# Patient Record
Sex: MALE | Race: BLACK OR AFRICAN AMERICAN | NOT HISPANIC OR LATINO | Employment: UNEMPLOYED | URBAN - METROPOLITAN AREA
[De-identification: names, ages, dates, MRNs, and addresses within clinical notes are randomized per-mention and may not be internally consistent; named-entity substitution may affect disease eponyms.]

---

## 2020-06-02 ENCOUNTER — TRANSCRIBE ORDERS (OUTPATIENT)
Dept: ADMINISTRATIVE | Facility: HOSPITAL | Age: 60
End: 2020-06-02

## 2020-06-02 ENCOUNTER — TRANSCRIBE ORDERS (OUTPATIENT)
Dept: LAB | Facility: HOSPITAL | Age: 60
End: 2020-06-02

## 2020-06-02 ENCOUNTER — APPOINTMENT (OUTPATIENT)
Dept: LAB | Facility: HOSPITAL | Age: 60
End: 2020-06-02
Payer: OTHER MISCELLANEOUS

## 2020-06-02 DIAGNOSIS — G89.4 CHRONIC PAIN SYNDROME: Primary | ICD-10-CM

## 2020-06-02 DIAGNOSIS — Z79.891 ENCOUNTER FOR LONG-TERM METHADONE USE: ICD-10-CM

## 2020-06-02 PROCEDURE — 80307 DRUG TEST PRSMV CHEM ANLYZR: CPT | Performed by: PAIN MEDICINE

## 2020-06-03 LAB
AMPHETAMINES UR QL SCN: NEGATIVE NG/ML
BARBITURATES UR QL SCN: NEGATIVE NG/ML
BENZODIAZ UR QL: NEGATIVE NG/ML
BZE UR QL: NEGATIVE NG/ML
CANNABINOIDS UR QL SCN: NEGATIVE NG/ML
METHADONE UR QL SCN: NEGATIVE NG/ML
OPIATES UR QL: NEGATIVE NG/ML
PCP UR QL: NEGATIVE NG/ML
PROPOXYPH UR QL SCN: NEGATIVE NG/ML

## 2020-09-22 ENCOUNTER — HOSPITAL ENCOUNTER (EMERGENCY)
Facility: HOSPITAL | Age: 60
Discharge: HOME | End: 2020-09-22
Attending: EMERGENCY MEDICINE | Admitting: EMERGENCY MEDICINE
Payer: COMMERCIAL

## 2020-09-22 VITALS
RESPIRATION RATE: 18 BRPM | DIASTOLIC BLOOD PRESSURE: 84 MMHG | SYSTOLIC BLOOD PRESSURE: 149 MMHG | OXYGEN SATURATION: 99 % | TEMPERATURE: 97.6 F | HEART RATE: 96 BPM

## 2020-09-22 DIAGNOSIS — M54.30 SCIATICA: Primary | ICD-10-CM

## 2020-09-22 DIAGNOSIS — G89.29 ACUTE EXACERBATION OF CHRONIC LOW BACK PAIN: ICD-10-CM

## 2020-09-22 DIAGNOSIS — M54.50 ACUTE EXACERBATION OF CHRONIC LOW BACK PAIN: ICD-10-CM

## 2020-09-22 PROCEDURE — 96372 THER/PROPH/DIAG INJ SC/IM: CPT

## 2020-09-22 PROCEDURE — 99284 EMERGENCY DEPT VISIT MOD MDM: CPT | Performed by: EMERGENCY MEDICINE

## 2020-09-22 PROCEDURE — 99283 EMERGENCY DEPT VISIT LOW MDM: CPT

## 2020-09-22 RX ORDER — NAPROXEN 500 MG/1
500 TABLET ORAL 2 TIMES DAILY WITH MEALS
Qty: 30 TABLET | Refills: 0 | Status: SHIPPED | OUTPATIENT
Start: 2020-09-22 | End: 2020-11-13 | Stop reason: DRUGHIGH

## 2020-09-22 RX ORDER — KETOROLAC TROMETHAMINE 30 MG/ML
30 INJECTION, SOLUTION INTRAMUSCULAR; INTRAVENOUS ONCE
Status: COMPLETED | OUTPATIENT
Start: 2020-09-22 | End: 2020-09-22

## 2020-09-22 RX ORDER — DIAZEPAM 5 MG/1
5 TABLET ORAL ONCE
Status: COMPLETED | OUTPATIENT
Start: 2020-09-22 | End: 2020-09-22

## 2020-09-22 RX ORDER — CYCLOBENZAPRINE HCL 10 MG
10 TABLET ORAL 2 TIMES DAILY PRN
Qty: 20 TABLET | Refills: 0 | Status: SHIPPED | OUTPATIENT
Start: 2020-09-22 | End: 2020-11-13

## 2020-09-22 RX ADMIN — DIAZEPAM 5 MG: 5 TABLET ORAL at 09:48

## 2020-09-22 RX ADMIN — KETOROLAC TROMETHAMINE 30 MG: 30 INJECTION, SOLUTION INTRAMUSCULAR at 09:48

## 2020-09-22 NOTE — ED PROVIDER NOTES
History  Chief Complaint   Patient presents with    Back Pain     chronic back pain since work injury last year, c/o lower back pain radiating to left leg        59-year-old gentleman presents complaint of recurrent back pains  He states that in 2019 he had a fall and since then has had recurrent and ongoing back pain issues  He does not currently have a family physician in this area as he does not have insurance coverage  He complains of pain that starts in his lumbar spine and radiates into his left lower extremity  Denies weakness but at times will have a mild numbness sensation  Denies any bowel or bladder incontinence issues or other acute neurologic complaints  Reports that in one week's time he has an appointment scheduled with his former primary care clinician and [place]  Back Pain   Location:  Lumbar spine  Quality:  Stabbing and stiffness  Radiates to:  L posterior upper leg and L thigh  Pain severity:  Severe  Onset quality:  Gradual  Timing:  Intermittent  Progression:  Waxing and waning  Chronicity:  Recurrent  Relieved by:  Nothing  Worsened by:  Palpation, movement, touching and twisting  Ineffective treatments: gabapentin - partial relief  Associated symptoms: no abdominal pain, no bladder incontinence, no bowel incontinence, no chest pain, no dysuria, no fever, no headaches, no numbness, no paresthesias (mild LLE at times - not currently), no perianal numbness and no weakness        None       Past Medical History:   Diagnosis Date    Back pain        History reviewed  No pertinent surgical history  History reviewed  No pertinent family history  I have reviewed and agree with the history as documented      E-Cigarette/Vaping     E-Cigarette/Vaping Substances     Social History     Tobacco Use    Smoking status: Never Smoker    Smokeless tobacco: Never Used   Substance Use Topics    Alcohol use: Not Currently    Drug use: Never       Review of Systems   Constitutional: Negative for activity change, chills, fatigue and fever  HENT: Negative  Negative for congestion, postnasal drip, rhinorrhea, sinus pain, sore throat and trouble swallowing  Eyes: Negative  Respiratory: Negative  Cardiovascular: Negative for chest pain  Gastrointestinal: Negative for abdominal pain, bowel incontinence, constipation, diarrhea, nausea and vomiting  Endocrine: Negative  Genitourinary: Negative  Negative for bladder incontinence and dysuria  Musculoskeletal: Positive for back pain  Negative for arthralgias, myalgias, neck pain and neck stiffness  Skin: Negative  Allergic/Immunologic: Negative  Neurological: Negative for weakness, numbness, headaches and paresthesias (mild LLE at times - not currently)  Hematological: Negative  Psychiatric/Behavioral: Negative  Physical Exam  Physical Exam  Vitals signs and nursing note reviewed  Constitutional:       General: He is not in acute distress  Appearance: He is well-developed  He is not diaphoretic  HENT:      Head: Normocephalic and atraumatic  Mouth/Throat:      Mouth: Mucous membranes are moist    Eyes:      Pupils: Pupils are equal, round, and reactive to light  Neck:      Musculoskeletal: Neck supple  Cardiovascular:      Rate and Rhythm: Normal rate and regular rhythm  Heart sounds: Normal heart sounds  Pulmonary:      Effort: Pulmonary effort is normal  No respiratory distress  Breath sounds: Normal breath sounds  Abdominal:      General: Bowel sounds are normal  There is no distension  Palpations: Abdomen is soft  Tenderness: There is no abdominal tenderness  There is no guarding  Musculoskeletal: Normal range of motion  Lumbar back: He exhibits tenderness and spasm  He exhibits no bony tenderness  Back:    Skin:     General: Skin is warm and dry  Capillary Refill: Capillary refill takes less than 2 seconds     Neurological:      General: No focal deficit present  Mental Status: He is alert and oriented to person, place, and time  Sensory: No sensory deficit  Motor: No weakness  Deep Tendon Reflexes: Reflexes normal    Psychiatric:         Mood and Affect: Mood normal          Behavior: Behavior normal          Vital Signs  ED Triage Vitals [09/22/20 0931]   Temperature Pulse Respirations Blood Pressure SpO2   97 6 °F (36 4 °C) 96 18 149/84 99 %      Temp Source Heart Rate Source Patient Position - Orthostatic VS BP Location FiO2 (%)   Tympanic Monitor Sitting Left arm --      Pain Score       --           Vitals:    09/22/20 0931   BP: 149/84   Pulse: 96   Patient Position - Orthostatic VS: Sitting         Visual Acuity      ED Medications  Medications   ketorolac (TORADOL) injection 30 mg (has no administration in time range)   diazepam (VALIUM) tablet 5 mg (5 mg Oral Given 9/22/20 0948)       Diagnostic Studies  Results Reviewed     None                 No orders to display              Procedures  Procedures         ED Course                                       MDM  Number of Diagnoses or Management Options  Acute exacerbation of chronic low back pain:   Sciatica:   Diagnosis management comments: 32-year-old gentleman presents with an acute exacerbation of his chronic low back pain  He does not currently have any red flag symptoms and already has a follow-up appointment scheduled in one weeks time with his primary care clinician  He is neurologically intact and we have put him in contact with registration to help him arrange for possible insurance coverage  He has been provided with medications here and was ambulatory out of the department        Disposition  Final diagnoses:   Sciatica   Acute exacerbation of chronic low back pain     Time reflects when diagnosis was documented in both MDM as applicable and the Disposition within this note     Time User Action Codes Description Comment    9/22/2020  9:41 AM Pardeep Muhammad Add [M54 9] Back pain     9/22/2020  9:41 AM [place] Clover Add [M54 30] Sciatica     9/22/2020  9:41 AM [place] Clover Add [M54 5,  G89 29] Acute exacerbation of chronic low back pain     9/22/2020  9:41 AM Arpan Vega Modify [M54 30] Sciatica     9/22/2020  9:41 AM Shantel Coma [M54 9] Back pain       ED Disposition     ED Disposition Condition Date/Time Comment    Discharge Stable Tue Sep 22, 2020  9:41 AM Rachna Comfort discharge to home/self care              Follow-up Information    None         Patient's Medications   Discharge Prescriptions    CYCLOBENZAPRINE (FLEXERIL) 10 MG TABLET    Take 1 tablet (10 mg total) by mouth 2 (two) times a day as needed for muscle spasms       Start Date: 9/22/2020 End Date: --       Order Dose: 10 mg       Quantity: 20 tablet    Refills: 0    NAPROXEN (NAPROSYN) 500 MG TABLET    Take 1 tablet (500 mg total) by mouth 2 (two) times a day with meals       Start Date: 9/22/2020 End Date: --       Order Dose: 500 mg       Quantity: 30 tablet    Refills: 0         PDMP Review     None          ED Provider  Electronically Signed by           Lorena Barrera DO  09/22/20 9305

## 2020-09-24 ENCOUNTER — TELEPHONE (OUTPATIENT)
Dept: PHYSICAL THERAPY | Facility: OTHER | Age: 60
End: 2020-09-24

## 2020-09-24 NOTE — TELEPHONE ENCOUNTER
Nurse reached out to discuss recent referral entered for  [facility] program and offerings  Nurse discussed calling site for pricing as he does not have current health insurance  Nurse also asked if he started the process yet as [facility] will allow pt to receive services prior to receiving card etc patient stated he was on the phone " a long time yesterday" about getting HI  Nurse stated she wanted to help him and call was disconnected by other party      Referral closed per protocol

## 2020-10-16 ENCOUNTER — OFFICE VISIT (OUTPATIENT)
Dept: FAMILY MEDICINE CLINIC | Facility: CLINIC | Age: 60
End: 2020-10-16

## 2020-10-16 VITALS
OXYGEN SATURATION: 99 % | HEART RATE: 89 BPM | TEMPERATURE: 97.8 F | RESPIRATION RATE: 18 BRPM | BODY MASS INDEX: 27.92 KG/M2 | SYSTOLIC BLOOD PRESSURE: 138 MMHG | DIASTOLIC BLOOD PRESSURE: 76 MMHG | HEIGHT: 70 IN | WEIGHT: 195 LBS

## 2020-10-16 DIAGNOSIS — M54.30 SCIATICA, UNSPECIFIED LATERALITY: ICD-10-CM

## 2020-10-16 DIAGNOSIS — M54.50 CHRONIC LOW BACK PAIN WITHOUT SCIATICA, UNSPECIFIED BACK PAIN LATERALITY: ICD-10-CM

## 2020-10-16 DIAGNOSIS — G89.29 CHRONIC LOW BACK PAIN WITHOUT SCIATICA, UNSPECIFIED BACK PAIN LATERALITY: ICD-10-CM

## 2020-10-16 DIAGNOSIS — N40.1 BENIGN PROSTATIC HYPERPLASIA WITH LOWER URINARY TRACT SYMPTOMS, SYMPTOM DETAILS UNSPECIFIED: ICD-10-CM

## 2020-10-16 DIAGNOSIS — I10 BENIGN ESSENTIAL HTN: Primary | ICD-10-CM

## 2020-10-16 DIAGNOSIS — E78.5 HYPERLIPIDEMIA, UNSPECIFIED HYPERLIPIDEMIA TYPE: ICD-10-CM

## 2020-10-16 DIAGNOSIS — E11.9 TYPE 2 DIABETES MELLITUS WITHOUT COMPLICATION, WITHOUT LONG-TERM CURRENT USE OF INSULIN (HCC): ICD-10-CM

## 2020-10-16 LAB — SL AMB POCT HEMOGLOBIN AIC: 6.2 (ref ?–6.5)

## 2020-10-16 PROCEDURE — 83036 HEMOGLOBIN GLYCOSYLATED A1C: CPT | Performed by: FAMILY MEDICINE

## 2020-10-16 PROCEDURE — 99202 OFFICE O/P NEW SF 15 MIN: CPT | Performed by: FAMILY MEDICINE

## 2020-10-16 RX ORDER — GABAPENTIN 800 MG/1
800 TABLET ORAL 3 TIMES DAILY
COMMUNITY
End: 2021-02-12 | Stop reason: SDUPTHER

## 2020-10-16 RX ORDER — ATORVASTATIN CALCIUM 10 MG/1
10 TABLET, FILM COATED ORAL DAILY
COMMUNITY
End: 2021-01-19 | Stop reason: SDUPTHER

## 2020-10-16 RX ORDER — HYDROCODONE BITARTRATE AND IBUPROFEN 7.5; 2 MG/1; MG/1
1 TABLET, FILM COATED ORAL EVERY 6 HOURS PRN
COMMUNITY
End: 2021-08-16 | Stop reason: SDUPTHER

## 2020-10-16 RX ORDER — AMLODIPINE BESYLATE 10 MG/1
10 TABLET ORAL DAILY
COMMUNITY
End: 2021-01-19 | Stop reason: SDUPTHER

## 2020-10-16 RX ORDER — TAMSULOSIN HYDROCHLORIDE 0.4 MG/1
0.4 CAPSULE ORAL
COMMUNITY
End: 2021-01-22 | Stop reason: SDUPTHER

## 2020-10-16 RX ORDER — CYCLOBENZAPRINE HCL 10 MG
10 TABLET ORAL 3 TIMES DAILY PRN
COMMUNITY
End: 2020-11-13 | Stop reason: SDUPTHER

## 2020-11-13 ENCOUNTER — APPOINTMENT (OUTPATIENT)
Dept: LAB | Facility: CLINIC | Age: 60
End: 2020-11-13
Payer: COMMERCIAL

## 2020-11-13 ENCOUNTER — OFFICE VISIT (OUTPATIENT)
Dept: FAMILY MEDICINE CLINIC | Facility: CLINIC | Age: 60
End: 2020-11-13

## 2020-11-13 VITALS
HEART RATE: 95 BPM | DIASTOLIC BLOOD PRESSURE: 80 MMHG | BODY MASS INDEX: 28.29 KG/M2 | HEIGHT: 70 IN | OXYGEN SATURATION: 97 % | SYSTOLIC BLOOD PRESSURE: 138 MMHG | WEIGHT: 197.6 LBS | RESPIRATION RATE: 18 BRPM | TEMPERATURE: 96.8 F

## 2020-11-13 DIAGNOSIS — Z12.5 SCREENING FOR PROSTATE CANCER: ICD-10-CM

## 2020-11-13 DIAGNOSIS — Z12.11 ENCOUNTER FOR SCREENING COLONOSCOPY: Primary | ICD-10-CM

## 2020-11-13 DIAGNOSIS — I10 BENIGN ESSENTIAL HTN: ICD-10-CM

## 2020-11-13 DIAGNOSIS — M54.30 SCIATICA, UNSPECIFIED LATERALITY: ICD-10-CM

## 2020-11-13 LAB
ALBUMIN SERPL BCP-MCNC: 4.2 G/DL (ref 3.5–5)
ALP SERPL-CCNC: 63 U/L (ref 46–116)
ALT SERPL W P-5'-P-CCNC: 45 U/L (ref 12–78)
ANION GAP SERPL CALCULATED.3IONS-SCNC: 5 MMOL/L (ref 4–13)
AST SERPL W P-5'-P-CCNC: 19 U/L (ref 5–45)
BASOPHILS # BLD AUTO: 0.02 THOUSANDS/ΜL (ref 0–0.1)
BASOPHILS NFR BLD AUTO: 0 % (ref 0–1)
BILIRUB SERPL-MCNC: 0.36 MG/DL (ref 0.2–1)
BUN SERPL-MCNC: 19 MG/DL (ref 5–25)
CALCIUM SERPL-MCNC: 9.6 MG/DL (ref 8.3–10.1)
CHLORIDE SERPL-SCNC: 107 MMOL/L (ref 100–108)
CO2 SERPL-SCNC: 28 MMOL/L (ref 21–32)
CREAT SERPL-MCNC: 1.19 MG/DL (ref 0.6–1.3)
EOSINOPHIL # BLD AUTO: 0.05 THOUSAND/ΜL (ref 0–0.61)
EOSINOPHIL NFR BLD AUTO: 1 % (ref 0–6)
ERYTHROCYTE [DISTWIDTH] IN BLOOD BY AUTOMATED COUNT: 13.2 % (ref 11.6–15.1)
GFR SERPL CREATININE-BSD FRML MDRD: 77 ML/MIN/1.73SQ M
GLUCOSE P FAST SERPL-MCNC: 102 MG/DL (ref 65–99)
HCT VFR BLD AUTO: 41.4 % (ref 36.5–49.3)
HGB BLD-MCNC: 14.6 G/DL (ref 12–17)
IMM GRANULOCYTES # BLD AUTO: 0.01 THOUSAND/UL (ref 0–0.2)
IMM GRANULOCYTES NFR BLD AUTO: 0 % (ref 0–2)
LYMPHOCYTES # BLD AUTO: 2.27 THOUSANDS/ΜL (ref 0.6–4.47)
LYMPHOCYTES NFR BLD AUTO: 48 % (ref 14–44)
MCH RBC QN AUTO: 29.8 PG (ref 26.8–34.3)
MCHC RBC AUTO-ENTMCNC: 35.3 G/DL (ref 31.4–37.4)
MCV RBC AUTO: 85 FL (ref 82–98)
MONOCYTES # BLD AUTO: 0.42 THOUSAND/ΜL (ref 0.17–1.22)
MONOCYTES NFR BLD AUTO: 9 % (ref 4–12)
NEUTROPHILS # BLD AUTO: 2.03 THOUSANDS/ΜL (ref 1.85–7.62)
NEUTS SEG NFR BLD AUTO: 42 % (ref 43–75)
NRBC BLD AUTO-RTO: 0 /100 WBCS
PLATELET # BLD AUTO: 261 THOUSANDS/UL (ref 149–390)
PMV BLD AUTO: 9.6 FL (ref 8.9–12.7)
POTASSIUM SERPL-SCNC: 3.9 MMOL/L (ref 3.5–5.3)
PROT SERPL-MCNC: 8 G/DL (ref 6.4–8.2)
RBC # BLD AUTO: 4.9 MILLION/UL (ref 3.88–5.62)
SODIUM SERPL-SCNC: 140 MMOL/L (ref 136–145)
WBC # BLD AUTO: 4.8 THOUSAND/UL (ref 4.31–10.16)

## 2020-11-13 PROCEDURE — 99213 OFFICE O/P EST LOW 20 MIN: CPT | Performed by: FAMILY MEDICINE

## 2020-11-13 PROCEDURE — 84153 ASSAY OF PSA TOTAL: CPT

## 2020-11-13 PROCEDURE — 80053 COMPREHEN METABOLIC PANEL: CPT

## 2020-11-13 PROCEDURE — 85025 COMPLETE CBC W/AUTO DIFF WBC: CPT

## 2020-11-13 PROCEDURE — 84154 ASSAY OF PSA FREE: CPT

## 2020-11-13 PROCEDURE — 36415 COLL VENOUS BLD VENIPUNCTURE: CPT

## 2020-11-13 RX ORDER — CYCLOBENZAPRINE HCL 10 MG
10 TABLET ORAL 3 TIMES DAILY PRN
Qty: 30 TABLET | Refills: 1 | Status: SHIPPED | OUTPATIENT
Start: 2020-11-13 | End: 2021-04-28 | Stop reason: SDUPTHER

## 2020-11-14 LAB
PSA FREE MFR SERPL: 21.4 %
PSA FREE SERPL-MCNC: 0.3 NG/ML
PSA SERPL-MCNC: 1.4 NG/ML (ref 0–4)

## 2020-11-17 ENCOUNTER — TELEPHONE (OUTPATIENT)
Dept: FAMILY MEDICINE CLINIC | Facility: CLINIC | Age: 60
End: 2020-11-17

## 2020-12-01 ENCOUNTER — TELEPHONE (OUTPATIENT)
Dept: FAMILY MEDICINE CLINIC | Facility: CLINIC | Age: 60
End: 2020-12-01

## 2020-12-02 ENCOUNTER — HOSPITAL ENCOUNTER (EMERGENCY)
Facility: HOSPITAL | Age: 60
Discharge: HOME | End: 2020-12-02
Attending: EMERGENCY MEDICINE | Admitting: EMERGENCY MEDICINE
Payer: COMMERCIAL

## 2020-12-02 VITALS
OXYGEN SATURATION: 100 % | BODY MASS INDEX: 28.32 KG/M2 | RESPIRATION RATE: 16 BRPM | TEMPERATURE: 97.9 F | HEART RATE: 84 BPM | DIASTOLIC BLOOD PRESSURE: 90 MMHG | SYSTOLIC BLOOD PRESSURE: 121 MMHG | WEIGHT: 197.4 LBS

## 2020-12-02 DIAGNOSIS — G89.29 CHRONIC BACK PAIN: Primary | ICD-10-CM

## 2020-12-02 DIAGNOSIS — M54.30 SCIATICA: ICD-10-CM

## 2020-12-02 DIAGNOSIS — M54.9 CHRONIC BACK PAIN: Primary | ICD-10-CM

## 2020-12-02 PROCEDURE — 96372 THER/PROPH/DIAG INJ SC/IM: CPT

## 2020-12-02 PROCEDURE — 99283 EMERGENCY DEPT VISIT LOW MDM: CPT

## 2020-12-02 PROCEDURE — 99283 EMERGENCY DEPT VISIT LOW MDM: CPT | Performed by: EMERGENCY MEDICINE

## 2020-12-02 RX ORDER — LIDOCAINE 50 MG/G
1 PATCH TOPICAL ONCE
Status: DISCONTINUED | OUTPATIENT
Start: 2020-12-02 | End: 2020-12-02 | Stop reason: HOSPADM

## 2020-12-02 RX ORDER — CYCLOBENZAPRINE HCL 10 MG
10 TABLET ORAL ONCE
Status: COMPLETED | OUTPATIENT
Start: 2020-12-02 | End: 2020-12-02

## 2020-12-02 RX ORDER — ACETAMINOPHEN 325 MG/1
975 TABLET ORAL ONCE
Status: COMPLETED | OUTPATIENT
Start: 2020-12-02 | End: 2020-12-02

## 2020-12-02 RX ORDER — KETOROLAC TROMETHAMINE 30 MG/ML
15 INJECTION, SOLUTION INTRAMUSCULAR; INTRAVENOUS ONCE
Status: COMPLETED | OUTPATIENT
Start: 2020-12-02 | End: 2020-12-02

## 2020-12-02 RX ADMIN — ACETAMINOPHEN 975 MG: 325 TABLET ORAL at 11:33

## 2020-12-02 RX ADMIN — KETOROLAC TROMETHAMINE 15 MG: 30 INJECTION, SOLUTION INTRAMUSCULAR; INTRAVENOUS at 11:33

## 2020-12-02 RX ADMIN — LIDOCAINE 1 PATCH: 50 PATCH CUTANEOUS at 11:36

## 2020-12-02 RX ADMIN — CYCLOBENZAPRINE HYDROCHLORIDE 10 MG: 10 TABLET, FILM COATED ORAL at 11:33

## 2020-12-03 DIAGNOSIS — Z12.11 ENCOUNTER FOR SCREENING COLONOSCOPY: Primary | ICD-10-CM

## 2020-12-08 ENCOUNTER — EVALUATION (OUTPATIENT)
Dept: PHYSICAL THERAPY | Facility: CLINIC | Age: 60
End: 2020-12-08
Payer: COMMERCIAL

## 2020-12-08 DIAGNOSIS — M54.30 SCIATICA, UNSPECIFIED LATERALITY: Primary | ICD-10-CM

## 2020-12-08 PROCEDURE — 97162 PT EVAL MOD COMPLEX 30 MIN: CPT

## 2020-12-11 ENCOUNTER — OFFICE VISIT (OUTPATIENT)
Dept: PHYSICAL THERAPY | Facility: CLINIC | Age: 60
End: 2020-12-11
Payer: COMMERCIAL

## 2020-12-11 DIAGNOSIS — M54.30 SCIATICA, UNSPECIFIED LATERALITY: Primary | ICD-10-CM

## 2020-12-11 PROCEDURE — 97530 THERAPEUTIC ACTIVITIES: CPT

## 2020-12-11 PROCEDURE — 97110 THERAPEUTIC EXERCISES: CPT

## 2020-12-14 ENCOUNTER — OFFICE VISIT (OUTPATIENT)
Dept: PHYSICAL THERAPY | Facility: CLINIC | Age: 60
End: 2020-12-14
Payer: COMMERCIAL

## 2020-12-14 DIAGNOSIS — M54.30 SCIATICA, UNSPECIFIED LATERALITY: Primary | ICD-10-CM

## 2020-12-14 PROCEDURE — 97110 THERAPEUTIC EXERCISES: CPT

## 2020-12-14 PROCEDURE — 97112 NEUROMUSCULAR REEDUCATION: CPT

## 2020-12-16 ENCOUNTER — OFFICE VISIT (OUTPATIENT)
Dept: PHYSICAL THERAPY | Facility: CLINIC | Age: 60
End: 2020-12-16
Payer: COMMERCIAL

## 2020-12-16 ENCOUNTER — TELEPHONE (OUTPATIENT)
Dept: FAMILY MEDICINE CLINIC | Facility: CLINIC | Age: 60
End: 2020-12-16

## 2020-12-16 DIAGNOSIS — M54.30 SCIATICA, UNSPECIFIED LATERALITY: Primary | ICD-10-CM

## 2020-12-16 PROCEDURE — 97150 GROUP THERAPEUTIC PROCEDURES: CPT

## 2020-12-16 PROCEDURE — 97010 HOT OR COLD PACKS THERAPY: CPT

## 2020-12-16 PROCEDURE — 97110 THERAPEUTIC EXERCISES: CPT

## 2020-12-16 PROCEDURE — 97530 THERAPEUTIC ACTIVITIES: CPT

## 2020-12-18 ENCOUNTER — OFFICE VISIT (OUTPATIENT)
Dept: PHYSICAL THERAPY | Facility: CLINIC | Age: 60
End: 2020-12-18
Payer: COMMERCIAL

## 2020-12-18 DIAGNOSIS — M54.30 SCIATICA, UNSPECIFIED LATERALITY: Primary | ICD-10-CM

## 2020-12-18 PROCEDURE — 97112 NEUROMUSCULAR REEDUCATION: CPT

## 2020-12-18 PROCEDURE — 97110 THERAPEUTIC EXERCISES: CPT

## 2020-12-18 PROCEDURE — 97530 THERAPEUTIC ACTIVITIES: CPT

## 2020-12-21 ENCOUNTER — OFFICE VISIT (OUTPATIENT)
Dept: PHYSICAL THERAPY | Facility: CLINIC | Age: 60
End: 2020-12-21
Payer: COMMERCIAL

## 2020-12-21 DIAGNOSIS — M54.30 SCIATICA, UNSPECIFIED LATERALITY: Primary | ICD-10-CM

## 2020-12-21 PROCEDURE — 97530 THERAPEUTIC ACTIVITIES: CPT

## 2020-12-21 PROCEDURE — 97112 NEUROMUSCULAR REEDUCATION: CPT

## 2020-12-21 PROCEDURE — 97110 THERAPEUTIC EXERCISES: CPT

## 2020-12-23 ENCOUNTER — OFFICE VISIT (OUTPATIENT)
Dept: PHYSICAL THERAPY | Facility: CLINIC | Age: 60
End: 2020-12-23
Payer: COMMERCIAL

## 2020-12-23 DIAGNOSIS — M54.30 SCIATICA, UNSPECIFIED LATERALITY: Primary | ICD-10-CM

## 2020-12-23 PROCEDURE — 97530 THERAPEUTIC ACTIVITIES: CPT

## 2020-12-23 PROCEDURE — 97112 NEUROMUSCULAR REEDUCATION: CPT

## 2020-12-24 ENCOUNTER — OFFICE VISIT (OUTPATIENT)
Dept: PHYSICAL THERAPY | Facility: CLINIC | Age: 60
End: 2020-12-24
Payer: COMMERCIAL

## 2020-12-24 DIAGNOSIS — M54.30 SCIATICA, UNSPECIFIED LATERALITY: Primary | ICD-10-CM

## 2020-12-24 PROCEDURE — 97530 THERAPEUTIC ACTIVITIES: CPT

## 2020-12-28 ENCOUNTER — OFFICE VISIT (OUTPATIENT)
Dept: PHYSICAL THERAPY | Facility: CLINIC | Age: 60
End: 2020-12-28
Payer: COMMERCIAL

## 2020-12-28 DIAGNOSIS — M54.30 SCIATICA, UNSPECIFIED LATERALITY: Primary | ICD-10-CM

## 2020-12-28 PROCEDURE — 97110 THERAPEUTIC EXERCISES: CPT

## 2020-12-28 PROCEDURE — 97112 NEUROMUSCULAR REEDUCATION: CPT

## 2020-12-30 ENCOUNTER — OFFICE VISIT (OUTPATIENT)
Dept: PHYSICAL THERAPY | Facility: CLINIC | Age: 60
End: 2020-12-30
Payer: COMMERCIAL

## 2020-12-30 DIAGNOSIS — M54.30 SCIATICA, UNSPECIFIED LATERALITY: Primary | ICD-10-CM

## 2020-12-30 PROCEDURE — 97112 NEUROMUSCULAR REEDUCATION: CPT

## 2020-12-30 PROCEDURE — 97530 THERAPEUTIC ACTIVITIES: CPT

## 2020-12-30 PROCEDURE — 97110 THERAPEUTIC EXERCISES: CPT

## 2020-12-31 ENCOUNTER — OFFICE VISIT (OUTPATIENT)
Dept: PHYSICAL THERAPY | Facility: CLINIC | Age: 60
End: 2020-12-31
Payer: COMMERCIAL

## 2020-12-31 DIAGNOSIS — M54.30 SCIATICA, UNSPECIFIED LATERALITY: Primary | ICD-10-CM

## 2020-12-31 PROCEDURE — 97112 NEUROMUSCULAR REEDUCATION: CPT

## 2020-12-31 PROCEDURE — 97110 THERAPEUTIC EXERCISES: CPT

## 2021-01-04 ENCOUNTER — APPOINTMENT (OUTPATIENT)
Dept: PHYSICAL THERAPY | Facility: CLINIC | Age: 61
End: 2021-01-04
Payer: COMMERCIAL

## 2021-01-06 ENCOUNTER — EVALUATION (OUTPATIENT)
Dept: PHYSICAL THERAPY | Facility: CLINIC | Age: 61
End: 2021-01-06
Payer: COMMERCIAL

## 2021-01-06 DIAGNOSIS — M54.30 SCIATICA, UNSPECIFIED LATERALITY: Primary | ICD-10-CM

## 2021-01-06 PROCEDURE — 97140 MANUAL THERAPY 1/> REGIONS: CPT

## 2021-01-06 PROCEDURE — 97110 THERAPEUTIC EXERCISES: CPT

## 2021-01-06 PROCEDURE — 97010 HOT OR COLD PACKS THERAPY: CPT

## 2021-01-06 NOTE — PROGRESS NOTES
PT Re-Evaluation     Today's date: 2021  Patient name: Kimi Nielsen  : 1960  MRN: [id number]  Referring provider: Mayur Braun MD  Dx:   Encounter Diagnosis     ICD-10-CM    1  Sciatica, unspecified laterality  M54 30                   Assessment  Assessment details: To date, Mr Rhianna Serrano has participated in a total of 12 skilled therapy sessions for tx of sciatica  Upon reassessment today, pt is making steady progress toward his goals  Objectively, he exhibits less pain during L/S AROM; improved stability during tandem stance TAMAR; increasing LE strength; decreased gait deviations; and dec TTP thru TAMAR iliac crests and QL  Subjectively, he reports an improved ability walking and notes reduced pain following days he attends therapy  He continues to report difficulty standing/walking prolonged periods of time which limits him from cooking and grocery shopping, stating that he requires frequent seated rest breaks while performing these activities  He also reports an improved ability completing bed mobility and now utilizes the log roll technique consistently  He still reports severe difficulty sleeping comfortably at night; negotiating stairs; donning his shoes/socks daily; and sitting comfortably as when driving for >62 minutes  He continues to present w/ strength deficits; gait deviations; mod dec and painful L/S AROM particularly during flex/ext; adverse neural tension thru the (L) sciatic nerve; decreased sensation thru the (L) LE; and TTP/inc tone thru the (R) L/S paraspinals  Marv would therefore benefit from 4 more weeks of PT intervention in order to address the aforementioned deficits so that he can return to his PLOF and function comfortably/safely in his home and surrounding environment    Impairments: abnormal gait, abnormal or restricted ROM, abnormal movement, activity intolerance, impaired balance, impaired physical strength, pain with function, poor posture  and poor body mechanics    Symptom irritability: moderateBarriers to therapy: (-) Chronicity of LBP; (+) motivated to improve  Understanding of Dx/Px/POC: good   Prognosis: good    Goals  STG 1: Pt will demonstrate compliance w/ HEP to supplement therapy in 1-2 weeks  MET  STG 2: Pt will report centralization of (L) LE pain by 50% in 2-4 weeks  NOT MET  STG 3: Pt will be able to sit comfortably for 1 hour to assist pt w/ ADLs such as driving in 2-4 weeks  PROGRESSING - 25 min  LTG 1: Pt will be able to sit comfortably for 2 hours in 6-8 weeks  PROGRESSING - 25 min  LTG 2: Pt will be able to walk 1 mile w/ the LRAD in 6-8 weeks  PROGRESSING - Pt able to walk about 7 min to car  LTG 3: Pt will be able to negotiate 1 flight of stairs w/ min difficulty in 6-8 weeks  NOT MET - pt reports continued difficulty  LTG 4: Pt will be able to roll over in bed w/ min difficulty in 6-8 weeks  PROGRESSING - pt reports improved ability since using log roll technique  LTG 5: Pt will be able to sleep comfortably at night w/out being woken by pain in 6-8 weeks  NOT MET  LTG 6: Pt will be able to don his shoes/socks w/ min difficulty in 6-8 weeks  NOT MET    Plan  Plan details: Reduce frequency to 2x/week and supplement therapy w/ HEP  Provide pt w/ updated HEP at [place]     Patient would benefit from: skilled physical therapy  Planned modality interventions: cryotherapy, TENS, traction and unattended electrical stimulation  Planned therapy interventions: abdominal trunk stabilization, self care, postural training, patient education, neuromuscular re-education, joint mobilization, manual therapy, massage, activity modification, balance, body mechanics training, breathing training, [place] taping, strengthening, stretching, therapeutic activities, coordination, flexibility, home exercise program, therapeutic exercise, functional ROM exercises and gait training  Frequency: 2x week  Duration in weeks: 4  Treatment plan discussed with: patient        Subjective Evaluation    History of Present Illness  Mechanism of injury: Pt states that since coming to therapy, there are times when he feels better and notes an improved ability walking  Pt continues to report severe burning pain thru the leg and back  He reports continued difficulty standing/walking prolonged periods of time as when cooking or grocery shopping  Pt also reports difficulty negotiating stairs daily and driving long distances  Pain  Current pain ratin  At best pain ratin  At worst pain ratin  Location: Pt reports pain across the LB as well as cramping/burning thru the (L) LE  Patient Goals  Patient goals for therapy: decreased pain  Patient goal: Pt would like to be able to get back to work  Objective     Concurrent Complaints  Positive for night pain and disturbed sleep  Negative for bladder dysfunction, bowel dysfunction and saddle (S4) numbness    Postural Observations    Additional Postural Observation Details  Rounded shoulders, slouched posture  Seated posture: Pt exhibits inc T/S kyphosis and dec L/s lordosis, inc lateral trunk lean  Correction of seated posture w/ use of L/S support improved symptoms  Palpation     Additional Palpation Details  Inc tone thru (R) L/S PS; mod-severe TTP thru TAMAR L/S PS; min TTP thru TAMAR iliac crests, QL       Neurological Testing     Sensation     Lumbar   Left   Diminished: light touch    Right   Intact: light touch    Comments   Left light touch: Diminished thru L3, L4    Active Range of Motion     Lumbar   Flexion:  with pain Restriction level: maximal  Extension:  with pain Restriction level: minimal  Left lateral flexion:  Restriction level: moderate  Right lateral flexion:  Restriction level: moderate  Left rotation:  Restriction level: minimal  Right rotation:  Restriction level: minimal    Strength/Myotome Testing     Left Hip   Planes of Motion   Flexion: 4  Abduction: 4- (Pain)    Right Hip   Planes of Motion   Flexion: 4+  Abduction: 4    Left Knee   Flexion: 4+  Extension: 4+    Right Knee   Flexion: 4+  Extension: 4+    Left Ankle/Foot   Dorsiflexion: 5  Plantar flexion: 5    Right Ankle/Foot   Dorsiflexion: 5  Plantar flexion: 5    Tests     Lumbar     Left   Positive passive SLR  Right   Negative passive SLR  General Comments:      Lumbar Comments  Tandem stance on (L): 20 secs, min sway, steady  Tandem stance on (R): 20 secs, min to no sway, steady  Gait: Dec speed, min antalgia on (L)  Precautions: Type 2 Diabetes; HTN; LBP; hyperlipidemia; benign prostate hyperplasia      Date 12/31 1/6 12/18 12/21 12/23 12/24 12/28 12/30   FOTO  XX    XX        Re-eval  XX               Manuals 12/31 1/6 12/18 12/21 12/23 12/24 12/28 12/30   BP             Re-eval  AL                                     Neuro Re-Ed 12/31 1/6 12/18 12/21 12/23 12/24 12/28 12/30   TVA Contractions  15x5" MHP   15x5" /c clam /c clam  5"x15 MPH D/c   SLR w/ TVA         10ea MHP    Supine march w/ TVA             H/L Clam w/ TVA     15x5" yellow 5"x30   Red 5"x30   Red      Slump nerve glides  nv           Mod front plank             Mod side plank             Palloff press          RTB 10x5" ea   Uni tband rows/ext w/ core RTB 10ea         RTB 10x ea   Shoulder taps w/ core on wall     10x ea   15x ea     Semi tandem stance on foam 20"x2ea         nv                Ther Ex 12/31 1/6 12/18 12/21 12/23 12/24 12/28 12/30   FIONA     3'         PPU     2x10 gentle        LTRs  10x10" MHP   10x10 ea 10"x10ea MHP 10"x10ea MHP  3' /c MHP    HS (S)**  4x15"w/ band supine w/ MHP   np 20"x5 MHP 10"x10ea  MHP  2' ea /c MHP supine    Piriformis (S)**     np        Bridges     10x  2x10   15x   Bridge w/ OH pulldown             Clams          20x   Sidelying hip abd  10ea         nv   KIM 10x /p Tx    10x     10x   Standing hip abd, ext 2x10ea green (B/L)    nv  10ea (B/L) green 15x ea      Leg press Ther Activity 12/31 1/6 12/18 12/21 12/23 12/24 12/28 12/30   Recumbent bike 6'     5' Lv1 5' L1 7'  7' Deferred  7'   Mini squats        x10     Sidesteps        1 laps     Fwd step ups nv            Lateral step ups nv            Standing hip hinge             Pt Edu     AL - HEP SP SP AL  AL- HEP review   Log roll      2' SP       Gait Training 12/31 1/6 12/18 12/21 12/23 12/24 12/28 12/30                             Modalities 12/31 1/6 12/18 12/21 12/23 12/24 12/28 12/30   MHP 5' post TE pre KIM 10' during Jono    Supine 5' + TE /c supine TE

## 2021-01-07 ENCOUNTER — HOSPITAL ENCOUNTER (EMERGENCY)
Facility: HOSPITAL | Age: 61
Discharge: HOME | End: 2021-01-07
Attending: EMERGENCY MEDICINE | Admitting: EMERGENCY MEDICINE
Payer: COMMERCIAL

## 2021-01-07 ENCOUNTER — APPOINTMENT (EMERGENCY)
Dept: RADIOLOGY | Facility: HOSPITAL | Age: 61
End: 2021-01-07
Payer: COMMERCIAL

## 2021-01-07 VITALS
OXYGEN SATURATION: 99 % | SYSTOLIC BLOOD PRESSURE: 134 MMHG | TEMPERATURE: 97.5 F | DIASTOLIC BLOOD PRESSURE: 79 MMHG | WEIGHT: 213.41 LBS | HEART RATE: 84 BPM | BODY MASS INDEX: 30.62 KG/M2 | RESPIRATION RATE: 18 BRPM

## 2021-01-07 DIAGNOSIS — M54.50 CHRONIC LOW BACK PAIN: ICD-10-CM

## 2021-01-07 DIAGNOSIS — R07.89 BURNING CHEST PAIN: Primary | ICD-10-CM

## 2021-01-07 DIAGNOSIS — G89.29 CHRONIC LOW BACK PAIN: ICD-10-CM

## 2021-01-07 LAB
ALBUMIN SERPL BCP-MCNC: 4.3 G/DL (ref 3–5.2)
ALP SERPL-CCNC: 63 U/L (ref 43–122)
ALT SERPL W P-5'-P-CCNC: 24 U/L (ref 9–52)
ANION GAP SERPL CALCULATED.3IONS-SCNC: 6 MMOL/L (ref 5–14)
AST SERPL W P-5'-P-CCNC: 23 U/L (ref 17–59)
BASOPHILS # BLD AUTO: 0 THOUSANDS/ΜL (ref 0–0.1)
BASOPHILS NFR BLD AUTO: 1 % (ref 0–1)
BILIRUB SERPL-MCNC: 0.5 MG/DL
BUN SERPL-MCNC: 14 MG/DL (ref 5–25)
CALCIUM SERPL-MCNC: 9.2 MG/DL (ref 8.4–10.2)
CHLORIDE SERPL-SCNC: 104 MMOL/L (ref 97–108)
CO2 SERPL-SCNC: 29 MMOL/L (ref 22–30)
CREAT SERPL-MCNC: 1.13 MG/DL (ref 0.7–1.5)
EOSINOPHIL # BLD AUTO: 0.1 THOUSAND/ΜL (ref 0–0.4)
EOSINOPHIL NFR BLD AUTO: 3 % (ref 0–6)
ERYTHROCYTE [DISTWIDTH] IN BLOOD BY AUTOMATED COUNT: 14 %
FLUAV RNA RESP QL NAA+PROBE: NEGATIVE
FLUBV RNA RESP QL NAA+PROBE: NEGATIVE
GFR SERPL CREATININE-BSD FRML MDRD: 82 ML/MIN/1.73SQ M
GLUCOSE SERPL-MCNC: 112 MG/DL (ref 70–99)
HCT VFR BLD AUTO: 39.8 % (ref 41–53)
HGB BLD-MCNC: 13.5 G/DL (ref 13.5–17.5)
LIPASE SERPL-CCNC: 133 U/L (ref 23–300)
LYMPHOCYTES # BLD AUTO: 2.3 THOUSANDS/ΜL (ref 0.5–4)
LYMPHOCYTES NFR BLD AUTO: 52 % (ref 25–45)
MCH RBC QN AUTO: 29.5 PG (ref 26–34)
MCHC RBC AUTO-ENTMCNC: 34 G/DL (ref 31–36)
MCV RBC AUTO: 87 FL (ref 80–100)
MONOCYTES # BLD AUTO: 0.3 THOUSAND/ΜL (ref 0.2–0.9)
MONOCYTES NFR BLD AUTO: 8 % (ref 1–10)
NEUTROPHILS # BLD AUTO: 1.6 THOUSANDS/ΜL (ref 1.8–7.8)
NEUTS SEG NFR BLD AUTO: 37 % (ref 45–65)
NT-PROBNP SERPL-MCNC: 21.5 PG/ML (ref 0–299)
PLATELET # BLD AUTO: 222 THOUSANDS/UL (ref 150–450)
PMV BLD AUTO: 7.3 FL (ref 8.9–12.7)
POTASSIUM SERPL-SCNC: 4 MMOL/L (ref 3.6–5)
PROT SERPL-MCNC: 7.6 G/DL (ref 5.9–8.4)
RBC # BLD AUTO: 4.59 MILLION/UL (ref 4.5–5.9)
RSV RNA RESP QL NAA+PROBE: NEGATIVE
SARS-COV-2 RNA RESP QL NAA+PROBE: NEGATIVE
SODIUM SERPL-SCNC: 139 MMOL/L (ref 137–147)
TROPONIN I SERPL-MCNC: <0.01 NG/ML (ref 0–0.03)
WBC # BLD AUTO: 4.4 THOUSAND/UL (ref 4.5–11)

## 2021-01-07 PROCEDURE — 83880 ASSAY OF NATRIURETIC PEPTIDE: CPT | Performed by: PHYSICIAN ASSISTANT

## 2021-01-07 PROCEDURE — 93005 ELECTROCARDIOGRAM TRACING: CPT

## 2021-01-07 PROCEDURE — 80053 COMPREHEN METABOLIC PANEL: CPT | Performed by: PHYSICIAN ASSISTANT

## 2021-01-07 PROCEDURE — 84484 ASSAY OF TROPONIN QUANT: CPT | Performed by: PHYSICIAN ASSISTANT

## 2021-01-07 PROCEDURE — 85025 COMPLETE CBC W/AUTO DIFF WBC: CPT | Performed by: PHYSICIAN ASSISTANT

## 2021-01-07 PROCEDURE — 71045 X-RAY EXAM CHEST 1 VIEW: CPT

## 2021-01-07 PROCEDURE — 99284 EMERGENCY DEPT VISIT MOD MDM: CPT | Performed by: PHYSICIAN ASSISTANT

## 2021-01-07 PROCEDURE — 0241U HB NFCT DS VIR RESP RNA 4 TRGT: CPT | Performed by: PHYSICIAN ASSISTANT

## 2021-01-07 PROCEDURE — 83690 ASSAY OF LIPASE: CPT | Performed by: PHYSICIAN ASSISTANT

## 2021-01-07 PROCEDURE — 96374 THER/PROPH/DIAG INJ IV PUSH: CPT

## 2021-01-07 PROCEDURE — 36415 COLL VENOUS BLD VENIPUNCTURE: CPT | Performed by: PHYSICIAN ASSISTANT

## 2021-01-07 PROCEDURE — 99285 EMERGENCY DEPT VISIT HI MDM: CPT

## 2021-01-07 RX ORDER — KETOROLAC TROMETHAMINE 30 MG/ML
15 INJECTION, SOLUTION INTRAMUSCULAR; INTRAVENOUS ONCE
Status: COMPLETED | OUTPATIENT
Start: 2021-01-07 | End: 2021-01-07

## 2021-01-07 RX ORDER — MAGNESIUM HYDROXIDE/ALUMINUM HYDROXICE/SIMETHICONE 120; 1200; 1200 MG/30ML; MG/30ML; MG/30ML
30 SUSPENSION ORAL ONCE
Status: COMPLETED | OUTPATIENT
Start: 2021-01-07 | End: 2021-01-07

## 2021-01-07 RX ORDER — METHOCARBAMOL 500 MG/1
500 TABLET, FILM COATED ORAL ONCE
Status: COMPLETED | OUTPATIENT
Start: 2021-01-07 | End: 2021-01-07

## 2021-01-07 RX ORDER — LIDOCAINE HYDROCHLORIDE 20 MG/ML
15 SOLUTION OROPHARYNGEAL ONCE
Status: COMPLETED | OUTPATIENT
Start: 2021-01-07 | End: 2021-01-07

## 2021-01-07 RX ORDER — ACETAMINOPHEN 325 MG/1
650 TABLET ORAL ONCE
Status: COMPLETED | OUTPATIENT
Start: 2021-01-07 | End: 2021-01-07

## 2021-01-07 RX ORDER — MELOXICAM 15 MG/1
15 TABLET ORAL DAILY
Qty: 20 TABLET | Refills: 0 | Status: SHIPPED | OUTPATIENT
Start: 2021-01-07 | End: 2021-09-10

## 2021-01-07 RX ORDER — LIDOCAINE HYDROCHLORIDE 20 MG/ML
JELLY TOPICAL AS NEEDED
Qty: 1 TUBE | Refills: 0 | Status: SHIPPED | OUTPATIENT
Start: 2021-01-07 | End: 2021-04-28 | Stop reason: SDUPTHER

## 2021-01-07 RX ORDER — ASPIRIN 81 MG/1
324 TABLET, CHEWABLE ORAL ONCE
Status: COMPLETED | OUTPATIENT
Start: 2021-01-07 | End: 2021-01-07

## 2021-01-07 RX ORDER — ALUMINUM HYDROXIDE, MAGNESIUM HYDROXIDE, SIMETHICONE 400; 400; 40 MG/10ML; MG/10ML; MG/10ML
15 SUSPENSION ORAL
Qty: 150 ML | Refills: 0 | Status: SHIPPED | OUTPATIENT
Start: 2021-01-07

## 2021-01-07 RX ORDER — LIDOCAINE 50 MG/G
1 PATCH TOPICAL ONCE
Status: DISCONTINUED | OUTPATIENT
Start: 2021-01-07 | End: 2021-01-07 | Stop reason: HOSPADM

## 2021-01-07 RX ADMIN — DIPHENHYDRAMINE HYDROCHLORIDE 25 MG: 25 LIQUID ORAL at 21:28

## 2021-01-07 RX ADMIN — METHOCARBAMOL TABLETS 500 MG: 500 TABLET, COATED ORAL at 20:45

## 2021-01-07 RX ADMIN — ALUMINUM HYDROXIDE, MAGNESIUM HYDROXIDE, AND SIMETHICONE 30 ML: 200; 200; 20 SUSPENSION ORAL at 21:30

## 2021-01-07 RX ADMIN — KETOROLAC TROMETHAMINE 15 MG: 30 INJECTION, SOLUTION INTRAMUSCULAR; INTRAVENOUS at 20:47

## 2021-01-07 RX ADMIN — LIDOCAINE HYDROCHLORIDE 15 ML: 20 SOLUTION ORAL; TOPICAL at 21:30

## 2021-01-07 RX ADMIN — LIDOCAINE 1 PATCH: 50 PATCH TOPICAL at 20:45

## 2021-01-07 RX ADMIN — ASPIRIN 81 MG CHEWABLE TABLET 324 MG: 81 TABLET CHEWABLE at 20:45

## 2021-01-07 RX ADMIN — ACETAMINOPHEN 650 MG: 325 TABLET ORAL at 20:45

## 2021-01-08 ENCOUNTER — OFFICE VISIT (OUTPATIENT)
Dept: PHYSICAL THERAPY | Facility: CLINIC | Age: 61
End: 2021-01-08
Payer: COMMERCIAL

## 2021-01-08 DIAGNOSIS — M54.30 SCIATICA, UNSPECIFIED LATERALITY: Primary | ICD-10-CM

## 2021-01-08 LAB
ATRIAL RATE: 89 BPM
P AXIS: 59 DEGREES
PR INTERVAL: 176 MS
QRS AXIS: 56 DEGREES
QRSD INTERVAL: 84 MS
QT INTERVAL: 364 MS
QTC INTERVAL: 442 MS
T WAVE AXIS: 6 DEGREES
VENTRICULAR RATE: 89 BPM

## 2021-01-08 PROCEDURE — 97112 NEUROMUSCULAR REEDUCATION: CPT

## 2021-01-08 PROCEDURE — 93010 ELECTROCARDIOGRAM REPORT: CPT | Performed by: INTERNAL MEDICINE

## 2021-01-08 PROCEDURE — 97530 THERAPEUTIC ACTIVITIES: CPT

## 2021-01-08 NOTE — ED PROCEDURE NOTE
Procedure  ECG 12 Lead Documentation Only    Date/Time: 1/7/2021 9:51 PM  Performed by: Baldev Triplett PA-C  Authorized by: Baldev Triplett PA-C     Indications / Diagnosis:  Burning chest pain  Patient location:  ED  Previous ECG:     Comparison to cardiac monitor: Yes    Interpretation:     Interpretation: normal    Rate:     ECG rate:  89    ECG rate assessment: normal    Rhythm:     Rhythm: sinus rhythm    Ectopy:     Ectopy: none    QRS:     QRS axis:  Normal    QRS intervals:  Normal  Conduction:     Conduction: normal    ST segments:     ST segments:  Normal  T waves:     T waves: normal    Comments:      qtc Shad 1923, FROYLAN  01/07/21 3630

## 2021-01-08 NOTE — ED PROVIDER NOTES
History  Chief Complaint   Patient presents with    Back Pain         Chest Pain     Patient is a 52-year-old male who presents today for evaluation of 2 complaints the 1st of which is a chronic left-sided low back pain which extends into his left leg at times and is managed with gabapentin and Flexeril patient reports he has not taken these medications today  Patient denies any direct traumatic inciting events, bowel or bladder incontinence, saddle anesthesia, numbness, tingling, weakness or paresthesias or paralysis associated with the pain  Patient denies any urinary symptoms or abdominal pain denies any nausea vomiting or diarrhea, fevers or chills  Patient reports no recent injections into the spine denies any IV drug use  Patient is also presenting today for sensation of "heartburn"  Patient describes a burning sensation in the midsternal area of his chest which he reports does radiate up into his throat  Patient reports he took Tums and sat up and drank some water and reports that this alleviated his pain  Patient denies any radiation of this pain into his back, the right-side chest or left-sided chest   Patient reports he has not had pain like this in the past denies any abdominal surgical history reports no burning/pain in his abdomen  Patient denies any palpitations or leg swelling recently  Patient reports no fevers or chills denies any shortness of breath         History provided by:  Patient   used: No    Back Pain  Location:  Lumbar spine  Quality:  Aching  Radiates to:  L posterior upper leg and L thigh  Pain severity:  Moderate  Pain is:  Same all the time  Onset quality:  Gradual  Associated symptoms: chest pain    Associated symptoms: no abdominal pain, no dysuria, no fever and no numbness    Chest Pain  Associated symptoms: back pain    Associated symptoms: no abdominal pain, no dizziness, no fatigue, no fever, no nausea, no numbness, no palpitations, no shortness of breath and not vomiting        Prior to Admission Medications   Prescriptions Last Dose Informant Patient Reported? Taking? HYDROcodone-ibuprofen (VICOPROFEN) 7 5-200 mg per tablet   Yes No   Sig: Take 1 tablet by mouth every 6 (six) hours as needed for moderate pain   amLODIPine (NORVASC) 10 mg tablet   Yes No   Sig: Take 10 mg by mouth daily   atorvastatin (LIPITOR) 10 mg tablet   Yes No   Sig: Take 10 mg by mouth daily   cyclobenzaprine (FLEXERIL) 10 mg tablet   No No   Sig: Take 1 tablet (10 mg total) by mouth 3 (three) times a day as needed for muscle spasms   gabapentin (NEURONTIN) 800 mg tablet  Self Yes No   Sig: Take 800 mg by mouth 3 (three) times a day   metFORMIN (GLUCOPHAGE) 500 mg tablet   Yes No   Sig: Take 500 mg by mouth 2 (two) times a day with meals   tamsulosin (FLOMAX) 0 4 mg   Yes No   Sig: Take 0 4 mg by mouth daily with dinner      Facility-Administered Medications: None       Past Medical History:   Diagnosis Date    Back pain        History reviewed  No pertinent surgical history  History reviewed  No pertinent family history  I have reviewed and agree with the history as documented  E-Cigarette/Vaping    E-Cigarette Use Never User      E-Cigarette/Vaping Substances     Social History     Tobacco Use    Smoking status: Never Smoker    Smokeless tobacco: Never Used   Substance Use Topics    Alcohol use: Not Currently    Drug use: Never       Review of Systems   Constitutional: Negative for chills, fatigue and fever  HENT: Negative for congestion, ear pain, rhinorrhea and sore throat  Eyes: Negative for redness  Respiratory: Negative for chest tightness and shortness of breath  Cardiovascular: Positive for chest pain  Negative for palpitations  Gastrointestinal: Negative for abdominal pain, nausea and vomiting  Genitourinary: Negative for dysuria and hematuria  Musculoskeletal: Positive for back pain  Skin: Negative for rash     Neurological: Negative for dizziness, syncope, light-headedness and numbness  Physical Exam  Physical Exam  Vitals signs and nursing note reviewed  Constitutional:       Appearance: Normal appearance  He is well-developed  HENT:      Head: Normocephalic and atraumatic  Eyes:      General: No scleral icterus  Pupils: Pupils are equal, round, and reactive to light  Neck:      Musculoskeletal: Normal range of motion  Cardiovascular:      Rate and Rhythm: Normal rate and regular rhythm  Pulses: Normal pulses  Pulmonary:      Effort: Pulmonary effort is normal  No respiratory distress  Breath sounds: No stridor  Abdominal:      General: There is no distension  Palpations: There is no mass  Musculoskeletal:      Lumbar back: He exhibits tenderness  Back:       Comments: No midline spinal tenderness, deformities, crepitus, step-off, skin changes noted to the area of pain  Skin:     General: Skin is warm and dry  Capillary Refill: Capillary refill takes less than 2 seconds  Coloration: Skin is not jaundiced  Neurological:      Mental Status: He is alert and oriented to person, place, and time        Gait: Gait normal    Psychiatric:         Mood and Affect: Mood normal          Vital Signs  ED Triage Vitals [01/07/21 2025]   Temperature Pulse Respirations Blood Pressure SpO2   97 5 °F (36 4 °C) 89 16 136/89 99 %      Temp Source Heart Rate Source Patient Position - Orthostatic VS BP Location FiO2 (%)   Tympanic Monitor Lying Left arm --      Pain Score       8           Vitals:    01/07/21 2025 01/07/21 2147   BP: 136/89 134/79   Pulse: 89 84   Patient Position - Orthostatic VS: Lying Lying         Visual Acuity      ED Medications  Medications   lidocaine (LIDODERM) 5 % patch 1 patch (1 patch Topical Medication Applied 1/7/21 2045)   aspirin chewable tablet 324 mg (324 mg Oral Given 1/7/21 2045)   ketorolac (TORADOL) injection 15 mg (15 mg Intravenous Given 1/7/21 2047)   acetaminophen (TYLENOL) tablet 650 mg (650 mg Oral Given 1/7/21 2045)   methocarbamol (ROBAXIN) tablet 500 mg (500 mg Oral Given 1/7/21 2045)   diphenhydrAMINE (BENADRYL) oral liquid 25 mg (25 mg Oral Given 1/7/21 2128)   Lidocaine Viscous HCl (XYLOCAINE) 2 % mucosal solution 15 mL (15 mL Swish & Swallow Given 1/7/21 2130)   aluminum-magnesium hydroxide-simethicone (MYLANTA) oral suspension 30 mL (30 mL Oral Given 1/7/21 2130)       Diagnostic Studies  Results Reviewed     Procedure Component Value Units Date/Time    COVID19, Influenza A/B, RSV PCR, [facility] [[id number]]  (Normal) Collected: 01/07/21 2039    Lab Status: Final result Specimen: Nares from Nasopharyngeal Swab Updated: 01/07/21 2136     SARS-CoV-2 Negative     INFLUENZA A PCR Negative     INFLUENZA B PCR Negative     RSV PCR Negative    Narrative: This test has been authorized by FDA under an EUA (Emergency Use Assay) for use by authorized laboratories  Clinical caution and judgement should be used with the interpretation of these results with consideration of the clinical impression and other laboratory testing  Testing reported as "Positive" or "Negative" has been proven to be accurate according to standard laboratory validation requirements  All testing is performed with control materials showing appropriate reactivity at standard intervals      Troponin I [[id number]]  (Normal) Collected: 01/07/21 2039    Lab Status: Final result Specimen: Blood from Arm, Right Updated: 01/07/21 2114     Troponin I <0 01 ng/mL     NT-BNP PRO [[id number]]  (Normal) Collected: 01/07/21 2039    Lab Status: Final result Specimen: Blood from Arm, Right Updated: 01/07/21 2114     NT-proBNP 21 5 pg/mL     Lipase [[id number]]  (Normal) Collected: 01/07/21 2039    Lab Status: Final result Specimen: Blood from Arm, Right Updated: 01/07/21 2108     Lipase 133 u/L     Narrative:      Hemolysis    Comprehensive metabolic panel [[id number]]  (Abnormal) Collected: 01/07/21 2039    Lab Status: Final result Specimen: Blood from Arm, Right Updated: 01/07/21 2108     Sodium 139 mmol/L      Potassium 4 0 mmol/L      Chloride 104 mmol/L      CO2 29 mmol/L      ANION GAP 6 mmol/L      BUN 14 mg/dL      Creatinine 1 13 mg/dL      Glucose 112 mg/dL      Calcium 9 2 mg/dL      AST 23 U/L      ALT 24 U/L      Alkaline Phosphatase 63 U/L      Total Protein 7 6 g/dL      Albumin 4 3 g/dL      Total Bilirubin 0 50 mg/dL      eGFR 82 ml/min/1 73sq m     Narrative:      Hemolysis  National Kidney Disease Foundation guidelines for Chronic Kidney Disease (CKD):     Stage 1 with normal or high GFR (GFR > 90 mL/min/1 73 square meters)    Stage 2 Mild CKD (GFR = 60-89 mL/min/1 73 square meters)    Stage 3A Moderate CKD (GFR = 45-59 mL/min/1 73 square meters)    Stage 3B Moderate CKD (GFR = 30-44 mL/min/1 73 square meters)    Stage 4 Severe CKD (GFR = 15-29 mL/min/1 73 square meters)    Stage 5 End Stage CKD (GFR <15 mL/min/1 73 square meters)  Note: GFR calculation is accurate only with a steady state creatinine    CBC and differential [[id number]]  (Abnormal) Collected: 01/07/21 2039    Lab Status: Final result Specimen: Blood from Arm, Right Updated: 01/07/21 2051     WBC 4 40 Thousand/uL      RBC 4 59 Million/uL      Hemoglobin 13 5 g/dL      Hematocrit 39 8 %      MCV 87 fL      MCH 29 5 pg      MCHC 34 0 g/dL      RDW 14 0 %      MPV 7 3 fL      Platelets 639 Thousands/uL      Neutrophils Relative 37 %      Lymphocytes Relative 52 %      Monocytes Relative 8 %      Eosinophils Relative 3 %      Basophils Relative 1 %      Neutrophils Absolute 1 60 Thousands/µL      Lymphocytes Absolute 2 30 Thousands/µL      Monocytes Absolute 0 30 Thousand/µL      Eosinophils Absolute 0 10 Thousand/µL      Basophils Absolute 0 00 Thousands/µL                  XR chest portable    (Results Pending)              Procedures  Procedures         ED Course  ED Course as of Jan 07 2151   Darleen Sidhu Jan 07, 2021 2138 Patient was reexamined at this time and informed of laboratory and/or imaging results and was found to be stable for discharge  Return to emergency department criteria was reviewed with the patient who verbalized understanding and was agreeable to discharge and the treatment plan at this time  HEART Risk Score      Most Recent Value   Heart Score Risk Calculator   History  0 Filed at: 01/07/2021 2115   ECG  0 Filed at: 01/07/2021 2115   Age  1 Filed at: 01/07/2021 2115   Risk Factors  1 Filed at: 01/07/2021 2115   Troponin  0 Filed at: 01/07/2021 2115   HEART Score  2 Filed at: 01/07/2021 2115                      SBIRT 22yo+      Most Recent Value   SBIRT (23 yo +)   In order to provide better care to our patients, we are screening all of our patients for alcohol and drug use  Would it be okay to ask you these screening questions? Unable to answer at this time Filed at: 01/07/2021 2057                    MDM  Number of Diagnoses or Management Options  Burning chest pain:   Chronic low back pain:   Diagnosis management comments: Denies history of severe or worsening lower extremity weakness/numbness  Denies history of saddle anesthesia/perineal anesthesia  Denies bowel or bladder incontinence/retention   History does not suggest diagnosis of cauda equina syndrome   Patient denies history of IVDA, denies history of fevers, no recent surgeries or any procedures to suggest a transient bacteremia leading to a diagnosis of subdural abscess  Denies history of blood thinner use with recent history of lumbar puncture or any violation of the epidural space to suggest history of epidural hematoma  Therefore these above diagnoses (cauda equina syndrome, epidural abscess, epidural hematoma) were not pursued with diagnostic imaging  All imaging and/or lab testing discussed with patient, strict return to ED precautions discussed  Patient recommended to follow up promptly with appropriate outpatient provider  Patient and/or family members verbalizes understanding and agrees with plan  Patient is stable for discharge      Portions of the record may have been created with voice recognition software  Occasional wrong word or "sound a like" substitutions may have occurred due to the inherent limitations of voice recognition software  Read the chart carefully and recognize, using context, where substitutions have occurred  Disposition  Final diagnoses:   Burning chest pain   Chronic low back pain     Time reflects when diagnosis was documented in both MDM as applicable and the Disposition within this note     Time User Action Codes Description Comment    1/7/2021  9:43 PM Jessica Ashlyn Add [R07 89] Burning chest pain     1/7/2021  9:43 PM Jessica Ashlyn Add [M54 5,  G89 29] Chronic low back pain       ED Disposition     ED Disposition Condition Date/Time Comment    Discharge Good u Jan 7, 2021  9:43 PM [employer] discharge to home/self care              Follow-up Information     Follow up With Specialties Details Why Contact Info    Opal Jacinto MD Family Medicine Schedule an appointment as soon as possible for a visit in 2 days As needed [facility] [address] [facility] [address]       [facility] Emergency Department Emergency Medicine  If symptoms worsen [address] [address] [phone]          Discharge Medication List as of 1/7/2021  9:45 PM      START taking these medications    Details   aluminum-magnesium hydroxide-simethicone (MAALOX) 200-200-20 MG/5ML SUSP Take 15 mL by mouth 4 (four) times a day (before meals and at bedtime), Starting u 1/7/2021, Normal      lidocaine (XYLOCAINE) 2 % topical gel Apply topically as needed (back pain), Starting u 1/7/2021, Normal      meloxicam (MOBIC) 15 mg tablet Take 1 tablet (15 mg total) by mouth daily, Starting Thu 1/7/2021, Normal         CONTINUE these medications which have NOT CHANGED    Details   amLODIPine (NORVASC) 10 mg tablet Take 10 mg by mouth daily, Historical Med      atorvastatin (LIPITOR) 10 mg tablet Take 10 mg by mouth daily, Historical Med      cyclobenzaprine (FLEXERIL) 10 mg tablet Take 1 tablet (10 mg total) by mouth 3 (three) times a day as needed for muscle spasms, Starting Fri 11/13/2020, Normal      gabapentin (NEURONTIN) 800 mg tablet Take 800 mg by mouth 3 (three) times a day, Historical Med      HYDROcodone-ibuprofen (VICOPROFEN) 7 5-200 mg per tablet Take 1 tablet by mouth every 6 (six) hours as needed for moderate pain, Historical Med      metFORMIN (GLUCOPHAGE) 500 mg tablet Take 500 mg by mouth 2 (two) times a day with meals, Historical Med      tamsulosin (FLOMAX) 0 4 mg Take 0 4 mg by mouth daily with dinner, Historical Med           No discharge procedures on file      PDMP Review     None          ED Provider  Electronically Signed by           Zackary Rodriguez PA-C  01/07/21 2149       Zackary Rodriguez PA-C  01/07/21 2659

## 2021-01-08 NOTE — PROGRESS NOTES
Daily Note     Today's date: 2021  Patient name: Nataly Draper  : 1960  MRN: [id number]  Referring provider: Diane Walsh MD  Dx:   Encounter Diagnosis     ICD-10-CM    1  Sciatica, unspecified laterality  M54 30                   Subjective: Pt states that he went the ER yesterday because he was experiencing burning thru his chest  He was given an IV of sorts which helped  This morning, pt states that he feels "a lot" better  Objective: See treatment diary below      Assessment: Tolerated treatment well  Added step ups and resumed squats to progress LE strength - pt fatigued by these additions  VC provided during today's tx session to improve upright posture when seated and occasionally to perform exercises w/ correct form  Provided pt updated HEP this AM and educated pt about goals of exercises/DOMs  Patient demonstrated fatigue post treatment, exhibited good technique with therapeutic exercises and would benefit from continued PT to progress core stab and functional LE strength to improve pt's tolerance to negotiating stairs daily in his home and community  Plan: Continue per plan of care  Progress treatment as tolerated  Precautions: Type 2 Diabetes; HTN; LBP; hyperlipidemia; benign prostate hyperplasia      Date    FOTO  XX           Re-eval  XX               Manuals    BP             Re-eval  AL                                     Neuro Re-Ed    TVA Contractions  15x5" MHP    /c clam /c clam  5"x15 MPH D/c   SLR w/ TVA         10ea MHP    Supine march w/ TVA             H/L Clam w/ TVA      5"x30   Red 5"x30   Red      Slump nerve glides  nv nv          Mod front plank             Mod side plank             Palloff press   GTB 10x5"       RTB 10x5" ea   Uni tband rows/ext w/ core RTB 10ea  GTB 10x ea       RTB 10x ea   Shoulder taps w/ core on wall        15x ea     Semi tandem stance on foam 20"x2ea  Tandem 2x20" ea       nv                Ther Ex 12/31 1/6 1/8 12/21 12/23 12/24 12/28 12/30   LTRs  10x10" MHP    10"x10ea MHP 10"x10ea MHP  3' /c MHP    HS (S)**  4x15"w/ band supine w/ MHP    20"x5 MHP 10"x10ea  MHP  2' ea /c MHP supine    Piriformis (S)**             Bridges       2x10   15x   Bridge w/ OH pulldown             Clams          20x   Sidelying hip abd  10ea         nv   KIM 10x /p Tx  nv       10x   Standing hip abd, ext 2x10ea green (B/L)  nv    10ea (B/L) green 15x ea      Leg press   nv                       Ther Activity 12/31 1/6 1/8 12/21 12/23 12/24 12/28 12/30   Recumbent bike 6'   10'   5' L1 7'  7' Deferred  7'   Mini squats   x10     x10     Sidesteps        1 laps     Fwd step ups nv  15x ea L1 Inc step         Lateral step ups nv  15x ea L1 Inc step         Standing hip hinge             Pt Edu   AL HEP   SP SP AL  AL- HEP review   Log roll      2' SP                    Gait Training 12/31 1/6 1/8 12/21 12/23 12/24 12/28 12/30                             Modalities 12/31 1/6 1/8 12/21 12/23 12/24 12/28 12/30   MHP 5' post TE pre KIM 10' during Jono    Supine 5' + TE /c supine TE

## 2021-01-11 ENCOUNTER — APPOINTMENT (OUTPATIENT)
Dept: PHYSICAL THERAPY | Facility: CLINIC | Age: 61
End: 2021-01-11
Payer: COMMERCIAL

## 2021-01-12 ENCOUNTER — CONSULT (OUTPATIENT)
Dept: SURGERY | Facility: CLINIC | Age: 61
End: 2021-01-12
Payer: COMMERCIAL

## 2021-01-12 VITALS
WEIGHT: 194 LBS | HEIGHT: 67 IN | TEMPERATURE: 99.2 F | BODY MASS INDEX: 30.45 KG/M2 | DIASTOLIC BLOOD PRESSURE: 84 MMHG | HEART RATE: 94 BPM | SYSTOLIC BLOOD PRESSURE: 132 MMHG

## 2021-01-12 DIAGNOSIS — Z12.11 ENCOUNTER FOR SCREENING COLONOSCOPY: ICD-10-CM

## 2021-01-12 PROCEDURE — 99204 OFFICE O/P NEW MOD 45 MIN: CPT | Performed by: SURGERY

## 2021-01-12 PROCEDURE — 3008F BODY MASS INDEX DOCD: CPT | Performed by: PHYSICIAN ASSISTANT

## 2021-01-12 PROCEDURE — 3079F DIAST BP 80-89 MM HG: CPT | Performed by: SURGERY

## 2021-01-12 PROCEDURE — 1036F TOBACCO NON-USER: CPT | Performed by: SURGERY

## 2021-01-12 PROCEDURE — 3008F BODY MASS INDEX DOCD: CPT | Performed by: SURGERY

## 2021-01-12 PROCEDURE — 3075F SYST BP GE 130 - 139MM HG: CPT | Performed by: SURGERY

## 2021-01-12 RX ORDER — HYDROCODONE BITARTRATE AND ACETAMINOPHEN 7.5; 325 MG/1; MG/1
1 TABLET ORAL EVERY 8 HOURS PRN
COMMUNITY
Start: 2020-12-11 | End: 2021-08-16 | Stop reason: SDUPTHER

## 2021-01-12 NOTE — PROGRESS NOTES
Assessment/Plan:  Discussed screening colonoscopy in detail including the bowel prep  Explained alternatives to colonoscopy  Discussed risks with procedure including abdominal discomfort/bloating, bleeding and very low risk perforation  Also explained the risk of missed adenoma due to poor bowel prep  Informed consent was obtained  Will schedule procedure within the next few weeks  He does not require any preoperative clearance, labs from  were reviewed  No problem-specific Assessment & Plan notes found for this encounter  Diagnoses and all orders for this visit:    Encounter for screening colonoscopy  -     Ambulatory referral to General Surgery    Other orders  -     HYDROcodone-acetaminophen (NORCO) 7 5-325 mg per tablet; Take 1 tablet by mouth every 8 (eight) hours as needed          Subjective:      Patient ID: Valeria Zuñiga is a 62 y o  male  He presents for screening colonoscopy  He has never had a colonoscopy before  He denies any melena or hematochezia  No abdominal pain or rectal pain  No constipation or diarrhea  He says his father  of colon cancer, diagnosed when he was in his 76s  The following portions of the patient's history were reviewed and updated as appropriate:   He  has a past medical history of Back pain  He   Patient Active Problem List    Diagnosis Date Noted    Benign essential HTN 10/16/2020    Chronic low back pain 10/16/2020    Hyperlipidemia 10/16/2020    Type 2 diabetes mellitus without complication, without long-term current use of insulin ([facility] [address] ) 10/16/2020    Benign prostatic hyperplasia with lower urinary tract symptoms 10/16/2020     He  has no past surgical history on file  His family history is not on file  He  reports that he has never smoked  He has never used smokeless tobacco  He reports previous alcohol use  He reports that he does not use drugs    Current Outpatient Medications   Medication Sig Dispense Refill    aluminum-magnesium hydroxide-simethicone (MAALOX) 200-200-20 MG/5ML SUSP Take 15 mL by mouth 4 (four) times a day (before meals and at bedtime) 150 mL 0    amLODIPine (NORVASC) 10 mg tablet Take 10 mg by mouth daily      atorvastatin (LIPITOR) 10 mg tablet Take 10 mg by mouth daily      cyclobenzaprine (FLEXERIL) 10 mg tablet Take 1 tablet (10 mg total) by mouth 3 (three) times a day as needed for muscle spasms 30 tablet 1    gabapentin (NEURONTIN) 800 mg tablet Take 800 mg by mouth 3 (three) times a day      HYDROcodone-acetaminophen (NORCO) 7 5-325 mg per tablet Take 1 tablet by mouth every 8 (eight) hours as needed      HYDROcodone-ibuprofen (VICOPROFEN) 7 5-200 mg per tablet Take 1 tablet by mouth every 6 (six) hours as needed for moderate pain      lidocaine (XYLOCAINE) 2 % topical gel Apply topically as needed (back pain) 1 Tube 0    meloxicam (MOBIC) 15 mg tablet Take 1 tablet (15 mg total) by mouth daily 20 tablet 0    metFORMIN (GLUCOPHAGE) 500 mg tablet Take 500 mg by mouth 2 (two) times a day with meals      tamsulosin (FLOMAX) 0 4 mg Take 0 4 mg by mouth daily with dinner       No current facility-administered medications for this visit        Current Outpatient Medications on File Prior to Visit   Medication Sig    aluminum-magnesium hydroxide-simethicone (MAALOX) 200-200-20 MG/5ML SUSP Take 15 mL by mouth 4 (four) times a day (before meals and at bedtime)    amLODIPine (NORVASC) 10 mg tablet Take 10 mg by mouth daily    atorvastatin (LIPITOR) 10 mg tablet Take 10 mg by mouth daily    cyclobenzaprine (FLEXERIL) 10 mg tablet Take 1 tablet (10 mg total) by mouth 3 (three) times a day as needed for muscle spasms    gabapentin (NEURONTIN) 800 mg tablet Take 800 mg by mouth 3 (three) times a day    HYDROcodone-acetaminophen (NORCO) 7 5-325 mg per tablet Take 1 tablet by mouth every 8 (eight) hours as needed    HYDROcodone-ibuprofen (VICOPROFEN) 7 5-200 mg per tablet Take 1 tablet by mouth every 6 (six) hours as needed for moderate pain    lidocaine (XYLOCAINE) 2 % topical gel Apply topically as needed (back pain)    meloxicam (MOBIC) 15 mg tablet Take 1 tablet (15 mg total) by mouth daily    metFORMIN (GLUCOPHAGE) 500 mg tablet Take 500 mg by mouth 2 (two) times a day with meals    tamsulosin (FLOMAX) 0 4 mg Take 0 4 mg by mouth daily with dinner     No current facility-administered medications on file prior to visit  He has No Known Allergies       Review of Systems   All other systems reviewed and are negative  Objective:      /84 (BP Location: Left arm, Patient Position: Sitting, Cuff Size: Adult)   Pulse 94   Temp 99 2 °F (37 3 °C) (Tympanic)   Ht 5' 7" (1 702 m)   Wt 88 kg (194 lb)   BMI 30 38 kg/m²          Physical Exam  Vitals signs reviewed  Constitutional:       General: He is not in acute distress  Appearance: Normal appearance  He is normal weight  HENT:      Head: Normocephalic and atraumatic  Eyes:      Extraocular Movements: Extraocular movements intact  Conjunctiva/sclera: Conjunctivae normal       Pupils: Pupils are equal, round, and reactive to light  Neck:      Musculoskeletal: Normal range of motion and neck supple  Cardiovascular:      Rate and Rhythm: Normal rate and regular rhythm  Pulmonary:      Effort: Pulmonary effort is normal  No respiratory distress  Breath sounds: Normal breath sounds  Abdominal:      General: Abdomen is flat  There is no distension  Palpations: Abdomen is soft  Tenderness: There is no abdominal tenderness  Musculoskeletal: Normal range of motion  General: No swelling or tenderness  Skin:     General: Skin is warm and dry  Neurological:      General: No focal deficit present  Mental Status: He is alert and oriented to person, place, and time  Psychiatric:         Mood and Affect: Mood normal          Behavior: Behavior normal          Thought Content:  Thought content normal          Judgment: Judgment normal

## 2021-01-13 ENCOUNTER — HOSPITAL ENCOUNTER (EMERGENCY)
Facility: HOSPITAL | Age: 61
Discharge: HOME | End: 2021-01-13
Attending: EMERGENCY MEDICINE
Payer: COMMERCIAL

## 2021-01-13 VITALS
OXYGEN SATURATION: 100 % | DIASTOLIC BLOOD PRESSURE: 95 MMHG | SYSTOLIC BLOOD PRESSURE: 150 MMHG | HEIGHT: 67 IN | BODY MASS INDEX: 29.93 KG/M2 | TEMPERATURE: 98.1 F | WEIGHT: 190.7 LBS | HEART RATE: 92 BPM | RESPIRATION RATE: 20 BRPM

## 2021-01-13 DIAGNOSIS — G89.29 CHRONIC BACK PAIN: Primary | ICD-10-CM

## 2021-01-13 DIAGNOSIS — M54.9 CHRONIC BACK PAIN: Primary | ICD-10-CM

## 2021-01-13 PROCEDURE — 99283 EMERGENCY DEPT VISIT LOW MDM: CPT

## 2021-01-13 PROCEDURE — 99282 EMERGENCY DEPT VISIT SF MDM: CPT | Performed by: PHYSICIAN ASSISTANT

## 2021-01-13 NOTE — ED NOTES
Pt was just tested on 1/7/21 for covid 19 to be re-tested     Chandrika Gorman, ENRIQUE  01/13/21 3914

## 2021-01-13 NOTE — ED PROVIDER NOTES
HPI: Patient is a 62 y o  male who presents with 4 days of myalgias which the patient describes at mild The patient has had contact with people with similar symptoms  The patient has not taken any medication  No Known Allergies    Past Medical History:   Diagnosis Date    Back pain       No past surgical history on file  Social History     Tobacco Use    Smoking status: Never Smoker    Smokeless tobacco: Never Used   Substance Use Topics    Alcohol use: Not Currently    Drug use: Never       Nursing notes reviewed  Physical Exam:  ED Triage Vitals [01/13/21 1333]   Temperature Pulse Respirations Blood Pressure SpO2   98 1 °F (36 7 °C) 92 20 150/95 100 %      Temp Source Heart Rate Source Patient Position - Orthostatic VS BP Location FiO2 (%)   Tympanic -- -- -- --      Pain Score       --           ROS: Positive for myalgia, the remainder of a 10 organ system ROS was otherwise unremarkable  General: awake, alert, no acute distress    Head: normocephalic, atraumatic    Eyes: no scleral icterus  Ears: external ears normal, hearing grossly intact  Nose: external exam grossly normal, negative nasal discharge  Neck: symmetric, No JVD noted, trachea midline  Pulmonary: no respiratory distress, no tachypnea noted  Cardiovascular: appears well perfused  Abdomen: no distention noted  Musculoskeletal: no deformities noted, tone normal  Neuro: grossly non-focal  Psych: mood and affect appropriate    60-year-old male with a past medical history moderate chronic back pain currently on hydrocodone, Lidoderm, meloxicam, and Flexeril presents to ED for request to have COVID-19 test secondary to worsening back pain  Patient's mother recently was positive COVID  Patient had COVID-19 test 4 days ago that was negative  Advised patient  That his symptoms are likely caused by worsening acute on chronic back pain    Also advised patient that if he was in close contact with his mother he is presumed positive and there be no need for further testing at this time  Patient otherwise has no other symptoms to include fever, cough, shortness of breath, dyspnea  Patient no acute distress  SpO2 100% on room air  The patient is stable and has a history and physical exam consistent with a viral illness  COVID19 testing has been performed  I considered the patient's other medical conditions as applicable/noted above in my medical decision making  The patient is stable upon discharge  The plan is for supportive care at home  The patient (and any family present) verbalized understanding of the discharge instructions and warnings that would necessitate return to the Emergency Department  All questions were answered prior to discharge  Medications - No data to display  Final diagnoses:   Chronic back pain     Time reflects when diagnosis was documented in both MDM as applicable and the Disposition within this note     Time User Action Codes Description Comment    1/13/2021  2:38 PM Isabela Byrd [M54 9,  G89 29] Chronic back pain       ED Disposition     ED Disposition Condition Date/Time Comment    Discharge Stable Wed Jan 13, 2021  2:38 PM Elvis Zurita discharge to home/self care  Follow-up Information     Follow up With Specialties Details Why Contact Info Additional [address] [facility]   [address]  [id number] [phone] [address] [facility], [address] [facility], [address] [place], [place], [id number] [phone]    [facility]   [address] [place] [address]  [id number]  700 [facility] and Pain [facility], Kaleb Hopkins [address] [place] [address], [place]        Patient's Medications   Discharge Prescriptions    No medications on file     No discharge procedures on file      Electronically Signed by Víctor Haskins PA-C  01/13/21 3244

## 2021-01-14 ENCOUNTER — TELEPHONE (OUTPATIENT)
Dept: FAMILY MEDICINE CLINIC | Facility: CLINIC | Age: 61
End: 2021-01-14

## 2021-01-15 ENCOUNTER — APPOINTMENT (OUTPATIENT)
Dept: PHYSICAL THERAPY | Facility: CLINIC | Age: 61
End: 2021-01-15
Payer: COMMERCIAL

## 2021-01-19 ENCOUNTER — TELEMEDICINE (OUTPATIENT)
Dept: FAMILY MEDICINE CLINIC | Facility: CLINIC | Age: 61
End: 2021-01-19

## 2021-01-19 ENCOUNTER — TELEPHONE (OUTPATIENT)
Dept: FAMILY MEDICINE CLINIC | Facility: CLINIC | Age: 61
End: 2021-01-19

## 2021-01-19 DIAGNOSIS — Z23 ENCOUNTER FOR IMMUNIZATION: ICD-10-CM

## 2021-01-19 DIAGNOSIS — E11.9 TYPE 2 DIABETES MELLITUS WITHOUT COMPLICATION, WITHOUT LONG-TERM CURRENT USE OF INSULIN (HCC): ICD-10-CM

## 2021-01-19 DIAGNOSIS — U07.1 COVID-19: Primary | ICD-10-CM

## 2021-01-19 DIAGNOSIS — I10 BENIGN ESSENTIAL HTN: ICD-10-CM

## 2021-01-19 PROCEDURE — 99214 OFFICE O/P EST MOD 30 MIN: CPT | Performed by: PHYSICIAN ASSISTANT

## 2021-01-19 RX ORDER — ALBUTEROL SULFATE 90 UG/1
2 AEROSOL, METERED RESPIRATORY (INHALATION) EVERY 6 HOURS PRN
Qty: 1 INHALER | Refills: 0 | Status: SHIPPED | OUTPATIENT
Start: 2021-01-19 | End: 2021-04-16 | Stop reason: SDUPTHER

## 2021-01-19 RX ORDER — AMLODIPINE BESYLATE 10 MG/1
10 TABLET ORAL DAILY
Qty: 90 TABLET | Refills: 0 | Status: SHIPPED | OUTPATIENT
Start: 2021-01-19 | End: 2021-02-12 | Stop reason: SDUPTHER

## 2021-01-19 RX ORDER — HYDROCODONE BITARTRATE AND ACETAMINOPHEN 7.5; 325 MG/1; MG/1
1 TABLET ORAL EVERY 8 HOURS PRN
Qty: 30 TABLET | Status: CANCELLED | OUTPATIENT
Start: 2021-01-19

## 2021-01-19 RX ORDER — GUAIFENESIN/DEXTROMETHORPHAN 100-10MG/5
5 SYRUP ORAL 3 TIMES DAILY PRN
Qty: 118 ML | Refills: 0 | Status: SHIPPED | OUTPATIENT
Start: 2021-01-19 | End: 2021-02-12

## 2021-01-19 RX ORDER — ATORVASTATIN CALCIUM 10 MG/1
10 TABLET, FILM COATED ORAL DAILY
Qty: 90 TABLET | Refills: 0 | Status: SHIPPED | OUTPATIENT
Start: 2021-01-19 | End: 2021-03-17

## 2021-01-19 RX ORDER — GABAPENTIN 800 MG/1
800 TABLET ORAL 3 TIMES DAILY
Status: CANCELLED | OUTPATIENT
Start: 2021-01-19

## 2021-01-19 NOTE — TELEPHONE ENCOUNTER
Called pt to schedule his next appts and he states he forgot to mention he needed fills on     HYDROcodone-acetaminophen       gabapentin (NEURONTIN) 800 mg

## 2021-01-19 NOTE — TELEPHONE ENCOUNTER
Neither med have been rx through here  I cant fill the hydrocodone    He needs to call his pain specialist

## 2021-01-19 NOTE — PROGRESS NOTES
COVID-19 Virtual Visit     Assessment/Plan:    Problem List Items Addressed This Visit        Endocrine    Type 2 diabetes mellitus without complication, without long-term current use of insulin (HCC)    Relevant Medications    atorvastatin (LIPITOR) 10 mg tablet    metFORMIN (GLUCOPHAGE) 500 mg tablet       Cardiovascular and Mediastinum    Benign essential HTN    Relevant Medications    amLODIPine (NORVASC) 10 mg tablet      Other Visit Diagnoses     COVID-19    -  Primary    Relevant Medications    dextromethorphan-guaiFENesin (ROBITUSSIN DM)  mg/5 mL syrup    albuterol (PROVENTIL HFA,VENTOLIN HFA) 90 mcg/act inhaler    Encounter for immunization        Relevant Orders    influenza vaccine, quadrivalent, recombinant, PF, 0 5 mL, for patients 18 yr+ (FLUBLOK)         Disposition:     I recommended self-quarantine for 10 days and to watch for symptoms until 14 days after exposure  If patient were to develop symptoms, they should self isolate and call our office for further guidance  To set up follow-up visit in another 2-3 days  Recommend using over-the-counter Robitussin also prescribed an inhaler and discussed how to use it today  He declined going for COVID testing since he is symptomatic and has had contact with someone who is positive  Recommend follow-up for diabetes in another month medication refills were sent to the pharmacy today  He is going to have someone else go to the pharmacy that has not had contact with him  the medications  I have spent 10 minutes directly with the patient  Greater than 50% of this time was spent in counseling/coordination of care regarding: risks and benefits of treatment options, instructions for management and impressions          Encounter provider Delano Wiggins PA-C    Provider located at [address] [place] [address]  [address] [place] [id number] [phone]    Recent Visits  Date Type Provider Dept   01/14/21 Telephone MD Haja Palafox   Showing recent visits within past 7 days and meeting all other requirements     Today's Visits  Date Type Provider Dept   01/19/21 Telemedicine FROYLAN Lam   Showing today's visits and meeting all other requirements     Future Appointments  No visits were found meeting these conditions  Showing future appointments within next 150 days and meeting all other requirements      This virtual check-in was done via Vaddio and patient was informed that this is a secure, HIPAA-compliant platform  He agrees to proceed  Patient agrees to participate in a virtual check in via telephone or video visit instead of presenting to the office to address urgent/immediate medical needs  Patient is aware this is a billable service  After connecting through [facility], the patient was identified by name and date of birth  Tessie Fernández was informed that this was a telemedicine visit and that the exam was being conducted confidentially over secure lines  My office door was closed  No one else was in the room  Tessie Fernández acknowledged consent and understanding of privacy and security of the telemedicine visit  I informed the patient that I have reviewed his record in Epic and presented the opportunity for him to ask any questions regarding the visit today  The patient agreed to participate  Subjective:   Tessie Fernández is a 62 y o  male who is concerned about COVID-19  Patient's symptoms include fever, chills, fatigue, anosmia, loss of taste, cough, shortness of breath, nausea, diarrhea, myalgias and headache  Patient denies vomiting       Date of symptom onset: 1/17/2021  Date of exposure: 1/19/2021    Exposure:   Contact with a person who is under investigation (PUI) for or who is positive for COVID-19 within the last 14 days?: Yes    Hospitalized recently for fever and/or lower respiratory symptoms?: No      Currently a healthcare worker that is involved in direct patient care?: No      Works in a special setting where the risk of COVID-19 transmission may be high? (this may include long-term care, correctional and longterm facilities; homeless shelters; assisted-living facilities and group homes ): No      Resident in a special setting where the risk of COVID-19 transmission may be high? (this may include long-term care, correctional and longterm facilities; homeless shelters; assisted-living facilities and group homes ): No      Patient has ongoing COVID exposure as his daughter is positive  Not currently taking any medication  He has chronic back pain which is now got worse and has had a lot of back stiffness  He does need refills of his medications for his diabetes and hypertension  He was supposed to come in for regular follow-up today but got sick  Lab Results   Component Value Date    SARSCOV2 Negative 01/07/2021    [address] [facility] [address] [facility] Not Detected 08/19/2020     Past Medical History:   Diagnosis Date    Back pain      No past surgical history on file    Current Outpatient Medications   Medication Sig Dispense Refill    albuterol (PROVENTIL HFA,VENTOLIN HFA) 90 mcg/act inhaler Inhale 2 puffs every 6 (six) hours as needed for wheezing or shortness of breath 1 Inhaler 0    aluminum-magnesium hydroxide-simethicone (MAALOX) 200-200-20 MG/5ML SUSP Take 15 mL by mouth 4 (four) times a day (before meals and at bedtime) 150 mL 0    amLODIPine (NORVASC) 10 mg tablet Take 1 tablet (10 mg total) by mouth daily 90 tablet 0    atorvastatin (LIPITOR) 10 mg tablet Take 1 tablet (10 mg total) by mouth daily 90 tablet 0    cyclobenzaprine (FLEXERIL) 10 mg tablet Take 1 tablet (10 mg total) by mouth 3 (three) times a day as needed for muscle spasms 30 tablet 1    dextromethorphan-guaiFENesin (ROBITUSSIN DM)  mg/5 mL syrup Take 5 mL by mouth 3 (three) times a day as needed for cough 118 mL 0    gabapentin (NEURONTIN) 800 mg tablet Take 800 mg by mouth 3 (three) times a day      HYDROcodone-acetaminophen (NORCO) 7 5-325 mg per tablet Take 1 tablet by mouth every 8 (eight) hours as needed      HYDROcodone-ibuprofen (VICOPROFEN) 7 5-200 mg per tablet Take 1 tablet by mouth every 6 (six) hours as needed for moderate pain      lidocaine (XYLOCAINE) 2 % topical gel Apply topically as needed (back pain) 1 Tube 0    meloxicam (MOBIC) 15 mg tablet Take 1 tablet (15 mg total) by mouth daily 20 tablet 0    metFORMIN (GLUCOPHAGE) 500 mg tablet Take 1 tablet (500 mg total) by mouth 2 (two) times a day with meals 180 tablet 0    tamsulosin (FLOMAX) 0 4 mg Take 0 4 mg by mouth daily with dinner       No current facility-administered medications for this visit  No Known Allergies    Review of Systems   Constitutional: Positive for chills, fatigue and fever  Respiratory: Positive for cough and shortness of breath  Cardiovascular: Negative for chest pain  Gastrointestinal: Positive for diarrhea and nausea  Negative for vomiting  Musculoskeletal: Positive for back pain and myalgias  Neurological: Positive for headaches  Objective: There were no vitals filed for this visit  Physical Exam  Constitutional:       Appearance: Normal appearance  HENT:      Head: Normocephalic and atraumatic  Right Ear: Ear canal normal       Left Ear: Ear canal normal       Nose: Nose normal    Eyes:      Conjunctiva/sclera: Conjunctivae normal    Neck:      Musculoskeletal: Normal range of motion  Comments: No visible masses    Pulmonary:      Effort: Pulmonary effort is normal  No respiratory distress  Neurological:      Mental Status: He is alert  Psychiatric:         Mood and Affect: Mood normal          Behavior: Behavior normal        VIRTUAL VISIT DISCLAIMER    Marv Castellon acknowledges that he has consented to an online visit or consultation   He understands that the online visit is based solely on information provided by him, and that, in the absence of a face-to-face physical evaluation by the physician, the diagnosis he receives is both limited and provisional in terms of accuracy and completeness  This is not intended to replace a full medical face-to-face evaluation by the physician  Kimi Nielsen understands and accepts these terms

## 2021-01-20 ENCOUNTER — TELEPHONE (OUTPATIENT)
Dept: FAMILY MEDICINE CLINIC | Facility: CLINIC | Age: 61
End: 2021-01-20

## 2021-01-22 ENCOUNTER — TELEMEDICINE (OUTPATIENT)
Dept: FAMILY MEDICINE CLINIC | Facility: CLINIC | Age: 61
End: 2021-01-22

## 2021-01-22 ENCOUNTER — APPOINTMENT (EMERGENCY)
Dept: RADIOLOGY | Facility: HOSPITAL | Age: 61
End: 2021-01-22
Payer: COMMERCIAL

## 2021-01-22 ENCOUNTER — HOSPITAL ENCOUNTER (EMERGENCY)
Facility: HOSPITAL | Age: 61
Discharge: HOME | End: 2021-01-22
Attending: EMERGENCY MEDICINE
Payer: COMMERCIAL

## 2021-01-22 VITALS
HEART RATE: 82 BPM | RESPIRATION RATE: 20 BRPM | DIASTOLIC BLOOD PRESSURE: 78 MMHG | BODY MASS INDEX: 29.81 KG/M2 | TEMPERATURE: 100.2 F | OXYGEN SATURATION: 96 % | WEIGHT: 190.3 LBS | SYSTOLIC BLOOD PRESSURE: 116 MMHG

## 2021-01-22 DIAGNOSIS — R06.02 SHORTNESS OF BREATH: ICD-10-CM

## 2021-01-22 DIAGNOSIS — N40.1 BENIGN PROSTATIC HYPERPLASIA WITH LOWER URINARY TRACT SYMPTOMS, SYMPTOM DETAILS UNSPECIFIED: ICD-10-CM

## 2021-01-22 DIAGNOSIS — J06.9 URI (UPPER RESPIRATORY INFECTION): Primary | ICD-10-CM

## 2021-01-22 DIAGNOSIS — Z20.822 CLOSE EXPOSURE TO COVID-19 VIRUS: ICD-10-CM

## 2021-01-22 DIAGNOSIS — R42 DIZZINESS: Primary | ICD-10-CM

## 2021-01-22 DIAGNOSIS — Z20.822 ENCOUNTER FOR LABORATORY TESTING FOR COVID-19 VIRUS: ICD-10-CM

## 2021-01-22 LAB
ALBUMIN SERPL BCP-MCNC: 4.1 G/DL (ref 3–5.2)
ALP SERPL-CCNC: 43 U/L (ref 43–122)
ALT SERPL W P-5'-P-CCNC: 31 U/L (ref 9–52)
ANION GAP SERPL CALCULATED.3IONS-SCNC: 6 MMOL/L (ref 5–14)
AST SERPL W P-5'-P-CCNC: 37 U/L (ref 17–59)
ATRIAL RATE: 90 BPM
BACTERIA UR QL AUTO: ABNORMAL /HPF
BASOPHILS # BLD AUTO: 0 THOUSANDS/ΜL (ref 0–0.1)
BASOPHILS NFR BLD AUTO: 0 % (ref 0–1)
BILIRUB SERPL-MCNC: 0.6 MG/DL
BILIRUB UR QL STRIP: NEGATIVE
BUN SERPL-MCNC: 19 MG/DL (ref 5–25)
CALCIUM SERPL-MCNC: 9.1 MG/DL (ref 8.4–10.2)
CHLORIDE SERPL-SCNC: 102 MMOL/L (ref 97–108)
CLARITY UR: CLEAR
CO2 SERPL-SCNC: 31 MMOL/L (ref 22–30)
COLOR UR: ABNORMAL
CREAT SERPL-MCNC: 1.2 MG/DL (ref 0.7–1.5)
EOSINOPHIL # BLD AUTO: 0 THOUSAND/ΜL (ref 0–0.4)
EOSINOPHIL NFR BLD AUTO: 0 % (ref 0–6)
ERYTHROCYTE [DISTWIDTH] IN BLOOD BY AUTOMATED COUNT: 13.3 %
GFR SERPL CREATININE-BSD FRML MDRD: 76 ML/MIN/1.73SQ M
GLUCOSE SERPL-MCNC: 122 MG/DL (ref 70–99)
GLUCOSE UR STRIP-MCNC: NEGATIVE MG/DL
HCT VFR BLD AUTO: 37.1 % (ref 41–53)
HGB BLD-MCNC: 12.6 G/DL (ref 13.5–17.5)
HGB UR QL STRIP.AUTO: 25
KETONES UR STRIP-MCNC: ABNORMAL MG/DL
LEUKOCYTE ESTERASE UR QL STRIP: NEGATIVE
LYMPHOCYTES # BLD AUTO: 1.3 THOUSANDS/ΜL (ref 0.5–4)
LYMPHOCYTES NFR BLD AUTO: 32 % (ref 25–45)
MCH RBC QN AUTO: 29.2 PG (ref 26–34)
MCHC RBC AUTO-ENTMCNC: 33.8 G/DL (ref 31–36)
MCV RBC AUTO: 86 FL (ref 80–100)
MONOCYTES # BLD AUTO: 0.4 THOUSAND/ΜL (ref 0.2–0.9)
MONOCYTES NFR BLD AUTO: 9 % (ref 1–10)
MUCOUS THREADS UR QL AUTO: ABNORMAL
NEUTROPHILS # BLD AUTO: 2.4 THOUSANDS/ΜL (ref 1.8–7.8)
NEUTS SEG NFR BLD AUTO: 59 % (ref 45–65)
NITRITE UR QL STRIP: NEGATIVE
NON-SQ EPI CELLS URNS QL MICRO: ABNORMAL /HPF
P AXIS: 57 DEGREES
PH UR STRIP.AUTO: 5 [PH]
PLATELET # BLD AUTO: 210 THOUSANDS/UL (ref 150–450)
PMV BLD AUTO: 7.8 FL (ref 8.9–12.7)
POTASSIUM SERPL-SCNC: 3.9 MMOL/L (ref 3.6–5)
PR INTERVAL: 176 MS
PROT SERPL-MCNC: 7.6 G/DL (ref 5.9–8.4)
PROT UR STRIP-MCNC: ABNORMAL MG/DL
QRS AXIS: 48 DEGREES
QRSD INTERVAL: 82 MS
QT INTERVAL: 348 MS
QTC INTERVAL: 425 MS
RBC # BLD AUTO: 4.31 MILLION/UL (ref 4.5–5.9)
RBC #/AREA URNS AUTO: ABNORMAL /HPF
SODIUM SERPL-SCNC: 139 MMOL/L (ref 137–147)
SP GR UR STRIP.AUTO: 1.02 (ref 1–1.04)
T WAVE AXIS: 23 DEGREES
UROBILINOGEN UA: NEGATIVE MG/DL
VENTRICULAR RATE: 90 BPM
WBC # BLD AUTO: 4.1 THOUSAND/UL (ref 4.5–11)
WBC #/AREA URNS AUTO: ABNORMAL /HPF

## 2021-01-22 PROCEDURE — 85025 COMPLETE CBC W/AUTO DIFF WBC: CPT | Performed by: EMERGENCY MEDICINE

## 2021-01-22 PROCEDURE — 36415 COLL VENOUS BLD VENIPUNCTURE: CPT | Performed by: EMERGENCY MEDICINE

## 2021-01-22 PROCEDURE — 93005 ELECTROCARDIOGRAM TRACING: CPT

## 2021-01-22 PROCEDURE — 1036F TOBACCO NON-USER: CPT | Performed by: PHYSICIAN ASSISTANT

## 2021-01-22 PROCEDURE — 99213 OFFICE O/P EST LOW 20 MIN: CPT | Performed by: PHYSICIAN ASSISTANT

## 2021-01-22 PROCEDURE — 96374 THER/PROPH/DIAG INJ IV PUSH: CPT

## 2021-01-22 PROCEDURE — 93010 ELECTROCARDIOGRAM REPORT: CPT | Performed by: INTERNAL MEDICINE

## 2021-01-22 PROCEDURE — 96361 HYDRATE IV INFUSION ADD-ON: CPT

## 2021-01-22 PROCEDURE — U0005 INFEC AGEN DETEC AMPLI PROBE: HCPCS | Performed by: EMERGENCY MEDICINE

## 2021-01-22 PROCEDURE — 99284 EMERGENCY DEPT VISIT MOD MDM: CPT

## 2021-01-22 PROCEDURE — 80053 COMPREHEN METABOLIC PANEL: CPT | Performed by: EMERGENCY MEDICINE

## 2021-01-22 PROCEDURE — 81001 URINALYSIS AUTO W/SCOPE: CPT | Performed by: EMERGENCY MEDICINE

## 2021-01-22 PROCEDURE — U0003 INFECTIOUS AGENT DETECTION BY NUCLEIC ACID (DNA OR RNA); SEVERE ACUTE RESPIRATORY SYNDROME CORONAVIRUS 2 (SARS-COV-2) (CORONAVIRUS DISEASE [COVID-19]), AMPLIFIED PROBE TECHNIQUE, MAKING USE OF HIGH THROUGHPUT TECHNOLOGIES AS DESCRIBED BY CMS-2020-01-R: HCPCS | Performed by: EMERGENCY MEDICINE

## 2021-01-22 PROCEDURE — 71045 X-RAY EXAM CHEST 1 VIEW: CPT

## 2021-01-22 PROCEDURE — 99285 EMERGENCY DEPT VISIT HI MDM: CPT | Performed by: EMERGENCY MEDICINE

## 2021-01-22 RX ORDER — BENZONATATE 100 MG/1
100 CAPSULE ORAL EVERY 8 HOURS
Qty: 21 CAPSULE | Refills: 0 | Status: SHIPPED | OUTPATIENT
Start: 2021-01-22 | End: 2021-02-12

## 2021-01-22 RX ORDER — TAMSULOSIN HYDROCHLORIDE 0.4 MG/1
0.4 CAPSULE ORAL
Qty: 90 CAPSULE | Refills: 0 | Status: SHIPPED | OUTPATIENT
Start: 2021-01-22 | End: 2021-04-28 | Stop reason: SDUPTHER

## 2021-01-22 RX ORDER — MULTIVIT WITH MINERALS/LUTEIN
1000 TABLET ORAL 2 TIMES DAILY
Qty: 28 TABLET | Refills: 0 | Status: SHIPPED | OUTPATIENT
Start: 2021-01-22 | End: 2021-03-17

## 2021-01-22 RX ORDER — ONDANSETRON 2 MG/ML
4 INJECTION INTRAMUSCULAR; INTRAVENOUS ONCE
Status: COMPLETED | OUTPATIENT
Start: 2021-01-22 | End: 2021-01-22

## 2021-01-22 RX ORDER — ONDANSETRON 4 MG/1
4 TABLET, ORALLY DISINTEGRATING ORAL EVERY 8 HOURS PRN
Qty: 20 TABLET | Refills: 0 | Status: SHIPPED | OUTPATIENT
Start: 2021-01-22 | End: 2021-02-12

## 2021-01-22 RX ORDER — MULTIVITAMIN
1 CAPSULE ORAL DAILY
Qty: 14 CAPSULE | Refills: 0 | Status: SHIPPED | OUTPATIENT
Start: 2021-01-22 | End: 2022-04-13

## 2021-01-22 RX ORDER — MELATONIN
2000 DAILY
Qty: 28 TABLET | Refills: 0 | Status: SHIPPED | OUTPATIENT
Start: 2021-01-22 | End: 2021-03-17

## 2021-01-22 RX ORDER — FLUTICASONE PROPIONATE 50 MCG
1 SPRAY, SUSPENSION (ML) NASAL DAILY
Qty: 16 G | Refills: 0 | Status: SHIPPED | OUTPATIENT
Start: 2021-01-22 | End: 2021-12-15 | Stop reason: SDUPTHER

## 2021-01-22 RX ORDER — ALBUTEROL SULFATE 90 UG/1
2 AEROSOL, METERED RESPIRATORY (INHALATION) EVERY 4 HOURS PRN
Qty: 1 INHALER | Refills: 0 | Status: SHIPPED | OUTPATIENT
Start: 2021-01-22 | End: 2021-11-09

## 2021-01-22 RX ORDER — ACETAMINOPHEN 325 MG/1
650 TABLET ORAL ONCE
Status: COMPLETED | OUTPATIENT
Start: 2021-01-22 | End: 2021-01-22

## 2021-01-22 RX ORDER — GUAIFENESIN 600 MG
1200 TABLET, EXTENDED RELEASE 12 HR ORAL EVERY 12 HOURS SCHEDULED
Qty: 30 TABLET | Refills: 0 | Status: SHIPPED | OUTPATIENT
Start: 2021-01-22 | End: 2021-03-17

## 2021-01-22 RX ADMIN — ONDANSETRON 4 MG: 2 INJECTION INTRAMUSCULAR; INTRAVENOUS at 12:26

## 2021-01-22 RX ADMIN — SODIUM CHLORIDE 1000 ML: 0.9 INJECTION, SOLUTION INTRAVENOUS at 12:31

## 2021-01-22 RX ADMIN — ACETAMINOPHEN 650 MG: 325 TABLET ORAL at 12:26

## 2021-01-22 NOTE — PROGRESS NOTES
COVID-19 Virtual Visit     Assessment/Plan:    Problem List Items Addressed This Visit        Genitourinary    Benign prostatic hyperplasia with lower urinary tract symptoms    Relevant Medications    tamsulosin (FLOMAX) 0 4 mg      Other Visit Diagnoses     Dizziness    -  Primary    Shortness of breath        Close exposure to COVID-19 virus             Disposition:     I referred patient to the Emergency Department at: [address] [facility] ED  I recommended continued isolation until at least 24 hours have passed since recovery defined as resolution of fever without the use of fever-reducing medications AND improvement in COVID symptoms AND 10 days have passed since onset of symptoms (or 10 days have passed since date of first positive viral diagnostic test for asymptomatic patients)  Patient will have someone else drive to ED declined 911  I have spent 15 minutes directly with the patient  Greater than 50% of this time was spent in counseling/coordination of care regarding: instructions for management and impressions  Encounter provider [address] [facility], FROYLAN    Provider located at [address] [address] [phone]    Recent Visits  Date Type Provider Dept   01/20/21 Telephone [facility]   01/19/21 Telephone [facility]   01/19/21 Telemedicine FROYLAN Lam [facility]   Showing recent visits within past 7 days and meeting all other requirements     Today's Visits  Date Type Provider Dept   01/22/21 Telemedicine FROYLAN Lam [facility]   Showing today's visits and meeting all other requirements     Future Appointments  No visits were found meeting these conditions     Showing future appointments within next 150 days and meeting all other requirements      This virtual check-in was done via eduPad and patient was informed that this is a secure, HIPAA-compliant platform  He agrees to proceed  Patient agrees to participate in a virtual check in via telephone or video visit instead of presenting to the office to address urgent/immediate medical needs  Patient is aware this is a billable service  After connecting through [facility], the patient was identified by name and date of birth  Stella De Guzman was informed that this was a telemedicine visit and that the exam was being conducted confidentially over secure lines  My office door was closed  No one else was in the room  Stella De Guzman acknowledged consent and understanding of privacy and security of the telemedicine visit  I informed the patient that I have reviewed his record in Epic and presented the opportunity for him to ask any questions regarding the visit today  The patient agreed to participate  Subjective:   Stella De Guzman is a 62 y o  male who has been screened for COVID-19  Symptom change since last report: unchanged  Patient's symptoms include fever, chills, fatigue, malaise, sore throat, shortness of breath, abdominal pain (deeels soreness) and headache  Patient denies nausea, vomiting and diarrhea  Lupe Mcpherson has been staying home and has isolated themselves in his home  He is taking care to not share personal items and is cleaning all surfaces that are touched often, like counters, tabletops, and doorknobs using household cleaning sprays or wipes  He is wearing a mask when he leaves his room  Date of symptom onset: 1/17/2021  Date of exposure: 1/19/2021    Patient is assumed positive based on multiple family contacts positive and his symptoms  He states SOB is about the same but weakness is worse and he has been dizzy  He fell multiple times last night       Lab Results   Component Value Date    SARSCOV2 Negative 01/07/2021    [address] [facility] [address] [facility] Not Detected 08/19/2020     Past Medical History:   Diagnosis Date    Back pain     Diabetes mellitus ([facility] [address] )     Hypertension      No past surgical history on file   No current facility-administered medications for this visit  Current Outpatient Medications   Medication Sig Dispense Refill    albuterol (PROVENTIL HFA,VENTOLIN HFA) 90 mcg/act inhaler Inhale 2 puffs every 6 (six) hours as needed for wheezing or shortness of breath 1 Inhaler 0    aluminum-magnesium hydroxide-simethicone (MAALOX) 200-200-20 MG/5ML SUSP Take 15 mL by mouth 4 (four) times a day (before meals and at bedtime) 150 mL 0    amLODIPine (NORVASC) 10 mg tablet Take 1 tablet (10 mg total) by mouth daily 90 tablet 0    atorvastatin (LIPITOR) 10 mg tablet Take 1 tablet (10 mg total) by mouth daily 90 tablet 0    cyclobenzaprine (FLEXERIL) 10 mg tablet Take 1 tablet (10 mg total) by mouth 3 (three) times a day as needed for muscle spasms 30 tablet 1    dextromethorphan-guaiFENesin (ROBITUSSIN DM)  mg/5 mL syrup Take 5 mL by mouth 3 (three) times a day as needed for cough 118 mL 0    gabapentin (NEURONTIN) 800 mg tablet Take 800 mg by mouth 3 (three) times a day      HYDROcodone-acetaminophen (NORCO) 7 5-325 mg per tablet Take 1 tablet by mouth every 8 (eight) hours as needed      HYDROcodone-ibuprofen (VICOPROFEN) 7 5-200 mg per tablet Take 1 tablet by mouth every 6 (six) hours as needed for moderate pain      lidocaine (XYLOCAINE) 2 % topical gel Apply topically as needed (back pain) 1 Tube 0    meloxicam (MOBIC) 15 mg tablet Take 1 tablet (15 mg total) by mouth daily 20 tablet 0    metFORMIN (GLUCOPHAGE) 500 mg tablet Take 1 tablet (500 mg total) by mouth 2 (two) times a day with meals 180 tablet 0    tamsulosin (FLOMAX) 0 4 mg Take 1 capsule (0 4 mg total) by mouth daily with dinner 90 capsule 0     No Known Allergies    Review of Systems   Constitutional: Positive for chills, fatigue and fever  HENT: Positive for sore throat  Respiratory: Positive for shortness of breath  Gastrointestinal: Positive for abdominal pain (deeels soreness)   Negative for diarrhea, nausea and vomiting  Neurological: Positive for dizziness and headaches  Objective: There were no vitals filed for this visit  Physical Exam  Constitutional:       Appearance: Normal appearance  HENT:      Head: Normocephalic and atraumatic  Right Ear: Ear canal normal       Left Ear: Ear canal normal       Nose: Nose normal    Eyes:      Conjunctiva/sclera: Conjunctivae normal    Neck:      Musculoskeletal: Normal range of motion  Comments: No visible masses    Pulmonary:      Effort: Pulmonary effort is normal  No respiratory distress  Neurological:      Mental Status: He is alert  Psychiatric:         Mood and Affect: Mood normal          Behavior: Behavior normal        VIRTUAL VISIT DISCLAIMER    Marv Nicole acknowledges that he has consented to an online visit or consultation  He understands that the online visit is based solely on information provided by him, and that, in the absence of a face-to-face physical evaluation by the physician, the diagnosis he receives is both limited and provisional in terms of accuracy and completeness  This is not intended to replace a full medical face-to-face evaluation by the physician  Leonie Reyes understands and accepts these terms

## 2021-01-22 NOTE — ED PROVIDER NOTES
History  Chief Complaint   Patient presents with    Flu Symptoms     pt c/o cough, chills and nausea  Spoke with his PCP who told him to come here  "My whole family has covid"       URI  Presenting symptoms: congestion, cough, fatigue, fever, rhinorrhea and sore throat    Severity:  Severe  Onset quality:  Gradual  Timing:  Constant  Progression:  Worsening  Chronicity:  New  Relieved by:  Nothing  Worsened by:  Nothing  Ineffective treatments:  OTC medications and inhaler  Associated symptoms: arthralgias, myalgias and sneezing    Associated symptoms: no sinus pain        Prior to Admission Medications   Prescriptions Last Dose Informant Patient Reported? Taking?    HYDROcodone-acetaminophen (NORCO) 7 5-325 mg per tablet   Yes No   Sig: Take 1 tablet by mouth every 8 (eight) hours as needed   HYDROcodone-ibuprofen (VICOPROFEN) 7 5-200 mg per tablet   Yes No   Sig: Take 1 tablet by mouth every 6 (six) hours as needed for moderate pain   albuterol (PROVENTIL HFA,VENTOLIN HFA) 90 mcg/act inhaler   No No   Sig: Inhale 2 puffs every 6 (six) hours as needed for wheezing or shortness of breath   aluminum-magnesium hydroxide-simethicone (MAALOX) 200-200-20 MG/5ML SUSP   No No   Sig: Take 15 mL by mouth 4 (four) times a day (before meals and at bedtime)   amLODIPine (NORVASC) 10 mg tablet   No No   Sig: Take 1 tablet (10 mg total) by mouth daily   atorvastatin (LIPITOR) 10 mg tablet   No No   Sig: Take 1 tablet (10 mg total) by mouth daily   cyclobenzaprine (FLEXERIL) 10 mg tablet   No No   Sig: Take 1 tablet (10 mg total) by mouth 3 (three) times a day as needed for muscle spasms   dextromethorphan-guaiFENesin (ROBITUSSIN DM)  mg/5 mL syrup   No No   Sig: Take 5 mL by mouth 3 (three) times a day as needed for cough   gabapentin (NEURONTIN) 800 mg tablet  Self Yes No   Sig: Take 800 mg by mouth 3 (three) times a day   lidocaine (XYLOCAINE) 2 % topical gel   No No   Sig: Apply topically as needed (back pain) meloxicam (MOBIC) 15 mg tablet   No No   Sig: Take 1 tablet (15 mg total) by mouth daily   metFORMIN (GLUCOPHAGE) 500 mg tablet   No No   Sig: Take 1 tablet (500 mg total) by mouth 2 (two) times a day with meals   tamsulosin (FLOMAX) 0 4 mg   No No   Sig: Take 1 capsule (0 4 mg total) by mouth daily with dinner      Facility-Administered Medications: None       Past Medical History:   Diagnosis Date    Back pain     Diabetes mellitus ([facility] [address] )     Hypertension        History reviewed  No pertinent surgical history  History reviewed  No pertinent family history  I have reviewed and agree with the history as documented  E-Cigarette/Vaping    E-Cigarette Use Never User      E-Cigarette/Vaping Substances     Social History     Tobacco Use    Smoking status: Never Smoker    Smokeless tobacco: Never Used   Substance Use Topics    Alcohol use: Not Currently    Drug use: Never       Review of Systems   Constitutional: Positive for activity change, appetite change, chills, fatigue and fever  HENT: Positive for congestion, postnasal drip, rhinorrhea, sneezing and sore throat  Negative for sinus pain and trouble swallowing  Eyes: Negative  Respiratory: Positive for cough and shortness of breath  Negative for chest tightness  Cardiovascular: Negative for chest pain  Gastrointestinal: Positive for diarrhea, nausea and vomiting  Negative for abdominal pain and constipation  Endocrine: Negative  Genitourinary: Negative  Musculoskeletal: Positive for arthralgias and myalgias  Negative for back pain  Skin: Negative  Allergic/Immunologic: Negative  Neurological: Negative  Hematological: Negative  Psychiatric/Behavioral: Negative  Physical Exam  Physical Exam  Vitals signs and nursing note reviewed  Constitutional:       General: He is not in acute distress  Appearance: Normal appearance  He is well-developed  He is ill-appearing  He is not toxic-appearing or diaphoretic  HENT:      Head: Normocephalic and atraumatic  Right Ear: Tympanic membrane and external ear normal       Left Ear: Tympanic membrane and external ear normal       Nose: Congestion and rhinorrhea present  Mouth/Throat:      Mouth: Mucous membranes are moist       Pharynx: Oropharynx is clear  Posterior oropharyngeal erythema present  No oropharyngeal exudate  Eyes:      Conjunctiva/sclera: Conjunctivae normal       Pupils: Pupils are equal, round, and reactive to light  Neck:      Musculoskeletal: Neck supple  Cardiovascular:      Rate and Rhythm: Normal rate and regular rhythm  Heart sounds: Normal heart sounds  Pulmonary:      Effort: Pulmonary effort is normal  No respiratory distress  Breath sounds: Normal breath sounds  No wheezing or rhonchi  Abdominal:      General: Bowel sounds are normal  There is no distension  Palpations: Abdomen is soft  Tenderness: There is no abdominal tenderness  There is no guarding  Musculoskeletal: Normal range of motion  Skin:     General: Skin is warm and dry  Capillary Refill: Capillary refill takes less than 2 seconds  Neurological:      General: No focal deficit present  Mental Status: He is alert and oriented to person, place, and time     Psychiatric:         Mood and Affect: Mood normal          Behavior: Behavior normal          Vital Signs  ED Triage Vitals [01/22/21 1156]   Temperature Pulse Respirations Blood Pressure SpO2   100 2 °F (37 9 °C) 102 20 119/82 97 %      Temp Source Heart Rate Source Patient Position - Orthostatic VS BP Location FiO2 (%)   Tympanic Monitor Lying Left arm --      Pain Score       --           Vitals:    01/22/21 1156 01/22/21 1429   BP: 119/82 116/78   Pulse: 102 82   Patient Position - Orthostatic VS: Lying Lying         Visual Acuity      ED Medications  Medications   sodium chloride 0 9 % bolus 1,000 mL (1,000 mL Intravenous New Bag 1/22/21 1231)   acetaminophen (TYLENOL) tablet 650 mg (650 mg Oral Given 1/22/21 1226)   ondansetron (ZOFRAN) injection 4 mg (4 mg Intravenous Given 1/22/21 1226)       Diagnostic Studies  Results Reviewed     Procedure Component Value Units Date/Time    UA (URINE) with reflex to Scope [[id number]]  (Abnormal) Collected: 01/22/21 1233    Lab Status: Final result Specimen: Urine, Clean Catch Updated: 01/22/21 1341     Color, UA Brown     Clarity, UA Clear     Specific [place], UA 1 020     pH, UA 5 0     Leukocytes, UA Negative     Nitrite, UA Negative     Protein, UA 30 (1+) mg/dl      Glucose, UA Negative mg/dl      Ketones, UA 5 (Trace) mg/dl      Bilirubin, UA Negative     Blood, UA 25 0     UROBILINOGEN UA Negative mg/dL     Urine Microscopic [[id number]] Collected: 01/22/21 1233    Lab Status: In process Specimen: Urine, Clean Catch Updated: 01/22/21 1341    Novel Coronavirus [facility] [[id number]] Collected: 01/22/21 1217    Lab Status:  In process Specimen: Nares from Nasopharyngeal Swab Updated: 01/22/21 1329    CBC and differential [[id number]]  (Abnormal) Collected: 01/22/21 1217    Lab Status: Final result Specimen: Blood from Arm, Right Updated: 01/22/21 1324     WBC 4 10 Thousand/uL      RBC 4 31 Million/uL      Hemoglobin 12 6 g/dL      Hematocrit 37 1 %      MCV 86 fL      MCH 29 2 pg      MCHC 33 8 g/dL      RDW 13 3 %      MPV 7 8 fL      Platelets 826 Thousands/uL      Neutrophils Relative 59 %      Lymphocytes Relative 32 %      Monocytes Relative 9 %      Eosinophils Relative 0 %      Basophils Relative 0 %      Neutrophils Absolute 2 40 Thousands/µL      Lymphocytes Absolute 1 30 Thousands/µL      Monocytes Absolute 0 40 Thousand/µL      Eosinophils Absolute 0 00 Thousand/µL      Basophils Absolute 0 00 Thousands/µL     Comprehensive metabolic panel [[id number]]  (Abnormal) Collected: 01/22/21 1217    Lab Status: Final result Specimen: Blood from Arm, Right Updated: 01/22/21 1323     Sodium 139 mmol/L      Potassium 3 9 mmol/L      Chloride 102 mmol/L      CO2 31 mmol/L      ANION GAP 6 mmol/L      BUN 19 mg/dL      Creatinine 1 20 mg/dL      Glucose 122 mg/dL      Calcium 9 1 mg/dL      AST 37 U/L      ALT 31 U/L      Alkaline Phosphatase 43 U/L      Total Protein 7 6 g/dL      Albumin 4 1 g/dL      Total Bilirubin 0 60 mg/dL      eGFR 76 ml/min/1 73sq m     Narrative:      [place] guidelines for Chronic Kidney Disease (CKD):     Stage 1 with normal or high GFR (GFR > 90 mL/min/1 73 square meters)    Stage 2 Mild CKD (GFR = 60-89 mL/min/1 73 square meters)    Stage 3A Moderate CKD (GFR = 45-59 mL/min/1 73 square meters)    Stage 3B Moderate CKD (GFR = 30-44 mL/min/1 73 square meters)    Stage 4 Severe CKD (GFR = 15-29 mL/min/1 73 square meters)    Stage 5 End Stage CKD (GFR <15 mL/min/1 73 square meters)  Note: GFR calculation is accurate only with a steady state creatinine                 XR chest 1 view portable   Final Result by Nesha Mcfarlane MD (01/22 7072)   No acute cardiopulmonary disease  Findings are stable            Workstation performed: [id number]                    Procedures  ECG 12 Lead Documentation Only    Date/Time: 1/22/2021 12:20 PM  Performed by: Óscar Garza DO  Authorized by: Óscar Garza DO     ECG reviewed by me, the ED Provider: yes    Patient location:  ED  Rate:     ECG rate:  90    ECG rate assessment: normal    Rhythm:     Rhythm: sinus rhythm    Ectopy:     Ectopy: PAC    QRS:     QRS axis:  Normal  ST segments:     ST segments:  Normal  T waves:     T waves: non-specific               ED Course                             SBIRT 22yo+      Most Recent Value   SBIRT (24 yo +)   In order to provide better care to our patients, we are screening all of our patients for alcohol and drug use  Would it be okay to ask you these screening questions? No Filed at: 01/22/2021 1233   Initial Alcohol Screen: US AUDIT-C    1   How often do you have a drink containing alcohol?  0 Filed at: 01/22/2021 1233   2  How many drinks containing alcohol do you have on a typical day you are drinking? 0 Filed at: 01/22/2021 1233   3a  Male UNDER 65: How often do you have five or more drinks on one occasion? 0 Filed at: 01/22/2021 1233   3b  FEMALE Any Age, or MALE 65+: How often do you have 4 or more drinks on one occassion? 0 Filed at: 01/22/2021 1233   Audit-C Score  0 Filed at: 01/22/2021 1233   DANIELLE: How many times in the past year have you    Used an illegal drug or used a prescription medication for non-medical reasons? Never Filed at: 01/22/2021 1233                    MDM  Number of Diagnoses or Management Options  Encounter for laboratory testing for COVID-19 virus:   URI (upper respiratory infection):   Diagnosis management comments: 52-year-old gentleman presents with COVID like symptoms  He reports that his entire family recently was diagnosed with COVID  He complains of generalized aches and pains along with URI symptoms  His vital signs are stable including his oxygen level  Patient's labs showed mild leukopenia and his chest x-ray was clear  Patient was sleeping soundly in no acute distress on my re-evaluation  Discussed need for follow-up with supportive care measures and expected clinical course         Amount and/or Complexity of Data Reviewed  Clinical lab tests: ordered and reviewed  Tests in the radiology section of CPT®: ordered and reviewed  Independent visualization of images, tracings, or specimens: yes        Disposition  Final diagnoses:   URI (upper respiratory infection)   Encounter for laboratory testing for COVID-19 virus     Time reflects when diagnosis was documented in both MDM as applicable and the Disposition within this note     Time User Action Codes Description Comment    1/22/2021  2:19 PM Chase Fernandez Add [J06 9] URI (upper respiratory infection)     1/22/2021  2:19 PM Chase Fernandez Add [Z20 822] Encounter for screening laboratory testing for COVID-19 virus 1/22/2021  2:19 PM Raynold Favorite Remove [Z20 822] Encounter for screening laboratory testing for COVID-19 virus     1/22/2021  2:19 PM Raynold Favorite Add [U10 [id number]] Encounter for laboratory testing for COVID-19 virus       ED Disposition     ED Disposition Condition Date/Time Comment    Discharge Stable Fri Jan 22, 2021  2:19 PM Valeria Dom discharge to home/self care              Follow-up Information     Follow up With Specialties Details Why Contact Info Additional [address] [facility] Medicine Call   [address]  [address] [facility]  [address]        [facility] Emergency Medicine   [address] [facility] [phone] [address] [facility]          Patient's Medications   Discharge Prescriptions    ALBUTEROL (PROVENTIL HFA,VENTOLIN HFA) 90 MCG/ACT INHALER    Inhale 2 puffs every 4 (four) hours as needed for wheezing       Start Date: 1/22/2021 End Date: --       Order Dose: 2 puffs       Quantity: 1 Inhaler    Refills: 0    ASCORBIC ACID (VITAMIN C) 1000 MG TABLET    Take 1 tablet (1,000 mg total) by mouth 2 (two) times a day for 14 days       Start Date: 1/22/2021 End Date: 2/5/2021       Order Dose: 1,000 mg       Quantity: 28 tablet    Refills: 0    BENZONATATE (TESSALON PERLES) 100 MG CAPSULE    Take 1 capsule (100 mg total) by mouth every 8 (eight) hours       Start Date: 1/22/2021 End Date: --       Order Dose: 100 mg       Quantity: 21 capsule    Refills: 0    CHOLECALCIFEROL (VITAMIN D3) 1,000 UNITS TABLET    Take 2 tablets (2,000 Units total) by mouth daily for 14 days       Start Date: 1/22/2021 End Date: 2/5/2021       Order Dose: 2,000 Units       Quantity: 28 tablet    Refills: 0    FLUTICASONE (FLONASE) 50 MCG/ACT NASAL SPRAY    1 spray into each nostril daily       Start Date: 1/22/2021 End Date: --       Order Dose: 1 spray       Quantity: 16 g Refills: 0    GUAIFENESIN (MUCINEX) 600 MG 12 HR TABLET    Take 2 tablets (1,200 mg total) by mouth every 12 (twelve) hours       Start Date: 1/22/2021 End Date: --       Order Dose: 1,200 mg       Quantity: 30 tablet    Refills: 0    MENTHOL-CETYLPYRIDINIUM (CEPACOL) 3 MG LOZENGE    Take 1 lozenge (3 mg total) by mouth as needed for sore throat       Start Date: 1/22/2021 End Date: --       Order Dose: 3 mg       Quantity: 9 lozenge    Refills: 0    MULTIPLE VITAMIN (MULTIVITAMIN) CAPSULE    Take 1 capsule by mouth daily for 14 days       Start Date: 1/22/2021 End Date: 2/5/2021       Order Dose: 1 capsule       Quantity: 14 capsule    Refills: 0     No discharge procedures on file      PDMP Review       Value Time User    PDMP Reviewed  Yes 1/19/2021 12:30 PM Avery Munguia PA-C          ED Provider  Electronically Signed by           Carolyne Perez DO  01/22/21 0132

## 2021-01-23 ENCOUNTER — TELEPHONE (OUTPATIENT)
Dept: EMERGENCY DEPT | Facility: HOSPITAL | Age: 61
End: 2021-01-23

## 2021-01-23 LAB — SARS-COV-2 N GENE RESP QL NAA+PROBE: POSITIVE

## 2021-01-24 ENCOUNTER — TELEPHONE (OUTPATIENT)
Dept: EMERGENCY DEPT | Facility: HOSPITAL | Age: 61
End: 2021-01-24

## 2021-01-24 NOTE — TELEPHONE ENCOUNTER
Called pt he is feeling fine,  Discussed +covid-19 test results, pt will recheck with family doctor, and will quarantine x 14 days

## 2021-01-30 ENCOUNTER — TELEPHONE (OUTPATIENT)
Dept: SURGERY | Facility: CLINIC | Age: 61
End: 2021-01-30

## 2021-01-30 NOTE — TELEPHONE ENCOUNTER
I spoke with Marv this morning to let him know his colonoscopy on 2/1/21 needs to be rescheduled  The surgery scheduler will contact him next week to reschedule his colonoscopy

## 2021-02-12 ENCOUNTER — OFFICE VISIT (OUTPATIENT)
Dept: FAMILY MEDICINE CLINIC | Facility: CLINIC | Age: 61
End: 2021-02-12

## 2021-02-12 ENCOUNTER — LAB (OUTPATIENT)
Dept: LAB | Facility: CLINIC | Age: 61
End: 2021-02-12
Payer: COMMERCIAL

## 2021-02-12 VITALS
WEIGHT: 191.7 LBS | HEART RATE: 86 BPM | DIASTOLIC BLOOD PRESSURE: 78 MMHG | BODY MASS INDEX: 30.09 KG/M2 | SYSTOLIC BLOOD PRESSURE: 112 MMHG | OXYGEN SATURATION: 96 % | RESPIRATION RATE: 18 BRPM | HEIGHT: 67 IN | TEMPERATURE: 98.5 F

## 2021-02-12 DIAGNOSIS — R11.0 NAUSEA: ICD-10-CM

## 2021-02-12 DIAGNOSIS — Z11.59 ENCOUNTER FOR HEPATITIS C SCREENING TEST FOR LOW RISK PATIENT: ICD-10-CM

## 2021-02-12 DIAGNOSIS — Z11.4 SCREENING FOR HIV (HUMAN IMMUNODEFICIENCY VIRUS): ICD-10-CM

## 2021-02-12 DIAGNOSIS — I10 BENIGN ESSENTIAL HTN: ICD-10-CM

## 2021-02-12 DIAGNOSIS — M54.50 CHRONIC LOW BACK PAIN WITHOUT SCIATICA, UNSPECIFIED BACK PAIN LATERALITY: ICD-10-CM

## 2021-02-12 DIAGNOSIS — E11.9 TYPE 2 DIABETES MELLITUS WITHOUT COMPLICATION, WITHOUT LONG-TERM CURRENT USE OF INSULIN (HCC): Primary | ICD-10-CM

## 2021-02-12 DIAGNOSIS — G89.29 CHRONIC LOW BACK PAIN WITHOUT SCIATICA, UNSPECIFIED BACK PAIN LATERALITY: ICD-10-CM

## 2021-02-12 DIAGNOSIS — E78.5 HYPERLIPIDEMIA, UNSPECIFIED HYPERLIPIDEMIA TYPE: ICD-10-CM

## 2021-02-12 DIAGNOSIS — E11.9 TYPE 2 DIABETES MELLITUS WITHOUT COMPLICATION, WITHOUT LONG-TERM CURRENT USE OF INSULIN (HCC): ICD-10-CM

## 2021-02-12 LAB
ALBUMIN SERPL BCP-MCNC: 4.2 G/DL (ref 3.5–5)
ALP SERPL-CCNC: 58 U/L (ref 46–116)
ALT SERPL W P-5'-P-CCNC: 37 U/L (ref 12–78)
ANION GAP SERPL CALCULATED.3IONS-SCNC: 4 MMOL/L (ref 4–13)
AST SERPL W P-5'-P-CCNC: 17 U/L (ref 5–45)
BASOPHILS # BLD AUTO: 0.01 THOUSANDS/ΜL (ref 0–0.1)
BASOPHILS NFR BLD AUTO: 0 % (ref 0–1)
BILIRUB SERPL-MCNC: 0.44 MG/DL (ref 0.2–1)
BUN SERPL-MCNC: 13 MG/DL (ref 5–25)
CALCIUM SERPL-MCNC: 9.7 MG/DL (ref 8.3–10.1)
CHLORIDE SERPL-SCNC: 106 MMOL/L (ref 100–108)
CHOLEST SERPL-MCNC: 186 MG/DL (ref 50–200)
CO2 SERPL-SCNC: 31 MMOL/L (ref 21–32)
CREAT SERPL-MCNC: 1.08 MG/DL (ref 0.6–1.3)
EOSINOPHIL # BLD AUTO: 0.04 THOUSAND/ΜL (ref 0–0.61)
EOSINOPHIL NFR BLD AUTO: 1 % (ref 0–6)
ERYTHROCYTE [DISTWIDTH] IN BLOOD BY AUTOMATED COUNT: 13.8 % (ref 11.6–15.1)
GFR SERPL CREATININE-BSD FRML MDRD: 86 ML/MIN/1.73SQ M
GLUCOSE P FAST SERPL-MCNC: 114 MG/DL (ref 65–99)
HCT VFR BLD AUTO: 39.8 % (ref 36.5–49.3)
HCV AB SER QL: NORMAL
HDLC SERPL-MCNC: 63 MG/DL
HGB BLD-MCNC: 13.6 G/DL (ref 12–17)
IMM GRANULOCYTES # BLD AUTO: 0.01 THOUSAND/UL (ref 0–0.2)
IMM GRANULOCYTES NFR BLD AUTO: 0 % (ref 0–2)
LDLC SERPL CALC-MCNC: 104 MG/DL (ref 0–100)
LEFT EYE DIABETIC RETINOPATHY: NORMAL
LEFT EYE IMAGE QUALITY: NORMAL
LEFT EYE MACULAR EDEMA: NORMAL
LEFT EYE OTHER RETINOPATHY: NORMAL
LYMPHOCYTES # BLD AUTO: 1.78 THOUSANDS/ΜL (ref 0.6–4.47)
LYMPHOCYTES NFR BLD AUTO: 49 % (ref 14–44)
MCH RBC QN AUTO: 29.1 PG (ref 26.8–34.3)
MCHC RBC AUTO-ENTMCNC: 34.2 G/DL (ref 31.4–37.4)
MCV RBC AUTO: 85 FL (ref 82–98)
MONOCYTES # BLD AUTO: 0.34 THOUSAND/ΜL (ref 0.17–1.22)
MONOCYTES NFR BLD AUTO: 9 % (ref 4–12)
NEUTROPHILS # BLD AUTO: 1.51 THOUSANDS/ΜL (ref 1.85–7.62)
NEUTS SEG NFR BLD AUTO: 41 % (ref 43–75)
NONHDLC SERPL-MCNC: 123 MG/DL
NRBC BLD AUTO-RTO: 0 /100 WBCS
PLATELET # BLD AUTO: 247 THOUSANDS/UL (ref 149–390)
PMV BLD AUTO: 10.1 FL (ref 8.9–12.7)
POTASSIUM SERPL-SCNC: 3.9 MMOL/L (ref 3.5–5.3)
PROT SERPL-MCNC: 8.6 G/DL (ref 6.4–8.2)
RBC # BLD AUTO: 4.67 MILLION/UL (ref 3.88–5.62)
RIGHT EYE DIABETIC RETINOPATHY: NORMAL
RIGHT EYE IMAGE QUALITY: NORMAL
RIGHT EYE MACULAR EDEMA: NORMAL
RIGHT EYE OTHER RETINOPATHY: NORMAL
SEVERITY (EYE EXAM): NORMAL
SL AMB POCT HEMOGLOBIN AIC: 6.2 (ref ?–6.5)
SODIUM SERPL-SCNC: 141 MMOL/L (ref 136–145)
TRIGL SERPL-MCNC: 93 MG/DL
WBC # BLD AUTO: 3.69 THOUSAND/UL (ref 4.31–10.16)

## 2021-02-12 PROCEDURE — 2025F 7 FLD RTA PHOTO W/O RTNOPTHY: CPT | Performed by: PHYSICIAN ASSISTANT

## 2021-02-12 PROCEDURE — 83036 HEMOGLOBIN GLYCOSYLATED A1C: CPT | Performed by: PHYSICIAN ASSISTANT

## 2021-02-12 PROCEDURE — 80053 COMPREHEN METABOLIC PANEL: CPT

## 2021-02-12 PROCEDURE — 3044F HG A1C LEVEL LT 7.0%: CPT | Performed by: PHYSICIAN ASSISTANT

## 2021-02-12 PROCEDURE — 99214 OFFICE O/P EST MOD 30 MIN: CPT | Performed by: PHYSICIAN ASSISTANT

## 2021-02-12 PROCEDURE — 36415 COLL VENOUS BLD VENIPUNCTURE: CPT

## 2021-02-12 PROCEDURE — 80061 LIPID PANEL: CPT

## 2021-02-12 PROCEDURE — 87389 HIV-1 AG W/HIV-1&-2 AB AG IA: CPT

## 2021-02-12 PROCEDURE — 85025 COMPLETE CBC W/AUTO DIFF WBC: CPT

## 2021-02-12 PROCEDURE — 86803 HEPATITIS C AB TEST: CPT

## 2021-02-12 RX ORDER — GABAPENTIN 800 MG/1
800 TABLET ORAL 3 TIMES DAILY
Qty: 90 TABLET | Refills: 1 | Status: SHIPPED | OUTPATIENT
Start: 2021-02-12 | End: 2021-04-28 | Stop reason: SDUPTHER

## 2021-02-12 RX ORDER — PROMETHAZINE HYDROCHLORIDE 25 MG/1
25 TABLET ORAL EVERY 6 HOURS PRN
Qty: 30 TABLET | Refills: 0 | Status: SHIPPED | OUTPATIENT
Start: 2021-02-12 | End: 2021-09-10

## 2021-02-12 RX ORDER — METHOCARBAMOL 750 MG/1
TABLET, FILM COATED ORAL
COMMUNITY
Start: 2021-02-10 | End: 2021-03-05

## 2021-02-12 RX ORDER — AMLODIPINE BESYLATE 10 MG/1
10 TABLET ORAL DAILY
Qty: 90 TABLET | Refills: 0 | Status: SHIPPED | OUTPATIENT
Start: 2021-02-12 | End: 2021-04-28 | Stop reason: SDUPTHER

## 2021-02-12 RX ORDER — PSYLLIUM HUSK 3.4 G/7G
POWDER ORAL
COMMUNITY
Start: 2021-01-22

## 2021-02-12 RX ORDER — METHOCARBAMOL 750 MG/1
750 TABLET, FILM COATED ORAL 4 TIMES DAILY PRN
COMMUNITY
Start: 2021-02-10 | End: 2021-02-20

## 2021-02-12 NOTE — ASSESSMENT & PLAN NOTE
Lab Results   Component Value Date    HGBA1C 6 2 02/12/2021   Continue with metformin daily  Diabetic eye wrist exam was performed in the office today  He does have neuropathy on his left foot but is more likely to be related to his lumbar radiculopathy that his diabetes  He should continue with gabapentin

## 2021-02-12 NOTE — LETTER
To Whom It May Concern:    Kimi Alberta, 1960, is currently disabled  He had been injured at work in 2019 and has need been able to work since  He is going to be establishing with a new pain management

## 2021-02-12 NOTE — LETTER
Denise Chavira, 1960, is currently disabled  He had been injured at work in 2019 and has need been able to work since  He is going to be establishing with a new pain management  He does need help with activity of daily living  His wife is currently living outside [place] and it would benefit him greatly to have her here to help aid in his medical problems

## 2021-02-12 NOTE — PROGRESS NOTES
Assessment/Plan:    Type 2 diabetes mellitus without complication, without long-term current use of insulin ([facility])    Lab Results   Component Value Date    HGBA1C 6 2 02/12/2021   Continue with metformin daily  Diabetic eye wrist exam was performed in the office today  He does have neuropathy on his left foot but is more likely to be related to his lumbar radiculopathy that his diabetes  He should continue with gabapentin  Benign essential HTN  Continue amlodipine  Chronic low back pain  He is scheduled to see pain management this month  Physical therapy has helped him in the past and recommend that he resume it  Continue with gabapentin daily  He was previously seeing a different pain specialist in [place] when he lived there and was taking Vicoprofen but I did not refill this at today's visit  Hyperlipidemia  Continue with atorvastatin  lipid level ordered to be rechecked         Problem List Items Addressed This Visit        Endocrine    Type 2 diabetes mellitus without complication, without long-term current use of insulin ([facility] [address] ) - Primary       Lab Results   Component Value Date    HGBA1C 6 2 02/12/2021   Continue with metformin daily  Diabetic eye wrist exam was performed in the office today  He does have neuropathy on his left foot but is more likely to be related to his lumbar radiculopathy that his diabetes  He should continue with gabapentin  Relevant Orders    POCT hemoglobin A1c (Completed)    Microalbumin / creatinine urine ratio    IRIS Diabetic eye exam (Completed)    Comprehensive metabolic panel    CBC and differential    Lipid panel       Cardiovascular and Mediastinum    Benign essential HTN     Continue amlodipine  Relevant Medications    amLODIPine (NORVASC) 10 mg tablet       Other    Chronic low back pain     He is scheduled to see pain management this month  Physical therapy has helped him in the past and recommend that he resume it    Continue with gabapentin daily  He was previously seeing a different pain specialist in [place] when he lived there and was taking Vicoprofen but I did not refill this at today's visit  Relevant Medications    gabapentin (NEURONTIN) 800 mg tablet    Other Relevant Orders    Ambulatory referral to Physical Therapy    Hyperlipidemia     Continue with atorvastatin  lipid level ordered to be rechecked           Other Visit Diagnoses     Encounter for hepatitis C screening test for low risk patient        Relevant Orders    Hepatitis C antibody    Screening for HIV (human immunodeficiency virus)        Relevant Orders    Human Immunodeficiency Virus 1/2 Antigen / Antibody ( Fourth Generation) with Reflex Testing    Nausea        Relevant Medications    promethazine (PHENERGAN) 25 mg tablet            Subjective:      Patient ID: Marlena Sierra is a 62 y o  male  HPI  62year old male with diabetes and hypertension here for follow-up  His blood sugar level has been good  He does not check his blood sugars regularly  He does need to get a diabetic eye exam   He does have numbness of his left foot but relates that to workman's comp injury  He denies any wounds on his feet  Denies any cold sensation when it is warm outside    He has been having sneezing still from [employer] and is getting back pain with it and tightness  He was seen at [address] [place] [address] ED and was given methocarbamol 750  Back pain is related to workmans comp injury  He did settle his work man comp  Was seeing Pain specialist in [place] prior  He was supposed to do surgery in April 2020 but becaose of COVID it was pushed off and now they wont pay for it  He did have lumbar epidurals in the past   He was also taking gabapentin and Vicoprofen  Original injury was from a fall  He injured his back and right leg  He does get numbness, tightness and pain in LLE  He feels like something crawled into his stomach    Smell and taste are still not normal  He is still getting intermittent nausea  He was diagnosed with COVID on January 22nd but did have symptoms for at least 3-4 days prior and his family was also positive  He is not having any cough, shortness of breath, fever  He does need to have a colonoscopy done however rescheduled due to the nausea he was still having  Shyann Antoine He also has a history of back pain since 2019  He follows with a pain specialist in [place] and is taking gabapentin for this  The following portions of the patient's history were reviewed and updated as appropriate:   He  has a past medical history of Back pain, Diabetes mellitus ([facility] [address] ), and Hypertension  He   Patient Active Problem List    Diagnosis Date Noted    Benign essential HTN 10/16/2020    Chronic low back pain 10/16/2020    Hyperlipidemia 10/16/2020    Type 2 diabetes mellitus without complication, without long-term current use of insulin ([facility] [address] ) 10/16/2020    Benign prostatic hyperplasia with lower urinary tract symptoms 10/16/2020     He  has no past surgical history on file  His family history is not on file  He  reports that he has never smoked  He has never used smokeless tobacco  He reports previous alcohol use  He reports that he does not use drugs    Current Outpatient Medications   Medication Sig Dispense Refill    albuterol (PROVENTIL HFA,VENTOLIN HFA) 90 mcg/act inhaler Inhale 2 puffs every 6 (six) hours as needed for wheezing or shortness of breath 1 Inhaler 0    albuterol (PROVENTIL HFA,VENTOLIN HFA) 90 mcg/act inhaler Inhale 2 puffs every 4 (four) hours as needed for wheezing 1 Inhaler 0    aluminum-magnesium hydroxide-simethicone (MAALOX) 200-200-20 MG/5ML SUSP Take 15 mL by mouth 4 (four) times a day (before meals and at bedtime) 150 mL 0    amLODIPine (NORVASC) 10 mg tablet Take 1 tablet (10 mg total) by mouth daily 90 tablet 0    atorvastatin (LIPITOR) 10 mg tablet Take 1 tablet (10 mg total) by mouth daily 90 tablet 0    Bioflavonoid Products (RA Vitamin C CR) TBCR take 1 tablet by mouth twice a day for 14 days      cyclobenzaprine (FLEXERIL) 10 mg tablet Take 1 tablet (10 mg total) by mouth 3 (three) times a day as needed for muscle spasms 30 tablet 1    fluticasone (FLONASE) 50 mcg/act nasal spray 1 spray into each nostril daily 16 g 0    gabapentin (NEURONTIN) 800 mg tablet Take 1 tablet (800 mg total) by mouth 3 (three) times a day 90 tablet 1    guaiFENesin (MUCINEX) 600 mg 12 hr tablet Take 2 tablets (1,200 mg total) by mouth every 12 (twelve) hours 30 tablet 0    HYDROcodone-acetaminophen (NORCO) 7 5-325 mg per tablet Take 1 tablet by mouth every 8 (eight) hours as needed      HYDROcodone-ibuprofen (VICOPROFEN) 7 5-200 mg per tablet Take 1 tablet by mouth every 6 (six) hours as needed for moderate pain      lidocaine (XYLOCAINE) 2 % topical gel Apply topically as needed (back pain) 1 Tube 0    meloxicam (MOBIC) 15 mg tablet Take 1 tablet (15 mg total) by mouth daily 20 tablet 0    metFORMIN (GLUCOPHAGE) 500 mg tablet Take 1 tablet (500 mg total) by mouth 2 (two) times a day with meals 180 tablet 0    methocarbamol (ROBAXIN) 750 mg tablet Take 750 mg by mouth 4 (four) times a day as needed      methocarbamol (ROBAXIN) 750 mg tablet take 1 tablet by mouth four times a day if needed for muscle spasm      tamsulosin (FLOMAX) 0 4 mg Take 1 capsule (0 4 mg total) by mouth daily with dinner 90 capsule 0    Ascorbic Acid (vitamin C) 1000 MG tablet Take 1 tablet (1,000 mg total) by mouth 2 (two) times a day for 14 days 28 tablet 0    cholecalciferol (VITAMIN D3) 1,000 units tablet Take 2 tablets (2,000 Units total) by mouth daily for 14 days 28 tablet 0    Multiple Vitamin (multivitamin) capsule Take 1 capsule by mouth daily for 14 days 14 capsule 0    promethazine (PHENERGAN) 25 mg tablet Take 1 tablet (25 mg total) by mouth every 6 (six) hours as needed for nausea or vomiting 30 tablet 0     No current facility-administered medications for this visit  Current Outpatient Medications on File Prior to Visit   Medication Sig    albuterol (PROVENTIL HFA,VENTOLIN HFA) 90 mcg/act inhaler Inhale 2 puffs every 6 (six) hours as needed for wheezing or shortness of breath    albuterol (PROVENTIL HFA,VENTOLIN HFA) 90 mcg/act inhaler Inhale 2 puffs every 4 (four) hours as needed for wheezing    aluminum-magnesium hydroxide-simethicone (MAALOX) 200-200-20 MG/5ML SUSP Take 15 mL by mouth 4 (four) times a day (before meals and at bedtime)    atorvastatin (LIPITOR) 10 mg tablet Take 1 tablet (10 mg total) by mouth daily    Bioflavonoid Products (RA Vitamin C CR) TBCR take 1 tablet by mouth twice a day for 14 days    cyclobenzaprine (FLEXERIL) 10 mg tablet Take 1 tablet (10 mg total) by mouth 3 (three) times a day as needed for muscle spasms    fluticasone (FLONASE) 50 mcg/act nasal spray 1 spray into each nostril daily    guaiFENesin (MUCINEX) 600 mg 12 hr tablet Take 2 tablets (1,200 mg total) by mouth every 12 (twelve) hours    HYDROcodone-acetaminophen (NORCO) 7 5-325 mg per tablet Take 1 tablet by mouth every 8 (eight) hours as needed    HYDROcodone-ibuprofen (VICOPROFEN) 7 5-200 mg per tablet Take 1 tablet by mouth every 6 (six) hours as needed for moderate pain    lidocaine (XYLOCAINE) 2 % topical gel Apply topically as needed (back pain)    meloxicam (MOBIC) 15 mg tablet Take 1 tablet (15 mg total) by mouth daily    metFORMIN (GLUCOPHAGE) 500 mg tablet Take 1 tablet (500 mg total) by mouth 2 (two) times a day with meals    methocarbamol (ROBAXIN) 750 mg tablet Take 750 mg by mouth 4 (four) times a day as needed    methocarbamol (ROBAXIN) 750 mg tablet take 1 tablet by mouth four times a day if needed for muscle spasm    tamsulosin (FLOMAX) 0 4 mg Take 1 capsule (0 4 mg total) by mouth daily with dinner    [DISCONTINUED] amLODIPine (NORVASC) 10 mg tablet Take 1 tablet (10 mg total) by mouth daily    [DISCONTINUED] benzonatate (TESSALON PERLES) 100 mg capsule Take 1 capsule (100 mg total) by mouth every 8 (eight) hours    [DISCONTINUED] dextromethorphan-guaiFENesin (ROBITUSSIN DM)  mg/5 mL syrup Take 5 mL by mouth 3 (three) times a day as needed for cough    [DISCONTINUED] gabapentin (NEURONTIN) 800 mg tablet Take 800 mg by mouth 3 (three) times a day    [DISCONTINUED] menthol-cetylpyridinium (CEPACOL) 3 MG lozenge Take 1 lozenge (3 mg total) by mouth as needed for sore throat    [DISCONTINUED] ondansetron (ZOFRAN-ODT) 4 mg disintegrating tablet Take 1 tablet (4 mg total) by mouth every 8 (eight) hours as needed for nausea or vomiting    Ascorbic Acid (vitamin C) 1000 MG tablet Take 1 tablet (1,000 mg total) by mouth 2 (two) times a day for 14 days    cholecalciferol (VITAMIN D3) 1,000 units tablet Take 2 tablets (2,000 Units total) by mouth daily for 14 days    Multiple Vitamin (multivitamin) capsule Take 1 capsule by mouth daily for 14 days     No current facility-administered medications on file prior to visit  He has No Known Allergies       Review of Systems   Constitutional: Negative for activity change, appetite change, fatigue, fever and unexpected weight change  HENT: Positive for sneezing  Negative for ear pain, hearing loss and sore throat  Eyes: Negative for visual disturbance  Respiratory: Negative for cough and wheezing  Cardiovascular: Negative for chest pain  Gastrointestinal: Positive for nausea  Negative for abdominal pain, constipation, diarrhea and vomiting  Genitourinary: Negative for difficulty urinating and dysuria  Musculoskeletal: Positive for back pain  Negative for arthralgias and myalgias  Skin: Negative for rash  Neurological: Positive for weakness and numbness  Negative for dizziness and headaches  Psychiatric/Behavioral: Negative for dysphoric mood  The patient is not nervous/anxious            Objective:      /78 (BP Location: Left arm, Patient Position: Sitting, Cuff Size: Standard) Pulse 86   Temp 98 5 °F (36 9 °C) (Temporal)   Resp 18   Ht 5' 7" (1 702 m)   Wt 87 kg (191 lb 11 2 oz)   SpO2 96%   BMI 30 02 kg/m²          Physical Exam  Vitals signs and nursing note reviewed  Constitutional:       General: He is not in acute distress  Appearance: He is well-developed  HENT:      Head: Normocephalic and atraumatic  Right Ear: External ear normal       Left Ear: External ear normal    Eyes:      Conjunctiva/sclera: Conjunctivae normal    Neck:      Musculoskeletal: Normal range of motion and neck supple  Thyroid: No thyromegaly  Cardiovascular:      Rate and Rhythm: Normal rate and regular rhythm  Pulses:           Dorsalis pedis pulses are 2+ on the right side and 2+ on the left side  Posterior tibial pulses are 2+ on the right side and 2+ on the left side  Heart sounds: Normal heart sounds  No murmur  Pulmonary:      Effort: Pulmonary effort is normal  No respiratory distress  Breath sounds: Normal breath sounds  No wheezing  Abdominal:      General: There is no distension  Palpations: There is no mass  Tenderness: There is no abdominal tenderness  There is no guarding  Musculoskeletal:         General: Tenderness (l3-5) present  Comments: Decreased rom lumbar spine     Feet:      Right foot:      Skin integrity: Dry skin present  No ulcer, skin breakdown, erythema, warmth or callus  Left foot:      Skin integrity: Dry skin present  No ulcer, skin breakdown, erythema, warmth or callus  Lymphadenopathy:      Cervical: No cervical adenopathy  Neurological:      Mental Status: He is alert and oriented to person, place, and time  Psychiatric:         Mood and Affect: Mood normal          Behavior: Behavior normal        Patient's shoes and socks removed  Right Foot/Ankle   Right Foot Inspection  Skin Exam: skin normal, skin intact and dry skin no warmth, no callus, no erythema, no maceration, no abnormal color, no pre-ulcer, no ulcer and no callus                          Toe Exam: ROM and strength within normal limits  Sensory     Proprioception: intact   Monofilament testing: intact  Vascular    The right DP pulse is 2+  The right PT pulse is 2+  Left Foot/Ankle  Left Foot Inspection  Skin Exam: skin normal, skin intact and dry skinno warmth, no erythema, no maceration, normal color, no pre-ulcer, no ulcer and no callus                         Toe Exam: ROM and strength within normal limits                   Sensory     Proprioception: intact  Monofilament: absent  Vascular    The left DP pulse is 2+  The left PT pulse is 2+  Assign Risk Category:  No deformity present;  No loss of protective sensation;        Risk: 0

## 2021-02-12 NOTE — ASSESSMENT & PLAN NOTE
He is scheduled to see pain management this month  Physical therapy has helped him in the past and recommend that he resume it  Continue with gabapentin daily  He was previously seeing a different pain specialist in [place] when he lived there and was taking Vicoprofen but I did not refill this at today's visit

## 2021-02-14 NOTE — RESULT ENCOUNTER NOTE
Right eye looked good   We couldnt get a view on left eye though to screen for retinopathy so he will need to see eye doctor

## 2021-02-14 NOTE — RESULT ENCOUNTER NOTE
Labs were good but his ldl cholesterol was still too high  Recommend increasing the strength of atorvastatin    I know he just got the rx so I would take 2 tabs of what he has and call when he needs refills

## 2021-02-15 ENCOUNTER — TRANSCRIBE ORDERS (OUTPATIENT)
Dept: LAB | Facility: HOSPITAL | Age: 61
End: 2021-02-15

## 2021-02-15 DIAGNOSIS — Z79.4 TYPE 2 DIABETES MELLITUS WITHOUT COMPLICATION, WITH LONG-TERM CURRENT USE OF INSULIN (HCC): Primary | ICD-10-CM

## 2021-02-15 DIAGNOSIS — E11.9 TYPE 2 DIABETES MELLITUS WITHOUT COMPLICATION, WITH LONG-TERM CURRENT USE OF INSULIN (HCC): Primary | ICD-10-CM

## 2021-02-15 LAB — HIV 1+2 AB+HIV1 P24 AG SERPL QL IA: NORMAL

## 2021-02-19 ENCOUNTER — APPOINTMENT (OUTPATIENT)
Dept: LAB | Facility: CLINIC | Age: 61
End: 2021-02-19
Payer: COMMERCIAL

## 2021-02-19 LAB
CREAT UR-MCNC: 155 MG/DL
MICROALBUMIN UR-MCNC: 7.8 MG/L (ref 0–20)
MICROALBUMIN/CREAT 24H UR: 5 MG/G CREATININE (ref 0–30)

## 2021-02-19 PROCEDURE — 3061F NEG MICROALBUMINURIA REV: CPT | Performed by: PHYSICIAN ASSISTANT

## 2021-02-19 PROCEDURE — 82043 UR ALBUMIN QUANTITATIVE: CPT

## 2021-02-19 PROCEDURE — 82570 ASSAY OF URINE CREATININE: CPT

## 2021-02-22 ENCOUNTER — APPOINTMENT (OUTPATIENT)
Dept: RADIOLOGY | Facility: MEDICAL CENTER | Age: 61
End: 2021-02-22
Payer: COMMERCIAL

## 2021-02-22 ENCOUNTER — CONSULT (OUTPATIENT)
Dept: PAIN MEDICINE | Facility: MEDICAL CENTER | Age: 61
End: 2021-02-22
Payer: COMMERCIAL

## 2021-02-22 VITALS
HEART RATE: 94 BPM | SYSTOLIC BLOOD PRESSURE: 148 MMHG | DIASTOLIC BLOOD PRESSURE: 90 MMHG | TEMPERATURE: 99.2 F | WEIGHT: 195 LBS | HEIGHT: 67 IN | RESPIRATION RATE: 18 BRPM | BODY MASS INDEX: 30.61 KG/M2

## 2021-02-22 DIAGNOSIS — M51.16 LUMBAR DISC DISEASE WITH RADICULOPATHY: Primary | ICD-10-CM

## 2021-02-22 DIAGNOSIS — M51.16 LUMBAR DISC DISEASE WITH RADICULOPATHY: ICD-10-CM

## 2021-02-22 DIAGNOSIS — G89.4 CHRONIC PAIN SYNDROME: ICD-10-CM

## 2021-02-22 DIAGNOSIS — M54.30 SCIATICA, UNSPECIFIED LATERALITY: ICD-10-CM

## 2021-02-22 PROCEDURE — 99244 OFF/OP CNSLTJ NEW/EST MOD 40: CPT | Performed by: PHYSICAL MEDICINE & REHABILITATION

## 2021-02-22 PROCEDURE — 72100 X-RAY EXAM L-S SPINE 2/3 VWS: CPT

## 2021-02-22 RX ORDER — DULOXETIN HYDROCHLORIDE 30 MG/1
30 CAPSULE, DELAYED RELEASE ORAL DAILY
Qty: 30 CAPSULE | Refills: 1 | Status: SHIPPED | OUTPATIENT
Start: 2021-02-22 | End: 2021-03-04

## 2021-02-22 NOTE — PROGRESS NOTES
Assessment  1  Lumbar disc disease with radiculopathy    2  Sciatica, unspecified laterality    3  Chronic pain syndrome        Plan  Mr Dalton Birmingham   Is a pleasant 41-year-old male who presents for  Initial evaluation regarding low back pain with intermittent radiating symptoms into the bilateral lower extremities  During today's evaluation he is demonstrating low back pain that is likely multifactorial in nature and majority of his pain appears to be generating from the lumbar spine  He was participating in physical therapy in December 2020 but reports having covered and having to stop temporarily  He still continue with his home exercises 3 times per week with minimal relief in his pain  At this time we will   1  Order lumbar x-ray and MRI to better identify disc and spine pathology contributing to symptoms  He has completed 6 weeks of physical therapy and home exercises in the last 6 months with minimal relief  2  Will provide Cymbalta 30 mg daily to better assist with neuropathic pain  3  Patient reports having 3 epidural steroid injections while living in [place] last year with minimal relief in his pain  We may still consider repeat injections depending upon MRI results  We did discuss  Neuromodulation with spinal cord stimulator possibly as well but for now we will wait for MRI results and call patient with results  My impressions and treatment recommendations were discussed in detail with the patient who verbalized understanding and had no further questions  Discharge instructions were provided  I personally saw and examined the patient and I agree with the above discussed plan of care  Orders Placed This Encounter   Procedures    MRI lumbar spine without contrast     Standing Status:   Future     Standing Expiration Date:   2/22/2025     Scheduling Instructions: There is no preparation for this test  Please leave your jewelry and valuables at home, wedding rings are the exception  Magnetic nail polish must be removed prior to arrival for your test  Please bring your insurance cards, a form of photo ID and a list of your medications with you  Arrive 15 minutes prior to your appointment time in order to register  Please bring any prior CT or MRI studies of this area that were not performed at a [facility] facility  To schedule this appointment, please contact Central Scheduling at [phone]  Prior to your appointment, please make sure you complete the MRI Screening Form when you e-Check in for your appointment  This will be available starting 7 days before your appointment in [facility]  You may receive an e-mail with an activation code if you do not have a [employer] account  If you do not have access to a device, we will complete your screening at your appointment  Order Specific Question:   What is the patient's sedation requirement? Answer:   No Sedation     Order Specific Question:   Release to patient through [facility]     Answer:   Immediate     Order Specific Question:   Is order priority selected as STAT? Answer:   No     Order Specific Question:   Reason for Exam (FREE TEXT)     Answer:   lumbar radiculopathy    X-ray lumbar spine 2 or 3 views     Standing Status:   Future     Standing Expiration Date:   2/22/2025     Scheduling Instructions:      Bring along any outside films relating to this procedure  New Medications Ordered This Visit   Medications    DULoxetine (CYMBALTA) 30 mg delayed release capsule     Sig: Take 1 capsule (30 mg total) by mouth daily     Dispense:  30 capsule     Refill:  1       History of Present Illness    Lupe Alfonso is a 62 y o  male  Presents to [place] [id number] [facility] for initial evaluation regarding low back pain, right hip and bilateral knee pain of greater than 2 years duration  Patient reports a work related injury and has been dealing with pain ever since    Today reports severe pain rated 9/10 and interfering with daily activities  Pain is constant 100% of the time with no specific pattern of morning, afternoon or evening  Describes symptoms as burning, cramping, shooting, throbbing, numbness, pins and needle sensation that is present throughout the day and night  Describes associated weakness and requires a cane for ambulation  Pain is unchanged with previous injections, physical therapy  Has tried several opiate medications including Vicodin and Percocet and presents for initial evaluation  I have personally reviewed and/or updated the patient's past medical history, past surgical history, family history, social history, current medications, allergies, and vital signs today  Review of Systems   Constitutional: Negative for fever and unexpected weight change  HENT: Negative for trouble swallowing  Eyes: Negative for visual disturbance  Respiratory: Negative for shortness of breath and wheezing  Cardiovascular: Negative for chest pain and palpitations  Gastrointestinal: Negative for constipation, diarrhea, nausea and vomiting  Endocrine: Negative for cold intolerance, heat intolerance and polydipsia  Genitourinary: Negative for difficulty urinating and frequency  Musculoskeletal: Positive for arthralgias, back pain, gait problem and myalgias  Negative for joint swelling  Skin: Negative for rash  Neurological: Negative for dizziness, seizures, syncope, weakness and headaches  Hematological: Does not bruise/bleed easily  Psychiatric/Behavioral: Positive for sleep disturbance  Negative for dysphoric mood  All other systems reviewed and are negative        Patient Active Problem List   Diagnosis    Benign essential HTN    Chronic low back pain    Hyperlipidemia    Type 2 diabetes mellitus without complication, without long-term current use of insulin (HCC)    Benign prostatic hyperplasia with lower urinary tract symptoms       Past Medical History:   Diagnosis Date    Asthma     Back pain     Back pain     Diabetes mellitus ([facility] [address] )     Hyperlipidemia     Hypertension        Past Surgical History:   Procedure Laterality Date    NO PAST SURGERIES         Family History   Problem Relation Age of Onset    No Known Problems Mother     No Known Problems Father        Social History     Occupational History    Not on file   Tobacco Use    Smoking status: Never Smoker    Smokeless tobacco: Never Used   Substance and Sexual Activity    Alcohol use: Not Currently    Drug use: Never    Sexual activity: Not Currently       Current Outpatient Medications on File Prior to Visit   Medication Sig    albuterol (PROVENTIL HFA,VENTOLIN HFA) 90 mcg/act inhaler Inhale 2 puffs every 6 (six) hours as needed for wheezing or shortness of breath    albuterol (PROVENTIL HFA,VENTOLIN HFA) 90 mcg/act inhaler Inhale 2 puffs every 4 (four) hours as needed for wheezing    aluminum-magnesium hydroxide-simethicone (MAALOX) 200-200-20 MG/5ML SUSP Take 15 mL by mouth 4 (four) times a day (before meals and at bedtime)    amLODIPine (NORVASC) 10 mg tablet Take 1 tablet (10 mg total) by mouth daily    Ascorbic Acid (vitamin C) 1000 MG tablet Take 1 tablet (1,000 mg total) by mouth 2 (two) times a day for 14 days    atorvastatin (LIPITOR) 10 mg tablet Take 1 tablet (10 mg total) by mouth daily    cholecalciferol (VITAMIN D3) 1,000 units tablet Take 2 tablets (2,000 Units total) by mouth daily for 14 days    cyclobenzaprine (FLEXERIL) 10 mg tablet Take 1 tablet (10 mg total) by mouth 3 (three) times a day as needed for muscle spasms    fluticasone (FLONASE) 50 mcg/act nasal spray 1 spray into each nostril daily    gabapentin (NEURONTIN) 800 mg tablet Take 1 tablet (800 mg total) by mouth 3 (three) times a day    HYDROcodone-ibuprofen (VICOPROFEN) 7 5-200 mg per tablet Take 1 tablet by mouth every 6 (six) hours as needed for moderate pain    lidocaine (XYLOCAINE) 2 % topical gel Apply topically as needed (back pain)    meloxicam (MOBIC) 15 mg tablet Take 1 tablet (15 mg total) by mouth daily    metFORMIN (GLUCOPHAGE) 500 mg tablet Take 1 tablet (500 mg total) by mouth 2 (two) times a day with meals    methocarbamol (ROBAXIN) 750 mg tablet take 1 tablet by mouth four times a day if needed for muscle spasm    Multiple Vitamin (multivitamin) capsule Take 1 capsule by mouth daily for 14 days    promethazine (PHENERGAN) 25 mg tablet Take 1 tablet (25 mg total) by mouth every 6 (six) hours as needed for nausea or vomiting    tamsulosin (FLOMAX) 0 4 mg Take 1 capsule (0 4 mg total) by mouth daily with dinner    Bioflavonoid Products (RA Vitamin C CR) TBCR take 1 tablet by mouth twice a day for 14 days    guaiFENesin (MUCINEX) 600 mg 12 hr tablet Take 2 tablets (1,200 mg total) by mouth every 12 (twelve) hours (Patient not taking: Reported on 2/22/2021)    HYDROcodone-acetaminophen (NORCO) 7 5-325 mg per tablet Take 1 tablet by mouth every 8 (eight) hours as needed     No current facility-administered medications on file prior to visit  No Known Allergies    Physical Exam    /90   Pulse 94   Temp 99 2 °F (37 3 °C)   Resp 18   Ht 5' 7" (1 702 m)   Wt 88 5 kg (195 lb)   BMI 30 54 kg/m²     Constitutional: normal, well developed, well nourished, alert, in no distress and non-toxic and no overt pain behavior    Eyes: anicteric  HEENT: grossly intact  Neck: supple, symmetric, trachea midline and no masses   Pulmonary:even and unlabored  Cardiovascular:No edema or pitting edema present  Skin:Normal without rashes or lesions and well hydrated  Psychiatric:Mood and affect appropriate  Neurologic:Cranial Nerves II-XII grossly intact  Musculoskeletal:antalgic and ambulates with cane , tenderness to palpation bilateral lumbar paraspinals, decreased active and passive range of motion with lumbar flexion and extension limited by pain, MMT 5/5 bilateral lower extremities except for right ankle dorsiflexion 4/5 and plantar flexion 5- out of 5, straight leg raise in the seated and supine position positive for radicular symptoms into the bilateral lower extremities, DTRs within normal limits    Imaging

## 2021-02-22 NOTE — PATIENT INSTRUCTIONS
Lumbar Radiculopathy   WHAT YOU NEED TO KNOW:   Lumbar radiculopathy is a painful condition that happens when a nerve in your lumbar spine (lower back) is pinched or irritated  Nerves control feeling and movement in your body  You may have numbness or pain that shoots down from your lower back towards your foot  DISCHARGE INSTRUCTIONS:   Medicines:   · Medicines:     ? NSAIDs , such as ibuprofen, help decrease swelling, pain, and fever  This medicine is available with or without a doctor's order  NSAIDs can cause stomach bleeding or kidney problems in certain people  If you take blood thinner medicine, always ask your healthcare provider if NSAIDs are safe for you  Always read the medicine label and follow directions  ? Muscle relaxers  help decrease pain and muscle spasms  ? Opioids: This is a strong medicine given to reduce severe pain  It is also called narcotic pain medicine  Take this medicine exactly as directed by your healthcare provider  ? Oral steroids: Steroids may also be given to reduce pain and swelling  ? Take your medicine as directed  Contact your healthcare provider if you think your medicine is not helping or if you have side effects  Tell him of her if you are allergic to any medicine  Keep a list of the medicines, vitamins, and herbs you take  Include the amounts, and when and why you take them  Bring the list or the pill bottles to follow-up visits  Carry your medicine list with you in case of an emergency  Follow up with your healthcare provider or spine specialist within 1 to 3 weeks:  After your first follow-up appointment, return to your healthcare provider or spine specialist every 2 weeks until you have healed  Ask for information about physical therapy for your condition  Write down your questions so you remember to ask them during your visits  Physical therapy:  You may need physical therapy to improve your condition   Your physical therapist may teach you certain exercises to improve posture (the way you stand and sit), flexibility, and strength in your lower back  Self care:   · Stay active: It is best to be active when you have lumbar radiculopathy  Your physical therapist or healthcare provider may tell you to take walks to ease yourself back into your daily routine  Avoid long periods of bed rest  Bed rest could worsen your symptoms  Do not move in ways that increase your pain  Ask for more information about the best ways to stay active  · Use ice or heat packs:  Use ice or heat packs as directed on the sore area of your body to decrease the pain and swelling  Put ice in a plastic bag covered with a towel on your low back  Cover heated items with a towel to avoid burns  Use ice and heat as directed  · Avoid heavy lifting: Your condition may worsen if you lift heavy things  Avoid lifting if possible  · Maintain a healthy weight:  Excess body weight may strain your back  Talk with your healthcare provider about ways to lose excess weight if you are overweight  Contact your healthcare provider or spine specialist if:   · Your pain does not improve within 1 to 3 weeks after treatment  · Your pain and weakness keep you from your normal activities at work, home, or school  · You lose more than 10 pounds in 6 months without trying  · You become depressed or sad because of the pain  · You have questions or concerns about your condition or care  Return to the emergency department if:   · You have a fever greater than 100 4°F for longer than 2 days  · You have new, severe back or leg pain, or your pain spreads to both legs  · You have any new signs of numbness or weakness, especially in your lower back, legs, arms, or genital area  · You have new trouble controlling your urine and bowel movements  · You do not feel like your bladder empties when you urinate      © Copyright [employer] 2020 Information is for End User's use only and may not be sold, redistributed or otherwise used for commercial purposes  All illustrations and images included in CareNotes® are the copyrighted property of A D A M [employer] Inc  or Tammy Akins  The above information is an  only  It is not intended as medical advice for individual conditions or treatments  Talk to your doctor, nurse or pharmacist before following any medical regimen to see if it is safe and effective for you

## 2021-02-25 ENCOUNTER — HOSPITAL ENCOUNTER (OUTPATIENT)
Dept: MRI IMAGING | Facility: HOSPITAL | Age: 61
Discharge: HOME | End: 2021-02-25
Attending: PHYSICAL MEDICINE & REHABILITATION
Payer: COMMERCIAL

## 2021-02-25 DIAGNOSIS — G89.4 CHRONIC PAIN SYNDROME: ICD-10-CM

## 2021-02-25 DIAGNOSIS — M54.30 SCIATICA, UNSPECIFIED LATERALITY: ICD-10-CM

## 2021-02-25 DIAGNOSIS — M51.16 LUMBAR DISC DISEASE WITH RADICULOPATHY: ICD-10-CM

## 2021-02-25 PROCEDURE — G1004 CDSM NDSC: HCPCS

## 2021-02-25 PROCEDURE — 72148 MRI LUMBAR SPINE W/O DYE: CPT

## 2021-02-26 DIAGNOSIS — E11.9 TYPE 2 DIABETES MELLITUS WITHOUT COMPLICATION, WITHOUT LONG-TERM CURRENT USE OF INSULIN (HCC): Primary | ICD-10-CM

## 2021-03-01 ENCOUNTER — TELEPHONE (OUTPATIENT)
Dept: RADIOLOGY | Facility: CLINIC | Age: 61
End: 2021-03-01

## 2021-03-01 NOTE — TELEPHONE ENCOUNTER
S/w pt, advised of the same  Pt verbalized understanding and plans to further discuss at [address] with Michelle Carson on 3/3

## 2021-03-01 NOTE — TELEPHONE ENCOUNTER
Kavon Arts, DO  [facility]              Please notify patient of lumbar MRI results demonstrating multilevel foraminal stenosis and disc protrusions at L3-L4 and L4-L5   If patient is interested and amenable I will place order for a lumbar epidural steroid injection under fluoro guidance   We did discuss the procedure, risks and benefits in detail   Thank you

## 2021-03-03 ENCOUNTER — OFFICE VISIT (OUTPATIENT)
Dept: PAIN MEDICINE | Facility: MEDICAL CENTER | Age: 61
End: 2021-03-03
Payer: COMMERCIAL

## 2021-03-03 VITALS
SYSTOLIC BLOOD PRESSURE: 149 MMHG | HEART RATE: 88 BPM | HEIGHT: 67 IN | DIASTOLIC BLOOD PRESSURE: 79 MMHG | BODY MASS INDEX: 29.82 KG/M2 | TEMPERATURE: 97.6 F | WEIGHT: 190 LBS

## 2021-03-03 DIAGNOSIS — G89.4 CHRONIC PAIN SYNDROME: Primary | ICD-10-CM

## 2021-03-03 DIAGNOSIS — M54.16 LUMBAR RADICULOPATHY: ICD-10-CM

## 2021-03-03 PROBLEM — M54.42 LOW BACK PAIN WITH LEFT-SIDED SCIATICA: Status: ACTIVE | Noted: 2021-03-03

## 2021-03-03 PROCEDURE — 99214 OFFICE O/P EST MOD 30 MIN: CPT | Performed by: NURSE PRACTITIONER

## 2021-03-03 NOTE — PROGRESS NOTES
Assessment  1  Lumbar radiculopathy - Left    2  Chronic pain syndrome        Plan    During the visit today, I did have a thorough conversation with the patient regarding his chronic pain, medication management, and treatment plan options  I reviewed his MRI results from 02/25/2021 with him  The results showed stable left foraminal and far lateral annular fissures and protrusions at L3-L4 and L4-L5  There is moderate ipsilateral foraminal stenosis and mild right foraminal stenosis at L3-L4 and L4-L5  Patient has left-sided radiculopathy that extends all the way to his feet and into his toes in an L3, L4-L5 dermatome distribution  Plan is as follows:  1  Possibly in the future-2 level, L3-L4 & L4-L5 TFESI  Patient doesn't want to do injections at this time as he has had them in the past in different areas with little relief  2  Referral to physical therapy  3  Discontinue Cymbalta-patient only took for 3 days due to side effects  4  Possible future ultra-sound guided TPIs of mid to low back  Pt  Presented with tender trigger points on exam   5  Follow-up in 8 weeks after course of physical therapy or sooner if patient decides to do injection or symptoms worsen  My impressions and treatment recommendations were discussed in detail with the patient who verbalized understanding and had no further questions  Discharge instructions were provided  I personally saw and examined the patient and I agree with the above discussed plan of care  Orders Placed This Encounter   Procedures    Ambulatory referral to Physical Therapy     Standing Status:   Future     Standing Expiration Date:   3/3/2022     Referral Priority:   Routine     Referral Type:   Physical Therapy     Referral Reason:   Specialty Services Required     Requested Specialty:   Physical Therapy     Number of Visits Requested:   1     Expiration Date:   3/3/2022     No orders of the defined types were placed in this encounter        History of Present Illness    Ashlyn Kaminski is a 62 y o  male   Who presents to the office today for follow-up of his MRI results and his chronic pain syndrome secondary to  Lumbar radiculopathy  At his last visit on 02/22/2021 he was prescribed Cymbalta 30 mg to help with his neuropathic pain  He states that he only took the medication for about 3 days when he began to have all over body itching and consulted with the pharmacist who told him to stop taking it  He does not wish to continue at this time  His pain today is 9/10, he says it is constant in the morning evening and night  The quality of his pain is burning, numbness and pins and needles  He currently takes 800 mg of gabapentin 3 times a day prescribed by another provider  He also takes a muscle relaxer but he doesn't know the name of it  He has two listed as currently taking on his med list  He states he doesn't take both, only one of them  He currently uses a cane to help him walk due to his pain  He states he does home exercises but not on a regular basis  He would like to be provided with some home stretching exercises  I have personally reviewed and/or updated the patient's past medical history, past surgical history, family history, social history, current medications, allergies, and vital signs today  Review of Systems   Respiratory: Negative for shortness of breath  Cardiovascular: Negative for chest pain  Gastrointestinal: Negative for constipation, diarrhea, nausea and vomiting  Musculoskeletal: Positive for back pain, gait problem (left leg pain  ) and myalgias  Negative for arthralgias and joint swelling  Skin: Negative for rash  Neurological: Negative for dizziness, seizures and weakness  All other systems reviewed and are negative        Patient Active Problem List   Diagnosis    Benign essential HTN    Chronic low back pain    Hyperlipidemia    Type 2 diabetes mellitus without complication, without long-term current use of insulin ([facility] )    Benign prostatic hyperplasia with lower urinary tract symptoms    Low back pain with left-sided sciatica    Chronic pain syndrome       Past Medical History:   Diagnosis Date    Asthma     Back pain     Back pain     Diabetes mellitus ([facility] )     Hyperlipidemia     Hypertension        Past Surgical History:   Procedure Laterality Date    NO PAST SURGERIES         Family History   Problem Relation Age of Onset    No Known Problems Mother     No Known Problems Father        Social History     Occupational History    Not on file   Tobacco Use    Smoking status: Never Smoker    Smokeless tobacco: Never Used   Substance and Sexual Activity    Alcohol use: Not Currently    Drug use: Never    Sexual activity: Not Currently       Current Outpatient Medications on File Prior to Visit   Medication Sig    amLODIPine (NORVASC) 10 mg tablet Take 1 tablet (10 mg total) by mouth daily    Ascorbic Acid (vitamin C) 1000 MG tablet Take 1 tablet (1,000 mg total) by mouth 2 (two) times a day for 14 days    atorvastatin (LIPITOR) 10 mg tablet Take 1 tablet (10 mg total) by mouth daily    Bioflavonoid Products (RA Vitamin C CR) TBCR take 1 tablet by mouth twice a day for 14 days    cyclobenzaprine (FLEXERIL) 10 mg tablet Take 1 tablet (10 mg total) by mouth 3 (three) times a day as needed for muscle spasms    gabapentin (NEURONTIN) 800 mg tablet Take 1 tablet (800 mg total) by mouth 3 (three) times a day    HYDROcodone-acetaminophen (NORCO) 7 5-325 mg per tablet Take 1 tablet by mouth every 8 (eight) hours as needed    lidocaine (XYLOCAINE) 2 % topical gel Apply topically as needed (back pain)    meloxicam (MOBIC) 15 mg tablet Take 1 tablet (15 mg total) by mouth daily    metFORMIN (GLUCOPHAGE) 500 mg tablet Take 1 tablet (500 mg total) by mouth 2 (two) times a day with meals    methocarbamol (ROBAXIN) 750 mg tablet take 1 tablet by mouth four times a day if needed for muscle spasm    Multiple Vitamin (multivitamin) capsule Take 1 capsule by mouth daily for 14 days    tamsulosin (FLOMAX) 0 4 mg Take 1 capsule (0 4 mg total) by mouth daily with dinner    albuterol (PROVENTIL HFA,VENTOLIN HFA) 90 mcg/act inhaler Inhale 2 puffs every 6 (six) hours as needed for wheezing or shortness of breath (Patient not taking: Reported on 3/3/2021)    albuterol (PROVENTIL HFA,VENTOLIN HFA) 90 mcg/act inhaler Inhale 2 puffs every 4 (four) hours as needed for wheezing (Patient not taking: Reported on 3/3/2021)    aluminum-magnesium hydroxide-simethicone (MAALOX) 200-200-20 MG/5ML SUSP Take 15 mL by mouth 4 (four) times a day (before meals and at bedtime)    cholecalciferol (VITAMIN D3) 1,000 units tablet Take 2 tablets (2,000 Units total) by mouth daily for 14 days    DULoxetine (CYMBALTA) 30 mg delayed release capsule Take 1 capsule (30 mg total) by mouth daily (Patient not taking: Reported on 3/3/2021)    fluticasone (FLONASE) 50 mcg/act nasal spray 1 spray into each nostril daily (Patient not taking: Reported on 3/3/2021)    guaiFENesin (MUCINEX) 600 mg 12 hr tablet Take 2 tablets (1,200 mg total) by mouth every 12 (twelve) hours (Patient not taking: Reported on 2/22/2021)    HYDROcodone-ibuprofen (VICOPROFEN) 7 5-200 mg per tablet Take 1 tablet by mouth every 6 (six) hours as needed for moderate pain    promethazine (PHENERGAN) 25 mg tablet Take 1 tablet (25 mg total) by mouth every 6 (six) hours as needed for nausea or vomiting (Patient not taking: Reported on 3/3/2021)     No current facility-administered medications on file prior to visit          No Known Allergies    Physical Exam    /79   Pulse 88   Temp 97 6 °F (36 4 °C) (Temporal)   Ht 5' 7" (1 702 m)   Wt 86 2 kg (190 lb)   BMI 29 76 kg/m²     Constitutional: normal, well developed, well nourished, alert,appears to be in significant pain  Eyes: anicteric  HEENT: grossly intact  Neck: supple, symmetric, trachea midline and no masses Pulmonary:even and unlabored  Cardiovascular: No visible edema  Skin:Normal without rashes or lesions and well hydrated  Psychiatric:Mood and affect appropriate  Neurologic:Cranial Nerves II-XII grossly intact  Musculoskeletal:antalgic and walks with cane   Lumbar Spine Exam    Appearance:  Normal lordosis  Palpation/Tenderness:  left lumbar paraspinal tenderness  right lumbar paraspinal tenderness  Sensory:  diminished pin prick sensation, location: left leg lateral thigh, front of thigh and lateral calf and shin  dimished light touch sensation, location: left leg lateral thigh, front of thigh and lateral calf and shin  Range of Motion:  Flexion:  No limitation and Moderately limited  with pain  Extension:  No limitation and Moderately limited  with pain  Reflexes:  Left Patellar:  1+   Right Patellar:  2+   Left Achilles:  1+   Right Achilles:  2+   Special Tests:  Left Straight Leg Test:  positive  Right Straight Leg Test:  positive   Thoracic Spine Exam    Appearance:  Normal lordosis  Palpation/Tenderness:  left thoracic paraspinal tenderness  right thoracic paraspinal tenderness  right thoracic spasm  trigger points palpable    Imaging

## 2021-03-03 NOTE — PATIENT INSTRUCTIONS

## 2021-03-04 ENCOUNTER — OFFICE VISIT (OUTPATIENT)
Dept: FAMILY MEDICINE CLINIC | Facility: CLINIC | Age: 61
End: 2021-03-04

## 2021-03-04 VITALS
SYSTOLIC BLOOD PRESSURE: 116 MMHG | HEIGHT: 67 IN | OXYGEN SATURATION: 98 % | WEIGHT: 191.6 LBS | TEMPERATURE: 97.8 F | HEART RATE: 87 BPM | BODY MASS INDEX: 30.07 KG/M2 | DIASTOLIC BLOOD PRESSURE: 82 MMHG | RESPIRATION RATE: 18 BRPM

## 2021-03-04 DIAGNOSIS — Z02.9 ENCOUNTER FOR NARCOTIC CONTRACT DISCUSSION: ICD-10-CM

## 2021-03-04 DIAGNOSIS — E66.09 CLASS 1 OBESITY DUE TO EXCESS CALORIES WITH SERIOUS COMORBIDITY AND BODY MASS INDEX (BMI) OF 30.0 TO 30.9 IN ADULT: ICD-10-CM

## 2021-03-04 DIAGNOSIS — M54.16 LUMBAR RADICULOPATHY: Primary | ICD-10-CM

## 2021-03-04 PROCEDURE — 80307 DRUG TEST PRSMV CHEM ANLYZR: CPT | Performed by: PHYSICIAN ASSISTANT

## 2021-03-04 PROCEDURE — DRUGNV: Performed by: PHYSICIAN ASSISTANT

## 2021-03-04 PROCEDURE — 99213 OFFICE O/P EST LOW 20 MIN: CPT | Performed by: PHYSICIAN ASSISTANT

## 2021-03-04 RX ORDER — AMITRIPTYLINE HYDROCHLORIDE 25 MG/1
25 TABLET, FILM COATED ORAL
Qty: 30 TABLET | Refills: 1 | Status: SHIPPED | OUTPATIENT
Start: 2021-03-04 | End: 2021-04-28 | Stop reason: SDUPTHER

## 2021-03-04 NOTE — PROGRESS NOTES
Assessment/Plan:    Class 1 obesity due to excess calories with serious comorbidity and body mass index (BMI) of 30 0 to 30 9 in adult  BMI Counseling: Body mass index is 30 01 kg/m²  The BMI is above normal  Nutrition recommendations include 3-5 servings of fruits/vegetables daily, consuming healthier snacks, decreasing soda and/or juice intake and moderation in carbohydrate intake  Lumbar radiculopathy  To start amitriptylline at night  Continue methocarbamol and 800mmg gabapentin tid  Narcotic contract was signed today and we discussed the medication can be addicting and can lead to overdose and death even when taking at rx dose  He has been on med prior and is aware of risks  Also discussed insurance may not cover prescription  He is aware  Surgical consult as requested as injeciton did not benefit him prior         Problem List Items Addressed This Visit        Nervous and Auditory    Lumbar radiculopathy - Primary     To start amitriptylline at night  Continue methocarbamol and 800mmg gabapentin tid  Narcotic contract was signed today and we discussed the medication can be addicting and can lead to overdose and death even when taking at rx dose  He has been on med prior and is aware of risks  Also discussed insurance may not cover prescription  He is aware  Surgical consult as requested as injeciton did not benefit him prior         Relevant Medications    amitriptyline (ELAVIL) 25 mg tablet    Other Relevant Orders    Ambulatory referral to Neurosurgery       Other    Class 1 obesity due to excess calories with serious comorbidity and body mass index (BMI) of 30 0 to 30 9 in adult     BMI Counseling: Body mass index is 30 01 kg/m²  The BMI is above normal  Nutrition recommendations include 3-5 servings of fruits/vegetables daily, consuming healthier snacks, decreasing soda and/or juice intake and moderation in carbohydrate intake             Encounter for narcotic contract discussion    Relevant Orders    Toxicology screen, urine            Subjective:      Patient ID: Jeramie Jacobo is a 62 y o  male  HPI  62year old M here for back pain  He did see pain management yesterday  He had MRI on 2/25/21 and has stable left foraminal and lateral annual fissure/protusion L3-4 and L4-5  He was offered injections but declined and was recommend to start PT  He was rx cymbalta but stopped it and is still taking gabapentin  He was on tramadol and hydrocodone and it didn't completely relieve pain but helped  Pain is in lumbar area and radiates into both legs  He gets numbness in left foot with cramping in both  HE did have recent mri that is unchanged from last  The following portions of the patient's history were reviewed and updated as appropriate:   He  has a past medical history of Asthma, Back pain, Back pain, Diabetes mellitus ([address] ), Hyperlipidemia, and Hypertension  He   Patient Active Problem List    Diagnosis Date Noted    Class 1 obesity due to excess calories with serious comorbidity and body mass index (BMI) of 30 0 to 30 9 in adult 03/05/2021    Lumbar radiculopathy 03/05/2021    Encounter for narcotic contract discussion 03/05/2021    Low back pain with left-sided sciatica 03/03/2021    Chronic pain syndrome 03/03/2021    Benign essential HTN 10/16/2020    Chronic low back pain 10/16/2020    Hyperlipidemia 10/16/2020    Type 2 diabetes mellitus without complication, without long-term current use of insulin ([address] ) 10/16/2020    Benign prostatic hyperplasia with lower urinary tract symptoms 10/16/2020     He  has a past surgical history that includes No past surgeries  His family history includes No Known Problems in his father and mother  He  reports that he has never smoked  He has never used smokeless tobacco  He reports previous alcohol use  He reports that he does not use drugs    Current Outpatient Medications   Medication Sig Dispense Refill    aluminum-magnesium hydroxide-simethicone (MAALOX) 341-871-00 MG/5ML SUSP Take 15 mL by mouth 4 (four) times a day (before meals and at bedtime) 150 mL 0    amLODIPine (NORVASC) 10 mg tablet Take 1 tablet (10 mg total) by mouth daily 90 tablet 0    atorvastatin (LIPITOR) 10 mg tablet Take 1 tablet (10 mg total) by mouth daily 90 tablet 0    Bioflavonoid Products (RA Vitamin C CR) TBCR take 1 tablet by mouth twice a day for 14 days      cyclobenzaprine (FLEXERIL) 10 mg tablet Take 1 tablet (10 mg total) by mouth 3 (three) times a day as needed for muscle spasms 30 tablet 1    gabapentin (NEURONTIN) 800 mg tablet Take 1 tablet (800 mg total) by mouth 3 (three) times a day 90 tablet 1    HYDROcodone-acetaminophen (NORCO) 7 5-325 mg per tablet Take 1 tablet by mouth every 8 (eight) hours as needed      HYDROcodone-ibuprofen (VICOPROFEN) 7 5-200 mg per tablet Take 1 tablet by mouth every 6 (six) hours as needed for moderate pain      lidocaine (XYLOCAINE) 2 % topical gel Apply topically as needed (back pain) 1 Tube 0    meloxicam (MOBIC) 15 mg tablet Take 1 tablet (15 mg total) by mouth daily 20 tablet 0    metFORMIN (GLUCOPHAGE) 500 mg tablet Take 1 tablet (500 mg total) by mouth 2 (two) times a day with meals 180 tablet 0    methocarbamol (ROBAXIN) 750 mg tablet take 1 tablet by mouth four times a day if needed for muscle spasm      tamsulosin (FLOMAX) 0 4 mg Take 1 capsule (0 4 mg total) by mouth daily with dinner 90 capsule 0    albuterol (PROVENTIL HFA,VENTOLIN HFA) 90 mcg/act inhaler Inhale 2 puffs every 6 (six) hours as needed for wheezing or shortness of breath (Patient not taking: Reported on 3/4/2021) 1 Inhaler 0    albuterol (PROVENTIL HFA,VENTOLIN HFA) 90 mcg/act inhaler Inhale 2 puffs every 4 (four) hours as needed for wheezing (Patient not taking: Reported on 3/4/2021) 1 Inhaler 0    amitriptyline (ELAVIL) 25 mg tablet Take 1 tablet (25 mg total) by mouth daily at bedtime 30 tablet 1    Ascorbic Acid (vitamin C) 1000 MG tablet Take 1 tablet (1,000 mg total) by mouth 2 (two) times a day for 14 days 28 tablet 0    cholecalciferol (VITAMIN D3) 1,000 units tablet Take 2 tablets (2,000 Units total) by mouth daily for 14 days 28 tablet 0    fluticasone (FLONASE) 50 mcg/act nasal spray 1 spray into each nostril daily (Patient not taking: Reported on 3/3/2021) 16 g 0    guaiFENesin (MUCINEX) 600 mg 12 hr tablet Take 2 tablets (1,200 mg total) by mouth every 12 (twelve) hours (Patient not taking: Reported on 2/22/2021) 30 tablet 0    Multiple Vitamin (multivitamin) capsule Take 1 capsule by mouth daily for 14 days 14 capsule 0    promethazine (PHENERGAN) 25 mg tablet Take 1 tablet (25 mg total) by mouth every 6 (six) hours as needed for nausea or vomiting (Patient not taking: Reported on 3/3/2021) 30 tablet 0     No current facility-administered medications for this visit        Current Outpatient Medications on File Prior to Visit   Medication Sig    aluminum-magnesium hydroxide-simethicone (MAALOX) 200-200-20 MG/5ML SUSP Take 15 mL by mouth 4 (four) times a day (before meals and at bedtime)    amLODIPine (NORVASC) 10 mg tablet Take 1 tablet (10 mg total) by mouth daily    atorvastatin (LIPITOR) 10 mg tablet Take 1 tablet (10 mg total) by mouth daily    Bioflavonoid Products (RA Vitamin C CR) TBCR take 1 tablet by mouth twice a day for 14 days    cyclobenzaprine (FLEXERIL) 10 mg tablet Take 1 tablet (10 mg total) by mouth 3 (three) times a day as needed for muscle spasms    gabapentin (NEURONTIN) 800 mg tablet Take 1 tablet (800 mg total) by mouth 3 (three) times a day    HYDROcodone-acetaminophen (NORCO) 7 5-325 mg per tablet Take 1 tablet by mouth every 8 (eight) hours as needed    HYDROcodone-ibuprofen (VICOPROFEN) 7 5-200 mg per tablet Take 1 tablet by mouth every 6 (six) hours as needed for moderate pain    lidocaine (XYLOCAINE) 2 % topical gel Apply topically as needed (back pain)    meloxicam (MOBIC) 15 mg tablet Take 1 tablet (15 mg total) by mouth daily    metFORMIN (GLUCOPHAGE) 500 mg tablet Take 1 tablet (500 mg total) by mouth 2 (two) times a day with meals    methocarbamol (ROBAXIN) 750 mg tablet take 1 tablet by mouth four times a day if needed for muscle spasm    tamsulosin (FLOMAX) 0 4 mg Take 1 capsule (0 4 mg total) by mouth daily with dinner    albuterol (PROVENTIL HFA,VENTOLIN HFA) 90 mcg/act inhaler Inhale 2 puffs every 6 (six) hours as needed for wheezing or shortness of breath (Patient not taking: Reported on 3/4/2021)    albuterol (PROVENTIL HFA,VENTOLIN HFA) 90 mcg/act inhaler Inhale 2 puffs every 4 (four) hours as needed for wheezing (Patient not taking: Reported on 3/4/2021)    Ascorbic Acid (vitamin C) 1000 MG tablet Take 1 tablet (1,000 mg total) by mouth 2 (two) times a day for 14 days    cholecalciferol (VITAMIN D3) 1,000 units tablet Take 2 tablets (2,000 Units total) by mouth daily for 14 days    fluticasone (FLONASE) 50 mcg/act nasal spray 1 spray into each nostril daily (Patient not taking: Reported on 3/3/2021)    guaiFENesin (MUCINEX) 600 mg 12 hr tablet Take 2 tablets (1,200 mg total) by mouth every 12 (twelve) hours (Patient not taking: Reported on 2/22/2021)    Multiple Vitamin (multivitamin) capsule Take 1 capsule by mouth daily for 14 days    promethazine (PHENERGAN) 25 mg tablet Take 1 tablet (25 mg total) by mouth every 6 (six) hours as needed for nausea or vomiting (Patient not taking: Reported on 3/3/2021)     No current facility-administered medications on file prior to visit  He has No Known Allergies       Review of Systems   Constitutional: Positive for fatigue  Negative for chills and fever  HENT: Negative for ear pain and sore throat  Eyes: Negative for pain and visual disturbance  Respiratory: Negative for cough and shortness of breath  Cardiovascular: Negative for chest pain and palpitations     Gastrointestinal: Negative for abdominal pain and vomiting  Genitourinary: Negative for dysuria and hematuria  Musculoskeletal: Positive for back pain and gait problem  Negative for arthralgias  Skin: Negative for color change and rash  Neurological: Positive for numbness  Negative for seizures and syncope  Psychiatric/Behavioral: Positive for sleep disturbance  All other systems reviewed and are negative  Objective:      /82 (BP Location: Left arm, Patient Position: Sitting, Cuff Size: Standard)   Pulse 87   Temp 97 8 °F (36 6 °C) (Temporal)   Resp 18   Ht 5' 7" (1 702 m)   Wt 86 9 kg (191 lb 9 6 oz)   SpO2 98%   BMI 30 01 kg/m²          Physical Exam  Vitals signs and nursing note reviewed  Constitutional:       General: He is not in acute distress  Appearance: He is well-developed  HENT:      Head: Normocephalic and atraumatic  Right Ear: External ear normal       Left Ear: External ear normal    Eyes:      Conjunctiva/sclera: Conjunctivae normal    Neck:      Musculoskeletal: Normal range of motion and neck supple  Thyroid: No thyromegaly  Cardiovascular:      Rate and Rhythm: Normal rate and regular rhythm  Heart sounds: Normal heart sounds  No murmur  Pulmonary:      Effort: Pulmonary effort is normal  No respiratory distress  Breath sounds: Normal breath sounds  No wheezing  Musculoskeletal:         General: Tenderness (lummbar) present  Comments: +SLR bilaterally     Lymphadenopathy:      Cervical: No cervical adenopathy  Neurological:      Mental Status: He is alert and oriented to person, place, and time  Sensory: Sensory deficit (decreased left foot) present     Psychiatric:         Mood and Affect: Mood normal          Behavior: Behavior normal

## 2021-03-05 PROBLEM — E66.811 CLASS 1 OBESITY DUE TO EXCESS CALORIES WITH SERIOUS COMORBIDITY AND BODY MASS INDEX (BMI) OF 30.0 TO 30.9 IN ADULT: Status: ACTIVE | Noted: 2021-03-05

## 2021-03-05 PROBLEM — Z02.9 ENCOUNTER FOR NARCOTIC CONTRACT DISCUSSION: Status: ACTIVE | Noted: 2021-03-05

## 2021-03-05 PROBLEM — E66.09 CLASS 1 OBESITY DUE TO EXCESS CALORIES WITH SERIOUS COMORBIDITY AND BODY MASS INDEX (BMI) OF 30.0 TO 30.9 IN ADULT: Status: ACTIVE | Noted: 2021-03-05

## 2021-03-05 PROBLEM — M54.16 LUMBAR RADICULOPATHY: Status: ACTIVE | Noted: 2021-03-05

## 2021-03-05 NOTE — ASSESSMENT & PLAN NOTE
To start amitriptylline at night  Continue methocarbamol and 800mmg gabapentin tid  Narcotic contract was signed today and we discussed the medication can be addicting and can lead to overdose and death even when taking at rx dose  He has been on med prior and is aware of risks  Also discussed insurance may not cover prescription  He is aware    Surgical consult as requested as injeciton did not benefit him prior

## 2021-03-05 NOTE — ASSESSMENT & PLAN NOTE
BMI Counseling: Body mass index is 30 01 kg/m²  The BMI is above normal  Nutrition recommendations include 3-5 servings of fruits/vegetables daily, consuming healthier snacks, decreasing soda and/or juice intake and moderation in carbohydrate intake

## 2021-03-09 ENCOUNTER — TELEPHONE (OUTPATIENT)
Dept: FAMILY MEDICINE CLINIC | Facility: CLINIC | Age: 61
End: 2021-03-09

## 2021-03-09 NOTE — TELEPHONE ENCOUNTER
Patient called office stating dr gustafson was going to send some pain ,edication to the pharmacy   Patient has not received anything     Please advice

## 2021-03-10 DIAGNOSIS — M54.50 CHRONIC LOW BACK PAIN WITHOUT SCIATICA, UNSPECIFIED BACK PAIN LATERALITY: ICD-10-CM

## 2021-03-10 DIAGNOSIS — G89.29 CHRONIC LOW BACK PAIN WITHOUT SCIATICA, UNSPECIFIED BACK PAIN LATERALITY: ICD-10-CM

## 2021-03-10 DIAGNOSIS — M54.16 LUMBAR RADICULOPATHY: Primary | ICD-10-CM

## 2021-03-10 RX ORDER — HYDROCODONE BITARTRATE AND ACETAMINOPHEN 7.5; 325 MG/1; MG/1
1 TABLET ORAL EVERY 8 HOURS PRN
Qty: 90 TABLET | Refills: 0 | Status: SHIPPED | OUTPATIENT
Start: 2021-03-10 | End: 2021-04-13 | Stop reason: SDUPTHER

## 2021-03-10 NOTE — TELEPHONE ENCOUNTER
Can you send in hydrocodone 7 5mg TID to his pharmacy   He signed narcotic contract and UDS was negative as it should be

## 2021-03-11 ENCOUNTER — TELEPHONE (OUTPATIENT)
Dept: FAMILY MEDICINE CLINIC | Facility: CLINIC | Age: 61
End: 2021-03-11

## 2021-03-15 ENCOUNTER — TELEPHONE (OUTPATIENT)
Dept: FAMILY MEDICINE CLINIC | Facility: CLINIC | Age: 61
End: 2021-03-15

## 2021-03-15 NOTE — TELEPHONE ENCOUNTER
No I mean he knocked on the window and came to the back office and then asked us about covid vaccines   We talked about his rx meds as well   Can we at least schedule him for the chest pain

## 2021-03-15 NOTE — TELEPHONE ENCOUNTER
I just talked to him when he came to the office and he said nothing about chest pain    We gave him the number for covid vaccine to schedule with hospital

## 2021-03-15 NOTE — TELEPHONE ENCOUNTER
Patient came in today complaining of chest pain, I advised him to go to ED but he said he did not want to and preferred to see you  I told him your appointments were to far out for that sort of symptom and that he needs to be seen in the ED  He then proceeded to ask about the COVID VAC and I informed him that at this moment we do not have it to give but that would message you that he would like to be vaccinated as well as include today's complaint and that you may give him a call

## 2021-03-16 DIAGNOSIS — J06.9 URI (UPPER RESPIRATORY INFECTION): ICD-10-CM

## 2021-03-16 DIAGNOSIS — Z20.822 ENCOUNTER FOR LABORATORY TESTING FOR COVID-19 VIRUS: ICD-10-CM

## 2021-03-16 RX ORDER — MELATONIN
2000 DAILY
Qty: 28 TABLET | Refills: 0 | OUTPATIENT
Start: 2021-03-16 | End: 2021-03-30

## 2021-03-16 RX ORDER — MULTIVIT WITH MINERALS/LUTEIN
1000 TABLET ORAL 2 TIMES DAILY
Qty: 28 TABLET | Refills: 0 | OUTPATIENT
Start: 2021-03-16 | End: 2021-03-30

## 2021-03-17 ENCOUNTER — EVALUATION (OUTPATIENT)
Dept: PHYSICAL THERAPY | Facility: CLINIC | Age: 61
End: 2021-03-17
Payer: COMMERCIAL

## 2021-03-17 ENCOUNTER — OFFICE VISIT (OUTPATIENT)
Dept: FAMILY MEDICINE CLINIC | Facility: CLINIC | Age: 61
End: 2021-03-17

## 2021-03-17 VITALS
BODY MASS INDEX: 30.92 KG/M2 | RESPIRATION RATE: 16 BRPM | WEIGHT: 197 LBS | TEMPERATURE: 97.9 F | HEIGHT: 67 IN | DIASTOLIC BLOOD PRESSURE: 72 MMHG | HEART RATE: 93 BPM | SYSTOLIC BLOOD PRESSURE: 120 MMHG | OXYGEN SATURATION: 99 %

## 2021-03-17 DIAGNOSIS — E78.5 HYPERLIPIDEMIA, UNSPECIFIED HYPERLIPIDEMIA TYPE: ICD-10-CM

## 2021-03-17 DIAGNOSIS — R07.9 CHEST PAIN, UNSPECIFIED TYPE: Primary | ICD-10-CM

## 2021-03-17 DIAGNOSIS — Z23 ENCOUNTER FOR IMMUNIZATION: ICD-10-CM

## 2021-03-17 DIAGNOSIS — G89.4 CHRONIC PAIN SYNDROME: ICD-10-CM

## 2021-03-17 DIAGNOSIS — I10 BENIGN ESSENTIAL HTN: ICD-10-CM

## 2021-03-17 DIAGNOSIS — M54.16 LUMBAR RADICULOPATHY: ICD-10-CM

## 2021-03-17 DIAGNOSIS — K21.9 GASTROESOPHAGEAL REFLUX DISEASE, UNSPECIFIED WHETHER ESOPHAGITIS PRESENT: ICD-10-CM

## 2021-03-17 PROCEDURE — 90682 RIV4 VACC RECOMBINANT DNA IM: CPT | Performed by: PHYSICIAN ASSISTANT

## 2021-03-17 PROCEDURE — 97112 NEUROMUSCULAR REEDUCATION: CPT | Performed by: PHYSICAL THERAPIST

## 2021-03-17 PROCEDURE — 3074F SYST BP LT 130 MM HG: CPT | Performed by: PHYSICIAN ASSISTANT

## 2021-03-17 PROCEDURE — 97110 THERAPEUTIC EXERCISES: CPT | Performed by: PHYSICAL THERAPIST

## 2021-03-17 PROCEDURE — 97162 PT EVAL MOD COMPLEX 30 MIN: CPT | Performed by: PHYSICAL THERAPIST

## 2021-03-17 PROCEDURE — 1036F TOBACCO NON-USER: CPT | Performed by: PHYSICIAN ASSISTANT

## 2021-03-17 PROCEDURE — 99214 OFFICE O/P EST MOD 30 MIN: CPT | Performed by: PHYSICIAN ASSISTANT

## 2021-03-17 PROCEDURE — 3078F DIAST BP <80 MM HG: CPT | Performed by: PHYSICIAN ASSISTANT

## 2021-03-17 PROCEDURE — 97014 ELECTRIC STIMULATION THERAPY: CPT | Performed by: PHYSICAL THERAPIST

## 2021-03-17 PROCEDURE — 90471 IMMUNIZATION ADMIN: CPT | Performed by: PHYSICIAN ASSISTANT

## 2021-03-17 RX ORDER — FAMOTIDINE 20 MG/1
20 TABLET, FILM COATED ORAL 2 TIMES DAILY
Qty: 90 TABLET | Refills: 0 | Status: SHIPPED | OUTPATIENT
Start: 2021-03-17 | End: 2021-08-16 | Stop reason: SDUPTHER

## 2021-03-17 RX ORDER — ATORVASTATIN CALCIUM 20 MG/1
20 TABLET, FILM COATED ORAL DAILY
Qty: 90 TABLET | Refills: 1 | Status: SHIPPED | OUTPATIENT
Start: 2021-03-17 | End: 2021-04-28 | Stop reason: SDUPTHER

## 2021-03-17 NOTE — PATIENT INSTRUCTIONS
Diet for Stomach Ulcers and Gastritis   WHAT YOU NEED TO KNOW:   A diet for stomach ulcers and gastritis is a meal plan that limits foods that irritate your stomach  Certain foods may worsen symptoms such as stomach pain, bloating, heartburn, or indigestion  DISCHARGE INSTRUCTIONS:   Foods to limit or avoid:  You may need to avoid acidic, spicy, or high-fat foods  Not all foods affect everyone the same way  You will need to learn which foods worsen your symptoms and limit those foods  The following are some foods that may worsen ulcer or gastritis symptoms:  · Beverages:      ? Whole milk and chocolate milk    ? Hot cocoa and cola    ? Any beverage with caffeine    ? Regular and decaffeinated coffee    ? Peppermint and spearmint tea    ? Green and black tea, with or without caffeine    ? Orange and grapefruit juices    ? Drinks that contain alcohol    · Spices and seasonings:      ? Black and red pepper    ? Chili powder    ? Mustard seed and nutmeg    · Other foods:      ? Dairy foods made from whole milk or cream    ? Chocolate    ? Spicy or strongly flavored cheeses, such as jalapeno or black pepper    ? Highly seasoned, high-fat meats, such as sausage, salami, bradley, ham, and cold cuts    ? Hot chiles and peppers    ? Tomato products, such as tomato paste, tomato sauce, or tomato juice    Foods to include:  Eat a variety of healthy foods from all the food groups  Eat fruits, vegetables, whole grains, and fat-free or low-fat dairy foods  Whole grains include whole-wheat breads, cereals, pasta, and brown rice  Choose lean meats, poultry (chicken and turkey), fish, beans, eggs, and nuts  A healthy meal plan is low in unhealthy fats, salt, and added sugar  Healthy fats include olive oil and canola oil  Ask your dietitian for more information about a healthy diet  Other helpful guidelines:   · Do not eat right before bedtime  Stop eating at least 2 hours before bedtime  · Eat small, frequent meals    Your stomach may tolerate small, frequent meals better than large meals  © Copyright [address] Information is for End User's use only and may not be sold, redistributed or otherwise used for commercial purposes  All illustrations and images included in CareNotes® are the copyrighted property of [employer]  or Tammy Akins  The above information is an  only  It is not intended as medical advice for individual conditions or treatments  Talk to your doctor, nurse or pharmacist before following any medical regimen to see if it is safe and effective for you

## 2021-03-17 NOTE — ASSESSMENT & PLAN NOTE
He is resuming physical therapy today  He should continue gabapentin  He also stated restart hydrocodone and he is where he take it sparingly    He is also interested in neuro surgery would like to stop medication if possible in the furture

## 2021-03-17 NOTE — ASSESSMENT & PLAN NOTE
Likely still related to COVID infection in January but is improving  Continue as needed albuterol inhaler  EKG was normal today

## 2021-03-17 NOTE — PROGRESS NOTES
Assessment/Plan:    Benign essential HTN    Controlled continue current medications    Lumbar radiculopathy   He is resuming physical therapy today  He should continue gabapentin  He also stated restart hydrocodone and he is where he take it sparingly  He is also interested in neuro surgery would like to stop medication if possible in the furture    Chest pain   Likely still related to COVID infection in January but is improving  Continue as needed albuterol inhaler  EKG was normal today  Problem List Items Addressed This Visit        Cardiovascular and Mediastinum    Benign essential HTN       Controlled continue current medications            Nervous and Auditory    Lumbar radiculopathy      He is resuming physical therapy today  He should continue gabapentin  He also stated restart hydrocodone and he is where he take it sparingly  He is also interested in neuro surgery would like to stop medication if possible in the furture            Other    Hyperlipidemia    Relevant Medications    atorvastatin (LIPITOR) 20 mg tablet    Chest pain - Primary      Likely still related to COVID infection in January but is improving  Continue as needed albuterol inhaler  EKG was normal today  Other Visit Diagnoses     Encounter for immunization        Relevant Orders    influenza vaccine, quadrivalent, recombinant, PF, 0 5 mL, for patients 18 yr+ (FLUBLOK) (Completed)    Gastroesophageal reflux disease, unspecified whether esophagitis present        Relevant Medications    famotidine (PEPCID) 20 mg tablet            Subjective:      Patient ID: Nataly Draper is a 62 y o  male  HPI  62year old M here for chest pain  He has been getting intermittent chest heaviness siince COVID infection in January     It can be with or without exertion  He does sometimes feel some tightness in his chest he will use albuterol inhaler and does help  Sometimes he deos get acid reflux as well   He gets it with spicy foods or saucy  Warm water does help  The pain has been getting better than the past and is not as consistent as before more mild and is occurring  He is not coughing or sneezing as much as in the past also  He is going for PT today  He is seeing neurosurgery on the 23rd  He felt like left leg got stronger after last time he was doing PT  He did do epidural injections and they didn't help in the past  He does not want to do more  He was supposed to see surgeon prior to moving him   He is no longer getting the upper back pain that he was having with sneeze  The following portions of the patient's history were reviewed and updated as appropriate: He  has a past medical history of Asthma, Back pain, Back pain, Diabetes mellitus ([address] ), Hyperlipidemia, and Hypertension  He   Patient Active Problem List    Diagnosis Date Noted    Chest pain 03/17/2021    Class 1 obesity due to excess calories with serious comorbidity and body mass index (BMI) of 30 0 to 30 9 in adult 03/05/2021    Lumbar radiculopathy 03/05/2021    Encounter for narcotic contract discussion 03/05/2021    Low back pain with left-sided sciatica 03/03/2021    Chronic pain syndrome 03/03/2021    Benign essential HTN 10/16/2020    Chronic low back pain 10/16/2020    Hyperlipidemia 10/16/2020    Type 2 diabetes mellitus without complication, without long-term current use of insulin ([facility] [address] ) 10/16/2020    Benign prostatic hyperplasia with lower urinary tract symptoms 10/16/2020     He  has a past surgical history that includes No past surgeries  His family history includes No Known Problems in his father and mother  He  reports that he has never smoked  He has never used smokeless tobacco  He reports previous alcohol use  He reports that he does not use drugs    Current Outpatient Medications   Medication Sig Dispense Refill    aluminum-magnesium hydroxide-simethicone (MAALOX) 108-947-84 MG/5ML SUSP Take 15 mL by mouth 4 (four) times a day (before meals and at bedtime) 150 mL 0    amLODIPine (NORVASC) 10 mg tablet Take 1 tablet (10 mg total) by mouth daily 90 tablet 0    gabapentin (NEURONTIN) 800 mg tablet Take 1 tablet (800 mg total) by mouth 3 (three) times a day 90 tablet 1    HYDROcodone-acetaminophen (NORCO) 7 5-325 mg per tablet Take 1 tablet by mouth every 8 (eight) hours as needed for painMax Daily Amount: 3 tablets 90 tablet 0    metFORMIN (GLUCOPHAGE) 500 mg tablet Take 1 tablet (500 mg total) by mouth 2 (two) times a day with meals 180 tablet 0    tamsulosin (FLOMAX) 0 4 mg Take 1 capsule (0 4 mg total) by mouth daily with dinner 90 capsule 0    albuterol (PROVENTIL HFA,VENTOLIN HFA) 90 mcg/act inhaler Inhale 2 puffs every 6 (six) hours as needed for wheezing or shortness of breath (Patient not taking: Reported on 3/4/2021) 1 Inhaler 0    albuterol (PROVENTIL HFA,VENTOLIN HFA) 90 mcg/act inhaler Inhale 2 puffs every 4 (four) hours as needed for wheezing (Patient not taking: Reported on 3/4/2021) 1 Inhaler 0    amitriptyline (ELAVIL) 25 mg tablet Take 1 tablet (25 mg total) by mouth daily at bedtime 30 tablet 1    atorvastatin (LIPITOR) 20 mg tablet Take 1 tablet (20 mg total) by mouth daily 90 tablet 1    Bioflavonoid Products (RA Vitamin C CR) TBCR take 1 tablet by mouth twice a day for 14 days      cyclobenzaprine (FLEXERIL) 10 mg tablet Take 1 tablet (10 mg total) by mouth 3 (three) times a day as needed for muscle spasms 30 tablet 1    famotidine (PEPCID) 20 mg tablet Take 1 tablet (20 mg total) by mouth 2 (two) times a day As needed for acid reflux 90 tablet 0    fluticasone (FLONASE) 50 mcg/act nasal spray 1 spray into each nostril daily (Patient not taking: Reported on 3/3/2021) 16 g 0    HYDROcodone-acetaminophen (NORCO) 7 5-325 mg per tablet Take 1 tablet by mouth every 8 (eight) hours as needed      HYDROcodone-ibuprofen (VICOPROFEN) 7 5-200 mg per tablet Take 1 tablet by mouth every 6 (six) hours as needed for moderate pain      lidocaine (XYLOCAINE) 2 % topical gel Apply topically as needed (back pain) 1 Tube 0    meloxicam (MOBIC) 15 mg tablet Take 1 tablet (15 mg total) by mouth daily 20 tablet 0    Multiple Vitamin (multivitamin) capsule Take 1 capsule by mouth daily for 14 days 14 capsule 0    promethazine (PHENERGAN) 25 mg tablet Take 1 tablet (25 mg total) by mouth every 6 (six) hours as needed for nausea or vomiting (Patient not taking: Reported on 3/3/2021) 30 tablet 0     No current facility-administered medications for this visit        Current Outpatient Medications on File Prior to Visit   Medication Sig    aluminum-magnesium hydroxide-simethicone (MAALOX) 200-200-20 MG/5ML SUSP Take 15 mL by mouth 4 (four) times a day (before meals and at bedtime)    amLODIPine (NORVASC) 10 mg tablet Take 1 tablet (10 mg total) by mouth daily    gabapentin (NEURONTIN) 800 mg tablet Take 1 tablet (800 mg total) by mouth 3 (three) times a day    HYDROcodone-acetaminophen (NORCO) 7 5-325 mg per tablet Take 1 tablet by mouth every 8 (eight) hours as needed for painMax Daily Amount: 3 tablets    metFORMIN (GLUCOPHAGE) 500 mg tablet Take 1 tablet (500 mg total) by mouth 2 (two) times a day with meals    tamsulosin (FLOMAX) 0 4 mg Take 1 capsule (0 4 mg total) by mouth daily with dinner    albuterol (PROVENTIL HFA,VENTOLIN HFA) 90 mcg/act inhaler Inhale 2 puffs every 6 (six) hours as needed for wheezing or shortness of breath (Patient not taking: Reported on 3/4/2021)    albuterol (PROVENTIL HFA,VENTOLIN HFA) 90 mcg/act inhaler Inhale 2 puffs every 4 (four) hours as needed for wheezing (Patient not taking: Reported on 3/4/2021)    amitriptyline (ELAVIL) 25 mg tablet Take 1 tablet (25 mg total) by mouth daily at bedtime    Bioflavonoid Products (RA Vitamin C CR) TBCR take 1 tablet by mouth twice a day for 14 days    cyclobenzaprine (FLEXERIL) 10 mg tablet Take 1 tablet (10 mg total) by mouth 3 (three) times a day as needed for muscle spasms    fluticasone (FLONASE) 50 mcg/act nasal spray 1 spray into each nostril daily (Patient not taking: Reported on 3/3/2021)    HYDROcodone-acetaminophen (NORCO) 7 5-325 mg per tablet Take 1 tablet by mouth every 8 (eight) hours as needed    HYDROcodone-ibuprofen (VICOPROFEN) 7 5-200 mg per tablet Take 1 tablet by mouth every 6 (six) hours as needed for moderate pain    lidocaine (XYLOCAINE) 2 % topical gel Apply topically as needed (back pain)    meloxicam (MOBIC) 15 mg tablet Take 1 tablet (15 mg total) by mouth daily    Multiple Vitamin (multivitamin) capsule Take 1 capsule by mouth daily for 14 days    promethazine (PHENERGAN) 25 mg tablet Take 1 tablet (25 mg total) by mouth every 6 (six) hours as needed for nausea or vomiting (Patient not taking: Reported on 3/3/2021)    [DISCONTINUED] Ascorbic Acid (vitamin C) 1000 MG tablet Take 1 tablet (1,000 mg total) by mouth 2 (two) times a day for 14 days    [DISCONTINUED] atorvastatin (LIPITOR) 10 mg tablet Take 1 tablet (10 mg total) by mouth daily    [DISCONTINUED] cholecalciferol (VITAMIN D3) 1,000 units tablet Take 2 tablets (2,000 Units total) by mouth daily for 14 days    [DISCONTINUED] guaiFENesin (MUCINEX) 600 mg 12 hr tablet Take 2 tablets (1,200 mg total) by mouth every 12 (twelve) hours (Patient not taking: Reported on 2/22/2021)     No current facility-administered medications on file prior to visit  He has No Known Allergies       Review of Systems   Constitutional: Negative for activity change, appetite change, fatigue, fever and unexpected weight change  HENT: Negative for ear pain, hearing loss and sore throat  Eyes: Negative for visual disturbance  Respiratory: Negative for cough and wheezing  Cardiovascular: Positive for chest pain  Gastrointestinal: Negative for abdominal pain, constipation, diarrhea and vomiting  Genitourinary: Negative for difficulty urinating and dysuria     Musculoskeletal: Positive for back pain  Negative for arthralgias and myalgias  Skin: Negative for rash  Neurological: Positive for weakness and numbness  Negative for dizziness and headaches  Psychiatric/Behavioral: Negative for behavioral problems  Objective:      /72 (BP Location: Left arm, Patient Position: Sitting, Cuff Size: Large)   Pulse 93   Temp 97 9 °F (36 6 °C) (Temporal)   Resp 16   Ht 5' 7" (1 702 m)   Wt 89 4 kg (197 lb)   SpO2 99%   BMI 30 85 kg/m²          Physical Exam  Vitals signs and nursing note reviewed  Constitutional:       General: He is not in acute distress  Appearance: He is well-developed  HENT:      Head: Normocephalic and atraumatic  Right Ear: External ear normal       Left Ear: External ear normal    Eyes:      Conjunctiva/sclera: Conjunctivae normal    Neck:      Musculoskeletal: Normal range of motion and neck supple  Thyroid: No thyromegaly  Cardiovascular:      Rate and Rhythm: Normal rate and regular rhythm  Heart sounds: Normal heart sounds  No murmur  Pulmonary:      Effort: Pulmonary effort is normal  No respiratory distress  Breath sounds: Normal breath sounds  No wheezing  Abdominal:      General: Abdomen is flat  Palpations: There is no mass  Tenderness: There is no abdominal tenderness  Lymphadenopathy:      Cervical: No cervical adenopathy  Neurological:      Mental Status: He is alert and oriented to person, place, and time     Psychiatric:         Mood and Affect: Mood normal          Behavior: Behavior normal        EKG :NSR

## 2021-03-17 NOTE — PROGRESS NOTES
PT Evaluation     Today's date: 3/17/2021  Patient name: Sharath Espinosa  : 1960  MRN: [id number]  Referring provider: RAMO Noriega  Dx:   Encounter Diagnosis     ICD-10-CM    1  Lumbar radiculopathy - Left  M54 16 Ambulatory referral to Physical Therapy   2  Chronic pain syndrome  G89 4 Ambulatory referral to Physical Therapy                  Assessment  Assessment details: Pt is a 62y o  year old male presenting to physical therapy for Lumbar radiculopathy - Left, Chronic pain syndrome that has bothered him for the past 2 years since an injury where he fell into a ditch in 2019  He presents with the following impairments: limited lumbar ROM, LE weakness, lumbar and thoracic hypomobility, and + slump and pSLR b/l affecting his function with walking, stairs, lifting, carrying, bending, and working  Pt will benefit from skilled physical therapy to address functional limitations noted in evaluation and meet patient goals  Impairments: abnormal muscle firing, abnormal or restricted ROM, abnormal movement, activity intolerance, impaired physical strength, lacks appropriate home exercise program and pain with function    Symptom irritability: highUnderstanding of Dx/Px/POC: good   Prognosis: good    Goals  ST  Pt will reduce pain to 6/10 at rest  2  Pt will improve L LE strength to 4-/5 grossly    LT  Pt will demonstrate good squat mechanics to allow for lifting off floor  2  Pt will improve lumbar flexion to min deficits to allow for forward bending    3  Pt will be I w HEP    Plan  Patient would benefit from: PT eval and skilled physical therapy  Planned modality interventions: biofeedback, electrical stimulation/Russian stimulation, TENS and thermotherapy: hydrocollator packs  Planned therapy interventions: abdominal trunk stabilization, neuromuscular re-education, joint mobilization, manual therapy, patient education, strengthening, stretching, therapeutic activities, therapeutic exercise, flexibility, functional ROM exercises and home exercise program  Frequency: 2x week  Duration in weeks: 6  Treatment plan discussed with: patient        Subjective Evaluation    History of Present Illness  Mechanism of injury: Pt reports LBP since 2019 when 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  His pain has worsened over the past year and radiates down his L LE with occasional pain down his R LE  His pain limits his ability to walk, lift, go up and down steps, and work  He also has difficulty sleeping due to the pain  Recurrent probem    Pain  Current pain ratin  At worst pain rating: 10  Quality: radiating and sharp  Aggravating factors: walking and standing      Diagnostic Tests  MRI studies: abnormal  Treatments  Previous treatment: medication and physical therapy  Current treatment: medication and physical therapy  Patient Goals  Patient goals for therapy: decreased pain, increased motion, increased strength, independence with ADLs/IADLs, return to work and return to sport/leisure activities          Objective     Palpation   Left   Hypertonic in the erector spinae and lumbar paraspinals  Tenderness of the lumbar paraspinals  Right   Hypertonic in the erector spinae and lumbar paraspinals  Tenderness of the erector spinae and lumbar paraspinals       Active Range of Motion     Lumbar   Flexion:  with pain Restriction level: maximal  Extension:  with pain Restriction level: minimal  Left lateral flexion:  with pain Restriction level: minimal  Right lateral flexion:  Restriction level: minimal  Left rotation:  Restriction level: minimal  Right rotation:  Restriction level: minimal    Joint Play     Hypomobile: T8, T9, T10, T11, T12, L1, L2, L3, L4, L5 and S1     Pain: T8, T9, T10, T11, T12, L1, L2, L3, L4 and L5     Strength/Myotome Testing     Lumbar   Left   Heel walk: normal  Toe walk: normal    Right   Heel walk: normal  Toe walk: normal    Left Hip Planes of Motion   Flexion: 3    Right Hip   Planes of Motion   Flexion: 4-    Left Knee   Flexion: 4-  Extension: 4    Right Knee   Flexion: 4  Extension: 4+    Tests     Lumbar     Left   Positive passive SLR and slump test    Negative crossed SLR  Right   Positive passive SLR and slump test    Negative crossed SLR  Functional Assessment      Squat    Unable to perform  and pain                Precautions:     Date 3/17            Visit # 1            FOTO 18/38             Re-eval               Manuals                                                                 Neuro Re-Ed             TA Bracing 10x10"            Bridges 10x5"            Gissel Kelley Dog                                                    Ther Ex             Bike             REIL/KIM 10x REIL            Hamstring Str             Piriformis Str             Leg Press             Open Book Str                          Pt Edu SF             Ther Activity                                       Gait Training                                       Modalities             TENS 10'

## 2021-03-23 ENCOUNTER — OFFICE VISIT (OUTPATIENT)
Dept: NEUROSURGERY | Facility: CLINIC | Age: 61
End: 2021-03-23
Payer: COMMERCIAL

## 2021-03-23 VITALS
WEIGHT: 194 LBS | HEIGHT: 67 IN | BODY MASS INDEX: 30.45 KG/M2 | TEMPERATURE: 97.5 F | HEART RATE: 90 BPM | DIASTOLIC BLOOD PRESSURE: 80 MMHG | SYSTOLIC BLOOD PRESSURE: 112 MMHG | RESPIRATION RATE: 16 BRPM

## 2021-03-23 DIAGNOSIS — M54.16 LUMBAR RADICULOPATHY: Primary | ICD-10-CM

## 2021-03-23 PROCEDURE — 3008F BODY MASS INDEX DOCD: CPT | Performed by: PHYSICIAN ASSISTANT

## 2021-03-23 PROCEDURE — 99213 OFFICE O/P EST LOW 20 MIN: CPT | Performed by: PHYSICIAN ASSISTANT

## 2021-03-23 PROCEDURE — 1036F TOBACCO NON-USER: CPT | Performed by: PHYSICIAN ASSISTANT

## 2021-03-23 NOTE — PATIENT INSTRUCTIONS
Neurosurgery Instructions  · At this time, we do not anticipate any neurosurgical intervention  · We recommend that you continue conservative management with pain management and physical therapy  · Continue follow up with your primary care provider for general health and we recommend healthy choices  · No follow up is required by us, you can follow up with us as needed  · Please contact us with any questions or concerns

## 2021-03-23 NOTE — PROGRESS NOTES
Neurosurgery Office Note  Mary Mejia 62 y o  male MRN: [id number]      Assessment/Plan     Lumbar radiculopathy    · Pt presents to the neurosurgery office for consultation for low back pain with radiation to the left leg with associated weakness and numbness  · Has tried various pain medicatiosn with limited relief and recently saw PT for evaluation  · Imaging reviewed personally and by attending  Final results below discussed with the patient  · MRI lumbar spine 2/25/21:  Stable left foraminal and far lateral annular fissures and protrusions at L3-L4 and L4-L5  There is moderate ipsilateral foraminal stenosis  Mild right foraminal stenosis at L3-L4 and L4-L5  · Xray Lumbar spine   02/22/2021: Mild degenerative disc disease with ventral osteophytes at L2-3 and L3-4  No significant disc space narrowing  Plan  · Pain control with OTC and prescribed medications as needed  Pt follows with pain management service  Reports he has had 3 BRIAN to lower back in [place]  · Pt recently seen by PT for evaluation  Recommend continuing PT therapy  · Reviewed imaging findings with pt  At this time, no neurosurgical intervention is anticipated  Recommend pt continue  Conservative mgt with PT and pain management as needed  · No scheduled follow up required by neurosurgery, pt could follow up on a PRN basis  · Discussed plan of care with patient who showed understanding  · Patient made aware to contact neurosurgery with any questions or concerns           Diagnoses and all orders for this visit:    Lumbar radiculopathy  -     Ambulatory referral to Neurosurgery            CHIEF COMPLAINT    Chief Complaint   Patient presents with    Consult    Back Pain       HISTORY    History of Present Illness     62y o  year old male     Patient is a 52-year-old male past medical history of  Hypertension, hyperlipidemia, diabetes mellitus type 2  who presents to the neurosurgery office for consultation for low back pain  Patient reports he has had low back pain since 2019 when he had an injury where he fell into a ditch at work in September 2019  Patient reports he has low back pain that radiates to the left leg  Pt rates his back/leg pain as 8/10 on the pain scale  Pt reports activity for long periods aggravates his back, so does sitting for too long his back gets stiff and his pain increases  He reports at times he gets muscle spasms  He reports his stiffness is worse in the winter months and he he gets relief with taking hot showers/soaking in hot tub  Patient admits to bilateral numbness and weakness in the lower extremities, worse on the left than right  Patient admits difficulty walking due to his symptoms  Pt reports since he fell at work in 2019, he has been using a cane for mobility  He reports gait instability  Patient denies bowel or bladder incontinence  Patient reports that he has tried various medications including gabapentin, tramadol and hydrocodone for pain relief but reports that they provide limited pain relief  Patient was referred to pain management was offered injections  He reports he had 3 BRIAN in [place] with very temporary relief  Patient was referred to physical therapy and saw physical therapy on 03/17/2021 for evaluation  Pt reports he is continuing PT at this time  Pt is  and has 7 children, his youngest child aged 8 lives at home  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  Pt reports that last time he worked was in October 2019 when he got injured/ fell at work  Brandt Routami He is on disability right now  REVIEW OF SYSTEMS    Review of Systems   Constitutional: Negative  HENT: Positive for tinnitus  Eyes: Positive for visual disturbance  Respiratory: Positive for shortness of breath (with activities)  Cardiovascular: Negative  Gastrointestinal: Positive for nausea  Endocrine: Negative      Genitourinary: Negative  Musculoskeletal: Positive for back pain (lower back pain, spasms/tight, down into legs mainly the left leg), gait problem (sometimes very unsteady, uses a cane, due to left leg) and myalgias (muscle pain in his back )  Negative for neck pain and neck stiffness  Skin: Negative  Allergic/Immunologic: Negative  Neurological: Positive for weakness and numbness (bilateral legs mainly leg)  Negative for dizziness, tremors, seizures, light-headedness and headaches  Hematological: Negative  Psychiatric/Behavioral: Negative            Meds/Allergies     Current Outpatient Medications   Medication Sig Dispense Refill    amitriptyline (ELAVIL) 25 mg tablet Take 1 tablet (25 mg total) by mouth daily at bedtime 30 tablet 1    amLODIPine (NORVASC) 10 mg tablet Take 1 tablet (10 mg total) by mouth daily 90 tablet 0    atorvastatin (LIPITOR) 20 mg tablet Take 1 tablet (20 mg total) by mouth daily 90 tablet 1    Bioflavonoid Products (RA Vitamin C CR) TBCR take 1 tablet by mouth twice a day for 14 days      cyclobenzaprine (FLEXERIL) 10 mg tablet Take 1 tablet (10 mg total) by mouth 3 (three) times a day as needed for muscle spasms 30 tablet 1    famotidine (PEPCID) 20 mg tablet Take 1 tablet (20 mg total) by mouth 2 (two) times a day As needed for acid reflux 90 tablet 0    gabapentin (NEURONTIN) 800 mg tablet Take 1 tablet (800 mg total) by mouth 3 (three) times a day 90 tablet 1    HYDROcodone-acetaminophen (NORCO) 7 5-325 mg per tablet Take 1 tablet by mouth every 8 (eight) hours as needed      HYDROcodone-acetaminophen (NORCO) 7 5-325 mg per tablet Take 1 tablet by mouth every 8 (eight) hours as needed for painMax Daily Amount: 3 tablets 90 tablet 0    lidocaine (XYLOCAINE) 2 % topical gel Apply topically as needed (back pain) 1 Tube 0    metFORMIN (GLUCOPHAGE) 500 mg tablet Take 1 tablet (500 mg total) by mouth 2 (two) times a day with meals 180 tablet 0    Multiple Vitamin (multivitamin) capsule Take 1 capsule by mouth daily for 14 days 14 capsule 0    promethazine (PHENERGAN) 25 mg tablet Take 1 tablet (25 mg total) by mouth every 6 (six) hours as needed for nausea or vomiting 30 tablet 0    tamsulosin (FLOMAX) 0 4 mg Take 1 capsule (0 4 mg total) by mouth daily with dinner 90 capsule 0    albuterol (PROVENTIL HFA,VENTOLIN HFA) 90 mcg/act inhaler Inhale 2 puffs every 6 (six) hours as needed for wheezing or shortness of breath (Patient not taking: Reported on 3/4/2021) 1 Inhaler 0    albuterol (PROVENTIL HFA,VENTOLIN HFA) 90 mcg/act inhaler Inhale 2 puffs every 4 (four) hours as needed for wheezing (Patient not taking: Reported on 3/4/2021) 1 Inhaler 0    aluminum-magnesium hydroxide-simethicone (MAALOX) 200-200-20 MG/5ML SUSP Take 15 mL by mouth 4 (four) times a day (before meals and at bedtime) 150 mL 0    fluticasone (FLONASE) 50 mcg/act nasal spray 1 spray into each nostril daily (Patient not taking: Reported on 3/3/2021) 16 g 0    HYDROcodone-ibuprofen (VICOPROFEN) 7 5-200 mg per tablet Take 1 tablet by mouth every 6 (six) hours as needed for moderate pain      meloxicam (MOBIC) 15 mg tablet Take 1 tablet (15 mg total) by mouth daily (Patient not taking: Reported on 3/23/2021) 20 tablet 0     No current facility-administered medications for this visit  No Known Allergies    PAST HISTORY    Past Medical History:   Diagnosis Date    Asthma     Back pain     Back pain     Diabetes mellitus ([address] )     Hyperlipidemia     Hypertension        Past Surgical History:   Procedure Laterality Date    NO PAST SURGERIES         Social History     Tobacco Use    Smoking status: Never Smoker    Smokeless tobacco: Never Used   Substance Use Topics    Alcohol use: Not Currently    Drug use: Never       Family History   Problem Relation Age of Onset    No Known Problems Mother     No Known Problems Father          Above history personally reviewed         EXAM    Vitals:Blood pressure 112/80, pulse 90, temperature 97 5 °F (36 4 °C), temperature source Tympanic, resp  rate 16, height 5' 7" (1 702 m), weight 88 kg (194 lb)  ,Body mass index is 30 38 kg/m²  Physical Exam  Constitutional:       Appearance: He is well-developed  HENT:      Head: Normocephalic and atraumatic  Eyes:      General: No scleral icterus  Conjunctiva/sclera: Conjunctivae normal       Pupils: Pupils are equal, round, and reactive to light  Neck:      Musculoskeletal: Normal range of motion and neck supple  Trachea: No tracheal deviation  Cardiovascular:      Rate and Rhythm: Normal rate  Pulmonary:      Effort: Pulmonary effort is normal    Abdominal:      Palpations: Abdomen is soft  Tenderness: There is no abdominal tenderness  There is no guarding  Musculoskeletal:         General: Tenderness present  Comments: TTP of the the lower thoracic/lumbar spine  Midline and paraspinal     Skin:     General: Skin is warm and dry  Coloration: Skin is not pale  Findings: No rash  Neurological:      Mental Status: He is alert and oriented to person, place, and time  Coordination: Finger-Nose-Finger Test normal       Comments: GCS 15, AAO X 3, ARSHAD, strength BUE 5/5 except left IO 4+/5, RLE 4+/5, LLE 4+/5 except left KF, DF/PF 4/5, sensation intact to LT/PP X 4 except left thumb and LLE: Laterally, Reflexes 1+ and symmetric, no christopher's or clonus, no drift bilaterally  Psychiatric:         Speech: Speech normal          Behavior: Behavior normal          Neurologic Exam     Mental Status   Oriented to person, place, and time  Attention: normal  Concentration: normal    Speech: speech is normal   Level of consciousness: alert  Knowledge: good  Normal comprehension  Cranial Nerves   Cranial nerves II through XII intact  CN III, IV, VI   Pupils are equal, round, and reactive to light      Motor Exam   Muscle bulk: normal  Overall muscle tone: normal  Right arm pronator drift: absent  Left arm pronator drift: absent    Gait, Coordination, and Reflexes     Coordination   Finger to nose coordination: normal        MEDICAL DECISION MAKING    Imaging Studies:     X-ray Lumbar Spine 2 Or 3 Views    Result Date: 2/28/2021  Narrative: LUMBAR SPINE INDICATION:   M54 30: Sciatica, unspecified side G89 4: Chronic pain syndrome M51 16: Intervertebral disc disorders with radiculopathy, lumbar region  COMPARISON:  MRI dated 10/9/2019  VIEWS:  XR SPINE LUMBAR 2 OR 3 VIEWS INJURY FINDINGS: There are 5 non rib bearing lumbar vertebral bodies  There is no evidence of acute fracture or destructive osseous lesion  Alignment is unremarkable  Mild degenerative disc disease with ventral osteophytes at L2-3 and L3-4  No significant disc space narrowing  The pedicles appear intact  Soft tissues are unremarkable  Impression: No acute osseous abnormality  Degenerative changes as described  Workstation performed: [id number]     Mri Lumbar Spine Without Contrast    Result Date: 2/28/2021  Narrative: MRI LUMBAR SPINE WITHOUT CONTRAST INDICATION: Chronic low back pain  COMPARISON: Outside MRI of the lumbar spine from October 9, 2019  TECHNIQUE:  Sagittal T1, sagittal T2, sagittal inversion recovery, axial T1 and axial T2, coronal T2  Imaging performed on 1 5T MRI  IMAGE QUALITY:  Diagnostic FINDINGS: VERTEBRAL BODIES:  There are 5 lumbar type vertebral bodies  Normal alignment of the lumbar spine  No spondylolysis or spondylolisthesis  No scoliosis  No compression fracture  Normal marrow signal is identified within the visualized bony structures  No discrete marrow lesion  SACRUM:  Normal signal within the sacrum  No evidence of insufficiency or stress fracture  DISTAL CORD AND CONUS:  Normal size and signal within the distal cord and conus  PARASPINAL SOFT TISSUES:  Paraspinal soft tissues are unremarkable    Right lower pole parapelvic cyst  LOWER THORACIC DISC SPACES:  Normal disc height and signal   No disc herniation, canal stenosis or foraminal narrowing  LUMBAR DISC SPACES:  Disc desiccation from L2-L3 to L4-L5  Preserved disc height  L1-L2:  Normal  L2-L3:  Mild noncompressive degenerative disease  L3-L4:  Left foraminal annular fissure and protrusion with compression of the exiting left L3 nerve root  There is mild right and moderate left foraminal stenosis  No spinal stenosis  No significant change  L4-L5:  Left foraminal and far lateral annular fissure with protrusion  There is abutment and displacement of the exiting left L4 nerve root  Moderate left and mild right foraminal stenosis  No spinal stenosis  No significant change  L5-S1:  Normal      Impression: Stable left foraminal and far lateral annular fissures and protrusions at L3-L4 and L4-L5  There is moderate ipsilateral foraminal stenosis, correlate for left L3 and L4 radiculitis  Mild right foraminal stenosis at L3-L4 and L4-L5  Workstation performed: [id number]       I have personally reviewed pertinent reports     and I have personally reviewed pertinent films in PACS

## 2021-03-23 NOTE — ASSESSMENT & PLAN NOTE
· Pt presents to the neurosurgery office for consultation for low back pain with radiation to the left leg with associated weakness and numbness  · Has tried various pain medicatiosn with limited relief and recently saw PT for evaluation  · Imaging reviewed personally and by attending  Final results below discussed with the patient  · MRI lumbar spine 2/25/21:  Stable left foraminal and far lateral annular fissures and protrusions at L3-L4 and L4-L5  There is moderate ipsilateral foraminal stenosis  Mild right foraminal stenosis at L3-L4 and L4-L5  · Xray Lumbar spine   02/22/2021: Mild degenerative disc disease with ventral osteophytes at L2-3 and L3-4  No significant disc space narrowing  Plan  · Pain control with OTC and prescribed medications as needed  Pt follows with pain management service  Reports he has had 3 BRIAN to lower back in [place]  · Pt recently seen by PT for evaluation  Recommend continuing PT therapy  · Reviewed imaging findings with pt  At this time, no neurosurgical intervention is anticipated  Recommend pt continue  Conservative mgt with PT and pain management as needed  · No scheduled follow up required by neurosurgery, pt could follow up on a PRN basis  · Discussed plan of care with patient who showed understanding  · Patient made aware to contact neurosurgery with any questions or concerns

## 2021-03-24 ENCOUNTER — OFFICE VISIT (OUTPATIENT)
Dept: PHYSICAL THERAPY | Facility: CLINIC | Age: 61
End: 2021-03-24
Payer: COMMERCIAL

## 2021-03-24 DIAGNOSIS — G89.4 CHRONIC PAIN SYNDROME: ICD-10-CM

## 2021-03-24 DIAGNOSIS — M54.16 LUMBAR RADICULOPATHY: Primary | ICD-10-CM

## 2021-03-24 PROCEDURE — 97110 THERAPEUTIC EXERCISES: CPT | Performed by: PHYSICAL THERAPIST

## 2021-03-24 PROCEDURE — 97140 MANUAL THERAPY 1/> REGIONS: CPT | Performed by: PHYSICAL THERAPIST

## 2021-03-24 PROCEDURE — 97112 NEUROMUSCULAR REEDUCATION: CPT | Performed by: PHYSICAL THERAPIST

## 2021-03-24 PROCEDURE — 97014 ELECTRIC STIMULATION THERAPY: CPT | Performed by: PHYSICAL THERAPIST

## 2021-03-24 NOTE — PROGRESS NOTES
Daily Note     Today's date: 3/24/2021  Patient name: Yane Zee  : 1960  MRN: [id number]  Referring provider: ARMO Phelps  Dx:   Encounter Diagnosis     ICD-10-CM    1  Lumbar radiculopathy - Left  M54 16    2  Chronic pain syndrome  G89 4                   Subjective: Pt reports his back is bothering him a little worse with the rain today  Objective: See treatment diary below      Assessment: Pt is challenged w addition of clams, bird dogs, and leg press  He has some L sided LBP w bird dogs, and has more difficulty w lifting L LE during clamshells  Patient demonstrated fatigue post treatment and would benefit from continued PT  Plan: Continue per plan of care  Progress treatment as tolerated         Precautions:     Date 3/17 3/24           Visit # 1 2           FOTO              Re-eval               Manuals  3/24           LE stretching  Ham/ITB str b/l           PA Mobs  SF Gr II w paraspinal STM                                     Neuro Re-Ed  3/24           TA Bracing 10x10" 5x10"           Bridges 10x5" 10x5"           Clams  15x GTB s/l           Bird Dog  10x LE only                                                  Ther Ex             Bike  6'           REIL/KIM 10x REIL 10x REIL           Hamstring Str             Piriformis Str             Leg Press  2x10 75#           Open Book Str                          Pt Edu SF             Ther Activity                                       Gait Training                                       Modalities             TENS 10' 10'

## 2021-03-30 ENCOUNTER — OFFICE VISIT (OUTPATIENT)
Dept: PHYSICAL THERAPY | Facility: CLINIC | Age: 61
End: 2021-03-30
Payer: COMMERCIAL

## 2021-03-30 DIAGNOSIS — G89.4 CHRONIC PAIN SYNDROME: ICD-10-CM

## 2021-03-30 DIAGNOSIS — M54.16 LUMBAR RADICULOPATHY: Primary | ICD-10-CM

## 2021-03-30 PROCEDURE — 97140 MANUAL THERAPY 1/> REGIONS: CPT

## 2021-03-30 PROCEDURE — 97112 NEUROMUSCULAR REEDUCATION: CPT

## 2021-03-30 PROCEDURE — 97014 ELECTRIC STIMULATION THERAPY: CPT

## 2021-03-30 PROCEDURE — 97110 THERAPEUTIC EXERCISES: CPT

## 2021-03-30 NOTE — PROGRESS NOTES
Daily Note     Today's date: 3/30/2021  Patient name: Natalya Blanc  : 1960  MRN: [id number]  Referring provider: RAMO Ellis  Dx:   Encounter Diagnosis     ICD-10-CM    1  Lumbar radiculopathy - Left  M54 16    2  Chronic pain syndrome  G89 4                   Subjective: Pt presents to PT reporting back pain with activity and prolonged sitting grading the pain a 7/10  Pt reports + response to stretches  Pt reports decreased pain post PT session but did not give a grade  Objective: See treatment diary below      Assessment: Tolerated treatment well and reports increased mobility post manual stretches  Pt does require occ cuing for proper posture secondary to sitting in slumped position  Issued updated HEP with stretches performed today; pt demonstrates an understanding by performing correctly in the clinic  Patient demonstrated fatigue post treatment, exhibited good technique with therapeutic exercises and would benefit from continued PT  Plan: Continue per plan of care        Precautions:     Date 3/17 3/24 3/30          Visit # 1 2 3          FOTO              Re-eval               Manuals  3/24 3/30          LE stretching  Ham/ITB str b/l Ham/ITB str b/l          PA Mobs  SF Gr II w paraspinal STM np                                    Neuro Re-Ed  3/24           TA Bracing 10x10" 5x10" 10x10"          Bridges 10x5" 10x5" 15x5"          Clams  15x GTB s/l 15x GTB s/l          Bird Dog  10x LE only 10x LE only                                                 Ther Ex             Bike  6' 6'          REIL/KIM 10x REIL 10x REIL 10x REIL          Hamstring Str   20" x5          Piriformis Str   20" x5          Leg Press  2x10 75# nv          Open Book Str                          Pt Edu SF             Ther Activity                                       Gait Training                                       Modalities             TENS 10' 10' 10"

## 2021-04-01 ENCOUNTER — OFFICE VISIT (OUTPATIENT)
Dept: PHYSICAL THERAPY | Facility: CLINIC | Age: 61
End: 2021-04-01
Payer: COMMERCIAL

## 2021-04-01 DIAGNOSIS — G89.4 CHRONIC PAIN SYNDROME: ICD-10-CM

## 2021-04-01 DIAGNOSIS — M54.16 LUMBAR RADICULOPATHY: Primary | ICD-10-CM

## 2021-04-01 PROCEDURE — 97112 NEUROMUSCULAR REEDUCATION: CPT | Performed by: PHYSICAL THERAPIST

## 2021-04-01 PROCEDURE — 97140 MANUAL THERAPY 1/> REGIONS: CPT | Performed by: PHYSICAL THERAPIST

## 2021-04-01 PROCEDURE — 97110 THERAPEUTIC EXERCISES: CPT | Performed by: PHYSICAL THERAPIST

## 2021-04-01 NOTE — PROGRESS NOTES
Daily Note     Today's date: 2021  Patient name: Mili Delarosa  : 1960  MRN: [id number]  Referring provider: RAMO Chappell  Dx:   Encounter Diagnosis     ICD-10-CM    1  Lumbar radiculopathy - Left  M54 16    2  Chronic pain syndrome  G89 4                   Subjective: Pt reports some back spasms today, but overall is feeling okay  Objective: See treatment diary below      Assessment: Pt is sore and fatigued w addition of pallof press and continued program   He does well w increased PA pressure w improvement into ext ROM for REIL and trials open book str w improvement in trunk rotation  Patient demonstrated fatigue post treatment and would benefit from continued PT  Plan: Continue per plan of care  Progress treatment as tolerated         Precautions:     Date 3/17 3/24 3/30 4/1         Visit # 1 2 3 4         FOTO              Re-eval               Manuals  3/24 3/30 4/1         LE stretching  Ham/ITB str b/l Ham/ITB str b/l Ham/ITB str b/l         PA Mobs  SF Gr II w paraspinal STM np SF Gr IV w paraspinal STM                                   Neuro Re-Ed  3/24  4/1         TA Bracing 10x10" 5x10" 10x10"          Bridges 10x5" 10x5" 15x5" 2x10 5"         Clams  15x GTB s/l 15x GTB s/l          Bird Dog  10x LE only 10x LE only 10x LE only         Pallof Press    10x5" 9#                                   Ther Ex             Bike  6' 6' 7'         REIL/KIM 10x REIL 10x REIL 10x REIL 10x REIL         Hamstring Str   20" x5 20"x3         Piriformis Str   20" x5 20"x3         Leg Press  2x10 75# nv 3x10 95#         Open Book Str    3x30" ea                      Pt Edu SF             Ther Activity                                       Gait Training                                       Modalities             TENS 10' 10' 10"

## 2021-04-02 ENCOUNTER — APPOINTMENT (OUTPATIENT)
Dept: PHYSICAL THERAPY | Facility: CLINIC | Age: 61
End: 2021-04-02
Payer: COMMERCIAL

## 2021-04-05 ENCOUNTER — APPOINTMENT (OUTPATIENT)
Dept: PHYSICAL THERAPY | Facility: CLINIC | Age: 61
End: 2021-04-05
Payer: COMMERCIAL

## 2021-04-09 ENCOUNTER — TELEPHONE (OUTPATIENT)
Dept: FAMILY MEDICINE CLINIC | Facility: CLINIC | Age: 61
End: 2021-04-09

## 2021-04-12 ENCOUNTER — IMMUNIZATIONS (OUTPATIENT)
Dept: FAMILY MEDICINE CLINIC | Facility: HOSPITAL | Age: 61
End: 2021-04-12

## 2021-04-12 DIAGNOSIS — Z23 ENCOUNTER FOR IMMUNIZATION: Primary | ICD-10-CM

## 2021-04-12 PROCEDURE — 91301 SARS-COV-2 / COVID-19 MRNA VACCINE (MODERNA) 100 MCG: CPT

## 2021-04-12 PROCEDURE — 0011A SARS-COV-2 / COVID-19 MRNA VACCINE (MODERNA) 100 MCG: CPT

## 2021-04-13 DIAGNOSIS — G89.29 CHRONIC LOW BACK PAIN WITHOUT SCIATICA, UNSPECIFIED BACK PAIN LATERALITY: ICD-10-CM

## 2021-04-13 DIAGNOSIS — M54.50 CHRONIC LOW BACK PAIN WITHOUT SCIATICA, UNSPECIFIED BACK PAIN LATERALITY: ICD-10-CM

## 2021-04-13 DIAGNOSIS — M54.16 LUMBAR RADICULOPATHY: ICD-10-CM

## 2021-04-14 ENCOUNTER — HOSPITAL ENCOUNTER (EMERGENCY)
Facility: HOSPITAL | Age: 61
Discharge: HOME | End: 2021-04-14
Attending: EMERGENCY MEDICINE
Payer: COMMERCIAL

## 2021-04-14 VITALS
WEIGHT: 197.09 LBS | TEMPERATURE: 99.4 F | DIASTOLIC BLOOD PRESSURE: 91 MMHG | SYSTOLIC BLOOD PRESSURE: 143 MMHG | BODY MASS INDEX: 30.87 KG/M2 | HEART RATE: 87 BPM | RESPIRATION RATE: 16 BRPM | OXYGEN SATURATION: 100 %

## 2021-04-14 DIAGNOSIS — L03.90 CELLULITIS: Primary | ICD-10-CM

## 2021-04-14 DIAGNOSIS — M79.89 RIGHT AXILLARY SWELLING: ICD-10-CM

## 2021-04-14 LAB
ANION GAP SERPL CALCULATED.3IONS-SCNC: 7 MMOL/L (ref 4–13)
BASOPHILS # BLD AUTO: 0.02 THOUSANDS/ΜL (ref 0–0.1)
BASOPHILS NFR BLD AUTO: 0 % (ref 0–1)
BUN SERPL-MCNC: 10 MG/DL (ref 5–25)
CALCIUM SERPL-MCNC: 9.6 MG/DL (ref 8.3–10.1)
CHLORIDE SERPL-SCNC: 103 MMOL/L (ref 100–108)
CO2 SERPL-SCNC: 32 MMOL/L (ref 21–32)
CREAT SERPL-MCNC: 1.17 MG/DL (ref 0.6–1.3)
EOSINOPHIL # BLD AUTO: 0.07 THOUSAND/ΜL (ref 0–0.61)
EOSINOPHIL NFR BLD AUTO: 1 % (ref 0–6)
ERYTHROCYTE [DISTWIDTH] IN BLOOD BY AUTOMATED COUNT: 14.2 % (ref 11.6–15.1)
GFR SERPL CREATININE-BSD FRML MDRD: 78 ML/MIN/1.73SQ M
GLUCOSE SERPL-MCNC: 107 MG/DL (ref 65–140)
HCT VFR BLD AUTO: 38.7 % (ref 36.5–49.3)
HGB BLD-MCNC: 13.7 G/DL (ref 12–17)
IMM GRANULOCYTES # BLD AUTO: 0.02 THOUSAND/UL (ref 0–0.2)
IMM GRANULOCYTES NFR BLD AUTO: 0 % (ref 0–2)
LYMPHOCYTES # BLD AUTO: 1.51 THOUSANDS/ΜL (ref 0.6–4.47)
LYMPHOCYTES NFR BLD AUTO: 29 % (ref 14–44)
MCH RBC QN AUTO: 30 PG (ref 26.8–34.3)
MCHC RBC AUTO-ENTMCNC: 35.4 G/DL (ref 31.4–37.4)
MCV RBC AUTO: 85 FL (ref 82–98)
MONOCYTES # BLD AUTO: 0.81 THOUSAND/ΜL (ref 0.17–1.22)
MONOCYTES NFR BLD AUTO: 16 % (ref 4–12)
NEUTROPHILS # BLD AUTO: 2.7 THOUSANDS/ΜL (ref 1.85–7.62)
NEUTS SEG NFR BLD AUTO: 54 % (ref 43–75)
NRBC BLD AUTO-RTO: 0 /100 WBCS
PLATELET # BLD AUTO: 178 THOUSANDS/UL (ref 149–390)
PMV BLD AUTO: 9.2 FL (ref 8.9–12.7)
POTASSIUM SERPL-SCNC: 4 MMOL/L (ref 3.5–5.3)
RBC # BLD AUTO: 4.57 MILLION/UL (ref 3.88–5.62)
SODIUM SERPL-SCNC: 142 MMOL/L (ref 136–145)
WBC # BLD AUTO: 5.13 THOUSAND/UL (ref 4.31–10.16)

## 2021-04-14 PROCEDURE — 36415 COLL VENOUS BLD VENIPUNCTURE: CPT | Performed by: EMERGENCY MEDICINE

## 2021-04-14 PROCEDURE — 85025 COMPLETE CBC W/AUTO DIFF WBC: CPT | Performed by: EMERGENCY MEDICINE

## 2021-04-14 PROCEDURE — 80048 BASIC METABOLIC PNL TOTAL CA: CPT | Performed by: EMERGENCY MEDICINE

## 2021-04-14 PROCEDURE — 99283 EMERGENCY DEPT VISIT LOW MDM: CPT

## 2021-04-14 PROCEDURE — 99284 EMERGENCY DEPT VISIT MOD MDM: CPT | Performed by: EMERGENCY MEDICINE

## 2021-04-14 RX ORDER — CEPHALEXIN 500 MG/1
500 CAPSULE ORAL EVERY 6 HOURS SCHEDULED
Qty: 20 CAPSULE | Refills: 0 | Status: SHIPPED | OUTPATIENT
Start: 2021-04-14 | End: 2021-04-19

## 2021-04-14 RX ORDER — CEPHALEXIN 250 MG/1
500 CAPSULE ORAL ONCE
Status: COMPLETED | OUTPATIENT
Start: 2021-04-14 | End: 2021-04-14

## 2021-04-14 RX ADMIN — CEPHALEXIN 500 MG: 250 CAPSULE ORAL at 13:29

## 2021-04-14 NOTE — Clinical Note
Juwan Waggoner was seen and treated in our emergency department on 4/14/2021  Diagnosis:     Walter Pradhan  may return to work on return date  He may return on this date: 04/15/2021         If you have any questions or concerns, please don't hesitate to call        Rose Muro MD    ______________________________           _______________          _______________  Hospital Representative                              Date                                Time

## 2021-04-14 NOTE — ED ATTENDING ATTESTATION
4/14/2021  Karrie FERNANDEZ, saw and evaluated the patient  I have discussed the patient with the resident/non-physician practitioner and agree with the resident's/non-physician practitioner's findings, Plan of Care, and MDM as documented in the resident's/non-physician practitioner's note, except where noted  All available labs and Radiology studies were reviewed  I was present for key portions of any procedure(s) performed by the resident/non-physician practitioner and I was immediately available to provide assistance  At this point I agree with the current assessment done in the Emergency Department  I have conducted an independent evaluation of this patient a history and physical is as follows:      A 56-year-old male with past medical history of hypertension, diabetes, BPH and chronic low back pain; presents with swelling to his right axilla since last evening  Patient denies associated pain  Swelling has not worsened since onset last night  Patient does report receiving the COVID vaccine 2 days ago, stating he had subjective fevers and chills following the vaccine however that has since resolved  Patient otherwise denies chest pain, shortness of breath, abdominal pain, nausea, vomiting, diarrhea, peripheral edema and rashes  Physical Exam  General Appearance: alert and oriented, nad, non toxic appearing  Skin:  Warm, dry, intact  HEENT: atraumatic, normocephalic  Neck: Supple, trachea midline  Cardiac: RRR; no murmurs, rub, gallops  Pulmonary: lungs CTAB; no wheezes, rales, rhonchi  Gastrointestinal: abdomen soft, nontender, nondistended; no guarding or rebound tenderness; good bowel sounds, no mass or bruits  Extremities:  Right axilla with 3 cm oblong area of swelling, swelling is mobile  No fluctuance or induration  No overlying erythema, area is warm to the touch    No pedal edema, 2+ pulses; no calf tenderness, no clubbing, no cyanosis  Neuro:  no focal motor or sensory deficits, CN 2-12 grossly intact  Psych:  Normal mood and affect, normal judgement and insight    Assessment and Plan:  Swelling to the right axilla, exam reveals a mobile area of swelling with warmth  No fluctuance or induration  Exam is not consistent with lymphadenopathy or abscess  Possible lipoma on exam, which isn't consistent with history  Due to patient's comorbid conditions, will check basic lab work and plan to treat for possible cellulitis        ED Course         Critical Care Time  Procedures

## 2021-04-15 ENCOUNTER — CLINICAL SUPPORT (OUTPATIENT)
Dept: FAMILY MEDICINE CLINIC | Facility: CLINIC | Age: 61
End: 2021-04-15

## 2021-04-15 VITALS — HEART RATE: 89 BPM | DIASTOLIC BLOOD PRESSURE: 80 MMHG | SYSTOLIC BLOOD PRESSURE: 120 MMHG

## 2021-04-15 DIAGNOSIS — I10 BENIGN ESSENTIAL HTN: Primary | ICD-10-CM

## 2021-04-15 NOTE — ED PROVIDER NOTES
History  Chief Complaint   Patient presents with    Mass     Lump under right arm  Noticed today  Got first covid vaccine Monday  Also chills, fever  Denies n/v/d    Chills     Patient is a 64-year-old male who presents for evaluation of right axillary swelling  Patient says that he got his 1st COVID vaccine on Monday  Patient noticed earlier this morning that he had right axillary swelling  It is mildly sore but there is no significant pain associated with it he denies overlying erythema but does complain of some warmth to the area  Patient has a history of poorly controlled diabetes mellitus in the past   Patient denies any recent trauma to the area  Patient denies weakness, numbness, paresthesias of the right upper extremity  He denies chest pain, dyspnea, nausea vomiting fevers, chills, rigors, abdominal pain  Patient has not taken anything for symptoms  No history of similar previous symptoms in the past           Prior to Admission Medications   Prescriptions Last Dose Informant Patient Reported? Taking?    Bioflavonoid Products (RA Vitamin C CR) TBCR   Yes No   Sig: take 1 tablet by mouth twice a day for 14 days   HYDROcodone-acetaminophen (NORCO) 7 5-325 mg per tablet   Yes No   Sig: Take 1 tablet by mouth every 8 (eight) hours as needed   HYDROcodone-ibuprofen (VICOPROFEN) 7 5-200 mg per tablet   Yes No   Sig: Take 1 tablet by mouth every 6 (six) hours as needed for moderate pain   Multiple Vitamin (multivitamin) capsule   No No   Sig: Take 1 capsule by mouth daily for 14 days   albuterol (PROVENTIL HFA,VENTOLIN HFA) 90 mcg/act inhaler   No No   Sig: Inhale 2 puffs every 4 (four) hours as needed for wheezing   Patient not taking: Reported on 3/4/2021   aluminum-magnesium hydroxide-simethicone (MAALOX) 295-945-61 MG/5ML SUSP   No No   Sig: Take 15 mL by mouth 4 (four) times a day (before meals and at bedtime)   amLODIPine (NORVASC) 10 mg tablet   No No   Sig: Take 1 tablet (10 mg total) by mouth daily   amitriptyline (ELAVIL) 25 mg tablet   No No   Sig: Take 1 tablet (25 mg total) by mouth daily at bedtime   atorvastatin (LIPITOR) 20 mg tablet   No No   Sig: Take 1 tablet (20 mg total) by mouth daily   cyclobenzaprine (FLEXERIL) 10 mg tablet   No No   Sig: Take 1 tablet (10 mg total) by mouth 3 (three) times a day as needed for muscle spasms   famotidine (PEPCID) 20 mg tablet   No No   Sig: Take 1 tablet (20 mg total) by mouth 2 (two) times a day As needed for acid reflux   fluticasone (FLONASE) 50 mcg/act nasal spray   No No   Si spray into each nostril daily   Patient not taking: Reported on 3/3/2021   gabapentin (NEURONTIN) 800 mg tablet   No No   Sig: Take 1 tablet (800 mg total) by mouth 3 (three) times a day   lidocaine (XYLOCAINE) 2 % topical gel   No No   Sig: Apply topically as needed (back pain)   meloxicam (MOBIC) 15 mg tablet   No No   Sig: Take 1 tablet (15 mg total) by mouth daily   Patient not taking: Reported on 3/23/2021   metFORMIN (GLUCOPHAGE) 500 mg tablet   No No   Sig: Take 1 tablet (500 mg total) by mouth 2 (two) times a day with meals   promethazine (PHENERGAN) 25 mg tablet   No No   Sig: Take 1 tablet (25 mg total) by mouth every 6 (six) hours as needed for nausea or vomiting   tamsulosin (FLOMAX) 0 4 mg   No No   Sig: Take 1 capsule (0 4 mg total) by mouth daily with dinner      Facility-Administered Medications: None       Past Medical History:   Diagnosis Date    Asthma     Back pain     Back pain     Diabetes mellitus ([address] )     Hyperlipidemia     Hypertension        Past Surgical History:   Procedure Laterality Date    NO PAST SURGERIES         Family History   Problem Relation Age of Onset    No Known Problems Mother     No Known Problems Father      I have reviewed and agree with the history as documented      E-Cigarette/Vaping    E-Cigarette Use Never User      E-Cigarette/Vaping Substances    Nicotine No     THC No     CBD No     Flavoring No     Other No  Unknown No      Social History     Tobacco Use    Smoking status: Never Smoker    Smokeless tobacco: Never Used   Substance Use Topics    Alcohol use: Not Currently    Drug use: Never        Review of Systems   Constitutional: Negative for chills, diaphoresis, fatigue and fever  HENT: Negative for drooling, facial swelling, sore throat and trouble swallowing  Eyes: Negative for photophobia  Respiratory: Negative for cough, choking, chest tightness, shortness of breath, wheezing and stridor  Cardiovascular: Negative for chest pain, palpitations and leg swelling  Gastrointestinal: Negative for abdominal distention, abdominal pain, diarrhea, nausea and vomiting  Genitourinary: Negative for dysuria  Musculoskeletal: Negative for back pain, neck pain and neck stiffness  Skin: Negative for color change, pallor and rash  Neurological: Negative for dizziness, speech difficulty, weakness, light-headedness, numbness and headaches  Hematological: Negative for adenopathy  Psychiatric/Behavioral: Negative for agitation  All other systems reviewed and are negative  Physical Exam  ED Triage Vitals [04/14/21 1201]   Temperature Pulse Respirations Blood Pressure SpO2   99 4 °F (37 4 °C) 98 18 170/77 99 %      Temp Source Heart Rate Source Patient Position - Orthostatic VS BP Location FiO2 (%)   Oral Monitor Sitting Right arm --      Pain Score       --             Orthostatic Vital Signs  Vitals:    04/14/21 1201 04/14/21 1337   BP: 170/77 143/91   Pulse: 98 87   Patient Position - Orthostatic VS: Sitting Sitting       Physical Exam  Vitals signs reviewed  Constitutional:       General: He is not in acute distress  Appearance: He is well-developed  HENT:      Head: Normocephalic  Eyes:      Pupils: Pupils are equal, round, and reactive to light  Neck:      Musculoskeletal: Normal range of motion and neck supple  Cardiovascular:      Rate and Rhythm: Normal rate and regular rhythm  Heart sounds: Normal heart sounds  No murmur  No friction rub  No gallop  Pulmonary:      Effort: Pulmonary effort is normal       Breath sounds: Normal breath sounds  Abdominal:      General: Bowel sounds are normal  There is no distension  Palpations: Abdomen is soft  Tenderness: There is no abdominal tenderness  There is no guarding  Musculoskeletal: Normal range of motion  Comments: Right axilla:  Patient with approximately 5 cm non indurated swelling noted  Compressible with mild tenderness  Bedside ultrasound with cobblestoning consistent with mild cellulitis noted  No drainable collection noted  Skin:     Capillary Refill: Capillary refill takes less than 2 seconds  Neurological:      Mental Status: He is alert and oriented to person, place, and time  Cranial Nerves: No cranial nerve deficit  Sensory: No sensory deficit  Motor: No abnormal muscle tone  Psychiatric:         Behavior: Behavior normal          Thought Content:  Thought content normal          Judgment: Judgment normal          ED Medications  Medications   cephalexin (KEFLEX) capsule 500 mg (500 mg Oral Given 4/14/21 1329)       Diagnostic Studies  Results Reviewed     Procedure Component Value Units Date/Time    Basic metabolic panel [[id number]] Collected: 04/14/21 1313    Lab Status: Final result Specimen: Blood from Arm, Right Updated: 04/14/21 1330     Sodium 142 mmol/L      Potassium 4 0 mmol/L      Chloride 103 mmol/L      CO2 32 mmol/L      ANION GAP 7 mmol/L      BUN 10 mg/dL      Creatinine 1 17 mg/dL      Glucose 107 mg/dL      Calcium 9 6 mg/dL      eGFR 78 ml/min/1 73sq m     Narrative:      [place] guidelines for Chronic Kidney Disease (CKD):     Stage 1 with normal or high GFR (GFR > 90 mL/min/1 73 square meters)    Stage 2 Mild CKD (GFR = 60-89 mL/min/1 73 square meters)    Stage 3A Moderate CKD (GFR = 45-59 mL/min/1 73 square meters)    Stage 3B Moderate CKD (GFR = 30-44 mL/min/1 73 square meters)    Stage 4 Severe CKD (GFR = 15-29 mL/min/1 73 square meters)    Stage 5 End Stage CKD (GFR <15 mL/min/1 73 square meters)  Note: GFR calculation is accurate only with a steady state creatinine    CBC and differential [[id number]]  (Abnormal) Collected: 04/14/21 1313    Lab Status: Final result Specimen: Blood from Arm, Right Updated: 04/14/21 1319     WBC 5 13 Thousand/uL      RBC 4 57 Million/uL      Hemoglobin 13 7 g/dL      Hematocrit 38 7 %      MCV 85 fL      MCH 30 0 pg      MCHC 35 4 g/dL      RDW 14 2 %      MPV 9 2 fL      Platelets 028 Thousands/uL      nRBC 0 /100 WBCs      Neutrophils Relative 54 %      Immat GRANS % 0 %      Lymphocytes Relative 29 %      Monocytes Relative 16 %      Eosinophils Relative 1 %      Basophils Relative 0 %      Neutrophils Absolute 2 70 Thousands/µL      Immature Grans Absolute 0 02 Thousand/uL      Lymphocytes Absolute 1 51 Thousands/µL      Monocytes Absolute 0 81 Thousand/µL      Eosinophils Absolute 0 07 Thousand/µL      Basophils Absolute 0 02 Thousands/µL                  No orders to display         Procedures  Procedures      ED Course                                       MDM  Number of Diagnoses or Management Options  Cellulitis:   Right axillary swelling:   Diagnosis management comments: Patient is a 15-year-old male who presents for evaluation of right axillary swelling likely secondary to cellulitis  Presentation is not consistent with lymphadenopathy associated with the COVID vaccine  Concern for early cellulitis is present    Patient was treated with antibiotics and advised return to care if he develops any new or worsening symptoms      Disposition  Final diagnoses:   Cellulitis   Right axillary swelling     Time reflects when diagnosis was documented in both MDM as applicable and the Disposition within this note     Time User Action Codes Description Comment    4/14/2021  1:32 PM eneida, [facility] [facility] [L03 90] Cellulitis     4/14/2021  1:32 PM Mike Eliezer Add [M79 89] Right axillary swelling       ED Disposition     ED Disposition Condition Date/Time Comment    Discharge Stable Wed Apr 14, 2021  1:32 PM Samantha Marques discharge to home/self care              Follow-up Information     Follow up With Specialties Details Why [address] [facility]  Emergency Department Emergency Medicine  If [place] [id number]  [address] [facility] Emergency Department, [address] [address] [facility] [address]  , [place], [place], [address] [facility], Physician Assistant Schedule an appointment as soon as possible for a visit in 2 days For symtpom recheck [address]  [address] [facility] [address]  [phone]             Discharge Medication List as of 4/14/2021  1:33 PM      START taking these medications    Details   cephalexin (KEFLEX) 500 mg capsule Take 1 capsule (500 mg total) by mouth every 6 (six) hours for 5 days, Starting Wed 4/14/2021, Until Mon 4/19/2021, Normal         CONTINUE these medications which have NOT CHANGED    Details   aluminum-magnesium hydroxide-simethicone (MAALOX) 200-200-20 MG/5ML SUSP Take 15 mL by mouth 4 (four) times a day (before meals and at bedtime), Starting Thu 1/7/2021, Normal      amitriptyline (ELAVIL) 25 mg tablet Take 1 tablet (25 mg total) by mouth daily at bedtime, Starting Thu 3/4/2021, Normal      amLODIPine (NORVASC) 10 mg tablet Take 1 tablet (10 mg total) by mouth daily, Starting Fri 2/12/2021, Normal      atorvastatin (LIPITOR) 20 mg tablet Take 1 tablet (20 mg total) by mouth daily, Starting Wed 3/17/2021, Normal      Bioflavonoid Products (RA Vitamin C CR) TBCR take 1 tablet by mouth twice a day for 14 days, Historical Med      cyclobenzaprine (FLEXERIL) 10 mg tablet Take 1 tablet (10 mg total) by mouth 3 (three) times a day as needed for muscle spasms, Starting Fri 11/13/2020, Normal      famotidine (PEPCID) 20 mg tablet Take 1 tablet (20 mg total) by mouth 2 (two) times a day As needed for acid reflux, Starting Wed 3/17/2021, Normal      gabapentin (NEURONTIN) 800 mg tablet Take 1 tablet (800 mg total) by mouth 3 (three) times a day, Starting Fri 2/12/2021, Normal      !! HYDROcodone-acetaminophen (NORCO) 7 5-325 mg per tablet Take 1 tablet by mouth every 8 (eight) hours as needed, Starting Fri 12/11/2020, Historical Med      HYDROcodone-ibuprofen (VICOPROFEN) 7 5-200 mg per tablet Take 1 tablet by mouth every 6 (six) hours as needed for moderate pain, Historical Med      lidocaine (XYLOCAINE) 2 % topical gel Apply topically as needed (back pain), Starting Thu 1/7/2021, Normal      metFORMIN (GLUCOPHAGE) 500 mg tablet Take 1 tablet (500 mg total) by mouth 2 (two) times a day with meals, Starting Tue 1/19/2021, Normal      promethazine (PHENERGAN) 25 mg tablet Take 1 tablet (25 mg total) by mouth every 6 (six) hours as needed for nausea or vomiting, Starting Fri 2/12/2021, Normal      tamsulosin (FLOMAX) 0 4 mg Take 1 capsule (0 4 mg total) by mouth daily with dinner, Starting Fri 1/22/2021, Normal      !! albuterol (PROVENTIL HFA,VENTOLIN HFA) 90 mcg/act inhaler Inhale 2 puffs every 4 (four) hours as needed for wheezing, Starting Fri 1/22/2021, Print      fluticasone (FLONASE) 50 mcg/act nasal spray 1 spray into each nostril daily, Starting Fri 1/22/2021, Print      meloxicam (MOBIC) 15 mg tablet Take 1 tablet (15 mg total) by mouth daily, Starting Thu 1/7/2021, Normal      Multiple Vitamin (multivitamin) capsule Take 1 capsule by mouth daily for 14 days, Starting Fri 1/22/2021, Until Tue 3/23/2021, Print      !! albuterol (PROVENTIL HFA,VENTOLIN HFA) 90 mcg/act inhaler Inhale 2 puffs every 6 (six) hours as needed for wheezing or shortness of breath, Starting Tue 1/19/2021, Normal      !! HYDROcodone-acetaminophen (NORCO) 7 5-325 mg per tablet Take 1 tablet by mouth every 8 (eight) hours as needed for painMax Daily Amount: 3 tablets, Starting Wed 3/10/2021, Normal       !! - Potential duplicate medications found  Please discuss with provider  No discharge procedures on file  PDMP Review       Value Time User    PDMP Reviewed  Yes 4/16/2021 10:00 AM Avery Munguia PA-C           ED Provider  Attending physically available and evaluated Alek Muñoz I managed the patient along with the ED Attending      Electronically Signed by         Shira Gannon MD  04/18/21 2145

## 2021-04-16 ENCOUNTER — OFFICE VISIT (OUTPATIENT)
Dept: FAMILY MEDICINE CLINIC | Facility: CLINIC | Age: 61
End: 2021-04-16

## 2021-04-16 VITALS
DIASTOLIC BLOOD PRESSURE: 74 MMHG | BODY MASS INDEX: 30.61 KG/M2 | HEIGHT: 67 IN | WEIGHT: 195 LBS | SYSTOLIC BLOOD PRESSURE: 108 MMHG | OXYGEN SATURATION: 96 % | HEART RATE: 86 BPM | RESPIRATION RATE: 16 BRPM | TEMPERATURE: 98.3 F

## 2021-04-16 DIAGNOSIS — I10 BENIGN ESSENTIAL HTN: ICD-10-CM

## 2021-04-16 DIAGNOSIS — Z86.16 HISTORY OF COVID-19: ICD-10-CM

## 2021-04-16 DIAGNOSIS — G89.29 CHRONIC LOW BACK PAIN WITHOUT SCIATICA, UNSPECIFIED BACK PAIN LATERALITY: ICD-10-CM

## 2021-04-16 DIAGNOSIS — L03.111 CELLULITIS OF RIGHT AXILLA: Primary | ICD-10-CM

## 2021-04-16 DIAGNOSIS — Z20.822 ENCOUNTER FOR LABORATORY TESTING FOR COVID-19 VIRUS: ICD-10-CM

## 2021-04-16 DIAGNOSIS — E11.9 TYPE 2 DIABETES MELLITUS WITHOUT COMPLICATION, WITHOUT LONG-TERM CURRENT USE OF INSULIN (HCC): ICD-10-CM

## 2021-04-16 DIAGNOSIS — M54.50 CHRONIC LOW BACK PAIN WITHOUT SCIATICA, UNSPECIFIED BACK PAIN LATERALITY: ICD-10-CM

## 2021-04-16 PROCEDURE — 99214 OFFICE O/P EST MOD 30 MIN: CPT | Performed by: PHYSICIAN ASSISTANT

## 2021-04-16 PROCEDURE — 3078F DIAST BP <80 MM HG: CPT | Performed by: PHYSICIAN ASSISTANT

## 2021-04-16 PROCEDURE — 3008F BODY MASS INDEX DOCD: CPT | Performed by: PHYSICIAN ASSISTANT

## 2021-04-16 PROCEDURE — 3725F SCREEN DEPRESSION PERFORMED: CPT | Performed by: PHYSICIAN ASSISTANT

## 2021-04-16 PROCEDURE — 3074F SYST BP LT 130 MM HG: CPT | Performed by: PHYSICIAN ASSISTANT

## 2021-04-16 PROCEDURE — 1036F TOBACCO NON-USER: CPT | Performed by: PHYSICIAN ASSISTANT

## 2021-04-16 RX ORDER — ALBUTEROL SULFATE 90 UG/1
2 AEROSOL, METERED RESPIRATORY (INHALATION) EVERY 6 HOURS PRN
Qty: 1 INHALER | Refills: 0 | Status: SHIPPED | OUTPATIENT
Start: 2021-04-16 | End: 2021-11-09

## 2021-04-16 RX ORDER — HYDROCODONE BITARTRATE AND ACETAMINOPHEN 7.5; 325 MG/1; MG/1
1 TABLET ORAL EVERY 8 HOURS PRN
Qty: 90 TABLET | Refills: 0 | Status: SHIPPED | OUTPATIENT
Start: 2021-04-16 | End: 2021-05-17 | Stop reason: SDUPTHER

## 2021-04-16 NOTE — PROGRESS NOTES
Assessment/Plan:    Benign essential HTN  Controlled  Continue current medications    Type 2 diabetes mellitus without complication, without long-term current use of insulin ([facility])    Lab Results   Component Value Date    HGBA1C 6 2 02/12/2021   continue current meds    Chronic low back pain  Continue hydrocodone, gabapentin and PT    Cellulitis of right axilla  Improved  Finish keflex         Problem List Items Addressed This Visit        Endocrine    Type 2 diabetes mellitus without complication, without long-term current use of insulin ([facility] [address] )       Lab Results   Component Value Date    HGBA1C 6 2 02/12/2021   continue current meds            Cardiovascular and Mediastinum    Benign essential HTN     Controlled  Continue current medications            Other    Chronic low back pain     Continue hydrocodone, gabapentin and PT         Cellulitis of right axilla - Primary     Improved  Finish keflex           Other Visit Diagnoses     History of COVID-19        Relevant Medications    albuterol (PROVENTIL HFA,VENTOLIN HFA) 90 mcg/act inhaler    Encounter for laboratory testing for COVID-19 virus                Subjective:      Patient ID: Pierre Sun is a 62 y o  male  HPI   62year old M here for follow up from the ED on 4/14 for mass under right arm  He was dx with cellulitis  The swelling is going down  He had COVID vaccine on 4/12/2021  His BP was high when he was there  He is feeling bettwer now  He was having chills before but it is better now  He is taking keflex and swelling is significantly improved  He is planning to get 2nd covid vaccine and then go to [place]  He will be only going for a few weeks  He is still getting some SOB with exertion but it is improved form prior  His covid symptoms have slowly been getting better  His blood sugar has be3en controlled       He did schedule surgery consult for his back but they had recommended he continue conservative treatment first  He is just starting PT so does not know if it is helping yet  The following portions of the patient's history were reviewed and updated as appropriate: He  has a past medical history of Asthma, Back pain, Back pain, Diabetes mellitus ([address] ), Hyperlipidemia, and Hypertension  He   Patient Active Problem List    Diagnosis Date Noted    Cellulitis of right axilla 04/19/2021    Chest pain 03/17/2021    Class 1 obesity due to excess calories with serious comorbidity and body mass index (BMI) of 30 0 to 30 9 in adult 03/05/2021    Lumbar radiculopathy 03/05/2021    Encounter for narcotic contract discussion 03/05/2021    Low back pain with left-sided sciatica 03/03/2021    Chronic pain syndrome 03/03/2021    Benign essential HTN 10/16/2020    Chronic low back pain 10/16/2020    Hyperlipidemia 10/16/2020    Type 2 diabetes mellitus without complication, without long-term current use of insulin ([facility] [address] ) 10/16/2020    Benign prostatic hyperplasia with lower urinary tract symptoms 10/16/2020     He  has a past surgical history that includes No past surgeries  His family history includes No Known Problems in his father and mother  He  reports that he has never smoked  He has never used smokeless tobacco  He reports previous alcohol use  He reports that he does not use drugs    Current Outpatient Medications   Medication Sig Dispense Refill    aluminum-magnesium hydroxide-simethicone (MAALOX) 801-046-20 MG/5ML SUSP Take 15 mL by mouth 4 (four) times a day (before meals and at bedtime) 150 mL 0    amitriptyline (ELAVIL) 25 mg tablet Take 1 tablet (25 mg total) by mouth daily at bedtime 30 tablet 1    amLODIPine (NORVASC) 10 mg tablet Take 1 tablet (10 mg total) by mouth daily 90 tablet 0    atorvastatin (LIPITOR) 20 mg tablet Take 1 tablet (20 mg total) by mouth daily 90 tablet 1    Bioflavonoid Products (RA Vitamin C CR) TBCR take 1 tablet by mouth twice a day for 14 days      cephalexin (KEFLEX) 500 mg capsule Take 1 capsule (500 mg total) by mouth every 6 (six) hours for 5 days 20 capsule 0    cyclobenzaprine (FLEXERIL) 10 mg tablet Take 1 tablet (10 mg total) by mouth 3 (three) times a day as needed for muscle spasms 30 tablet 1    famotidine (PEPCID) 20 mg tablet Take 1 tablet (20 mg total) by mouth 2 (two) times a day As needed for acid reflux 90 tablet 0    gabapentin (NEURONTIN) 800 mg tablet Take 1 tablet (800 mg total) by mouth 3 (three) times a day 90 tablet 1    HYDROcodone-acetaminophen (NORCO) 7 5-325 mg per tablet Take 1 tablet by mouth every 8 (eight) hours as needed      HYDROcodone-acetaminophen (NORCO) 7 5-325 mg per tablet Take 1 tablet by mouth every 8 (eight) hours as needed for painMax Daily Amount: 3 tablets 90 tablet 0    HYDROcodone-ibuprofen (VICOPROFEN) 7 5-200 mg per tablet Take 1 tablet by mouth every 6 (six) hours as needed for moderate pain      lidocaine (XYLOCAINE) 2 % topical gel Apply topically as needed (back pain) 1 Tube 0    metFORMIN (GLUCOPHAGE) 500 mg tablet Take 1 tablet (500 mg total) by mouth 2 (two) times a day with meals 180 tablet 0    promethazine (PHENERGAN) 25 mg tablet Take 1 tablet (25 mg total) by mouth every 6 (six) hours as needed for nausea or vomiting 30 tablet 0    tamsulosin (FLOMAX) 0 4 mg Take 1 capsule (0 4 mg total) by mouth daily with dinner 90 capsule 0    albuterol (PROVENTIL HFA,VENTOLIN HFA) 90 mcg/act inhaler Inhale 2 puffs every 4 (four) hours as needed for wheezing (Patient not taking: Reported on 3/4/2021) 1 Inhaler 0    albuterol (PROVENTIL HFA,VENTOLIN HFA) 90 mcg/act inhaler Inhale 2 puffs every 6 (six) hours as needed for wheezing or shortness of breath 1 Inhaler 0    fluticasone (FLONASE) 50 mcg/act nasal spray 1 spray into each nostril daily (Patient not taking: Reported on 3/3/2021) 16 g 0    meloxicam (MOBIC) 15 mg tablet Take 1 tablet (15 mg total) by mouth daily (Patient not taking: Reported on 3/23/2021) 20 tablet 0    Multiple Vitamin (multivitamin) capsule Take 1 capsule by mouth daily for 14 days 14 capsule 0     No current facility-administered medications for this visit        Current Outpatient Medications on File Prior to Visit   Medication Sig    aluminum-magnesium hydroxide-simethicone (MAALOX) 200-200-20 MG/5ML SUSP Take 15 mL by mouth 4 (four) times a day (before meals and at bedtime)    amitriptyline (ELAVIL) 25 mg tablet Take 1 tablet (25 mg total) by mouth daily at bedtime    amLODIPine (NORVASC) 10 mg tablet Take 1 tablet (10 mg total) by mouth daily    atorvastatin (LIPITOR) 20 mg tablet Take 1 tablet (20 mg total) by mouth daily    Bioflavonoid Products (RA Vitamin C CR) TBCR take 1 tablet by mouth twice a day for 14 days    cephalexin (KEFLEX) 500 mg capsule Take 1 capsule (500 mg total) by mouth every 6 (six) hours for 5 days    cyclobenzaprine (FLEXERIL) 10 mg tablet Take 1 tablet (10 mg total) by mouth 3 (three) times a day as needed for muscle spasms    famotidine (PEPCID) 20 mg tablet Take 1 tablet (20 mg total) by mouth 2 (two) times a day As needed for acid reflux    gabapentin (NEURONTIN) 800 mg tablet Take 1 tablet (800 mg total) by mouth 3 (three) times a day    HYDROcodone-acetaminophen (NORCO) 7 5-325 mg per tablet Take 1 tablet by mouth every 8 (eight) hours as needed    HYDROcodone-acetaminophen (NORCO) 7 5-325 mg per tablet Take 1 tablet by mouth every 8 (eight) hours as needed for painMax Daily Amount: 3 tablets    HYDROcodone-ibuprofen (VICOPROFEN) 7 5-200 mg per tablet Take 1 tablet by mouth every 6 (six) hours as needed for moderate pain    lidocaine (XYLOCAINE) 2 % topical gel Apply topically as needed (back pain)    metFORMIN (GLUCOPHAGE) 500 mg tablet Take 1 tablet (500 mg total) by mouth 2 (two) times a day with meals    promethazine (PHENERGAN) 25 mg tablet Take 1 tablet (25 mg total) by mouth every 6 (six) hours as needed for nausea or vomiting    tamsulosin (FLOMAX) 0 4 mg Take 1 capsule (0 4 mg total) by mouth daily with dinner    albuterol (PROVENTIL HFA,VENTOLIN HFA) 90 mcg/act inhaler Inhale 2 puffs every 4 (four) hours as needed for wheezing (Patient not taking: Reported on 3/4/2021)    fluticasone (FLONASE) 50 mcg/act nasal spray 1 spray into each nostril daily (Patient not taking: Reported on 3/3/2021)    meloxicam (MOBIC) 15 mg tablet Take 1 tablet (15 mg total) by mouth daily (Patient not taking: Reported on 3/23/2021)    Multiple Vitamin (multivitamin) capsule Take 1 capsule by mouth daily for 14 days     No current facility-administered medications on file prior to visit  He has No Known Allergies       Review of Systems   Constitutional: Negative for chills and fever  HENT: Negative for ear pain and sore throat  Eyes: Negative for pain and visual disturbance  Respiratory: Negative for cough and shortness of breath  Cardiovascular: Negative for chest pain, palpitations and leg swelling  Gastrointestinal: Negative for abdominal pain and vomiting  Genitourinary: Negative for dysuria and hematuria  Musculoskeletal: Positive for back pain  Negative for arthralgias  Skin: Negative for color change and rash  Neurological: Positive for numbness  Negative for seizures and syncope  All other systems reviewed and are negative  Objective:      /74 (BP Location: Left arm, Patient Position: Sitting, Cuff Size: Standard)   Pulse 86   Temp 98 3 °F (36 8 °C) (Temporal)   Resp 16   Ht 5' 7" (1 702 m)   Wt 88 5 kg (195 lb)   SpO2 96%   BMI 30 54 kg/m²          Physical Exam  Vitals signs and nursing note reviewed  Constitutional:       General: He is not in acute distress  Appearance: He is well-developed  HENT:      Head: Normocephalic and atraumatic        Right Ear: External ear normal       Left Ear: External ear normal    Eyes:      Conjunctiva/sclera: Conjunctivae normal    Neck:      Musculoskeletal: Normal range of motion and neck supple  Thyroid: No thyromegaly  Cardiovascular:      Rate and Rhythm: Normal rate and regular rhythm  Heart sounds: Normal heart sounds  No murmur  Pulmonary:      Effort: Pulmonary effort is normal  No respiratory distress  Breath sounds: Normal breath sounds  No wheezing  Lymphadenopathy:      Cervical: No cervical adenopathy  Skin:     Findings: No rash  Comments: Right axilla with about 2 area of swelling   Neurological:      Mental Status: He is alert and oriented to person, place, and time     Psychiatric:         Mood and Affect: Mood normal          Behavior: Behavior normal

## 2021-04-19 PROBLEM — L03.111 CELLULITIS OF RIGHT AXILLA: Status: ACTIVE | Noted: 2021-04-19

## 2021-04-28 DIAGNOSIS — E78.5 HYPERLIPIDEMIA, UNSPECIFIED HYPERLIPIDEMIA TYPE: ICD-10-CM

## 2021-04-28 DIAGNOSIS — I10 BENIGN ESSENTIAL HTN: ICD-10-CM

## 2021-04-28 DIAGNOSIS — M54.30 SCIATICA, UNSPECIFIED LATERALITY: ICD-10-CM

## 2021-04-28 DIAGNOSIS — N40.1 BENIGN PROSTATIC HYPERPLASIA WITH LOWER URINARY TRACT SYMPTOMS, SYMPTOM DETAILS UNSPECIFIED: ICD-10-CM

## 2021-04-28 DIAGNOSIS — G89.29 CHRONIC LOW BACK PAIN WITHOUT SCIATICA, UNSPECIFIED BACK PAIN LATERALITY: ICD-10-CM

## 2021-04-28 DIAGNOSIS — M54.50 CHRONIC LOW BACK PAIN WITHOUT SCIATICA, UNSPECIFIED BACK PAIN LATERALITY: ICD-10-CM

## 2021-04-28 DIAGNOSIS — M54.16 LUMBAR RADICULOPATHY: ICD-10-CM

## 2021-04-28 DIAGNOSIS — E11.9 TYPE 2 DIABETES MELLITUS WITHOUT COMPLICATION, WITHOUT LONG-TERM CURRENT USE OF INSULIN (HCC): ICD-10-CM

## 2021-04-28 DIAGNOSIS — M54.50 CHRONIC LOW BACK PAIN: ICD-10-CM

## 2021-04-28 DIAGNOSIS — G89.29 CHRONIC LOW BACK PAIN: ICD-10-CM

## 2021-04-28 NOTE — TELEPHONE ENCOUNTER
Patient requesting refills on meds for his Dm, BP, Prostate, pain meds and his CHOL    patient will be traveling so he would like to see if he could request his meds for pain early  He did not know names of meds, please reach out to him to verify

## 2021-04-29 RX ORDER — ATORVASTATIN CALCIUM 20 MG/1
20 TABLET, FILM COATED ORAL DAILY
Qty: 90 TABLET | Refills: 1 | Status: SHIPPED | OUTPATIENT
Start: 2021-04-29 | End: 2021-05-18 | Stop reason: SDUPTHER

## 2021-04-29 RX ORDER — AMITRIPTYLINE HYDROCHLORIDE 25 MG/1
25 TABLET, FILM COATED ORAL
Qty: 30 TABLET | Refills: 1 | Status: SHIPPED | OUTPATIENT
Start: 2021-04-29 | End: 2021-05-18 | Stop reason: SDUPTHER

## 2021-04-29 RX ORDER — GABAPENTIN 800 MG/1
800 TABLET ORAL 3 TIMES DAILY
Qty: 90 TABLET | Refills: 1 | Status: SHIPPED | OUTPATIENT
Start: 2021-04-29 | End: 2021-05-18 | Stop reason: SDUPTHER

## 2021-04-29 RX ORDER — TAMSULOSIN HYDROCHLORIDE 0.4 MG/1
0.4 CAPSULE ORAL
Qty: 90 CAPSULE | Refills: 0 | Status: SHIPPED | OUTPATIENT
Start: 2021-04-29 | End: 2021-05-18 | Stop reason: SDUPTHER

## 2021-04-29 RX ORDER — HYDROCODONE BITARTRATE AND ACETAMINOPHEN 7.5; 325 MG/1; MG/1
1 TABLET ORAL EVERY 8 HOURS PRN
Qty: 30 TABLET | OUTPATIENT
Start: 2021-04-29

## 2021-04-29 RX ORDER — AMLODIPINE BESYLATE 10 MG/1
10 TABLET ORAL DAILY
Qty: 90 TABLET | Refills: 0 | Status: SHIPPED | OUTPATIENT
Start: 2021-04-29 | End: 2021-05-18 | Stop reason: SDUPTHER

## 2021-04-29 RX ORDER — CYCLOBENZAPRINE HCL 10 MG
10 TABLET ORAL 3 TIMES DAILY PRN
Qty: 30 TABLET | Refills: 1 | Status: SHIPPED | OUTPATIENT
Start: 2021-04-29 | End: 2021-05-18 | Stop reason: SDUPTHER

## 2021-04-29 RX ORDER — LIDOCAINE HYDROCHLORIDE 20 MG/ML
JELLY TOPICAL 3 TIMES DAILY
Qty: 30 ML | Refills: 2 | Status: SHIPPED | OUTPATIENT
Start: 2021-04-29 | End: 2021-05-18 | Stop reason: SDUPTHER

## 2021-05-10 ENCOUNTER — IMMUNIZATIONS (OUTPATIENT)
Dept: FAMILY MEDICINE CLINIC | Facility: HOSPITAL | Age: 61
End: 2021-05-10

## 2021-05-10 DIAGNOSIS — Z23 ENCOUNTER FOR IMMUNIZATION: Primary | ICD-10-CM

## 2021-05-10 PROCEDURE — 91301 SARS-COV-2 / COVID-19 MRNA VACCINE (MODERNA) 100 MCG: CPT

## 2021-05-10 PROCEDURE — 0012A SARS-COV-2 / COVID-19 MRNA VACCINE (MODERNA) 100 MCG: CPT

## 2021-05-11 ENCOUNTER — CLINICAL SUPPORT (OUTPATIENT)
Dept: FAMILY MEDICINE CLINIC | Facility: CLINIC | Age: 61
End: 2021-05-11

## 2021-05-11 DIAGNOSIS — Z11.52 ENCOUNTER FOR SCREENING FOR COVID-19: Primary | ICD-10-CM

## 2021-05-11 PROCEDURE — U0005 INFEC AGEN DETEC AMPLI PROBE: HCPCS | Performed by: PHYSICIAN ASSISTANT

## 2021-05-11 PROCEDURE — U0003 INFECTIOUS AGENT DETECTION BY NUCLEIC ACID (DNA OR RNA); SEVERE ACUTE RESPIRATORY SYNDROME CORONAVIRUS 2 (SARS-COV-2) (CORONAVIRUS DISEASE [COVID-19]), AMPLIFIED PROBE TECHNIQUE, MAKING USE OF HIGH THROUGHPUT TECHNOLOGIES AS DESCRIBED BY CMS-2020-01-R: HCPCS | Performed by: PHYSICIAN ASSISTANT

## 2021-05-12 LAB — SARS-COV-2 RNA RESP QL NAA+PROBE: NEGATIVE

## 2021-05-17 DIAGNOSIS — M54.16 LUMBAR RADICULOPATHY: ICD-10-CM

## 2021-05-17 DIAGNOSIS — M54.50 CHRONIC LOW BACK PAIN WITHOUT SCIATICA, UNSPECIFIED BACK PAIN LATERALITY: ICD-10-CM

## 2021-05-17 DIAGNOSIS — G89.29 CHRONIC LOW BACK PAIN WITHOUT SCIATICA, UNSPECIFIED BACK PAIN LATERALITY: ICD-10-CM

## 2021-05-17 RX ORDER — HYDROCODONE BITARTRATE AND ACETAMINOPHEN 7.5; 325 MG/1; MG/1
1 TABLET ORAL EVERY 8 HOURS PRN
Qty: 90 TABLET | Refills: 0 | Status: SHIPPED | OUTPATIENT
Start: 2021-05-17 | End: 2021-06-11 | Stop reason: SDUPTHER

## 2021-05-18 DIAGNOSIS — M54.30 SCIATICA, UNSPECIFIED LATERALITY: ICD-10-CM

## 2021-05-18 DIAGNOSIS — G89.29 CHRONIC LOW BACK PAIN WITHOUT SCIATICA, UNSPECIFIED BACK PAIN LATERALITY: ICD-10-CM

## 2021-05-18 DIAGNOSIS — M54.50 CHRONIC LOW BACK PAIN: ICD-10-CM

## 2021-05-18 DIAGNOSIS — N40.1 BENIGN PROSTATIC HYPERPLASIA WITH LOWER URINARY TRACT SYMPTOMS, SYMPTOM DETAILS UNSPECIFIED: ICD-10-CM

## 2021-05-18 DIAGNOSIS — M54.50 CHRONIC LOW BACK PAIN WITHOUT SCIATICA, UNSPECIFIED BACK PAIN LATERALITY: ICD-10-CM

## 2021-05-18 DIAGNOSIS — G89.29 CHRONIC LOW BACK PAIN: ICD-10-CM

## 2021-05-18 DIAGNOSIS — E11.9 TYPE 2 DIABETES MELLITUS WITHOUT COMPLICATION, WITHOUT LONG-TERM CURRENT USE OF INSULIN (HCC): ICD-10-CM

## 2021-05-18 DIAGNOSIS — M54.16 LUMBAR RADICULOPATHY: ICD-10-CM

## 2021-05-18 DIAGNOSIS — I10 BENIGN ESSENTIAL HTN: ICD-10-CM

## 2021-05-18 DIAGNOSIS — E78.5 HYPERLIPIDEMIA, UNSPECIFIED HYPERLIPIDEMIA TYPE: ICD-10-CM

## 2021-05-18 RX ORDER — AMLODIPINE BESYLATE 10 MG/1
10 TABLET ORAL DAILY
Qty: 90 TABLET | Refills: 0 | Status: SHIPPED | OUTPATIENT
Start: 2021-05-18 | End: 2021-08-16 | Stop reason: SDUPTHER

## 2021-05-18 RX ORDER — CYCLOBENZAPRINE HCL 10 MG
10 TABLET ORAL 3 TIMES DAILY PRN
Qty: 30 TABLET | Refills: 1 | Status: SHIPPED | OUTPATIENT
Start: 2021-05-18 | End: 2022-03-18 | Stop reason: SDUPTHER

## 2021-05-18 RX ORDER — AMITRIPTYLINE HYDROCHLORIDE 25 MG/1
25 TABLET, FILM COATED ORAL
Qty: 30 TABLET | Refills: 1 | Status: SHIPPED | OUTPATIENT
Start: 2021-05-18 | End: 2021-09-10 | Stop reason: SDUPTHER

## 2021-05-18 RX ORDER — TAMSULOSIN HYDROCHLORIDE 0.4 MG/1
0.4 CAPSULE ORAL
Qty: 90 CAPSULE | Refills: 0 | Status: SHIPPED | OUTPATIENT
Start: 2021-05-18 | End: 2021-09-10 | Stop reason: SDUPTHER

## 2021-05-18 RX ORDER — GABAPENTIN 800 MG/1
800 TABLET ORAL 3 TIMES DAILY
Qty: 90 TABLET | Refills: 1 | Status: SHIPPED | OUTPATIENT
Start: 2021-05-18 | End: 2021-08-16 | Stop reason: SDUPTHER

## 2021-05-18 RX ORDER — ATORVASTATIN CALCIUM 20 MG/1
20 TABLET, FILM COATED ORAL DAILY
Qty: 90 TABLET | Refills: 1 | Status: SHIPPED | OUTPATIENT
Start: 2021-05-18 | End: 2021-08-16 | Stop reason: SDUPTHER

## 2021-05-18 RX ORDER — LIDOCAINE HYDROCHLORIDE 20 MG/ML
JELLY TOPICAL 3 TIMES DAILY
Qty: 30 ML | Refills: 2 | Status: SHIPPED | OUTPATIENT
Start: 2021-05-18 | End: 2022-08-09 | Stop reason: DRUGHIGH

## 2021-05-18 NOTE — TELEPHONE ENCOUNTER
Patient mom is here for appt and would like scripts printed for refills    Not to be sent to pharmacy directly

## 2021-05-25 ENCOUNTER — TELEPHONE (OUTPATIENT)
Dept: FAMILY MEDICINE CLINIC | Facility: CLINIC | Age: 61
End: 2021-05-25

## 2021-06-11 DIAGNOSIS — G89.29 CHRONIC LOW BACK PAIN WITHOUT SCIATICA, UNSPECIFIED BACK PAIN LATERALITY: ICD-10-CM

## 2021-06-11 DIAGNOSIS — M54.16 LUMBAR RADICULOPATHY: ICD-10-CM

## 2021-06-11 DIAGNOSIS — M54.50 CHRONIC LOW BACK PAIN WITHOUT SCIATICA, UNSPECIFIED BACK PAIN LATERALITY: ICD-10-CM

## 2021-06-11 RX ORDER — HYDROCODONE BITARTRATE AND ACETAMINOPHEN 7.5; 325 MG/1; MG/1
1 TABLET ORAL EVERY 8 HOURS PRN
Qty: 90 TABLET | Refills: 0 | Status: SHIPPED | OUTPATIENT
Start: 2021-06-11 | End: 2021-08-16 | Stop reason: SDUPTHER

## 2021-06-11 NOTE — TELEPHONE ENCOUNTER
Patient requesting HYDROcodone-acetaminophen ([facility]) 7 5-325 mg per tablet     Patient stated they called Monday and I dont see anything in the chart - they leave to [place] on Monday and need it ASAP     Thank you

## 2021-07-02 ENCOUNTER — TELEPHONE (OUTPATIENT)
Dept: FAMILY MEDICINE CLINIC | Facility: CLINIC | Age: 61
End: 2021-07-02

## 2021-07-02 NOTE — TELEPHONE ENCOUNTER
SIGNATURES NEEDED FOR [facility] CTR # [id number]/ POLICY NUMBER [id number]) FORM RECEIVED VIA FAX  WILL BE PLACED IN FORM BIN FOR MA PICKUP

## 2021-08-16 DIAGNOSIS — M54.16 LUMBAR RADICULOPATHY: ICD-10-CM

## 2021-08-16 DIAGNOSIS — G89.29 CHRONIC LOW BACK PAIN WITHOUT SCIATICA, UNSPECIFIED BACK PAIN LATERALITY: ICD-10-CM

## 2021-08-16 DIAGNOSIS — M54.50 CHRONIC LOW BACK PAIN WITHOUT SCIATICA, UNSPECIFIED BACK PAIN LATERALITY: ICD-10-CM

## 2021-08-16 DIAGNOSIS — E78.5 HYPERLIPIDEMIA, UNSPECIFIED HYPERLIPIDEMIA TYPE: ICD-10-CM

## 2021-08-16 DIAGNOSIS — I10 BENIGN ESSENTIAL HTN: ICD-10-CM

## 2021-08-16 DIAGNOSIS — E11.9 TYPE 2 DIABETES MELLITUS WITHOUT COMPLICATION, WITHOUT LONG-TERM CURRENT USE OF INSULIN (HCC): ICD-10-CM

## 2021-08-16 DIAGNOSIS — K21.9 GASTROESOPHAGEAL REFLUX DISEASE, UNSPECIFIED WHETHER ESOPHAGITIS PRESENT: ICD-10-CM

## 2021-08-16 RX ORDER — HYDROCODONE BITARTRATE AND ACETAMINOPHEN 7.5; 325 MG/1; MG/1
1 TABLET ORAL EVERY 8 HOURS PRN
Qty: 90 TABLET | Refills: 0 | Status: SHIPPED | OUTPATIENT
Start: 2021-08-16 | End: 2021-09-17 | Stop reason: SDUPTHER

## 2021-08-16 RX ORDER — GABAPENTIN 800 MG/1
800 TABLET ORAL 3 TIMES DAILY
Qty: 90 TABLET | Refills: 0 | Status: SHIPPED | OUTPATIENT
Start: 2021-08-16 | End: 2021-09-10 | Stop reason: SDUPTHER

## 2021-08-16 RX ORDER — ATORVASTATIN CALCIUM 20 MG/1
20 TABLET, FILM COATED ORAL DAILY
Qty: 90 TABLET | Refills: 1 | Status: SHIPPED | OUTPATIENT
Start: 2021-08-16 | End: 2022-04-29 | Stop reason: SDUPTHER

## 2021-08-16 RX ORDER — AMLODIPINE BESYLATE 10 MG/1
10 TABLET ORAL DAILY
Qty: 90 TABLET | Refills: 0 | Status: SHIPPED | OUTPATIENT
Start: 2021-08-16 | End: 2021-10-16 | Stop reason: SDUPTHER

## 2021-08-16 RX ORDER — FAMOTIDINE 20 MG/1
20 TABLET, FILM COATED ORAL 2 TIMES DAILY
Qty: 90 TABLET | Refills: 0 | Status: SHIPPED | OUTPATIENT
Start: 2021-08-16 | End: 2021-12-15 | Stop reason: SDUPTHER

## 2021-08-16 NOTE — TELEPHONE ENCOUNTER
Pt is requesting refill on the below medication, pt has an up coming appointment     gabapentin (NEURONTIN) 800 mg tablet    HYDROcodone-acetaminophen (NORCO) 7 5-325 mg per tablet         amLODIPine (NORVASC) 10 mg tablet     atorvastatin (LIPITOR) 20 mg tablet

## 2021-08-20 ENCOUNTER — HOSPITAL ENCOUNTER (EMERGENCY)
Facility: HOSPITAL | Age: 61
Discharge: HOME | End: 2021-08-20
Attending: EMERGENCY MEDICINE | Admitting: EMERGENCY MEDICINE
Payer: COMMERCIAL

## 2021-08-20 ENCOUNTER — APPOINTMENT (EMERGENCY)
Dept: RADIOLOGY | Facility: HOSPITAL | Age: 61
End: 2021-08-20
Payer: COMMERCIAL

## 2021-08-20 VITALS
OXYGEN SATURATION: 98 % | BODY MASS INDEX: 29.74 KG/M2 | HEART RATE: 73 BPM | DIASTOLIC BLOOD PRESSURE: 93 MMHG | WEIGHT: 189.9 LBS | RESPIRATION RATE: 18 BRPM | TEMPERATURE: 98.7 F | SYSTOLIC BLOOD PRESSURE: 145 MMHG

## 2021-08-20 DIAGNOSIS — J45.901 ASTHMA EXACERBATION: Primary | ICD-10-CM

## 2021-08-20 LAB
ALBUMIN SERPL BCP-MCNC: 4.5 G/DL (ref 3–5.2)
ALP SERPL-CCNC: 54 U/L (ref 43–122)
ALT SERPL W P-5'-P-CCNC: 23 U/L
ANION GAP SERPL CALCULATED.3IONS-SCNC: 8 MMOL/L (ref 5–14)
AST SERPL W P-5'-P-CCNC: 28 U/L (ref 17–59)
ATRIAL RATE: 79 BPM
BASOPHILS # BLD AUTO: 0 THOUSANDS/ΜL (ref 0–0.1)
BASOPHILS NFR BLD AUTO: 0 % (ref 0–1)
BILIRUB SERPL-MCNC: 0.47 MG/DL
BUN SERPL-MCNC: 15 MG/DL (ref 5–25)
CALCIUM SERPL-MCNC: 10.1 MG/DL (ref 8.4–10.2)
CHLORIDE SERPL-SCNC: 103 MMOL/L (ref 97–108)
CO2 SERPL-SCNC: 28 MMOL/L (ref 22–30)
CREAT SERPL-MCNC: 1.11 MG/DL (ref 0.7–1.5)
EOSINOPHIL # BLD AUTO: 0 THOUSAND/ΜL (ref 0–0.4)
EOSINOPHIL NFR BLD AUTO: 1 % (ref 0–6)
ERYTHROCYTE [DISTWIDTH] IN BLOOD BY AUTOMATED COUNT: 13.8 %
GFR SERPL CREATININE-BSD FRML MDRD: 84 ML/MIN/1.73SQ M
GLUCOSE SERPL-MCNC: 111 MG/DL (ref 70–99)
HCT VFR BLD AUTO: 41.8 % (ref 41–53)
HGB BLD-MCNC: 14.7 G/DL (ref 13.5–17.5)
LYMPHOCYTES # BLD AUTO: 1.8 THOUSANDS/ΜL (ref 0.5–4)
LYMPHOCYTES NFR BLD AUTO: 42 % (ref 25–45)
MCH RBC QN AUTO: 30.9 PG (ref 26–34)
MCHC RBC AUTO-ENTMCNC: 35.2 G/DL (ref 31–36)
MCV RBC AUTO: 88 FL (ref 80–100)
MONOCYTES # BLD AUTO: 0.3 THOUSAND/ΜL (ref 0.2–0.9)
MONOCYTES NFR BLD AUTO: 8 % (ref 1–10)
NEUTROPHILS # BLD AUTO: 2.1 THOUSANDS/ΜL (ref 1.8–7.8)
NEUTS SEG NFR BLD AUTO: 49 % (ref 45–65)
NT-PROBNP SERPL-MCNC: 31.5 PG/ML (ref 0–299)
P AXIS: 71 DEGREES
PLATELET # BLD AUTO: 224 THOUSANDS/UL (ref 150–450)
PMV BLD AUTO: 8 FL (ref 8.9–12.7)
POTASSIUM SERPL-SCNC: 4.4 MMOL/L (ref 3.6–5)
PR INTERVAL: 176 MS
PROT SERPL-MCNC: 8.2 G/DL (ref 5.9–8.4)
QRS AXIS: 60 DEGREES
QRSD INTERVAL: 84 MS
QT INTERVAL: 362 MS
QTC INTERVAL: 415 MS
RBC # BLD AUTO: 4.77 MILLION/UL (ref 4.5–5.9)
SARS-COV-2 RNA RESP QL NAA+PROBE: NEGATIVE
SODIUM SERPL-SCNC: 139 MMOL/L (ref 137–147)
T WAVE AXIS: 37 DEGREES
TROPONIN I SERPL-MCNC: <0.01 NG/ML (ref 0–0.03)
VENTRICULAR RATE: 79 BPM
WBC # BLD AUTO: 4.2 THOUSAND/UL (ref 4.5–11)

## 2021-08-20 PROCEDURE — 36415 COLL VENOUS BLD VENIPUNCTURE: CPT | Performed by: EMERGENCY MEDICINE

## 2021-08-20 PROCEDURE — 85025 COMPLETE CBC W/AUTO DIFF WBC: CPT | Performed by: EMERGENCY MEDICINE

## 2021-08-20 PROCEDURE — 83880 ASSAY OF NATRIURETIC PEPTIDE: CPT | Performed by: EMERGENCY MEDICINE

## 2021-08-20 PROCEDURE — 99285 EMERGENCY DEPT VISIT HI MDM: CPT

## 2021-08-20 PROCEDURE — 84484 ASSAY OF TROPONIN QUANT: CPT | Performed by: EMERGENCY MEDICINE

## 2021-08-20 PROCEDURE — 93010 ELECTROCARDIOGRAM REPORT: CPT | Performed by: INTERNAL MEDICINE

## 2021-08-20 PROCEDURE — 71045 X-RAY EXAM CHEST 1 VIEW: CPT

## 2021-08-20 PROCEDURE — 80053 COMPREHEN METABOLIC PANEL: CPT | Performed by: EMERGENCY MEDICINE

## 2021-08-20 PROCEDURE — U0005 INFEC AGEN DETEC AMPLI PROBE: HCPCS | Performed by: EMERGENCY MEDICINE

## 2021-08-20 PROCEDURE — 99285 EMERGENCY DEPT VISIT HI MDM: CPT | Performed by: EMERGENCY MEDICINE

## 2021-08-20 PROCEDURE — 93005 ELECTROCARDIOGRAM TRACING: CPT

## 2021-08-20 PROCEDURE — U0003 INFECTIOUS AGENT DETECTION BY NUCLEIC ACID (DNA OR RNA); SEVERE ACUTE RESPIRATORY SYNDROME CORONAVIRUS 2 (SARS-COV-2) (CORONAVIRUS DISEASE [COVID-19]), AMPLIFIED PROBE TECHNIQUE, MAKING USE OF HIGH THROUGHPUT TECHNOLOGIES AS DESCRIBED BY CMS-2020-01-R: HCPCS | Performed by: EMERGENCY MEDICINE

## 2021-08-20 RX ORDER — DEXAMETHASONE 4 MG/1
12 TABLET ORAL ONCE
Qty: 3 TABLET | Refills: 0 | Status: SHIPPED | OUTPATIENT
Start: 2021-08-20 | End: 2021-08-20

## 2021-08-20 RX ORDER — ALBUTEROL SULFATE 90 UG/1
1-2 AEROSOL, METERED RESPIRATORY (INHALATION) EVERY 6 HOURS PRN
Qty: 18 G | Refills: 0 | Status: SHIPPED | OUTPATIENT
Start: 2021-08-20 | End: 2021-11-09

## 2021-08-20 RX ORDER — IPRATROPIUM BROMIDE AND ALBUTEROL SULFATE 2.5; .5 MG/3ML; MG/3ML
3 SOLUTION RESPIRATORY (INHALATION)
Status: DISCONTINUED | OUTPATIENT
Start: 2021-08-20 | End: 2021-08-20 | Stop reason: HOSPADM

## 2021-08-20 RX ADMIN — IPRATROPIUM BROMIDE AND ALBUTEROL SULFATE 3 ML: .5; 3 SOLUTION RESPIRATORY (INHALATION) at 13:42

## 2021-08-20 RX ADMIN — DEXAMETHASONE SODIUM PHOSPHATE 10 MG: 10 INJECTION, SOLUTION INTRAMUSCULAR; INTRAVENOUS at 13:41

## 2021-08-23 NOTE — ED PROVIDER NOTES
History  Chief Complaint   Patient presents with    Chest Pain     for months and worse the last 2 days  Family Practice sent him over    Back Pain     Patient is a 42-year-old male arrives for evaluation of chest pain and trouble breathing  Patient states that he has had intermittent chest pain for several months, not associated with any activity, worse with movement  He also notes that he is on a daily inhaler, he has had worsening shortness of breath over last 2 days  Prior to Admission Medications   Prescriptions Last Dose Informant Patient Reported? Taking?    Bioflavonoid Products (RA Vitamin C CR) TBCR   Yes No   Sig: take 1 tablet by mouth twice a day for 14 days   HYDROcodone-acetaminophen (NORCO) 7 5-325 mg per tablet   No No   Sig: Take 1 tablet by mouth every 8 (eight) hours as needed for painMax Daily Amount: 3 tablets   Multiple Vitamin (multivitamin) capsule   No No   Sig: Take 1 capsule by mouth daily for 14 days   albuterol (PROVENTIL HFA,VENTOLIN HFA) 90 mcg/act inhaler   No No   Sig: Inhale 2 puffs every 4 (four) hours as needed for wheezing   Patient not taking: Reported on 3/4/2021   albuterol (PROVENTIL HFA,VENTOLIN HFA) 90 mcg/act inhaler   No No   Sig: Inhale 2 puffs every 6 (six) hours as needed for wheezing or shortness of breath   aluminum-magnesium hydroxide-simethicone (MAALOX) 200-200-20 MG/5ML SUSP   No No   Sig: Take 15 mL by mouth 4 (four) times a day (before meals and at bedtime)   amLODIPine (NORVASC) 10 mg tablet   No No   Sig: Take 1 tablet (10 mg total) by mouth daily   amitriptyline (ELAVIL) 25 mg tablet   No No   Sig: Take 1 tablet (25 mg total) by mouth daily at bedtime   atorvastatin (LIPITOR) 20 mg tablet   No No   Sig: Take 1 tablet (20 mg total) by mouth daily   cyclobenzaprine (FLEXERIL) 10 mg tablet   No No   Sig: Take 1 tablet (10 mg total) by mouth 3 (three) times a day as needed for muscle spasms   famotidine (PEPCID) 20 mg tablet   No No   Sig: Take 1 tablet (20 mg total) by mouth 2 (two) times a day As needed for acid reflux   fluticasone (FLONASE) 50 mcg/act nasal spray   No No   Si spray into each nostril daily   Patient not taking: Reported on 3/3/2021   gabapentin (NEURONTIN) 800 mg tablet   No No   Sig: Take 1 tablet (800 mg total) by mouth 3 (three) times a day   lidocaine (XYLOCAINE) 2 % topical gel   No No   Sig: Apply topically 3 (three) times a day   meloxicam (MOBIC) 15 mg tablet   No No   Sig: Take 1 tablet (15 mg total) by mouth daily   Patient not taking: Reported on 3/23/2021   metFORMIN (GLUCOPHAGE) 500 mg tablet   No No   Sig: Take 1 tablet (500 mg total) by mouth 2 (two) times a day with meals   promethazine (PHENERGAN) 25 mg tablet   No No   Sig: Take 1 tablet (25 mg total) by mouth every 6 (six) hours as needed for nausea or vomiting   tamsulosin (FLOMAX) 0 4 mg   No No   Sig: Take 1 capsule (0 4 mg total) by mouth daily with dinner      Facility-Administered Medications: None       Past Medical History:   Diagnosis Date    Asthma     Back pain     Back pain     Diabetes mellitus ([facility] [address] )     Hyperlipidemia     Hypertension        Past Surgical History:   Procedure Laterality Date    NO PAST SURGERIES         Family History   Problem Relation Age of Onset    No Known Problems Mother     No Known Problems Father      I have reviewed and agree with the history as documented  E-Cigarette/Vaping    E-Cigarette Use Never User      E-Cigarette/Vaping Substances    Nicotine No     THC No     CBD No     Flavoring No     Other No     Unknown No      Social History     Tobacco Use    Smoking status: Never Smoker    Smokeless tobacco: Never Used   Vaping Use    Vaping Use: Never used   Substance Use Topics    Alcohol use: Not Currently    Drug use: Never       Review of Systems   Constitutional: Negative  Negative for chills and fever  HENT: Negative  Negative for rhinorrhea, sore throat, trouble swallowing and voice change  Eyes: Negative  Negative for pain and visual disturbance  Respiratory: Positive for cough and shortness of breath  Negative for wheezing  Cardiovascular: Positive for chest pain  Negative for palpitations  Gastrointestinal: Negative for abdominal pain, diarrhea, nausea and vomiting  Genitourinary: Negative  Negative for dysuria and frequency  Musculoskeletal: Negative  Negative for neck pain and neck stiffness  Skin: Negative  Negative for rash  Neurological: Negative  Negative for dizziness, speech difficulty, weakness, light-headedness and numbness  Physical Exam  Physical Exam  Vitals and nursing note reviewed  Constitutional:       General: He is not in acute distress  Appearance: He is well-developed  HENT:      Head: Normocephalic and atraumatic  Eyes:      Conjunctiva/sclera: Conjunctivae normal       Pupils: Pupils are equal, round, and reactive to light  Neck:      Trachea: No tracheal deviation  Cardiovascular:      Rate and Rhythm: Normal rate and regular rhythm  Pulmonary:      Effort: Pulmonary effort is normal  No respiratory distress  Breath sounds: Examination of the right-upper field reveals wheezing  Examination of the left-upper field reveals wheezing  Examination of the right-lower field reveals wheezing  Examination of the left-lower field reveals wheezing  Wheezing present  No rales  Comments: Mild and expiratory wheezing appreciated in all 4 lung fields  No evidence of respiratory distress  Abdominal:      General: Bowel sounds are normal  There is no distension  Palpations: Abdomen is soft  Tenderness: There is no abdominal tenderness  There is no guarding or rebound  Musculoskeletal:         General: No tenderness or deformity  Normal range of motion  Cervical back: Normal range of motion and neck supple  Skin:     General: Skin is warm and dry  Capillary Refill: Capillary refill takes less than 2 seconds  Findings: No rash  Neurological:      Mental Status: He is alert and oriented to person, place, and time  Psychiatric:         Behavior: Behavior normal          Vital Signs  ED Triage Vitals   Temperature Pulse Respirations Blood Pressure SpO2   08/20/21 1315 08/20/21 1315 08/20/21 1315 08/20/21 1315 08/20/21 1315   98 7 °F (37 1 °C) 84 22 146/73 100 %      Temp Source Heart Rate Source Patient Position - Orthostatic VS BP Location FiO2 (%)   08/20/21 1315 08/20/21 1315 08/20/21 1315 08/20/21 1315 --   Oral Monitor Sitting Left arm       Pain Score       08/20/21 1316       9           Vitals:    08/20/21 1315 08/20/21 1532   BP: 146/73 145/93   Pulse: 84 73   Patient Position - Orthostatic VS: Sitting Sitting         Visual Acuity      ED Medications  Medications   dexamethasone oral liquid 10 mg 1 mL (10 mg Oral Given 8/20/21 1341)       Diagnostic Studies  Results Reviewed     Procedure Component Value Units Date/Time    Novel Coronavirus (Covid-19),PCR [facility] - 2 Hour Stat [[id number]]  (Normal) Collected: 08/20/21 1357    Lab Status: Final result Specimen: Nares from Nose Updated: 08/20/21 1538     SARS-CoV-2 Negative    Narrative: The specimen collection materials, transport medium, and/or testing methodology utilized in the production of these test results have been proven to be reliable in a limited validation with an abbreviated program under the Emergency Utilization Authorization provided by the FDA  Testing reported as "Presumptive positive" will be confirmed with secondary testing to ensure result accuracy  Clinical caution and judgement should be used with the interpretation of these results with consideration of the clinical impression and other laboratory testing  Testing reported as "Positive" or "Negative" has been proven to be accurate according to standard laboratory validation requirements    All testing is performed with control materials showing appropriate reactivity at standard intervals        Troponin I [[id number]]  (Normal) Collected: 08/20/21 1352    Lab Status: Final result Specimen: Blood from Arm, Left Updated: 08/20/21 1511     Troponin I <0 01 ng/mL     NT-BNP PRO [[id number]]  (Normal) Collected: 08/20/21 1352    Lab Status: Final result Specimen: Blood from Arm, Left Updated: 08/20/21 1505     NT-proBNP 31 5 pg/mL     CBC and differential [[id number]]  (Abnormal) Collected: 08/20/21 1352    Lab Status: Final result Specimen: Blood from Arm, Left Updated: 08/20/21 1457     WBC 4 20 Thousand/uL      RBC 4 77 Million/uL      Hemoglobin 14 7 g/dL      Hematocrit 41 8 %      MCV 88 fL      MCH 30 9 pg      MCHC 35 2 g/dL      RDW 13 8 %      MPV 8 0 fL      Platelets 738 Thousands/uL      Neutrophils Relative 49 %      Lymphocytes Relative 42 %      Monocytes Relative 8 %      Eosinophils Relative 1 %      Basophils Relative 0 %      Neutrophils Absolute 2 10 Thousands/µL      Lymphocytes Absolute 1 80 Thousands/µL      Monocytes Absolute 0 30 Thousand/µL      Eosinophils Absolute 0 00 Thousand/µL      Basophils Absolute 0 00 Thousands/µL     Comprehensive metabolic panel [[id number]]  (Abnormal) Collected: 08/20/21 1352    Lab Status: Final result Specimen: Blood from Arm, Left Updated: 08/20/21 1456     Sodium 139 mmol/L      Potassium 4 4 mmol/L      Chloride 103 mmol/L      CO2 28 mmol/L      ANION GAP 8 mmol/L      BUN 15 mg/dL      Creatinine 1 11 mg/dL      Glucose 111 mg/dL      Calcium 10 1 mg/dL      AST 28 U/L      ALT 23 U/L      Alkaline Phosphatase 54 U/L      Total Protein 8 2 g/dL      Albumin 4 5 g/dL      Total Bilirubin 0 47 mg/dL      eGFR 84 ml/min/1 73sq m     Narrative:      [place] guidelines for Chronic Kidney Disease (CKD):     Stage 1 with normal or high GFR (GFR > 90 mL/min/1 73 square meters)    Stage 2 Mild CKD (GFR = 60-89 mL/min/1 73 square meters)    Stage 3A Moderate CKD (GFR = 45-59 mL/min/1 73 square meters)    Stage 3B Moderate CKD (GFR = 30-44 mL/min/1 73 square meters)    Stage 4 Severe CKD (GFR = 15-29 mL/min/1 73 square meters)    Stage 5 End Stage CKD (GFR <15 mL/min/1 73 square meters)  Note: GFR calculation is accurate only with a steady state creatinine                 XR chest 1 view portable   ED Interpretation by Rajesh Victoria DO (08/20 1529)   No acute pna or ptx appreciated  Final Result by Vinod Mares MD (08/20 1532)   No acute cardiopulmonary disease  Findings are stable            Workstation performed: [id number]                    Procedures  Procedures         ED Course  ED Course as of Aug 23 0707   Fri Aug 20, 2021   1331 Procedure Note: EKG  Date/Time: 08/20/21 1:31 PM   Performed by: Edyta Barrett  Authorized by: Edyta Barrett  ECG interpreted by me, the ED Provider: yes   The EKG demonstrates:  Rate 79  Rhythm sinus  QTc 415  No ST elevations/depressions                    HEART Risk Score      Most Recent Value   Heart Score Risk Calculator   History  0 Filed at: 08/20/2021 1530   ECG  1 Filed at: 08/20/2021 1530   Age  1 Filed at: 08/20/2021 1530   Risk Factors  1 Filed at: 08/20/2021 1530   Troponin  0 Filed at: 08/20/2021 1530   HEART Score  3 Filed at: 08/20/2021 1530                                    MDM  Number of Diagnoses or Management Options  Asthma exacerbation  Diagnosis management comments: Patient states that he has been out of his medications for several days associated with his worsening symptoms of shortness of breath  Troponin EKG and chest x-ray are otherwise were unremarkable  COVID testing is negative  Patient was feeling significantly better after treatment in the ED is where the for follow-up care with his primary care doctor and strict return precautions were discussed         Amount and/or Complexity of Data Reviewed  Clinical lab tests: ordered and reviewed  Tests in the radiology section of CPT®: ordered and reviewed  Tests in the medicine section of CPT®: reviewed and ordered  Independent visualization of images, tracings, or specimens: yes        Disposition  Final diagnoses:   Asthma exacerbation     Time reflects when diagnosis was documented in both MDM as applicable and the Disposition within this note     Time User Action Codes Description Comment    8/20/2021  4:01 PM Bneoit Baca Carson [T84 [id number]] Asthma exacerbation       ED Disposition     ED Disposition Condition Date/Time Comment    Discharge Stable Fri Aug 20, 2021 [address] discharge to home/self care  Follow-up Information     Follow up With Specialties Details Why Contact Info    Avery Munguia PA-C Family Medicine, Physician Assistant   [address] [facility]  [address]  [place] [address]  [phone]            Discharge Medication List as of 8/20/2021  4:03 PM      START taking these medications    Details   !! albuterol (Ventolin HFA) 90 mcg/act inhaler Inhale 1-2 puffs every 6 (six) hours as needed for wheezing, Starting Fri 8/20/2021, Normal      dexamethasone (DECADRON) 4 mg tablet Take 3 tablets (12 mg total) by mouth once for 1 dose, Starting Fri 8/20/2021, Normal       !! - Potential duplicate medications found  Please discuss with provider        CONTINUE these medications which have NOT CHANGED    Details   !! albuterol (PROVENTIL HFA,VENTOLIN HFA) 90 mcg/act inhaler Inhale 2 puffs every 4 (four) hours as needed for wheezing, Starting Fri 1/22/2021, Print      !! albuterol (PROVENTIL HFA,VENTOLIN HFA) 90 mcg/act inhaler Inhale 2 puffs every 6 (six) hours as needed for wheezing or shortness of breath, Starting Fri 4/16/2021, Normal      aluminum-magnesium hydroxide-simethicone (MAALOX) 200-200-20 MG/5ML SUSP Take 15 mL by mouth 4 (four) times a day (before meals and at bedtime), Starting Thu 1/7/2021, Normal      amitriptyline (ELAVIL) 25 mg tablet Take 1 tablet (25 mg total) by mouth daily at bedtime, Starting Tue 5/18/2021, Normal      amLODIPine (NORVASC) 10 mg tablet Take 1 tablet (10 mg total) by mouth daily, Starting Mon 8/16/2021, Normal      atorvastatin (LIPITOR) 20 mg tablet Take 1 tablet (20 mg total) by mouth daily, Starting Mon 8/16/2021, Normal      Bioflavonoid Products (RA Vitamin C CR) TBCR take 1 tablet by mouth twice a day for 14 days, Historical Med      cyclobenzaprine (FLEXERIL) 10 mg tablet Take 1 tablet (10 mg total) by mouth 3 (three) times a day as needed for muscle spasms, Starting Tue 5/18/2021, Normal      famotidine (PEPCID) 20 mg tablet Take 1 tablet (20 mg total) by mouth 2 (two) times a day As needed for acid reflux, Starting Mon 8/16/2021, Normal      fluticasone (FLONASE) 50 mcg/act nasal spray 1 spray into each nostril daily, Starting Fri 1/22/2021, Print      gabapentin (NEURONTIN) 800 mg tablet Take 1 tablet (800 mg total) by mouth 3 (three) times a day, Starting Mon 8/16/2021, Normal      HYDROcodone-acetaminophen (NORCO) 7 5-325 mg per tablet Take 1 tablet by mouth every 8 (eight) hours as needed for painMax Daily Amount: 3 tablets, Starting Mon 8/16/2021, Normal      lidocaine (XYLOCAINE) 2 % topical gel Apply topically 3 (three) times a day, Starting Tue 5/18/2021, Normal      meloxicam (MOBIC) 15 mg tablet Take 1 tablet (15 mg total) by mouth daily, Starting Thu 1/7/2021, Normal      metFORMIN (GLUCOPHAGE) 500 mg tablet Take 1 tablet (500 mg total) by mouth 2 (two) times a day with meals, Starting Mon 8/16/2021, Normal      Multiple Vitamin (multivitamin) capsule Take 1 capsule by mouth daily for 14 days, Starting Fri 1/22/2021, Until Tue 3/23/2021, Print      promethazine (PHENERGAN) 25 mg tablet Take 1 tablet (25 mg total) by mouth every 6 (six) hours as needed for nausea or vomiting, Starting Fri 2/12/2021, Normal      tamsulosin (FLOMAX) 0 4 mg Take 1 capsule (0 4 mg total) by mouth daily with dinner, Starting Tue 5/18/2021, Normal       !! - Potential duplicate medications found  Please discuss with provider          No discharge procedures on file      PDMP Review       Value Time User    PDMP Reviewed  Yes 8/16/2021  3:37 PM Avery Munguia PA-C          ED Provider  Electronically Signed by           Eliza Scruggs DO  08/23/21 5709

## 2021-08-31 ENCOUNTER — OFFICE VISIT (OUTPATIENT)
Dept: SURGERY | Facility: CLINIC | Age: 61
End: 2021-08-31
Payer: COMMERCIAL

## 2021-08-31 ENCOUNTER — PREP FOR PROCEDURE (OUTPATIENT)
Dept: SURGERY | Facility: CLINIC | Age: 61
End: 2021-08-31

## 2021-08-31 VITALS
HEIGHT: 67 IN | SYSTOLIC BLOOD PRESSURE: 132 MMHG | DIASTOLIC BLOOD PRESSURE: 88 MMHG | HEART RATE: 78 BPM | TEMPERATURE: 97.5 F | WEIGHT: 189 LBS | BODY MASS INDEX: 29.66 KG/M2

## 2021-08-31 DIAGNOSIS — Z13.820 SCREENING FOR OSTEOPOROSIS: Primary | ICD-10-CM

## 2021-08-31 DIAGNOSIS — Z12.11 ENCOUNTER FOR SCREENING COLONOSCOPY: Primary | ICD-10-CM

## 2021-08-31 PROCEDURE — 3075F SYST BP GE 130 - 139MM HG: CPT | Performed by: SURGERY

## 2021-08-31 PROCEDURE — 3008F BODY MASS INDEX DOCD: CPT | Performed by: SURGERY

## 2021-08-31 PROCEDURE — 3079F DIAST BP 80-89 MM HG: CPT | Performed by: SURGERY

## 2021-08-31 PROCEDURE — 1036F TOBACCO NON-USER: CPT | Performed by: SURGERY

## 2021-08-31 PROCEDURE — 99214 OFFICE O/P EST MOD 30 MIN: CPT | Performed by: SURGERY

## 2021-08-31 RX ORDER — DEXAMETHASONE 4 MG/1
TABLET ORAL
COMMUNITY
Start: 2021-08-20 | End: 2022-03-14

## 2021-08-31 RX ORDER — POLYETHYLENE GLYCOL 3350 17 G/17G
238 POWDER, FOR SOLUTION ORAL SEE ADMIN INSTRUCTIONS
Qty: 238 G | Refills: 0 | Status: SHIPPED | OUTPATIENT
Start: 2021-08-31 | End: 2021-10-11 | Stop reason: HOSPADM

## 2021-08-31 NOTE — PROGRESS NOTES
Assessment/Plan:  Discussed risks and benefits of colonoscopy including low risk of bleeding if polypectomy performed, abdominal discomfort/bloating and very small risk of colon perforation  Explained bowel prep in detail and gave instructions detailing appropriate pre-procedure diet and medication use  Prescription for bowel prep will be sent to the pharmacy  Procedure will be scheduled at earliest convenience  No problem-specific Assessment & Plan notes found for this encounter  Diagnoses and all orders for this visit:    Encounter for screening colonoscopy    Other orders  -     dexamethasone (DECADRON) 4 mg tablet; TAKE 3 TABLETS (12 MG TOTAL) BY MOUTH ONCE FOR 1 DOSE          Subjective:      Patient ID: Kenyatta Cancer is a 62 y o  male  He returns to discuss colonoscopy  He was   Originally scheduled to have 1 in February of this year but he got COVID so this was canceled  He denies any residual respiratory deficits though says that when he lies on his back for long periods of time he can have some shortness of breath with back pain but otherwise no shortness of breath or respiratory distress  He denies any abdominal pain or rectal pain, no blood in his stool, no constipation  The following portions of the patient's history were reviewed and updated as appropriate: allergies, current medications, past family history, past medical history, past social history, past surgical history and problem list     Review of Systems   Gastrointestinal: Negative for abdominal distention, abdominal pain, anal bleeding, blood in stool, constipation and rectal pain  All other systems reviewed and are negative  Objective:      /88 (BP Location: Left arm, Patient Position: Sitting, Cuff Size: Standard)   Pulse 78   Temp 97 5 °F (36 4 °C) (Tympanic)   Ht 5' 7" (1 702 m)   Wt 85 7 kg (189 lb)   BMI 29 60 kg/m²          Physical Exam  Vitals reviewed     Constitutional:       Appearance: Normal appearance  He is normal weight  HENT:      Head: Normocephalic and atraumatic  Eyes:      Extraocular Movements: Extraocular movements intact  Conjunctiva/sclera: Conjunctivae normal       Pupils: Pupils are equal, round, and reactive to light  Cardiovascular:      Rate and Rhythm: Normal rate and regular rhythm  Pulmonary:      Effort: Pulmonary effort is normal  No respiratory distress  Breath sounds: Normal breath sounds  Abdominal:      General: Abdomen is flat  There is no distension  Palpations: Abdomen is soft  Tenderness: There is no abdominal tenderness  Musculoskeletal:         General: No swelling or tenderness  Normal range of motion  Cervical back: Normal range of motion and neck supple  Skin:     General: Skin is warm and dry  Neurological:      General: No focal deficit present  Mental Status: He is alert and oriented to person, place, and time

## 2021-09-10 ENCOUNTER — OFFICE VISIT (OUTPATIENT)
Dept: FAMILY MEDICINE CLINIC | Facility: CLINIC | Age: 61
End: 2021-09-10

## 2021-09-10 VITALS
WEIGHT: 191.5 LBS | OXYGEN SATURATION: 98 % | BODY MASS INDEX: 30.06 KG/M2 | DIASTOLIC BLOOD PRESSURE: 76 MMHG | RESPIRATION RATE: 18 BRPM | HEART RATE: 85 BPM | SYSTOLIC BLOOD PRESSURE: 130 MMHG | TEMPERATURE: 97.5 F | HEIGHT: 67 IN

## 2021-09-10 DIAGNOSIS — N40.1 BENIGN PROSTATIC HYPERPLASIA WITH LOWER URINARY TRACT SYMPTOMS, SYMPTOM DETAILS UNSPECIFIED: ICD-10-CM

## 2021-09-10 DIAGNOSIS — M54.16 LUMBAR RADICULOPATHY: ICD-10-CM

## 2021-09-10 DIAGNOSIS — G89.29 CHRONIC LOW BACK PAIN WITHOUT SCIATICA, UNSPECIFIED BACK PAIN LATERALITY: ICD-10-CM

## 2021-09-10 DIAGNOSIS — E11.9 TYPE 2 DIABETES MELLITUS WITHOUT COMPLICATION, WITHOUT LONG-TERM CURRENT USE OF INSULIN (HCC): Primary | ICD-10-CM

## 2021-09-10 DIAGNOSIS — M54.50 CHRONIC LOW BACK PAIN WITHOUT SCIATICA, UNSPECIFIED BACK PAIN LATERALITY: ICD-10-CM

## 2021-09-10 DIAGNOSIS — U09.9 POST-ACUTE SEQUELAE OF COVID-19 (PASC): ICD-10-CM

## 2021-09-10 DIAGNOSIS — R06.02 SHORTNESS OF BREATH: ICD-10-CM

## 2021-09-10 DIAGNOSIS — I10 BENIGN ESSENTIAL HTN: ICD-10-CM

## 2021-09-10 LAB — SL AMB POCT HEMOGLOBIN AIC: 5.8 (ref ?–6.5)

## 2021-09-10 PROCEDURE — 99214 OFFICE O/P EST MOD 30 MIN: CPT | Performed by: PHYSICIAN ASSISTANT

## 2021-09-10 PROCEDURE — 83036 HEMOGLOBIN GLYCOSYLATED A1C: CPT | Performed by: PHYSICIAN ASSISTANT

## 2021-09-10 PROCEDURE — 3044F HG A1C LEVEL LT 7.0%: CPT | Performed by: SURGERY

## 2021-09-10 PROCEDURE — 3008F BODY MASS INDEX DOCD: CPT | Performed by: SURGERY

## 2021-09-10 RX ORDER — TAMSULOSIN HYDROCHLORIDE 0.4 MG/1
0.4 CAPSULE ORAL
Qty: 270 CAPSULE | Refills: 1 | Status: SHIPPED | OUTPATIENT
Start: 2021-09-10 | End: 2021-11-04 | Stop reason: SDUPTHER

## 2021-09-10 RX ORDER — GABAPENTIN 800 MG/1
800 TABLET ORAL 3 TIMES DAILY
Qty: 90 TABLET | Refills: 0 | Status: SHIPPED | OUTPATIENT
Start: 2021-09-10 | End: 2022-03-01 | Stop reason: SDUPTHER

## 2021-09-10 RX ORDER — AMITRIPTYLINE HYDROCHLORIDE 25 MG/1
25 TABLET, FILM COATED ORAL
Qty: 30 TABLET | Refills: 3 | Status: SHIPPED | OUTPATIENT
Start: 2021-09-10 | End: 2021-10-14
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
## 2021-09-17 ENCOUNTER — TELEPHONE (OUTPATIENT)
Dept: FAMILY MEDICINE CLINIC | Facility: CLINIC | Age: 61
End: 2021-09-17

## 2021-09-17 DIAGNOSIS — M54.16 LUMBAR RADICULOPATHY: ICD-10-CM

## 2021-09-17 DIAGNOSIS — M54.50 CHRONIC LOW BACK PAIN WITHOUT SCIATICA, UNSPECIFIED BACK PAIN LATERALITY: ICD-10-CM

## 2021-09-17 DIAGNOSIS — G89.29 CHRONIC LOW BACK PAIN WITHOUT SCIATICA, UNSPECIFIED BACK PAIN LATERALITY: ICD-10-CM

## 2021-09-17 RX ORDER — HYDROCODONE BITARTRATE AND ACETAMINOPHEN 7.5; 325 MG/1; MG/1
1 TABLET ORAL EVERY 8 HOURS PRN
Qty: 90 TABLET | Refills: 0 | Status: SHIPPED | OUTPATIENT
Start: 2021-09-17 | End: 2021-11-04 | Stop reason: SDUPTHER

## 2021-09-17 NOTE — TELEPHONE ENCOUNTER
Patient came in and requested refill for following medication       HYDROcodone-acetaminophen (NORCO) 7 5-325 mg per tablet

## 2021-09-20 ENCOUNTER — TELEPHONE (OUTPATIENT)
Dept: PREADMISSION TESTING | Facility: HOSPITAL | Age: 61
End: 2021-09-20

## 2021-09-27 ENCOUNTER — HOSPITAL ENCOUNTER (OUTPATIENT)
Dept: PULMONOLOGY | Facility: HOSPITAL | Age: 61
Discharge: HOME | End: 2021-09-27
Payer: COMMERCIAL

## 2021-09-27 DIAGNOSIS — U09.9 POST-ACUTE SEQUELAE OF COVID-19 (PASC): ICD-10-CM

## 2021-09-27 DIAGNOSIS — R06.02 SHORTNESS OF BREATH: ICD-10-CM

## 2021-09-27 PROCEDURE — 94726 PLETHYSMOGRAPHY LUNG VOLUMES: CPT | Performed by: INTERNAL MEDICINE

## 2021-09-27 PROCEDURE — 94760 N-INVAS EAR/PLS OXIMETRY 1: CPT

## 2021-09-27 PROCEDURE — 94060 EVALUATION OF WHEEZING: CPT

## 2021-09-27 PROCEDURE — 94060 EVALUATION OF WHEEZING: CPT | Performed by: INTERNAL MEDICINE

## 2021-09-27 PROCEDURE — 94729 DIFFUSING CAPACITY: CPT

## 2021-09-27 PROCEDURE — 94729 DIFFUSING CAPACITY: CPT | Performed by: INTERNAL MEDICINE

## 2021-09-27 PROCEDURE — 94726 PLETHYSMOGRAPHY LUNG VOLUMES: CPT

## 2021-09-27 RX ORDER — ALBUTEROL SULFATE 2.5 MG/3ML
2.5 SOLUTION RESPIRATORY (INHALATION) ONCE
Status: COMPLETED | OUTPATIENT
Start: 2021-09-27 | End: 2021-09-27

## 2021-09-27 RX ADMIN — ALBUTEROL SULFATE 2.5 MG: 2.5 SOLUTION RESPIRATORY (INHALATION) at 12:54

## 2021-10-01 ENCOUNTER — TELEPHONE (OUTPATIENT)
Dept: FAMILY MEDICINE CLINIC | Facility: CLINIC | Age: 61
End: 2021-10-01

## 2021-10-06 ENCOUNTER — OFFICE VISIT (OUTPATIENT)
Dept: PULMONOLOGY | Facility: CLINIC | Age: 61
End: 2021-10-06
Payer: COMMERCIAL

## 2021-10-06 ENCOUNTER — TELEPHONE (OUTPATIENT)
Dept: PULMONOLOGY | Facility: CLINIC | Age: 61
End: 2021-10-06

## 2021-10-06 VITALS
HEIGHT: 67 IN | RESPIRATION RATE: 16 BRPM | TEMPERATURE: 97.7 F | DIASTOLIC BLOOD PRESSURE: 88 MMHG | SYSTOLIC BLOOD PRESSURE: 124 MMHG | WEIGHT: 191.4 LBS | HEART RATE: 75 BPM | OXYGEN SATURATION: 99 % | BODY MASS INDEX: 30.04 KG/M2

## 2021-10-06 DIAGNOSIS — J45.41 MODERATE PERSISTENT REACTIVE AIRWAY DISEASE WITH ACUTE EXACERBATION: ICD-10-CM

## 2021-10-06 DIAGNOSIS — U09.9 POST-ACUTE SEQUELAE OF COVID-19 (PASC): Primary | ICD-10-CM

## 2021-10-06 DIAGNOSIS — R06.02 SHORTNESS OF BREATH: ICD-10-CM

## 2021-10-06 PROBLEM — J45.909 REACTIVE AIRWAY DISEASE: Status: ACTIVE | Noted: 2021-10-06

## 2021-10-06 PROCEDURE — 94664 DEMO&/EVAL PT USE INHALER: CPT | Performed by: INTERNAL MEDICINE

## 2021-10-06 PROCEDURE — 1036F TOBACCO NON-USER: CPT | Performed by: INTERNAL MEDICINE

## 2021-10-06 PROCEDURE — 99244 OFF/OP CNSLTJ NEW/EST MOD 40: CPT | Performed by: INTERNAL MEDICINE

## 2021-10-08 ENCOUNTER — TELEPHONE (OUTPATIENT)
Dept: GASTROENTEROLOGY | Facility: HOSPITAL | Age: 61
End: 2021-10-08

## 2021-10-09 ENCOUNTER — ANESTHESIA EVENT (OUTPATIENT)
Dept: ANESTHESIOLOGY | Facility: HOSPITAL | Age: 61
End: 2021-10-09

## 2021-10-09 ENCOUNTER — ANESTHESIA (OUTPATIENT)
Dept: ANESTHESIOLOGY | Facility: HOSPITAL | Age: 61
End: 2021-10-09

## 2021-10-11 ENCOUNTER — ANESTHESIA EVENT (OUTPATIENT)
Dept: GASTROENTEROLOGY | Facility: HOSPITAL | Age: 61
End: 2021-10-11

## 2021-10-11 ENCOUNTER — ANESTHESIA (OUTPATIENT)
Dept: GASTROENTEROLOGY | Facility: HOSPITAL | Age: 61
End: 2021-10-11

## 2021-10-11 ENCOUNTER — HOSPITAL ENCOUNTER (OUTPATIENT)
Dept: GASTROENTEROLOGY | Facility: HOSPITAL | Age: 61
Setting detail: OUTPATIENT SURGERY
Discharge: HOME | End: 2021-10-11
Attending: SURGERY | Admitting: SURGERY
Payer: COMMERCIAL

## 2021-10-11 VITALS
HEART RATE: 62 BPM | DIASTOLIC BLOOD PRESSURE: 89 MMHG | TEMPERATURE: 96.5 F | OXYGEN SATURATION: 97 % | SYSTOLIC BLOOD PRESSURE: 146 MMHG | RESPIRATION RATE: 20 BRPM

## 2021-10-11 DIAGNOSIS — Z13.820 SCREENING FOR OSTEOPOROSIS: ICD-10-CM

## 2021-10-11 PROBLEM — R06.02 SHORTNESS OF BREATH: Status: RESOLVED | Noted: 2021-10-06 | Resolved: 2021-10-11

## 2021-10-11 LAB — GLUCOSE SERPL-MCNC: 85 MG/DL (ref 65–140)

## 2021-10-11 PROCEDURE — 88305 TISSUE EXAM BY PATHOLOGIST: CPT | Performed by: PATHOLOGY

## 2021-10-11 PROCEDURE — 82948 REAGENT STRIP/BLOOD GLUCOSE: CPT

## 2021-10-11 PROCEDURE — 45380 COLONOSCOPY AND BIOPSY: CPT | Performed by: SURGERY

## 2021-10-11 RX ORDER — PROPOFOL 10 MG/ML
INJECTION, EMULSION INTRAVENOUS AS NEEDED
Status: DISCONTINUED | OUTPATIENT
Start: 2021-10-11 | End: 2021-10-11

## 2021-10-11 RX ORDER — SODIUM CHLORIDE 9 MG/ML
50 INJECTION, SOLUTION INTRAVENOUS CONTINUOUS
Status: DISCONTINUED | OUTPATIENT
Start: 2021-10-11 | End: 2021-10-15 | Stop reason: HOSPADM

## 2021-10-11 RX ADMIN — PROPOFOL 100 MG: 10 INJECTION, EMULSION INTRAVENOUS at 08:33

## 2021-10-11 RX ADMIN — SODIUM CHLORIDE 50 ML/HR: 0.9 INJECTION, SOLUTION INTRAVENOUS at 07:11

## 2021-10-11 RX ADMIN — PROPOFOL 100 MG: 10 INJECTION, EMULSION INTRAVENOUS at 08:37

## 2021-10-11 RX ADMIN — PROPOFOL 100 MG: 10 INJECTION, EMULSION INTRAVENOUS at 08:44

## 2021-10-12 DIAGNOSIS — J45.41 MODERATE PERSISTENT REACTIVE AIRWAY DISEASE WITH ACUTE EXACERBATION: Primary | ICD-10-CM

## 2021-10-12 RX ORDER — BUDESONIDE AND FORMOTEROL FUMARATE DIHYDRATE 160; 4.5 UG/1; UG/1
2 AEROSOL RESPIRATORY (INHALATION) 2 TIMES DAILY
Qty: 10.2 G | Refills: 3 | Status: SHIPPED | OUTPATIENT
Start: 2021-10-12 | End: 2022-03-14 | Stop reason: SDUPTHER

## 2021-10-14 ENCOUNTER — OFFICE VISIT (OUTPATIENT)
Dept: FAMILY MEDICINE CLINIC | Facility: CLINIC | Age: 61
End: 2021-10-14

## 2021-10-14 VITALS
WEIGHT: 191 LBS | OXYGEN SATURATION: 98 % | HEIGHT: 67 IN | TEMPERATURE: 98 F | SYSTOLIC BLOOD PRESSURE: 120 MMHG | BODY MASS INDEX: 29.98 KG/M2 | HEART RATE: 81 BPM | RESPIRATION RATE: 16 BRPM | DIASTOLIC BLOOD PRESSURE: 78 MMHG

## 2021-10-14 DIAGNOSIS — M54.16 LUMBAR RADICULOPATHY: ICD-10-CM

## 2021-10-14 DIAGNOSIS — E11.9 TYPE 2 DIABETES MELLITUS WITHOUT COMPLICATION, WITHOUT LONG-TERM CURRENT USE OF INSULIN (HCC): ICD-10-CM

## 2021-10-14 DIAGNOSIS — Z23 NEEDS FLU SHOT: ICD-10-CM

## 2021-10-14 DIAGNOSIS — I10 BENIGN ESSENTIAL HTN: ICD-10-CM

## 2021-10-14 DIAGNOSIS — F32.0 CURRENT MILD EPISODE OF MAJOR DEPRESSIVE DISORDER WITHOUT PRIOR EPISODE (HCC): Primary | ICD-10-CM

## 2021-10-14 PROCEDURE — 3008F BODY MASS INDEX DOCD: CPT | Performed by: INTERNAL MEDICINE

## 2021-10-14 PROCEDURE — 90682 RIV4 VACC RECOMBINANT DNA IM: CPT | Performed by: PHYSICIAN ASSISTANT

## 2021-10-14 PROCEDURE — 99214 OFFICE O/P EST MOD 30 MIN: CPT | Performed by: PHYSICIAN ASSISTANT

## 2021-10-14 PROCEDURE — 90471 IMMUNIZATION ADMIN: CPT | Performed by: PHYSICIAN ASSISTANT

## 2021-10-14 RX ORDER — LANCING DEVICE/LANCETS
KIT MISCELLANEOUS DAILY
Qty: 1 EACH | Refills: 0 | Status: SHIPPED | OUTPATIENT
Start: 2021-10-14 | End: 2022-08-09 | Stop reason: SDUPTHER

## 2021-10-14 RX ORDER — VENLAFAXINE HYDROCHLORIDE 37.5 MG/1
37.5 CAPSULE, EXTENDED RELEASE ORAL
Qty: 30 CAPSULE | Refills: 5 | Status: SHIPPED | OUTPATIENT
Start: 2021-10-14 | End: 2021-12-10

## 2021-10-14 RX ORDER — LANCETS 33 GAUGE
EACH MISCELLANEOUS DAILY
Qty: 100 EACH | Refills: 1 | Status: SHIPPED | OUTPATIENT
Start: 2021-10-14

## 2021-10-14 RX ORDER — BLOOD-GLUCOSE METER
EACH MISCELLANEOUS DAILY
Qty: 1 KIT | Refills: 0 | Status: SHIPPED | OUTPATIENT
Start: 2021-10-14 | End: 2022-06-14 | Stop reason: SDUPTHER

## 2021-10-14 RX ORDER — BLOOD SUGAR DIAGNOSTIC
STRIP MISCELLANEOUS
Qty: 50 EACH | Refills: 5 | Status: SHIPPED | OUTPATIENT
Start: 2021-10-14 | End: 2022-05-09 | Stop reason: CLARIF

## 2021-10-14 RX ORDER — GLYCERIN ADULT
SUPPOSITORY, RECTAL RECTAL DAILY
Qty: 1 EACH | Refills: 0 | Status: SHIPPED | OUTPATIENT
Start: 2021-10-14 | End: 2021-12-10 | Stop reason: SDUPTHER

## 2021-10-16 PROBLEM — F32.0 CURRENT MILD EPISODE OF MAJOR DEPRESSIVE DISORDER WITHOUT PRIOR EPISODE (HCC): Status: ACTIVE | Noted: 2021-10-16

## 2021-10-16 RX ORDER — AMLODIPINE BESYLATE 10 MG/1
10 TABLET ORAL DAILY
Qty: 90 TABLET | Refills: 1 | Status: SHIPPED | OUTPATIENT
Start: 2021-10-16 | End: 2022-03-03 | Stop reason: SDUPTHER

## 2021-10-26 ENCOUNTER — TELEPHONE (OUTPATIENT)
Dept: PULMONOLOGY | Facility: CLINIC | Age: 61
End: 2021-10-26

## 2021-10-26 ENCOUNTER — TELEPHONE (OUTPATIENT)
Dept: SURGERY | Facility: CLINIC | Age: 61
End: 2021-10-26

## 2021-10-28 ENCOUNTER — TELEPHONE (OUTPATIENT)
Dept: PSYCHIATRY | Facility: CLINIC | Age: 61
End: 2021-10-28

## 2021-11-04 DIAGNOSIS — N40.1 BENIGN PROSTATIC HYPERPLASIA WITH LOWER URINARY TRACT SYMPTOMS, SYMPTOM DETAILS UNSPECIFIED: ICD-10-CM

## 2021-11-04 DIAGNOSIS — G89.29 CHRONIC LOW BACK PAIN WITHOUT SCIATICA, UNSPECIFIED BACK PAIN LATERALITY: ICD-10-CM

## 2021-11-04 DIAGNOSIS — M54.16 LUMBAR RADICULOPATHY: ICD-10-CM

## 2021-11-04 DIAGNOSIS — M54.50 CHRONIC LOW BACK PAIN WITHOUT SCIATICA, UNSPECIFIED BACK PAIN LATERALITY: ICD-10-CM

## 2021-11-04 RX ORDER — TAMSULOSIN HYDROCHLORIDE 0.4 MG/1
0.4 CAPSULE ORAL
Qty: 270 CAPSULE | Refills: 1 | Status: SHIPPED | OUTPATIENT
Start: 2021-11-04 | End: 2021-12-15 | Stop reason: SDUPTHER

## 2021-11-04 RX ORDER — HYDROCODONE BITARTRATE AND ACETAMINOPHEN 7.5; 325 MG/1; MG/1
1 TABLET ORAL EVERY 8 HOURS PRN
Qty: 90 TABLET | Refills: 0 | Status: SHIPPED | OUTPATIENT
Start: 2021-11-04 | End: 2021-12-02

## 2021-11-05 ENCOUNTER — HOSPITAL ENCOUNTER (OUTPATIENT)
Dept: NON INVASIVE DIAGNOSTICS | Facility: HOSPITAL | Age: 61
Discharge: HOME | End: 2021-11-05
Attending: INTERNAL MEDICINE
Payer: COMMERCIAL

## 2021-11-05 VITALS
BODY MASS INDEX: 29.98 KG/M2 | HEART RATE: 81 BPM | WEIGHT: 191 LBS | HEIGHT: 67 IN | DIASTOLIC BLOOD PRESSURE: 78 MMHG | SYSTOLIC BLOOD PRESSURE: 120 MMHG

## 2021-11-05 DIAGNOSIS — R06.02 SHORTNESS OF BREATH: ICD-10-CM

## 2021-11-05 LAB
AORTIC ROOT: 3.8 CM
AORTIC VALVE MEAN VELOCITY: 6.7 M/S
APICAL FOUR CHAMBER EJECTION FRACTION: 68 %
ASCENDING AORTA: 3.4 CM
AV LVOT MEAN GRADIENT: 1 MMHG
AV LVOT PEAK GRADIENT: 3 MMHG
AV MEAN GRADIENT: 2 MMHG
AV PEAK GRADIENT: 4 MMHG
DOP CALC AO VTI: 20 CM
DOP CALC LVOT PEAK VEL VTI: 15.86 CM
DOP CALC LVOT PEAK VEL: 0.81 M/S
E WAVE DECELERATION TIME: 192 MS
FRACTIONAL SHORTENING: 47 % (ref 28–44)
INTERVENTRICULAR SEPTUM IN DIASTOLE (PARASTERNAL SHORT AXIS VIEW): 1.3 CM
LAAS-AP4: 16.8 CM2
LEFT INTERNAL DIMENSION IN SYSTOLE: 2.3 CM (ref 2.1–4)
LEFT VENTRICULAR INTERNAL DIMENSION IN DIASTOLE: 4.3 CM (ref 5.04–7.51)
LEFT VENTRICULAR POSTERIOR WALL IN END DIASTOLE: 0.9 CM
LEFT VENTRICULAR STROKE VOLUME: 66 ML
MV E'TISSUE VEL-SEP: 6 CM/S
MV PEAK A VEL: 0.94 M/S
MV PEAK E VEL: 64 CM/S
MV STENOSIS PRESSURE HALF TIME: 0 MS
RIGHT VENTRICLE ID DIMENSION: 3.3 CM
SL CV LV EF: 65
SL CV PED ECHO LEFT VENTRICLE DIASTOLIC VOLUME (MOD BIPLANE) 2D: 84 ML
SL CV PED ECHO LEFT VENTRICLE SYSTOLIC VOLUME (MOD BIPLANE) 2D: 18 ML
TRICUSPID VALVE PEAK REGURGITATION VELOCITY: 2.18 M/S
TRICUSPID VALVE S': 42 CM/S
TV PEAK GRADIENT: 19 MMHG
Z-SCORE OF LEFT VENTRICULAR DIMENSION IN END SYSTOLE: -3.58

## 2021-11-05 PROCEDURE — 93306 TTE W/DOPPLER COMPLETE: CPT | Performed by: INTERNAL MEDICINE

## 2021-11-05 PROCEDURE — 93306 TTE W/DOPPLER COMPLETE: CPT

## 2021-11-09 ENCOUNTER — OFFICE VISIT (OUTPATIENT)
Dept: PULMONOLOGY | Facility: CLINIC | Age: 61
End: 2021-11-09
Payer: COMMERCIAL

## 2021-11-09 VITALS
RESPIRATION RATE: 18 BRPM | SYSTOLIC BLOOD PRESSURE: 124 MMHG | OXYGEN SATURATION: 98 % | BODY MASS INDEX: 30.29 KG/M2 | TEMPERATURE: 96.7 F | DIASTOLIC BLOOD PRESSURE: 78 MMHG | HEART RATE: 78 BPM | WEIGHT: 193 LBS | HEIGHT: 67 IN

## 2021-11-09 DIAGNOSIS — J45.41 MODERATE PERSISTENT REACTIVE AIRWAY DISEASE WITH ACUTE EXACERBATION: ICD-10-CM

## 2021-11-09 DIAGNOSIS — U09.9 POST-ACUTE SEQUELAE OF COVID-19 (PASC): Primary | ICD-10-CM

## 2021-11-09 DIAGNOSIS — I51.89 DIASTOLIC DYSFUNCTION: ICD-10-CM

## 2021-11-09 PROCEDURE — 3078F DIAST BP <80 MM HG: CPT | Performed by: NURSE PRACTITIONER

## 2021-11-09 PROCEDURE — 3074F SYST BP LT 130 MM HG: CPT | Performed by: NURSE PRACTITIONER

## 2021-11-09 PROCEDURE — 1036F TOBACCO NON-USER: CPT | Performed by: NURSE PRACTITIONER

## 2021-11-09 PROCEDURE — 99213 OFFICE O/P EST LOW 20 MIN: CPT | Performed by: NURSE PRACTITIONER

## 2021-11-09 PROCEDURE — 3008F BODY MASS INDEX DOCD: CPT | Performed by: NURSE PRACTITIONER

## 2021-11-30 DIAGNOSIS — M54.50 CHRONIC LOW BACK PAIN WITHOUT SCIATICA, UNSPECIFIED BACK PAIN LATERALITY: ICD-10-CM

## 2021-11-30 DIAGNOSIS — G89.29 CHRONIC LOW BACK PAIN WITHOUT SCIATICA, UNSPECIFIED BACK PAIN LATERALITY: ICD-10-CM

## 2021-11-30 DIAGNOSIS — M54.16 LUMBAR RADICULOPATHY: ICD-10-CM

## 2021-12-02 RX ORDER — HYDROCODONE BITARTRATE AND ACETAMINOPHEN 7.5; 325 MG/1; MG/1
TABLET ORAL
Qty: 90 TABLET | Refills: 0 | Status: SHIPPED | OUTPATIENT
Start: 2021-12-02 | End: 2022-01-07 | Stop reason: SDUPTHER

## 2021-12-10 ENCOUNTER — OFFICE VISIT (OUTPATIENT)
Dept: FAMILY MEDICINE CLINIC | Facility: CLINIC | Age: 61
End: 2021-12-10

## 2021-12-10 VITALS
TEMPERATURE: 98.1 F | OXYGEN SATURATION: 97 % | RESPIRATION RATE: 18 BRPM | DIASTOLIC BLOOD PRESSURE: 84 MMHG | HEART RATE: 92 BPM | BODY MASS INDEX: 30.39 KG/M2 | HEIGHT: 67 IN | WEIGHT: 193.6 LBS | SYSTOLIC BLOOD PRESSURE: 126 MMHG

## 2021-12-10 DIAGNOSIS — F11.90 CHRONIC NARCOTIC USE: ICD-10-CM

## 2021-12-10 DIAGNOSIS — I10 BENIGN ESSENTIAL HTN: ICD-10-CM

## 2021-12-10 DIAGNOSIS — G47.01 INSOMNIA DUE TO MEDICAL CONDITION: ICD-10-CM

## 2021-12-10 DIAGNOSIS — F32.0 CURRENT MILD EPISODE OF MAJOR DEPRESSIVE DISORDER WITHOUT PRIOR EPISODE (HCC): ICD-10-CM

## 2021-12-10 DIAGNOSIS — E11.9 TYPE 2 DIABETES MELLITUS WITHOUT COMPLICATION, WITHOUT LONG-TERM CURRENT USE OF INSULIN (HCC): Primary | ICD-10-CM

## 2021-12-10 LAB — SL AMB POCT HEMOGLOBIN AIC: 6.1 (ref ?–6.5)

## 2021-12-10 PROCEDURE — 3044F HG A1C LEVEL LT 7.0%: CPT | Performed by: NURSE PRACTITIONER

## 2021-12-10 PROCEDURE — 3008F BODY MASS INDEX DOCD: CPT | Performed by: NURSE PRACTITIONER

## 2021-12-10 PROCEDURE — 83036 HEMOGLOBIN GLYCOSYLATED A1C: CPT | Performed by: PHYSICIAN ASSISTANT

## 2021-12-10 PROCEDURE — 80361 OPIATES 1 OR MORE: CPT | Performed by: PHYSICIAN ASSISTANT

## 2021-12-10 PROCEDURE — 99214 OFFICE O/P EST MOD 30 MIN: CPT | Performed by: PHYSICIAN ASSISTANT

## 2021-12-10 PROCEDURE — 80307 DRUG TEST PRSMV CHEM ANLYZR: CPT | Performed by: PHYSICIAN ASSISTANT

## 2021-12-10 RX ORDER — ZOLPIDEM TARTRATE 5 MG/1
5 TABLET ORAL
Qty: 30 TABLET | Refills: 0 | Status: SHIPPED | OUTPATIENT
Start: 2021-12-10

## 2021-12-10 RX ORDER — GLYCERIN ADULT
SUPPOSITORY, RECTAL RECTAL DAILY
Qty: 1 EACH | Refills: 0 | Status: SHIPPED | OUTPATIENT
Start: 2021-12-10 | End: 2022-06-14 | Stop reason: SDUPTHER

## 2021-12-10 RX ORDER — VENLAFAXINE HYDROCHLORIDE 75 MG/1
75 CAPSULE, EXTENDED RELEASE ORAL
Qty: 30 CAPSULE | Refills: 5 | Status: SHIPPED | OUTPATIENT
Start: 2021-12-10 | End: 2022-03-14 | Stop reason: SDUPTHER

## 2021-12-15 DIAGNOSIS — J06.9 URI (UPPER RESPIRATORY INFECTION): ICD-10-CM

## 2021-12-15 DIAGNOSIS — E11.9 TYPE 2 DIABETES MELLITUS WITHOUT COMPLICATION, WITHOUT LONG-TERM CURRENT USE OF INSULIN (HCC): ICD-10-CM

## 2021-12-15 DIAGNOSIS — Z20.822 ENCOUNTER FOR LABORATORY TESTING FOR COVID-19 VIRUS: ICD-10-CM

## 2021-12-15 DIAGNOSIS — K21.9 GASTROESOPHAGEAL REFLUX DISEASE, UNSPECIFIED WHETHER ESOPHAGITIS PRESENT: ICD-10-CM

## 2021-12-15 DIAGNOSIS — N40.1 BENIGN PROSTATIC HYPERPLASIA WITH LOWER URINARY TRACT SYMPTOMS, SYMPTOM DETAILS UNSPECIFIED: ICD-10-CM

## 2021-12-16 ENCOUNTER — TELEPHONE (OUTPATIENT)
Dept: FAMILY MEDICINE CLINIC | Facility: CLINIC | Age: 61
End: 2021-12-16

## 2021-12-16 RX ORDER — FAMOTIDINE 20 MG/1
20 TABLET, FILM COATED ORAL 2 TIMES DAILY
Qty: 90 TABLET | Refills: 0 | Status: SHIPPED | OUTPATIENT
Start: 2021-12-16

## 2021-12-16 RX ORDER — TAMSULOSIN HYDROCHLORIDE 0.4 MG/1
0.4 CAPSULE ORAL
Qty: 270 CAPSULE | Refills: 1 | Status: SHIPPED | OUTPATIENT
Start: 2021-12-16 | End: 2022-06-01 | Stop reason: SDUPTHER

## 2021-12-16 RX ORDER — FLUTICASONE PROPIONATE 50 MCG
1 SPRAY, SUSPENSION (ML) NASAL DAILY
Qty: 16 G | Refills: 0 | Status: SHIPPED | OUTPATIENT
Start: 2021-12-16

## 2021-12-17 ENCOUNTER — TELEPHONE (OUTPATIENT)
Dept: FAMILY MEDICINE CLINIC | Facility: CLINIC | Age: 61
End: 2021-12-17

## 2021-12-17 ENCOUNTER — CLINICAL SUPPORT (OUTPATIENT)
Dept: FAMILY MEDICINE CLINIC | Facility: CLINIC | Age: 61
End: 2021-12-17

## 2021-12-17 DIAGNOSIS — Z11.52 ENCOUNTER FOR SCREENING FOR COVID-19: Primary | ICD-10-CM

## 2021-12-17 PROCEDURE — U0003 INFECTIOUS AGENT DETECTION BY NUCLEIC ACID (DNA OR RNA); SEVERE ACUTE RESPIRATORY SYNDROME CORONAVIRUS 2 (SARS-COV-2) (CORONAVIRUS DISEASE [COVID-19]), AMPLIFIED PROBE TECHNIQUE, MAKING USE OF HIGH THROUGHPUT TECHNOLOGIES AS DESCRIBED BY CMS-2020-01-R: HCPCS | Performed by: PHYSICIAN ASSISTANT

## 2021-12-17 PROCEDURE — U0005 INFEC AGEN DETEC AMPLI PROBE: HCPCS | Performed by: PHYSICIAN ASSISTANT

## 2021-12-18 LAB — SARS-COV-2 RNA RESP QL NAA+PROBE: NEGATIVE

## 2021-12-20 LAB
HYDROCODONE UR QL: NEGATIVE NG/ML
HYDROMORPHONE UR QL: NEGATIVE NG/ML

## 2022-01-07 ENCOUNTER — TELEPHONE (OUTPATIENT)
Dept: FAMILY MEDICINE CLINIC | Facility: CLINIC | Age: 62
End: 2022-01-07

## 2022-01-07 DIAGNOSIS — G89.29 CHRONIC LOW BACK PAIN WITHOUT SCIATICA, UNSPECIFIED BACK PAIN LATERALITY: ICD-10-CM

## 2022-01-07 DIAGNOSIS — M54.50 CHRONIC LOW BACK PAIN WITHOUT SCIATICA, UNSPECIFIED BACK PAIN LATERALITY: ICD-10-CM

## 2022-01-07 DIAGNOSIS — M54.16 LUMBAR RADICULOPATHY: ICD-10-CM

## 2022-01-07 RX ORDER — HYDROCODONE BITARTRATE AND ACETAMINOPHEN 7.5; 325 MG/1; MG/1
1 TABLET ORAL EVERY 8 HOURS PRN
Qty: 90 TABLET | Refills: 0 | Status: SHIPPED | OUTPATIENT
Start: 2022-01-07 | End: 2022-02-03 | Stop reason: SDUPTHER

## 2022-01-14 ENCOUNTER — OFFICE VISIT (OUTPATIENT)
Dept: URGENT CARE | Age: 62
End: 2022-01-14
Payer: MEDICARE

## 2022-01-14 VITALS
HEART RATE: 80 BPM | OXYGEN SATURATION: 96 % | SYSTOLIC BLOOD PRESSURE: 136 MMHG | RESPIRATION RATE: 18 BRPM | TEMPERATURE: 98 F | DIASTOLIC BLOOD PRESSURE: 78 MMHG

## 2022-01-14 DIAGNOSIS — G89.29 CHRONIC BILATERAL LOW BACK PAIN WITHOUT SCIATICA: Primary | ICD-10-CM

## 2022-01-14 DIAGNOSIS — M54.50 CHRONIC BILATERAL LOW BACK PAIN WITHOUT SCIATICA: Primary | ICD-10-CM

## 2022-01-14 PROCEDURE — 99213 OFFICE O/P EST LOW 20 MIN: CPT

## 2022-01-14 RX ORDER — LIDOCAINE 50 MG/G
1 PATCH TOPICAL DAILY
Qty: 10 PATCH | Refills: 0 | Status: SHIPPED | OUTPATIENT
Start: 2022-01-14 | End: 2022-03-18 | Stop reason: SDUPTHER

## 2022-01-14 NOTE — PATIENT INSTRUCTIONS
Please use Lidoderm patches directed  Please follow-up with your primary care provider for further evaluation of low back pain  Acute Low Back Pain, Ambulatory Care   GENERAL INFORMATION:   Acute low back pain  is discomfort in your lower back area that lasts for less than 12 weeks  The word acute is used to describe pain that starts suddenly, worsens quickly, and lasts for a short time  Common symptoms include the following:   · Back stiffness or spasms    · Pain down the back or side of one leg    · Holding yourself in an unusual position or posture to decrease your back pain    · Not being able to find a sitting position that is comfortable    · Slow increase in your pain for 24 to 48 hours after you stress your back    · Tenderness on your lower back or severe pain when you move your back  Seek immediate care for the following symptoms:   · Severe pain    · Sudden stiffness and heaviness in both buttocks down to both legs    · Numbness or weakness in one leg, or pain in both legs    · Numbness in your genital area or across your lower back    · Unable to control your urine or bowel movements  Treatment for acute low back pain  may include any of the following:  · Medicines:      ¨ NSAIDs  help decrease swelling and pain or fever  This medicine is available with or without a doctor's order  NSAIDs can cause stomach bleeding or kidney problems in certain people  If you take blood thinner medicine, always ask your healthcare provider if NSAIDs are safe for you  Always read the medicine label and follow directions  ¨ Muscle relaxers  help decrease muscle spasms pain  ¨ Prescription pain medicine  may be given  Ask how to take this medicine safely  · Surgery  may be needed if your pain is severe and other treatments do not work  Surgery may be needed for conditions of the lumbar spine, such as herniated disc or spinal stenosis  Manage your symptoms:   · Sleep on a firm mattress    If you do not have a firm mattress, have someone move your mattress to the floor for a few days  A piece of plywood under your mattress can also help make it firmer  · Apply ice  on your lower back for 15 to 20 minutes every hour or as directed  Use an ice pack, or put crushed ice in a plastic bag  Cover it with a towel  Ice helps prevent tissue damage and decreases swelling and pain  You can alternate ice and heat  · Apply heat  on your lower back for 20 to 30 minutes every 2 hours for as many days as directed  Heat helps decrease pain and muscle spasms  · Go to physical therapy  A physical therapist teaches you exercises to help improve movement and strength, and to decrease pain  Prevent acute low back pain:   · Use proper body mechanics  ¨ Bend at the hips and knees when you  objects  Do not bend from the waist  Use your leg muscles as you lift the load  Do not use your back  Keep the object close to your chest as you lift it  Try not to twist or lift anything above your waist     ¨ Change your position often when you stand for long periods of time  Rest one foot on a small box or footrest, and then switch to the other foot often  ¨ Try not to sit for long periods of time  When you do, sit in a straight-backed chair with your feet flat on the floor  Never reach, pull, or push while you are sitting  · Exercise regularly  Warm up before you exercise  Do exercises that strengthen your back muscles  Ask about the best exercise plan for you  · Maintain a healthy weight  Ask your healthcare provider how much you should weigh  Ask him to help you create a weight loss plan if you are overweight  Follow up with your healthcare provider as directed:  Return for a follow-up visit if you still have pain after 1 to 3 weeks of treatment  You may need to visit an orthopedist if your back pain lasts more than 6 to 12 weeks  Write down your questions so you remember to ask them during your visits    CARE AGREEMENT: You have the right to help plan your care  Learn about your health condition and how it may be treated  Discuss treatment options with your caregivers to decide what care you want to receive  You always have the right to refuse treatment  The above information is an  only  It is not intended as medical advice for individual conditions or treatments  Talk to your doctor, nurse or pharmacist before following any medical regimen to see if it is safe and effective for you  © 2014 [address] is for End User's use only and may not be sold, redistributed or otherwise used for commercial purposes  All illustrations and images included in CareNotes® are the copyrighted property of [employer]  or Nomi Pritchett

## 2022-01-14 NOTE — PROGRESS NOTES
[facility]        NAME: Cori Allred is a 62 y o  male  : 1960    MRN: [id number]  DATE: 2022  TIME: 10:17 AM    Assessment and Plan   Chronic bilateral low back pain without sciatica [M54 50, G89 29]  1  Chronic bilateral low back pain without sciatica  lidocaine (Lidoderm) 5 %         Patient Instructions       Follow up with PCP in 3-5 days  Proceed to  ER if symptoms worsen  Chief Complaint     Chief Complaint   Patient presents with    Back Pain     mid back pain x a "few days" , took rx  pain pill last night, denies use today          History of Present Illness       Patient presenting with worsening low back pain over the past 3 days  He states that he has a history of low back pain and is prescribed hydrocodone and gabapentin, but currently this regimen is not working  Patient states that the pain is in his mid to low back and radiates down into his left leg  Patient denies any numbness or tingling or change in his down bilateral function  Patient denies any saddle anesthesia  He denies any trauma or injury to illicit the pain  Back Pain  Associated symptoms include numbness  Pertinent negatives include no abdominal pain, chest pain, dysuria, fever or weakness  Review of Systems   Review of Systems   Constitutional: Negative for chills and fever  HENT: Negative for ear pain and sore throat  Eyes: Negative for pain and visual disturbance  Respiratory: Negative for cough and shortness of breath  Cardiovascular: Negative for chest pain and palpitations  Gastrointestinal: Negative for abdominal pain and vomiting  Genitourinary: Negative for dysuria and hematuria  Musculoskeletal: Positive for back pain  Negative for arthralgias  Skin: Negative for color change and rash  Neurological: Positive for numbness  Negative for weakness  Hematological: Negative  Psychiatric/Behavioral: Negative      All other systems reviewed and are negative          Current Medications       Current Outpatient Medications:     aluminum-magnesium hydroxide-simethicone (MAALOX) 200-200-20 MG/5ML SUSP, Take 15 mL by mouth 4 (four) times a day (before meals and at bedtime), Disp: 150 mL, Rfl: 0    amLODIPine (NORVASC) 10 mg tablet, Take 1 tablet (10 mg total) by mouth daily, Disp: 90 tablet, Rfl: 1    atorvastatin (LIPITOR) 20 mg tablet, Take 1 tablet (20 mg total) by mouth daily, Disp: 90 tablet, Rfl: 1    Bioflavonoid Products (RA Vitamin C CR) TBCR, take 1 tablet by mouth twice a day for 14 days, Disp: , Rfl:     Blood Glucose Monitoring Suppl (OneTouch Verio) w/Device KIT, Use daily, Disp: 1 kit, Rfl: 0    Blood Pressure Monitoring (Blood Pressure Monitor/Arm) EMILY, Use daily, Disp: 1 each, Rfl: 0    budesonide-formoterol (Symbicort) 160-4 5 mcg/act inhaler, Inhale 2 puffs 2 (two) times a day Rinse mouth after use , Disp: 10 2 g, Rfl: 3    cyclobenzaprine (FLEXERIL) 10 mg tablet, Take 1 tablet (10 mg total) by mouth 3 (three) times a day as needed for muscle spasms, Disp: 30 tablet, Rfl: 1    dexamethasone (DECADRON) 4 mg tablet, TAKE 3 TABLETS (12 MG TOTAL) BY MOUTH ONCE FOR 1 DOSE, Disp: , Rfl:     famotidine (PEPCID) 20 mg tablet, Take 1 tablet (20 mg total) by mouth 2 (two) times a day As needed for acid reflux, Disp: 90 tablet, Rfl: 0    fluticasone (FLONASE) 50 mcg/act nasal spray, 1 spray into each nostril daily, Disp: 16 g, Rfl: 0    gabapentin (NEURONTIN) 800 mg tablet, Take 1 tablet (800 mg total) by mouth 3 (three) times a day, Disp: 90 tablet, Rfl: 0    glucose blood (OneTouch Verio) test strip, Check blood sugar daily, Disp: 50 each, Rfl: 5    HYDROcodone-acetaminophen (NORCO) 7 5-325 mg per tablet, Take 1 tablet by mouth every 8 (eight) hours as needed for pain Max Daily Amount: 3 tablets, Disp: 90 tablet, Rfl: 0    Lancet Devices (ONE TOUCH DELICA LANCING DEV) MISC, Use daily, Disp: 1 each, Rfl: 0    lidocaine (Lidoderm) 5 %, Apply 1 patch topically daily Remove & Discard patch within 12 hours or as directed by MD, Disp: 10 patch, Rfl: 0    lidocaine (XYLOCAINE) 2 % topical gel, Apply topically 3 (three) times a day, Disp: 30 mL, Rfl: 2    metFORMIN (GLUCOPHAGE) 500 mg tablet, Take 1 tablet (500 mg total) by mouth daily, Disp: 90 tablet, Rfl: 1    Multiple Vitamin (multivitamin) capsule, Take 1 capsule by mouth daily for 14 days, Disp: 14 capsule, Rfl: 0    OneTouch Delica Lancets 65F MISC, Use daily, Disp: 100 each, Rfl: 1    tamsulosin (FLOMAX) 0 4 mg, Take 1 capsule (0 4 mg total) by mouth daily with dinner, Disp: 270 capsule, Rfl: 1    venlafaxine (EFFEXOR-XR) 75 mg 24 hr capsule, Take 1 capsule (75 mg total) by mouth daily with breakfast, Disp: 30 capsule, Rfl: 5    zolpidem (AMBIEN) 5 mg tablet, Take 1 tablet (5 mg total) by mouth daily at bedtime as needed for sleep, Disp: 30 tablet, Rfl: 0    Current Allergies     Allergies as of 01/14/2022    (No Known Allergies)            The following portions of the patient's history were reviewed and updated as appropriate: allergies, current medications, past family history, past medical history, past social history, past surgical history and problem list      Past Medical History:   Diagnosis Date    Asthma     Back pain     Back pain     Diabetes mellitus ([facility] )     Hyperlipidemia     Hypertension        Past Surgical History:   Procedure Laterality Date    NO PAST SURGERIES         Family History   Problem Relation Age of Onset    No Known Problems Mother     No Known Problems Father          Medications have been verified  Objective   /78   Pulse 80   Temp 98 °F (36 7 °C)   Resp 18   SpO2 96%        Physical Exam     Physical Exam  Vitals and nursing note reviewed  Constitutional:       General: He is not in acute distress  Appearance: Normal appearance  He is not ill-appearing  HENT:      Head: Normocephalic and atraumatic        Right Ear: Tympanic membrane normal       Left Ear: Tympanic membrane normal       Nose: No congestion or rhinorrhea  Mouth/Throat:      Mouth: Mucous membranes are moist       Pharynx: Oropharynx is clear  No oropharyngeal exudate or posterior oropharyngeal erythema  Eyes:      Extraocular Movements: Extraocular movements intact  Conjunctiva/sclera: Conjunctivae normal       Pupils: Pupils are equal, round, and reactive to light  Cardiovascular:      Rate and Rhythm: Normal rate and regular rhythm  Pulses: Normal pulses  Heart sounds: Normal heart sounds  No murmur heard  No friction rub  No gallop  Pulmonary:      Effort: No respiratory distress  Breath sounds: Normal breath sounds  No wheezing, rhonchi or rales  Abdominal:      General: Bowel sounds are normal       Palpations: Abdomen is soft  Tenderness: There is no abdominal tenderness  Musculoskeletal:      Cervical back: Normal range of motion and neck supple  Lumbar back: Tenderness present  No swelling, signs of trauma, spasms or bony tenderness  Decreased range of motion  Negative right straight leg raise test and negative left straight leg raise test         Back:    Skin:     General: Skin is warm and dry  Capillary Refill: Capillary refill takes less than 2 seconds  Neurological:      General: No focal deficit present  Mental Status: He is alert and oriented to person, place, and time  Psychiatric:         Mood and Affect: Mood normal          Behavior: Behavior normal          Discussed with patient importance of following up with primary care provider for further evaluation of low back pain as he is currently on a pain regimen  Patient agreed with current plan of care  All questions and concerns were addressed at this time

## 2022-02-03 ENCOUNTER — OFFICE VISIT (OUTPATIENT)
Dept: FAMILY MEDICINE CLINIC | Facility: CLINIC | Age: 62
End: 2022-02-03

## 2022-02-03 ENCOUNTER — TELEPHONE (OUTPATIENT)
Dept: FAMILY MEDICINE CLINIC | Facility: CLINIC | Age: 62
End: 2022-02-03

## 2022-02-03 ENCOUNTER — TELEPHONE (OUTPATIENT)
Dept: NEUROSURGERY | Facility: CLINIC | Age: 62
End: 2022-02-03

## 2022-02-03 VITALS
TEMPERATURE: 96.6 F | BODY MASS INDEX: 30.92 KG/M2 | WEIGHT: 197 LBS | SYSTOLIC BLOOD PRESSURE: 142 MMHG | OXYGEN SATURATION: 97 % | DIASTOLIC BLOOD PRESSURE: 90 MMHG | HEART RATE: 92 BPM | RESPIRATION RATE: 18 BRPM | HEIGHT: 67 IN

## 2022-02-03 DIAGNOSIS — Z76.89 POOR DENTITION REQUIRING REFERRAL TO DENTISTRY: ICD-10-CM

## 2022-02-03 DIAGNOSIS — N48.89 PENILE PAIN: ICD-10-CM

## 2022-02-03 DIAGNOSIS — M54.42 ACUTE LEFT-SIDED LOW BACK PAIN WITH LEFT-SIDED SCIATICA: ICD-10-CM

## 2022-02-03 DIAGNOSIS — M54.16 LUMBAR RADICULOPATHY: Primary | ICD-10-CM

## 2022-02-03 DIAGNOSIS — M54.16 LUMBAR RADICULOPATHY: ICD-10-CM

## 2022-02-03 DIAGNOSIS — G89.29 CHRONIC LOW BACK PAIN WITHOUT SCIATICA, UNSPECIFIED BACK PAIN LATERALITY: ICD-10-CM

## 2022-02-03 DIAGNOSIS — M54.50 CHRONIC LOW BACK PAIN WITHOUT SCIATICA, UNSPECIFIED BACK PAIN LATERALITY: ICD-10-CM

## 2022-02-03 LAB
SL AMB  POCT GLUCOSE, UA: NEGATIVE
SL AMB LEUKOCYTE ESTERASE,UA: NEGATIVE
SL AMB POCT BILIRUBIN,UA: NORMAL
SL AMB POCT BLOOD,UA: NORMAL
SL AMB POCT CLARITY,UA: CLEAR
SL AMB POCT COLOR,UA: YELLOW
SL AMB POCT KETONES,UA: NEGATIVE
SL AMB POCT NITRITE,UA: NEGATIVE
SL AMB POCT PH,UA: 5
SL AMB POCT SPECIFIC GRAVITY,UA: 1.01
SL AMB POCT URINE PROTEIN: NORMAL
SL AMB POCT UROBILINOGEN: NEGATIVE

## 2022-02-03 PROCEDURE — 81002 URINALYSIS NONAUTO W/O SCOPE: CPT | Performed by: FAMILY MEDICINE

## 2022-02-03 PROCEDURE — 87086 URINE CULTURE/COLONY COUNT: CPT | Performed by: FAMILY MEDICINE

## 2022-02-03 PROCEDURE — 99213 OFFICE O/P EST LOW 20 MIN: CPT | Performed by: FAMILY MEDICINE

## 2022-02-03 RX ORDER — HYDROCODONE BITARTRATE AND ACETAMINOPHEN 7.5; 325 MG/1; MG/1
1 TABLET ORAL EVERY 8 HOURS PRN
Qty: 90 TABLET | Refills: 0 | Status: SHIPPED | OUTPATIENT
Start: 2022-02-03 | End: 2022-03-03 | Stop reason: SDUPTHER

## 2022-02-03 NOTE — ASSESSMENT & PLAN NOTE
Acute on chronic left sided lumbar back pain with sciatica to left foot, no alarming symptoms at this time patient denies any kind a urinary or fecal incontinence  Denies saddle anesthesia  Takes Norco, flexeril & Gabapentin   Now has new weakness with decreased reflexes of left leg since previous MRI that showed stenosis  Will repeat MRI to assess worsening clinical status  Will make referral for [address] [place] [address]  Advised patient to stretch, hamstrings and quadriceps to improve back pain and provided demonstration  Will provide stretches in AVS  Patient may use warm and cold compresses and apply to affected area p r n  for pain    Referral placed to neurosurgery given known stenosis with worsening clinical status

## 2022-02-03 NOTE — PROGRESS NOTES
Assessment/Plan:    Low back pain with left-sided sciatica  Acute on chronic left sided lumbar back pain with sciatica to left foot, no alarming symptoms at this time patient denies any kind a urinary or fecal incontinence  Denies saddle anesthesia  Takes Norco, flexeril & Gabapentin   Now has new weakness with decreased reflexes of left leg since previous MRI that showed stenosis  Will repeat MRI to assess worsening clinical status  Will make referral for [facility]  Advised patient to stretch, hamstrings and quadriceps to improve back pain and provided demonstration  Will provide stretches in AVS  Patient may use warm and cold compresses and apply to affected area p r n  for pain  Referral placed to neurosurgery given known stenosis with worsening clinical status     Diagnoses and all orders for this visit:    Lumbar radiculopathy  -     Ambulatory Referral to Physical Therapy; Future  -     Ambulatory Referral to Neurosurgery; Future  -     MRI lumbar spine wo contrast; Future    Acute left-sided low back pain with left-sided sciatica  -     Ambulatory Referral to Physical Therapy; Future  -     Ambulatory Referral to Neurosurgery; Future  -     MRI lumbar spine wo contrast; Future  -     Urine culture    Penile pain  Comments:  No urinary symptoms, Urine dip no signs of infection, will send urine culture  Orders:  -     POCT urine dip    Poor dentition requiring referral to dentistry  Comments:  Requesting dental care, referral placed  Orders:  -     Ambulatory Referral to Dentistry; Future        Subjective:      Patient ID: Todd Jack is a 62 y o  male  This is a very pleasant 62year old male who presents today for a follow up for back pain  He was seen in an urgent care on 1/14/22  He has a history of chronic low back pain for which he is prescribed hydrocodone and gabapentin  Patient states that the pain is in his mid to low back and radiates down into his left leg    He denies any trauma or injury to illicit the pain  Does note that he has a burning in perineum region, denies tingling/numbness  After further clarification though it is more a sensation at the base of the penis and not the perineum  Denies fecal or urinary incontinence  The tingling is down his left leg to his foot  Gait is antalgic  Feels weakness of the left leg, now avoids stairs in his home as the pain so severe  Has attending physical therapy multiple times in the past and feels it helps a little but he always has constant pain  The following portions of the patient's history were reviewed and updated as appropriate: allergies, current medications, past family history, past medical history, past social history, past surgical history and problem list     Review of Systems   Constitutional: Negative for activity change, appetite change, fatigue and fever  HENT: Negative for congestion, rhinorrhea and sore throat  Gastrointestinal: Negative for abdominal distention, constipation and diarrhea  Genitourinary: Positive for frequency and penile pain  Negative for difficulty urinating, dysuria, enuresis, flank pain, genital sores, hematuria, penile discharge, penile swelling, scrotal swelling and testicular pain  Musculoskeletal: Positive for back pain and gait problem  Negative for arthralgias  Skin: Negative for rash  Neurological: Positive for dizziness, weakness and numbness  Negative for light-headedness and headaches  Psychiatric/Behavioral: Positive for sleep disturbance  Negative for agitation  Objective:    /90 (BP Location: Left arm, Patient Position: Sitting, Cuff Size: Large)   Pulse 92   Temp (!) 96 6 °F (35 9 °C) (Temporal)   Resp 18   Ht 5' 7" (1 702 m)   Wt 89 4 kg (197 lb)   SpO2 97%   BMI 30 85 kg/m²      Physical Exam  Vitals and nursing note reviewed  Exam conducted with a chaperone present  Constitutional:       General: He is not in acute distress  Appearance: Normal appearance  He is obese  He is not ill-appearing  HENT:      Head: Normocephalic and atraumatic  Right Ear: External ear normal       Left Ear: External ear normal    Eyes:      Extraocular Movements: Extraocular movements intact  Conjunctiva/sclera: Conjunctivae normal    Cardiovascular:      Rate and Rhythm: Normal rate and regular rhythm  Pulses: Normal pulses  Heart sounds: Normal heart sounds  No murmur heard  Pulmonary:      Effort: Pulmonary effort is normal  No respiratory distress  Abdominal:      General: Bowel sounds are normal  There is no distension  Palpations: Abdomen is soft  Tenderness: There is no abdominal tenderness  Genitourinary:     Pubic Area: No rash  Penis: Uncircumcised  Tenderness ( at inferior base of penis ) present  No paraphimosis, erythema, discharge, swelling or lesions  Testes: Normal       Epididymis:      Right: Normal       Left: Normal       Comments: No numbness/decreased sensation of perineum  Musculoskeletal:      Cervical back: Normal and normal range of motion  Thoracic back: Normal       Lumbar back: Tenderness present  No swelling, edema, deformity, signs of trauma, lacerations or bony tenderness  Decreased range of motion  Positive left straight leg raise test  Negative right straight leg raise test  No scoliosis  Skin:     Capillary Refill: Capillary refill takes less than 2 seconds  Findings: No lesion  Neurological:      Mental Status: He is alert and oriented to person, place, and time  Sensory: Sensation is intact  Motor: Weakness (left leg) present  Coordination: Coordination is intact  Gait: Gait abnormal (antalgic)  Deep Tendon Reflexes: Reflexes abnormal       Reflex Scores:       Patellar reflexes are 2+ on the right side and 1+ on the left side    Psychiatric:         Mood and Affect: Mood normal          Behavior: Behavior normal            Jessika Granda Hugo Tran MD  02/03/22  12:04 PM

## 2022-02-03 NOTE — TELEPHONE ENCOUNTER
I was just wondering if you had talked to him about the norco today    I can fill it but wanted to make sure it was ok with you

## 2022-02-03 NOTE — TELEPHONE ENCOUNTER
2/3/22 CARMEN FROM [facility] CALLED AND SAID SHE WAS ABLE TO MOVE PT'S MRI APPT TO 2/18/22   I CALLED PT AND RE-SCHEDULED HIS F/U TO 2/22/22

## 2022-02-03 NOTE — PATIENT INSTRUCTIONS
Lower Back Exercises   WHAT YOU NEED TO KNOW:   Lower back exercises help heal and strengthen your back muscles to prevent another injury  Ask your healthcare provider if you need to see a physical therapist for more advanced exercises  DISCHARGE INSTRUCTIONS:   Return to the emergency department if:   · You have severe pain that prevents you from moving  Contact your healthcare provider if:   · Your pain becomes worse  · You have new pain  · You have questions or concerns about your condition or care  Do lower back exercises safely:   · Do the exercises on a mat or firm surface  (not on a bed) to support your spine and prevent low back pain  · Move slowly and smoothly  Avoid fast or jerky motions  · Breathe normally  Do not hold your breath  · Stop if you feel pain  It is normal to feel some discomfort at first  Regular exercise will help decrease your discomfort over time  Lower back exercises: Your healthcare provider may recommend that you do back exercises 10 to 30 minutes each day  He may also recommend that you do exercises 1 to 3 times each day  Ask your healthcare provider which exercises are best for you and how often to do them  · Ankle pumps:  Lie on your back  Move your foot up (with your toes pointing toward your head)  Then, move your foot down (with your toes pointing away from you)  Repeat this exercise 10 times on each side  · Heel slides:  Lie on your back  Slowly bend one leg and then straighten it  Next, bend the other leg and then straighten it  Repeat 10 times on each side  · Pelvic tilt:  Lie on your back with your knees bent and feet flat on the floor  Place your arms in a relaxed position beside your body  Tighten the muscles of your abdomen and flatten your back against the floor  Hold for 5 seconds  Repeat 5 times  · Back stretch:  Lie on your back with your hands behind your head   Bend your knees and turn the lower half of your body to one side  Hold this position for 10 seconds  Repeat 3 times on each side  · Straight leg raises:  Lie on your back with one leg straight  Bend the other knee  Tighten your abdomen and then slowly lift the straight leg up about 6 to 12 inches off the floor  Hold for 1 to 5 seconds  Lower your leg slowly  Repeat 10 times on each leg  · Knee-to-chest:  Lie on your back with your knees bent and feet flat on the floor  Pull one of your knees toward your chest and hold it there for 5 seconds  Return your leg to the starting position  Lift the other knee toward your chest and hold for 5 seconds  Do this 5 times on each side  · Cat and camel:  Place your hands and knees on the floor  Arch your back upward toward the ceiling and lower your head  Round out your spine as much as you can  Hold for 5 seconds  Lift your head upward and push your chest downward toward the floor  Hold for 5 seconds  Do 3 sets or as directed  · Wall squats:  Stand with your back against a wall  Tighten the muscles of your abdomen  Slowly lower your body until your knees are bent at a 45 degree angle  Hold this position for 5 seconds  Slowly move back up to a standing position  Repeat 10 times  · Curl up:  Lie on your back with your knees bent and feet flat on the floor  Place your hands, palms down, underneath the curve in your lower back  Next, with your elbows on the floor, lift your shoulders and chest 2 to 3 inches  Keep your head in line with your shoulders  Hold this position for 5 seconds  When you can do this exercise without pain for 10 to 15 seconds, you may add a rotation  While your shoulders and chest are lifted off the ground, turn slightly to the left and hold  Repeat on the other side  · Bird dog:  Place your hands and knees on the floor  Keep your wrists directly below your shoulders and your knees directly below your hips  Pull your belly button in toward your spine   Do not flatten or arch your back  Tighten your abdominal muscles  Raise one arm straight out so that it is aligned with your head  Next, raise the leg opposite your arm  Hold this position for 15 seconds  Lower your arm and leg slowly and change sides  Do 5 sets  © Copyright [employer] 2021 Information is for End User's use only and may not be sold, redistributed or otherwise used for commercial purposes  All illustrations and images included in CareNotes® are the copyrighted property of [employer]  or [facility] [employer]   The above information is an  only  It is not intended as medical advice for individual conditions or treatments  Talk to your doctor, nurse or pharmacist before following any medical regimen to see if it is safe and effective for you

## 2022-02-04 LAB — BACTERIA UR CULT: NORMAL

## 2022-02-08 ENCOUNTER — OFFICE VISIT (OUTPATIENT)
Dept: FAMILY MEDICINE CLINIC | Facility: CLINIC | Age: 62
End: 2022-02-08

## 2022-02-08 ENCOUNTER — HOSPITAL ENCOUNTER (OUTPATIENT)
Dept: MRI IMAGING | Facility: HOSPITAL | Age: 62
Discharge: HOME | End: 2022-02-08
Payer: MEDICARE

## 2022-02-08 VITALS
BODY MASS INDEX: 31.08 KG/M2 | OXYGEN SATURATION: 98 % | SYSTOLIC BLOOD PRESSURE: 150 MMHG | TEMPERATURE: 98 F | HEIGHT: 67 IN | HEART RATE: 88 BPM | DIASTOLIC BLOOD PRESSURE: 92 MMHG | RESPIRATION RATE: 18 BRPM | WEIGHT: 198 LBS

## 2022-02-08 DIAGNOSIS — M54.42 ACUTE LEFT-SIDED LOW BACK PAIN WITH LEFT-SIDED SCIATICA: ICD-10-CM

## 2022-02-08 DIAGNOSIS — N41.1 CHRONIC PROSTATITIS: ICD-10-CM

## 2022-02-08 DIAGNOSIS — M54.42 CHRONIC BILATERAL LOW BACK PAIN WITH LEFT-SIDED SCIATICA: Primary | ICD-10-CM

## 2022-02-08 DIAGNOSIS — M54.16 LUMBAR RADICULOPATHY: ICD-10-CM

## 2022-02-08 DIAGNOSIS — G89.29 CHRONIC BILATERAL LOW BACK PAIN WITH LEFT-SIDED SCIATICA: Primary | ICD-10-CM

## 2022-02-08 PROCEDURE — 99213 OFFICE O/P EST LOW 20 MIN: CPT | Performed by: FAMILY MEDICINE

## 2022-02-08 PROCEDURE — 72148 MRI LUMBAR SPINE W/O DYE: CPT

## 2022-02-08 PROCEDURE — G1004 CDSM NDSC: HCPCS

## 2022-02-08 RX ORDER — IBUPROFEN 800 MG/1
800 TABLET ORAL EVERY 8 HOURS PRN
Qty: 30 TABLET | Refills: 1 | Status: SHIPPED | OUTPATIENT
Start: 2022-02-08 | End: 2022-02-28 | Stop reason: SDUPTHER

## 2022-02-08 RX ORDER — LEVOFLOXACIN 500 MG/1
500 TABLET, FILM COATED ORAL EVERY 24 HOURS
Qty: 30 TABLET | Refills: 0 | Status: SHIPPED | OUTPATIENT
Start: 2022-02-08 | End: 2022-03-10

## 2022-02-08 NOTE — PROGRESS NOTES
Assessment/Plan:    Chronic low back pain  Continue current medications  Repeat Lumbar MRI to be done today  Follow up neurosurgery for further management    Chronic prostatitis  Unclear etiology of pt's groin pain  Will treat with a course of antibiotics as a prostatitis  Return to office if not improving with antibiotics      Return if symptoms worsen or fail to improve, for Next scheduled follow up  Diagnoses and all orders for this visit:    Chronic bilateral low back pain with left-sided sciatica  -     ibuprofen (MOTRIN) 800 mg tablet; Take 1 tablet (800 mg total) by mouth every 8 (eight) hours as needed for mild pain    Chronic prostatitis  -     levofloxacin (LEVAQUIN) 500 mg tablet; Take 1 tablet (500 mg total) by mouth every 24 hours          Subjective:     GAGAN Muñoz is a 62 y o  male who presented to the office today for continued severe back pain  He states that since he was seen last week on 2/3/22 in our office the pain has increased, and is radiating down the left leg with numbness and tingling and weakness  He is taking the gabapentin, flexeril,  and Ibuprofen and the Norco as needed  While in the office I called Radiology to see if we could schedule his MRI sooner and they stated he could come over within the next ten minutes to have done  Pt left the office  He also complains of continued pain at the base of his penis  He was examined last visit for possible decreased sensation related to his back pain, but sensation was intact  He reports previously years ago when he had a similar pain a course of antibiotic alleviated the pain  He Urine Analysis last visit was negative,  He denies any urinary sx        The following portions of the patient's history were reviewed and updated as appropriate: allergies, current medications, past family history, past medical history, past social history, past surgical history and problem list     Patient Active Problem List   Diagnosis    Benign essential HTN    Chronic low back pain    Hyperlipidemia    Type 2 diabetes mellitus without complication, without long-term current use of insulin (HCC)    Benign prostatic hyperplasia with lower urinary tract symptoms    Low back pain with left-sided sciatica    Chronic pain syndrome    Class 1 obesity due to excess calories with serious comorbidity and body mass index (BMI) of 30 0 to 30 9 in adult    Lumbar radiculopathy    Encounter for narcotic contract discussion    Chest pain    Cellulitis of right axilla    Post-acute sequelae of COVID-19 (PASC)    Reactive airway disease    Current mild episode of major depressive disorder without prior episode ([facility] [address] )    Diastolic dysfunction    Chronic prostatitis       Current Outpatient Medications on File Prior to Visit   Medication Sig Dispense Refill    aluminum-magnesium hydroxide-simethicone (MAALOX) 200-200-20 MG/5ML SUSP Take 15 mL by mouth 4 (four) times a day (before meals and at bedtime) 150 mL 0    amLODIPine (NORVASC) 10 mg tablet Take 1 tablet (10 mg total) by mouth daily 90 tablet 1    atorvastatin (LIPITOR) 20 mg tablet Take 1 tablet (20 mg total) by mouth daily 90 tablet 1    Bioflavonoid Products (RA Vitamin C CR) TBCR take 1 tablet by mouth twice a day for 14 days      Blood Glucose Monitoring Suppl (OneTouch Verio) w/Device KIT Use daily 1 kit 0    Blood Pressure Monitoring (Blood Pressure Monitor/Arm) EMILY Use daily 1 each 0    budesonide-formoterol (Symbicort) 160-4 5 mcg/act inhaler Inhale 2 puffs 2 (two) times a day Rinse mouth after use   (Patient taking differently: Inhale 2 puffs as needed Rinse mouth after use  ) 10 2 g 3    cyclobenzaprine (FLEXERIL) 10 mg tablet Take 1 tablet (10 mg total) by mouth 3 (three) times a day as needed for muscle spasms 30 tablet 1    dexamethasone (DECADRON) 4 mg tablet TAKE 3 TABLETS (12 MG TOTAL) BY MOUTH ONCE FOR 1 DOSE (Patient not taking: Reported on 2/10/2022)      famotidine (PEPCID) 20 mg tablet Take 1 tablet (20 mg total) by mouth 2 (two) times a day As needed for acid reflux 90 tablet 0    fluticasone (FLONASE) 50 mcg/act nasal spray 1 spray into each nostril daily 16 g 0    gabapentin (NEURONTIN) 800 mg tablet Take 1 tablet (800 mg total) by mouth 3 (three) times a day 90 tablet 0    glucose blood (OneTouch Verio) test strip Check blood sugar daily 50 each 5    HYDROcodone-acetaminophen (NORCO) 7 5-325 mg per tablet Take 1 tablet by mouth every 8 (eight) hours as needed for pain For ongoing pain Max Daily Amount: 3 tablets 90 tablet 0    Lancet Devices (ONE TOUCH DELICA LANCING DEV) MISC Use daily 1 each 0    lidocaine (Lidoderm) 5 % Apply 1 patch topically daily Remove & Discard patch within 12 hours or as directed by MD 10 patch 0    lidocaine (XYLOCAINE) 2 % topical gel Apply topically 3 (three) times a day 30 mL 2    metFORMIN (GLUCOPHAGE) 500 mg tablet Take 1 tablet (500 mg total) by mouth daily 90 tablet 1    Multiple Vitamin (multivitamin) capsule Take 1 capsule by mouth daily for 14 days 14 capsule 0    OneTouch [employer]ica Lancets 92G MISC Use daily 100 each 1    tamsulosin (FLOMAX) 0 4 mg Take 1 capsule (0 4 mg total) by mouth daily with dinner 270 capsule 1    venlafaxine (EFFEXOR-XR) 75 mg 24 hr capsule Take 1 capsule (75 mg total) by mouth daily with breakfast 30 capsule 5    zolpidem (AMBIEN) 5 mg tablet Take 1 tablet (5 mg total) by mouth daily at bedtime as needed for sleep 30 tablet 0     No current facility-administered medications on file prior to visit  Review of Systems   Constitutional: Negative for chills and fever  HENT: Negative for congestion and sore throat  Respiratory: Negative for cough and shortness of breath  Cardiovascular: Negative for chest pain  Gastrointestinal: Negative for nausea and vomiting  Genitourinary: Negative for penile discharge, penile pain, scrotal swelling and urgency     Musculoskeletal: Positive for back pain and gait problem  Objective:    /92 (BP Location: Left arm, Patient Position: Sitting, Cuff Size: Large)   Pulse 88   Temp 98 °F (36 7 °C) (Temporal)   Resp 18   Ht 5' 7" (1 702 m)   Wt 89 8 kg (198 lb)   SpO2 98%   BMI 31 01 kg/m²     Physical Exam  Vitals reviewed  Constitutional:       Appearance: He is obese  HENT:      Head: Normocephalic and atraumatic  Cardiovascular:      Rate and Rhythm: Normal rate  Pulmonary:      Effort: Pulmonary effort is normal  No respiratory distress  Neurological:      Mental Status: He is alert and oriented to person, place, and time  Psychiatric:         Mood and Affect: Mood normal          Thought Content: Thought content normal      Unable to complete physical exam as the pt left to have his Lumbar MRI done      Tosha Mcgraw MD  02/10/22  1:52 PM

## 2022-02-09 ENCOUNTER — TELEPHONE (OUTPATIENT)
Dept: FAMILY MEDICINE CLINIC | Facility: CLINIC | Age: 62
End: 2022-02-09

## 2022-02-09 ENCOUNTER — OFFICE VISIT (OUTPATIENT)
Dept: DENTISTRY | Facility: CLINIC | Age: 62
End: 2022-02-09

## 2022-02-09 VITALS — DIASTOLIC BLOOD PRESSURE: 91 MMHG | HEART RATE: 94 BPM | SYSTOLIC BLOOD PRESSURE: 146 MMHG | TEMPERATURE: 97.8 F

## 2022-02-09 DIAGNOSIS — Z01.20 ENCOUNTER FOR DENTAL EXAMINATION: Primary | ICD-10-CM

## 2022-02-09 PROCEDURE — D0210 INTRAORAL - COMPLETE SERIES OF RADIOGRAPHIC IMAGES: HCPCS

## 2022-02-09 PROCEDURE — D0150 COMPREHENSIVE ORAL EVALUATION - NEW OR ESTABLISHED PATIENT: HCPCS

## 2022-02-09 NOTE — TELEPHONE ENCOUNTER
----- Message from Christine Mcmahon MD sent at 2/9/2022 10:48 AM EST -----  Hi,    Please call pt and confirm ff he still needs appt with Dr John Zapien on 2/18  It would be preferable for him to follow up with his PCP Dr Edi Singh and he already has an appt with her in March  Also I called neurosurgery and they will try to move up his appt from 2/18 to sooner and directly give him a call      Thanks,  Dr Puga Hedge

## 2022-02-09 NOTE — PROGRESS NOTES
Comprehensive Exam    Raheel Urbina presents for a comprehensive exam  Verbal consent for treatment given in addition to the forms  Reviewed health history - Patient is ASA Class II  Consents signed: Yes    Perio: Generalized  Pain Scale: 0  Caries Assessment: low  Radiographs:FMX    Oral Hygiene instruction reviewed and given  Hygiene recall visits recommended to the patient  Treatment Plan:  1  Infection control  2  Periodontal therapy:  3  Caries control:  4  Occlusal evaluation    Recc SRP UR & LL w/prophy   (selective areas with sub)   Pt went for regular 6mr appts    Prognosis is Good    Referrals needed:

## 2022-02-10 ENCOUNTER — OFFICE VISIT (OUTPATIENT)
Dept: NEUROSURGERY | Facility: CLINIC | Age: 62
End: 2022-02-10
Payer: MEDICARE

## 2022-02-10 ENCOUNTER — TELEPHONE (OUTPATIENT)
Dept: PAIN MEDICINE | Facility: CLINIC | Age: 62
End: 2022-02-10

## 2022-02-10 VITALS
HEART RATE: 88 BPM | DIASTOLIC BLOOD PRESSURE: 91 MMHG | RESPIRATION RATE: 16 BRPM | WEIGHT: 196 LBS | HEIGHT: 67 IN | SYSTOLIC BLOOD PRESSURE: 143 MMHG | BODY MASS INDEX: 30.76 KG/M2 | TEMPERATURE: 98.4 F

## 2022-02-10 DIAGNOSIS — M47.817 FACET ARTHROPATHY, LUMBOSACRAL: ICD-10-CM

## 2022-02-10 DIAGNOSIS — M54.42 ACUTE LEFT-SIDED LOW BACK PAIN WITH LEFT-SIDED SCIATICA: ICD-10-CM

## 2022-02-10 DIAGNOSIS — G89.4 CHRONIC PAIN SYNDROME: Primary | ICD-10-CM

## 2022-02-10 DIAGNOSIS — M48.061 LUMBAR FORAMINAL STENOSIS: ICD-10-CM

## 2022-02-10 DIAGNOSIS — M54.16 LUMBAR RADICULOPATHY: ICD-10-CM

## 2022-02-10 PROBLEM — N41.1 CHRONIC PROSTATITIS: Status: ACTIVE | Noted: 2022-02-10

## 2022-02-10 PROCEDURE — 99214 OFFICE O/P EST MOD 30 MIN: CPT | Performed by: NURSE PRACTITIONER

## 2022-02-10 NOTE — TELEPHONE ENCOUNTER
[facility] before next available appointment slot first week of March, due to his diagnosis for surgical evaluation   Please reach out to her or office at # [phone]

## 2022-02-10 NOTE — PATIENT INSTRUCTIONS
Back Pain, Ambulatory Care   GENERAL INFORMATION:   Back pain  is common  You may feel sore or stiff on one or both sides of your back  The pain may spread to your buttocks or thighs  Back pain may be caused by an injury, lack of exercise, or obesity  Repeated bending, lifting, twisting, or lifting heavy items can also cause back pain  Seek immediate care for the following symptoms:   · Pain, numbness, or weakness in one or both legs    · Pain that is so severe, you cannot walk    · Unable to control your urine or bowel movements    · Severe back pain with chest pain    · Severe back pain, nausea, and vomiting    · Severe back pain that spreads to your side or genital area  Treatment for back pain  may include any of the following:  · NSAIDs  help decrease swelling and pain or fever  This medicine is available with or without a doctor's order  NSAIDs can cause stomach bleeding or kidney problems in certain people  If you take blood thinner medicine, always ask your healthcare provider if NSAIDs are safe for you  Always read the medicine label and follow directions  · Acetaminophen  decreases pain  It is available without a doctor's order  Ask how much to take and how often to take it  Follow directions  Acetaminophen can cause liver damage if not taken correctly  · Prescription pain medicine  may be given  Ask your healthcare provider how to take this medicine safely  Manage your back pain:   · Apply ice  on your back for 15 to 20 minutes every hour or as directed  Use an ice pack, or put crushed ice in a plastic bag  Cover it with a towel  Ice helps decrease swelling and pain  · Apply heat  on your back for 20 to 30 minutes every 2 hours for as many days as directed  Heat helps decrease pain and muscle spasms  You can alternate ice and heat  · Stay active  as much as you can without causing more pain  Bed rest could make your back pain worse  Avoid heavy lifting until your pain is gone    Follow up with your healthcare provider as directed:  Write down your questions so you remember to ask them during your visits  CARE AGREEMENT:   You have the right to help plan your care  Learn about your health condition and how it may be treated  Discuss treatment options with your caregivers to decide what care you want to receive  You always have the right to refuse treatment  The above information is an  only  It is not intended as medical advice for individual conditions or treatments  Talk to your doctor, nurse or pharmacist before following any medical regimen to see if it is safe and effective for you  © 2014 [address] is for End User's use only and may not be sold, redistributed or otherwise used for commercial purposes  All illustrations and images included in CareNotes® are the copyrighted property of [employer]  or Nomi Pritchett

## 2022-02-10 NOTE — ASSESSMENT & PLAN NOTE
· As addressed in HPI  · Acute on chronic pain , dermatodial distribution L 2L 3 4 5 S1   · Reports he became alarmed when recurrent back and lower extremity pain accompanied by numbness in saddle area, testicles  He reports s PCP start Levaquin as per note he described penis pain and groin suspect prostatitis  · Denies difficultly urinating or bowel incontinence   · No gait instability , antalgic use SPC, subjective  Unsteady and balance problem  · Facial and verbal expression of pain when moving in office ,Left knee and hip flexion caused left sided low back pain  · Diminished patellar and acheilias reflex       Imagining full report in imagining section  · MRI Lumbar No significant change in left foraminal disc protrusion at levels L3-4 and L4-5 with moderate left foraminal stenosis  Correlate for possible left L3 and L4 radiculopathy  · Discussed with dr Jacqueline Paez - concerning surgical pathology appreciated far lateral foraminal  Stenosis and facet arthropathy    · Teaching inf  spinsl stenosisi provide d in AVS      PLAN  · Reviewed MRI imagining in detail  · Optimize conservative treatment   · Continue physical therapy   · Referral t pain management   · RTO in 4 weeks solo Dr Jacqueline Paez  · If symptoms worsen saddle anesthesia , loss of bowel control, inability to urinate weakness in lower extremities difficultly walking, worsening back pain go to emergency room

## 2022-02-10 NOTE — TELEPHONE ENCOUNTER
Called patient he saw Dr Dayna Gilman 6 months ago- he is requesting a KAIA due to him living in [facility] now  We received a call from Neuro surg  That they would like this patient seen ASAP prior to surg  Consult      Please advise if KAIA is ok     Please reply all     Thank you

## 2022-02-10 NOTE — TELEPHONE ENCOUNTER
Called pt  He stated he does not need 2/18/22 appt  Will follow up with PCP on 3/31/22 appt  I also informed of neurosurgery message

## 2022-02-10 NOTE — PROGRESS NOTES
Assessment/Plan:    Lumbar foraminal stenosis  As addressed in MRI lumbar spine     Facet arthropathy, lumbosacral  As addressed in MRI lumbar spine  Chronic pain syndrome  · As addressed in HPI  · Acute on chronic pain , dermatodial distribution L 2L 3 4 5 S1   · Reports he became alarmed when recurrent back and lower extremity pain accompanied by numbness in saddle area, testicles  He reports s PCP start Levaquin as per note he described penis pain and groin suspect prostatitis  · Denies difficultly urinating or bowel incontinence   · No gait instability , antalgic use SPC, subjective  Unsteady and balance problem  · Facial and verbal expression of pain when moving in office ,Left knee and hip flexion caused left sided low back pain  · Diminished patellar and acheilias reflex       Imagining full report in imagining section  · MRI Lumbar No significant change in left foraminal disc protrusion at levels L3-4 and L4-5 with moderate left foraminal stenosis  Correlate for possible left L3 and L4 radiculopathy  · Discussed with dr Bhargavi Hall - concerning surgical pathology appreciated far lateral foraminal  Stenosis and facet arthropathy  · Teaching inf  spinsl stenosisi provide d in AVS      PLAN  · Reviewed MRI imagining in detail  · Optimize conservative treatment   · Continue physical therapy   · Referral t pain management   · RTO in 4 weeks solo Dr Bhargavi Hall  · If symptoms worsen saddle anesthesia , loss of bowel control, inability to urinate weakness in lower extremities difficultly walking, worsening back pain go to emergency room       Lumbar radiculopathy  -     Ambulatory Referral to Neurosurgery    Acute left-sided low back pain with left-sided sciatica  -     Ambulatory Referral to Neurosurgery    Subjective: I am in a lot of pain   In my back and legs     Patient ID: Makenna Meehan is a 62 y o  male     HPI   Referral from PCP recurrent acute on chronic pain  involving bilateral low back, buttocks, posterior  lateral , anterior thighs with distribution into lateral aspect of bilateral lower legs , ankle and lateral feet, in the right leg similar symptoms but no thigh pain, has the  associated paresthesia sensation in bilateral  legs , feet and toes  Has cramping and numbness in legs  When rising form a squatting posture  has pain and tightness across lower back  Symptoms worse on left side  The most severe pian is bilateral low back left greater than right     The onset of his pain was 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, immediately braced him left side against the ditch walls to prevent further descent into ditch  Onset of acute on chronic pain about 2 weeks ago    Pain severity  8-9/10 on numeric scale  Aggravating factors  Standing up form a squatting position, twisting lumbar area  For example when driving  And twisting upper trunk to see opposing traffic  Multimodal treatments   · Pain management - in 2019 injections in his back x 3  Delivered efficacy for several hours  Then by the end of the day pain returned to baseline  · Physical therapy in the past,  Provided temporary relief  PCP  Reordered therapy had referral visit yesterday   · Medicinal products provide temporary relief, lidocaine patches to low back and lateral aspects of bilateral hips--  Ibuprofen  Takes when pain is severe eases the pain a little  This is his second referral to neurosurgery , in 2021 consulted w/ DR Rosalio Reyes  No longer working since work incident , on medicare disability     I have spent 60 minutes with patient today in which greater than 50% of this time was spent in assessment, examination, impressions, reviewing imagining and recommendations for care  All questions were answered to his/her satisfaction, and contact information provided in the event additional questions arise   Patient acknowledged an understanding and agreement with plan              REVIEW OF SYSTEMS  Review of Systems   Constitutional: Negative  HENT: Negative  Eyes: Negative  Respiratory: Positive for shortness of breath (with activity)  Cardiovascular: Negative  Gastrointestinal: Negative  Endocrine: Positive for cold intolerance  Genitourinary:        Started antibiotic yesterday, discomfort urinating, a little better today   Musculoskeletal: Positive for back pain (radiates to L>R legs), gait problem (using standard cane, last fall about 2 1/2 weeks ago) and myalgias  Negative for neck pain and neck stiffness  Skin: Negative  Allergic/Immunologic: Negative  Neurological: Positive for weakness (legs), light-headedness and numbness (N/T legs and back)  Negative for headaches  Unsteady balance   Hematological: Negative  Psychiatric/Behavioral: Positive for dysphoric mood and sleep disturbance  Trouble focusing sometimes         Meds/Allergies     Current Outpatient Medications   Medication Sig Dispense Refill    aluminum-magnesium hydroxide-simethicone (MAALOX) 200-200-20 MG/5ML SUSP Take 15 mL by mouth 4 (four) times a day (before meals and at bedtime) 150 mL 0    amLODIPine (NORVASC) 10 mg tablet Take 1 tablet (10 mg total) by mouth daily 90 tablet 1    atorvastatin (LIPITOR) 20 mg tablet Take 1 tablet (20 mg total) by mouth daily 90 tablet 1    Bioflavonoid Products (RA Vitamin C CR) TBCR take 1 tablet by mouth twice a day for 14 days      budesonide-formoterol (Symbicort) 160-4 5 mcg/act inhaler Inhale 2 puffs 2 (two) times a day Rinse mouth after use   (Patient taking differently: Inhale 2 puffs as needed Rinse mouth after use  ) 10 2 g 3    cyclobenzaprine (FLEXERIL) 10 mg tablet Take 1 tablet (10 mg total) by mouth 3 (three) times a day as needed for muscle spasms 30 tablet 1    famotidine (PEPCID) 20 mg tablet Take 1 tablet (20 mg total) by mouth 2 (two) times a day As needed for acid reflux 90 tablet 0    fluticasone (FLONASE) 50 mcg/act nasal spray 1 spray into each nostril daily 16 g 0    gabapentin (NEURONTIN) 800 mg tablet Take 1 tablet (800 mg total) by mouth 3 (three) times a day 90 tablet 0    HYDROcodone-acetaminophen (NORCO) 7 5-325 mg per tablet Take 1 tablet by mouth every 8 (eight) hours as needed for pain For ongoing pain Max Daily Amount: 3 tablets 90 tablet 0    ibuprofen (MOTRIN) 800 mg tablet Take 1 tablet (800 mg total) by mouth every 8 (eight) hours as needed for mild pain 30 tablet 1    levofloxacin (LEVAQUIN) 500 mg tablet Take 1 tablet (500 mg total) by mouth every 24 hours 30 tablet 0    lidocaine (Lidoderm) 5 % Apply 1 patch topically daily Remove & Discard patch within 12 hours or as directed by MD 10 patch 0    lidocaine (XYLOCAINE) 2 % topical gel Apply topically 3 (three) times a day 30 mL 2    metFORMIN (GLUCOPHAGE) 500 mg tablet Take 1 tablet (500 mg total) by mouth daily 90 tablet 1    Multiple Vitamin (multivitamin) capsule Take 1 capsule by mouth daily for 14 days 14 capsule 0    tamsulosin (FLOMAX) 0 4 mg Take 1 capsule (0 4 mg total) by mouth daily with dinner 270 capsule 1    venlafaxine (EFFEXOR-XR) 75 mg 24 hr capsule Take 1 capsule (75 mg total) by mouth daily with breakfast 30 capsule 5    zolpidem (AMBIEN) 5 mg tablet Take 1 tablet (5 mg total) by mouth daily at bedtime as needed for sleep 30 tablet 0    Blood Glucose Monitoring Suppl (OneTouch Verio) w/Device KIT Use daily 1 kit 0    Blood Pressure Monitoring (Blood Pressure Monitor/Arm) EMILY Use daily 1 each 0    dexamethasone (DECADRON) 4 mg tablet TAKE 3 TABLETS (12 MG TOTAL) BY MOUTH ONCE FOR 1 DOSE (Patient not taking: Reported on 2/10/2022)      glucose blood (OneTouch Verio) test strip Check blood sugar daily 50 each 5    Lancet Devices (ONE TOUCH DELICA LANCING DEV) MISC Use daily 1 each 0    OneTouch Delica Lancets 23P MISC Use daily 100 each 1     No current facility-administered medications for this visit         No Known Allergies    PAST HISTORY    Past Medical History:   Diagnosis Date    Asthma     Back pain     Back pain     Diabetes mellitus ([address] )     Hyperlipidemia     Hypertension        Past Surgical History:   Procedure Laterality Date    CYST REMOVAL  2017       Social History     Tobacco Use    Smoking status: Never Smoker    Smokeless tobacco: Never Used   Vaping Use    Vaping Use: Never used   Substance Use Topics    Alcohol use: Not Currently     Comment: rare    Drug use: Never       Family History   Problem Relation Age of Onset    Hypertension Mother     Diabetes Mother     Cancer Father     Diabetes Family     Hypertension Family        The following portions of the patient's history were reviewed and updated as appropriate: allergies, current medications, past family history, past medical history, past social history, past surgical history and problem list       EXAM    Vitals:Blood pressure 143/91, pulse 88, temperature 98 4 °F (36 9 °C), temperature source Tympanic, resp  rate 16, height 5' 7" (1 702 m), weight 88 9 kg (196 lb)  ,Body mass index is 30 7 kg/m²  Physical Exam  Vitals and nursing note reviewed  Musculoskeletal:      Right lower leg: No edema  Left lower leg: No edema  Comments: Painful flexion  And lumbar extension and knee and hip extension left side   Neurological:      Mental Status: He is alert  Gait: Gait abnormal       Deep Tendon Reflexes: Reflexes abnormal       Reflex Scores:       Patellar reflexes are 1+ on the right side and 1+ on the left side  Achilles reflexes are 1+ on the right side and 1+ on the left side    Psychiatric:         Mood and Affect: Mood normal          Behavior: Behavior normal          Neurologic Exam     Motor Exam     Strength   Right iliopsoas: 5/5  Left iliopsoas: 5/5  Right quadriceps: 5/5  Left quadriceps: 5/5  Right hamstrin/5  Left hamstrin/5  Right anterior tibial: 5/5  Left anterior tibial: 5/5  Right gastroc: 5/5  Left gastroc: 5/5    Sensory Exam   Right leg light touch: normal  Left leg light touch: normal    Gait, Coordination, and Reflexes     Gait  Gait: (antalgic use SPC)    Reflexes   Right patellar: 1+  Left patellar: 1+  Right achilles: 1+  Left achilles: 1+  Right ankle clonus: absent  Left pendular knee jerk: absent      Imaging Studies  MRI lumbar spine wo contrast    Result Date: 2/9/2022  Narrative: MRI LUMBAR SPINE WITHOUT CONTRAST INDICATION: M54 16: Radiculopathy, lumbar region M54 42: Lumbago with sciatica, left side  COMPARISON:  MRI lumbar spine 2/25/2021 TECHNIQUE:  Sagittal T1, sagittal T2, sagittal inversion recovery, axial T1 and axial T2, coronal T2   IMAGE QUALITY:  Diagnostic FINDINGS: VERTEBRAL BODIES:  There are 5 lumbar type vertebral bodies  There is preserved normal lumbar lordosis  No spondylolisthesis  SACRUM:  Normal signal within the sacrum  No evidence of insufficiency or stress fracture  DISTAL CORD AND CONUS:  Normal size and signal within the distal cord and conus  PARASPINAL SOFT TISSUES:  Paraspinal soft tissues are unremarkable  Right kidney inferior pole cyst  LOWER THORACIC DISC SPACES:  Normal disc height and signal   No disc herniation, canal stenosis or foraminal narrowing  LUMBAR DISC SPACES: L1-L2:  No disc bulge  Mild facet arthropathy  No canal or foraminal stenosis  L2-L3:  No disc bulge  Moderate facet arthropathy  No significant canal or foraminal stenosis  L3-L4:  Left foraminal disc protrusion  Moderate bilateral facet arthropathy  No canal stenosis  Moderate left and mild right foraminal stenosis  L4-L5:  Left foraminal disc protrusion  Moderate bilateral facet arthropathy  No significant canal stenosis  Moderate left and mild right foraminal stenosis  L5-S1:  No disc bulge  Mild facet arthropathy  No canal or foraminal stenosis       Impression: No significant change in left foraminal disc protrusion at levels L3-4 and L4-5 with moderate left foraminal stenosis  Correlate for possible left L3 and L4 radiculopathy  Workstation performed: [id number]       I have personally reviewed pertinent reports     and I have personally reviewed pertinent films in PACS

## 2022-02-10 NOTE — ASSESSMENT & PLAN NOTE
Unclear etiology of pt's groin pain  Will treat with a course of antibiotics as a prostatitis  Return to office if not improving with antibiotics

## 2022-02-10 NOTE — PATIENT INSTRUCTIONS
If you develop inability to urinate , worsening numbness and tingling in perineal  testicle area, loss of control of bowels , difficultly walking , loss of motor control in legs  Go immediately to the emergency room          Lumbar Spinal Stenosis   WHAT YOU NEED TO KNOW:   What is lumbar spinal stenosis? Lumbar spinal stenosis is narrowing of the spinal canal in your lower back  Your spinal canal is the opening in your spine that contains your spinal cord  When your spinal canal narrows, it may put pressure on your spinal cord and nerves  What causes lumbar spinal stenosis? Narrowing of your spinal canal may be caused by changes that happen as you age  These changes include bone spurs (growths), herniated discs, and thickened ligaments  Bone growths can be caused by osteoarthritis  A herniated disc bulges out between the vertebrae (bones) and into your spinal canal  Discs are spongy cushions between the vertebrae in your spine  Herniated discs may be caused by activities that increase stress on the spine  An example is heavy lifting  Ligaments that connect the vertebrae may thicken and harden as you get older  Other conditions, such as spinal injuries and Paget's disease, can also cause spinal stenosis  What are the signs and symptoms of lumbar spinal stenosis? Signs and symptoms may start or get worse when you stand or walk  They are often relieved when you sit or lean forward  · Low back pain    · Pain, numbness, tingling, or weakness in one or both legs    · Pain in your buttocks that extends to your thighs or legs    How is lumbar spinal stenosis diagnosed? Your healthcare provider will ask about your symptoms and when they started  He or she will ask if you have any medical conditions  Your provider may ask you to lift, bend, walk, sit, or reach  An x-ray, MRI or a CT may show problems in your spine that are causing spinal stenosis   You may be given contrast liquid to help the spine show up better in the pictures  Tell the healthcare provider if you have ever had an allergic reaction to contrast liquid  Do not enter the MRI room with anything metal  Metal can cause serious injury  Tell the healthcare provider if you have any metal in or on your body  How is lumbar spinal stenosis treated? · NSAIDs , such as ibuprofen, help decrease swelling, pain, and fever  NSAIDs can cause stomach bleeding or kidney problems in certain people  If you take blood thinner medicine, always ask your healthcare provider if NSAIDs are safe for you  Always read the medicine label and follow directions  · Acetaminophen  decreases pain and fever  It is available without a doctor's order  Ask how much to take and how often to take it  Follow directions  Read the labels of all other medicines you are using to see if they also contain acetaminophen, or ask your doctor or pharmacist  Acetaminophen can cause liver damage if not taken correctly  Do not use more than 4 grams (4,000 milligrams) total of acetaminophen in one day  · Prescription pain medicine  may be given  Ask how to take this medicine safely  · Muscle relaxers  help decrease pain and muscle spasms  · A steroid injection  may be given to reduce inflammation  Steroid medicine is injected into the epidural space  The epidural space is between your spinal cord and vertebrae  · A nerve block  is an injection of numbing medicine  You may need a nerve block if your pain is not going away, or is getting worse  · Surgery  may be needed to widen your spinal canal or decrease pressure on your spinal cord  Surgery may also be done to fix damaged or injured vertebrae in your back  How can I manage my symptoms? · Go to physical and occupational therapy  as directed  A physical therapist teaches you exercises to help improve movement and strength, and to decrease pain  An occupational therapist teaches you skills to help with your daily activities       · Rest  when you feel it is needed  Slowly start to do more each day  Return to your daily activities as directed  · Apply heat on your back for 20 to 30 minutes every 2 hours for as many days as directed  Heat helps decrease pain and muscle spasms  · Apply ice  on your back for 15 to 20 minutes every hour or as directed  Use an ice pack, or put crushed ice in a plastic bag  Cover it with a towel before you apply it to your skin  Ice helps prevent tissue damage and decreases swelling and pain  When should I seek immediate care? · You have pain in your leg that does not go away or gets worse  · You have trouble moving your legs  · You cannot control when you urinate or have a bowel movement  When should I contact my healthcare provider? · You have new or worsening symptoms  · Your symptoms keep you from doing your daily activities  · You have questions or concerns about your condition or care  CARE AGREEMENT:   You have the right to help plan your care  Learn about your health condition and how it may be treated  Discuss treatment options with your healthcare providers to decide what care you want to receive  You always have the right to refuse treatment  The above information is an  only  It is not intended as medical advice for individual conditions or treatments  Talk to your doctor, nurse or pharmacist before following any medical regimen to see if it is safe and effective for you  © Copyright [employer] 2021 Information is for End User's use only and may not be sold, redistributed or otherwise used for commercial purposes   All illustrations and images included in CareNotes® are the copyrighted property of [employer]  or [address] [place] [address]

## 2022-02-10 NOTE — ASSESSMENT & PLAN NOTE
Continue current medications  Repeat Lumbar MRI to be done today  Follow up neurosurgery for further management

## 2022-02-11 ENCOUNTER — TELEPHONE (OUTPATIENT)
Dept: FAMILY MEDICINE CLINIC | Facility: CLINIC | Age: 62
End: 2022-02-11

## 2022-02-11 NOTE — TELEPHONE ENCOUNTER
PCP SIGNATURE NEEDED FOR [facility] FORM RECEIVED VIA FAX AND PLACED IN PCP FOLDER TO BE DELIVERED AT ASSIGNED TIMES

## 2022-02-14 ENCOUNTER — OFFICE VISIT (OUTPATIENT)
Dept: DENTISTRY | Facility: CLINIC | Age: 62
End: 2022-02-14

## 2022-02-14 DIAGNOSIS — K03.6 DENTAL CALCULUS: ICD-10-CM

## 2022-02-14 DIAGNOSIS — K05.6 PERIODONTAL DISEASE: ICD-10-CM

## 2022-02-14 DIAGNOSIS — Z01.20 ENCOUNTER FOR DENTAL EXAMINATION: Primary | ICD-10-CM

## 2022-02-14 DIAGNOSIS — K03.6 ACCRETIONS ON TEETH: ICD-10-CM

## 2022-02-14 PROCEDURE — D1110 PROPHYLAXIS - ADULT: HCPCS

## 2022-02-14 NOTE — PROGRESS NOTES
Prophy    Dental procedures in this visit     - PROPHYLAXIS - ADULT (Completed)     Service provider: [facility], [address] [facility]     Billing provider: Susy Rodas DMD   ASA 2  Patient presents for prophy following Comp  Exam and  FMX    Method Used:  · Prophy Method Used: Ultrasonic Scaling  · Hand Scaling  · Polished  · Flossed    Radiographs Taken:  · None    Intra/Extra Oral Cancer Screening:  · Within normal limits    Oral Hygiene:  · Good    Plaque:  · Generalized  · Light    Calculus:  · Localized  · Moderate    Bleeding:  Bleeding on probing: No periodontal exam for this visit  · Light    Stain:  · Light    Periodontal Charting:  · Spot probing    Periodontal Classification:  · Localized  · Moderate    Periodontitis Staging/Grading:  · Stagin  · Grading:  B    Dr Tara Walters reviewed [facility] and revised [place] plan   Patient is interested in RPD after shakira is completed     localized SRP was discussed at comp  visit, however he was scheduled with me for a Prophy today  Until COVID shutdown, he was having routine dental work  Last prophy was in 2019  Overall tissue healthy   salivary calc  noted especially lower anterior teeth  Re-eval next prophy to see if site specific SRP needs to be done    NV  shakira # 15  NV2  shakira # 8  NV3 continue with shakira as charted  NV4 pre auth for RPD  NV5  6 mos recall    No orders of the defined types were placed in this encounter

## 2022-02-15 ENCOUNTER — EVALUATION (OUTPATIENT)
Dept: PHYSICAL THERAPY | Facility: CLINIC | Age: 62
End: 2022-02-15
Payer: MEDICARE

## 2022-02-15 DIAGNOSIS — M54.16 LUMBAR RADICULOPATHY: Primary | ICD-10-CM

## 2022-02-15 DIAGNOSIS — M54.42 ACUTE LEFT-SIDED LOW BACK PAIN WITH LEFT-SIDED SCIATICA: ICD-10-CM

## 2022-02-15 PROCEDURE — 97162 PT EVAL MOD COMPLEX 30 MIN: CPT

## 2022-02-15 NOTE — PROGRESS NOTES
PT Evaluation     Today's date: 2/15/2022  Patient name: Makenna Meehan  : 1960  MRN: [id number]  Referring provider: Vonda Bunn MD  Dx:   Encounter Diagnosis     ICD-10-CM    1  Lumbar radiculopathy  M54 16    2  Acute left-sided low back pain with left-sided sciatica  M54 42                   Assessment  Assessment details: Makenna Meehan is a 62 y o  male who presents today to outpatient therapy for evaluation of lumbar radiculopathy with acute left-sided low back pain with left-sided sciatica  Upon assessment today, pt exhibits decreased/painful L/S AROM; decreased LE strength; decreased functional mobility during 5x STS; postural deviations especially when seated; TTP thru TAMAR L/S PS; adverse neural tension thru TAMAR sciatic nerves as indicated by (+) SLR; and decreased HS/Piriformis flexibility TAMAR  These impairments are contributing to functional limitations with sitting; negotiating stairs; and sleeping at night  Pt would therefore benefit from PT intervention in order to address the aforementioned deficits so that he can return to his PLOF and function comfortably/safely in his home and surrounding environment  Thank you for the referral! - Rea Camarena, PT, DPT  Impairments: abnormal or restricted ROM, abnormal movement, impaired balance, impaired physical strength, pain with function and poor posture   Understanding of Dx/Px/POC: good   Prognosis: good    Goals  STG 1: Pt will demonstrate compliance w/ HEP to supplement therapy in 1-2 weeks  STG 2: Pt will report 25% reduction in pain in 2-4 weeks  LTG 1: Pt will be able to negotiate 1 flight of stairs in his home reciprocally with minimal difficulty in 6-8 weeks  LTG 2: Pt will be able to sit comfortably for 2-4 hours with min increased symptoms to assist pt with ADLs such as driving in 6-8 weeks  LTG 3: Pt will be able to sleep comfortably at night with min difficulty related to the LB in 6-8 weeks      Plan  Plan details: Educated pt today about role of movement in helping with pain; PT goals; prognosis; diagnosis; and red flag symptoms warranting immediate MD f/u (particularly bowel/bladder incontinence) - pt verbalized understanding  Patient would benefit from: skilled physical therapy  Planned modality interventions: cryotherapy, TENS, traction and unattended electrical stimulation  Planned therapy interventions: abdominal trunk stabilization, self care, postural training, patient education, neuromuscular re-education, joint mobilization, manual therapy, massage, activity modification, balance, body mechanics training, breathing training, [place] taping, strengthening, stretching, therapeutic activities, coordination, flexibility, home exercise program, therapeutic exercise and functional ROM exercises  Frequency: 2x week  Duration in weeks: 4  Treatment plan discussed with: patient         Subjective Evaluation    History of Present Illness  Mechanism of injury: Pt reports a hx of LBP since 2019 which began worsening in December  About 3 weeks ago, he was trying to take something out of the refrigerator when he experienced an acute exacerbation of his back  He eventually f/u with his doctor and obtained an MRI - this was unremarkable (noting no new changes since last MRI which was performed in Feb last year)  His doctor said that he may need to have surgery but advised pt to perform physical therapy  He is scheduled to f/u with the doctor in a few days and then f/u with pain management at the beginning of March  Last week, he tried to initiate therapy but was having burning "down there" as well as increased frequency of urination  He has since f/u with MD and obtained antibiotics which have significantly eased these symptoms  Overall, pt states that his back is "a lot better" than he was in 2020  He tries to stay moving throughout the day and reports compliance with stretches at home which he believes helps   Currently, pt reports difficulty sitting prolonged periods of time; sleeping; and negotiating stairs as a result of LBP  Eases: Moving/stretching, physical therapy  Aggs: Sitting, resting, sleeping  Pain  Current pain ratin  At best pain ratin  At worst pain rating: 10  Location: Pt reports "burning" thru the lower back and N/T thru the (L) anterior thigh, lower leg, and lateral ankle  Pt also reports occasional N/T thru the (L) anterior thigh  Quality: burning    Patient Goals  Patient goal: Pt would like to avoid surgery, if possible  He would like to be able to move a little bit more/better with less pain  Objective     Concurrent Complaints  Positive for disturbed sleep  Negative for bladder dysfunction and bowel dysfunction    Postural Observations  Seated posture: poor    Additional Postural Observation Details  Pt frequently repositions self during evaluation when seated  Tends to sit with slumped posture, forward head, lateral trunk lean to (R) with (R) UE rested on table  Palpation     Additional Palpation Details  Mod TTP, hypertonicity thru TAMAR L/S PS  LTG: Min TTP  Active Range of Motion     Lumbar   Flexion:  with pain Restriction level: minimal  Extension: Active lumbar extension: LBP  with pain Restriction level: moderate  Left lateral flexion:  with pain Restriction level: minimal  Right lateral flexion:  WFL  Left rotation:  with pain Restriction level: moderate  Right rotation:  Restriction level: minimal    Additional Active Range of Motion Details  HS flexibility mod dec on (L), mod-severely dec on (R)  LTG: Min dec  Piriformis flexibility mod dec on (L), min-mod dec on (R)  LTG: Min dec      Strength/Myotome Testing     Right Hip   Planes of Motion   Flexion: 5    Left Knee   Flexion: 4  Extension: 4+    Right Knee   Flexion: 4+  Extension: 4+    Left Ankle/Foot   Dorsiflexion: 5  Plantar flexion: 5    Right Ankle/Foot   Dorsiflexion: 5  Plantar flexion: 5    Tests     Lumbar     Left Positive passive SLR  Right   Positive passive SLR  General Comments:      Lumbar Comments  5x STS: 26 secs, RPE 6, TAMAR UE on thighs  LTG: <15 secs, no UE  Tandem stance: Unremarkable     (R) SLS: 15 secs, min sway, steady  (L) SLS: 15 secs, min sway, steady  Gait: Pt ambulates with SPC with slowed speed  Precautions: Type 2 Diabetes; HTN; LBP; hyperlipidemia; benign prostate hyperplasia; depression    Date 2/15            FOTO IE            Re-Eval IE                Manuals 2/15                                                                Neuro Re-Ed 2/15            Supine 90/90 march nv            Dead bugs             Bird dogs nv            Mod front plank             Mod side plank             Slump nerve glides             Palloff press             Tband rows + Squat             Tband ext + March             SLS  nv                         Ther Ex 2/15            Bridges (add march when able) nv            SL Hip Abd nv            Piriformis (S) nv            Seated HS (S)  nv            Seated T/S Ext in chair nv            DL Leg press             KIM with SAG at wall                          Ther Activity 2/15            TM Walking nv            Sidesteps [employer] walks             Mini squats nv            Repeated STS, Airex nv            Step Ups             Pt Edu AL                         Gait Training 2/15                                      Modalities 2/15            Prn

## 2022-02-17 ENCOUNTER — APPOINTMENT (OUTPATIENT)
Dept: PHYSICAL THERAPY | Facility: CLINIC | Age: 62
End: 2022-02-17
Payer: MEDICARE

## 2022-02-21 ENCOUNTER — OFFICE VISIT (OUTPATIENT)
Dept: FAMILY MEDICINE CLINIC | Facility: CLINIC | Age: 62
End: 2022-02-21

## 2022-02-21 ENCOUNTER — OFFICE VISIT (OUTPATIENT)
Dept: PHYSICAL THERAPY | Facility: CLINIC | Age: 62
End: 2022-02-21
Payer: MEDICARE

## 2022-02-21 VITALS
BODY MASS INDEX: 30.61 KG/M2 | OXYGEN SATURATION: 98 % | HEIGHT: 67 IN | RESPIRATION RATE: 18 BRPM | DIASTOLIC BLOOD PRESSURE: 74 MMHG | WEIGHT: 195 LBS | TEMPERATURE: 97.5 F | SYSTOLIC BLOOD PRESSURE: 130 MMHG | HEART RATE: 98 BPM

## 2022-02-21 DIAGNOSIS — M54.16 LUMBAR RADICULOPATHY: Primary | ICD-10-CM

## 2022-02-21 DIAGNOSIS — M54.42 ACUTE LEFT-SIDED LOW BACK PAIN WITH LEFT-SIDED SCIATICA: ICD-10-CM

## 2022-02-21 DIAGNOSIS — Z74.2 ASSISTANCE NEEDED AT HOME: ICD-10-CM

## 2022-02-21 DIAGNOSIS — M54.42 ACUTE LEFT-SIDED LOW BACK PAIN WITH LEFT-SIDED SCIATICA: Primary | ICD-10-CM

## 2022-02-21 PROBLEM — L03.111 CELLULITIS OF RIGHT AXILLA: Status: RESOLVED | Noted: 2021-04-19 | Resolved: 2022-02-21

## 2022-02-21 PROCEDURE — 97110 THERAPEUTIC EXERCISES: CPT

## 2022-02-21 PROCEDURE — 99213 OFFICE O/P EST LOW 20 MIN: CPT | Performed by: FAMILY MEDICINE

## 2022-02-21 PROCEDURE — 97530 THERAPEUTIC ACTIVITIES: CPT

## 2022-02-21 NOTE — PATIENT INSTRUCTIONS
Back Pain   WHAT YOU NEED TO KNOW:   Back pain is common  You may have back pain and muscle spasms  You may feel sore or stiff on one or both sides of your back  The pain may spread to your lower body  Conditions that affect the spine, joints, or muscles can cause back pain  These may include arthritis, spinal stenosis (narrowing of the spinal column), muscle tension, or breakdown of the spinal discs  DISCHARGE INSTRUCTIONS:   Call your local emergency number (911 in the [facility]) if:   · You have severe back pain with chest pain  · You cannot control your urine or bowel movements  · Your pain becomes so severe that you cannot walk  Return to the emergency department if:   · You have pain, numbness, or weakness in one or both legs  · You have severe back pain, nausea, and vomiting  · You have severe back pain that spreads to your side or genital area  Call your doctor if:   · You have back pain that does not get better with rest and pain medicine  · You have a fever  · You have pain that worsens when you are on your back or when you rest     · You have pain that worsens when you cough or sneeze  · You lose weight without trying  · You have questions or concerns about your condition or care  Medicines: You may need any of the following:  · NSAIDs , such as ibuprofen, help decrease swelling, pain, and fever  This medicine is available with or without a doctor's order  NSAIDs can cause stomach bleeding or kidney problems in certain people  If you take blood thinner medicine, always ask your healthcare provider if NSAIDs are safe for you  Always read the medicine label and follow directions  · Acetaminophen  decreases pain and fever  It is available without a doctor's order  Ask how much to take and how often to take it  Follow directions   Read the labels of all other medicines you are using to see if they also contain acetaminophen, or ask your doctor or pharmacist  Acetaminophen can cause liver damage if not taken correctly  Do not use more than 4 grams (4,000 milligrams) total of acetaminophen in one day  · Muscle relaxers  help decrease muscle spasms and back pain  · Prescription pain medicine  may be given  Ask your healthcare provider how to take this medicine safely  Some prescription pain medicines contain acetaminophen  Do not take other medicines that contain acetaminophen without talking to your healthcare provider  Too much acetaminophen may cause liver damage  Prescription pain medicine may cause constipation  Ask your healthcare provider how to prevent or treat constipation  · Take your medicine as directed  Contact your healthcare provider if you think your medicine is not helping or if you have side effects  Tell him or her if you are allergic to any medicine  Keep a list of the medicines, vitamins, and herbs you take  Include the amounts, and when and why you take them  Bring the list or the pill bottles to follow-up visits  Carry your medicine list with you in case of an emergency  How to manage your back pain:   · Apply ice  on your back for 15 to 20 minutes every hour or as directed  Use an ice pack, or put crushed ice in a plastic bag  Cover it with a towel before you apply it to your skin  Ice helps prevent tissue damage and decreases pain  · Apply heat  on your back for 20 to 30 minutes every 2 hours for as many days as directed  Heat helps decrease pain and muscle spasms  · Stay active  as much as you can without causing more pain  Bed rest could make your back pain worse  Avoid heavy lifting until your pain is gone  · Go to physical therapy as directed  A physical therapist can teach you exercises to help improve movement and strength, and to decrease pain  Follow up with your doctor in 2 weeks, or as directed: You might need to see a specialist  Write down your questions so you remember to ask them during your visits    © Copyright [employer] 2021 Information is for [employer] use only and may not be sold, redistributed or otherwise used for commercial purposes  All illustrations and images included in CareNotes® are the copyrighted property of A D A M , Inc  or Tammy Akins  The above information is an  only  It is not intended as medical advice for individual conditions or treatments  Talk to your doctor, nurse or pharmacist before following any medical regimen to see if it is safe and effective for you

## 2022-02-21 NOTE — PROGRESS NOTES
Daily Note     Today's date: 2022  Patient name: Marti Templeton  : 1960  MRN: [id number]  Referring provider: Akanksha Crawford MD  Dx:   Encounter Diagnosis     ICD-10-CM    1  Lumbar radiculopathy  M54 16    2  Acute left-sided low back pain with left-sided sciatica  M54 42        Start Time: 1030  Stop Time: 1105  Total time in clinic (min): 35 minutes  Subjective: Pt arrives to first f/u since IE reporting LBP Sx remain constant in nature and travel to his (L) foot  Pt arrives to Tx /c SPC - min WB'ing through AD observed  Objective: See treatment diary below    Assessment:  Implemented exercise diary per PT POC as indicated below - notified pt that interventions may be uncomfortable; however, pt should notify therapist of any increases in or sharp bouts of pain Sx  Pt tolerates TM fair - requiring frequent VC'ing to increase stride, improve L/S posture, and reduce UE WB'ing  Pt required mod cuing to reduce femoral ER during sidestepping; fair carryover; band placed around knees for safety/to reduce tripping hazard  Timer used during stretching to ensure effective duration of hold - pt tolerated well  Pt demonstrates piriformis and HS flexibility (R) > (L)  Provided pt /c HEP - pt notes 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 but experiences fatigue when doing so  Although pt arrives to Tx /c SPC he ambulates throughout clinic without AD  Pt would benefit from continued PT  Plan: Cont /c PT POC  Progress as tolerated  Precautions: Type 2 Diabetes; HTN; LBP; hyperlipidemia; benign prostate hyperplasia; depression    Date 2/15 02/21           Visit 1 2           FOTO IE            Re-Eval IE                Manuals 2/15 02/21                                                               Neuro Re-Ed 2/15 02/21           Supine  nv            Dead bugs             Bird dogs nv            Mod front plank             Mod side plank             Slump nerve glides Palloff press             Tband rows + Squat             Tband ext + March             SLS  nv                         Ther Ex 2/15 02/21           Bridges (+march*) nv 20x           SL Hip Abd nv            Piriformis (S) nv 30"x3ea           Seated HS (S)  nv 30"x3ea           Seated T/S Ext  chair nv 5"x10           DL Leg press             KIM with SAG  wall                          Ther Activity 2/15 02/21           TM Walking nv 4' 0 7mph           Sidesteps nv GTB 2'            [employer] walks             Mini squats nv            Repeated STS, Airex nv 15x           Step Ups             Pt Edu AL SP                        Gait Training 2/15 02/21                                     Modalities 2/15 02/21           Prn                              Milena Egan, PTA

## 2022-02-21 NOTE — LETTER
Date: 2022    To Whom It May Concern:    Patient was seen in the office today for follow up  Jose Infante (:1960) has a past medical history consistent with worsening lumbar pain with left sided-sciatica, chronic pain syndrome, HTN, depression, anxiety and insomnia  His wife is currently not a [place]  citizen (currently residing in [place]) and it is recommended that she be able to come to the [address] [place] [address] to live here  Charleen Hardy is in need of mental support from her as well as assistance after he gets his lumbar surgery which he is the process of scheduling  Also he suffers from herniated discs and needs assistance with activities of daily living  He suffered from [place] and his breathing was effected last year and for this he being treated  Please expedited her VISA  If you have any questions, please don't hesitate to let me know         Thank Yuridia Haskins MD

## 2022-02-21 NOTE — PROGRESS NOTES
Assessment/Plan:    Low back pain with left-sided sciatica  Pt is followed by pain medicine and neurosurgery with upcoming appt respectively on 03/03 and 03/14  No recent changes or alarming red flags   Pain control relieved by Norco mostly  Continue to follow with PT twice weekly to maximize conservative measures   Social work referral placed to help patient with assistance at home meanwhile his wife is in the process of immigrating to [facility] letter provided for patient to better assist patient's wife with visa process in order to take care of him after back surgery        Diagnoses and all orders for this visit:    Acute left-sided low back pain with left-sided sciatica    Assistance needed at home  -     Ambulatory Referral to Social Work Care Management Program; Future          Subjective:      Patient ID: Jeet Delong is a 62 y o  male  HPI     Patient presents today to the office requesting updated letter to better assist his wife with [address] immigration in the setting of awaiting his back surgery and needing help at home  He has a PMHx of chronic back pain with left sided sciatica  Since 2019, 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  Was scheduled for surgery in 2020 but did not proceed due to the pandemic  Pain was worse last pain level 9/10  Pain starts in low back and radiates down the left leg  Currently working with PT 2 days a week (Tu & Th) with minimal relief  Pain improves with Norco use  He has also had 4 injections in his back  Symptoms worsen with prolonged sitting so he constantly is he moving to relief  Pt not working due to the pain and is on disability  Associated symptoms include leg weakness and muscle spasms  There is unsteady gait and instability  Pt needs updated letter for Immigration to bring his wife from [place] for assistance after the surgery  He live with his 6years old child at home and no other support     He is scheduled on March 3rd, 2022-->pain medicine   Has appt with neurosurgery on 03/14  He needs to be scheduled for surgery  The following portions of the patient's history were reviewed and updated as appropriate: allergies, current medications, past family history, past medical history, past social history, past surgical history and problem list     Review of Systems   Constitutional: Positive for activity change  Negative for chills and fever  HENT: Negative for sore throat  Eyes: Negative for visual disturbance  Respiratory: Negative  Negative for cough and shortness of breath  Cardiovascular: Negative for chest pain and leg swelling  Gastrointestinal: Negative  Negative for abdominal pain, blood in stool, constipation, diarrhea, nausea and vomiting  Genitourinary: Negative  Negative for dysuria and hematuria  Musculoskeletal: Positive for back pain, gait problem and myalgias  Negative for arthralgias  Skin: Negative for rash  Neurological: Positive for weakness (L leg) and numbness  Negative for dizziness and headaches  Left leg numbness   Psychiatric/Behavioral: The patient is not nervous/anxious  Objective:    /74 (BP Location: Left arm, Patient Position: Sitting, Cuff Size: Adult)   Pulse 98   Temp 97 5 °F (36 4 °C) (Temporal)   Resp 18   Ht 5' 7" (1 702 m)   Wt 88 5 kg (195 lb)   SpO2 98%   BMI 30 54 kg/m²      Physical Exam  Vitals and nursing note reviewed  Constitutional:       General: He is not in acute distress  Appearance: Normal appearance  He is well-developed  He is obese  He is not ill-appearing, toxic-appearing or diaphoretic  HENT:      Head: Normocephalic and atraumatic  Nose: Nose normal    Eyes:      General:         Right eye: No discharge  Left eye: No discharge  Extraocular Movements: Extraocular movements intact  Cardiovascular:      Rate and Rhythm: Normal rate and regular rhythm  Heart sounds: Normal heart sounds  Pulmonary:      Effort: Pulmonary effort is normal  No respiratory distress  Breath sounds: Normal breath sounds  Abdominal:      Palpations: Abdomen is soft  Tenderness: There is no abdominal tenderness  Musculoskeletal:         General: Tenderness (midline lumbar and paraspinal ms b/l, worse on left) present  Normal range of motion  Cervical back: Normal range of motion and neck supple  Right lower leg: No edema  Left lower leg: No edema  Comments: Straight leg raise (+) b/l, worse on the left  Strength 5/5 BLE   Skin:     General: Skin is warm  Findings: No rash  Neurological:      Mental Status: He is alert and oriented to person, place, and time  Mental status is at baseline  Motor: Weakness present  Gait: Gait abnormal (ambulates with cane)  Psychiatric:         Mood and Affect: Mood normal          Behavior: Behavior normal          Thought Content:  Thought content normal          Judgment: Judgment normal

## 2022-02-22 NOTE — ASSESSMENT & PLAN NOTE
Pt is followed by pain medicine and neurosurgery with upcoming appt respectively on 03/03 and 03/14     No recent changes or alarming red flags   Pain control relieved by Norco mostly  Continue to follow with PT twice weekly to maximize conservative measures   Social work referral placed to help patient with assistance at home meanwhile his wife is in the process of immigrating to [facility] letter provided for patient to better assist patient's wife with visa process in order to take care of him after back surgery

## 2022-02-23 ENCOUNTER — PATIENT OUTREACH (OUTPATIENT)
Dept: FAMILY MEDICINE CLINIC | Facility: CLINIC | Age: 62
End: 2022-02-23

## 2022-02-23 DIAGNOSIS — Z78.9 NEEDS ASSISTANCE WITH COMMUNITY RESOURCES: Primary | ICD-10-CM

## 2022-02-23 NOTE — PROGRESS NOTES
JEROD ALMARAZ received consult from Provider, Dr Connie Meng regarding patient need assistance at home and would like to apply for Waiver Services  JEROD ALMARAZ placed call to patient to follow-up and further assist  Introduce self and reason for calling  Pt reports he is interested in [employer] due to his back issues he need help around the house and with his ADLs  Pt reports his wife still in [place] and he in the process to bring her so she could take care of him  Pt report he lives with his 6yo daughter  Pt reports he cannot work, he received SSD and get [employer]  JEROD ALMARAZ explained to patient how the Waiver program work and the process of applying for [employer] which it could takes up to three months to be approve  Pt verbalized understanding and states he is still interesting in applying  Explained JEROD ALMARAZ would place a [facility] referral so they could assist him apply for Waiver and with the whole process  Pt verbalized understanding  JEROD [facility] will remains available and will continue to follow-up  Placed referral to [facility]

## 2022-02-24 ENCOUNTER — OFFICE VISIT (OUTPATIENT)
Dept: PHYSICAL THERAPY | Facility: CLINIC | Age: 62
End: 2022-02-24
Payer: MEDICARE

## 2022-02-24 DIAGNOSIS — M54.42 ACUTE LEFT-SIDED LOW BACK PAIN WITH LEFT-SIDED SCIATICA: ICD-10-CM

## 2022-02-24 DIAGNOSIS — M54.16 LUMBAR RADICULOPATHY: Primary | ICD-10-CM

## 2022-02-24 PROCEDURE — 97112 NEUROMUSCULAR REEDUCATION: CPT

## 2022-02-24 PROCEDURE — 97530 THERAPEUTIC ACTIVITIES: CPT

## 2022-02-24 PROCEDURE — 97110 THERAPEUTIC EXERCISES: CPT

## 2022-02-24 NOTE — PROGRESS NOTES
Daily Note     Today's date: 2022  Patient name: Eliezer Mojica  : 1960  MRN: [id number]  Referring provider: Elías Bang MD  Dx:   Encounter Diagnosis     ICD-10-CM    1  Lumbar radiculopathy  M54 16    2  Acute left-sided low back pain with left-sided sciatica  M54 42                   Subjective: Pt states that he is experiencing some tingling thru the (L) LE this morning which he attributes to the cold weather  As a result, he would like to get the LE's moving and requests to start with the recumbent bike today because this "distracts" him from the pain  Pt states that he felt "good" following exercises LV  Objective: See treatment diary below      Assessment: Tolerated treatment well  Trialed SLS but pt with difficulty maintaining without UE assist  Therefore regressed to tandem stance with improved tolerance demonstrated  He exhibited min-mod sway and required intermittent UE assist  Pt challenged with addition of alt LE in quadruped, exhibiting mod increased trunk rotation when performing on (L) evidence of decreased core stab  Post tx session, pt visibly fatigued  Patient demonstrated fatigue post treatment, exhibited good technique with therapeutic exercises and would benefit from continued PT to progress core stability and glute strength to improve pt's tolerance to standing/walking prolonged periods of time daily  Plan: Continue per plan of care  Progress treatment as tolerated  Precautions: Type 2 Diabetes; HTN; LBP; hyperlipidemia; benign prostate hyperplasia; depression    Date 2/15 02/21 2/24          Visit 1 2 3          FOTO IE            Re-Eval IE                Manuals 2/15 02/21 2/24                                                              Neuro Re-Ed 2/15 02/21 2/24          Supine march nv  15x ea          Dead bugs             Bird dogs nv  10x ea alt LE only          Mod front plank             Mod side plank             Slump nerve glides Palloff press             Tband rows + Squat             Tband ext + March             SLS  nv  2x20" Tandem                       Ther Ex 2/15 02/21 2/24          Bridges (+march*) nv 20x 20x          SL Hip Abd nv            Piriformis (S) nv 30"x3ea 4x20"          Seated HS (S)  nv 30"x3ea           Seated T/S Ext  chair nv 5"x10 15x5"          DL Leg press             KIM with SAG  wall                          Ther Activity 2/15 02/21 2/24          TM Walking nv 4' 0 7mph           Recumbent bike   8' L0          Sidesteps nv GTB 2'  BTB 2'          Monster walks             Mini squats nv            Repeated STS, Airex nv 15x 2x10 No Airex          Step Ups             Pt Edu AL SP AL                       Gait Training 2/15 02/21 2/24                                    Modalities 2/15 02/21 2/24          Prn

## 2022-02-28 ENCOUNTER — APPOINTMENT (OUTPATIENT)
Dept: PHYSICAL THERAPY | Facility: CLINIC | Age: 62
End: 2022-02-28
Payer: MEDICARE

## 2022-02-28 DIAGNOSIS — G89.29 CHRONIC LOW BACK PAIN WITHOUT SCIATICA, UNSPECIFIED BACK PAIN LATERALITY: ICD-10-CM

## 2022-02-28 DIAGNOSIS — M54.16 LUMBAR RADICULOPATHY: ICD-10-CM

## 2022-02-28 DIAGNOSIS — M54.42 CHRONIC BILATERAL LOW BACK PAIN WITH LEFT-SIDED SCIATICA: ICD-10-CM

## 2022-02-28 DIAGNOSIS — M54.50 CHRONIC LOW BACK PAIN WITHOUT SCIATICA, UNSPECIFIED BACK PAIN LATERALITY: ICD-10-CM

## 2022-02-28 DIAGNOSIS — G89.29 CHRONIC BILATERAL LOW BACK PAIN WITH LEFT-SIDED SCIATICA: ICD-10-CM

## 2022-02-28 NOTE — TELEPHONE ENCOUNTER
Patient is requesting refills for     ibuprofen (MOTRIN) 800 mg tablet [[id number]    HYDROcodone-acetaminophen (NORCO) 7 5-325 mg per tablet

## 2022-03-01 DIAGNOSIS — M54.50 CHRONIC LOW BACK PAIN WITHOUT SCIATICA, UNSPECIFIED BACK PAIN LATERALITY: ICD-10-CM

## 2022-03-01 DIAGNOSIS — G89.29 CHRONIC LOW BACK PAIN WITHOUT SCIATICA, UNSPECIFIED BACK PAIN LATERALITY: ICD-10-CM

## 2022-03-02 ENCOUNTER — OFFICE VISIT (OUTPATIENT)
Dept: DENTISTRY | Facility: CLINIC | Age: 62
End: 2022-03-02

## 2022-03-02 VITALS — TEMPERATURE: 96.6 F | SYSTOLIC BLOOD PRESSURE: 128 MMHG | HEART RATE: 82 BPM | DIASTOLIC BLOOD PRESSURE: 81 MMHG

## 2022-03-02 DIAGNOSIS — K02.9 DENTAL CARIES: Primary | ICD-10-CM

## 2022-03-02 RX ORDER — GABAPENTIN 800 MG/1
800 TABLET ORAL 3 TIMES DAILY
Qty: 90 TABLET | Refills: 3 | Status: SHIPPED | OUTPATIENT
Start: 2022-03-02

## 2022-03-02 NOTE — PROGRESS NOTES
Composite Filling    Lolita Liza presents for composite filling  PMH reviewed, no changes  Discussed with patient need for RCT if pulp exposure occurs or in future if pulp is inflamed  Pt understands and consents  Applied topical benzocaine, administered carps 2% lido 1:100k epi via and carps 4% articaine 1:100k epi via     Prepped tooth #%-DO with 245 carbide on high speed  Caries removed with round carbide on slow speed  Placed tofflemire/palodent matrix  Isolation with cotton rolls and dri-angles; Chipped incisal edge # 6 smoothed with pt permission ; Large wear facets-pt is a   Etch with 37% H2PO4, rinse, dry  Applied Adhese with 20 second scrub once, gentle air dry and light cured for 10s  Restored with Tetric bulk michael shade A2 and light cured  Refined with finishing burs, polished with enhance point  Verified occlusion and contacts  Pt left satisfied  #8 needs crown in future due to large fillings    Rec- OTC ;  Continue with restorative/ 90 min #8

## 2022-03-03 ENCOUNTER — OFFICE VISIT (OUTPATIENT)
Dept: PAIN MEDICINE | Facility: CLINIC | Age: 62
End: 2022-03-03
Payer: MEDICARE

## 2022-03-03 ENCOUNTER — PATIENT OUTREACH (OUTPATIENT)
Dept: FAMILY MEDICINE CLINIC | Facility: CLINIC | Age: 62
End: 2022-03-03

## 2022-03-03 ENCOUNTER — APPOINTMENT (OUTPATIENT)
Dept: PHYSICAL THERAPY | Facility: CLINIC | Age: 62
End: 2022-03-03
Payer: MEDICARE

## 2022-03-03 VITALS
HEIGHT: 67 IN | SYSTOLIC BLOOD PRESSURE: 125 MMHG | BODY MASS INDEX: 30.29 KG/M2 | DIASTOLIC BLOOD PRESSURE: 84 MMHG | WEIGHT: 193 LBS | HEART RATE: 90 BPM

## 2022-03-03 DIAGNOSIS — M54.16 LUMBAR RADICULOPATHY: ICD-10-CM

## 2022-03-03 DIAGNOSIS — I10 BENIGN ESSENTIAL HTN: ICD-10-CM

## 2022-03-03 DIAGNOSIS — M54.16 LUMBAR RADICULOPATHY: Primary | ICD-10-CM

## 2022-03-03 DIAGNOSIS — M54.50 CHRONIC LOW BACK PAIN WITHOUT SCIATICA, UNSPECIFIED BACK PAIN LATERALITY: ICD-10-CM

## 2022-03-03 DIAGNOSIS — G89.29 CHRONIC MIDLINE LOW BACK PAIN WITH SCIATICA, SCIATICA LATERALITY UNSPECIFIED: ICD-10-CM

## 2022-03-03 DIAGNOSIS — M48.061 LUMBAR FORAMINAL STENOSIS: ICD-10-CM

## 2022-03-03 DIAGNOSIS — M47.817 FACET ARTHROPATHY, LUMBOSACRAL: ICD-10-CM

## 2022-03-03 DIAGNOSIS — M54.40 CHRONIC MIDLINE LOW BACK PAIN WITH SCIATICA, SCIATICA LATERALITY UNSPECIFIED: ICD-10-CM

## 2022-03-03 DIAGNOSIS — G89.29 CHRONIC LOW BACK PAIN WITHOUT SCIATICA, UNSPECIFIED BACK PAIN LATERALITY: ICD-10-CM

## 2022-03-03 DIAGNOSIS — G89.4 CHRONIC PAIN SYNDROME: ICD-10-CM

## 2022-03-03 PROCEDURE — 99214 OFFICE O/P EST MOD 30 MIN: CPT | Performed by: ANESTHESIOLOGY

## 2022-03-03 RX ORDER — HYDROCODONE BITARTRATE AND ACETAMINOPHEN 7.5; 325 MG/1; MG/1
1 TABLET ORAL EVERY 8 HOURS PRN
Qty: 90 TABLET | Refills: 0 | Status: SHIPPED | OUTPATIENT
Start: 2022-03-03 | End: 2022-04-01 | Stop reason: SDUPTHER

## 2022-03-03 RX ORDER — AMLODIPINE BESYLATE 10 MG/1
10 TABLET ORAL DAILY
Qty: 90 TABLET | Refills: 1 | Status: CANCELLED | OUTPATIENT
Start: 2022-03-03

## 2022-03-03 RX ORDER — AMLODIPINE BESYLATE 10 MG/1
10 TABLET ORAL DAILY
Qty: 90 TABLET | Refills: 1 | Status: SHIPPED | OUTPATIENT
Start: 2022-03-03

## 2022-03-03 RX ORDER — IBUPROFEN 800 MG/1
400 TABLET ORAL EVERY 6 HOURS PRN
Qty: 30 TABLET | Refills: 1 | Status: SHIPPED | OUTPATIENT
Start: 2022-03-03 | End: 2022-06-28

## 2022-03-03 RX ORDER — HYDROCODONE BITARTRATE AND ACETAMINOPHEN 7.5; 325 MG/1; MG/1
1 TABLET ORAL EVERY 8 HOURS PRN
Qty: 90 TABLET | Refills: 0 | OUTPATIENT
Start: 2022-03-03

## 2022-03-03 NOTE — PROGRESS NOTES
Assessment  1  Lumbar radiculopathy    2  Chronic pain syndrome    3  Lumbar foraminal stenosis    4  Facet arthropathy, lumbosacral    5  Chronic midline low back pain with sciatica, sciatica laterality unspecified        Plan  61-year-old male last seen by our practice in March 2021 transferring care for 2nd opinion and secondary to location, presenting for interval follow-up regarding lumbosacral back pain with radiculopathy into bilateral lower extremities  MRI of the lumbar spine shows facet arthrosis throughout the lumbar spine  Left-sided disc herniations at L3-4 and L4-5 resulting in foraminal stenosis worse on the left than right  Patient has met with Neurosurgery and is considering surgery  He does have follow-up with them in 2 weeks  He is currently taking Norco 7 5/325 mg q 8 hours p r n  which is prescribed by his PCP, gabapentin 800 mg t i d , and ibuprofen p r n  mild relief  Physical therapy has not helped in the past   He has had epidural steroid injections when he lived in [place] with mild transient relief  He was trialed on duloxetine, however was unable tolerate the side effects  Of note he is currently taking Effexor for mood  Patient's low back pain seems to be multifactorial including myofascial and facet mediated components  No evidence of sacroiliitis  Lower extremity symptoms do appear to be radicular in nature stemming from foraminal stenosis  1  I did offer the patient a left L3 and L4 TFESI considering left-sided radicular symptoms are worse, however the patient would like to hold off on interventional therapy at this time until he speaks with Neurosurgery which I think is reasonable  2  Patient may continue with Norco and gabapentin as prescribed  3  Patient will continue with ibuprofen p r n      4  Patient will follow-up on an as-needed basis per his request        My impressions and treatment recommendations were discussed in detail with the patient who verbalized understanding and had no further questions  Discharge instructions were provided  I personally saw and examined the patient and I agree with the above discussed plan of care  No orders of the defined types were placed in this encounter  No orders of the defined types were placed in this encounter  History of Present Illness    Jag Puckett is a 62 y o  male last seen by our practice in March 2021 transferring care for 2nd opinion and secondary to location, presenting for interval follow-up regarding lumbosacral back pain with radiculopathy into bilateral lower extremities  He denies any bladder or bowel incontinence or saddle anesthesia  MRI of the lumbar spine shows facet arthrosis throughout the lumbar spine  Left-sided disc herniations at L3-4 and L4-5 resulting in foraminal stenosis worse on the left than right  Patient has met with Neurosurgery and is considering surgery  He does have follow-up with them in 2 weeks  He is currently taking Norco 7 5/325 mg q 8 hours p r n  which is prescribed by his PCP, gabapentin 800 mg t i d , and ibuprofen p r n  mild relief  Physical therapy has not helped in the past   He has had epidural steroid injections when he lived in [place] with mild transient relief  He was trialed on duloxetine, however was unable tolerate the side effects  Of note he is currently taking Effexor for mood  The patient rates his pain severe and nearly constant  The pain is not follow any particular pattern throughout the day  The pain is described as burning, shooting, and numbness  The pain is increased with standing, walking, changing position, and exercise  The pain is alleviated with sitting, relaxation, and lying down  Other than as stated above, the patient denies any interval changes in medications, medical condition, mental condition, symptoms, or allergies since the last office visit            I have personally reviewed and/or updated the patient's past medical history, past surgical history, family history, social history, current medications, allergies, and vital signs today  Review of Systems   Constitutional: Negative for fever and unexpected weight change  HENT: Negative for trouble swallowing  Eyes: Negative for visual disturbance  Respiratory: Negative for shortness of breath and wheezing  Cardiovascular: Negative for chest pain and palpitations  Gastrointestinal: Negative for constipation, diarrhea, nausea and vomiting  Endocrine: Negative for cold intolerance, heat intolerance and polydipsia  Genitourinary: Negative for difficulty urinating and frequency  Musculoskeletal: Negative for arthralgias, gait problem, joint swelling and myalgias  Skin: Negative for rash  Neurological: Negative for dizziness, seizures, syncope, weakness and headaches  Hematological: Does not bruise/bleed easily  Psychiatric/Behavioral: Negative for dysphoric mood  All other systems reviewed and are negative        Patient Active Problem List   Diagnosis    Benign essential HTN    Chronic low back pain    Hyperlipidemia    Type 2 diabetes mellitus without complication, without long-term current use of insulin (HCC)    Benign prostatic hyperplasia with lower urinary tract symptoms    Low back pain with left-sided sciatica    Chronic pain syndrome    Class 1 obesity due to excess calories with serious comorbidity and body mass index (BMI) of 30 0 to 30 9 in adult    Lumbar radiculopathy    Encounter for narcotic contract discussion    Chest pain    Post-acute sequelae of COVID-19 (PASC)    Reactive airway disease    Current mild episode of major depressive disorder without prior episode (HCC)    Diastolic dysfunction    Chronic prostatitis    Lumbar foraminal stenosis    Facet arthropathy, lumbosacral       Past Medical History:   Diagnosis Date    Asthma     Back pain     Back pain     Diabetes mellitus ([facility] [address] )     Hyperlipidemia     Hypertension        Past Surgical History:   Procedure Laterality Date    CYST REMOVAL  2017       Family History   Problem Relation Age of Onset    Hypertension Mother     Diabetes Mother     Cancer Father     Diabetes Family     Hypertension Family        Social History     Occupational History    Not on file   Tobacco Use    Smoking status: Never Smoker    Smokeless tobacco: Never Used   Vaping Use    Vaping Use: Never used   Substance and Sexual Activity    Alcohol use: Not Currently     Comment: rare    Drug use: Never    Sexual activity: Not Currently       Current Outpatient Medications on File Prior to Visit   Medication Sig    aluminum-magnesium hydroxide-simethicone (MAALOX) 200-200-20 MG/5ML SUSP Take 15 mL by mouth 4 (four) times a day (before meals and at bedtime)    amLODIPine (NORVASC) 10 mg tablet Take 1 tablet (10 mg total) by mouth daily    atorvastatin (LIPITOR) 20 mg tablet Take 1 tablet (20 mg total) by mouth daily    Bioflavonoid Products (RA Vitamin C CR) TBCR take 1 tablet by mouth twice a day for 14 days    Blood Glucose Monitoring Suppl (OneTouch Verio) w/Device KIT Use daily    Blood Pressure Monitoring (Blood Pressure Monitor/Arm) EMILY Use daily    cyclobenzaprine (FLEXERIL) 10 mg tablet Take 1 tablet (10 mg total) by mouth 3 (three) times a day as needed for muscle spasms    famotidine (PEPCID) 20 mg tablet Take 1 tablet (20 mg total) by mouth 2 (two) times a day As needed for acid reflux    fluticasone (FLONASE) 50 mcg/act nasal spray 1 spray into each nostril daily    gabapentin (NEURONTIN) 800 mg tablet Take 1 tablet (800 mg total) by mouth 3 (three) times a day    glucose blood (OneTouch Verio) test strip Check blood sugar daily    HYDROcodone-acetaminophen (NORCO) 7 5-325 mg per tablet Take 1 tablet by mouth every 8 (eight) hours as needed for pain For ongoing pain Max Daily Amount: 3 tablets    ibuprofen (MOTRIN) 800 mg tablet Take 1 tablet (800 mg total) by mouth every 8 (eight) hours as needed for mild pain    Lancet Devices (ONE TOUCH DELICA LANCING DEV) MISC Use daily    levofloxacin (LEVAQUIN) 500 mg tablet Take 1 tablet (500 mg total) by mouth every 24 hours    lidocaine (Lidoderm) 5 % Apply 1 patch topically daily Remove & Discard patch within 12 hours or as directed by MD    lidocaine (XYLOCAINE) 2 % topical gel Apply topically 3 (three) times a day    metFORMIN (GLUCOPHAGE) 500 mg tablet Take 1 tablet (500 mg total) by mouth daily    OneTouch Delica Lancets 18U MISC Use daily    tamsulosin (FLOMAX) 0 4 mg Take 1 capsule (0 4 mg total) by mouth daily with dinner    venlafaxine (EFFEXOR-XR) 75 mg 24 hr capsule Take 1 capsule (75 mg total) by mouth daily with breakfast    zolpidem (AMBIEN) 5 mg tablet Take 1 tablet (5 mg total) by mouth daily at bedtime as needed for sleep    budesonide-formoterol (Symbicort) 160-4 5 mcg/act inhaler Inhale 2 puffs 2 (two) times a day Rinse mouth after use  (Patient not taking: Reported on 3/3/2022 )    dexamethasone (DECADRON) 4 mg tablet TAKE 3 TABLETS (12 MG TOTAL) BY MOUTH ONCE FOR 1 DOSE (Patient not taking: Reported on 2/10/2022)    Multiple Vitamin (multivitamin) capsule Take 1 capsule by mouth daily for 14 days     No current facility-administered medications on file prior to visit  No Known Allergies    Physical Exam    Ht 5' 7" (1 702 m)   Wt 87 5 kg (193 lb)   BMI 30 23 kg/m²     Constitutional: normal, well developed, well nourished, alert, in no distress and non-toxic and no overt pain behavior  Eyes: anicteric  HEENT: grossly intact  Neck: supple, symmetric, trachea midline and no masses   Pulmonary:even and unlabored  Cardiovascular:No edema or pitting edema present  Skin:Normal without rashes or lesions and well hydrated  Psychiatric:Mood and affect appropriate  Neurologic:Cranial Nerves II-XII grossly intact  Musculoskeletal:antalgic gait  Ambulates with a cane  Bilateral lumbar paraspinals tender to palpation from L3-L5  Bilateral SI joints nontender to palpation  Bilateral patellar and Achilles reflexes were 1/4 and symmetrical   No clonus was noted bilaterally  Bilateral lower extremity strength 5/5 in all muscle groups  Sensation intact to light touch in L3 through S1 dermatomes bilaterally  Positive seated straight leg raise bilaterally  Imaging      Study Result    Narrative & Impression   MRI LUMBAR SPINE WITHOUT CONTRAST     INDICATION: M54 16: Radiculopathy, lumbar region  M54 42: Lumbago with sciatica, left side      COMPARISON:  MRI lumbar spine 2/25/2021     TECHNIQUE:  Sagittal T1, sagittal T2, sagittal inversion recovery, axial T1 and axial T2, coronal T2     IMAGE QUALITY:  Diagnostic     FINDINGS:     VERTEBRAL BODIES:  There are 5 lumbar type vertebral bodies      There is preserved normal lumbar lordosis  No spondylolisthesis      SACRUM:  Normal signal within the sacrum  No evidence of insufficiency or stress fracture      DISTAL CORD AND CONUS:  Normal size and signal within the distal cord and conus      PARASPINAL SOFT TISSUES:  Paraspinal soft tissues are unremarkable      Right kidney inferior pole cyst      LOWER THORACIC DISC SPACES:  Normal disc height and signal   No disc herniation, canal stenosis or foraminal narrowing      LUMBAR DISC SPACES:     L1-L2:  No disc bulge  Mild facet arthropathy  No canal or foraminal stenosis      L2-L3:  No disc bulge  Moderate facet arthropathy  No significant canal or foraminal stenosis      L3-L4:  Left foraminal disc protrusion  Moderate bilateral facet arthropathy  No canal stenosis  Moderate left and mild right foraminal stenosis      L4-L5:  Left foraminal disc protrusion  Moderate bilateral facet arthropathy  No significant canal stenosis  Moderate left and mild right foraminal stenosis      L5-S1:  No disc bulge  Mild facet arthropathy    No canal or foraminal stenosis      IMPRESSION:     No significant change in left foraminal disc protrusion at levels L3-4 and L4-5 with moderate left foraminal stenosis  Correlate for possible left L3 and L4 radiculopathy         Workstation performed: [id number]     PACS Images     Show images for X-ray lumbar spine 2 or 3 views    Study Result    Narrative & Impression   LUMBAR SPINE     INDICATION:   M54 30: Sciatica, unspecified side  G89 4: Chronic pain syndrome  M51 16: Intervertebral disc disorders with radiculopathy, lumbar region      COMPARISON:  MRI dated 10/9/2019      VIEWS:  XR SPINE LUMBAR 2 OR 3 VIEWS INJURY        FINDINGS:     There are 5 non rib bearing lumbar vertebral bodies       There is no evidence of acute fracture or destructive osseous lesion      Alignment is unremarkable       Mild degenerative disc disease with ventral osteophytes at L2-3 and L3-4   No significant disc space narrowing      The pedicles appear intact      Soft tissues are unremarkable      IMPRESSION:     No acute osseous abnormality        Degenerative changes as described         Workstation performed: [id number]

## 2022-03-03 NOTE — PROGRESS NOTES
Outgoing call  03/03/2022    cmoc called pt regarding services requested by patient, patient needs assistance in applying for waiver services, completed assessment with patient over the phone, patient has agreed to services and will be coming in to office to fill form out to start the waiver process      Next outreach assigned on 03/03/2022 10:00am at Niurka Bauer

## 2022-03-04 ENCOUNTER — PATIENT OUTREACH (OUTPATIENT)
Dept: FAMILY MEDICINE CLINIC | Facility: CLINIC | Age: 62
End: 2022-03-04

## 2022-03-04 NOTE — PROGRESS NOTES
In Person encounter  03/04/2022 10:am    CMOC met pt to start the process for waiver services  Process was explained to pt, and waiver consent form was faxed  [facility] the Physician certification form for pt  Next outreach is set up for 03/07/2022 to collect the physician certification form and call MALIA FISHMAN to set up an yfn for pt

## 2022-03-07 ENCOUNTER — PATIENT OUTREACH (OUTPATIENT)
Dept: FAMILY MEDICINE CLINIC | Facility: CLINIC | Age: 62
End: 2022-03-07

## 2022-03-07 DIAGNOSIS — M48.061 LUMBAR FORAMINAL STENOSIS: ICD-10-CM

## 2022-03-07 DIAGNOSIS — M54.42 ACUTE LEFT-SIDED LOW BACK PAIN WITH LEFT-SIDED SCIATICA: Primary | ICD-10-CM

## 2022-03-07 NOTE — PROGRESS NOTES
03/07/2022    cmoc sent form over to Dr Luís Sun, had to resubmitted form since  it was completed incorrectly      Next outreach is scheduled for 03/09/2022 with audrey and to follow up with  in regards to form

## 2022-03-07 NOTE — PROGRESS NOTES
Outgoing Call:  3/7/2022    [facility] did complete a chart review as it is being handed off by Calista Yanes, [facility]  Pt is interested in waiver services, however after meeting with Dr Oumar Crocker, [facility] was informed pt does not meet criteria for waiver services  She did place a referral for VNA services and this was forwarded to Jerry Santos  CMOC did call pt and informed him of this  Pt was very upset by this and stated that he struggles with his ADL's and does not think his PCP made a fair assessment of this need  CMOC expressed understanding and did inform him that a referral for VNA services were placed but wait time is lengthy  Pt stated that he will reach out to his PCP to discuss waiver services  This referral will be closed  No further outreach is scheduled

## 2022-03-07 NOTE — PROGRESS NOTES
Asked to fill as form to see if patient qualifies for waiver program  He does not qualify for nursing level of care base off assessment at last visit as patient  However patient may require assistance at home we are not aware of  Therefore will place VNA order for in home evaluation that can better assess patients function in home       Tatyana Gabriel MD  03/07/22  4:49 PM

## 2022-03-08 ENCOUNTER — PATIENT OUTREACH (OUTPATIENT)
Dept: FAMILY MEDICINE CLINIC | Facility: CLINIC | Age: 62
End: 2022-03-08

## 2022-03-08 ENCOUNTER — APPOINTMENT (OUTPATIENT)
Dept: PHYSICAL THERAPY | Facility: CLINIC | Age: 62
End: 2022-03-08
Payer: MEDICARE

## 2022-03-08 DIAGNOSIS — Z74.2 NEED FOR HOME HEALTH CARE: Primary | ICD-10-CM

## 2022-03-08 NOTE — PROGRESS NOTES
Outgoing Calls:  3/8/2022    CMOC did receive notification from JEROD, Maggie Chino, to continue working on waiver services with pt  CMOC did reach out to MALIA FISHMAN to schedule a waiver evaluation  An evaluation was scheduled for 3/11/21, between 9AM-11AM, with Uriah Garcia  She can be reached at [phone]  This evaluation will take place at pt's home  CMOC did call pt and informed him of this  Pt was very thankful  Pt agreed he would be home for this evaluation and is aware [facility] will follow up with him next week  Next outreach is scheduled for 3/15/2022

## 2022-03-08 NOTE — PROGRESS NOTES
JEROD ALMARAZ received IB message from [facility] regarding assistance patient apply for [employer]  After [facility] met with Dr Crissy Cheema was informed pt does not meet criteria for Waiver services and a VNA service referral was placed for in-home evaluation that can better assess patients function in home  JEROD ALMARAZ called [facility] Stacy to discuss referral as JEROD ALMARAZ received the referral from Dr Hever Salvador to assist patient with waiver Services as patient need assistance at home  Per [facility] and [facility] assessment, pt needs assistance with his ADLs  Pt reported to [facility] need assistance to shower as he is afraid of losing his balance  Pt also reported to [facility] he lives alone with 8yo daughter as his wife still in [facility]  JEROD ALMARAZ reached out to Dr Gilford Sarna via [address] [place] [address] Rd text as she was the Provider that placed the JEROD ALMARAZ referral to assist with Waiver Services  Explained the process of Waiver Services and how they could assist patient at home  Informs waiver would complete an assessment to determine patient's function at home and if he qualify for the services  Dr Gilford Sarna, states he is on vacation this week but she could work on this next week when she come back and she is also going to reach out to Dr Vania Huertas  JEORD ALMARAZ received an IB message from [facility] stating the following    " Pt is actively receiving outpt physical therapy services (has an appt today) - Therefore we cannot provide services in the home - Our agency also does not go to the home to assess what the pt needs and we do not provide just personal care assistance - If pt has an office visit and skilled needs are identified and oupt therapy has ended, then a new referral can be placed "     JEROD ALMARAZ also reached out to Dr Dinah Diop via email and explained patient reported he need assistance with his ADLs and Waiver Services could assess patient level of care and patient's function at home, however, the medical form need to be complete/signed by her or   Ba to further assist patient  SW CM placed referral to [facility] after, [facility] agree to continue to assist with the process after Dr Ping Stinson complete the form  [facility] agree to call patient and informs of the same  SW CM will remains available and will continue to follow-up

## 2022-03-10 ENCOUNTER — OFFICE VISIT (OUTPATIENT)
Dept: PHYSICAL THERAPY | Facility: CLINIC | Age: 62
End: 2022-03-10
Payer: MEDICARE

## 2022-03-10 DIAGNOSIS — M54.42 ACUTE LEFT-SIDED LOW BACK PAIN WITH LEFT-SIDED SCIATICA: ICD-10-CM

## 2022-03-10 DIAGNOSIS — M54.16 LUMBAR RADICULOPATHY: Primary | ICD-10-CM

## 2022-03-10 PROCEDURE — 97530 THERAPEUTIC ACTIVITIES: CPT

## 2022-03-10 PROCEDURE — 97110 THERAPEUTIC EXERCISES: CPT

## 2022-03-10 PROCEDURE — 97112 NEUROMUSCULAR REEDUCATION: CPT

## 2022-03-10 NOTE — PROGRESS NOTES
Daily Note     Today's date: 3/10/2022  Patient name: Jose Infante  : 1960  MRN: [id number]  Referring provider: Nicole Albarado MD  Dx:   Encounter Diagnosis     ICD-10-CM    1  Lumbar radiculopathy  M54 16    2  Acute left-sided low back pain with left-sided sciatica  M54 42                   Subjective: Pt states that he is feeling a lot of pain thru the (L) LE today  He had to cancel his physical therapy appt earlier this week because he was in too much pain  He also has been continuing to experience increase frequency of urination  He f/u with the neurosurgeon on Monday and is going to f/u regarding: elevated pain levels and urinary frequency  Pt is requesting to perform a 30 min treatment session as a result of elevated pain levels  Objective: See treatment diary below      Assessment: Tolerated treatment fair  Performed primarily supine exercises today to improve pt's tolerance to exercise  Strongly encouraged pt to f/u with MD regarding urinary frequency - pt verbalized understanding  Patient demonstrated fatigue post treatment, exhibited good technique with therapeutic exercises and would benefit from continued PT to progress core stability and glute strength/stability to improve lumbopelvic stability and thus pt's tolerance to prolonged standing/ambulation daily  Plan: Continue per plan of care  Progress treatment as tolerated  Precautions: Type 2 Diabetes; HTN; LBP; hyperlipidemia; benign prostate hyperplasia; depression    Date 2/15 02/21 2/24 3/10         Visit 1 2 3 4         FOTO IE            Re-Eval IE                Manuals 2/15 02/21 2/24 3/10                                                             Neuro Re-Ed 2/15 02/21 2/24 3/10         TVA Contractions    10x5"         TVA + March    15x ea         Supine march nv  15x ea          Dead bugs             Bird dogs nv  10x ea alt LE only          Mod front plank             Mod side plank             Slump nerve glides             Palloff press             Tband rows + Squat             Tband ext + March             SLS  nv  2x20" Tandem                       Ther Ex 2/15 02/21 2/24 3/10         Bridges (+march*) nv 20x 20x          SL Hip Abd nv            Piriformis (S) nv 30"x3ea 4x20" 4x20"         Seated HS (S)  nv 30"x3ea  4x20" supine         Seated T/S Ext  chair nv 5"x10 15x5" 20x3"         DL Leg press             KIM with SAG  wall                          Ther Activity 2/15 02/21 2/24 3/10         TM Walking nv 4' 0 7mph           Recumbent bike   8' L0 5' L0         Sidesteps nv GTB 2'  BTB 2'          Monster walks             Mini squats nv            Repeated STS, Airex nv 15x 2x10 No Airex          Step Ups             Pt Edu AL SP AL AL                      Gait Training 2/15 02/21 2/24 3/10                                   Modalities 2/15 02/21 2/24 3/10         Prn

## 2022-03-14 ENCOUNTER — TELEPHONE (OUTPATIENT)
Dept: FAMILY MEDICINE CLINIC | Facility: CLINIC | Age: 62
End: 2022-03-14

## 2022-03-14 ENCOUNTER — OFFICE VISIT (OUTPATIENT)
Dept: NEUROSURGERY | Facility: CLINIC | Age: 62
End: 2022-03-14
Payer: MEDICARE

## 2022-03-14 ENCOUNTER — OFFICE VISIT (OUTPATIENT)
Dept: FAMILY MEDICINE CLINIC | Facility: CLINIC | Age: 62
End: 2022-03-14

## 2022-03-14 VITALS
DIASTOLIC BLOOD PRESSURE: 88 MMHG | SYSTOLIC BLOOD PRESSURE: 146 MMHG | RESPIRATION RATE: 16 BRPM | HEIGHT: 67 IN | BODY MASS INDEX: 30.45 KG/M2 | WEIGHT: 194 LBS | TEMPERATURE: 98.4 F | HEART RATE: 83 BPM

## 2022-03-14 VITALS
RESPIRATION RATE: 18 BRPM | DIASTOLIC BLOOD PRESSURE: 86 MMHG | SYSTOLIC BLOOD PRESSURE: 140 MMHG | WEIGHT: 194 LBS | OXYGEN SATURATION: 98 % | HEIGHT: 67 IN | BODY MASS INDEX: 30.45 KG/M2 | TEMPERATURE: 98.2 F | HEART RATE: 103 BPM

## 2022-03-14 DIAGNOSIS — F32.0 CURRENT MILD EPISODE OF MAJOR DEPRESSIVE DISORDER WITHOUT PRIOR EPISODE (HCC): ICD-10-CM

## 2022-03-14 DIAGNOSIS — R35.0 URINARY FREQUENCY: Primary | ICD-10-CM

## 2022-03-14 DIAGNOSIS — J45.41 MODERATE PERSISTENT REACTIVE AIRWAY DISEASE WITH ACUTE EXACERBATION: ICD-10-CM

## 2022-03-14 DIAGNOSIS — N40.1 BENIGN PROSTATIC HYPERPLASIA WITH LOWER URINARY TRACT SYMPTOMS, SYMPTOM DETAILS UNSPECIFIED: ICD-10-CM

## 2022-03-14 DIAGNOSIS — R20.2 PARESTHESIA OF BOTH HANDS: ICD-10-CM

## 2022-03-14 DIAGNOSIS — M54.50 LOWER BACK PAIN: ICD-10-CM

## 2022-03-14 DIAGNOSIS — I10 BENIGN ESSENTIAL HTN: ICD-10-CM

## 2022-03-14 DIAGNOSIS — M54.16 LUMBAR RADICULOPATHY: Primary | ICD-10-CM

## 2022-03-14 DIAGNOSIS — R41.3 MEMORY DIFFICULTIES: ICD-10-CM

## 2022-03-14 PROCEDURE — 99213 OFFICE O/P EST LOW 20 MIN: CPT | Performed by: FAMILY MEDICINE

## 2022-03-14 PROCEDURE — 99213 OFFICE O/P EST LOW 20 MIN: CPT | Performed by: STUDENT IN AN ORGANIZED HEALTH CARE EDUCATION/TRAINING PROGRAM

## 2022-03-14 RX ORDER — VENLAFAXINE HYDROCHLORIDE 150 MG/1
150 CAPSULE, EXTENDED RELEASE ORAL
Qty: 30 CAPSULE | Refills: 2 | Status: SHIPPED | OUTPATIENT
Start: 2022-03-14 | End: 2022-05-31

## 2022-03-14 RX ORDER — BUDESONIDE AND FORMOTEROL FUMARATE DIHYDRATE 160; 4.5 UG/1; UG/1
2 AEROSOL RESPIRATORY (INHALATION) 2 TIMES DAILY
Qty: 10.2 G | Refills: 3 | Status: SHIPPED | OUTPATIENT
Start: 2022-03-14

## 2022-03-14 NOTE — PROGRESS NOTES
Assessment/Plan:    Benign essential HTN  Borderline BP control on Norvasc 10 mg qam, advised to change to qhs and improve diet following DASH as provided in AVS    Benign prostatic hyperplasia with lower urinary tract symptoms  Increased frequency x 1 month   On Flomax 0 4 mg daily   UA negative on 2/4, however around time symptoms started to will repeat   Repeat PSA and refer to urology     Paresthesia of both hands  Bilateral finger tips, no change in temp association or time of day   Will recheck glucose control, assess for nutritional deficiencies   Follow up in 3-5 weeks    Memory difficulties  Forgot the stove on twice recently   Mini Cog 2 today, remembered two words, alec numbers on clock wrong  Referral to geriatrics placed     Current mild episode of major depressive disorder without prior episode [facility])  Denies SI/SH/HI but reports worsening depressive mood  Increase Effexor 75 mg daily to 150 mg daily  Encouraged to reach back out to mental health resources to at least get on wait list (attempted in past but were not taking new patients) Provided information sheet  Diagnoses and all orders for this visit:    Urinary frequency  -     PSA, Total Screen; Future  -     UA w Reflex to Microscopic w Reflex to Culture -Lab Collect; Future  -     Ambulatory Referral to Urology; Future    Paresthesia of both hands  -     HEMOGLOBIN A1C W/ EAG ESTIMATION; Future  -     CBC and differential; Future  -     Iron Panel (Includes Ferritin, Iron Sat%, Iron, and TIBC); Future  -     Vitamin B12; Future    Benign essential HTN    Current mild episode of major depressive disorder without prior episode (HCC)  -     venlafaxine (EFFEXOR-XR) 150 mg 24 hr capsule; Take 1 capsule (150 mg total) by mouth daily with breakfast    Moderate persistent reactive airway disease with acute exacerbation  -     budesonide-formoterol (Symbicort) 160-4 5 mcg/act inhaler;  Inhale 2 puffs 2 (two) times a day Rinse mouth after use     Benign prostatic hyperplasia with lower urinary tract symptoms, symptom details unspecified    Memory difficulties  -     Ambulatory Referral to [facility]; Future      Subjective:      Patient ID: Zeus Tamez is a 62 y o  male  This is a very pleasant 62 y o  male who presents to the clinic for management of their chronic medical conditions  Patient's medical conditions are stable unless noted otherwise above  Since I last saw him his pain and physical impairment has greatly worsened  He is applying for waiver services that is being processed now  He also endorses concern about his memory  His 6year old daughter helps him a lot with things at the home, setting up his medications and applying lotion to his feet, as well as will cooking with his help  His wife is still in [place] and having a hard time getting approval to come see him  He has an apt with neurosurgery today to discuss options for his back pain  Patient has not had any recent hospitalizations, or medical emergencies since last visit  Patient has no further complaints other than what is mentioned in the ROS  COVID - breathing not right since   The following portions of the patient's history were reviewed and updated as appropriate: allergies, current medications, past family history, past medical history, past social history, past surgical history and problem list     Review of Systems   Constitutional: Positive for activity change  Negative for chills and fever  HENT: Negative for sore throat  Eyes: Negative for visual disturbance  Respiratory: Negative  Negative for cough and shortness of breath  Cardiovascular: Negative for chest pain and leg swelling  Gastrointestinal: Negative  Negative for abdominal pain, blood in stool, constipation, diarrhea, nausea and vomiting  Genitourinary: Negative  Negative for dysuria and hematuria  Musculoskeletal: Positive for back pain, gait problem and myalgias   Negative for arthralgias  Skin: Negative for rash  Neurological: Positive for weakness (L leg) and numbness  Negative for dizziness and headaches  Left leg numbness   Psychiatric/Behavioral: The patient is not nervous/anxious  Objective:    /86 (BP Location: Left arm, Patient Position: Sitting, Cuff Size: Adult)   Pulse 103   Temp 98 2 °F (36 8 °C) (Temporal)   Resp 18   Ht 5' 7" (1 702 m)   Wt 88 kg (194 lb)   SpO2 98%   BMI 30 38 kg/m²      Physical Exam  Constitutional:       Appearance: Normal appearance  He is not ill-appearing  HENT:      Head: Normocephalic and atraumatic  Eyes:      Extraocular Movements: Extraocular movements intact  Conjunctiva/sclera: Conjunctivae normal    Cardiovascular:      Rate and Rhythm: Normal rate and regular rhythm  Pulses: Normal pulses  Heart sounds: Normal heart sounds  Pulmonary:      Effort: Pulmonary effort is normal  No respiratory distress  Musculoskeletal:      Right lower leg: No edema  Left lower leg: No edema  Skin:     Findings: No lesion or rash  Neurological:      Mental Status: He is alert and oriented to person, place, and time  Gait: Gait abnormal (antalgic)        Comments: Mini cog 2   Psychiatric:         Behavior: Behavior normal            Joy Brown MD  03/14/22  12:59 PM

## 2022-03-14 NOTE — TELEPHONE ENCOUNTER
----- Message from Alexus Sousa MD sent at 3/14/2022  1:07 PM EDT -----  Regarding: FW: BP cuff and glucometer  Please inform patient of the following  Thank you    ----- Message -----  From: Carmen Ramachandran RN  Sent: 3/14/2022  12:24 PM EDT  To: Alexus Sousa MD  Subject: RE: BP cuff and glucometer                       BP cuffs are not covered by most insurances we deal with and it appears he already got a meter back in October, so insurance won't pay for another one  There should be a customer service number on the box or in the user manual that he can call for a replacement  You could try sending BP cuff script to [facility] but the pt may just have to pay for it out of pocket   ----- Message -----  From: Alexus Sousa MD  Sent: 3/14/2022  11:39 AM EDT  To: Carmen Ramachandran RN  Subject: BP cuff and glucometer                           Patient needs a BP cuff  He also said his glucometer broke  Can you help with ordering both of these? Thank you

## 2022-03-14 NOTE — ASSESSMENT & PLAN NOTE
Borderline BP control on Norvasc 10 mg qam, advised to change to qhs and improve diet following DASH as provided in AVS

## 2022-03-14 NOTE — TELEPHONE ENCOUNTER
03/14/22    Form has been scanned into pt chart      Form Type: [place] [employer] [facility] / PHYSICIAL CERTIFICATION

## 2022-03-14 NOTE — ASSESSMENT & PLAN NOTE
Forgot the stove on twice recently   Mini Cog 2 today, remembered two words, alec numbers on clock wrong     Referral to geriatrics placed

## 2022-03-14 NOTE — PATIENT INSTRUCTIONS
Address: [address] [place], [place], [address]  Hours: Open ? Closes 5PM  Phone: [phone]      Call the lung doctor at the number above to schedule a follow up apt  Change your amlodpine to take at bedtime instead of the morning  Please get your blood work completed  Lifestyle Medicine Tip Sheet    1  Eat predominantly less processed foods such as fast food, T V  dinners, and bradley  2  Eat Close to [facility], [employer] or [employer]    3  Eat a predominantly plant based diet   a  Dark Leafy Greens  b  Fruits/Vegetables  c  Whole Grains: Whole wheat, barely, wheat berries, quinoa, steel cut oats, brown rice, whole wheat pasta  d  Legumes: kidney beans, bryan beans, white beans, black beans, garbanzo beans (chickpeas), lima beans (mature, dried), split peas, lentils, and edamame (green soybeans)      4  At least half of the plate should contain fruits or vegetables        5  Liquid should be predominantly water  (limit soda and juice)    6  Watch portion size  7  Foods you should avoid or limit?  - Fats - Specifically saturated and trans-fats  They are found in margarines, many fast foods, and some store-bought baked goods  Saturated and Trans-fats can raise your cholesterol level and your chance of getting heart disease    - When you cook, it's best to use no oils but if needed try to limit the amount of oil used as oil contains many calories per volume and is very unhealthy when heated during cooking    - Sugar -Limit or avoid sugar, sweets, and refined grains  Refined grains are found in white bread, white rice, most forms of pasta, and most packaged "snack" foods    - Try not to cook with salt and avoid  adding extra salt to your  meals   - Meat - Studies have shown that eating a lot of red meat and poultry can increase your risk of certain health problems, including heart disease, diabetes, obesity and cancer  So try to limit the intake of it      8  Practice good sleep hygiene by getting 7-9 hours of sleep a night    9  Daily exercise minimum of 30 minutes (walking around the block)    10  Socialization (friends and family)   - Explore your neighborhood  Go to the park, spend time at [employer]  - Consider taking a class or volunteering to connect with new people    If you are interested you can read more about healthy food choices at the following websites:  a  Nutritionfacts  org  b  Home cooking recipes: https://www [employer]/  c  http://johanna info/  d  Familydoctor  org

## 2022-03-14 NOTE — ASSESSMENT & PLAN NOTE
Increased frequency x 1 month   On Flomax 0 4 mg daily   UA negative on 2/4, however around time symptoms started to will repeat   Repeat PSA and refer to urology

## 2022-03-14 NOTE — ASSESSMENT & PLAN NOTE
Denies SI/SH/HI but reports worsening depressive mood  Increase Effexor 75 mg daily to 150 mg daily  Encouraged to reach back out to mental health resources to at least get on wait list (attempted in past but were not taking new patients) Provided information sheet

## 2022-03-14 NOTE — PROGRESS NOTES
Office Note - Neurosurgery   Marv Maynard 62 y o  male MRN: [id number]      Assessment and Plan: This is a 61-year-old male presenting with chronic left leg pain  MRI of his lumbar spine demonstrates degeneration specifically at L3-4 and L4-5 with development of bilateral foraminal stenosis  I had a long discussion with the patient about his symptoms as well as imaging findings  I believe his symptoms are likely stemming from the stenosis he has a follow-up  Since the patient had conservative management a few years ago and has not had any recent treatment, I for the patient to Pain Management for L3-4 and L4-5 injections  We discussed surgical options however we decided to pursue that after he has maximized conservative management efforts  I will see the patient on a p r n  basis  History, physical examination and diagnostic tests were reviewed and questions answered  Diagnosis, care plan and treatment options were discussed  The patient understand instructions and will follow up as directed  Follow-up:  P r n  Diagnoses and all orders for this visit:    Lumbar radiculopathy  -     Ambulatory Referral to Pain Management; Future    Lower back pain  -     Ambulatory Referral to Pain Management; Future        Subjective/Objective     Chief Complaint    Left leg pain    HPI    This is the 61-year-old male with history of chronic left leg pain presenting for consultation  The patient states she was involved in a work related injury in 2019 whereby she fell  Since then he reports having severe pain that radiates in the left buttock down the posterolateral aspect of his thigh to anterolateral aspect of his leg  He states when the pain initially began with radiate to the lateral aspect of his foot however if not will just hands at the leg  He denies any numbness or tingling  Given how severe the pain is, fusion and moves a cane  In 2019, she underwent epidural steroid injections without any help  He is currently undergoing physical therapy and on his 2nd week of therapy  Due to the pain, the patient has been out of work since his injury  KEIRA CROCKETT personally reviewed and updated  Review of Systems   Constitutional: Negative  HENT: Negative  Eyes: Negative  Respiratory: Positive for shortness of breath (with activity)  Cardiovascular: Negative  Gastrointestinal: Negative  Endocrine: Positive for cold intolerance  Genitourinary:        Started antibiotic yesterday, discomfort urinating, a little better today   Musculoskeletal: Positive for back pain (radiates to L>R legs), gait problem (using standard cane, last fall about 2 1/2 weeks ago) and myalgias  Negative for neck pain and neck stiffness  Currently in PT   Skin: Negative  Allergic/Immunologic: Negative  Neurological: Positive for weakness (legs), light-headedness and numbness (N/T legs and back, toes and finget tips)  Negative for headaches  Unsteady balance   Hematological: Negative  Psychiatric/Behavioral: Positive for dysphoric mood and sleep disturbance          Trouble focusing sometimes       Family History    Family History   Problem Relation Age of Onset    Hypertension Mother     Diabetes Mother     Cancer Father     Diabetes Family     Hypertension Family        Social History    Social History     Socioeconomic History    Marital status: /Civil Union     Spouse name: Not on file    Number of children: Not on file    Years of education: Not on file    Highest education level: Not on file   Occupational History    Not on file   Tobacco Use    Smoking status: Never Smoker    Smokeless tobacco: Never Used   Vaping Use    Vaping Use: Never used   Substance and Sexual Activity    Alcohol use: Not Currently     Comment: rare    Drug use: Never    Sexual activity: Not Currently   Other Topics Concern    Not on file   Social History Narrative    Not on file     Social Determinants of Health     Financial Resource Strain: Low Risk     Difficulty of Paying Living Expenses: Not hard at all   Food Insecurity: No Food Insecurity    Worried About Running Out of Food in the Last Year: Never true    Day of Food in the Last Year: Never true   Transportation Needs: No Transportation Needs    Lack of Transportation (Medical): No    Lack of Transportation (Non-Medical):  No   Physical Activity: Not on file   Stress: Not on file   Social Connections: Not on file   Intimate Partner Violence: Not on file   Housing Stability: Not on file       Past Medical History    Past Medical History:   Diagnosis Date    Asthma     Back pain     Back pain     Diabetes mellitus ([facility] [address] )     Hyperlipidemia     Hypertension        Surgical History    Past Surgical History:   Procedure Laterality Date    CYST REMOVAL  2017       Medications      Current Outpatient Medications:     aluminum-magnesium hydroxide-simethicone (MAALOX) 200-200-20 MG/5ML SUSP, Take 15 mL by mouth 4 (four) times a day (before meals and at bedtime), Disp: 150 mL, Rfl: 0    amLODIPine (NORVASC) 10 mg tablet, Take 1 tablet (10 mg total) by mouth daily, Disp: 90 tablet, Rfl: 1    atorvastatin (LIPITOR) 20 mg tablet, Take 1 tablet (20 mg total) by mouth daily, Disp: 90 tablet, Rfl: 1    Bioflavonoid Products (RA Vitamin C CR) TBCR, take 1 tablet by mouth twice a day for 14 days, Disp: , Rfl:     Blood Glucose Monitoring Suppl (OneTouch Verio) w/Device KIT, Use daily, Disp: 1 kit, Rfl: 0    Blood Pressure Monitoring (Blood Pressure Monitor/Arm) EMILY, Use daily, Disp: 1 each, Rfl: 0    budesonide-formoterol (Symbicort) 160-4 5 mcg/act inhaler, Inhale 2 puffs 2 (two) times a day Rinse mouth after use , Disp: 10 2 g, Rfl: 3    cyclobenzaprine (FLEXERIL) 10 mg tablet, Take 1 tablet (10 mg total) by mouth 3 (three) times a day as needed for muscle spasms, Disp: 30 tablet, Rfl: 1    famotidine (PEPCID) 20 mg tablet, Take 1 tablet (20 mg total) by mouth 2 (two) times a day As needed for acid reflux, Disp: 90 tablet, Rfl: 0    fluticasone (FLONASE) 50 mcg/act nasal spray, 1 spray into each nostril daily, Disp: 16 g, Rfl: 0    gabapentin (NEURONTIN) 800 mg tablet, Take 1 tablet (800 mg total) by mouth 3 (three) times a day, Disp: 90 tablet, Rfl: 3    glucose blood (OneTouch Verio) test strip, Check blood sugar daily, Disp: 50 each, Rfl: 5    HYDROcodone-acetaminophen (NORCO) 7 5-325 mg per tablet, Take 1 tablet by mouth every 8 (eight) hours as needed for pain For ongoing pain Max Daily Amount: 3 tablets, Disp: 90 tablet, Rfl: 0    ibuprofen (MOTRIN) 800 mg tablet, Take 0 5 tablets (400 mg total) by mouth every 6 (six) hours as needed for mild pain, Disp: 30 tablet, Rfl: 1    Lancet Devices (ONE TOUCH DELICA LANCING DEV) MISC, Use daily, Disp: 1 each, Rfl: 0    lidocaine (Lidoderm) 5 %, Apply 1 patch topically daily Remove & Discard patch within 12 hours or as directed by MD, Disp: 10 patch, Rfl: 0    lidocaine (XYLOCAINE) 2 % topical gel, Apply topically 3 (three) times a day, Disp: 30 mL, Rfl: 2    metFORMIN (GLUCOPHAGE) 500 mg tablet, Take 1 tablet (500 mg total) by mouth daily, Disp: 90 tablet, Rfl: 1    Multiple Vitamin (multivitamin) capsule, Take 1 capsule by mouth daily for 14 days, Disp: 14 capsule, Rfl: 0    OneTouch Delica Lancets C MISC, Use daily, Disp: 100 each, Rfl: 1    tamsulosin (FLOMAX) 0 4 mg, Take 1 capsule (0 4 mg total) by mouth daily with dinner, Disp: 270 capsule, Rfl: 1    venlafaxine (EFFEXOR-XR) 150 mg 24 hr capsule, Take 1 capsule (150 mg total) by mouth daily with breakfast, Disp: 30 capsule, Rfl: 2    zolpidem (AMBIEN) 5 mg tablet, Take 1 tablet (5 mg total) by mouth daily at bedtime as needed for sleep, Disp: 30 tablet, Rfl: 0    Allergies    No Known Allergies    The following portions of the patient's history were reviewed and updated as appropriate: allergies, current medications, past family history, past medical history, past social history, past surgical history and problem list     Investigations    I personally reviewed the MRI results with the patient:      Physical Exam    Vitals:  Blood pressure 146/88, pulse 83, temperature 98 4 °F (36 9 °C), temperature source Tympanic, resp  rate 16, height 5' 7" (1 702 m), weight 88 kg (194 lb)  ,Body mass index is 30 38 kg/m²      General:  Normal, well developed, not in distress/pain     Skin:   No issues, no rashes noted     Musculoskeletal:   5/5 strength throughout all muscle groups  No tenderness to palpation of the spine       Neurologic Exam:  Awake and alert  Oriented x3  Speech clear and fluent  ARSHAD   Sensation to light touch and pin prick intact throughout  No christopher's  No clonus  2+ patellar reflexes     Gait:   Ambulates with a cane, normal posture

## 2022-03-14 NOTE — ASSESSMENT & PLAN NOTE
Bilateral finger tips, no change in temp association or time of day   Will recheck glucose control, assess for nutritional deficiencies   Follow up in 3-5 weeks

## 2022-03-15 ENCOUNTER — OFFICE VISIT (OUTPATIENT)
Dept: PHYSICAL THERAPY | Facility: CLINIC | Age: 62
End: 2022-03-15
Payer: MEDICARE

## 2022-03-15 ENCOUNTER — PATIENT OUTREACH (OUTPATIENT)
Dept: FAMILY MEDICINE CLINIC | Facility: CLINIC | Age: 62
End: 2022-03-15

## 2022-03-15 ENCOUNTER — APPOINTMENT (OUTPATIENT)
Dept: LAB | Facility: CLINIC | Age: 62
End: 2022-03-15
Payer: MEDICARE

## 2022-03-15 DIAGNOSIS — M54.16 LUMBAR RADICULOPATHY: Primary | ICD-10-CM

## 2022-03-15 DIAGNOSIS — M54.42 ACUTE LEFT-SIDED LOW BACK PAIN WITH LEFT-SIDED SCIATICA: ICD-10-CM

## 2022-03-15 DIAGNOSIS — R35.0 URINARY FREQUENCY: ICD-10-CM

## 2022-03-15 DIAGNOSIS — R20.2 PARESTHESIA OF BOTH HANDS: ICD-10-CM

## 2022-03-15 LAB
BASOPHILS # BLD AUTO: 0.02 THOUSANDS/ΜL (ref 0–0.1)
BASOPHILS NFR BLD AUTO: 0 % (ref 0–1)
BILIRUB UR QL STRIP: NEGATIVE
CLARITY UR: CLEAR
COLOR UR: NORMAL
EOSINOPHIL # BLD AUTO: 0.07 THOUSAND/ΜL (ref 0–0.61)
EOSINOPHIL NFR BLD AUTO: 2 % (ref 0–6)
ERYTHROCYTE [DISTWIDTH] IN BLOOD BY AUTOMATED COUNT: 13.5 % (ref 11.6–15.1)
EST. AVERAGE GLUCOSE BLD GHB EST-MCNC: 131 MG/DL
FERRITIN SERPL-MCNC: 390 NG/ML (ref 8–388)
GLUCOSE UR STRIP-MCNC: NEGATIVE MG/DL
HBA1C MFR BLD: 6.2 %
HCT VFR BLD AUTO: 39.2 % (ref 36.5–49.3)
HGB BLD-MCNC: 14.2 G/DL (ref 12–17)
HGB UR QL STRIP.AUTO: NEGATIVE
IMM GRANULOCYTES # BLD AUTO: 0.01 THOUSAND/UL (ref 0–0.2)
IMM GRANULOCYTES NFR BLD AUTO: 0 % (ref 0–2)
IRON SATN MFR SERPL: 28 % (ref 20–50)
IRON SERPL-MCNC: 75 UG/DL (ref 65–175)
KETONES UR STRIP-MCNC: NEGATIVE MG/DL
LEUKOCYTE ESTERASE UR QL STRIP: NEGATIVE
LYMPHOCYTES # BLD AUTO: 2.6 THOUSANDS/ΜL (ref 0.6–4.47)
LYMPHOCYTES NFR BLD AUTO: 57 % (ref 14–44)
MCH RBC QN AUTO: 29.3 PG (ref 26.8–34.3)
MCHC RBC AUTO-ENTMCNC: 36.2 G/DL (ref 31.4–37.4)
MCV RBC AUTO: 81 FL (ref 82–98)
MONOCYTES # BLD AUTO: 0.3 THOUSAND/ΜL (ref 0.17–1.22)
MONOCYTES NFR BLD AUTO: 7 % (ref 4–12)
NEUTROPHILS # BLD AUTO: 1.53 THOUSANDS/ΜL (ref 1.85–7.62)
NEUTS SEG NFR BLD AUTO: 34 % (ref 43–75)
NITRITE UR QL STRIP: NEGATIVE
NRBC BLD AUTO-RTO: 0 /100 WBCS
PH UR STRIP.AUTO: 6 [PH]
PLATELET # BLD AUTO: 245 THOUSANDS/UL (ref 149–390)
PMV BLD AUTO: 9.6 FL (ref 8.9–12.7)
PROT UR STRIP-MCNC: NEGATIVE MG/DL
PSA SERPL-MCNC: 1.6 NG/ML (ref 0–4)
RBC # BLD AUTO: 4.84 MILLION/UL (ref 3.88–5.62)
SP GR UR STRIP.AUTO: 1.02 (ref 1–1.03)
TIBC SERPL-MCNC: 265 UG/DL (ref 250–450)
UROBILINOGEN UR STRIP-ACNC: <2 MG/DL
VIT B12 SERPL-MCNC: 625 PG/ML (ref 100–900)
WBC # BLD AUTO: 4.53 THOUSAND/UL (ref 4.31–10.16)

## 2022-03-15 PROCEDURE — 83540 ASSAY OF IRON: CPT

## 2022-03-15 PROCEDURE — 83550 IRON BINDING TEST: CPT

## 2022-03-15 PROCEDURE — 81003 URINALYSIS AUTO W/O SCOPE: CPT

## 2022-03-15 PROCEDURE — 97112 NEUROMUSCULAR REEDUCATION: CPT

## 2022-03-15 PROCEDURE — 36415 COLL VENOUS BLD VENIPUNCTURE: CPT

## 2022-03-15 PROCEDURE — G0103 PSA SCREENING: HCPCS

## 2022-03-15 PROCEDURE — 82728 ASSAY OF FERRITIN: CPT

## 2022-03-15 PROCEDURE — 97110 THERAPEUTIC EXERCISES: CPT

## 2022-03-15 PROCEDURE — 97530 THERAPEUTIC ACTIVITIES: CPT

## 2022-03-15 PROCEDURE — 82607 VITAMIN B-12: CPT

## 2022-03-15 PROCEDURE — 83036 HEMOGLOBIN GLYCOSYLATED A1C: CPT

## 2022-03-15 PROCEDURE — 85025 COMPLETE CBC W/AUTO DIFF WBC: CPT

## 2022-03-15 NOTE — PROGRESS NOTES
Daily Note     Today's date: 3/15/2022  Patient name: Samantha Marques  : 1960  MRN: [id number]  Referring provider: Sofya Hopson MD  Dx:   Encounter Diagnosis     ICD-10-CM    1  Lumbar radiculopathy  M54 16    2  Acute left-sided low back pain with left-sided sciatica  M54 42        Start Time: 1030  Stop Time: 1101  Total time in clinic (min): 31 minutes  Subjective:  Pt reports to Tx reporting he is sleepy today - notes that today his (L)LBP sx "aren't too bad" but reports the other day it was "not good at all - a lot of pain "   Pt arrives to Tx /c SPC - uses intermittently  Pt states he exercises every day - unable to ascertain if he feels better afterwards, noting he still feels weak  Objective: See treatment diary below    Assessment: Tolerated treatment fair  Pt challenged /c LP at 65# - reduce resistance /c pt noting improved tolerance  Although pt demonstrates less antalgic gait and improved eloy without using SPC, he often reaches for it during transitions throughout the room 2/2 reports of hx of knees "giving out" and fear of (L)LE weakness making him a higher fall risk - for these reasons did not perform marches or squats /c TB exercises in order to first get pt acclimated to exercise  Pt reports piriformis (S) on (L) hip is more uncomfortable/challenging than the (R)  Pt fatigued post Tx and defers further exercises today  Pt demonstrated fatigue post treatment, exhibited good technique with therapeutic exercises and would benefit from continued PT to progress core stability and glute strength/stability to improve lumbopelvic stability and thus pt's tolerance to prolonged standing/ambulation daily  Plan: Cont /c PT POC  Progress as tolerated  Precautions: Type 2 Diabetes; HTN; LBP; hyperlipidemia; benign prostate hyperplasia; depression    Date 2/15 02/21 2/24 3/10 03/15        Visit 1 2 3 4 5        FOTO IE            Re-Eval IE                Manuals 2/15 02/21 2/24 3/10 03/15                                                            Neuro Re-Ed 2/15 02/21 2/24 3/10 03/15        TVA Contractions    10x5"         TVA + March    15x ea         Supine march nv  15x ea          Dead bugs             Bird dogs nv  10x ea alt LE only          Mod front plank             Mod side plank             Slump nerve glides             Palloff press             Tband rows + Squat     blck 20x reg        Tband ext + March     blk 20x reg        SLS  nv  2x20" Tandem                       Ther Ex 2/15 02/21 2/24 3/10 03/15        Bridges (+march*) nv 20x 20x  15x        SL Hip Abd nv            Piriformis (S) nv 30"x3ea 4x20" 4x20" 10"x10 supine        Seated HS (S)  nv 30"x3ea  4x20" supine         Seated T/S Ext  chair nv 5"x10 15x5" 20x3"         DL Leg press     S3 F7  65#10  55#x10        KIM with SAG  wall                          Ther Activity 2/15 02/21 2/24 3/10 03/15        TM Walking nv 4' 0 7mph           Recumbent bike   8' L0 5' L0 8' L0        Sidesteps nv GTB 2'  BTB 2'          Monster walks             Mini squats nv            Repeated STS, Airex nv 15x 2x10 No Airex          Step Ups             Pt Edu AL SP AL AL SP                     Gait Training 2/15 02/21 2/24 3/10 03/15                                  Modalities 2/15 02/21 2/24 3/10 03/15        Prn                              Samantha Hill, PTA

## 2022-03-15 NOTE — PROGRESS NOTES
Daily Note     Today's date: 3/15/2022  Patient name: Iain Cavazos  : 1960  MRN: [id number]  Referring provider: Allan Santiago MD  Dx:   Encounter Diagnosis     ICD-10-CM    1  Lumbar radiculopathy  M54 16    2  Acute left-sided low back pain with left-sided sciatica  M54 42        Start Time: 1030        Subjective:     Objective: See treatment diary below    Assessment: Tolerated treatment fair  Pt demonstrated fatigue post treatment, exhibited good technique with therapeutic exercises and would benefit from continued PT to progress core stability and glute strength/stability to improve lumbopelvic stability and thus pt's tolerance to prolonged standing/ambulation daily  Plan: Cont /c PT POC  Progress as tolerated  Precautions: Type 2 Diabetes; HTN; LBP; hyperlipidemia; benign prostate hyperplasia; depression    Date 2/15 02/21 2/24 3/10 03/15        Visit 1 2 3 4 5        FOTO IE            Re-Eval IE                Manuals 2/15 02/21 2/24 3/10 03/15                                                            Neuro Re-Ed 2/15 02/21 2/24 3/10 03/15        TVA Contractions    10x5"         TVA + March    15x ea         Supine march nv  15x ea          Dead bugs             Bird dogs nv  10x ea alt LE only          Mod front plank             Mod side plank             Slump nerve glides             Palloff press             Tband rows + Squat             Tband ext + March             SLS  nv  2x20" Tandem                       Ther Ex 2/15 02/21 2/24 3/10 03/15        Bridges (+march*) nv 20x 20x          SL Hip Abd nv            Piriformis (S) nv 30"x3ea 4x20" 4x20"         Seated HS (S)  nv 30"x3ea  4x20" supine         Seated T/S Ext  chair nv 5"x10 15x5" 20x3"         DL Leg press             KIM with SAG  wall                          Ther Activity 2/15 02/21 2/24 3/10 03/15        TM Walking nv 4' 0 7mph           Recumbent bike   8' L0 5' L0         Sidesteps nv GTB 2'  BTB 2' Monster walks             Mini squats nv            Repeated STS, Airex nv 15x 2x10 No Airex          Step Ups             Pt Edu AL SP AL AL                      Gait Training 2/15 02/21 2/24 3/10 03/15                                  Modalities 2/15 02/21 2/24 3/10 03/15        Prn                              Tami Muniz, KARRIE

## 2022-03-15 NOTE — PROGRESS NOTES
Incoming Call / Chart Review:  3/15/2022    Brett Milner did receive a call from pt stating that he did get a call from [address] [place] [address] requesting copy of PC form  [address] informed pt that this was already completed and faxed to them yesterday  [facility] to f/u and keep pt updated  Pt thanked Brett Milner for her time  [facility] did also check on [place] [employer] website for an update  Status states that application was submitted to [employer] for financial review  This is a good indicator that pt was medically approved  [facility] will f/u  Next outreach is scheduled for 3/17/2022

## 2022-03-17 ENCOUNTER — OFFICE VISIT (OUTPATIENT)
Dept: PHYSICAL THERAPY | Facility: CLINIC | Age: 62
End: 2022-03-17
Payer: MEDICARE

## 2022-03-17 ENCOUNTER — PATIENT OUTREACH (OUTPATIENT)
Dept: FAMILY MEDICINE CLINIC | Facility: CLINIC | Age: 62
End: 2022-03-17

## 2022-03-17 DIAGNOSIS — M54.16 LUMBAR RADICULOPATHY: Primary | ICD-10-CM

## 2022-03-17 DIAGNOSIS — M54.42 ACUTE LEFT-SIDED LOW BACK PAIN WITH LEFT-SIDED SCIATICA: ICD-10-CM

## 2022-03-17 PROCEDURE — 97010 HOT OR COLD PACKS THERAPY: CPT

## 2022-03-17 PROCEDURE — 97530 THERAPEUTIC ACTIVITIES: CPT

## 2022-03-17 NOTE — PROGRESS NOTES
Daily Note     Today's date: 3/17/2022  Patient name: Jayshree Lott  : 1960  MRN: [id number]  Referring provider: Kishor Tinoco MD  Dx:   Encounter Diagnosis     ICD-10-CM    1  Lumbar radiculopathy  M54 16    2  Acute left-sided low back pain with left-sided sciatica  M54 42        Start Time: 1030  Stop Time: 1103  Total time in clinic (min): 33 minutes  Subjective:  Pt reports to Tx reporting he has been having increased spasming and stiffness in (L)LB that radiates down (L)LE since last night  Pt notes he took a warm bath /c baking soda and green alcohol around 0345 this AM which helped reduce some of his stiffness but reports to PT noting it has returned  Pt has inquiries re: use of TENS for pain today  Objective: See treatment diary below    Assessment: Pt tolerated Tx fair  Given pt's recent medical history of increased urinary urge, did not use TENS today - instead utilzed MHP in (R) SL'ing position for pt positional preference which pt reported (+) response to; however, notes he was not feeling up to exercises today and defers further Tx interventions  Provided pt education re: progressing program as tolerated and resuming held TE NV  Pt would benefit from continued PT to build and progress core and glute strength/stability in order to improve lumbopelvic stability and thus pt's tolerance to prolonged standing/ambulation daily  Plan: Cont /c PT POC  Progress as tolerated  Precautions: Type 2 Diabetes; HTN; LBP; hyperlipidemia; benign prostate hyperplasia; depression    Date 2/15 02/21 2/24 3/10 03/15 03/17       Visit 1 2 3 4 5 6       FOTO IE            Re-Eval IE                Manuals 2/15 02/21 2/24 3/10 03/15 03/17                                                           Neuro Re-Ed 2/15 02/21 2/24 3/10 03/15 03/17       TVA Contractions    10x5"         TVA + March    15x ea         Supine march nv  15x ea          Dead bugs             Bird dogs nv  10x ea alt LE only Mod front plank             Mod side plank             Slump nerve glides             Palloff press             Tband rows + Squat     blck 20x reg        Tband ext + March     blk 20x reg        SLS  nv  2x20" Tandem                       Ther Ex 2/15 02/21 2/24 3/10 03/15 03/17       Bridges (+march*) nv 20x 20x  15x        SL Hip Abd nv            Piriformis (S) nv 30"x3ea 4x20" 4x20" 10"x10 supine        Seated HS (S)  nv 30"x3ea  4x20" supine         Seated T/S Ext  chair nv 5"x10 15x5" 20x3"         DL Leg press     S3 F7  65#10  55#x10        KIM with SAG  wall                          Ther Activity 2/15 02/21 2/24 3/10 03/15 03/17       TM Walking nv 4' 0 7mph           Recumbent bike   8' L0 5' L0 8' L0 8' L0       Sidesteps nv GTB 2'  BTB 2'          Monster walks             Mini squats nv            Repeated STS, Airex nv 15x 2x10 No Airex          Step Ups             Pt Edu AL SP AL AL SP SP                    Gait Training 2/15 02/21 2/24 3/10 03/15 03/17                                 Modalities 2/15 02/21 2/24 3/10 03/15 03/17       [facility]      15'                        Neyda Gavin PTA

## 2022-03-17 NOTE — PROGRESS NOTES
Outgoing Call / Chart Review:  3/17/2022    [facility] did complete a chart review and checked on waiver status on [place] [employer] website  Status states that [place] [employer] is waiting on [employer] assessment to be completed  [facility] did call pt and discussed this with him  Pt stated that he did receive a call from them and they will be calling him back on Monday to complete an evaluation  [facility] asked pt to keep her informed in the event he receives a letter from [employer] requesting financial documents to complete application  Pt agreed  Pt asked [facility] if she could assist him in applying for rental assistance as he is behind on rent  [facility] informed pt he will need to provide financial documents and proof from jordan that he is behind on rent  Pt agreed to this and an appointment was scheduled to apply       Next outreach is scheduled for 3/21/22 at [facility], at [facility]

## 2022-03-18 ENCOUNTER — OFFICE VISIT (OUTPATIENT)
Dept: FAMILY MEDICINE CLINIC | Facility: CLINIC | Age: 62
End: 2022-03-18

## 2022-03-18 VITALS
TEMPERATURE: 98 F | SYSTOLIC BLOOD PRESSURE: 126 MMHG | WEIGHT: 194 LBS | RESPIRATION RATE: 16 BRPM | OXYGEN SATURATION: 98 % | HEART RATE: 83 BPM | DIASTOLIC BLOOD PRESSURE: 86 MMHG | HEIGHT: 67 IN | BODY MASS INDEX: 30.45 KG/M2

## 2022-03-18 DIAGNOSIS — H93.12 TINNITUS OF LEFT EAR: ICD-10-CM

## 2022-03-18 DIAGNOSIS — M54.30 SCIATICA, UNSPECIFIED LATERALITY: ICD-10-CM

## 2022-03-18 DIAGNOSIS — M62.838 NECK MUSCLE SPASM: Primary | ICD-10-CM

## 2022-03-18 DIAGNOSIS — M54.50 CHRONIC BILATERAL LOW BACK PAIN WITHOUT SCIATICA: ICD-10-CM

## 2022-03-18 DIAGNOSIS — G89.29 CHRONIC BILATERAL LOW BACK PAIN WITHOUT SCIATICA: ICD-10-CM

## 2022-03-18 PROCEDURE — 99213 OFFICE O/P EST LOW 20 MIN: CPT | Performed by: FAMILY MEDICINE

## 2022-03-18 RX ORDER — LIDOCAINE 50 MG/G
1 PATCH TOPICAL DAILY
Qty: 10 PATCH | Refills: 0 | Status: SHIPPED | OUTPATIENT
Start: 2022-03-18 | End: 2022-06-28 | Stop reason: SDUPTHER

## 2022-03-18 RX ORDER — CYCLOBENZAPRINE HCL 10 MG
10 TABLET ORAL
Qty: 30 TABLET | Refills: 0 | Status: SHIPPED | OUTPATIENT
Start: 2022-03-18

## 2022-03-18 NOTE — PROGRESS NOTES
Assessment/Plan:    Neck muscle spasm  -symptoms of left sided neck pain associated with headache  -onset 2 days ago  -sleeps with head laying to the right side due to left hip pain  -physical exam remarkable for left sided tense shoulder muscle along the neck area as well  -discussed with patient symptoms of neck spasms  -recommendations to continue with tylenol PRN and chronic medications for back pain gabapentin as prescribed  -on chronic opioid treatment for back pain as well; following with multidisciplinary team for this  -can apply lidocaine patches, refilled  -also flexeril at night as needed for muscle relaxant  -if symptoms persists or worsen to notify back    Tinnitus of left ear  -left sided 'buzzing' sound  -physical exam: ear canal normal, no wax impacted noted, some residual wax in the canal; normal tympanic membrane bilateral ears  -discussed with patient no blocking or signs of infection in ears  -discussed etiology of tinnitus and modalities for managing: keeping some background noise in the back  -if persists, can consider ENT referral        Diagnoses and all orders for this visit:    Neck muscle spasm  -     cyclobenzaprine (FLEXERIL) 10 mg tablet; Take 1 tablet (10 mg total) by mouth daily at bedtime as needed for muscle spasms  -     lidocaine (Lidoderm) 5 %; Apply 1 patch topically daily Remove & Discard patch within 12 hours or as directed by MD    Sciatica, unspecified laterality    Chronic bilateral low back pain without sciatica    Tinnitus of left ear          Subjective:      Patient ID: Eliezer Mojica is a 62 y o  male  Case of 61 y/o male who came today for same day evaluation of left sided neck pain  Onset of symptoms 2 days ago, associated with headache  Sleeps with head laying to the right side due to chronic left side back pain  Taken tylenol PRN for symptoms relief  Denies blurry vision associated with symptoms  He also has tension in the back as well      He states to have also symptoms of depression, where he forgets at times that leaves the stove on and lost some focus at times  He has been compliant with his medication Effexor for this symptoms  Flexeril--> Not recall last time when took medication  lives with daughter at this time who is in school right know  No nausea, no vomiting  Lidocaine patch works for back --> does not have refills  Has Some dizziness at times when standing up    Some tinnitus in the left ear--> no loud noise exposure in the past that he recalls    Chronic back pain: Gabapentin 800 mg TID and ibuprofen as needed; also hydrocodone 3 times a day        The following portions of the patient's history were reviewed and updated as appropriate: allergies, current medications, past family history, past medical history, past social history, past surgical history and problem list     Review of Systems   Constitutional: Negative for fever  HENT: Positive for tinnitus (left ear)  Respiratory: Negative for cough, shortness of breath and wheezing  Cardiovascular: Negative for chest pain, palpitations and leg swelling  Musculoskeletal: Positive for back pain ( chronic)  Left side neck pain   Skin: Negative  Neurological: Positive for headaches  Psychiatric/Behavioral: The patient is not nervous/anxious  Objective:      /86 (BP Location: Right arm, Patient Position: Sitting, Cuff Size: Large)   Pulse 83   Temp 98 °F (36 7 °C) (Temporal)   Resp 16   Ht 5' 7" (1 702 m)   Wt 88 kg (194 lb)   SpO2 98%   BMI 30 38 kg/m²          Physical Exam  Vitals reviewed  Constitutional:       General: He is not in acute distress  Appearance: Normal appearance  He is not ill-appearing, toxic-appearing or diaphoretic  HENT:      Right Ear: Tympanic membrane, ear canal and external ear normal  There is no impacted cerumen  Left Ear: Tympanic membrane, ear canal and external ear normal  There is no impacted cerumen        Ears: Comments: Residual wax in ear canals bilaterally, not impacted  Eyes:      Extraocular Movements: Extraocular movements intact  Pulmonary:      Effort: Pulmonary effort is normal    Musculoskeletal:      Comments: Left shoulder muscle tension and tenderness in left side of the neck    Skin:     General: Skin is warm  Neurological:      Mental Status: He is alert  Mental status is at baseline     Psychiatric:      Comments: A little depressed, conversational

## 2022-03-19 ENCOUNTER — APPOINTMENT (EMERGENCY)
Dept: RADIOLOGY | Facility: HOSPITAL | Age: 62
End: 2022-03-19
Payer: MEDICARE

## 2022-03-19 ENCOUNTER — HOSPITAL ENCOUNTER (EMERGENCY)
Facility: HOSPITAL | Age: 62
Discharge: HOME | End: 2022-03-19
Attending: EMERGENCY MEDICINE | Admitting: EMERGENCY MEDICINE
Payer: MEDICARE

## 2022-03-19 VITALS
RESPIRATION RATE: 18 BRPM | HEART RATE: 81 BPM | WEIGHT: 196.43 LBS | BODY MASS INDEX: 30.77 KG/M2 | DIASTOLIC BLOOD PRESSURE: 71 MMHG | OXYGEN SATURATION: 97 % | SYSTOLIC BLOOD PRESSURE: 108 MMHG | TEMPERATURE: 97.8 F

## 2022-03-19 DIAGNOSIS — R10.9 ABDOMINAL PAIN: Primary | ICD-10-CM

## 2022-03-19 DIAGNOSIS — M62.838 MUSCLE SPASM: ICD-10-CM

## 2022-03-19 LAB
ALBUMIN SERPL BCP-MCNC: 4.2 G/DL (ref 3–5.2)
ALP SERPL-CCNC: 57 U/L (ref 43–122)
ALT SERPL W P-5'-P-CCNC: 21 U/L
ANION GAP SERPL CALCULATED.3IONS-SCNC: 5 MMOL/L (ref 5–14)
AST SERPL W P-5'-P-CCNC: 26 U/L (ref 17–59)
BACTERIA UR QL AUTO: NORMAL /HPF
BASOPHILS # BLD AUTO: 0 THOUSANDS/ΜL (ref 0–0.1)
BASOPHILS NFR BLD AUTO: 1 % (ref 0–1)
BILIRUB SERPL-MCNC: 0.7 MG/DL
BILIRUB UR QL STRIP: NEGATIVE
BUN SERPL-MCNC: 18 MG/DL (ref 5–25)
CALCIUM SERPL-MCNC: 8.9 MG/DL (ref 8.4–10.2)
CARDIAC TROPONIN I PNL SERPL HS: 5 NG/L (ref 8–18)
CHLORIDE SERPL-SCNC: 104 MMOL/L (ref 97–108)
CLARITY UR: CLEAR
CO2 SERPL-SCNC: 29 MMOL/L (ref 22–30)
COLOR UR: ABNORMAL
CREAT SERPL-MCNC: 1.13 MG/DL (ref 0.7–1.5)
EOSINOPHIL # BLD AUTO: 0.1 THOUSAND/ΜL (ref 0–0.4)
EOSINOPHIL NFR BLD AUTO: 3 % (ref 0–6)
ERYTHROCYTE [DISTWIDTH] IN BLOOD BY AUTOMATED COUNT: 14.1 %
GFR SERPL CREATININE-BSD FRML MDRD: 70 ML/MIN/1.73SQ M
GLUCOSE SERPL-MCNC: 128 MG/DL (ref 70–99)
GLUCOSE UR STRIP-MCNC: NEGATIVE MG/DL
HCT VFR BLD AUTO: 40.5 % (ref 41–53)
HGB BLD-MCNC: 13.9 G/DL (ref 13.5–17.5)
HGB UR QL STRIP.AUTO: NEGATIVE
KETONES UR STRIP-MCNC: NEGATIVE MG/DL
LACTATE SERPL-SCNC: 1.2 MMOL/L (ref 0.7–2)
LEUKOCYTE ESTERASE UR QL STRIP: NEGATIVE
LIPASE SERPL-CCNC: 112 U/L (ref 23–300)
LYMPHOCYTES # BLD AUTO: 2 THOUSANDS/ΜL (ref 0.5–4)
LYMPHOCYTES NFR BLD AUTO: 51 % (ref 25–45)
MCH RBC QN AUTO: 29.8 PG (ref 26–34)
MCHC RBC AUTO-ENTMCNC: 34.5 G/DL (ref 31–36)
MCV RBC AUTO: 86 FL (ref 80–100)
MONOCYTES # BLD AUTO: 0.3 THOUSAND/ΜL (ref 0.2–0.9)
MONOCYTES NFR BLD AUTO: 9 % (ref 1–10)
NEUTROPHILS # BLD AUTO: 1.5 THOUSANDS/ΜL (ref 1.8–7.8)
NEUTS SEG NFR BLD AUTO: 37 % (ref 45–65)
NITRITE UR QL STRIP: NEGATIVE
NON-SQ EPI CELLS URNS QL MICRO: NORMAL /HPF
NT-PROBNP SERPL-MCNC: 30.6 PG/ML (ref 0–299)
OTHER STN SPEC: NORMAL
PH UR STRIP.AUTO: 7 [PH]
PLATELET # BLD AUTO: 243 THOUSANDS/UL (ref 150–450)
PMV BLD AUTO: 7.6 FL (ref 8.9–12.7)
POTASSIUM SERPL-SCNC: 4.3 MMOL/L (ref 3.6–5)
PROT SERPL-MCNC: 7.9 G/DL (ref 5.9–8.4)
PROT UR STRIP-MCNC: ABNORMAL MG/DL
RBC # BLD AUTO: 4.69 MILLION/UL (ref 4.5–5.9)
RBC #/AREA URNS AUTO: NORMAL /HPF
SODIUM SERPL-SCNC: 138 MMOL/L (ref 137–147)
SP GR UR STRIP.AUTO: 1.01 (ref 1–1.04)
UROBILINOGEN UA: NEGATIVE MG/DL
WBC # BLD AUTO: 3.9 THOUSAND/UL (ref 4.5–11)
WBC #/AREA URNS AUTO: NORMAL /HPF

## 2022-03-19 PROCEDURE — 96361 HYDRATE IV INFUSION ADD-ON: CPT

## 2022-03-19 PROCEDURE — 83605 ASSAY OF LACTIC ACID: CPT | Performed by: EMERGENCY MEDICINE

## 2022-03-19 PROCEDURE — 71045 X-RAY EXAM CHEST 1 VIEW: CPT

## 2022-03-19 PROCEDURE — 96374 THER/PROPH/DIAG INJ IV PUSH: CPT

## 2022-03-19 PROCEDURE — 93005 ELECTROCARDIOGRAM TRACING: CPT

## 2022-03-19 PROCEDURE — 99285 EMERGENCY DEPT VISIT HI MDM: CPT | Performed by: EMERGENCY MEDICINE

## 2022-03-19 PROCEDURE — 99284 EMERGENCY DEPT VISIT MOD MDM: CPT

## 2022-03-19 PROCEDURE — 80053 COMPREHEN METABOLIC PANEL: CPT | Performed by: EMERGENCY MEDICINE

## 2022-03-19 PROCEDURE — 85025 COMPLETE CBC W/AUTO DIFF WBC: CPT | Performed by: EMERGENCY MEDICINE

## 2022-03-19 PROCEDURE — 83690 ASSAY OF LIPASE: CPT | Performed by: EMERGENCY MEDICINE

## 2022-03-19 PROCEDURE — 84484 ASSAY OF TROPONIN QUANT: CPT | Performed by: EMERGENCY MEDICINE

## 2022-03-19 PROCEDURE — 83880 ASSAY OF NATRIURETIC PEPTIDE: CPT | Performed by: EMERGENCY MEDICINE

## 2022-03-19 PROCEDURE — 36415 COLL VENOUS BLD VENIPUNCTURE: CPT | Performed by: EMERGENCY MEDICINE

## 2022-03-19 PROCEDURE — 81001 URINALYSIS AUTO W/SCOPE: CPT | Performed by: EMERGENCY MEDICINE

## 2022-03-19 RX ORDER — MORPHINE SULFATE 4 MG/ML
4 INJECTION, SOLUTION INTRAMUSCULAR; INTRAVENOUS ONCE
Status: COMPLETED | OUTPATIENT
Start: 2022-03-19 | End: 2022-03-19

## 2022-03-19 RX ADMIN — SODIUM CHLORIDE 1000 ML: 0.9 INJECTION, SOLUTION INTRAVENOUS at 13:04

## 2022-03-19 RX ADMIN — MORPHINE SULFATE 4 MG: 4 INJECTION INTRAVENOUS at 13:03

## 2022-03-19 NOTE — ASSESSMENT & PLAN NOTE
-left sided 'buzzing' sound  -physical exam: ear canal normal, no wax impacted noted, some residual wax in the canal; normal tympanic membrane bilateral ears  -discussed with patient no blocking or signs of infection in ears  -discussed etiology of tinnitus and modalities for managing: keeping some background noise in the back  -if persists, can consider ENT referral

## 2022-03-19 NOTE — ASSESSMENT & PLAN NOTE
-symptoms of left sided neck pain associated with headache  -onset 2 days ago  -sleeps with head laying to the right side due to left hip pain  -physical exam remarkable for left sided tense shoulder muscle along the neck area as well  -discussed with patient symptoms of neck spasms  -recommendations to continue with tylenol PRN and chronic medications for back pain gabapentin as prescribed  -on chronic opioid treatment for back pain as well; following with multidisciplinary team for this  -can apply lidocaine patches, refilled  -also flexeril at night as needed for muscle relaxant  -if symptoms persists or worsen to notify back

## 2022-03-19 NOTE — ED PROVIDER NOTES
History  Chief Complaint   Patient presents with    Epigastric Pain     states it makes it hard to breathe      Patient is a 72-year-old male, history of diabetes, hypertension  Presents the emergency room for concerns of back muscle spasms and chest tightness as well as epigastric abdominal pain  No associated nausea vomiting diarrhea  He states he symptoms in present for several days, he was seen by his family practice provider yesterday, given Lidoderm patches as well as Flexeril prescription  He states he took his 1st dose last night without any significant relief  Denies any focal weakness numbness or tingling  No nausea or diaphoresis  He states he has had trouble breathing since he had COVID approximately 2 years ago  Denies history of DVT or PE  Prior to Admission Medications   Prescriptions Last Dose Informant Patient Reported? Taking?    Bioflavonoid Products (RA Vitamin C CR) TBCR  Self Yes Yes   Sig: take 1 tablet by mouth twice a day for 14 days   Blood Glucose Monitoring Suppl (OneTouch Verio) w/Device KIT   No Yes   Sig: Use daily   Blood Pressure Monitoring (Blood Pressure Monitor/Arm) EMILY   No Yes   Sig: Use daily   HYDROcodone-acetaminophen (NORCO) 7 5-325 mg per tablet   No Yes   Sig: Take 1 tablet by mouth every 8 (eight) hours as needed for pain For ongoing pain Max Daily Amount: 3 tablets   Lancet Devices (ONE TOUCH DELICA LANCING DEV) MISC   No Yes   Sig: Use daily   Multiple Vitamin (multivitamin) capsule   No No   Sig: Take 1 capsule by mouth daily for 14 days   OneTouch Delica Lancets 22Q MISC   No Yes   Sig: Use daily   aluminum-magnesium hydroxide-simethicone (MAALOX) 200-200-20 MG/5ML SUSP  Self No Yes   Sig: Take 15 mL by mouth 4 (four) times a day (before meals and at bedtime)   amLODIPine (NORVASC) 10 mg tablet   No Yes   Sig: Take 1 tablet (10 mg total) by mouth daily   atorvastatin (LIPITOR) 20 mg tablet  Self No Yes   Sig: Take 1 tablet (20 mg total) by mouth daily   budesonide-formoterol (Symbicort) 160-4 5 mcg/act inhaler   No Yes   Sig: Inhale 2 puffs 2 (two) times a day Rinse mouth after use     cyclobenzaprine (FLEXERIL) 10 mg tablet   No Yes   Sig: Take 1 tablet (10 mg total) by mouth daily at bedtime as needed for muscle spasms   famotidine (PEPCID) 20 mg tablet   No Yes   Sig: Take 1 tablet (20 mg total) by mouth 2 (two) times a day As needed for acid reflux   fluticasone (FLONASE) 50 mcg/act nasal spray   No Yes   Si spray into each nostril daily   gabapentin (NEURONTIN) 800 mg tablet   No Yes   Sig: Take 1 tablet (800 mg total) by mouth 3 (three) times a day   glucose blood (OneTouch Verio) test strip   No Yes   Sig: Check blood sugar daily   ibuprofen (MOTRIN) 800 mg tablet   No Yes   Sig: Take 0 5 tablets (400 mg total) by mouth every 6 (six) hours as needed for mild pain   lidocaine (Lidoderm) 5 %   No Yes   Sig: Apply 1 patch topically daily Remove & Discard patch within 12 hours or as directed by MD   lidocaine (XYLOCAINE) 2 % topical gel  Self No Yes   Sig: Apply topically 3 (three) times a day   metFORMIN (GLUCOPHAGE) 500 mg tablet   No Yes   Sig: Take 1 tablet (500 mg total) by mouth daily   tamsulosin (FLOMAX) 0 4 mg   No Yes   Sig: Take 1 capsule (0 4 mg total) by mouth daily with dinner   venlafaxine (EFFEXOR-XR) 150 mg 24 hr capsule   No Yes   Sig: Take 1 capsule (150 mg total) by mouth daily with breakfast   zolpidem (AMBIEN) 5 mg tablet   No Yes   Sig: Take 1 tablet (5 mg total) by mouth daily at bedtime as needed for sleep      Facility-Administered Medications: None       Past Medical History:   Diagnosis Date    Asthma     Back pain     Back pain     Diabetes mellitus ([address] )     Hyperlipidemia     Hypertension        Past Surgical History:   Procedure Laterality Date    CYST REMOVAL         Family History   Problem Relation Age of Onset    Hypertension Mother     Diabetes Mother     Cancer Father     Diabetes Family     Hypertension Family      I have reviewed and agree with the history as documented  E-Cigarette/Vaping    E-Cigarette Use Never User      E-Cigarette/Vaping Substances    Nicotine No     THC No     CBD No     Flavoring No     Other No     Unknown No      Social History     Tobacco Use    Smoking status: Never Smoker    Smokeless tobacco: Never Used   Vaping Use    Vaping Use: Never used   Substance Use Topics    Alcohol use: Not Currently     Comment: rare    Drug use: Never       Review of Systems   Constitutional: Negative  Negative for chills and fever  HENT: Negative  Negative for rhinorrhea, sore throat, trouble swallowing and voice change  Eyes: Negative  Negative for pain and visual disturbance  Respiratory: Positive for chest tightness and shortness of breath  Negative for cough and wheezing  Cardiovascular: Negative  Negative for chest pain and palpitations  Gastrointestinal: Negative for abdominal pain, diarrhea, nausea and vomiting  Genitourinary: Negative  Negative for dysuria and frequency  Musculoskeletal: Positive for arthralgias, back pain and myalgias  Negative for neck pain and neck stiffness  Skin: Negative  Negative for rash  Neurological: Negative  Negative for dizziness, speech difficulty, weakness, light-headedness and numbness  Physical Exam  Physical Exam  Vitals and nursing note reviewed  Constitutional:       General: He is not in acute distress  Appearance: He is well-developed  HENT:      Head: Normocephalic and atraumatic  Eyes:      Conjunctiva/sclera: Conjunctivae normal       Pupils: Pupils are equal, round, and reactive to light  Neck:      Trachea: No tracheal deviation  Cardiovascular:      Rate and Rhythm: Normal rate and regular rhythm  Pulmonary:      Effort: Pulmonary effort is normal  No respiratory distress  Breath sounds: Normal breath sounds  No wheezing or rales     Abdominal:      General: Bowel sounds are normal  There is no distension  Palpations: Abdomen is soft  Tenderness: There is no abdominal tenderness  There is no guarding or rebound  Musculoskeletal:         General: No tenderness or deformity  Normal range of motion  Cervical back: Normal range of motion and neck supple  Skin:     General: Skin is warm and dry  Capillary Refill: Capillary refill takes less than 2 seconds  Findings: No rash  Neurological:      Mental Status: He is alert and oriented to person, place, and time     Psychiatric:         Behavior: Behavior normal          Vital Signs  ED Triage Vitals   Temperature Pulse Respirations Blood Pressure SpO2   03/19/22 1227 03/19/22 1227 03/19/22 1227 03/19/22 1227 03/19/22 1227   97 8 °F (36 6 °C) 94 18 140/82 98 %      Temp Source Heart Rate Source Patient Position - Orthostatic VS BP Location FiO2 (%)   03/19/22 1227 03/19/22 1227 03/19/22 1227 03/19/22 1227 --   Tympanic Monitor Sitting Left arm       Pain Score       03/19/22 1303       10 - Worst Possible Pain           Vitals:    03/19/22 1227 03/19/22 1346   BP: 140/82 108/71   Pulse: 94 81   Patient Position - Orthostatic VS: Sitting          Visual Acuity      ED Medications  Medications   sodium chloride 0 9 % bolus 1,000 mL (1,000 mL Intravenous New Bag 3/19/22 1304)   morphine (PF) 4 mg/mL injection 4 mg (4 mg Intravenous Given 3/19/22 1303)       Diagnostic Studies  Results Reviewed     Procedure Component Value Units Date/Time    High Sensitivity Troponin I Random [[id number]]  (Abnormal) Collected: 03/19/22 1258    Lab Status: Final result Specimen: Blood from Arm, Left Updated: 03/19/22 1337     HS TnI random 5 ng/L     NT-BNP PRO [[id number]]  (Normal) Collected: 03/19/22 1258    Lab Status: Final result Specimen: Blood from Arm, Left Updated: 03/19/22 1328     NT-proBNP 30 6 pg/mL     CBC and differential [[id number]]  (Abnormal) Collected: 03/19/22 1258    Lab Status: Final result Specimen: Blood from Arm, Left Updated: 03/19/22 1324     WBC 3 90 Thousand/uL      RBC 4 69 Million/uL      Hemoglobin 13 9 g/dL      Hematocrit 40 5 %      MCV 86 fL      MCH 29 8 pg      MCHC 34 5 g/dL      RDW 14 1 %      MPV 7 6 fL      Platelets 910 Thousands/uL      Neutrophils Relative 37 %      Lymphocytes Relative 51 %      Monocytes Relative 9 %      Eosinophils Relative 3 %      Basophils Relative 1 %      Neutrophils Absolute 1 50 Thousands/µL      Lymphocytes Absolute 2 00 Thousands/µL      Monocytes Absolute 0 30 Thousand/µL      Eosinophils Absolute 0 10 Thousand/µL      Basophils Absolute 0 00 Thousands/µL     Lipase [[id number]]  (Normal) Collected: 03/19/22 1258    Lab Status: Final result Specimen: Blood from Arm, Left Updated: 03/19/22 1320     Lipase 112 u/L     Lactic acid [[id number]]  (Normal) Collected: 03/19/22 1258    Lab Status: Final result Specimen: Blood from Arm, Left Updated: 03/19/22 1320     LACTIC ACID 1 2 mmol/L     Narrative:      Result may be elevated if tourniquet was used during collection      Comprehensive metabolic panel [[id number]]  (Abnormal) Collected: 03/19/22 1258    Lab Status: Final result Specimen: Blood from Arm, Left Updated: 03/19/22 1320     Sodium 138 mmol/L      Potassium 4 3 mmol/L      Chloride 104 mmol/L      CO2 29 mmol/L      ANION GAP 5 mmol/L      BUN 18 mg/dL      Creatinine 1 13 mg/dL      Glucose 128 mg/dL      Calcium 8 9 mg/dL      AST 26 U/L      ALT 21 U/L      Alkaline Phosphatase 57 U/L      Total Protein 7 9 g/dL      Albumin 4 2 g/dL      Total Bilirubin 0 70 mg/dL      eGFR 70 ml/min/1 73sq m     Narrative:      [place] guidelines for Chronic Kidney Disease (CKD):     Stage 1 with normal or high GFR (GFR > 90 mL/min/1 73 square meters)    Stage 2 Mild CKD (GFR = 60-89 mL/min/1 73 square meters)    Stage 3A Moderate CKD (GFR = 45-59 mL/min/1 73 square meters)    Stage 3B Moderate CKD (GFR = 30-44 mL/min/1 73 square meters)   Stage 4 Severe CKD (GFR = 15-29 mL/min/1 73 square meters)    Stage 5 End Stage CKD (GFR <15 mL/min/1 73 square meters)  Note: GFR calculation is accurate only with a steady state creatinine    UA (URINE) with reflex to Scope [[id number]]     Lab Status: No result Specimen: Urine                  XR chest 1 view portable   ED Interpretation by Jolanta Rodríguez DO (03/19 1334)   No acute cardiopulmonary disease  Procedures  Procedures         ED Course  ED Course as of 03/19/22 1354   Sat Mar 19, 2022   1331 Procedure Note: EKG  Date/Time: 03/19/22 1:31 PM   Performed by: Juancarlos aCmarena by: Asael Reeves  ECG interpreted by me, the ED Provider: yes   The EKG demonstrates:  Rate 84  Rhythm sinus  QTc 439  No ST elevations/depressions       1348 Patient states his symptoms resolved  Labs okay  Aware the need for outpatient follow-up                                             MDM  Number of Diagnoses or Management Options  Abdominal pain  Muscle spasm  Diagnosis management comments: I have reviewed the patient's visit and any testing done in the emergency department  They have verbalized their understanding of any testing done today and have no further questions or concerns regarding their care in the emergency room  They will follow up with their primary care physician as well as with any specialist in their discharge instructions  Strict return precautions were discussed         Amount and/or Complexity of Data Reviewed  Clinical lab tests: ordered and reviewed  Tests in the radiology section of CPT®: reviewed  Tests in the medicine section of CPT®: ordered and reviewed  Independent visualization of images, tracings, or specimens: yes        Disposition  Final diagnoses:   Abdominal pain   Muscle spasm     Time reflects when diagnosis was documented in both MDM as applicable and the Disposition within this note     Time User Action Codes Description Comment    3/19/2022  1:52 PM Natalie Pugh Add [R10 9] Abdominal pain     3/19/2022  1:53 PM Natalie Pugh Add [T16 406] Muscle spasm       ED Disposition     ED Disposition Condition Date/Time Comment    Discharge Stable Sat Mar 19, 2022  1:51 PM Rashard Kayli discharge to home/self care  Follow-up Information     Follow up With Specialties Details Why Contact Info Additional [address] In 1 week  [address], [address] [facility] [address]  [address], [address], [address] [place], [place], [address] [facility]   [address]  [place] [id number]  Charissa David [address] [facility], [address], [address], [address] [phone]          Patient's Medications   Discharge Prescriptions    No medications on file       No discharge procedures on file      PDMP Review       Value Time User    PDMP Reviewed  Yes 3/3/2022  4:12 PM [address] [place] [address]          ED Provider  Electronically Signed by           Roula Garcia DO  03/19/22 2229

## 2022-03-19 NOTE — Clinical Note
Jayshree Lott was seen and treated in our emergency department on 3/19/2022  Diagnosis:     Marv    He may return on this date: 03/22/2022         If you have any questions or concerns, please don't hesitate to call        Yoni Bruno, DO    ______________________________           _______________          _______________  Hospital Representative                              Date                                Time

## 2022-03-21 ENCOUNTER — PATIENT OUTREACH (OUTPATIENT)
Dept: FAMILY MEDICINE CLINIC | Facility: CLINIC | Age: 62
End: 2022-03-21

## 2022-03-21 LAB
ATRIAL RATE: 84 BPM
P AXIS: 59 DEGREES
PR INTERVAL: 178 MS
QRS AXIS: 60 DEGREES
QRSD INTERVAL: 84 MS
QT INTERVAL: 372 MS
QTC INTERVAL: 439 MS
T WAVE AXIS: 15 DEGREES
VENTRICULAR RATE: 84 BPM

## 2022-03-21 PROCEDURE — 93010 ELECTROCARDIOGRAM REPORT: CPT

## 2022-03-21 NOTE — PROGRESS NOTES
In Person:  3/21/2022    [facility] did meet with pt a Claude Miguel  Rental assistance application was completed for [employer], however pt still needs to provide copy of a utility bill and a recent bank statement  CMOC to f/u  CMOC did also review DPW letter for waiver service  CMOC made copies of all documents requested and scanned to [facility] for review  Pt is still in need of bank statements for past two years and tax documents for past five years  CMOC to f/u  Next outreach is scheduled for 3/24/2022

## 2022-03-23 DIAGNOSIS — M54.16 LUMBAR RADICULOPATHY: Primary | ICD-10-CM

## 2022-03-24 ENCOUNTER — PATIENT OUTREACH (OUTPATIENT)
Dept: FAMILY MEDICINE CLINIC | Facility: CLINIC | Age: 62
End: 2022-03-24

## 2022-03-24 NOTE — PROGRESS NOTES
Outgoing Call:  3/24/2022    [facility] did call pt to discuss documents still needed to complete waiver application  Pt stated that he did receive all of his bank statements and had his sister mail them put to [facility] informed pt it could take a coupe of weeks before they are reviewed  CMOC will f/u with pt in one week  Will call DPW at that time for an update  Next outreach is scheduled for 3/31/2022

## 2022-03-25 ENCOUNTER — PATIENT OUTREACH (OUTPATIENT)
Dept: FAMILY MEDICINE CLINIC | Facility: CLINIC | Age: 62
End: 2022-03-25

## 2022-03-25 NOTE — PROGRESS NOTES
Incoming / Gerhardt Canny Calls:  3/25/2022    CMOC did receive a call from pt stating that he received another packet in the mail from his bank with additional bank statements  He stated that he will drop them off at [facility] for [facility] to fax to [facility] to complete waiver application  [facility] agreed this would be fine  CMOC did receive a message less than an hour after this call from  confirming pt did drop off forms  Shortly after this [facility] received another call from pt  Pt was extremely upset  He stated that he received a call from [address] him that his waver request was declined due to PCP completing Physician Certification form and recommending short term for length of care  CMOC expressed understanding  Pt stated that he will be going to [facility] to file a grievance as he feels he is in need of service and not being treated fairly  [facility] informed pt she will have his SW call him to discuss further  [facility] did call JEROD Gardner, and explained pt's concerns  She agreed to call pt and discuss his concerns  Next outreach is scheduled for 4/1/2022  Outgoing Calls:  [facility] did receive notification from [facility] [address], [address], stating that she did fax a revised PC form to MALIA FISHMAN along with a letter from Dr Erika Bourgeois stating that pt is in need of long term waiver services  CMOC did call pt and informed him of this  Pt was pleased with this  [facility] did call MALIA FISHMAN and informed them that a new form has been submitted along with letter  MALIA FISH[employer] rep stated that it will take up to 72 hours to upload into their system and [facility] should follow up next week  Next outreach is scheduled for 3/28/2022

## 2022-03-25 NOTE — PROGRESS NOTES
JEROD ALMARAZ received a call from [facility] regarding patient was denied for waiver services due to PCP completing the Physician Certification from and recommending short term for length care instead of long term  Stacy states patient is very upset and states he was planning to come to the office to file a grievance as he feels he is in need of service and not being 'treated fairly"  Stacy asked JEROD ALMARAZ to call patient to further discuss  JEROD ALMARAZ agreed to call patient  JEROD ALMARAZ called patient  Pt states he is very upset and he is already in the parking a lot of the practice and he want to talk to the Provider  JEROD ALMARAZ verbalized understanding and provided emotional support  Informs patient JEROD ALMARAZ could meet with him in the  and JEROD ALMARAZ would talk to the Medical Director and Practice Administrator so they could meet with him as well  Pt verbalized understanding  JEROD ALMARAZ discussed situation and patient's concerns with the Medical Director, Dr Serena Adkins would complete a chart review and agree to speak with the patient as well  JEROD ALMARAZ met with patient in   Pt states she would like to speak with the Practice Administrator  JEROD ALMARAZ talked to Isrrael Practice Administrator and discussed patient's concerns  Isrrael met with patient in his office privately  Dr Serena Rizzo also met with the patient  Dr Matt Grewal agree to complete a new Physician Certification as well a letter stating that patient is in need of long term waiver services  JEROD ALMARAZ faxed the new PC form and letter to [address] [place] [address] [phone]  Letter and PC was given to the front staff to be scanned into pt's chart  JEROD ALMARAZ notified [facility] Stacy and she agree to follow-up with MALIA NEWSOME CM received call from patient  JEROD ALMARAZ informs patient the letter and new PC was faxed and [facility] would follow-up  Pt thanked JEROD ALMARAZ for the help  Informs [facility] will continue to follow-up with him      JEROD ALMARAZ will remains available and will continue to follow-up

## 2022-03-27 ENCOUNTER — HOSPITAL ENCOUNTER (EMERGENCY)
Facility: HOSPITAL | Age: 62
Discharge: HOME | End: 2022-03-27
Attending: EMERGENCY MEDICINE
Payer: MEDICARE

## 2022-03-27 ENCOUNTER — APPOINTMENT (EMERGENCY)
Dept: RADIOLOGY | Facility: HOSPITAL | Age: 62
End: 2022-03-27
Payer: MEDICARE

## 2022-03-27 VITALS
WEIGHT: 196.21 LBS | SYSTOLIC BLOOD PRESSURE: 139 MMHG | BODY MASS INDEX: 30.73 KG/M2 | RESPIRATION RATE: 16 BRPM | HEART RATE: 80 BPM | DIASTOLIC BLOOD PRESSURE: 85 MMHG | OXYGEN SATURATION: 97 % | TEMPERATURE: 97.8 F

## 2022-03-27 DIAGNOSIS — R07.9 CHEST PAIN, UNSPECIFIED TYPE: Primary | ICD-10-CM

## 2022-03-27 DIAGNOSIS — R11.0 NAUSEA: ICD-10-CM

## 2022-03-27 DIAGNOSIS — M54.9 CHRONIC BACK PAIN: ICD-10-CM

## 2022-03-27 DIAGNOSIS — G89.29 CHRONIC BACK PAIN: ICD-10-CM

## 2022-03-27 LAB
ALBUMIN SERPL BCP-MCNC: 4.4 G/DL (ref 3–5.2)
ALP SERPL-CCNC: 58 U/L (ref 43–122)
ALT SERPL W P-5'-P-CCNC: 27 U/L
ANION GAP SERPL CALCULATED.3IONS-SCNC: 6 MMOL/L (ref 5–14)
AST SERPL W P-5'-P-CCNC: 26 U/L (ref 17–59)
ATRIAL RATE: 85 BPM
BASOPHILS # BLD AUTO: 0 THOUSANDS/ΜL (ref 0–0.1)
BASOPHILS NFR BLD AUTO: 0 % (ref 0–1)
BILIRUB SERPL-MCNC: 0.59 MG/DL
BUN SERPL-MCNC: 16 MG/DL (ref 5–25)
CALCIUM SERPL-MCNC: 9.4 MG/DL (ref 8.4–10.2)
CARDIAC TROPONIN I PNL SERPL HS: 4 NG/L
CHLORIDE SERPL-SCNC: 102 MMOL/L (ref 97–108)
CO2 SERPL-SCNC: 30 MMOL/L (ref 22–30)
CREAT SERPL-MCNC: 1.19 MG/DL (ref 0.7–1.5)
D DIMER PPP FEU-MCNC: 0.36 UG/ML FEU
EOSINOPHIL # BLD AUTO: 0.1 THOUSAND/ΜL (ref 0–0.4)
EOSINOPHIL NFR BLD AUTO: 2 % (ref 0–6)
ERYTHROCYTE [DISTWIDTH] IN BLOOD BY AUTOMATED COUNT: 14.4 %
GFR SERPL CREATININE-BSD FRML MDRD: 65 ML/MIN/1.73SQ M
GLUCOSE SERPL-MCNC: 147 MG/DL (ref 70–99)
HCT VFR BLD AUTO: 42.9 % (ref 41–53)
HGB BLD-MCNC: 14.9 G/DL (ref 13.5–17.5)
LIPASE SERPL-CCNC: 110 U/L (ref 23–300)
LYMPHOCYTES # BLD AUTO: 2.4 THOUSANDS/ΜL (ref 0.5–4)
LYMPHOCYTES NFR BLD AUTO: 51 % (ref 25–45)
MCH RBC QN AUTO: 29.7 PG (ref 26–34)
MCHC RBC AUTO-ENTMCNC: 34.7 G/DL (ref 31–36)
MCV RBC AUTO: 86 FL (ref 80–100)
MONOCYTES # BLD AUTO: 0.3 THOUSAND/ΜL (ref 0.2–0.9)
MONOCYTES NFR BLD AUTO: 7 % (ref 1–10)
NEUTROPHILS # BLD AUTO: 1.9 THOUSANDS/ΜL (ref 1.8–7.8)
NEUTS SEG NFR BLD AUTO: 40 % (ref 45–65)
P AXIS: 62 DEGREES
PLATELET # BLD AUTO: 229 THOUSANDS/UL (ref 150–450)
PMV BLD AUTO: 7.5 FL (ref 8.9–12.7)
POTASSIUM SERPL-SCNC: 3.7 MMOL/L (ref 3.6–5)
PR INTERVAL: 170 MS
PROT SERPL-MCNC: 8.2 G/DL (ref 5.9–8.4)
QRS AXIS: 54 DEGREES
QRSD INTERVAL: 82 MS
QT INTERVAL: 376 MS
QTC INTERVAL: 447 MS
RBC # BLD AUTO: 5.01 MILLION/UL (ref 4.5–5.9)
SODIUM SERPL-SCNC: 138 MMOL/L (ref 137–147)
T WAVE AXIS: 25 DEGREES
VENTRICULAR RATE: 85 BPM
WBC # BLD AUTO: 4.7 THOUSAND/UL (ref 4.5–11)

## 2022-03-27 PROCEDURE — 83690 ASSAY OF LIPASE: CPT | Performed by: EMERGENCY MEDICINE

## 2022-03-27 PROCEDURE — 85379 FIBRIN DEGRADATION QUANT: CPT | Performed by: EMERGENCY MEDICINE

## 2022-03-27 PROCEDURE — 99285 EMERGENCY DEPT VISIT HI MDM: CPT | Performed by: EMERGENCY MEDICINE

## 2022-03-27 PROCEDURE — 99285 EMERGENCY DEPT VISIT HI MDM: CPT

## 2022-03-27 PROCEDURE — 93010 ELECTROCARDIOGRAM REPORT: CPT | Performed by: INTERNAL MEDICINE

## 2022-03-27 PROCEDURE — 85025 COMPLETE CBC W/AUTO DIFF WBC: CPT | Performed by: EMERGENCY MEDICINE

## 2022-03-27 PROCEDURE — 84484 ASSAY OF TROPONIN QUANT: CPT | Performed by: EMERGENCY MEDICINE

## 2022-03-27 PROCEDURE — 93005 ELECTROCARDIOGRAM TRACING: CPT

## 2022-03-27 PROCEDURE — 96376 TX/PRO/DX INJ SAME DRUG ADON: CPT

## 2022-03-27 PROCEDURE — 80053 COMPREHEN METABOLIC PANEL: CPT | Performed by: EMERGENCY MEDICINE

## 2022-03-27 PROCEDURE — 96374 THER/PROPH/DIAG INJ IV PUSH: CPT

## 2022-03-27 PROCEDURE — 36415 COLL VENOUS BLD VENIPUNCTURE: CPT | Performed by: EMERGENCY MEDICINE

## 2022-03-27 PROCEDURE — 71046 X-RAY EXAM CHEST 2 VIEWS: CPT

## 2022-03-27 PROCEDURE — 96375 TX/PRO/DX INJ NEW DRUG ADDON: CPT

## 2022-03-27 RX ORDER — HYDROMORPHONE HCL/PF 1 MG/ML
0.5 SYRINGE (ML) INJECTION ONCE
Status: COMPLETED | OUTPATIENT
Start: 2022-03-27 | End: 2022-03-27

## 2022-03-27 RX ORDER — ONDANSETRON 4 MG/1
4 TABLET, ORALLY DISINTEGRATING ORAL EVERY 6 HOURS PRN
Qty: 20 TABLET | Refills: 0 | Status: SHIPPED | OUTPATIENT
Start: 2022-03-27

## 2022-03-27 RX ORDER — ONDANSETRON 2 MG/ML
4 INJECTION INTRAMUSCULAR; INTRAVENOUS ONCE
Status: COMPLETED | OUTPATIENT
Start: 2022-03-27 | End: 2022-03-27

## 2022-03-27 RX ADMIN — ONDANSETRON 4 MG: 2 INJECTION INTRAMUSCULAR; INTRAVENOUS at 09:43

## 2022-03-27 RX ADMIN — HYDROMORPHONE HYDROCHLORIDE 0.5 MG: 1 INJECTION, SOLUTION INTRAMUSCULAR; INTRAVENOUS; SUBCUTANEOUS at 08:10

## 2022-03-27 RX ADMIN — ONDANSETRON 4 MG: 2 INJECTION INTRAMUSCULAR; INTRAVENOUS at 08:09

## 2022-03-27 NOTE — ED PROVIDER NOTES
History  Chief Complaint   Patient presents with    Shortness of Breath     pt arrives c/o shortness of breath and back pain " got worse through the night "       67-year-old male with a history of hypertension, hyperlipidemia, diabetes, back pain, lumbar radiculopathy, sciatica presenting for evaluation of chest pain and shortness of breath  Patient states he has had escalating symptoms since he had COVID-19 1 year ago  He states he has a midsternal chest pressure sensation that is worse with coughing and sneezing  He also is now endorsing chest pain at rest   Pain does not radiate  He has associated shortness of breath and nausea but denies vomiting  Patient also notes mid epigastric soreness in his abdomen  Patient endorses lower back pain, which is chronic, in addition to a burning sensation down his left leg  Patient took his home [place] without significant improvement  He is also using lidocaine patches  Shortness of Breath  Associated symptoms: abdominal pain and chest pain    Associated symptoms: no cough (occasional, non-productive), no fever, no headaches, no rash, no sore throat and no vomiting        Prior to Admission Medications   Prescriptions Last Dose Informant Patient Reported? Taking?    Bioflavonoid Products (RA Vitamin C CR) TBCR  Self Yes No   Sig: take 1 tablet by mouth twice a day for 14 days   Blood Glucose Monitoring Suppl (OneTouch Verio) w/Device KIT   No No   Sig: Use daily   Blood Pressure Monitoring (Blood Pressure Monitor/Arm) EMILY   No No   Sig: Use daily   HYDROcodone-acetaminophen (NORCO) 7 5-325 mg per tablet   No No   Sig: Take 1 tablet by mouth every 8 (eight) hours as needed for pain For ongoing pain Max Daily Amount: 3 tablets   Lancet Devices (ONE TOUCH DELICA LANCING DEV) MISC   No No   Sig: Use daily   Multiple Vitamin (multivitamin) capsule   No No   Sig: Take 1 capsule by mouth daily for 14 days   OneTouch Delica Lancets 93C MISC   No No   Sig: Use daily aluminum-magnesium hydroxide-simethicone (MAALOX) [phone] MG/5ML SUSP  Self No No   Sig: Take 15 mL by mouth 4 (four) times a day (before meals and at bedtime)   amLODIPine (NORVASC) 10 mg tablet   No No   Sig: Take 1 tablet (10 mg total) by mouth daily   atorvastatin (LIPITOR) 20 mg tablet  Self No No   Sig: Take 1 tablet (20 mg total) by mouth daily   budesonide-formoterol (Symbicort) 160-4 5 mcg/act inhaler   No No   Sig: Inhale 2 puffs 2 (two) times a day Rinse mouth after use     cyclobenzaprine (FLEXERIL) 10 mg tablet   No No   Sig: Take 1 tablet (10 mg total) by mouth daily at bedtime as needed for muscle spasms   famotidine (PEPCID) 20 mg tablet   No No   Sig: Take 1 tablet (20 mg total) by mouth 2 (two) times a day As needed for acid reflux   fluticasone (FLONASE) 50 mcg/act nasal spray   No No   Si spray into each nostril daily   gabapentin (NEURONTIN) 800 mg tablet   No No   Sig: Take 1 tablet (800 mg total) by mouth 3 (three) times a day   glucose blood (OneTouch Verio) test strip   No No   Sig: Check blood sugar daily   ibuprofen (MOTRIN) 800 mg tablet   No No   Sig: Take 0 5 tablets (400 mg total) by mouth every 6 (six) hours as needed for mild pain   lidocaine (Lidoderm) 5 %   No No   Sig: Apply 1 patch topically daily Remove & Discard patch within 12 hours or as directed by MD   lidocaine (XYLOCAINE) 2 % topical gel  Self No No   Sig: Apply topically 3 (three) times a day   metFORMIN (GLUCOPHAGE) 500 mg tablet   No No   Sig: Take 1 tablet (500 mg total) by mouth daily   tamsulosin (FLOMAX) 0 4 mg   No No   Sig: Take 1 capsule (0 4 mg total) by mouth daily with dinner   venlafaxine (EFFEXOR-XR) 150 mg 24 hr capsule   No No   Sig: Take 1 capsule (150 mg total) by mouth daily with breakfast   zolpidem (AMBIEN) 5 mg tablet   No No   Sig: Take 1 tablet (5 mg total) by mouth daily at bedtime as needed for sleep      Facility-Administered Medications: None       Past Medical History:   Diagnosis Date    Asthma     Back pain     Back pain     Diabetes mellitus ([facility] [address] )     Hyperlipidemia     Hypertension        Past Surgical History:   Procedure Laterality Date    CYST REMOVAL  2017       Family History   Problem Relation Age of Onset    Hypertension Mother     Diabetes Mother     Cancer Father     Diabetes Family     Hypertension Family      I have reviewed and agree with the history as documented  E-Cigarette/Vaping    E-Cigarette Use Never User      E-Cigarette/Vaping Substances    Nicotine No     THC No     CBD No     Flavoring No     Other No     Unknown No      Social History     Tobacco Use    Smoking status: Never Smoker    Smokeless tobacco: Never Used   Vaping Use    Vaping Use: Never used   Substance Use Topics    Alcohol use: Not Currently     Comment: rare    Drug use: Never       Review of Systems   Constitutional: Negative for chills and fever  HENT: Negative for congestion, rhinorrhea and sore throat  Eyes: Negative for pain, discharge and visual disturbance  Respiratory: Positive for shortness of breath  Negative for cough (occasional, non-productive)  Cardiovascular: Positive for chest pain  Negative for palpitations and leg swelling  Gastrointestinal: Positive for abdominal pain and nausea  Negative for diarrhea and vomiting  Genitourinary: Negative for dysuria, frequency and hematuria  Musculoskeletal: Positive for arthralgias, back pain and myalgias  Skin: Negative for color change and rash  Neurological: Positive for numbness (left lower extremity)  Negative for dizziness, weakness, light-headedness and headaches  Hematological: Does not bruise/bleed easily  Physical Exam  Physical Exam  Vitals and nursing note reviewed  Constitutional:       General: He is not in acute distress  Appearance: Normal appearance  HENT:      Head: Normocephalic and atraumatic  Nose: Nose normal    Eyes:      General: No scleral icterus  Extraocular Movements: Extraocular movements intact  Conjunctiva/sclera: Conjunctivae normal       Pupils: Pupils are equal, round, and reactive to light  Cardiovascular:      Rate and Rhythm: Normal rate and regular rhythm  Pulmonary:      Effort: Pulmonary effort is normal  No respiratory distress  Breath sounds: No wheezing, rhonchi or rales  Chest:      Chest wall: No mass, tenderness or crepitus  Abdominal:      General: There is no distension  Palpations: Abdomen is soft  Tenderness: There is abdominal tenderness (midepigastric)  There is no guarding or rebound  Musculoskeletal:         General: No swelling or deformity  Cervical back: Neck supple  No bony tenderness  Thoracic back: No bony tenderness  Lumbar back: Tenderness (left sided) present  No bony tenderness  Right lower leg: No edema  Left lower leg: No edema  Comments: lidoderm patches on lower back   Skin:     General: Skin is warm and dry  Findings: No rash  Neurological:      General: No focal deficit present  Mental Status: He is alert and oriented to person, place, and time  Mental status is at baseline  Gait: Gait abnormal (antalgic with cane)     Psychiatric:         Mood and Affect: Mood normal          Behavior: Behavior normal          Vital Signs  ED Triage Vitals   Temperature Pulse Respirations Blood Pressure SpO2   03/27/22 0746 03/27/22 0746 03/27/22 0746 03/27/22 0746 03/27/22 0746   97 8 °F (36 6 °C) 95 18 153/89 100 %      Temp Source Heart Rate Source Patient Position - Orthostatic VS BP Location FiO2 (%)   03/27/22 0746 03/27/22 0746 03/27/22 0746 03/27/22 0746 --   Tympanic Monitor Sitting Right arm       Pain Score       03/27/22 0810       9           Vitals:    03/27/22 0900 03/27/22 1000 03/27/22 1030 03/27/22 1100   BP: 127/81 131/81 132/81 139/85   Pulse: 73 70 74 80   Patient Position - Orthostatic VS:             Visual Acuity      ED Medications  Medications   HYDROmorphone (DILAUDID) injection 0 5 mg (0 5 mg Intravenous Given 3/27/22 0810)   ondansetron (ZOFRAN) injection 4 mg (4 mg Intravenous Given 3/27/22 0809)   ondansetron (ZOFRAN) injection 4 mg (4 mg Intravenous Given 3/27/22 0943)       Diagnostic Studies  Results Reviewed     Procedure Component Value Units Date/Time    HS Troponin 0hr (reflex protocol) [[id number]]  (Normal) Collected: 03/27/22 0803    Lab Status: Final result Specimen: Blood from Arm, Right Updated: 03/27/22 0831     hs TnI 0hr 4 ng/L     Comprehensive metabolic panel [[id number]]  (Abnormal) Collected: 03/27/22 0803    Lab Status: Final result Specimen: Blood from Arm, Right Updated: 03/27/22 0830     Sodium 138 mmol/L      Potassium 3 7 mmol/L      Chloride 102 mmol/L      CO2 30 mmol/L      ANION GAP 6 mmol/L      BUN 16 mg/dL      Creatinine 1 19 mg/dL      Glucose 147 mg/dL      Calcium 9 4 mg/dL      AST 26 U/L      ALT 27 U/L      Alkaline Phosphatase 58 U/L      Total Protein 8 2 g/dL      Albumin 4 4 g/dL      Total Bilirubin 0 59 mg/dL      eGFR 65 ml/min/1 73sq m     Narrative:      [place] guidelines for Chronic Kidney Disease (CKD):     Stage 1 with normal or high GFR (GFR > 90 mL/min/1 73 square meters)    Stage 2 Mild CKD (GFR = 60-89 mL/min/1 73 square meters)    Stage 3A Moderate CKD (GFR = 45-59 mL/min/1 73 square meters)    Stage 3B Moderate CKD (GFR = 30-44 mL/min/1 73 square meters)    Stage 4 Severe CKD (GFR = 15-29 mL/min/1 73 square meters)    Stage 5 End Stage CKD (GFR <15 mL/min/1 73 square meters)  Note: GFR calculation is accurate only with a steady state creatinine    Lipase [[id number]]  (Normal) Collected: 03/27/22 0803    Lab Status: Final result Specimen: Blood from Arm, Right Updated: 03/27/22 0830     Lipase 110 u/L     D-Dimer [[id number]]  (Normal) Collected: 03/27/22 0803    Lab Status: Final result Specimen: Blood from Arm, Right Updated: 03/27/22 1925     D-Dimer, Quant 0 36 ug/ml FEU     Narrative: In the evaluation for possible pulmonary embolism, in the appropriate (Well's Score of 4 or less) patient, the age adjusted d-dimer cutoff for this patient can be calculated as:    Age x 0 01 (in ug/mL) for Age-adjusted D-dimer exclusion threshold for a patient over 50 years  CBC and differential [[id number]]  (Abnormal) Collected: 03/27/22 0803    Lab Status: Final result Specimen: Blood from Arm, Right Updated: 03/27/22 0815     WBC 4 70 Thousand/uL      RBC 5 01 Million/uL      Hemoglobin 14 9 g/dL      Hematocrit 42 9 %      MCV 86 fL      MCH 29 7 pg      MCHC 34 7 g/dL      RDW 14 4 %      MPV 7 5 fL      Platelets 499 Thousands/uL      Neutrophils Relative 40 %      Lymphocytes Relative 51 %      Monocytes Relative 7 %      Eosinophils Relative 2 %      Basophils Relative 0 %      Neutrophils Absolute 1 90 Thousands/µL      Lymphocytes Absolute 2 40 Thousands/µL      Monocytes Absolute 0 30 Thousand/µL      Eosinophils Absolute 0 10 Thousand/µL      Basophils Absolute 0 00 Thousands/µL                  XR chest 2 views   ED Interpretation by Taylor Fraga MD (03/27 7933)   No acute disease      Final Result by Zhang Encinas MD (03/27 1105)      No acute cardiopulmonary disease                    Workstation performed: [id number]                    Procedures  ECG 12 Lead Documentation Only    Date/Time: 3/27/2022 8:04 AM  Performed by: Taylor Fraga MD  Authorized by: Taylor Fraga MD     Indications / Diagnosis:  Chest pain  ECG reviewed by me, the ED Provider: yes    Patient location:  ED  Previous ECG:     Previous ECG:  Compared to current    Comparison ECG info:  3/19/2022    Similarity:  No change  Interpretation:     Interpretation: normal    Rate:     ECG rate:  85    ECG rate assessment: normal    Rhythm:     Rhythm: sinus rhythm    Ectopy:     Ectopy: none    QRS:     QRS axis: Normal    QRS intervals:  Normal  Conduction:     Conduction: normal    ST segments:     ST segments:  Normal  T waves:     T waves: normal               ED Course  ED Course as of 03/27/22 1133   Sun Mar 27, 2022   0822 Neutrophils %(!): 40   0822 Lymphocytes Relative(!): 51   0822 CBC and differential(!)  Otherwise grossly normal   0823 D-Dimer, Quant: 0 36   0833 hs TnI 0hr: 4   0833 Glucose, Random(!): 147   0833 Comprehensive metabolic panel(!)  Grossly normal   0833 Lipase: 110   0838 Reassessed patient; he states he feels better but is tired and would like to sleep  Discussed results, re-examined abdomen--non-tender  Patient refusing to answer additional questions, stating "I'm tired, I'm tired " Will let patient rest and re-assess   [phone] with family medicine; will see patient   2119 Patient endorsing continued nausea; will give additional ondansetron   1001 Patient continues to rest comfortably   1039 Patient sleeping   1127 Patient feeling better and requesting discharge  Re-evaluated by family medicine; patient will have [facility] appt [facility]  He will also meet with social work and have psychiatry follow up arranged  Patient states his sister will pick him up to go home  HEART Risk Score      Most Recent Value   Heart Score Risk Calculator    History 0 Filed at: 03/27/2022 0848   ECG 0 Filed at: 03/27/2022 0848   Age 1 Filed at: 03/27/2022 0848   Risk Factors 1 Filed at: 03/27/2022 0848   Troponin 0 Filed at: 03/27/2022 0848   HEART Score 2 Filed at: 03/27/2022 0848                                      MDM  Number of Diagnoses or Management Options  Chest pain, unspecified type  Chronic back pain  Nausea  Diagnosis management comments: 60-year-old male presenting for evaluation of chest pain, shortness of breath, nausea, abdominal pain, and back pain  His back pain appears to be chronic in nature, and he has no new symptoms    He does not have midline bony tenderness to palpation  Do not feel he requires imaging today  Patient has an antalgic gait but no focal neurologic deficits  Differential diagnosis for chest pain includes acute coronary syndrome, pneumonia, pneumothorax, pulmonary edema, pleural effusion, pulmonary embolism  This pain also appears to be chronic in nature since he was diagnosed with COVID-19  EKG shows normal sinus rhythm without evidence of ischemia or malignant dysrhythmia  Troponin is 4  Heart score is 2; do not feel he requires repeat troponin or further cardiac workup today  Chest x-ray per my interpretation is negative for acute cardiopulmonary abnormality  D dimer is normal  Patient has grossly normal electrolytes, renal function, and LFTs  No evidence of leukocytosis  Lipase is normal   Patient treated symptomatically with improvement in his symptoms  On re-evaluation, patient does not have abdominal tenderness  He requested additional medication for Zofran and then slept for several hours in the emergency department  He was evaluated by [facility]; patient has recently been noted to have increasing depression during their visits in addition to memory issues  Patient is appropriate today on my exam and denies suicidal and homicidal ideation  However, his symptoms may be somatic manifestations of worsening depression  Family medicine will see him tomorrow in follow-up  He will also meet with social Work to arrange for psychiatry follow-up  Patient has already been referred to Geriatrics  Patient provided with a prescription for Zofran  After several hours, patient states he feels improved and would like to go home to his daughter  Return precautions were discussed  Patient in agreement and understanding of these instructions          Disposition  Final diagnoses:   Chest pain, unspecified type   Nausea   Chronic back pain     Time reflects when diagnosis was documented in both MDM as applicable and the Disposition within this note     Time User Action Codes Description Comment    3/27/2022 11:29 AM Luis F Beavers [place] Add [R07 9] Chest pain, unspecified type     3/27/2022 11:29 AM FayDorothea Add [R11 0] Nausea     3/27/2022 11:29 AM Stefanie Dorothea Add [M54 9,  G89 29] Chronic back pain       ED Disposition     ED Disposition Condition Date/Time Comment    Discharge Stable Sun Mar 27, 2022 11:29 AM Rashard Milan discharge to home/self care  Follow-up Information     Follow up With Specialties Details Why Contact Info    Rehab Chance Tarango DO Family Medicine Schedule an appointment as soon as possible for a visit   [address]  [address] [facility]  [address]  [facility] [address]             Patient's Medications   Discharge Prescriptions    ONDANSETRON (ZOFRAN ODT) 4 MG DISINTEGRATING TABLET    Take 1 tablet (4 mg total) by mouth every 6 (six) hours as needed for nausea or vomiting       Start Date: 3/27/2022 End Date: --       Order Dose: 4 mg       Quantity: 20 tablet    Refills: 0       No discharge procedures on file      PDMP Review       Value Time User    PDMP Reviewed  Yes 3/3/2022  4:12 PM [address] [place] [address]          ED Provider  Electronically Signed by           Tiana Willis MD  03/27/22 [phone]

## 2022-03-27 NOTE — QUICK NOTE
9: 35 am - Patient is a 63 y/o male with PMHx of HTN, HLd, diabetes, sciatica, lumbar radiculopathy, Depression and memory decline who presented to the ED with chest pain and Shortness of breath  Patient states that his SOB has been present since he got COVID-19 last year  His chest pain is intermittent, pressure like, mid-sternal, non-radiating,  initially 6/10 but improved since he came to the ED  Patient report nausea but denies vomiting  Initial Labs including Troponin, EKG, CXR in the ED were normal  He endorse feeling tired and would like to sleep  Patient states that he follows up with pain management outpatient for chronic back pain  He was seen in the [facility] office two weeks ago for worsening depression and memory decline  His medication Effexor was increased from 75 mg to 150 mg daily  Patient states that he has been working with social work to get him a psychiatry appointment but that has been unsuccessful because they were not taking new patients at the time  He was also referred to geriatrics outpatient  Denies SI/HI    - Encourage patient to follow up with PCP and social work for help with outpatient psych appointment  - Encourage medication compliance          11:29 am - Patient seen at bedside  He feels rested from the nap  He receive zofran for nausea and report that the nausea has improved  He request water or orange juice to drink  He feels much better and would like to go home to his daughter     Email send to clerical staff at [facility] [address] [facility] to schedule patient for same day visit tomorrow    -Patient also encourage to follow up with PCP tomorrow at [facility]  - Encourage medication compliance  - Patient encourage to return to ED if symptoms worsen

## 2022-03-28 ENCOUNTER — PATIENT OUTREACH (OUTPATIENT)
Dept: FAMILY MEDICINE CLINIC | Facility: CLINIC | Age: 62
End: 2022-03-28

## 2022-03-28 NOTE — PROGRESS NOTES
Outgoing Call:  3/28/2022    [facility] did call MALIA FISHMAN to request an update on PC form which was resubmitted to Nicole Caba on 3/25/22  CMOC was informed that the new PC form has been received and is still in process  Will be reviewed later this week  CMOC did call pt and informed him of this  Pt expressed understanding  Pt also informed [facility] that he was in the ED yesterday and has not been feeling well  He stated that the ED doctor informed him to come into the [facility] clinic today to seek counseling services  [facility] informed pt he would not need to come in to the clinic and that she will reach out to his SW, Jorje, and update her on this  Pt agreed to wait for her call  CMOC to f/u  Next outreach is scheduled for 3/30/2022  Outgoing Scan:    CMOC did receive a packet from pt which included bank statements  CMOC did scan all statements to [facility] for review and to complete financial portion of waiver application  Patient/Caregiver provided printed discharge information.

## 2022-03-29 ENCOUNTER — TELEPHONE (OUTPATIENT)
Dept: FAMILY MEDICINE CLINIC | Facility: CLINIC | Age: 62
End: 2022-03-29

## 2022-03-29 ENCOUNTER — DOCUMENTATION (OUTPATIENT)
Dept: OTHER | Facility: OTHER | Age: 62
End: 2022-03-29

## 2022-03-29 ENCOUNTER — PATIENT OUTREACH (OUTPATIENT)
Dept: FAMILY MEDICINE CLINIC | Facility: CLINIC | Age: 62
End: 2022-03-29

## 2022-03-29 NOTE — PROGRESS NOTES
Hussain / Pebbles Traore Call:  3/29/2022    [facility] did receive a call from pt stating that he received a letter from [address] [place] [address] in reference to denial of waiver services  Pt was concerned  [facility] informed pt that this letter is in reference [facility] and currently a reconsideration is being completed  Pt expressed understanding  [facility] informed pt she will f/u with him later this week  CMOC did call MALIA FISHMAN to request an update on waiver status  CMOC was informed it is still pending  Next outreach is scheduled for 3/31/2022

## 2022-03-29 NOTE — PROGRESS NOTES
JEROD ALMARAZ received IB message from [facility] that patient stated he was in the ED on 3/27/22 and was told he need counseling services  JEROD ALMARAZ also received a message from the Practice Administrator Deanne Novak, that patient need a script for a walker, however, patient next appointment is for 4/13/2022 and he would like to know if patient could get a script without been seen  JEROD ALMARAZ explained to Deanne Novak patient would need to be seen as the DME company require a script as well as the visit's notes  JEROD ALMARAZ would reach out to [employer] from [facility] to see if they has any walker available for donation  JEROD ALMARAZ reached out to [address] [place] [address] Road and [address] [place] [address] Road states she has two walkers available with the front wheels and JEROD ALMARAZ could has one if patient is interested  JEROD ALMARAZ reached out to patient to follow-up  JEROD ALMARAZ informs patient [address] [place] [address] has two walkers available with the two front wheels if he is interested we could donate it to him  Patient states he would like the rolling walkers, that one that has fours wheels and a seat  JEROD ALMARAZ explained he would need a scripts from the doctor to get that type of walker  Pt verbalized understanding and states he would takes the one from [address] [place] [address] for now until he could see the doctor and get the script  JEROD ALMARAZ verbalized understanding and ask the patient if he could stop by the office to pick the walker  Pt states he could pick the walker  Informs patient JEROD ALMARAZ would make arraignment to pick the walker from [address] [place] [address]  JEROD ALMARAZ would notify patient when the walker is in the office  JEROD ALMARAZ ask patient if he is interested in [address] services  Pt states he has a hx of depression and he been taking medication for it  Pt states he was told in the ED he need [address] services  JEROD ALMARAZ verbalized understanding and ask patient if he want therapy in-person or other the phone  Pt states he would prefer over the phone   Informs [facility] provide virtual therapy and JEROD ALMARAZ would call to place a referral  Informs patient, [facility] might has a wait list and he would has to wait until they call him to schedule an appointment  Pt verbalized understanding  JEROD ALMARAZ reached out to [address] to see if she could pick the walker from [facility], as this JEROD ALMARAZ is working remotely  Calvin Vaz agreed to picked the walker  JEROD ALMARAZ informs Farida Gtz would be picking up the walker from her office  JEROD ALMARAZ placed call to [facility]  Spoke with Vernon Junior states they has a wait list at this time, but two new therapists would be starting soon  JEROD ALMARAZ verbalized understanding and provided patient's information so he could be add to the wait list  Requested virtual therapy  Ellen verbalized understanding  JEROD ALMARAZ placed call to patient and informs he was placed on the wait list with [facility] and as soon they have an opening they will be reaching out to him to schedule an appointment  Pt verbalized understanding  Informs patient the walker is in the office with Elena Rodriges and he could stop by to pick it up  Pt states his sister Ara Chaudhry would pick it up for him  Informs to ask for Elena Rodriges when she come to the office to pick it up  Ara Chaudhry states she would try to pick it up today if not tomorrow morning  JEROD ALMARAZ verbalized understanding  Reminded patient to request the script when he come to the appointment  Pt verbalized understanding and thanked JEROD ALMARAZ for all the help  JEROD ALMARAZ will remains available and will continue to follow-up

## 2022-03-29 NOTE — TELEPHONE ENCOUNTER
03/29/22    Hi Dr Tj Bob,     Just wanted to let you know that I called Pt and informed Mr Catina Wolfe your request for him to see his PCP, and Pt stated that he will discuss his depression, med, and other medical matters on the day of his Appt  Pt did not want to schedule a separate appt for Depression and med refill  Pt is aware of the type of visit / reason of his appt on 04/13/22

## 2022-03-31 ENCOUNTER — PATIENT OUTREACH (OUTPATIENT)
Dept: FAMILY MEDICINE CLINIC | Facility: CLINIC | Age: 62
End: 2022-03-31

## 2022-03-31 NOTE — PROGRESS NOTES
Outgoing Calls:  3/31/2022    CMOC did call [facility] [employer] to check on status of waiver application  CMOC was informed pt is clinically eligible and application now has been transferred to MALIA CALIXTO for financial review  CMOC did call pt and informed him of update  CMOC also informed pt he will receive a jaimee from [facility] if they are in need of additional information  If financially approved, pt will receive a call from a  to schedule an evaluation to determine hours pt will be approved for  Pt expressed understanding  Next outreach is scheduled for 4/7/2022

## 2022-03-31 NOTE — PROGRESS NOTES
3/29/22: Care management requested assistance for client who could not obtain a walker through insurance  Informed Sarita Hope that we have one in the office which she can have for him  It has front wheels  Sarita Hope stated this will work well for him  Leonardo Reed MSW, picked it up for him  1

## 2022-04-01 ENCOUNTER — TELEPHONE (OUTPATIENT)
Dept: FAMILY MEDICINE CLINIC | Facility: CLINIC | Age: 62
End: 2022-04-01

## 2022-04-01 DIAGNOSIS — G89.29 CHRONIC LOW BACK PAIN WITHOUT SCIATICA, UNSPECIFIED BACK PAIN LATERALITY: ICD-10-CM

## 2022-04-01 DIAGNOSIS — M54.16 LUMBAR RADICULOPATHY: ICD-10-CM

## 2022-04-01 DIAGNOSIS — M54.50 CHRONIC LOW BACK PAIN WITHOUT SCIATICA, UNSPECIFIED BACK PAIN LATERALITY: ICD-10-CM

## 2022-04-01 RX ORDER — HYDROCODONE BITARTRATE AND ACETAMINOPHEN 7.5; 325 MG/1; MG/1
1 TABLET ORAL EVERY 8 HOURS PRN
Qty: 90 TABLET | Refills: 0 | Status: SHIPPED | OUTPATIENT
Start: 2022-04-01 | End: 2022-04-29 | Stop reason: SDUPTHER

## 2022-04-01 NOTE — TELEPHONE ENCOUNTER
Patient in need of refills for HYDROcodone-acetaminophen (NORCO) 7 5-325 mg per tablet  Please advise

## 2022-04-06 ENCOUNTER — PATIENT OUTREACH (OUTPATIENT)
Dept: FAMILY MEDICINE CLINIC | Facility: CLINIC | Age: 62
End: 2022-04-06

## 2022-04-06 NOTE — PROGRESS NOTES
Hussain / Jami Wolfe:  4/6/2022    CMOC did receive a call from pt stating that he had an evaluation completed by an agency in reference to waiver services, yesterday  Pt also received another call this morning but does not know who it was from  They stated that pt will receive a new insurance card in the mail  [address] [place] [address] informed pt it could be that he was financially approved for waiver services as typically a new insurance is provided to individuals that are approved for waiver services  CMOC offered to call [place] [employer] with pt and he agreed  CMOC and pt were informed by Mariana Garza that pt's waiver application is still pending  CMOC requested the CW assigned to the case to call  CMOC should receive this call within three business days  Next outreach is scheduled for 4/14/2022

## 2022-04-08 ENCOUNTER — PATIENT OUTREACH (OUTPATIENT)
Dept: FAMILY MEDICINE CLINIC | Facility: CLINIC | Age: 62
End: 2022-04-08

## 2022-04-08 NOTE — PROGRESS NOTES
JEROD ALMARAZ received a call from patient stating he received a call from [address] and that he was approved but he want to know what is the next step  Informs patient it would be best for him to follow-up with [facility] as she would be able to further explain to him what is the next step  Pt states he tired calling her but she did not answer  Informs she is probably working with another patient at this time but she will return his call  Pt verbalized understanding  Pt also reports that he received a call from [facility] and he was told they don't accept his insurance  JEROD ALMARAZ would follow-up with [facility] as patient's [place] is [place]  Pt verbalized understanding  JEROD ALMARAZ would follow-up with patient after calling [facility]  Pt verbalized understanding and thanked JEROD ALMARAZ for all the help and for the walker  Pt states his sister picked it up and he been using it  JEROD LAMARAZ placed call to [facility] to follow-up  No answer, left a details vm and ask for a return call  Will continue to follow-up

## 2022-04-11 ENCOUNTER — PATIENT OUTREACH (OUTPATIENT)
Dept: FAMILY MEDICINE CLINIC | Facility: CLINIC | Age: 62
End: 2022-04-11

## 2022-04-11 NOTE — PROGRESS NOTES
Incoming Call:  4/11/2022    [facility] did receive a call from pt stating that he heard form his new  who scheduled an appointment to meet at his home next week in reference to waiver services  This evaluation will be to determine how many waiver hours he will be approved for  CMOC explained what to expect moving forward and informed pt that he should expect another 2-3 weeks before all is finalized with waiver services  Pt expressed understanding  Next outreach is scheduled for 4/18/2022

## 2022-04-13 ENCOUNTER — OFFICE VISIT (OUTPATIENT)
Dept: FAMILY MEDICINE CLINIC | Facility: CLINIC | Age: 62
End: 2022-04-13

## 2022-04-13 ENCOUNTER — PATIENT OUTREACH (OUTPATIENT)
Dept: FAMILY MEDICINE CLINIC | Facility: CLINIC | Age: 62
End: 2022-04-13

## 2022-04-13 VITALS
HEART RATE: 94 BPM | HEIGHT: 67 IN | BODY MASS INDEX: 30.76 KG/M2 | TEMPERATURE: 98 F | WEIGHT: 196 LBS | SYSTOLIC BLOOD PRESSURE: 114 MMHG | DIASTOLIC BLOOD PRESSURE: 78 MMHG | RESPIRATION RATE: 16 BRPM | OXYGEN SATURATION: 98 %

## 2022-04-13 DIAGNOSIS — F32.0 CURRENT MILD EPISODE OF MAJOR DEPRESSIVE DISORDER WITHOUT PRIOR EPISODE (HCC): ICD-10-CM

## 2022-04-13 DIAGNOSIS — N40.1 BENIGN PROSTATIC HYPERPLASIA WITH LOWER URINARY TRACT SYMPTOMS, SYMPTOM DETAILS UNSPECIFIED: ICD-10-CM

## 2022-04-13 DIAGNOSIS — R20.2 PARESTHESIA OF BOTH HANDS: ICD-10-CM

## 2022-04-13 DIAGNOSIS — J45.40 MODERATE PERSISTENT REACTIVE AIRWAY DISEASE WITHOUT COMPLICATION: ICD-10-CM

## 2022-04-13 DIAGNOSIS — M48.061 LUMBAR FORAMINAL STENOSIS: ICD-10-CM

## 2022-04-13 DIAGNOSIS — E11.9 TYPE 2 DIABETES MELLITUS WITHOUT COMPLICATION, WITHOUT LONG-TERM CURRENT USE OF INSULIN (HCC): Primary | ICD-10-CM

## 2022-04-13 DIAGNOSIS — F11.20 CONTINUOUS OPIOID DEPENDENCE (HCC): ICD-10-CM

## 2022-04-13 DIAGNOSIS — M54.16 LUMBAR RADICULOPATHY: ICD-10-CM

## 2022-04-13 DIAGNOSIS — I10 BENIGN ESSENTIAL HTN: ICD-10-CM

## 2022-04-13 PROCEDURE — 3074F SYST BP LT 130 MM HG: CPT | Performed by: FAMILY MEDICINE

## 2022-04-13 PROCEDURE — 99214 OFFICE O/P EST MOD 30 MIN: CPT | Performed by: FAMILY MEDICINE

## 2022-04-13 PROCEDURE — 3078F DIAST BP <80 MM HG: CPT | Performed by: FAMILY MEDICINE

## 2022-04-13 RX ORDER — BLOOD PRESSURE TEST KIT
KIT MISCELLANEOUS DAILY
Qty: 1 KIT | Refills: 0 | Status: SHIPPED | OUTPATIENT
Start: 2022-04-13

## 2022-04-13 NOTE — PROGRESS NOTES
Assessment/Plan:    Lumbar foraminal stenosis  MRI of his lumbar spine demonstrates degeneration specifically at L3-4 and L4-5 with development of bilateral foraminal stenosis  Continue flexeril and gabapentin      Saw neurosurgeon in March 2022 - referred him to Pain Management for L3-4 and L4-5 injections      Patient wants to maximize conservative treatment before having to do surgery         Type 2 diabetes mellitus without complication, without long-term current use of insulin ([facility])    Lab Results   Component Value Date    HGBA1C 6 2 (H) 03/15/2022       Stable on metformin 500 mg daily    Refused to see podiatrist or ophthalmologist at this time    Benign essential HTN  BP controlled on amlodipine 10 mg daily  Continued to emphasize low salt diet (<2000 mg per day)    Lumbar radiculopathy  Continue norco and gabapentin      Scripts for toilet seat riser, walker with wheels and a seat, shower bars, blood pressure machine, blood sugar machine given to patient  Disability Parking Placard Form completed    Hyperlipidemia  ASCVD risk score 18 1%  On atorvastatin 20 mg daily - patient does not want dose increased and will try low cholesterol diet    Reactive airway disease  Secondary to prior COVID infection  Stable on symbicort    Benign prostatic hyperplasia with lower urinary tract symptoms  Stable on flomax 0 4 mg daily  Has appointment with urology on 5/4/22    Current mild episode of major depressive disorder without prior episode ([address] )  Stable on effexor 150 mg daily and ambien 5 mg qhs  Patient was encouraged to contact mental health services - form given at previous visit       Diagnoses and all orders for this visit:    Type 2 diabetes mellitus without complication, without long-term current use of insulin ([address] )  -     Microalbumin / creatinine urine ratio  -     Blood Glucose Monitoring Suppl KIT; Use in the morning Please give test strips and lancets  Dx  E11 9    Lumbar radiculopathy  -     Durable Medical Equipment  -     Durable Medical Equipment  -     Durable Medical Equipment    Lumbar foraminal stenosis  -     Durable Medical Equipment  -     Durable Medical Equipment  -     Durable Medical Equipment    Paresthesia of both hands  -     TSH, 3rd generation with Free T4 reflex; Future  -     Vitamin D 25 hydroxy; Future    Continuous opioid dependence (HCC)    Benign essential HTN  -     Blood Pressure KIT; Use in the morning With appropriate size cuff    Moderate persistent reactive airway disease without complication    Benign prostatic hyperplasia with lower urinary tract symptoms, symptom details unspecified    Current mild episode of major depressive disorder without prior episode ([facility] )          Subjective:      Patient ID: Valeria Zuñiga is a 62 y o  male  Saw neurosurgery in March 2022 for back pain  Referred him to pain management for lumbar injections  Discussed surgical options but opted out  Has Pain Management appointment   Lat PT session in March 17, 2022    Has urology appointment on May 4, 2022    When twists his back, feels trinkle of urine  Diagnosed with DM in 2019 and has been taking metformin since then  Lives home with daughter    Asking for script for toilet seat riser, walker with wheels and a seat, shower bars, blood pressure machine, blood sugar machine  Disability Parking Placard Form completed      Home Care Representatives coming to his house Monday 4/18/2022      The following portions of the patient's history were reviewed and updated as appropriate: allergies, current medications, past family history, past medical history, past social history, past surgical history and problem list     Review of Systems   Constitutional: Negative for appetite change, fatigue, fever and unexpected weight change  HENT: Negative for congestion, ear pain, postnasal drip, rhinorrhea and sore throat  Eyes: Negative for pain and visual disturbance     Respiratory: Negative for cough, chest tightness and shortness of breath  Cardiovascular: Negative for chest pain, palpitations and leg swelling  Gastrointestinal: Negative for abdominal pain, constipation, diarrhea, nausea and vomiting  Genitourinary: Negative for difficulty urinating and dysuria  Musculoskeletal: Positive for back pain and gait problem  Negative for arthralgias  Skin: Negative for rash  Neurological: Negative for dizziness, numbness and headaches  Psychiatric/Behavioral: Negative for behavioral problems and suicidal ideas  The patient is not nervous/anxious  Objective:      /78 (BP Location: Right arm, Patient Position: Sitting, Cuff Size: Large)   Pulse 94   Temp 98 °F (36 7 °C) (Temporal)   Resp 16   Ht 5' 7" (1 702 m)   Wt 88 9 kg (196 lb)   SpO2 98%   BMI 30 70 kg/m²          Physical Exam  Constitutional:       Appearance: He is well-developed  HENT:      Head: Normocephalic and atraumatic  Right Ear: External ear normal       Left Ear: External ear normal       Nose: Nose normal    Eyes:      Conjunctiva/sclera: Conjunctivae normal       Pupils: Pupils are equal, round, and reactive to light  Cardiovascular:      Rate and Rhythm: Normal rate and regular rhythm  Pulses: Pulses are weak  Dorsalis pedis pulses are 1+ on the right side and 1+ on the left side  Posterior tibial pulses are 1+ on the right side and 1+ on the left side  Heart sounds: Normal heart sounds  Pulmonary:      Effort: Pulmonary effort is normal       Breath sounds: Normal breath sounds  Abdominal:      General: Bowel sounds are normal       Palpations: Abdomen is soft  Musculoskeletal:         General: Normal range of motion  Cervical back: Normal range of motion and neck supple  Feet:      Right foot:      Skin integrity: Dry skin present  Left foot:      Skin integrity: Dry skin present  Skin:     General: Skin is warm     Neurological:      Mental Status: He is alert and oriented to person, place, and time  Psychiatric:         Behavior: Behavior normal            Patient's shoes and socks removed  Right Foot/Ankle   Right Foot Inspection  Skin Exam: dry skin  Toe Exam: ROM and strength within normal limits  Sensory   Monofilament testing: diminished    Vascular  The right DP pulse is 1+  The right PT pulse is 1+  Left Foot/Ankle  Left Foot Inspection  Skin Exam: dry skin  Toe Exam: ROM and strength within normal limits  Sensory   Monofilament testing: diminished    Vascular  The left DP pulse is 1+  The left PT pulse is 1+       Assign Risk Category  Loss of protective sensation  Weak pulses

## 2022-04-13 NOTE — PROGRESS NOTES
JEROD ALMARAZ placed call to [facility] to follow-up as patient told JEROD ALMARAZ [facility] do not accept his insurance  Spoke with [place]  Spoke checked patient's insurance in the system and states patient has Medicare part A & B at primary insurance now and [employer] as Secondary and they do not accept Medicare at this time  JEROD ALMARAZ met with ANSHUL Foreman to verify if patient has a new insurance as JEROD ALMARAZ was told by [facility]  Per Kellen patient does has new insurance and her Medicare Part A & B is his primary insurance as to 4/1/2022  JEROD ALMARAZ thanked kellen for her clarification     JEROD CM placed call to [facility] [phone], the [place] office to see if patient could do telehealth  Spoke with Fernando Stewart states the intake appointment has to be done in person then patient could do Telehealth  JERODCM verbalized understanding  JEROD ALMARAZ provided patient's demographic information and confirmed insurance information  Fernando Stewart states they would patient within few days but if they don't call within few days patient or JEROD CM could call back to follow-up  JEROD ALMARAZ verbalized understanding and thanked Fernando NEWSOME CM placed call to patient to follow-up and informs patient he does has a new insurance and [facility] takes his insurances but the intake appointment has to be done in person  Pt verbalized understanding  Provided [facility] address and phone number and encourage patient to call if he don't get a call within a week  Pt verbalized understanding and thanked JEROD ALMARAZ for the help  Informs patient JEROD ALMARAZ would be out of the office until next week but JEROD ALMARAZ would follow-up next week

## 2022-04-18 ENCOUNTER — PATIENT OUTREACH (OUTPATIENT)
Dept: FAMILY MEDICINE CLINIC | Facility: CLINIC | Age: 62
End: 2022-04-18

## 2022-04-18 NOTE — PROGRESS NOTES
Outgoing Call:  4/18/2022    Bandar Garciads did call pt to discuss status of evaluation with his   Pt stated that his SC did complete the evaluation this morning and she is requesting pt receives 24 hours a week in services  She also informed pt he will know within a few days if he was approved  SC did also provide pt and his sister with a list of [facility] agencies for them to select from to receive care  Pt completed this process  CMOC to f/u  Next outreach is scheduled for 4/25/2022

## 2022-04-19 ENCOUNTER — TELEPHONE (OUTPATIENT)
Dept: FAMILY MEDICINE CLINIC | Facility: CLINIC | Age: 62
End: 2022-04-19

## 2022-04-19 DIAGNOSIS — E11.9 TYPE 2 DIABETES MELLITUS WITHOUT COMPLICATION, WITHOUT LONG-TERM CURRENT USE OF INSULIN (HCC): ICD-10-CM

## 2022-04-19 PROBLEM — M54.50 CHRONIC LOW BACK PAIN: Status: RESOLVED | Noted: 2020-10-16 | Resolved: 2022-04-19

## 2022-04-19 PROBLEM — G89.29 CHRONIC LOW BACK PAIN: Status: RESOLVED | Noted: 2020-10-16 | Resolved: 2022-04-19

## 2022-04-19 PROBLEM — R07.9 CHEST PAIN: Status: RESOLVED | Noted: 2021-03-17 | Resolved: 2022-04-19

## 2022-04-19 NOTE — ASSESSMENT & PLAN NOTE
Lab Results   Component Value Date    HGBA1C 6 2 (H) 03/15/2022       Stable on metformin 500 mg daily    Refused to see podiatrist or ophthalmologist at this time

## 2022-04-19 NOTE — ASSESSMENT & PLAN NOTE
Continue norco and gabapentin      Scripts for toilet seat riser, walker with wheels and a seat, shower bars, blood pressure machine, blood sugar machine given to patient  Disability Parking Placard Form completed

## 2022-04-19 NOTE — TELEPHONE ENCOUNTER
MEDICARE PART B DETAILED WRITTEN ORDER  form received on 04/19/22  to be completed by PCP  Copy made and placed in PCP folder  Forms to be delivered to PCP mailbox at assigned time  DESCRIPTION OF ITEM ORDERED:  One Touch Verio Test Strips      NOTE: a blank copy of form has been made and placed at , under the letter G

## 2022-04-19 NOTE — TELEPHONE ENCOUNTER
HOME CARE DELIVERED A MEDICAL SUPPLY COMPANY / Physician's Order  form received on 04/19/22  to be completed by PCP  Copy made and placed in PCP folder  Forms to be delivered to PCP mailbox at assigned time        DIAGNOSIS ICD - 10:  R39 [id number]      NOTE: a blank copy of form has been made and placed at , under the letter G

## 2022-04-19 NOTE — ASSESSMENT & PLAN NOTE
Stable on effexor 150 mg daily and ambien 5 mg qhs  Patient was encouraged to contact mental health services - form given at previous visit

## 2022-04-19 NOTE — ASSESSMENT & PLAN NOTE
ASCVD risk score 18 1%  On atorvastatin 20 mg daily - patient does not want dose increased and will try low cholesterol diet

## 2022-04-19 NOTE — ASSESSMENT & PLAN NOTE
MRI of his lumbar spine demonstrates degeneration specifically at L3-4 and L4-5 with development of bilateral foraminal stenosis  Continue flexeril and gabapentin      Saw neurosurgeon in March 2022 - referred him to Pain Management for L3-4 and L4-5 injections      Patient wants to maximize conservative treatment before having to do surgery

## 2022-04-21 ENCOUNTER — APPOINTMENT (OUTPATIENT)
Dept: LAB | Facility: CLINIC | Age: 62
End: 2022-04-21
Payer: MEDICARE

## 2022-04-21 ENCOUNTER — TELEPHONE (OUTPATIENT)
Dept: FAMILY MEDICINE CLINIC | Facility: CLINIC | Age: 62
End: 2022-04-21

## 2022-04-21 DIAGNOSIS — R20.2 PARESTHESIA OF BOTH HANDS: ICD-10-CM

## 2022-04-21 DIAGNOSIS — E11.9 TYPE 2 DIABETES MELLITUS WITHOUT COMPLICATION, WITHOUT LONG-TERM CURRENT USE OF INSULIN (HCC): Primary | ICD-10-CM

## 2022-04-21 LAB
25(OH)D3 SERPL-MCNC: 31.1 NG/ML (ref 30–100)
CREAT UR-MCNC: 123 MG/DL
MICROALBUMIN UR-MCNC: 13.7 MG/L (ref 0–20)
MICROALBUMIN/CREAT 24H UR: 11 MG/G CREATININE (ref 0–30)
T4 FREE SERPL-MCNC: 0.88 NG/DL (ref 0.76–1.46)
TSH SERPL DL<=0.05 MIU/L-ACNC: 0.77 UIU/ML (ref 0.45–4.5)

## 2022-04-21 PROCEDURE — 84443 ASSAY THYROID STIM HORMONE: CPT

## 2022-04-21 PROCEDURE — 84439 ASSAY OF FREE THYROXINE: CPT

## 2022-04-21 PROCEDURE — 82306 VITAMIN D 25 HYDROXY: CPT

## 2022-04-21 PROCEDURE — 36415 COLL VENOUS BLD VENIPUNCTURE: CPT

## 2022-04-21 PROCEDURE — 82570 ASSAY OF URINE CREATININE: CPT | Performed by: FAMILY MEDICINE

## 2022-04-21 PROCEDURE — 82043 UR ALBUMIN QUANTITATIVE: CPT | Performed by: FAMILY MEDICINE

## 2022-04-21 RX ORDER — LANCETS 28 GAUGE
EACH MISCELLANEOUS
Qty: 200 EACH | Refills: 3 | Status: SHIPPED | OUTPATIENT
Start: 2022-04-21 | End: 2022-05-09 | Stop reason: SDUPTHER

## 2022-04-21 RX ORDER — BLOOD-GLUCOSE METER
1 KIT MISCELLANEOUS DAILY
Qty: 1 EACH | Refills: 0 | Status: SHIPPED | OUTPATIENT
Start: 2022-04-21 | End: 2022-05-09 | Stop reason: SDUPTHER

## 2022-04-21 RX ORDER — BLOOD-GLUCOSE METER
KIT MISCELLANEOUS
Qty: 200 EACH | Refills: 3 | Status: SHIPPED | OUTPATIENT
Start: 2022-04-21 | End: 2022-05-09 | Stop reason: CLARIF

## 2022-04-21 NOTE — TELEPHONE ENCOUNTER
Patient came in with scripts for dme that were given to him on 4/13 and asked if we could fax it to a place where he can get them  Scripts were given to Christal Samano MA

## 2022-04-21 NOTE — TELEPHONE ENCOUNTER
Order for shower chair has been processed VIA online portal  [employer] will reach out to the pt directly once order have been electronically signed by Ya Johnson  Customer service   [phone]

## 2022-04-21 NOTE — TELEPHONE ENCOUNTER
received orders, requested have been processed VIA online portal  [employer] will reach out to the pt directly once the orders are signed electronically by Nga Bey

## 2022-04-25 ENCOUNTER — PATIENT OUTREACH (OUTPATIENT)
Dept: FAMILY MEDICINE CLINIC | Facility: CLINIC | Age: 62
End: 2022-04-25

## 2022-04-25 NOTE — PROGRESS NOTES
Outgoing Call:  4/25/2022    [facility] did call pt to discuss status of waiver  Pt stated that he was approved for 38 hours a week and services begin this Friday  [facility] informed pt that if he has any concerns or questions in reference to waiver service he would need to address them with his  who oversees this program  Pt expressed understanding  Pt asked [facility] about status of rental assistance application  [facility] informed him that he still will need to provide a recent bank statement and utility bill  Pt stated that he will continue to work on this and may have documents by end of week  Next outreach is scheduled for 5/2/2022

## 2022-04-26 ENCOUNTER — PATIENT OUTREACH (OUTPATIENT)
Dept: FAMILY MEDICINE CLINIC | Facility: CLINIC | Age: 62
End: 2022-04-26

## 2022-04-26 NOTE — PROGRESS NOTES
Per chart review, patient was approved for [employer]  Patient was approved for 38 hours a week and services behin this Friday  JEROD ALMARAZ placed call to patient to follow-up and see if he heard from [facility] to establish [address] services  Pt reports he is very happy he was approved for [employer] and expressed he is grateful for all the help from JEROD ALMARAZ and [facility]  JEROD ALMARAZ is also happy patient was approved for [employer]  Pt reports he has not heard from [facility] to establish [address] services  JEROD ALMARAZ offers to call and check on the status of the referral  JEROD ALMARAZ tried to call with patient on the phone but JEROD ALMARAZ was unable to do it and had to call [facility] without the patient  JEROD ALMARAZ called [facility] and was inform patient was called and he declined the services  JEROD ALMARAZ is not sure as patient is very interested in services  JREOD ALMARAZ ask for patient to be place on the list again and JEROD ALMARAZ would follow-up with patient to assure patient be aware [facility] is going to call him again to establish services  Patient was placed on the wait list again  JEROD ALMARAZ placed call to patient to follow-up  No answer, left a details vm and ask for a return call  Will remains available and will continue to follow-up  Addendum    JEROD ALMARAZ received call back from patient  JEROD ALMARAZ informs patient Per [facility] declined services when they called him  Pt states he has not received any call from [facility] services  JEROD ALMARAZ verbalized understanding and encourage patient to be mindful if [facility] call him again to establish services  JEROD ALMARAZ also encourage patient to call [facility] as well to follow-up  Pt  Verbalized understanding and thanked JEROD ALMARAZ  Will continue to follow-up  aylin encarnacion RN to aylin encarnacion RN to janette blount rn

## 2022-04-28 ENCOUNTER — TELEPHONE (OUTPATIENT)
Dept: FAMILY MEDICINE CLINIC | Facility: CLINIC | Age: 62
End: 2022-04-28

## 2022-04-28 NOTE — TELEPHONE ENCOUNTER
Per online portal shower chair has been delivered successful The patient is a 7y1m Male complaining of cough.

## 2022-04-28 NOTE — TELEPHONE ENCOUNTER
Patient in need of refills for atorvastatin (LIPITOR) 20 mg tablet, and HYDROcodone-acetaminophen (NORCO) 7 5-325 mg per tablet    Please advise

## 2022-04-29 ENCOUNTER — PATIENT OUTREACH (OUTPATIENT)
Dept: FAMILY MEDICINE CLINIC | Facility: CLINIC | Age: 62
End: 2022-04-29

## 2022-04-29 DIAGNOSIS — G89.29 CHRONIC LOW BACK PAIN WITHOUT SCIATICA, UNSPECIFIED BACK PAIN LATERALITY: ICD-10-CM

## 2022-04-29 DIAGNOSIS — M54.50 CHRONIC LOW BACK PAIN WITHOUT SCIATICA, UNSPECIFIED BACK PAIN LATERALITY: ICD-10-CM

## 2022-04-29 DIAGNOSIS — M54.16 LUMBAR RADICULOPATHY: ICD-10-CM

## 2022-04-29 DIAGNOSIS — E78.5 HYPERLIPIDEMIA, UNSPECIFIED HYPERLIPIDEMIA TYPE: ICD-10-CM

## 2022-04-29 RX ORDER — ATORVASTATIN CALCIUM 20 MG/1
20 TABLET, FILM COATED ORAL DAILY
Qty: 90 TABLET | Refills: 1 | Status: SHIPPED | OUTPATIENT
Start: 2022-04-29

## 2022-04-29 RX ORDER — HYDROCODONE BITARTRATE AND ACETAMINOPHEN 7.5; 325 MG/1; MG/1
1 TABLET ORAL EVERY 8 HOURS PRN
Qty: 90 TABLET | Refills: 0 | Status: SHIPPED | OUTPATIENT
Start: 2022-04-29 | End: 2022-06-01 | Stop reason: SDUPTHER

## 2022-04-29 NOTE — TELEPHONE ENCOUNTER
Pt needs refill    HYDROcodone-acetaminophen (NORCO) 7 5-325 mg per tablet    atorvastatin (LIPITOR) 20 mg tablet

## 2022-04-29 NOTE — PROGRESS NOTES
Incoming Call:  4/29/2022    [facility] did receive a call from pt stating that he dropped off a few bank statements for [facility] to attach to rental assistance application with HARPREET Mallory  [facility] informed him she will review them  [facility] did inform pt that she also received a letter from [employer] stating that pt has been approved for Medicare premium assistance and the state will begin paying for his monthly premium which is $170 a month  Pt should begin seeing an extra $170 a month in his SSD payments moving forward  Pt stated that this did not make sense as he usually receives $1,299 a month however received $1,139 today  CMOC encouraged pt to call [employer] on Monday and inquire about this  Pt agreed to compete this  Pt also stated that he received a bill from [employer] stating that he is at risk of having his gas services shut off  [facility] asked him if he attempted to call and make a payment arrangement  Pt stated that he did not  [facility] encouraged pt to call and attempt to do this and she will offer assistance as needed  Pt agreed to do this  CMOC to f/u  Next outreach is scheduled for 5/2/2022

## 2022-05-02 ENCOUNTER — TELEPHONE (OUTPATIENT)
Dept: FAMILY MEDICINE CLINIC | Facility: CLINIC | Age: 62
End: 2022-05-02

## 2022-05-02 ENCOUNTER — PATIENT OUTREACH (OUTPATIENT)
Dept: FAMILY MEDICINE CLINIC | Facility: CLINIC | Age: 62
End: 2022-05-02

## 2022-05-02 NOTE — TELEPHONE ENCOUNTER
Lucero Polanco the [address] left a voicemail requesting Incontinence Supplies for the PT

## 2022-05-02 NOTE — PROGRESS NOTES
Outgoing Call:  5/2/2022    Noah Dunlap did call pt to discuss status of his SS benefits  Pt stated that he did not make the call to [facility] as he is not feeling well today  He stated that he had a rough night last night and his sister is going to the pharmacy today to  some medicine for him  Noah Dunlap wished him well and informed pt she will f/u tomorrow  Pt thanked Naoh Dunlap for her call  Next outreach is scheduled for 5/3/2022

## 2022-05-02 NOTE — TELEPHONE ENCOUNTER
There is no documentation as to what supplies patient needs  There is no diagnosis of incontinence in the chart  Will ask clinical nurse manager to investigate what patient needs and the diagnosis  Once order is established, I would be happy to sign

## 2022-05-04 ENCOUNTER — PATIENT OUTREACH (OUTPATIENT)
Dept: FAMILY MEDICINE CLINIC | Facility: CLINIC | Age: 62
End: 2022-05-04

## 2022-05-04 ENCOUNTER — OFFICE VISIT (OUTPATIENT)
Dept: UROLOGY | Facility: AMBULATORY SURGERY CENTER | Age: 62
End: 2022-05-04
Payer: MEDICARE

## 2022-05-04 VITALS
WEIGHT: 199 LBS | HEIGHT: 67 IN | OXYGEN SATURATION: 99 % | SYSTOLIC BLOOD PRESSURE: 128 MMHG | BODY MASS INDEX: 31.23 KG/M2 | HEART RATE: 70 BPM | DIASTOLIC BLOOD PRESSURE: 82 MMHG

## 2022-05-04 DIAGNOSIS — N40.1 BPH WITH OBSTRUCTION/LOWER URINARY TRACT SYMPTOMS: Primary | ICD-10-CM

## 2022-05-04 DIAGNOSIS — Z12.5 PROSTATE CANCER SCREENING: ICD-10-CM

## 2022-05-04 DIAGNOSIS — N13.8 BPH WITH OBSTRUCTION/LOWER URINARY TRACT SYMPTOMS: Primary | ICD-10-CM

## 2022-05-04 DIAGNOSIS — R35.0 URINARY FREQUENCY: ICD-10-CM

## 2022-05-04 LAB
POST-VOID RESIDUAL VOLUME, ML POC: 0 ML
SL AMB  POCT GLUCOSE, UA: NORMAL
SL AMB LEUKOCYTE ESTERASE,UA: NORMAL
SL AMB POCT BILIRUBIN,UA: NORMAL
SL AMB POCT BLOOD,UA: NORMAL
SL AMB POCT CLARITY,UA: CLEAR
SL AMB POCT COLOR,UA: YELLOW
SL AMB POCT KETONES,UA: NORMAL
SL AMB POCT NITRITE,UA: NORMAL
SL AMB POCT PH,UA: 7.5
SL AMB POCT SPECIFIC GRAVITY,UA: 1.01
SL AMB POCT URINE PROTEIN: NORMAL
SL AMB POCT UROBILINOGEN: 0.2

## 2022-05-04 PROCEDURE — 51798 US URINE CAPACITY MEASURE: CPT | Performed by: NURSE PRACTITIONER

## 2022-05-04 PROCEDURE — 81002 URINALYSIS NONAUTO W/O SCOPE: CPT | Performed by: NURSE PRACTITIONER

## 2022-05-04 PROCEDURE — 99203 OFFICE O/P NEW LOW 30 MIN: CPT | Performed by: NURSE PRACTITIONER

## 2022-05-04 NOTE — TELEPHONE ENCOUNTER
I contacted the [facility] supplies ,I spoke with Herbert De La Torre and  I advised the message from Dr Hema Christianson and they will contact the pt and let him know he don't have the diagnosis for incontinence supply

## 2022-05-04 NOTE — TELEPHONE ENCOUNTER
Please contact Iván Ibanez back if you have her # somewhere, it's not documented here or contact the pt to inquire of his needs

## 2022-05-04 NOTE — PROGRESS NOTES
5/4/2022      Chief Complaint   Patient presents with    Urinary Frequency     Assessment and Plan    62 y o  male managed by patient    1  Benign Prostatic hyperplasia with lower urinary tract symptoms  · Urine dip in office unremarkable  · Bladder scan PVR 0 mL  · Continue with tamsulosin  · Schedule cystoscopy/TRUS  · Schedule ultrasound kidney and bladder  · We discussed need to maintain adequate hydration while avoiding bladder irritating foods and beverages  · Ensure complete bladder emptying with urination  · Follow up in the office in 1 month for cystoscopy/TRUS with physician    2  Prostate Cancer Screening  · PSA performed 03/15/2022 resulted 1 6  · KELLEN-prostate approximate 40-45 g with no nodules  · Repeat PSA and KELLEN in 1 year    3  Family history of prostate cancer  · Father    History of Present Illness  Briana Santiago is a 62 y o  male here for follow up evaluation of  urinary symptoms secondary to benign prostatic hyperplasia  Patient is also here for routine prostate cancer screening  Patient presents to the office today with complaints of urinary frequency, urgency, weak urinary stream and nocturia  He reports getting upwards to 4 times at night to urinate  He also reports episodes of urinary urge incontinence  He reports this to be a chronic issue and has been managed thus far on tamsulosin by his PCP  He reports initiation tamsulosin in 2018  He reports at the time of starting the medication he had moderate resolution of lower urinary tract symptoms  Unfortunately, as time progressed he has had worsening of the symptoms  He reports drinking a lot of water per day and 1 cup of coffee daily  Patient also has complaints of a tingling sensation in his penis and occasional dysuria with urinating  He reports occasional flank pain  Patient reports a family history of prostate cancer in his father        Component PSA, Total   Latest Ref Rng & Units 0 0 - 4 0 ng/mL   11/13/2020 1 4   3/15/2022 1 6       Review of Systems   Constitutional: Negative for chills and fever  Respiratory: Negative for cough and shortness of breath  Cardiovascular: Negative for chest pain  Gastrointestinal: Negative for abdominal distention, abdominal pain, blood in stool, nausea and vomiting  Genitourinary: Positive for dysuria, frequency and urgency  Negative for difficulty urinating, enuresis, flank pain and hematuria  Skin: Negative for rash  AUA SYMPTOM SCORE      Most Recent Value   AUA SYMPTOM SCORE    How often have you had a sensation of not emptying your bladder completely after you finished urinating? 1   How often have you had to urinate again less than two hours after you finished urinating? 5   How often have you found you stopped and started again several times when you urinate? 4   How often have you found it difficult to postpone urination? 3   How often have you had a weak urinary stream? 3   How often have you had to push or strain to begin urination? 1   How many times did you most typically get up to urinate from the time you went to bed at night until the time you got up in the morning?  5   Quality of Life: If you were to spend the rest of your life with your urinary condition just the way it is now, how would you feel about that? 3   AUA SYMPTOM SCORE 22             Past Medical History  Past Medical History:   Diagnosis Date    Asthma     Back pain     Back pain     Diabetes mellitus ([address] )     Hyperlipidemia     Hypertension        Past Social History  Past Surgical History:   Procedure Laterality Date    CYST REMOVAL  2017     Social History     Tobacco Use   Smoking Status Never Smoker   Smokeless Tobacco Never Used       Past Family History  Family History   Problem Relation Age of Onset    Hypertension Mother     Diabetes Mother     Cancer Father     Diabetes Family     Hypertension Family        Past Social history  Social History     Socioeconomic History    Marital status: /Civil Union     Spouse name: Not on file    Number of children: Not on file    Years of education: Not on file    Highest education level: Not on file   Occupational History    Not on file   Tobacco Use    Smoking status: Never Smoker    Smokeless tobacco: Never Used   Vaping Use    Vaping Use: Never used   Substance and Sexual Activity    Alcohol use: Not Currently     Comment: rare    Drug use: Never    Sexual activity: Not Currently   Other Topics Concern    Not on file   Social History Narrative    Not on file     Social Determinants of Health     Financial Resource Strain: Low Risk     Difficulty of Paying Living Expenses: Not hard at all   Food Insecurity: No Food Insecurity    Worried About [address] in the Last Year: Never true    [address] in the Last Year: Never true   Transportation Needs: No Transportation Needs    Lack of Transportation (Medical): No    Lack of Transportation (Non-Medical):  No   Physical Activity: Not on file   Stress: Not on file   Social Connections: Not on file   Intimate Partner Violence: Not on file   Housing Stability: Not on file       Current Medications  Current Outpatient Medications   Medication Sig Dispense Refill    aluminum-magnesium hydroxide-simethicone (MAALOX) 200-200-20 MG/5ML SUSP Take 15 mL by mouth 4 (four) times a day (before meals and at bedtime) 150 mL 0    amLODIPine (NORVASC) 10 mg tablet Take 1 tablet (10 mg total) by mouth daily 90 tablet 1    atorvastatin (LIPITOR) 20 mg tablet Take 1 tablet (20 mg total) by mouth daily 90 tablet 1    Bioflavonoid Products (RA Vitamin C CR) TBCR take 1 tablet by mouth twice a day for 14 days      Blood Glucose Monitoring Suppl (OneTouch Verio) w/Device KIT Use daily 1 kit 0    Blood Glucose Monitoring Suppl KIT Use in the morning Please give test strips and lancets  Dx  E11 9 1 kit 0    Blood Pressure KIT Use in the morning With appropriate size cuff 1 kit 0    Blood Pressure Monitoring (Blood Pressure Monitor/Arm) EMILY Use daily 1 each 0    budesonide-formoterol (Symbicort) 160-4 5 mcg/act inhaler Inhale 2 puffs 2 (two) times a day Rinse mouth after use   10 2 g 3    cyclobenzaprine (FLEXERIL) 10 mg tablet Take 1 tablet (10 mg total) by mouth daily at bedtime as needed for muscle spasms 30 tablet 0    famotidine (PEPCID) 20 mg tablet Take 1 tablet (20 mg total) by mouth 2 (two) times a day As needed for acid reflux 90 tablet 0    fluticasone (FLONASE) 50 mcg/act nasal spray 1 spray into each nostril daily 16 g 0    gabapentin (NEURONTIN) 800 mg tablet Take 1 tablet (800 mg total) by mouth 3 (three) times a day 90 tablet 3    glucose blood (FREESTYLE LITE) test strip TESTING DAILY IN THE  each 3    glucose blood (OneTouch Verio) test strip Check blood sugar daily 50 each 5    glucose monitoring kit (FREESTYLE) monitoring kit Use 1 each in the morning TESTING DAILY IN THE AM 1 each 0    HYDROcodone-acetaminophen (NORCO) 7 5-325 mg per tablet Take 1 tablet by mouth every 8 (eight) hours as needed for pain For ongoing pain Max Daily Amount: 3 tablets 90 tablet 0    ibuprofen (MOTRIN) 800 mg tablet Take 0 5 tablets (400 mg total) by mouth every 6 (six) hours as needed for mild pain 30 tablet 1    Lancet Devices (ONE TOUCH DELICA LANCING DEV) MISC Use daily 1 each 0    Lancets (freestyle) lancets TESTING DAILY IN THE  each 3    lidocaine (Lidoderm) 5 % Apply 1 patch topically daily Remove & Discard patch within 12 hours or as directed by MD 10 patch 0    lidocaine (XYLOCAINE) 2 % topical gel Apply topically 3 (three) times a day 30 mL 2    metFORMIN (GLUCOPHAGE) 500 mg tablet Take 1 tablet (500 mg total) by mouth daily 90 tablet 1    ondansetron (Zofran ODT) 4 mg disintegrating tablet Take 1 tablet (4 mg total) by mouth every 6 (six) hours as needed for nausea or vomiting 20 tablet 0    OneTouch Delica Lancets 48I MISC Use daily 100 each 1    tamsulosin (FLOMAX) 0 4 mg Take 1 capsule (0 4 mg total) by mouth daily with dinner 270 capsule 1    zolpidem (AMBIEN) 5 mg tablet Take 1 tablet (5 mg total) by mouth daily at bedtime as needed for sleep 30 tablet 0    Multiple Vitamin (multivitamin) capsule Take 1 capsule by mouth daily for 14 days 14 capsule 0    venlafaxine (EFFEXOR-XR) 150 mg 24 hr capsule Take 1 capsule (150 mg total) by mouth daily with breakfast 30 capsule 2     No current facility-administered medications for this visit  Allergies  No Known Allergies      The following portions of the patient's history were reviewed and updated as appropriate: allergies, current medications, past medical history, past social history, past surgical history and problem list       Vitals  Vitals:    05/04/22 0933   BP: 128/82   BP Location: Left arm   Patient Position: Sitting   Cuff Size: Adult   Pulse: 70   SpO2: 99%   Weight: 90 3 kg (199 lb)   Height: 5' 7" (1 702 m)           Physical Exam  Physical Exam  Vitals reviewed  Constitutional:       General: He is not in acute distress  Appearance: Normal appearance  He is normal weight  HENT:      Head: Normocephalic  Eyes:      Pupils: Pupils are equal, round, and reactive to light  Cardiovascular:      Rate and Rhythm: Normal rate  Pulmonary:      Effort: No respiratory distress  Breath sounds: Normal breath sounds  Genitourinary:     Penis: Normal        Testes: Normal       Comments: KELLEN-prostate 40-45 g with no nodules  Skin:     General: Skin is warm and dry  Neurological:      General: No focal deficit present  Mental Status: He is alert and oriented to person, place, and time     Psychiatric:         Mood and Affect: Mood normal          Behavior: Behavior normal        Results  Recent Results (from the past 1 hour(s))   POCT urine dip    Collection Time: 05/04/22  9:45 AM   Result Value Ref Range    LEUKOCYTE ESTERASE,UA -     NITRITE,UA -     SL AMB POCT UROBILINOGEN 0 2 POCT URINE PROTEIN -      PH,UA 7 5     BLOOD,UA -     SPECIFIC GRAVITY,UA 1 015     KETONES,UA -     BILIRUBIN,UA -     GLUCOSE, UA -      COLOR,UA yellow     CLARITY,UA clear    POCT Measure PVR    Collection Time: 05/04/22  9:46 AM   Result Value Ref Range    POST-VOID RESIDUAL VOLUME, ML POC 0 mL   ]  Lab Results   Component Value Date    PSA 1 6 03/15/2022    PSA 1 4 11/13/2020     Lab Results   Component Value Date    CALCIUM 9 4 03/27/2022    K 3 7 03/27/2022    CO2 30 03/27/2022     03/27/2022    BUN 16 03/27/2022    CREATININE 1 19 03/27/2022     Lab Results   Component Value Date    WBC 4 70 03/27/2022    HGB 14 9 03/27/2022    HCT 42 9 03/27/2022    MCV 86 03/27/2022     03/27/2022       Orders  Orders Placed This Encounter   Procedures    Cystoscopy     Standing Status:   Future     Standing Expiration Date:   5/4/2023    US kidney and bladder     Standing Status:   Future     Standing Expiration Date:   5/4/2026     Scheduling Instructions:      "Prep required if being scheduled in conjunction with other studies, refer to those examination's Preps first before scheduling  All patients for US Kidney and Bladder they must drink 24 oz of water 60 minutes before your scheduled appointment time  This test requires you to have a FULL bladder  Please do not urinate before your test             Please bring your physician order, insurance cards, a form of photo ID and a list of your medications with you  Arrive 15 minutes prior to your appointment time in order to register  If you need to have lab work or a urinalysis, please do this AFTER your ultrasound  "     Order Specific Question:   Reason for Exam:     Answer:   frequency    PSA, Total Screen     This is a patient instruction: This test is non-fasting  Please drink two glasses of water morning of bloodwork          Standing Status:   Future     Standing Expiration Date:   11/4/2023    POCT urine dip    POCT Measure PVR       RAMO Petersen

## 2022-05-04 NOTE — PROGRESS NOTES
Incoming Call:  Coral Taylor did receive a call from pt stating that he did call [employer] and was able to see that he made an error  Pt stated that he did receive the correct amount in his SS benefits check this month  [facility] thanked pt for his call and informed him she will check on status of his rental assistance application with HARPREET Mallory by end of week  Next outreach is scheduled for 5/6/2022

## 2022-05-04 NOTE — PATIENT INSTRUCTIONS
Schedule cystoscopy  Schedule Ultrasound of kidney and bladder    Cystoscopy   AMBULATORY CARE:   A cystoscopy  is a procedure to look inside of your urethra and bladder using a cystoscope  A cystoscope is a small tube with a light and magnifying camera on the end  The procedure is used to diagnose and treat conditions of the bladder, urethra, and prostate  The procedure is also done to remove stones or blood clots from the urethra or bladder  Your healthcare provider may do other tests, such as ureteroscopy, during a cystoscopy  Prepare for your cystoscopy: You may need to stop smoking several days before your procedure, if you are having general anesthesia  Tell your healthcare provider what medicines you take  Your healthcare provider will tell you what medicines to take and not to take on the day of your procedure  You may need to stop taking medicines such as anticoagulants, aspirin, and ibuprofen several days before your procedure  He may tell you stop eating after midnight the night before your procedure  You may be asked to drink a large amount of liquids before your procedure  Make plans for someone to drive you home after your procedure  During your cystoscopy:   · You may be given general anesthesia to keep you asleep and pain free during your procedure  Your healthcare provider may give you anesthesia in your spine  With spinal anesthesia the lower part of your body will be numb  You will not feel pain during your procedure  Your healthcare provider may instead use local anesthesia that is put into your urethra and bladder  You will not feel pain, but you may be able to feel some pressure during your procedure  With local anesthesia, you may feel burning or need to urinate when the cystoscope is put in and removed  · You will be placed on your back and your feet may be placed in stirrups  The cystoscope will be will be placed through your urethra and into your bladder   The urologist will look at the walls of your urethra as the scope goes through to your bladder  Your bladder may be filled with an irrigation liquid to help your urologist see inside of your bladder more clearly   Special tools may used to remove tissue or stones  Your urologist may use a special tool to stop bleeding in your bladder  If there are blood clots in your bladder, your healthcare provider will inject an irrigation fluid into your bladder  Then he will use suction to remove the fluid and blood clots  After a cystoscopy:  After you are fully awake, you will go home  After your cystoscopy, it is normal to have pink-colored urine  It is also normal to have an increased need to urinate  You may have burning when you urinate  If you had general anesthesia, it may take at least 24 hours before you feel like your usual self  Risks of a cystoscopy: You may bleed more than expected or develop an infection  Swelling caused by the cystoscopy may cause a blockage or slow urine flow  Seek care immediately if:   · Your urine turns from pink to red, or you have clots in your urine  · You cannot urinate and your bladder feels full  · Your pain or burning becomes worse or lasts longer than 2 days  Contact your healthcare provider or urologist if:   · Your urine stays pink for longer than 3 days  · Your pain or burning becomes worse  · Your skin is itchy, swollen, or has a new rash  · You have a fever and chills  · You have questions or concerns about your condition or care  After your cystoscopy: It is normal to have pink-colored urine  It is also normal to have an increased need to urinate and burning when you urinate  If you had general anesthesia, it may take at least 24 hours before you feel like your usual self  · Drink at least 3 to 4 glasses of water daily for 2 days after your procedure  Avoid acidic juices such as orange juice and lemonade  Water can help prevent blood clots from forming   It can also help decrease the amount of acid in your urine that can cause burning  · Sit in a warm tub of water  Warm baths relieve pain and bladder spasms  · Do not have sex  until your healthcare provider tells you it is okay  Sex may increase your risk for a urinary tract infection  Medicines: You may  be given any of the following:  · Antibiotics  help treat or prevent a bacterial infection  · Acetaminophen  decreases pain and fever  It is available without a doctor's order  Ask how much to take and how often to take it  Follow directions  Read the labels of all other medicines you are using to see if they also contain acetaminophen, or ask your doctor or pharmacist  Acetaminophen can cause liver damage if not taken correctly  Do not use more than 4 grams (4,000 milligrams) total of acetaminophen in one day  · Take your medicine as directed  Contact your healthcare provider if you think your medicine is not helping or if you have side effects  Tell him or her if you are allergic to any medicine  Keep a list of the medicines, vitamins, and herbs you take  Include the amounts, and when and why you take them  Bring the list or the pill bottles to follow-up visits  Carry your medicine list with you in case of an emergency  Follow up with your healthcare provider as directed: You may need to have another cystoscopy  Write down your questions so you remember to ask them during your visits  © 2017 [employer] Information is for End User's use only and may not be sold, redistributed or otherwise used for commercial purposes  All illustrations and images included in CareNotes® are the copyrighted property of A D A M , Inc  or Nomi Pritchett  The above information is an  only  It is not intended as medical advice for individual conditions or treatments  Talk to your doctor, nurse or pharmacist before following any medical regimen to see if it is safe and effective for you

## 2022-05-05 ENCOUNTER — PATIENT OUTREACH (OUTPATIENT)
Dept: FAMILY MEDICINE CLINIC | Facility: CLINIC | Age: 62
End: 2022-05-05

## 2022-05-05 NOTE — PROGRESS NOTES
JEROD ALMARAZ placed call to patient to follow-up and see if he has heard from [facility]  Pt states he has not heard from [facility] yet  Pt reports he is not feeling well today, he is having a lot back pain  JEROD ALMARAZ verbalized understanding and offers to call [facility] to check if he still on the wait list  Pt verbalized understanding and thanked JEROD ALMARAZ for the help  JEROD ALMARAZ placed call to [facility] spoke with Guzman Warren states they has not been able to verify patient's insurance  JEROD ALMARAZ provided patient's insurance information and SS#  Guzman Warren states she would sent everything to the intake dept and ask if they could call JEROD ALMARAZ instead to schedule the appointment  JEROD ALMARAZ agree they could call me to schedule the intake appointment  Guzman Warren would sent the information to intake dept  JEROD ALMARAZ thanked Guzman NEWSOME CM will remains available and will continue to follow-up  Addendum     JEROD ALMARAZ received call from Rachel from [facility] stating patient has been scheduled for intake appointment for May 26th 1pm at the [place] location  Rachel states patient is aware of this as he talked to the patient already  JEROD ALMARAZ thanked Rachel for following up with JEROD ALMARAZ and patient  JEROD ALMARAZ will remains available and will continue to follow-up

## 2022-05-06 ENCOUNTER — HOSPITAL ENCOUNTER (OUTPATIENT)
Dept: RADIOLOGY | Age: 62
Discharge: HOME | End: 2022-05-06
Payer: MEDICARE

## 2022-05-06 ENCOUNTER — TELEPHONE (OUTPATIENT)
Dept: FAMILY MEDICINE CLINIC | Facility: CLINIC | Age: 62
End: 2022-05-06

## 2022-05-06 ENCOUNTER — PATIENT OUTREACH (OUTPATIENT)
Dept: FAMILY MEDICINE CLINIC | Facility: CLINIC | Age: 62
End: 2022-05-06

## 2022-05-06 ENCOUNTER — APPOINTMENT (OUTPATIENT)
Dept: LAB | Age: 62
End: 2022-05-06
Payer: MEDICARE

## 2022-05-06 DIAGNOSIS — R35.0 URINARY FREQUENCY: ICD-10-CM

## 2022-05-06 PROCEDURE — 76770 US EXAM ABDO BACK WALL COMP: CPT

## 2022-05-06 NOTE — TELEPHONE ENCOUNTER
PCP SIGNATURE NEEDED FOR [facility] ( ONE TOUCH VERIO TEST STRIPS)  FORM RECEIVED VIA FAX AND PLACED IN PCP FOLDER TO BE DELIVERED AT ASSIGNED TIMES

## 2022-05-06 NOTE — PROGRESS NOTES
Outgoing Call:  5/6/2022    [facility] did call pt to discuss completing [employer] application for rental assistance  [facility] needs pt signature  Pt agreed to stop at  on Monday where form will be for pt to sign  Pt agreed  [facility] to f/u  Next outreach is scheduled for 5/9/2022

## 2022-05-09 ENCOUNTER — OFFICE VISIT (OUTPATIENT)
Dept: FAMILY MEDICINE CLINIC | Facility: CLINIC | Age: 62
End: 2022-05-09

## 2022-05-09 ENCOUNTER — PATIENT OUTREACH (OUTPATIENT)
Dept: FAMILY MEDICINE CLINIC | Facility: CLINIC | Age: 62
End: 2022-05-09

## 2022-05-09 VITALS
DIASTOLIC BLOOD PRESSURE: 78 MMHG | RESPIRATION RATE: 18 BRPM | WEIGHT: 204 LBS | HEIGHT: 67 IN | TEMPERATURE: 97.6 F | SYSTOLIC BLOOD PRESSURE: 118 MMHG | OXYGEN SATURATION: 97 % | BODY MASS INDEX: 32.02 KG/M2 | HEART RATE: 85 BPM

## 2022-05-09 DIAGNOSIS — I10 BENIGN ESSENTIAL HTN: ICD-10-CM

## 2022-05-09 DIAGNOSIS — N40.1 BENIGN PROSTATIC HYPERPLASIA WITH URINARY FREQUENCY: ICD-10-CM

## 2022-05-09 DIAGNOSIS — M54.42 ACUTE LEFT-SIDED LOW BACK PAIN WITH LEFT-SIDED SCIATICA: Primary | ICD-10-CM

## 2022-05-09 DIAGNOSIS — R35.0 BENIGN PROSTATIC HYPERPLASIA WITH URINARY FREQUENCY: ICD-10-CM

## 2022-05-09 DIAGNOSIS — E11.9 TYPE 2 DIABETES MELLITUS WITHOUT COMPLICATION, WITHOUT LONG-TERM CURRENT USE OF INSULIN (HCC): ICD-10-CM

## 2022-05-09 PROCEDURE — 3074F SYST BP LT 130 MM HG: CPT

## 2022-05-09 PROCEDURE — 3078F DIAST BP <80 MM HG: CPT

## 2022-05-09 PROCEDURE — 99214 OFFICE O/P EST MOD 30 MIN: CPT

## 2022-05-09 RX ORDER — BLOOD SUGAR DIAGNOSTIC
STRIP MISCELLANEOUS
Qty: 100 EACH | Refills: 11
Start: 2022-05-09 | End: 2022-06-14 | Stop reason: SDUPTHER

## 2022-05-09 RX ORDER — LANCETS 28 GAUGE
EACH MISCELLANEOUS
Qty: 200 EACH | Refills: 3 | Status: SHIPPED | OUTPATIENT
Start: 2022-05-09

## 2022-05-09 RX ORDER — BLOOD-GLUCOSE METER
1 KIT MISCELLANEOUS DAILY
Qty: 1 EACH | Refills: 0 | Status: SHIPPED | OUTPATIENT
Start: 2022-05-09

## 2022-05-09 NOTE — ASSESSMENT & PLAN NOTE
Symptoms of urinary frequency and feeling of incomplete emptying of bladder persist on Flomax 0 4 mg daily  Completed US Kidney and Bladder on 5/6/22  Scheduled for cystoscopy/TRUS with urologist on 6/20/22

## 2022-05-09 NOTE — ASSESSMENT & PLAN NOTE
At his request, pt provided with paper prescriptions for new glucometer and lancets, stating pharmacy did not receive last order placed  Pt has strips on hand, not refilled at this time  Continue Metformin 500 mg daily  Resume checking BS daily in AM  Bring log to next appt  Reviewed rationale for good blood sugar control with patient     Lab Results   Component Value Date    HGBA1C 6 2 (H) 03/15/2022

## 2022-05-09 NOTE — PROGRESS NOTES
Assessment/Plan:    Type 2 diabetes mellitus without complication, without long-term current use of insulin ([facility] [address] )  At his request, pt provided with paper prescriptions for new glucometer and lancets, stating pharmacy did not receive last order placed  Pt has strips on hand, not refilled at this time  Continue Metformin 500 mg daily  Resume checking BS daily in AM  Bring log to next appt  Reviewed rationale for good blood sugar control with patient  Lab Results   Component Value Date    HGBA1C 6 2 (H) 03/15/2022       Benign essential HTN  BP at goal in office today: 118/78  Continue current medication: amlodipine 10 mg daily  Low back pain with left-sided sciatica  Pt no longer attending PT and states weakness in left leg has increased since stopping therapy  Reports home health aid helps him at home and someone does his shopping for him  Referral for PT placed  Pt had appt with [facility] this morning, states they will not be pursuing surgery unless new and severe symptoms, will be scheduling injections instead  Continue hydrocodone-acetaminophen, gabapentin, and Flexeril as prescribed  Norco last refilled on 4/29/22  PDMP reviewed, no abnormalities noted  Benign prostatic hyperplasia with lower urinary tract symptoms  Symptoms of urinary frequency and feeling of incomplete emptying of bladder persist on Flomax 0 4 mg daily  Completed US Kidney and Bladder on 5/6/22  Scheduled for cystoscopy/TRUS with urologist on 6/20/22  Diagnoses and all orders for this visit:    Acute left-sided low back pain with left-sided sciatica  -     Ambulatory Referral to Physical Therapy;  Future    Type 2 diabetes mellitus without complication, without long-term current use of insulin ([facility])  -     glucose monitoring kit (FREESTYLE) monitoring kit; Use 1 each in the morning TESTING DAILY IN THE AM  -     Lancets (freestyle) lancets; TESTING DAILY IN THE AM    Benign essential HTN    Benign prostatic hyperplasia with urinary frequency          Subjective:      Patient ID: Lew Porter is a 62 y o  male  HPI  Marv presented to the office to follow up for chronic back pain stemming from fall in 2019  Pain radiates to left leg, associated left leg weakness but no numbness or tingling  Pt continues to follow with [facility], states they will not be pursuing surgery at this time due to risks, will be scheduling injections that pt states he had before in [place] and were effective  Pt ambulating with cane, states he needs a walker due to weakness in left leg  Reports walker, toilet riser, and shower chair were ordered but he only received shower chair  Pt lives in first floor apt with only one step  Pt has home health aid and assistance with shopping  He states he is unable to work  Pt c/o continued urinary frequency, weak stream, and feeling of incomplete bladder emptying  Denies constipation or saddle anesthesia  Completed US kidney and bladder 5/6/22  Scheduled for cystoscopy and TRUS with urology on 6/20/22  Pt did not want to discuss diabetes, stated, "don't even ask " Has not checked BS in about 1 month due to broken glucometer  Pt denied need for any medication refills at this time  The following portions of the patient's history were reviewed and updated as appropriate: allergies, current medications, past family history, past medical history, past social history, past surgical history and problem list     Review of Systems   Constitutional: Negative for appetite change, chills, fatigue, fever and unexpected weight change  HENT: Negative  Respiratory: Positive for cough  Negative for chest tightness and shortness of breath  Cardiovascular: Positive for leg swelling  Negative for chest pain and palpitations  Gastrointestinal: Negative for constipation, diarrhea, nausea and vomiting  Genitourinary: Positive for difficulty urinating (chronic)     Musculoskeletal: Positive for back pain, gait problem and myalgias  Neurological: Positive for weakness (left leg)  Negative for dizziness and headaches  Objective:      /78 (BP Location: Left arm, Patient Position: Sitting, Cuff Size: Adult)   Pulse 85   Temp 97 6 °F (36 4 °C) (Temporal)   Resp 18   Ht 5' 7" (1 702 m)   Wt 92 5 kg (204 lb)   SpO2 97%   BMI 31 95 kg/m²          Physical Exam  Vitals reviewed  Constitutional:       General: He is not in acute distress  Appearance: He is obese  He is not ill-appearing  HENT:      Head: Normocephalic and atraumatic  Cardiovascular:      Rate and Rhythm: Normal rate and regular rhythm  Heart sounds: Normal heart sounds  Pulmonary:      Effort: Pulmonary effort is normal       Breath sounds: Normal breath sounds  Musculoskeletal:         General: No swelling or deformity  Cervical back: No tenderness  Thoracic back: No tenderness  Lumbar back: Tenderness present  No swelling or deformity  Right lower leg: No edema  Left lower leg: No edema  Skin:     General: Skin is warm and dry  Neurological:      Mental Status: He is alert and oriented to person, place, and time  Motor: Motor function is intact  Gait: Gait is intact  Comments: Ambulates with cane      Psychiatric:         Mood and Affect: Mood and affect normal

## 2022-05-09 NOTE — PROGRESS NOTES
In Person:  5/9/2022    Pt did arrive at [facility] for his scheduled PCP appointment and did sign [employer] assistance form for [facility] to forward to [facility]  [facility] will need to review all documents requested prior to forwarding  Will complete this week  Next outreach is scheduled for 5/11/22

## 2022-05-09 NOTE — PATIENT INSTRUCTIONS
Please resume checking your blood sugar in the morning  Bring your blood sugar log with you to the next visit

## 2022-05-09 NOTE — ASSESSMENT & PLAN NOTE
Pt no longer attending PT and states weakness in left leg has increased since stopping therapy  Reports home health aid helps him at home and someone does his shopping for him  Referral for PT placed  Pt had appt with [facility] this morning, states they will not be pursuing surgery unless new and severe symptoms, will be scheduling injections instead  Continue hydrocodone-acetaminophen, gabapentin, and Flexeril as prescribed  Norco last refilled on 4/29/22  PDMP reviewed, no abnormalities noted

## 2022-05-11 ENCOUNTER — PATIENT OUTREACH (OUTPATIENT)
Dept: FAMILY MEDICINE CLINIC | Facility: CLINIC | Age: 62
End: 2022-05-11

## 2022-05-12 NOTE — PROGRESS NOTES
Fax:  5/11/2022    [facility] did review documents for pt's rental assistance application and did fax to [employer] for review  [facility] to f/u  Next outreach is scheduled for 5/15/2022

## 2022-05-18 ENCOUNTER — PATIENT OUTREACH (OUTPATIENT)
Dept: FAMILY MEDICINE CLINIC | Facility: CLINIC | Age: 62
End: 2022-05-18

## 2022-05-18 NOTE — PROGRESS NOTES
Outgoing Call:  5/18/2022    [facility] did call [employer] to confirm they are in receipt of pt's rental assistance application which was faxed to them on 5/11/22  Voice message left requesting a call in return  Next outreach is scheduled for 5/25/2022

## 2022-05-20 ENCOUNTER — PATIENT OUTREACH (OUTPATIENT)
Dept: FAMILY MEDICINE CLINIC | Facility: CLINIC | Age: 62
End: 2022-05-20

## 2022-05-20 NOTE — PROGRESS NOTES
Incoming Call:  5/23/2022    [facility] did receive a call from Mariely at [employer]  She stated that she received [facility]'s message and was calling in reference to pt's ERAP application  She stated that [facility] has attempted to reach out to pt on three occasions and have been unsuccessful  She also stated that pt has already applied prior to [facility] submitting application  She stated that pt does not meet criteria for assistance as he is on a fixed income  Pt will also need to prove his rent is behind due to loss of hours because of Covid  [facility]OC will reach out to pt and inform him of this  Next outreach is scheduled for 5/23/2022

## 2022-05-23 ENCOUNTER — PATIENT OUTREACH (OUTPATIENT)
Dept: FAMILY MEDICINE CLINIC | Facility: CLINIC | Age: 62
End: 2022-05-23

## 2022-05-23 DIAGNOSIS — E11.9 TYPE 2 DIABETES MELLITUS WITHOUT COMPLICATION, WITHOUT LONG-TERM CURRENT USE OF INSULIN (HCC): Primary | ICD-10-CM

## 2022-05-23 NOTE — PROGRESS NOTES
Outgoing Calls:  5/23/2022    [facility] did call pt and updated him on call with [employer] in reference to his rental assistance application  Pt expressed understanding and is aware he may not be eligible for assistance as he is currently on a fixed income  [facility] did inform pt she will call [employer] to discuss further  CMOC provided pt with number for her so that he can call and discuss any concerns with her  [facility] did call [employer] and discussed pt stated that he was employed last in 2020 and is in arrears on his rent, 90 days  She stated that she will need a copy of pt's tax returns for past two years to reconsider his application  She also asked for [facility] to please scan pt's ERAP application to her email  CMOC to complete  Next outreach is scheduled for 5/25/2022

## 2022-05-23 NOTE — PROGRESS NOTES
Incoming Call:  5/23/2022    [facility] did receive a call from pt stating that he spoke with his sister in reference to the [place] program and they are both very upset he may not be approved for assistance  [facility] informed pt it will be up to [employer] to determine if he is eligible for services  [facility] reminded pt they will review his application once more if he provides his filed taxes for past two years  Pt previously stated that he lost his job due to [employer] related issues early last year  During this call he stated that he last worked in 2019  [facility] informed him pandemic shutdown did not occur until early 2020 and that he should provide documents requested to [employer] as they are the only ones who can approve or deny services  Pt stated that he will not be providing them with said documents as he feels it is too intrusive and doesn't feel the need for them to have this  Pt stated that his sister informed him that many of her friends never worked and were not affected with Covid and did not need to provide proof of income to get approved  [facility] again reminded pt what he would need to do to move forward with his application  Pt stated that he is no longer interested in applying and thanked [facility] for her time  [facility] informed pt this referral will be closed as needs have been met with the exception of rental assistance  Pt expressed understanding  No further outreach is scheduled

## 2022-05-24 RX ORDER — LANCETS
EACH MISCELLANEOUS DAILY
Qty: 100 EACH | Refills: 1 | Status: SHIPPED | OUTPATIENT
Start: 2022-05-24

## 2022-05-25 ENCOUNTER — TELEPHONE (OUTPATIENT)
Dept: FAMILY MEDICINE CLINIC | Facility: CLINIC | Age: 62
End: 2022-05-25

## 2022-05-25 NOTE — TELEPHONE ENCOUNTER
[facility] ( [facility])  form received on 5/25/22  to be completed by PCP  Copy made and placed in PCP folder  Forms to be delivered to PCP mailbox at assigned time

## 2022-05-31 ENCOUNTER — PATIENT OUTREACH (OUTPATIENT)
Dept: FAMILY MEDICINE CLINIC | Facility: CLINIC | Age: 62
End: 2022-05-31

## 2022-05-31 DIAGNOSIS — F32.0 CURRENT MILD EPISODE OF MAJOR DEPRESSIVE DISORDER WITHOUT PRIOR EPISODE (HCC): ICD-10-CM

## 2022-05-31 RX ORDER — VENLAFAXINE HYDROCHLORIDE 150 MG/1
CAPSULE, EXTENDED RELEASE ORAL
Qty: 30 CAPSULE | Refills: 2 | Status: SHIPPED | OUTPATIENT
Start: 2022-05-31

## 2022-05-31 NOTE — PROGRESS NOTES
JEROD ALMARAZ placed call to patient to follow-up and see if he was able to attend to the intake appointment with [facility] on May 26th  Pt reports he did attended to the appointment and has another appointment scheduled with the therapist this Friday  JEROD ALAMRAZ verbalized understanding and informs patient he would see the Psych in few weeks but to continue seeing the therapist  Pt verbalized understanding and thanked JEROD ALMARAZ for all the help  Pt denies any additional resources at this time  Pt was approved for [facility] and established [place] [id number] services  Referral would be closed at this time, as patient's needs has been met  Informs patient to reach out as needed  No further outreach scheduled at this time

## 2022-06-01 DIAGNOSIS — N40.1 BENIGN PROSTATIC HYPERPLASIA WITH LOWER URINARY TRACT SYMPTOMS, SYMPTOM DETAILS UNSPECIFIED: ICD-10-CM

## 2022-06-01 DIAGNOSIS — E11.9 TYPE 2 DIABETES MELLITUS WITHOUT COMPLICATION, WITHOUT LONG-TERM CURRENT USE OF INSULIN (HCC): ICD-10-CM

## 2022-06-01 DIAGNOSIS — M54.16 LUMBAR RADICULOPATHY: ICD-10-CM

## 2022-06-01 DIAGNOSIS — G89.29 CHRONIC LOW BACK PAIN WITHOUT SCIATICA, UNSPECIFIED BACK PAIN LATERALITY: ICD-10-CM

## 2022-06-01 DIAGNOSIS — M54.50 CHRONIC LOW BACK PAIN WITHOUT SCIATICA, UNSPECIFIED BACK PAIN LATERALITY: ICD-10-CM

## 2022-06-01 NOTE — TELEPHONE ENCOUNTER
Pt is requesting medication refill for     HYDROcodone-acetaminophen (NORCO) 7 5-325 mg per tablet [    metFORMIN (GLUCOPHAGE) 500 mg tablet     tamsulosin (FLOMAX) 0 4 mg       Please send to pharmacy on file

## 2022-06-02 ENCOUNTER — NURSE TRIAGE (OUTPATIENT)
Dept: OTHER | Facility: OTHER | Age: 62
End: 2022-06-02

## 2022-06-02 ENCOUNTER — APPOINTMENT (EMERGENCY)
Dept: RADIOLOGY | Facility: HOSPITAL | Age: 62
End: 2022-06-02
Payer: MEDICARE

## 2022-06-02 ENCOUNTER — HOSPITAL ENCOUNTER (EMERGENCY)
Facility: HOSPITAL | Age: 62
Discharge: HOME | End: 2022-06-02
Attending: EMERGENCY MEDICINE | Admitting: EMERGENCY MEDICINE
Payer: MEDICARE

## 2022-06-02 VITALS
TEMPERATURE: 99.3 F | WEIGHT: 194.5 LBS | OXYGEN SATURATION: 97 % | BODY MASS INDEX: 30.53 KG/M2 | SYSTOLIC BLOOD PRESSURE: 154 MMHG | HEIGHT: 67 IN | HEART RATE: 81 BPM | RESPIRATION RATE: 20 BRPM | DIASTOLIC BLOOD PRESSURE: 86 MMHG

## 2022-06-02 DIAGNOSIS — R07.9 CHEST PAIN: ICD-10-CM

## 2022-06-02 DIAGNOSIS — R05.1 ACUTE COUGH: Primary | ICD-10-CM

## 2022-06-02 LAB
ALBUMIN SERPL BCP-MCNC: 4.5 G/DL (ref 3–5.2)
ALP SERPL-CCNC: 54 U/L (ref 43–122)
ALT SERPL W P-5'-P-CCNC: 31 U/L
ANION GAP SERPL CALCULATED.3IONS-SCNC: 9 MMOL/L (ref 5–14)
AST SERPL W P-5'-P-CCNC: 26 U/L (ref 17–59)
ATRIAL RATE: 85 BPM
BASOPHILS # BLD AUTO: 0.02 THOUSANDS/ΜL (ref 0–0.1)
BASOPHILS NFR BLD AUTO: 0 % (ref 0–1)
BILIRUB SERPL-MCNC: 0.67 MG/DL
BUN SERPL-MCNC: 16 MG/DL (ref 5–25)
CALCIUM SERPL-MCNC: 9.5 MG/DL (ref 8.4–10.2)
CARDIAC TROPONIN I PNL SERPL HS: 3 NG/L
CHLORIDE SERPL-SCNC: 104 MMOL/L (ref 97–108)
CO2 SERPL-SCNC: 26 MMOL/L (ref 22–30)
CREAT SERPL-MCNC: 1.15 MG/DL (ref 0.7–1.5)
EOSINOPHIL # BLD AUTO: 0.06 THOUSAND/ΜL (ref 0–0.61)
EOSINOPHIL NFR BLD AUTO: 1 % (ref 0–6)
ERYTHROCYTE [DISTWIDTH] IN BLOOD BY AUTOMATED COUNT: 13.6 % (ref 11.6–15.1)
GFR SERPL CREATININE-BSD FRML MDRD: 68 ML/MIN/1.73SQ M
GLUCOSE SERPL-MCNC: 149 MG/DL (ref 70–99)
HCT VFR BLD AUTO: 39.7 % (ref 36.5–49.3)
HGB BLD-MCNC: 13.9 G/DL (ref 12–17)
IMM GRANULOCYTES # BLD AUTO: 0.01 THOUSAND/UL (ref 0–0.2)
IMM GRANULOCYTES NFR BLD AUTO: 0 % (ref 0–2)
LIPASE SERPL-CCNC: 72 U/L (ref 23–300)
LYMPHOCYTES # BLD AUTO: 1.15 THOUSANDS/ΜL (ref 0.6–4.47)
LYMPHOCYTES NFR BLD AUTO: 22 % (ref 14–44)
MCH RBC QN AUTO: 29.1 PG (ref 26.8–34.3)
MCHC RBC AUTO-ENTMCNC: 35 G/DL (ref 31.4–37.4)
MCV RBC AUTO: 83 FL (ref 82–98)
MONOCYTES # BLD AUTO: 0.56 THOUSAND/ΜL (ref 0.17–1.22)
MONOCYTES NFR BLD AUTO: 11 % (ref 4–12)
NEUTROPHILS # BLD AUTO: 3.51 THOUSANDS/ΜL (ref 1.85–7.62)
NEUTS SEG NFR BLD AUTO: 66 % (ref 43–75)
NRBC BLD AUTO-RTO: 0 /100 WBCS
P AXIS: 56 DEGREES
PLATELET # BLD AUTO: 209 THOUSANDS/UL (ref 149–390)
PMV BLD AUTO: 9.1 FL (ref 8.9–12.7)
POTASSIUM SERPL-SCNC: 3.9 MMOL/L (ref 3.6–5)
PR INTERVAL: 174 MS
PROT SERPL-MCNC: 8 G/DL (ref 5.9–8.4)
QRS AXIS: 55 DEGREES
QRSD INTERVAL: 84 MS
QT INTERVAL: 370 MS
QTC INTERVAL: 440 MS
RBC # BLD AUTO: 4.78 MILLION/UL (ref 3.88–5.62)
SODIUM SERPL-SCNC: 139 MMOL/L (ref 137–147)
T WAVE AXIS: 10 DEGREES
VENTRICULAR RATE: 85 BPM
WBC # BLD AUTO: 5.31 THOUSAND/UL (ref 4.31–10.16)

## 2022-06-02 PROCEDURE — 93005 ELECTROCARDIOGRAM TRACING: CPT

## 2022-06-02 PROCEDURE — 87636 SARSCOV2 & INF A&B AMP PRB: CPT | Performed by: PHYSICIAN ASSISTANT

## 2022-06-02 PROCEDURE — 84484 ASSAY OF TROPONIN QUANT: CPT | Performed by: PHYSICIAN ASSISTANT

## 2022-06-02 PROCEDURE — 83690 ASSAY OF LIPASE: CPT | Performed by: PHYSICIAN ASSISTANT

## 2022-06-02 PROCEDURE — 71045 X-RAY EXAM CHEST 1 VIEW: CPT

## 2022-06-02 PROCEDURE — 36415 COLL VENOUS BLD VENIPUNCTURE: CPT | Performed by: PHYSICIAN ASSISTANT

## 2022-06-02 PROCEDURE — 99285 EMERGENCY DEPT VISIT HI MDM: CPT

## 2022-06-02 PROCEDURE — 93010 ELECTROCARDIOGRAM REPORT: CPT | Performed by: INTERNAL MEDICINE

## 2022-06-02 PROCEDURE — 99285 EMERGENCY DEPT VISIT HI MDM: CPT | Performed by: PHYSICIAN ASSISTANT

## 2022-06-02 PROCEDURE — 96360 HYDRATION IV INFUSION INIT: CPT

## 2022-06-02 PROCEDURE — 85025 COMPLETE CBC W/AUTO DIFF WBC: CPT | Performed by: PHYSICIAN ASSISTANT

## 2022-06-02 PROCEDURE — 80053 COMPREHEN METABOLIC PANEL: CPT | Performed by: PHYSICIAN ASSISTANT

## 2022-06-02 RX ORDER — ACETAMINOPHEN 500 MG
500 TABLET ORAL EVERY 6 HOURS PRN
Qty: 30 TABLET | Refills: 0 | Status: SHIPPED | OUTPATIENT
Start: 2022-06-02

## 2022-06-02 RX ORDER — ALBUTEROL SULFATE 90 UG/1
2 AEROSOL, METERED RESPIRATORY (INHALATION) EVERY 4 HOURS PRN
Qty: 6.7 G | Refills: 0 | Status: SHIPPED | OUTPATIENT
Start: 2022-06-02

## 2022-06-02 RX ORDER — TAMSULOSIN HYDROCHLORIDE 0.4 MG/1
0.4 CAPSULE ORAL
Qty: 90 CAPSULE | Refills: 1 | Status: SHIPPED | OUTPATIENT
Start: 2022-06-02

## 2022-06-02 RX ORDER — HYDROCODONE BITARTRATE AND ACETAMINOPHEN 7.5; 325 MG/1; MG/1
1 TABLET ORAL EVERY 8 HOURS PRN
Qty: 90 TABLET | Refills: 0 | Status: SHIPPED | OUTPATIENT
Start: 2022-06-02 | End: 2022-06-30 | Stop reason: SDUPTHER

## 2022-06-02 RX ORDER — IBUPROFEN 400 MG/1
400 TABLET ORAL EVERY 6 HOURS PRN
Qty: 12 TABLET | Refills: 0 | Status: SHIPPED | OUTPATIENT
Start: 2022-06-02 | End: 2022-06-28

## 2022-06-02 RX ORDER — GUAIFENESIN/DEXTROMETHORPHAN 100-10MG/5
5 SYRUP ORAL 3 TIMES DAILY PRN
Qty: 118 ML | Refills: 0 | Status: SHIPPED | OUTPATIENT
Start: 2022-06-02 | End: 2022-06-12

## 2022-06-02 RX ORDER — FLUTICASONE PROPIONATE 50 MCG
1 SPRAY, SUSPENSION (ML) NASAL DAILY
Qty: 16 G | Refills: 0 | Status: SHIPPED | OUTPATIENT
Start: 2022-06-02

## 2022-06-02 RX ADMIN — SODIUM CHLORIDE 1000 ML: 0.9 INJECTION, SOLUTION INTRAVENOUS at 09:35

## 2022-06-02 NOTE — ED PROVIDER NOTES
History  Chief Complaint   Patient presents with    Chest Pain     States cough and sore throat for past 2 days; states coughing makes the chest hurt; and causes nausea  Patient is a 36-year-old male reports a past medical history significant for diabetes, hypertension who presents today for evaluation of coughing, congestion, chest pain while coughing and fatigue  Patient endorses subjective fevers and chills for which he has taken medication at home  Patient reports he has been taking over-the-counter cough medication with minimal relief for symptoms  Patient reports his symptoms began on Tuesday, 2 days ago  Patient reports he is vaccinated for COVID and influenza  Patient reports his chest pain is similar to previous chest pain he had post COVID in 2020  Patient reports no pain at rest, no dyspnea, no palpitations, no abdominal pain, vomiting or diarrhea  History provided by:  Patient   used: No    Chest Pain  Pain location:  Substernal area  Pain quality: aching    Pain radiates to:  Does not radiate  Pain radiates to the back: no    Pain severity:  Moderate  Onset quality:  Gradual  Duration:  2 days  Timing:  Intermittent  Progression:  Waxing and waning  Context comment:  Coughing  Relieved by:  None tried  Worsened by:  Coughing  Ineffective treatments: Over-the-counter medications  Associated symptoms: cough and fatigue    Associated symptoms: no abdominal pain, no dizziness, no fever, no nausea, no numbness, no palpitations, no shortness of breath and not vomiting        Prior to Admission Medications   Prescriptions Last Dose Informant Patient Reported? Taking?    Bioflavonoid Products (RA Vitamin C CR) TBCR  Self Yes No   Sig: take 1 tablet by mouth twice a day for 14 days   Blood Glucose Monitoring Suppl (OneTouch Verio) w/Device KIT  Self No No   Sig: Use daily   Blood Glucose Monitoring Suppl KIT  Self No No   Sig: Use in the morning Please give test strips and lancets  Dx  E11 9   Blood Pressure KIT  Self No No   Sig: Use in the morning With appropriate size cuff   Blood Pressure Monitoring (Blood Pressure Monitor/Arm) EMILY  Self No No   Sig: Use daily   HYDROcodone-acetaminophen (NORCO) 7 5-325 mg per tablet   No No   Sig: Take 1 tablet by mouth every 8 (eight) hours as needed for pain For ongoing pain Max Daily Amount: 3 tablets   Lancet Devices (ONE TOUCH DELICA LANCING DEV) MISC  Self No No   Sig: Use daily   Lancets (freestyle) lancets   No No   Sig: TESTING DAILY IN THE AM   Lancets (onetouch ultrasoft) lancets   No No   Sig: Use daily Use as instructed to check sugars daily   Multiple Vitamin (multivitamin) capsule   No No   Sig: Take 1 capsule by mouth daily for 14 days   OneTouch Delica Lancets 47M MISC  Self No No   Sig: Use daily   aluminum-magnesium hydroxide-simethicone (MAALOX) 200-200-20 MG/5ML SUSP  Self No No   Sig: Take 15 mL by mouth 4 (four) times a day (before meals and at bedtime)   amLODIPine (NORVASC) 10 mg tablet  Self No No   Sig: Take 1 tablet (10 mg total) by mouth daily   atorvastatin (LIPITOR) 20 mg tablet  Self No No   Sig: Take 1 tablet (20 mg total) by mouth daily   budesonide-formoterol (Symbicort) 160-4 5 mcg/act inhaler  Self No No   Sig: Inhale 2 puffs 2 (two) times a day Rinse mouth after use     cyclobenzaprine (FLEXERIL) 10 mg tablet  Self No No   Sig: Take 1 tablet (10 mg total) by mouth daily at bedtime as needed for muscle spasms   famotidine (PEPCID) 20 mg tablet  Self No No   Sig: Take 1 tablet (20 mg total) by mouth 2 (two) times a day As needed for acid reflux   fluticasone (FLONASE) 50 mcg/act nasal spray  Self No No   Si spray into each nostril daily   gabapentin (NEURONTIN) 800 mg tablet  Self No No   Sig: Take 1 tablet (800 mg total) by mouth 3 (three) times a day   glucose blood (OneTouch Verio) test strip   No No   Sig: Check blood sugar daily   glucose monitoring kit (FREESTYLE) monitoring kit   No No   Sig: Use 1 each in the morning TESTING DAILY IN THE AM   ibuprofen (MOTRIN) 800 mg tablet  Self No No   Sig: Take 0 5 tablets (400 mg total) by mouth every 6 (six) hours as needed for mild pain   lidocaine (Lidoderm) 5 %  Self No No   Sig: Apply 1 patch topically daily Remove & Discard patch within 12 hours or as directed by MD   lidocaine (XYLOCAINE) 2 % topical gel  Self No No   Sig: Apply topically 3 (three) times a day   metFORMIN (GLUCOPHAGE) 500 mg tablet   No No   Sig: Take 1 tablet (500 mg total) by mouth daily   ondansetron (Zofran ODT) 4 mg disintegrating tablet  Self No No   Sig: Take 1 tablet (4 mg total) by mouth every 6 (six) hours as needed for nausea or vomiting   tamsulosin (FLOMAX) 0 4 mg   No No   Sig: Take 1 capsule (0 4 mg total) by mouth daily with dinner   venlafaxine (EFFEXOR-XR) 150 mg 24 hr capsule   No No   Sig: take 1 capsule by mouth once daily WITH BREAKFAST   zolpidem (AMBIEN) 5 mg tablet  Self No No   Sig: Take 1 tablet (5 mg total) by mouth daily at bedtime as needed for sleep      Facility-Administered Medications: None       Past Medical History:   Diagnosis Date    Asthma     Back pain     Back pain     Diabetes mellitus ([facility] [address] )     Hyperlipidemia     Hypertension        Past Surgical History:   Procedure Laterality Date    CYST REMOVAL  2017       Family History   Problem Relation Age of Onset    Hypertension Mother     Diabetes Mother     Cancer Father     Diabetes Family     Hypertension Family      I have reviewed and agree with the history as documented      E-Cigarette/Vaping    E-Cigarette Use Never User      E-Cigarette/Vaping Substances    Nicotine No     THC No     CBD No     Flavoring No     Other No     Unknown No      Social History     Tobacco Use    Smoking status: Never Smoker    Smokeless tobacco: Never Used   Vaping Use    Vaping Use: Never used   Substance Use Topics    Alcohol use: Not Currently     Comment: rare    Drug use: Never       Review of Systems   Constitutional: Positive for fatigue  Negative for chills and fever  HENT: Positive for congestion  Negative for ear pain, rhinorrhea and sore throat  Eyes: Negative for redness  Respiratory: Positive for cough  Negative for chest tightness and shortness of breath  Cardiovascular: Positive for chest pain  Negative for palpitations  Gastrointestinal: Negative for abdominal pain, nausea and vomiting  Genitourinary: Negative for dysuria and hematuria  Musculoskeletal: Positive for myalgias  Skin: Negative for rash  Neurological: Negative for dizziness, syncope, light-headedness and numbness  Physical Exam  Physical Exam  Vitals and nursing note reviewed  Constitutional:       Appearance: Normal appearance  He is well-developed  HENT:      Head: Normocephalic and atraumatic  Eyes:      General: No scleral icterus  Pupils: Pupils are equal, round, and reactive to light  Cardiovascular:      Rate and Rhythm: Normal rate and regular rhythm  Pulses: Normal pulses  Pulmonary:      Effort: Pulmonary effort is normal  No respiratory distress  Breath sounds: No stridor  Abdominal:      General: There is no distension  Palpations: There is no mass  Musculoskeletal:      Cervical back: Normal range of motion  Skin:     General: Skin is warm and dry  Capillary Refill: Capillary refill takes less than 2 seconds  Coloration: Skin is not jaundiced  Neurological:      Mental Status: He is alert and oriented to person, place, and time        Gait: Gait normal    Psychiatric:         Mood and Affect: Mood normal          Vital Signs  ED Triage Vitals [06/02/22 0900]   Temperature Pulse Respirations Blood Pressure SpO2   99 3 °F (37 4 °C) 87 20 143/86 98 %      Temp Source Heart Rate Source Patient Position - Orthostatic VS BP Location FiO2 (%)   Oral Monitor Lying Left arm --      Pain Score       8           Vitals:    06/02/22 0900   BP: 143/86   Pulse: 87   Patient Position - Orthostatic VS: Lying         Visual Acuity      ED Medications  Medications   sodium chloride 0 9 % bolus 1,000 mL (1,000 mL Intravenous New Bag 6/2/22 0935)       Diagnostic Studies  Results Reviewed     Procedure Component Value Units Date/Time    HS Troponin 0hr (reflex protocol) [[id number]]  (Normal) Collected: 06/02/22 0922    Lab Status: Final result Specimen: Blood from Line, Venous Updated: 06/02/22 0958     hs TnI 0hr 3 ng/L     HS Troponin I 2hr [[id number]]     Lab Status: No result Specimen: Blood     Lipase [[id number]]  (Normal) Collected: 06/02/22 0922    Lab Status: Final result Specimen: Blood from Line, Venous Updated: 06/02/22 0947     Lipase 72 u/L     Comprehensive metabolic panel [[id number]]  (Abnormal) Collected: 06/02/22 0922    Lab Status: Final result Specimen: Blood from Line, Venous Updated: 06/02/22 0947     Sodium 139 mmol/L      Potassium 3 9 mmol/L      Chloride 104 mmol/L      CO2 26 mmol/L      ANION GAP 9 mmol/L      BUN 16 mg/dL      Creatinine 1 15 mg/dL      Glucose 149 mg/dL      Calcium 9 5 mg/dL      AST 26 U/L      ALT 31 U/L      Alkaline Phosphatase 54 U/L      Total Protein 8 0 g/dL      Albumin 4 5 g/dL      Total Bilirubin 0 67 mg/dL      eGFR 68 ml/min/1 73sq m     Narrative:      Ann guidelines for Chronic Kidney Disease (CKD):     Stage 1 with normal or high GFR (GFR > 90 mL/min/1 73 square meters)    Stage 2 Mild CKD (GFR = 60-89 mL/min/1 73 square meters)    Stage 3A Moderate CKD (GFR = 45-59 mL/min/1 73 square meters)    Stage 3B Moderate CKD (GFR = 30-44 mL/min/1 73 square meters)    Stage 4 Severe CKD (GFR = 15-29 mL/min/1 73 square meters)    Stage 5 End Stage CKD (GFR <15 mL/min/1 73 square meters)  Note: GFR calculation is accurate only with a steady state creatinine    CBC and differential [[id number]] Collected: 06/02/22 0922    Lab Status: Final result Specimen: Blood from Line, Venous Updated: 06/02/22 0933     WBC 5 31 Thousand/uL      RBC 4 78 Million/uL      Hemoglobin 13 9 g/dL      Hematocrit 39 7 %      MCV 83 fL      MCH 29 1 pg      MCHC 35 0 g/dL      RDW 13 6 %      MPV 9 1 fL      Platelets 612 Thousands/uL      nRBC 0 /100 WBCs      Neutrophils Relative 66 %      Immat GRANS % 0 %      Lymphocytes Relative 22 %      Monocytes Relative 11 %      Eosinophils Relative 1 %      Basophils Relative 0 %      Neutrophils Absolute 3 51 Thousands/µL      Immature Grans Absolute 0 01 Thousand/uL      Lymphocytes Absolute 1 15 Thousands/µL      Monocytes Absolute 0 56 Thousand/µL      Eosinophils Absolute 0 06 Thousand/µL      Basophils Absolute 0 02 Thousands/µL     COVID/FLU - 24 hour TAT [[id number]] Collected: 06/02/22 0924    Lab Status: In process Specimen: Nares from Nose Updated: 06/02/22 0928                 XR chest portable   ED Interpretation by Master Baer PA-C (06/02 8310)   No acute cardiopulmonary abnormalities visualized  Final Result by Lili Jacobs MD (06/02 1025)      No acute cardiopulmonary disease                    Workstation performed: [id number]                    Procedures  ECG 12 Lead Documentation Only    Date/Time: 6/2/2022 9:24 AM  Performed by: Master Baer PA-C  Authorized by: Master Baer PA-C     Indications / Diagnosis:  Chest pain with coughing  ECG reviewed by me, the ED Provider: yes    Patient location:  ED  Interpretation:     Interpretation: normal    Rate:     ECG rate:  85    ECG rate assessment: normal    Rhythm:     Rhythm: sinus rhythm    Ectopy:     Ectopy: none    QRS:     QRS axis:  Normal    QRS intervals:  Normal  Conduction:     Conduction: normal    ST segments:     ST segments:  Normal  T waves:     T waves: normal    Comments:        QRS 84                 ED Course  ED Course as of 06/02/22 1045   Thu Jun 02, 2022   1043 Patient was reexamined at this time and informed of laboratory and/or imaging results and was found to be stable for discharge  Return to emergency department criteria was reviewed with the patient who verbalized understanding and was agreeable to discharge and the treatment plan at this time  HEART Risk Score    Flowsheet Row Most Recent Value   Heart Score Risk Calculator    History 0 Filed at: 06/02/2022 0924   ECG 0 Filed at: 06/02/2022 1268   Age 1 Filed at: 06/02/2022 8821   Risk Factors 2 Filed at: 06/02/2022 0924   Troponin 0 Filed at: 06/02/2022 3768   HEART Score 3 Filed at: 06/02/2022 0508                        SBIRT 20yo+    Flowsheet Row Most Recent Value   SBIRT (23 yo +)    In order to provide better care to our patients, we are screening all of our patients for alcohol and drug use  Would it be okay to ask you these screening questions? No Filed at: 06/02/2022 4564                    MDM  Number of Diagnoses or Management Options  Diagnosis management comments: All imaging and/or lab testing discussed with patient, strict return to ED precautions discussed  Patient recommended to follow up promptly with appropriate outpatient provider  Patient and/or family members verbalizes understanding and agrees with plan  Patient is stable for discharge      Portions of the record may have been created with voice recognition software  Occasional wrong word or "sound a like" substitutions may have occurred due to the inherent limitations of voice recognition software  Read the chart carefully and recognize, using context, where substitutions have occurred          Disposition  Final diagnoses:   Acute cough   Chest pain     Time reflects when diagnosis was documented in both MDM as applicable and the Disposition within this note     Time User Action Codes Description Comment    6/2/2022 10:44 AM Alfonso Search Add [R05 1] Acute cough     6/2/2022 10:44 AM Alfonso Search Add [R07 9] Chest pain       ED Disposition     ED Disposition   Discharge    Condition Good    Date/Time   Thu Jun 2, 2022 10:43 AM    Comment   Leonie Reyes discharge to home/self care  Follow-up Information     Follow up With Specialties Details Why Contact Info    [facility] [address], DO Family Medicine Schedule an appointment as soon as possible for a visit  As needed [address]  [address] [address] Marilee Correia [facility] [address]             Patient's Medications   Discharge Prescriptions    ACETAMINOPHEN (TYLENOL) 500 MG TABLET    Take 1 tablet (500 mg total) by mouth every 6 (six) hours as needed for mild pain       Start Date: 6/2/2022  End Date: --       Order Dose: 500 mg       Quantity: 30 tablet    Refills: 0    ALBUTEROL (PROVENTIL HFA,VENTOLIN HFA) 90 MCG/ACT INHALER    Inhale 2 puffs every 4 (four) hours as needed (coughing)       Start Date: 6/2/2022  End Date: --       Order Dose: 2 puffs       Quantity: 6 7 g    Refills: 0    DEXTROMETHORPHAN-GUAIFENESIN (ROBITUSSIN DM)  MG/5 ML SYRUP    Take 5 mL by mouth 3 (three) times a day as needed (cough) for up to 10 days       Start Date: 6/2/2022  End Date: 6/12/2022       Order Dose: 5 mL       Quantity: 118 mL    Refills: 0    FLUTICASONE (FLONASE) 50 MCG/ACT NASAL SPRAY    1 spray into each nostril daily       Start Date: 6/2/2022  End Date: --       Order Dose: 1 spray       Quantity: 16 g    Refills: 0    IBUPROFEN (MOTRIN) 400 MG TABLET    Take 1 tablet (400 mg total) by mouth every 6 (six) hours as needed for mild pain       Start Date: 6/2/2022  End Date: --       Order Dose: 400 mg       Quantity: 12 tablet    Refills: 0    MENTHOL-CETYLPYRIDINIUM (CEPACOL) 3 MG LOZENGE    Take 1 lozenge (3 mg total) by mouth as needed for sore throat       Start Date: 6/2/2022  End Date: --       Order Dose: 3 mg       Quantity: 30 lozenge    Refills: 0       No discharge procedures on file      PDMP Review       Value Time User    PDMP Reviewed  Yes 6/2/2022  8:39 AM Patrick Graham MD          ED Provider  Electronically Signed by           Lee Staples PA-C  06/02/22 1045

## 2022-06-02 NOTE — TELEPHONE ENCOUNTER
Patient advised to go to the emergency department at [address] [place] [address]  Patient placed on the ED tracking board  Reason for Disposition   SEVERE chest pain    Answer Assessment - Initial Assessment Questions  1  LOCATION: "Where does it hurt?"        "Right across my breasts "   2  RADIATION: "Does the pain go anywhere else?" (e g , into neck, jaw, arms, back)      Back under left side of neck   3  ONSET: "When did the chest pain begin?" (Minutes, hours or days)       Tuesday   4  PATTERN "Does the pain come and go, or has it been constant since it started?"  "Does it get worse with exertion?"       Constant "It is getting worse last night till this morning "   5  DURATION: "How long does it last" (e g , seconds, minutes, hours)      Constant   6  SEVERITY: "How bad is the pain?"  (e g , Scale 1-10; mild, moderate, or severe)     - MILD (1-3): doesn't interfere with normal activities      - MODERATE (4-7): interferes with normal activities or awakens from sleep     - SEVERE (8-10): excruciating pain, unable to do any normal activities        8-9/10   7  CARDIAC RISK FACTORS: "Do you have any history of heart problems or risk factors for heart disease?" (e g , angina, prior heart attack; diabetes, high blood pressure, high cholesterol, smoker, or strong family history of heart disease)      Denies   8  PULMONARY RISK FACTORS: "Do you have any history of lung disease?"  (e g , blood clots in lung, asthma, emphysema, birth control pills)      Asthma   9  CAUSE: "What do you think is causing the chest pain?"      Unknown   10  OTHER SYMPTOMS: "Do you have any other symptoms?" (e g , dizziness, nausea, vomiting, sweating, fever, difficulty breathing, cough)        "I was hot this morning ", nausea, cough, mild dizziness, sneezing, "I have been using my pump for my breathing   When I sit up in the sofa my breathing is better "    Protocols used: CHEST PAIN-ADULT-

## 2022-06-02 NOTE — TELEPHONE ENCOUNTER
Regarding: Severe stomach pain  ----- Message from Gabriela Pandya sent at 6/2/2022  8:19 AM EDT -----  "I have severe pain in my stomach as I'm breathing, sneezing and coughing   I did not sleep at all last night "

## 2022-06-03 LAB
FLUAV RNA RESP QL NAA+PROBE: NEGATIVE
FLUBV RNA RESP QL NAA+PROBE: NEGATIVE
SARS-COV-2 RNA RESP QL NAA+PROBE: POSITIVE

## 2022-06-13 ENCOUNTER — PATIENT OUTREACH (OUTPATIENT)
Dept: FAMILY MEDICINE CLINIC | Facility: CLINIC | Age: 62
End: 2022-06-13

## 2022-06-13 NOTE — PROGRESS NOTES
JEROD ALMARAZ received message from  that patient's therapist from [facility], Shila Hanna would like to speak with JEROD ALMARAZ and is requesting a call back at [phone] ex [phone]  She has some concerns she would like to discuss with JEROD NEWSOME CM placed call back to Meganprincess Lee to follow-up  No answer, left a details vm and ask for a return call  Will remains available and will await for a call back

## 2022-06-14 ENCOUNTER — PATIENT OUTREACH (OUTPATIENT)
Dept: FAMILY MEDICINE CLINIC | Facility: CLINIC | Age: 62
End: 2022-06-14

## 2022-06-14 ENCOUNTER — TELEPHONE (OUTPATIENT)
Dept: FAMILY MEDICINE CLINIC | Facility: CLINIC | Age: 62
End: 2022-06-14

## 2022-06-14 ENCOUNTER — OFFICE VISIT (OUTPATIENT)
Dept: FAMILY MEDICINE CLINIC | Facility: CLINIC | Age: 62
End: 2022-06-14

## 2022-06-14 VITALS
BODY MASS INDEX: 31.08 KG/M2 | OXYGEN SATURATION: 97 % | HEIGHT: 67 IN | DIASTOLIC BLOOD PRESSURE: 68 MMHG | WEIGHT: 198 LBS | RESPIRATION RATE: 18 BRPM | HEART RATE: 80 BPM | TEMPERATURE: 97.2 F | SYSTOLIC BLOOD PRESSURE: 132 MMHG

## 2022-06-14 DIAGNOSIS — I10 BENIGN ESSENTIAL HTN: ICD-10-CM

## 2022-06-14 DIAGNOSIS — Z78.9 NEED FOR FOLLOW-UP BY SOCIAL WORKER: ICD-10-CM

## 2022-06-14 DIAGNOSIS — E11.9 TYPE 2 DIABETES MELLITUS WITHOUT COMPLICATION, WITHOUT LONG-TERM CURRENT USE OF INSULIN (HCC): ICD-10-CM

## 2022-06-14 DIAGNOSIS — J45.40 MODERATE PERSISTENT REACTIVE AIRWAY DISEASE WITHOUT COMPLICATION: Primary | ICD-10-CM

## 2022-06-14 PROCEDURE — 99214 OFFICE O/P EST MOD 30 MIN: CPT | Performed by: NURSE PRACTITIONER

## 2022-06-14 PROCEDURE — 3078F DIAST BP <80 MM HG: CPT | Performed by: NURSE PRACTITIONER

## 2022-06-14 PROCEDURE — 3075F SYST BP GE 130 - 139MM HG: CPT | Performed by: NURSE PRACTITIONER

## 2022-06-14 RX ORDER — BLOOD-GLUCOSE METER
EACH MISCELLANEOUS DAILY
Qty: 1 KIT | Refills: 0 | Status: SHIPPED | OUTPATIENT
Start: 2022-06-14

## 2022-06-14 RX ORDER — ALBUTEROL SULFATE 2.5 MG/3ML
2.5 SOLUTION RESPIRATORY (INHALATION) EVERY 6 HOURS PRN
Qty: 180 ML | Refills: 5 | Status: SHIPPED | OUTPATIENT
Start: 2022-06-14

## 2022-06-14 RX ORDER — LANCETS 33 GAUGE
EACH MISCELLANEOUS DAILY
Qty: 100 EACH | Refills: 6 | Status: SHIPPED | OUTPATIENT
Start: 2022-06-14

## 2022-06-14 RX ORDER — PREDNISONE 20 MG/1
40 TABLET ORAL DAILY
Qty: 10 TABLET | Refills: 0 | Status: SHIPPED | OUTPATIENT
Start: 2022-06-14 | End: 2022-06-19

## 2022-06-14 RX ORDER — GLYCERIN ADULT
SUPPOSITORY, RECTAL RECTAL DAILY
Qty: 1 EACH | Refills: 0 | Status: SHIPPED | OUTPATIENT
Start: 2022-06-14

## 2022-06-14 RX ORDER — BLOOD SUGAR DIAGNOSTIC
STRIP MISCELLANEOUS
Qty: 100 EACH | Refills: 11
Start: 2022-06-14 | End: 2022-08-09 | Stop reason: SDUPTHER

## 2022-06-14 NOTE — TELEPHONE ENCOUNTER
DME order received in my bin for nebulizer  Order has been placed VIA online portal  Once order is electronically signed by Frances [facility] will reach out to the pt directly

## 2022-06-14 NOTE — PROGRESS NOTES
Assessment/Plan:    Type 2 diabetes mellitus without complication, without long-term current use of insulin (HCC)    Lab Results   Component Value Date    HGBA1C 6 2 (H) 03/15/2022     Historically well controlled, on metformin 500 mg daily       Reactive airway disease  Current exacerbation, will send prednisone course and nebulizer machine   Consider pulmonology consult if no improvement on follow up     Benign essential HTN  Pressure is within goal today in the office   Continue with current regimen: amlodipine 10 mg daily    Need for follow-up by    Patient immigrated from [place],  He is guardian of his 6year-old daughter  Reports that at this time she is helping to care for him a significant amount and he would like help to try to find out how to get his wife to the [facility] from [place] for more assistance   referral to social Work    Namnatalia Corado was seen today for follow-up and weakness - generalized  Diagnoses and all orders for this visit:    Moderate persistent reactive airway disease without complication  -     predniSONE 20 mg tablet; Take 2 tablets (40 mg total) by mouth daily for 5 days  -     Nebulizer Supplies  -     Nebulizer  -     albuterol (2 5 mg/3 mL) 0 083 % nebulizer solution; Take 3 mL (2 5 mg total) by nebulization every 6 (six) hours as needed for wheezing or shortness of breath    Type 2 diabetes mellitus without complication, without long-term current use of insulin ([facility])  -     Blood Glucose Monitoring Suppl (OneTouch Verio) w/Device KIT; Use daily  -     glucose blood (OneTouch Verio) test strip; Check blood sugar daily  -     OneTouch Delica Lancets 80O MISC; Use in the morning    Benign essential HTN  -     Blood Pressure Monitoring (Blood Pressure Monitor/Arm) EMILY; Use daily    Need for follow-up by   -     Ambulatory Referral to Social Work Care Management Program; Future        Return in about 4 weeks (around 7/12/2022) for [place]             Subjective: Amor Toribio is a 62 y o  male who  has a past medical history of Asthma, Back pain, Back pain, Diabetes mellitus ([address] ), Hyperlipidemia, and Hypertension  who presented to the office today for follow up  Reports ongoing chest tightness since having COVID  Using the albuterol inhaler is helpful for a short period of time  Reports that he is needing a lot of assistance at home due to this and his chronic back issues  He would like his wife to come to the [facility] from [place] but notes that there are issues with immigration  The following portions of the patient's history were reviewed and updated as appropriate: allergies, current medications, past family history, past medical history, past social history, past surgical history and problem list     Current Outpatient Medications on File Prior to Visit   Medication Sig Dispense Refill    acetaminophen (TYLENOL) 500 mg tablet Take 1 tablet (500 mg total) by mouth every 6 (six) hours as needed for mild pain 30 tablet 0    albuterol (PROVENTIL HFA,VENTOLIN HFA) 90 mcg/act inhaler Inhale 2 puffs every 4 (four) hours as needed (coughing) 6 7 g 0    aluminum-magnesium hydroxide-simethicone (MAALOX) 200-200-20 MG/5ML SUSP Take 15 mL by mouth 4 (four) times a day (before meals and at bedtime) 150 mL 0    amLODIPine (NORVASC) 10 mg tablet Take 1 tablet (10 mg total) by mouth daily 90 tablet 1    atorvastatin (LIPITOR) 20 mg tablet Take 1 tablet (20 mg total) by mouth daily 90 tablet 1    Bioflavonoid Products (RA Vitamin C CR) TBCR take 1 tablet by mouth twice a day for 14 days      Blood Glucose Monitoring Suppl KIT Use in the morning Please give test strips and lancets  Dx  E11 9 1 kit 0    Blood Pressure KIT Use in the morning With appropriate size cuff 1 kit 0    budesonide-formoterol (Symbicort) 160-4 5 mcg/act inhaler Inhale 2 puffs 2 (two) times a day Rinse mouth after use   10 2 g 3    cyclobenzaprine (FLEXERIL) 10 mg tablet Take 1 tablet (10 mg total) by mouth daily at bedtime as needed for muscle spasms 30 tablet 0    famotidine (PEPCID) 20 mg tablet Take 1 tablet (20 mg total) by mouth 2 (two) times a day As needed for acid reflux 90 tablet 0    fluticasone (FLONASE) 50 mcg/act nasal spray 1 spray into each nostril daily 16 g 0    fluticasone (FLONASE) 50 mcg/act nasal spray 1 spray into each nostril daily 16 g 0    gabapentin (NEURONTIN) 800 mg tablet Take 1 tablet (800 mg total) by mouth 3 (three) times a day 90 tablet 3    glucose monitoring kit (FREESTYLE) monitoring kit Use 1 each in the morning TESTING DAILY IN THE AM 1 each 0    HYDROcodone-acetaminophen (NORCO) 7 5-325 mg per tablet Take 1 tablet by mouth every 8 (eight) hours as needed for pain For ongoing pain Max Daily Amount: 3 tablets 90 tablet 0    ibuprofen (MOTRIN) 400 mg tablet Take 1 tablet (400 mg total) by mouth every 6 (six) hours as needed for mild pain 12 tablet 0    ibuprofen (MOTRIN) 800 mg tablet Take 0 5 tablets (400 mg total) by mouth every 6 (six) hours as needed for mild pain 30 tablet 1    Lancet Devices (ONE TOUCH DELICA LANCING DEV) MISC Use daily 1 each 0    Lancets (freestyle) lancets TESTING DAILY IN THE  each 3    Lancets (onetouch ultrasoft) lancets Use daily Use as instructed to check sugars daily 100 each 1    lidocaine (Lidoderm) 5 % Apply 1 patch topically daily Remove & Discard patch within 12 hours or as directed by MD 10 patch 0    lidocaine (XYLOCAINE) 2 % topical gel Apply topically 3 (three) times a day 30 mL 2    menthol-cetylpyridinium (CEPACOL) 3 MG lozenge Take 1 lozenge (3 mg total) by mouth as needed for sore throat 30 lozenge 0    metFORMIN (GLUCOPHAGE) 500 mg tablet Take 1 tablet (500 mg total) by mouth daily 90 tablet 1    Multiple Vitamin (multivitamin) capsule Take 1 capsule by mouth daily for 14 days 14 capsule 0    ondansetron (Zofran ODT) 4 mg disintegrating tablet Take 1 tablet (4 mg total) by mouth every 6 (six) hours as needed for nausea or vomiting 20 tablet 0    OneTouch Delica Lancets 42Y MISC Use daily 100 each 1    tamsulosin (FLOMAX) 0 4 mg Take 1 capsule (0 4 mg total) by mouth daily with dinner 90 capsule 1    venlafaxine (EFFEXOR-XR) 150 mg 24 hr capsule take 1 capsule by mouth once daily WITH BREAKFAST 30 capsule 2    zolpidem (AMBIEN) 5 mg tablet Take 1 tablet (5 mg total) by mouth daily at bedtime as needed for sleep 30 tablet 0     No current facility-administered medications on file prior to visit  Review of Systems   Constitutional: Negative for chills and fever  HENT: Negative for ear pain and sore throat  Eyes: Negative for pain and visual disturbance  Respiratory: Positive for chest tightness and shortness of breath  Negative for cough  Cardiovascular: Negative for chest pain and palpitations  Gastrointestinal: Negative for abdominal pain and vomiting  Genitourinary: Negative for dysuria and hematuria  Musculoskeletal: Positive for arthralgias, gait problem and myalgias  Negative for back pain  Skin: Negative for color change and rash  Neurological: Negative for seizures and syncope  Psychiatric/Behavioral: Positive for dysphoric mood  Negative for self-injury and suicidal ideas  The patient is nervous/anxious  All other systems reviewed and are negative  Objective:    /68 (BP Location: Right arm, Patient Position: Sitting, Cuff Size: Adult)   Pulse 80   Temp (!) 97 2 °F (36 2 °C) (Temporal)   Resp 18   Ht 5' 7" (1 702 m)   Wt 89 8 kg (198 lb)   SpO2 97%   BMI 31 01 kg/m²     Physical Exam  Vitals and nursing note reviewed  Constitutional:       General: He is not in acute distress  Appearance: He is well-developed  He is not diaphoretic  HENT:      Head: Normocephalic and atraumatic        Right Ear: External ear normal       Left Ear: External ear normal    Eyes:      Conjunctiva/sclera: Conjunctivae normal       Pupils: Pupils are equal, round, and reactive to light  Cardiovascular:      Rate and Rhythm: Normal rate and regular rhythm  Pulmonary:      Effort: Pulmonary effort is normal  No respiratory distress  Breath sounds: No wheezing  Comments: +decreased breath sounds BL bases   Abdominal:      General: Bowel sounds are normal  There is no distension  Palpations: Abdomen is soft  Tenderness: There is no abdominal tenderness  Musculoskeletal:      Cervical back: Normal range of motion and neck supple  Right lower leg: No edema  Left lower leg: No edema  Comments: Using walker    Lymphadenopathy:      Cervical: No cervical adenopathy  Skin:     General: Skin is warm and dry  Capillary Refill: Capillary refill takes less than 2 seconds  Findings: No rash  Neurological:      Mental Status: He is alert and oriented to person, place, and time     Psychiatric:         Behavior: Behavior normal          RAMO Hoover  06/15/22  10:09 AM Admission

## 2022-06-14 NOTE — TELEPHONE ENCOUNTER
Juliette from [employer] left a voicemail stating the pt needs a new script for the glucometer and the supplies, because Cinthia Lamar is not register to [facility]

## 2022-06-14 NOTE — PROGRESS NOTES
JEROD ALMARAZ received a [facility] would like to speak with JEROD ALMARAZ and is requesting a call back at [phone] ex [phone]  JEROD ALMARAZ call Betty Bernstein back  No answer, left a details vm and ask for a return call  Will continue to follow-up  Addendum     JEROD ALMARAZ received vm from Mikael Ramirez she would like to follow-up with JEROD ALMARAZ regarding what assistance/resources JEROD ALMARAZ is assisting patient with as patient main stressor is housing and she would like to know if JEROD ALMARAZ could further assist  Due to her schedule Betty Bernstein states JEROD ALMARAZ could email her instead to [email or web address]      JEROD ALMARAZ will follow-up with patient first to assure JEROD ALMARAZ get verbal consent to email/talk to Betty Bernstein

## 2022-06-15 PROBLEM — Z78.9 NEED FOR FOLLOW-UP BY SOCIAL WORKER: Status: ACTIVE | Noted: 2022-06-15

## 2022-06-15 NOTE — ASSESSMENT & PLAN NOTE
Lab Results   Component Value Date    HGBA1C 6 2 (H) 03/15/2022     Historically well controlled, on metformin 500 mg daily

## 2022-06-15 NOTE — ASSESSMENT & PLAN NOTE
Current exacerbation, will send prednisone course and nebulizer machine   Consider pulmonology consult if no improvement on follow up

## 2022-06-15 NOTE — ASSESSMENT & PLAN NOTE
Patient immigrated from [place],  He is guardian of his 6year-old daughter  Reports that at this time she is helping to care for him a significant amount and he would like help to try to find out how to get his wife to the [facility] from [place] for more assistance   referral to social Work

## 2022-06-16 ENCOUNTER — TELEPHONE (OUTPATIENT)
Dept: FAMILY MEDICINE CLINIC | Facility: CLINIC | Age: 62
End: 2022-06-16

## 2022-06-16 ENCOUNTER — PATIENT OUTREACH (OUTPATIENT)
Dept: FAMILY MEDICINE CLINIC | Facility: CLINIC | Age: 62
End: 2022-06-16

## 2022-06-16 ENCOUNTER — OFFICE VISIT (OUTPATIENT)
Dept: DENTISTRY | Facility: CLINIC | Age: 62
End: 2022-06-16

## 2022-06-16 VITALS — TEMPERATURE: 98.9 F | SYSTOLIC BLOOD PRESSURE: 122 MMHG | HEART RATE: 78 BPM | DIASTOLIC BLOOD PRESSURE: 77 MMHG

## 2022-06-16 DIAGNOSIS — K02.62 DENTIN CARIES: Primary | ICD-10-CM

## 2022-06-16 PROCEDURE — D2335 RESIN-BASED COMPOSITE - 4 OR MORE SURFACES OR INVOLVING INCISAL ANGLE (ANTERIOR): HCPCS | Performed by: DENTIST

## 2022-06-16 NOTE — PROGRESS NOTES
Composite Filling    Sharath Espinosa presents for composite filling  PMH reviewed, no changes  Applied topical benzocaine, administered 0 84 cc carps 4% articaine 1:100k epi via buccal/local infiltration  Prepped tooth #8 DIFL with 245 carbide on high speed  Beveled margin with karloine bur  Isolation with cotton rolls and dri-angles    Etch with 37% H2PO4, rinse, dry  Applied Adhese with 20 second scrub once, gentle air dry and light cured for 10s  Restored with  Restored with Tetric bulk michael shade A3 5 and light cured  Refined with finishing burs, polished with enhance point  Verified occlusion and contacts  Pt left satisfied  Previous provider mentioned that patient was on 6mrc exam, however, first dental visit after COVID patient was diagnosed Stage 2, Grade B periodontal disease   Pt will need another periodontal examination at next recall exam     DONE TODAY  #8 MIDFL comp shakira    NEXT VISIT  #5 DO comp shakira    Attending: Dr Alban Shields

## 2022-06-16 NOTE — TELEPHONE ENCOUNTER
PT STATES THAT ON HIS LAST VISIT HE SPOKE TO THE PROVIDER AND EXPLAINED HE NEEDED A LETTER WITH HIS DIAGNOSES, PT STOPPED BY FOR LETTER BUT I DONT SEE ANYTHING WAS MADE, PT REQUESTING CALL WHEN LETTER IS READY

## 2022-06-16 NOTE — PROGRESS NOTES
JEROD ALMARAZ placed call to patient to follow-up and request verbal consent to talk/email to his counselor from [employer]  JEROD ALMARAZ informs patient his therapy reached out to JEROD ALMARAZ regarding his housing situation and being behind on his rent  Pt provide verbal consent for JEROD ALMARAZ to speak with this therapy  Pt states he provided JEROD ALMARAZ's phone number to the therapist    Smiley Duggan verbalized understanding and will email his therapist     Smiley Duggan informs patient Yuval Hashimoto was helping him applied for rental assistance through [employer] but patient was denied for being on a fix income  [place] Lennox was going review his application once more time if patient provides his filled taxes for past two years  Patient was asked for his 2019 and 2020 taxes, however, patient refused to provided those documents requested as he felt it is too intrusive for [facility] to have those document and stated he was no longer interested in applying  JEROD ALMARAZ reviewed the above information with patient and informs patient he could continue with the process bu the will need to submit copy of his filled taxes for 2019 and 2020  Pt states he did not work in 2020 and he only filed taxes for 2019  JEROD ALMARAZ verbalized understanding and suggested to provides copy of the 2019 taxes and writes a letter to [facility] stating he did not filled taxes in 2020  JEROD ALMARAZ also informs patient he could apply for rental assistance with [place] through their mental health program  Informs patient now that he is established with [facility] services, his therapist could provide a letter and he could apply for rental assistance through [facility]   Pt asked JEROD ALMARAZ to email him the information  JEROD ALMARAZ emailed patient Delmi of [facility] address and phone number and informs he need to go in-person to complete the application  Pt verbalized understanding and thanked JEROD ALMARAZ for the helps   JEROD ALMARAZ also informs patient he could benefit from having an ICM that could help him with the process of rental assistance or applying for any additional services  Informs JEROD ALMARAZ is going to ask his therapist to talk to him about him and place a referral  Pt verbalized understanding  Email the therapist, Maryellen Hair to [email or web address]  JEROD ALMARAZ informs Maryellen Hair patient already applied for [employer] through [employer] and he need to provide additional document and patient is aware of what documents are needed  Also informs JEROD [facility] provided [facility] information and ask if she could provide patient with a letter stating he is currently in [place] [address] services  JEROD ALMARAZ suggested placing a referral for an ICM to further assist patient with his social needs  JEROD ALMARAZ received another email from Maryellen Hair asking if patient could apply for Housing  JEROD ALMARAZ informs [facility] is closed at this time as well as [facility] and patient cannot apply for 54 & up because he lives with his 6years old daughter and patient also is on the process of bringing his wife from \[facility]\""  JEROD ALMARAZ will remains available as needed  No further outreach scheduled at this time

## 2022-06-20 ENCOUNTER — PATIENT OUTREACH (OUTPATIENT)
Dept: FAMILY MEDICINE CLINIC | Facility: CLINIC | Age: 62
End: 2022-06-20

## 2022-06-20 ENCOUNTER — PROCEDURE VISIT (OUTPATIENT)
Dept: UROLOGY | Facility: AMBULATORY SURGERY CENTER | Age: 62
End: 2022-06-20
Payer: MEDICARE

## 2022-06-20 VITALS
DIASTOLIC BLOOD PRESSURE: 80 MMHG | HEIGHT: 67 IN | HEART RATE: 77 BPM | WEIGHT: 199 LBS | BODY MASS INDEX: 31.23 KG/M2 | OXYGEN SATURATION: 99 % | SYSTOLIC BLOOD PRESSURE: 138 MMHG

## 2022-06-20 DIAGNOSIS — N40.1 BPH WITH OBSTRUCTION/LOWER URINARY TRACT SYMPTOMS: Primary | ICD-10-CM

## 2022-06-20 DIAGNOSIS — N13.8 BPH WITH OBSTRUCTION/LOWER URINARY TRACT SYMPTOMS: Primary | ICD-10-CM

## 2022-06-20 PROCEDURE — 52000 CYSTOURETHROSCOPY: CPT | Performed by: UROLOGY

## 2022-06-20 NOTE — PROGRESS NOTES
Assessment/Plan:    BPH with obstruction/lower urinary tract symptoms  The patient has voiding symptoms despite Flomax  However these are primarily overactive bladder in nature  He is emptying fairly well based on multiple PVRs  His uroflow was not accurate since he only voided 30 cc  We discussed options which focus on Kegel exercises and anti muscarinic medication verses outlet surgery  I explained outlet surgery will help him void stronger and faster in May help his overactive bladder symptoms but is not guarantee to  He wants to try physical therapy for pelvic floor teaching as well as medication first   This is reasonable  I have ordered Mirabegron and instructed him to let us know if this is too expensive in which case we will changed to oxybutynin  Side effects of each were discussed  We will see him back in 3 months  Subjective:      Patient ID: Leonie Reyes is a 62 y o  male  HPI    Leonie Reyes is a 62 y o  male here for follow up evaluation of  urinary symptoms secondary to benign prostatic hyperplasia  He has chronic complaints of urinary frequency, urgency, weak urinary stream and nocturia x 4  He has episodes of urinary urge incontinence  He started tamsulosin in 2018 and at first had moderate resolution of lower urinary tract symptoms  Unfortunately, as time progressed he has had worsening of the symptoms  His main complaints now is frequency/urgency with daily urge UI  He reports drinking a lot of water per day and 1 cup of coffee daily  Patient also has complaints of a tingling sensation in his penis and occasional dysuria with urinating  He reports occasional flank pain  PVR in May 2022 was 0cc, 30cc June 2022  Uroflow June 2022 only voided 30cc and qmax and average were 6ml/s  Cysto June 2022 showed obstructing lateral lobes with very small median lobe and mild trabeculations  TRUS volume 45cc       Patient reports a family history of prostate cancer in his father  PSA stable at 1 6 in March 2022  Past Surgical History:   Procedure Laterality Date    CYST REMOVAL  2017        Past Medical History:   Diagnosis Date    Asthma     Back pain     Back pain     Diabetes mellitus ([address] )     Hyperlipidemia     Hypertension              Review of Systems   Constitutional: Negative for chills and fever  HENT: Negative for ear pain and sore throat  Eyes: Negative for pain and visual disturbance  Respiratory: Negative for cough and shortness of breath  Cardiovascular: Negative for chest pain and palpitations  Gastrointestinal: Negative for abdominal pain and vomiting  Genitourinary: Positive for frequency and urgency  Negative for dysuria and hematuria  Musculoskeletal: Negative for arthralgias and back pain  Skin: Negative for color change and rash  Neurological: Negative for seizures and syncope  All other systems reviewed and are negative  Objective:      /80   Pulse 77   Ht 5' 7" (1 702 m)   Wt 90 3 kg (199 lb)   SpO2 99%   BMI 31 17 kg/m²     Lab Results   Component Value Date    PSA 1 6 03/15/2022    PSA 1 4 11/13/2020          Physical Exam  Vitals reviewed  Constitutional:       Appearance: Normal appearance  He is normal weight  HENT:      Head: Normocephalic and atraumatic  Eyes:      Pupils: Pupils are equal, round, and reactive to light  Abdominal:      General: Abdomen is flat  Neurological:      General: No focal deficit present  Mental Status: He is alert and oriented to person, place, and time  Psychiatric:         Mood and Affect: Mood normal          Thought Content: Thought content normal                 Cystoscopy     Date/Time 6/20/2022 8:54 AM     Performed by  Zhanna Salinas MD     Authorized by Zhanna Salinas MD      Universal Protocol:  Consent: Written consent obtained    Risks and benefits: risks, benefits and alternatives were discussed  Consent given by: patient  Time out: Immediately prior to procedure a "time out" was called to verify the correct patient, procedure, equipment, support staff and site/side marked as required  Patient understanding: patient states understanding of the procedure being performed  Patient consent: the patient's understanding of the procedure matches consent given  Procedure consent: procedure consent matches procedure scheduled  Patient identity confirmed: verbally with patient        Procedure Details:  Procedure type: cystoscopy    Patient tolerance: Patient tolerated the procedure well with no immediate complications    Additional Procedure Details: A time-out was performed identifying the correct patient site and procedure  A MA chaperone was in the room  A flexible cystoscope was introduced into the urethra  The pendulous urethra was normal   The prostatic urethra showed bilateral lobar hypertrophy with a small median lobe and prostatic extrusion into bladder  The bladder did not have any lesions concerning for malignancy  There were mild trabeculations and no diverticula  The ureteral orifices were in orthotopic position  Biopsy prostate     Date/Time 6/20/2022 8:54 AM     Performed by  Luis Tena MD     Authorized by Luis Tena MD      Universal Protocol   Consent: Written consent obtained  Consent given by: patient  Time out: Immediately prior to procedure a "time out" was called to verify the correct patient, procedure, equipment, support staff and site/side marked as required    Patient understanding: patient states understanding of the procedure being performed  Patient consent: the patient's understanding of the procedure matches consent given  Procedure consent: procedure consent matches procedure scheduled  Relevant documents: relevant documents present and verified  Patient identity confirmed: verbally with patient        Local anesthesia used: no     Anesthesia   Local anesthesia used: no     Sedation   Patient sedated: no        Specimen: no   Procedure Details   Procedure Notes: The patient was placed in left lateral decubitus position  A digital rectal examination was performed  The prostate did not have any nodules  An ultrasound probe was introduced into the rectum  The prostate was evaluated and measurements made showing the prostate to be 45 cc in size    Patient tolerance: patient tolerated the procedure well with no immediate complications           Orders  Orders Placed This Encounter   Procedures    Cystoscopy     This order was created via procedure documentation    Biopsy prostate     This order was created via procedure documentation    Ambulatory Referral to Physical Therapy     Standing Status:   Future     Standing Expiration Date:   6/20/2023     Referral Priority:   Routine     Referral Type:   Physical Therapy     Referral Reason:   Specialty Services Required     Requested Specialty:   Physical Therapy     Number of Visits Requested:   1     Expiration Date:   6/20/2023

## 2022-06-20 NOTE — PROGRESS NOTES
Outgoing Mail:  6/220/2022    [facility] did mail out packet of bank statements pt had provided to [facility] for waiver application  No further outreach is scheduled as this referral has been closed

## 2022-06-20 NOTE — PATIENT INSTRUCTIONS
- I have ordered you Mirabegron medication  If this is too expensive with insurance let us know and we can order you oxybutynin instead  - I have placed referral to physical therapy to help learn kegel exercises      Kegel Exercises for Men   AMBULATORY CARE:   Kegel exercises  help strengthen your pelvic muscles  Pelvic muscles hold your pelvic organs, such as your bladder, in place  Kegel exercises help prevent or control problems with urine incontinence (leakage)  Incontinence may be caused by an enlarged prostate, older age, or obesity  Contact your healthcare provider if:   You cannot feel your pelvic muscles tighten or relax  You continue to leak urine  You have questions or concerns about your condition or care  Use the correct muscles:  Pelvic muscles are the muscles you use to control urine flow  To target these muscles, stop and start the flow of urine several times  This will help you become familiar with how it feels to tighten and relax these muscles  How to do Kegel exercises:   Empty your bladder  You may lie down, stand up, or sit down to do these exercises  When you first try to do these exercises, it may be easier if you lie down  Tighten or squeeze your pelvic muscles slowly  It may feel like you are trying to hold back urine or gas  Hold this position for 3 seconds  Relax for 3 seconds  Repeat this cycle 10 times  Do 10 sets of Kegel exercises, at least 3 times a day  Do not hold your breath when you do Kegel exercises  Keep your stomach, back, and leg muscles relaxed  As your muscles get stronger, you will be able to hold the squeeze longer  Your healthcare provider may ask that you increase your pelvic muscle squeeze to 10 seconds  After you squeeze for 10 seconds, relax for 10 seconds  What else you should know:   Once you know how to do Kegel exercises, use different positions  This will help to strengthen your pelvic muscles as much as possible   You can do these exercises while you lie on the floor, watch TV, or stand  You may notice improved bladder control within about 6 weeks  Tighten your pelvic muscles before you sneeze, cough, or lift to prevent urine leakage  Follow up with your doctor as directed:  Write down your questions so you remember to ask them during your visits  © [employer] 2022 Information is for End User's use only and may not be sold, redistributed or otherwise used for commercial purposes  All illustrations and images included in CareNotes® are the copyrighted property of [employer]  or [facility] Pascual Richardson   The above information is an  only  It is not intended as medical advice for individual conditions or treatments  Talk to your doctor, nurse or pharmacist before following any medical regimen to see if it is safe and effective for you

## 2022-06-20 NOTE — ASSESSMENT & PLAN NOTE
The patient has voiding symptoms despite Flomax  However these are primarily overactive bladder in nature  He is emptying fairly well based on multiple PVRs  His uroflow was not accurate since he only voided 30 cc  We discussed options which focus on Kegel exercises and anti muscarinic medication verses outlet surgery  I explained outlet surgery will help him void stronger and faster in May help his overactive bladder symptoms but is not guarantee to  He wants to try physical therapy for pelvic floor teaching as well as medication first   This is reasonable  I have ordered Mirabegron and instructed him to let us know if this is too expensive in which case we will changed to oxybutynin  Side effects of each were discussed  We will see him back in 3 months

## 2022-06-22 ENCOUNTER — TELEPHONE (OUTPATIENT)
Dept: FAMILY MEDICINE CLINIC | Facility: CLINIC | Age: 62
End: 2022-06-22

## 2022-06-22 NOTE — TELEPHONE ENCOUNTER
PCP SIGNATURE NEEDED FOR [employer] FORM RECEIVED VIA FAX AND PLACED IN PCP FOLDER TO BE DELIVERED AT ASSIGNED TIMES

## 2022-06-28 ENCOUNTER — OFFICE VISIT (OUTPATIENT)
Dept: FAMILY MEDICINE CLINIC | Facility: CLINIC | Age: 62
End: 2022-06-28

## 2022-06-28 VITALS
DIASTOLIC BLOOD PRESSURE: 82 MMHG | OXYGEN SATURATION: 96 % | RESPIRATION RATE: 16 BRPM | HEART RATE: 96 BPM | TEMPERATURE: 98.7 F | BODY MASS INDEX: 30.61 KG/M2 | SYSTOLIC BLOOD PRESSURE: 120 MMHG | HEIGHT: 67 IN | WEIGHT: 195 LBS

## 2022-06-28 DIAGNOSIS — M62.838 NECK MUSCLE SPASM: ICD-10-CM

## 2022-06-28 DIAGNOSIS — G89.29 CHRONIC BILATERAL LOW BACK PAIN WITH LEFT-SIDED SCIATICA: ICD-10-CM

## 2022-06-28 DIAGNOSIS — F11.20 CONTINUOUS OPIOID DEPENDENCE (HCC): Primary | ICD-10-CM

## 2022-06-28 DIAGNOSIS — M54.50 CHRONIC BILATERAL LOW BACK PAIN WITHOUT SCIATICA: ICD-10-CM

## 2022-06-28 DIAGNOSIS — M54.16 LUMBAR RADICULOPATHY: ICD-10-CM

## 2022-06-28 DIAGNOSIS — G89.29 CHRONIC LOW BACK PAIN WITHOUT SCIATICA, UNSPECIFIED BACK PAIN LATERALITY: ICD-10-CM

## 2022-06-28 DIAGNOSIS — M54.42 CHRONIC BILATERAL LOW BACK PAIN WITH LEFT-SIDED SCIATICA: ICD-10-CM

## 2022-06-28 DIAGNOSIS — M54.50 CHRONIC LOW BACK PAIN WITHOUT SCIATICA, UNSPECIFIED BACK PAIN LATERALITY: ICD-10-CM

## 2022-06-28 DIAGNOSIS — G89.29 CHRONIC BILATERAL LOW BACK PAIN WITHOUT SCIATICA: ICD-10-CM

## 2022-06-28 PROCEDURE — 99214 OFFICE O/P EST MOD 30 MIN: CPT | Performed by: NURSE PRACTITIONER

## 2022-06-28 PROCEDURE — 3074F SYST BP LT 130 MM HG: CPT | Performed by: NURSE PRACTITIONER

## 2022-06-28 PROCEDURE — 3079F DIAST BP 80-89 MM HG: CPT | Performed by: NURSE PRACTITIONER

## 2022-06-28 RX ORDER — LIDOCAINE 50 MG/G
1 PATCH TOPICAL DAILY
Qty: 10 PATCH | Refills: 0 | Status: SHIPPED | OUTPATIENT
Start: 2022-06-28 | End: 2022-07-07 | Stop reason: SDUPTHER

## 2022-06-28 NOTE — PATIENT INSTRUCTIONS

## 2022-06-28 NOTE — PROGRESS NOTES
Assessment/Plan     Problem List Items Addressed This Visit        Nervous and Auditory    Low back pain with left-sided sciatica    Relevant Medications    lidocaine (Lidoderm) 5 %    Other Relevant Orders    Ambulatory Referral to Physiatry    Toxicology screen, urine (Clinic collect)    Oxycodone/Oxymorphone urine (Clinic collect)    Hydrocodone and Metabolites, Urine (Clinic collect)    Lumbar radiculopathy       Musculoskeletal and Integument    Neck muscle spasm    Relevant Medications    lidocaine (Lidoderm) 5 %    Other Relevant Orders    Ambulatory Referral to [address] [place] [address]    Toxicology screen, urine (Clinic collect)    Oxycodone/Oxymorphone urine (Clinic collect)    Hydrocodone and Metabolites, Urine (Clinic collect)       Other    Continuous opioid dependence ([facility] [address] ) - Primary    Relevant Medications    naloxone (NARCAN) 4 mg/0 1 mL nasal spray    Other Relevant Orders    Toxicology screen, urine (Clinic collect)    Oxycodone/Oxymorphone urine (Clinic collect)    Hydrocodone and Metabolites, Urine (Clinic collect)      Other Visit Diagnoses     Chronic low back pain without sciatica, unspecified back pain laterality        Chronic bilateral low back pain without sciatica               Patient will require pain contract to be signed at next visit and BPI screen         Opiate risks  There are risks associated with opioid medications, including dependence, addiction and tolerance  The patient understands and agrees to use these medications only as prescribed  Potential side effects of the medications include, but are not limited to, constipation, drowsiness, addiction, impaired judgment and risk of fatal overdose if not taken as prescribed  The patient was warned against driving while taking sedation medications  Sharing medications is a felony  At this point in time, the patient is showing no signs of addiction, abuse, diversion or suicidal ideation  Pateint is taking concurrent benzodiazepines   Has been counseled on the risks of taking opioids and benzodiazepines including sedation, increased fall risk, dizziness, addictive potential and death  Patient is taking concurrent muscle relaxers  Has been counseled on the risks of taking opioids and muscle relaxers including sedation, increased fall risk, dizziness, addictive potential and death  Patient has a high risk condition (age > 72, JEFFREY, renal or hepatic impairment, mental health condition, use of alcohol or other substances)  Has been counseled on the specific risks of taking opioids with these conditions and the increased risks including including sedation, increased fall risk, dizziness, addictive potential and death  Opioid agreement  Pain management agreement was reviewed with patient and signed/updated during visit      Drug screen  Drug screen collected during today's visit      PD[place] review  [place] or [place]  reviewed  No red flags were identified; safe to proceed with prescription      BMI Counseling: Body mass index is 30 54 kg/m²  The BMI is above normal  Nutrition recommendations include encouraging healthy choices of fruits and vegetables, decreasing fast food intake, consuming healthier snacks and limiting drinks that contain sugar  Rationale for BMI follow-up plan is due to patient being overweight or obese          Subjective     Opioid Management:   Type of visit: Follow-up    Aberrant behavior?: No      Adverse effects from medication?: No      Social Support System  Patient lacks significant family support      Screening Tools/Assessments:    PHQ-2/9:  Last PHQ-2 score: 6 (Last PHQ-2 date: 12/10/2021)  Last PHQ-9 score: 13 (Last PHQ-9 date: 12/10/2021)      Drug Screen:  Date of last drug screen: 12/20/2021    Opioid agreement:  Active Opioid agreement on file?: No    Opioid agreement signed date: 3/8/2021  Opioid agreement expiration date: 3/8/2022    Naloxone:  Currently prescribed Naloxone (Narcan): No        Pain Medications             acetaminophen (TYLENOL) 500 mg tablet Take 1 tablet (500 mg total) by mouth every 6 (six) hours as needed for mild pain    cyclobenzaprine (FLEXERIL) 10 mg tablet Take 1 tablet (10 mg total) by mouth daily at bedtime as needed for muscle spasms    gabapentin (NEURONTIN) 800 mg tablet Take 1 tablet (800 mg total) by mouth 3 (three) times a day    HYDROcodone-acetaminophen (NORCO) 7 5-325 mg per tablet Take 1 tablet by mouth every 8 (eight) hours as needed for pain For ongoing pain Max Daily Amount: 3 tablets    venlafaxine (EFFEXOR-XR) 150 mg 24 hr capsule take 1 capsule by mouth once daily WITH BREAKFAST         Outpatient Morphine Milligram Equivalents Per Day     6/29/22 and after 22 5 MME/Day    Order Name Dose Route Frequency Maximum MME/Day     HYDROcodone-acetaminophen (NORCO) 7 5-325 mg per tablet 1 tablet Oral Every 8 hours PRN 22 5 MME/Day    Total Potential Morphine Milligram Equivalents Per Day 22 5 MME/Day    Calculation Information        HYDROcodone-acetaminophen (NORCO) 7 5-325 mg per tablet    HYDROcodone-acetaminophen 7 5-325 mg Tabs: maximum daily dose of 22 5 mg of opioid in combo * morphine equivalence factor of 1 = 22 5 MME/Day                         PDMP Review       Value Time User    PDMP Reviewed  Yes 6/14/2022  1:02 PM RAMO Gordon         Review of Systems   Constitutional: Negative for chills and fever  HENT: Negative for ear pain and sore throat  Eyes: Negative for pain and visual disturbance  Respiratory: Negative for cough, chest tightness and shortness of breath  Cardiovascular: Negative for chest pain and palpitations  Gastrointestinal: Negative for abdominal pain and vomiting  Genitourinary: Negative for dysuria and hematuria  Musculoskeletal: Positive for arthralgias, gait problem and myalgias  Negative for back pain  Skin: Negative for color change and rash  Neurological: Negative for seizures and syncope  Psychiatric/Behavioral: Positive for dysphoric mood  Negative for self-injury and suicidal ideas  The patient is nervous/anxious  All other systems reviewed and are negative  Objective     /82 (BP Location: Right arm, Patient Position: Sitting, Cuff Size: Standard)   Pulse 96   Temp 98 7 °F (37 1 °C) (Temporal)   Resp 16   Ht 5' 7" (1 702 m)   Wt 88 5 kg (195 lb)   SpO2 96%   BMI 30 54 kg/m²     Physical Exam  Constitutional:       General: He is not in acute distress  HENT:      Head: Normocephalic and atraumatic  Right Ear: External ear normal       Left Ear: External ear normal    Eyes:      General:         Right eye: No discharge  Left eye: No discharge  Cardiovascular:      Rate and Rhythm: Normal rate and regular rhythm  Pulmonary:      Effort: Pulmonary effort is normal  No respiratory distress  Breath sounds: No wheezing  Musculoskeletal:      Cervical back: Decreased range of motion  Thoracic back: Decreased range of motion  Lumbar back: Tenderness present  Decreased range of motion  Positive left straight leg raise test  Negative right straight leg raise test    Skin:     General: Skin is warm and dry  Neurological:      Mental Status: He is alert  Mental status is at baseline           RAMO Cai

## 2022-06-29 ENCOUNTER — APPOINTMENT (OUTPATIENT)
Dept: LAB | Facility: CLINIC | Age: 62
End: 2022-06-29
Payer: MEDICARE

## 2022-06-29 PROCEDURE — 80307 DRUG TEST PRSMV CHEM ANLYZR: CPT | Performed by: NURSE PRACTITIONER

## 2022-06-29 PROCEDURE — 80361 OPIATES 1 OR MORE: CPT | Performed by: NURSE PRACTITIONER

## 2022-06-29 RX ORDER — NALOXONE HYDROCHLORIDE 4 MG/.1ML
SPRAY NASAL
Qty: 1 EACH | Refills: 1 | Status: SHIPPED | OUTPATIENT
Start: 2022-06-29

## 2022-06-30 DIAGNOSIS — M54.16 LUMBAR RADICULOPATHY: ICD-10-CM

## 2022-06-30 DIAGNOSIS — M54.50 CHRONIC LOW BACK PAIN WITHOUT SCIATICA, UNSPECIFIED BACK PAIN LATERALITY: ICD-10-CM

## 2022-06-30 DIAGNOSIS — G89.29 CHRONIC LOW BACK PAIN WITHOUT SCIATICA, UNSPECIFIED BACK PAIN LATERALITY: ICD-10-CM

## 2022-06-30 LAB — OXYCODONE+OXYMORPHONE UR QL SCN: NEGATIVE NG/ML

## 2022-07-01 RX ORDER — HYDROCODONE BITARTRATE AND ACETAMINOPHEN 7.5; 325 MG/1; MG/1
1 TABLET ORAL EVERY 8 HOURS PRN
Qty: 90 TABLET | Refills: 0 | Status: SHIPPED | OUTPATIENT
Start: 2022-07-01 | End: 2022-08-01 | Stop reason: SDUPTHER

## 2022-07-01 NOTE — TELEPHONE ENCOUNTER
Pt called office today 07/01/22 asking for Up-Date on Med Refill Request   Med: HY[facility]one-acetaminophen ([facility]) 7 5-325 mg per tab    Pt stated that he is Leaving on Vacation on 07/03/22 and needs Medication  Please Contact Pt if further Information is Needed

## 2022-07-05 ENCOUNTER — TELEPHONE (OUTPATIENT)
Dept: FAMILY MEDICINE CLINIC | Facility: CLINIC | Age: 62
End: 2022-07-05

## 2022-07-05 NOTE — TELEPHONE ENCOUNTER
PCP SIGNATURE NEEDED FOR [employer] FORM RECEIVED VIA FAX AND PLACED IN PCP FOLDER TO BE DELIVERED AT ASSIGNED TIMES        Prior Auth Lidocaine 5% patches

## 2022-07-07 ENCOUNTER — TELEPHONE (OUTPATIENT)
Dept: FAMILY MEDICINE CLINIC | Facility: CLINIC | Age: 62
End: 2022-07-07

## 2022-07-07 DIAGNOSIS — M62.838 NECK MUSCLE SPASM: ICD-10-CM

## 2022-07-07 DIAGNOSIS — M54.42 CHRONIC BILATERAL LOW BACK PAIN WITH LEFT-SIDED SCIATICA: ICD-10-CM

## 2022-07-07 DIAGNOSIS — G89.29 CHRONIC BILATERAL LOW BACK PAIN WITH LEFT-SIDED SCIATICA: ICD-10-CM

## 2022-07-07 LAB
AMPHETAMINES UR QL SCN: NEGATIVE NG/ML
BARBITURATES UR QL SCN: NEGATIVE NG/ML
BENZODIAZ UR QL: NEGATIVE NG/ML
BZE UR QL: NEGATIVE NG/ML
CANNABINOIDS UR QL SCN: NEGATIVE NG/ML
METHADONE UR QL SCN: NEGATIVE NG/ML
OPIATES UR QL: NEGATIVE
PCP UR QL: NEGATIVE NG/ML
PROPOXYPH UR QL SCN: NEGATIVE NG/ML

## 2022-07-07 RX ORDER — LIDOCAINE 50 MG/G
1 PATCH TOPICAL DAILY
Qty: 10 PATCH | Refills: 0 | Status: SHIPPED | OUTPATIENT
Start: 2022-07-07 | End: 2022-08-09 | Stop reason: SDUPTHER

## 2022-07-07 NOTE — TELEPHONE ENCOUNTER
Pharmacy called requesting prior authorization for Lidocaine (Lidoderm) 5%  Preferred medications are:       Glydo Jelly Syringe Lidocaine Cream Lidocaine Jelly Lidocaine Ointment Lidocaine 4% Patch (OTC) Lidocaine 5% Patch (generic Lidoderm Patch)QL Lidocaine Solution Lidocaine Viscous SolutionAR Lidocaine-Prilocaine Cream    Would you like to continue with prior auth?

## 2022-07-19 LAB
HYDROCODONE UR QL: 466 NG/ML
HYDROMORPHONE UR QL: 101 NG/ML

## 2022-07-22 ENCOUNTER — PATIENT OUTREACH (OUTPATIENT)
Dept: FAMILY MEDICINE CLINIC | Facility: CLINIC | Age: 62
End: 2022-07-22

## 2022-07-22 ENCOUNTER — TELEPHONE (OUTPATIENT)
Dept: FAMILY MEDICINE CLINIC | Facility: CLINIC | Age: 62
End: 2022-07-22

## 2022-07-22 DIAGNOSIS — Z78.9 NEED FOR FOLLOW-UP BY SOCIAL WORKER: Primary | ICD-10-CM

## 2022-07-22 NOTE — TELEPHONE ENCOUNTER
PPL QUALIFICATION INITIATED    NAME ON BILL: EZEKIEL MURPHY  ADDRESS ON [facility]: [address] [place] [address] [address] [place] [id number]  ACCOUNT #: [de-identified]    SEND THIS REQUEST TO CLINICAL TEAM AND PCP ONLY  **SW WILL BE ADVISED BY CLINICAL TEAM/PCP**

## 2022-07-22 NOTE — PROGRESS NOTES
JEROD ALMARAZ received message from medical assistant regarding [employer] Electric shutoff notice  JEROD ALMARAZ was advised patient qualifies for medical certification  JEROD ALMARAZ was consulted by Dr Joey Guerrero who advised able to sign form prior to weekend  JEROD ALMARAZ did then place call to [facility] and provided required information  The representative Thierno Boswell will fax medical certification form to JEROD ALMARAZ  Fax was received  JEROD ALMARAZ consulted again with provider who certified medical necessity  JEROD  faxed form back to [facility] [employer]  Confirmation page received

## 2022-07-27 ENCOUNTER — PATIENT OUTREACH (OUTPATIENT)
Dept: FAMILY MEDICINE CLINIC | Facility: CLINIC | Age: 62
End: 2022-07-27

## 2022-07-27 NOTE — PROGRESS NOTES
JERODCM approached by MD Anant Tony as [facility] medical certification form in her mailbox  SWCM contacted patient who states that he received a notice that his gas will be shut off on 8/8 if 277$ is not paid  Patient having trouble paying  He states he has filled out an application with [employer] of [address] [place] [address] and is waiting on his tax documents which his daughter is assisting him to get  SW also requested for patient to reach out directly to [facility] for a budget, payment plan  He has the phone number to do this  [facility] medical certification completed and faxed to [facility]  [facility]

## 2022-07-27 NOTE — TELEPHONE ENCOUNTER
Filled out PPL form on 7/22/22 and gave to [facility] who faxed form  Now there was a [facility] form that I filled it and gave to London Miller  She told me she will call [employer] and follow-up   Thanks

## 2022-07-27 NOTE — PROGRESS NOTES
SWCM approached by MD Serena Rizzo as [facility] medical certification form in her mailbox  SW contacted patient who states that he received a notice that his gas will be shut off on 8/8 if 277$ is not paid  Patient having trouble paying  He states he has filled out an application with [employer] [facility] [employer] and is waiting on his tax documents which his daughter is assisting him to get  SW also requested for patient to reach out directly to [facility] for a budget, payment plan  or he can call [facility] [employer] and they will send him the application for Cap  [employer] no longer assisting with utilities only rent  [facility] medical certification completed and faxed to [facility]  Confirmation sheet received from [facility]   Patient stated that he had a gas leak in the home, [employer] weatherization came out to home and assessed and found a leak in stove  Scott came out to the home this morning and fixed the leak  [facility] to follow

## 2022-07-28 ENCOUNTER — TELEPHONE (OUTPATIENT)
Dept: FAMILY MEDICINE CLINIC | Facility: CLINIC | Age: 62
End: 2022-07-28

## 2022-08-01 ENCOUNTER — PATIENT OUTREACH (OUTPATIENT)
Dept: FAMILY MEDICINE CLINIC | Facility: CLINIC | Age: 62
End: 2022-08-01

## 2022-08-01 DIAGNOSIS — M54.50 CHRONIC LOW BACK PAIN WITHOUT SCIATICA, UNSPECIFIED BACK PAIN LATERALITY: ICD-10-CM

## 2022-08-01 DIAGNOSIS — G89.29 CHRONIC LOW BACK PAIN WITHOUT SCIATICA, UNSPECIFIED BACK PAIN LATERALITY: ICD-10-CM

## 2022-08-01 DIAGNOSIS — M54.16 LUMBAR RADICULOPATHY: ICD-10-CM

## 2022-08-01 NOTE — PROGRESS NOTES
JEROD ALMARAZ noted in chart that while JEROD ALMARAZ was OOO the medical certification for [employer] was also completed and faxed  The patient had a gas leak issue which was resolved through [address]      JEROD ALMARAZ placed call to the patient, Marv  He has not followed up with UGI or PPL  JEROD ALMARAZ encouraged him to do so as the shut off delays are only 30 days at a time  He will call Wednesday  JEROD ALMARAZ encouraged him to inquire about payment plan options  He indicated already being on On[employer] for PPL  His balance for PPL is approximately $200  He still has gas and electricity on in the hme  The leak is still fixed  JEROD ALMARAZ and Marv discussed the [employer] [facility] application he had indicated his daughter was assisting during last outreach by JEROD Arreguin  He stated he just needs paperwork to submit and that his daughter is able to handle it and he does not need JEROD ALMARAZ to follow up with the daughter  Marv makes $1299/mo in [facility]  His rent is $1100/mo  He lives with his daughter  Upon further discussion it was noted his daughter is 15 yo  It is the same daughter assisting with obtaining his needed documents  JEROD ALMARAZ advised it may be best to have a [facility] assist with this process instead of the daughter  JEROD ALMRAAZ explained role and scope of CMOC and he was agreeable to a referral      Following conversation JEROD ALMARAZ noted in chart that [employer]MS [employer] worked with Misty La extensively to apply for assistance through [employer] and had declined further services as he had declined to provide the needed documents as he felt it was too intrusive  JEROD ALMARAZ noted in chart he needed his tax documents for 2019 and 2020  JEROD ALMARAZ did call Marv back and he confirmed previously working with [employer]  Upon further discussion he has his 2019 and 2020 W2s on his phone  His daughter was helping him get them printed and he will be going to [employer] to get them printed   He assured JEROD ALMARAZ that the daughter is not the one who will be dropping the documents off to [facility] [employer]  He has a  at [facility] [employer] who his assisting him with process  JEROD ALMARAZ will close referral as Elly Rhodes still has gas and electric  He is in final stage of application for [employer] and has the W2s ready to print  He is aware can contact office or JEROD ALMARAZ directly as needs arise  JEROD ALMARAZ will continue to remain available for psychosocial support as needed

## 2022-08-01 NOTE — TELEPHONE ENCOUNTER
Patient is requesting refill on his mee medication     Please advise    HYDROcodone-acetaminophen (NORCO) 7 5-325 mg per tablet

## 2022-08-02 NOTE — TELEPHONE ENCOUNTER
08/02/22    Pt came into the office today  requesting Refill on the following Med:   HYDROcodone-acetaminophen (NORCO) 7 5-325 mg per tablet     Pt next Appt; 08/09/22      Any questions please call Pt

## 2022-08-03 ENCOUNTER — PATIENT OUTREACH (OUTPATIENT)
Dept: FAMILY MEDICINE CLINIC | Facility: CLINIC | Age: 62
End: 2022-08-03

## 2022-08-03 NOTE — PROGRESS NOTES
Incoming Call:  8/3/2022    [facility] did receive a call from pt asking information on who [facility] last spoke with at [facility] when seeking rental assistance  [facility] did provide name and phone number to worker for [employer] program  Last contact with [facility] and [facility] was on 5/23/22  No further outreach is scheduled

## 2022-08-04 ENCOUNTER — TELEPHONE (OUTPATIENT)
Dept: FAMILY MEDICINE CLINIC | Facility: CLINIC | Age: 62
End: 2022-08-04

## 2022-08-04 RX ORDER — HYDROCODONE BITARTRATE AND ACETAMINOPHEN 7.5; 325 MG/1; MG/1
1 TABLET ORAL EVERY 8 HOURS PRN
Qty: 90 TABLET | Refills: 0 | Status: SHIPPED | OUTPATIENT
Start: 2022-08-04 | End: 2022-09-07 | Stop reason: SDUPTHER

## 2022-08-09 ENCOUNTER — OFFICE VISIT (OUTPATIENT)
Dept: FAMILY MEDICINE CLINIC | Facility: CLINIC | Age: 62
End: 2022-08-09

## 2022-08-09 VITALS
HEIGHT: 67 IN | DIASTOLIC BLOOD PRESSURE: 84 MMHG | TEMPERATURE: 97.8 F | BODY MASS INDEX: 31.39 KG/M2 | HEART RATE: 82 BPM | RESPIRATION RATE: 18 BRPM | WEIGHT: 200 LBS | SYSTOLIC BLOOD PRESSURE: 132 MMHG | OXYGEN SATURATION: 98 %

## 2022-08-09 DIAGNOSIS — M54.42 CHRONIC BILATERAL LOW BACK PAIN WITH LEFT-SIDED SCIATICA: ICD-10-CM

## 2022-08-09 DIAGNOSIS — I10 BENIGN ESSENTIAL HTN: ICD-10-CM

## 2022-08-09 DIAGNOSIS — E11.9 TYPE 2 DIABETES MELLITUS WITHOUT COMPLICATION, WITHOUT LONG-TERM CURRENT USE OF INSULIN (HCC): Primary | ICD-10-CM

## 2022-08-09 DIAGNOSIS — N13.8 BPH WITH OBSTRUCTION/LOWER URINARY TRACT SYMPTOMS: ICD-10-CM

## 2022-08-09 DIAGNOSIS — N40.1 BPH WITH OBSTRUCTION/LOWER URINARY TRACT SYMPTOMS: ICD-10-CM

## 2022-08-09 DIAGNOSIS — R30.0 DYSURIA: ICD-10-CM

## 2022-08-09 DIAGNOSIS — G89.29 CHRONIC BILATERAL LOW BACK PAIN WITH LEFT-SIDED SCIATICA: ICD-10-CM

## 2022-08-09 LAB
BACTERIA UR QL AUTO: ABNORMAL /HPF
BILIRUB UR QL STRIP: NEGATIVE
CLARITY UR: CLEAR
COLOR UR: ABNORMAL
GLUCOSE UR STRIP-MCNC: NEGATIVE MG/DL
HGB UR QL STRIP.AUTO: ABNORMAL
KETONES UR STRIP-MCNC: NEGATIVE MG/DL
LEUKOCYTE ESTERASE UR QL STRIP: NEGATIVE
NITRITE UR QL STRIP: NEGATIVE
NON-SQ EPI CELLS URNS QL MICRO: ABNORMAL /HPF
PH UR STRIP.AUTO: 6.5 [PH]
PROT UR STRIP-MCNC: NEGATIVE MG/DL
RBC #/AREA URNS AUTO: ABNORMAL /HPF
SL AMB  POCT GLUCOSE, UA: NEGATIVE
SL AMB LEUKOCYTE ESTERASE,UA: NEGATIVE
SL AMB POCT BILIRUBIN,UA: NEGATIVE
SL AMB POCT BLOOD,UA: POSITIVE
SL AMB POCT CLARITY,UA: CLEAR
SL AMB POCT COLOR,UA: ABNORMAL
SL AMB POCT HEMOGLOBIN AIC: 6.8 (ref ?–6.5)
SL AMB POCT KETONES,UA: NEGATIVE
SL AMB POCT NITRITE,UA: NEGATIVE
SL AMB POCT PH,UA: 6
SL AMB POCT SPECIFIC GRAVITY,UA: 1
SL AMB POCT URINE PROTEIN: NEGATIVE
SL AMB POCT UROBILINOGEN: NEGATIVE
SP GR UR STRIP.AUTO: 1.01 (ref 1–1.03)
UROBILINOGEN UR STRIP-ACNC: <2 MG/DL
WBC #/AREA URNS AUTO: ABNORMAL /HPF

## 2022-08-09 PROCEDURE — 83036 HEMOGLOBIN GLYCOSYLATED A1C: CPT

## 2022-08-09 PROCEDURE — 3075F SYST BP GE 130 - 139MM HG: CPT

## 2022-08-09 PROCEDURE — 81001 URINALYSIS AUTO W/SCOPE: CPT

## 2022-08-09 PROCEDURE — 3079F DIAST BP 80-89 MM HG: CPT

## 2022-08-09 PROCEDURE — 87086 URINE CULTURE/COLONY COUNT: CPT

## 2022-08-09 PROCEDURE — 99214 OFFICE O/P EST MOD 30 MIN: CPT

## 2022-08-09 PROCEDURE — 81002 URINALYSIS NONAUTO W/O SCOPE: CPT

## 2022-08-09 RX ORDER — LIDOCAINE 40 MG/G
CREAM TOPICAL AS NEEDED
Qty: 30 G | Refills: 0 | Status: SHIPPED | OUTPATIENT
Start: 2022-08-09 | End: 2022-08-12 | Stop reason: SDUPTHER

## 2022-08-09 RX ORDER — LIDOCAINE 50 MG/G
1 PATCH TOPICAL DAILY
Qty: 30 PATCH | Refills: 1 | Status: SHIPPED | OUTPATIENT
Start: 2022-08-09 | End: 2022-08-12 | Stop reason: SDUPTHER

## 2022-08-09 RX ORDER — LANCING DEVICE/LANCETS
KIT MISCELLANEOUS DAILY
Qty: 1 EACH | Refills: 2 | Status: SHIPPED | OUTPATIENT
Start: 2022-08-09 | End: 2022-08-12 | Stop reason: SDUPTHER

## 2022-08-09 RX ORDER — BLOOD SUGAR DIAGNOSTIC
STRIP MISCELLANEOUS
Qty: 100 EACH | Refills: 2 | Status: SHIPPED | OUTPATIENT
Start: 2022-08-09 | End: 2022-08-12 | Stop reason: SDUPTHER

## 2022-08-09 NOTE — ASSESSMENT & PLAN NOTE
Follows with urology  Currently on mirabegron ER 50 mg daily  Reporting dysuria x2 days   - POCT urine dip positive for RBCs  - Urinalysis and culture  - Continue regular follow up with urology

## 2022-08-09 NOTE — ASSESSMENT & PLAN NOTE
Restarting PT this Friday  Pt reports physiatry appt is scheduled  - Add lidocaine patches/cream   - Continue Norco as prescribed by PCP last on 8/4/22

## 2022-08-09 NOTE — PROGRESS NOTES
[id number] [id number] PRACTICE JOSE CRUZ    NAME: Zeus Tamez  AGE: 62 y o  SEX: male  : 1960     DATE: 2022     Assessment and Plan:     Problem List Items Addressed This Visit        Endocrine    Type 2 diabetes mellitus without complication, without long-term current use of insulin ([facility] [address] ) - Primary       Lab Results   Component Value Date    HGBA1C 6 8 (A) 2022    HGBA1C 6 2 (H) 03/15/2022    HGBA1C 6 1 12/10/2021    HGBA1C 5 8 09/10/2021     HgbA1c on upward trajectory  Pt admits to some dietary indiscretions  - Advised limiting carbohydrates in diet  - Physical activity as tolerated  - Increase metformin to 500 mg BID with meals    - Continue home blood glucose monitoring   - Follow up in 3 months for repeat A1c  Relevant Medications    glucose blood (OneTouch Verio) test strip    Lancet Devices (ONE TOUCH DELICA LANCING DEV) MISC    metFORMIN (GLUCOPHAGE) 500 mg tablet    Other Relevant Orders    POCT hemoglobin A1c (Completed)       Cardiovascular and Mediastinum    Benign essential HTN     BP at goal in office today: 132/84   - Continue amlodipine 10 mg daily            Nervous and Auditory    Low back pain with left-sided sciatica     Restarting PT this Friday  Pt reports physiatry appt is scheduled  - Add lidocaine patches/cream   - Continue Norco as prescribed by PCP last on 22  Relevant Medications    lidocaine (Lidoderm) 5 %    lidocaine (LMX) 4 % cream       Genitourinary    BPH with obstruction/lower urinary tract symptoms     Follows with urology  Currently on mirabegron ER 50 mg daily  Reporting dysuria x2 days   - POCT urine dip positive for RBCs  - Urinalysis and culture  - Continue regular follow up with urology             Other Visit Diagnoses     Dysuria        Relevant Orders    POCT urine dip (Completed)    Urinalysis with microscopic    Urine culture                 Return in about 3 months (around 11/9/2022) for Medicare Wellness  Chief Complaint:     Chief Complaint   Patient presents with    Back Pain     Follow up      History of Present Illness:     Megan Worthington presented to the office today for routine follow up for chronic conditions  Pt reports checking blood sugar at home, did not have log available for review  Cystoscopy and prostate biopsy completed 6/20/22  Flomax changed to mirabegron with little improvement in symptoms, still reporting urgency/frequency  Review of Systems:     Review of Systems   Constitutional: Negative for fatigue, fever and unexpected weight change  Respiratory: Negative for cough, shortness of breath and wheezing  Cardiovascular: Negative for chest pain, palpitations and leg swelling  Gastrointestinal: Negative for abdominal pain, blood in stool, constipation, diarrhea, nausea and vomiting  Endocrine: Negative for polyuria  Genitourinary: Positive for dysuria (mild pain and tingling sensation with urination x2 days) and urgency  Negative for hematuria  Musculoskeletal: Positive for arthralgias and back pain  Neurological: Negative for dizziness, weakness and numbness  All other systems reviewed and are negative       Past Medical History:     Past Medical History:   Diagnosis Date    Asthma     Back pain     Back pain     Diabetes mellitus ([facility] [address] )     Hyperlipidemia     Hypertension       Past Surgical History:     Past Surgical History:   Procedure Laterality Date    CYST REMOVAL  2017      Social History:     Social History     Socioeconomic History    Marital status: /Civil Union     Spouse name: None    Number of children: None    Years of education: None    Highest education level: None   Occupational History    None   Tobacco Use    Smoking status: Never Smoker    Smokeless tobacco: Never Used   Vaping Use    Vaping Use: Never used   Substance and Sexual Activity    Alcohol use: Not Currently     Comment: rare    Drug use: Never    Sexual activity: Not Currently   Other Topics Concern    None   Social History Narrative    None     Social Determinants of Health     Financial Resource Strain: Low Risk     Difficulty of Paying Living Expenses: Not hard at all   Food Insecurity: No Food Insecurity    Worried About Running Out of Food in the Last Year: Never true    Day of Food in the Last Year: Never true   Transportation Needs: No Transportation Needs    Lack of Transportation (Medical): No    Lack of Transportation (Non-Medical):  No   Physical Activity: Not on file   Stress: Not on file   Social Connections: Not on file   Intimate Partner Violence: Not on file   Housing Stability: Unknown    Unable to Pay for Housing in the Last Year: No    Number of Places Lived in the Last Year: Not on file    Unstable Housing in the Last Year: No      Family History:     Family History   Problem Relation Age of Onset    Hypertension Mother     Diabetes Mother     Cancer Father     Diabetes Family     Hypertension Family       Current Medications:     Current Outpatient Medications   Medication Sig Dispense Refill    glucose blood (OneTouch Verio) test strip Check blood sugar daily 100 each 2    Lancet Devices (ONE TOUCH DELICA LANCING DEV) MISC Use daily 1 each 2    lidocaine (Lidoderm) 5 % Apply 1 patch topically daily Remove & Discard patch within 12 hours or as directed by MD 30 patch 1    lidocaine (LMX) 4 % cream Apply topically as needed for mild pain 30 g 0    metFORMIN (GLUCOPHAGE) 500 mg tablet Take 1 tablet (500 mg total) by mouth 2 (two) times a day with meals 60 tablet 3    acetaminophen (TYLENOL) 500 mg tablet Take 1 tablet (500 mg total) by mouth every 6 (six) hours as needed for mild pain 30 tablet 0    albuterol (2 5 mg/3 mL) 0 083 % nebulizer solution Take 3 mL (2 5 mg total) by nebulization every 6 (six) hours as needed for wheezing or shortness of breath 180 mL 5    albuterol (PROVENTIL HFA,VENTOLIN HFA) 90 mcg/act inhaler Inhale 2 puffs every 4 (four) hours as needed (coughing) 6 7 g 0    aluminum-magnesium hydroxide-simethicone (MAALOX) 200-200-20 MG/5ML SUSP Take 15 mL by mouth 4 (four) times a day (before meals and at bedtime) 150 mL 0    amLODIPine (NORVASC) 10 mg tablet Take 1 tablet (10 mg total) by mouth daily 90 tablet 1    atorvastatin (LIPITOR) 20 mg tablet Take 1 tablet (20 mg total) by mouth daily 90 tablet 1    Bioflavonoid Products (RA Vitamin C CR) TBCR take 1 tablet by mouth twice a day for 14 days      Blood Glucose Monitoring Suppl (OneTouch Verio) w/Device KIT Use daily 1 kit 0    Blood Glucose Monitoring Suppl KIT Use in the morning Please give test strips and lancets  Dx  E11 9 1 kit 0    Blood Pressure KIT Use in the morning With appropriate size cuff 1 kit 0    Blood Pressure Monitoring (Blood Pressure Monitor/Arm) EMILY Use daily 1 each 0    budesonide-formoterol (Symbicort) 160-4 5 mcg/act inhaler Inhale 2 puffs 2 (two) times a day Rinse mouth after use   10 2 g 3    cyclobenzaprine (FLEXERIL) 10 mg tablet Take 1 tablet (10 mg total) by mouth daily at bedtime as needed for muscle spasms 30 tablet 0    famotidine (PEPCID) 20 mg tablet Take 1 tablet (20 mg total) by mouth 2 (two) times a day As needed for acid reflux 90 tablet 0    fluticasone (FLONASE) 50 mcg/act nasal spray 1 spray into each nostril daily 16 g 0    fluticasone (FLONASE) 50 mcg/act nasal spray 1 spray into each nostril daily 16 g 0    gabapentin (NEURONTIN) 800 mg tablet Take 1 tablet (800 mg total) by mouth 3 (three) times a day 90 tablet 3    glucose monitoring kit (FREESTYLE) monitoring kit Use 1 each in the morning TESTING DAILY IN THE AM 1 each 0    HYDROcodone-acetaminophen (NORCO) 7 5-325 mg per tablet Take 1 tablet by mouth every 8 (eight) hours as needed for pain For ongoing pain Max Daily Amount: 3 tablets 90 tablet 0    Lancets (freestyle) lancets TESTING DAILY IN THE  each 3    Lancets (onetouch ultrasoft) lancets Use daily Use as instructed to check sugars daily 100 each 1    menthol-cetylpyridinium (CEPACOL) 3 MG lozenge Take 1 lozenge (3 mg total) by mouth as needed for sore throat 30 lozenge 0    Mirabegron ER 50 MG TB24 Take 1 tablet (50 mg total) by mouth daily 60 tablet 6    Multiple Vitamin (multivitamin) capsule Take 1 capsule by mouth daily for 14 days 14 capsule 0    naloxone (NARCAN) 4 mg/0 1 mL nasal spray Administer 1 spray into a nostril  If no response after 2-3 minutes, give another dose in the other nostril using a new spray  1 each 1    ondansetron (Zofran ODT) 4 mg disintegrating tablet Take 1 tablet (4 mg total) by mouth every 6 (six) hours as needed for nausea or vomiting 20 tablet 0    OneTouch Delica Lancets 78K MISC Use daily 100 each 1    OneTouch Delica Lancets 02E MISC Use in the morning 100 each 6    tamsulosin (FLOMAX) 0 4 mg Take 1 capsule (0 4 mg total) by mouth daily with dinner 90 capsule 1    venlafaxine (EFFEXOR-XR) 150 mg 24 hr capsule take 1 capsule by mouth once daily WITH BREAKFAST 30 capsule 2    zolpidem (AMBIEN) 5 mg tablet Take 1 tablet (5 mg total) by mouth daily at bedtime as needed for sleep 30 tablet 0     No current facility-administered medications for this visit  Allergies:     No Known Allergies   Physical Exam:     /84 (BP Location: Left arm, Patient Position: Sitting, Cuff Size: Large)   Pulse 82   Temp 97 8 °F (36 6 °C) (Temporal)   Resp 18   Ht 5' 7" (1 702 m)   Wt 90 7 kg (200 lb)   SpO2 98%   BMI 31 32 kg/m²     Physical Exam  Vitals reviewed  Constitutional:       General: He is not in acute distress  Appearance: He is obese  He is not ill-appearing or diaphoretic  HENT:      Head: Normocephalic and atraumatic  Cardiovascular:      Rate and Rhythm: Normal rate and regular rhythm  Heart sounds: Normal heart sounds  No murmur heard  Pulmonary:      Effort: Pulmonary effort is normal  No tachypnea  Breath sounds: Normal breath sounds  No decreased breath sounds or wheezing  Musculoskeletal:      Right lower leg: No edema  Left lower leg: No edema  Skin:     General: Skin is warm and dry  Neurological:      Mental Status: He is alert and oriented to person, place, and time  Comments: Ambulating with cane     Psychiatric:         Mood and Affect: Mood and affect normal           Dyan Cranker, [address]

## 2022-08-09 NOTE — ASSESSMENT & PLAN NOTE
Lab Results   Component Value Date    HGBA1C 6 8 (A) 08/09/2022    HGBA1C 6 2 (H) 03/15/2022    HGBA1C 6 1 12/10/2021    HGBA1C 5 8 09/10/2021     HgbA1c on upward trajectory  Pt admits to some dietary indiscretions  - Advised limiting carbohydrates in diet  - Physical activity as tolerated  - Increase metformin to 500 mg BID with meals    - Continue home blood glucose monitoring   - Follow up in 3 months for repeat A1c

## 2022-08-10 ENCOUNTER — TELEPHONE (OUTPATIENT)
Dept: FAMILY MEDICINE CLINIC | Facility: CLINIC | Age: 62
End: 2022-08-10

## 2022-08-10 LAB — BACTERIA UR CULT: ABNORMAL

## 2022-08-11 NOTE — TELEPHONE ENCOUNTER
Jacki Wong 13 hours ago (2:28 PM)       Pharmacy called the nurse line stating that medications were sent, but you are not registered under medicare part B they were unable to dispense medication, per pharmacist you will have to registered or medication will need to be sent by a provider that is registered  Please advise Thanks  Routing comment      [facility] Bullard [id number] [address] 13 hours ago (2:26 PM)       I sent metformin (increased dose), lidocaine cream and patches, lancets, and test strips when I saw your patient on 8/9/22  I don't know what the problem is here, but would you be able to resend? Thank you

## 2022-08-12 ENCOUNTER — EVALUATION (OUTPATIENT)
Dept: PHYSICAL THERAPY | Facility: CLINIC | Age: 62
End: 2022-08-12
Payer: MEDICARE

## 2022-08-12 DIAGNOSIS — M54.42 ACUTE LEFT-SIDED LOW BACK PAIN WITH LEFT-SIDED SCIATICA: ICD-10-CM

## 2022-08-12 DIAGNOSIS — N40.1 BPH WITH OBSTRUCTION/LOWER URINARY TRACT SYMPTOMS: ICD-10-CM

## 2022-08-12 DIAGNOSIS — G89.29 CHRONIC BILATERAL LOW BACK PAIN WITH LEFT-SIDED SCIATICA: ICD-10-CM

## 2022-08-12 DIAGNOSIS — E11.9 TYPE 2 DIABETES MELLITUS WITHOUT COMPLICATION, WITHOUT LONG-TERM CURRENT USE OF INSULIN (HCC): ICD-10-CM

## 2022-08-12 DIAGNOSIS — N13.8 BPH WITH OBSTRUCTION/LOWER URINARY TRACT SYMPTOMS: ICD-10-CM

## 2022-08-12 DIAGNOSIS — M54.42 CHRONIC BILATERAL LOW BACK PAIN WITH LEFT-SIDED SCIATICA: ICD-10-CM

## 2022-08-12 PROCEDURE — 97112 NEUROMUSCULAR REEDUCATION: CPT | Performed by: PHYSICAL THERAPIST

## 2022-08-12 PROCEDURE — 97162 PT EVAL MOD COMPLEX 30 MIN: CPT | Performed by: PHYSICAL THERAPIST

## 2022-08-12 RX ORDER — LIDOCAINE 50 MG/G
1 PATCH TOPICAL DAILY
Qty: 30 PATCH | Refills: 1 | Status: SHIPPED | OUTPATIENT
Start: 2022-08-12 | End: 2022-12-02 | Stop reason: SDUPTHER

## 2022-08-12 RX ORDER — BLOOD SUGAR DIAGNOSTIC
STRIP MISCELLANEOUS
Qty: 100 EACH | Refills: 2 | Status: SHIPPED | OUTPATIENT
Start: 2022-08-12 | End: 2022-10-04 | Stop reason: SDUPTHER

## 2022-08-12 RX ORDER — LANCING DEVICE/LANCETS
KIT MISCELLANEOUS DAILY
Qty: 1 EACH | Refills: 2 | Status: SHIPPED | OUTPATIENT
Start: 2022-08-12

## 2022-08-12 RX ORDER — LIDOCAINE 40 MG/G
CREAM TOPICAL AS NEEDED
Qty: 30 G | Refills: 0 | Status: SHIPPED | OUTPATIENT
Start: 2022-08-12

## 2022-08-12 NOTE — PROGRESS NOTES
PT Evaluation     Today's date: 2022  Patient name: Pierre Sun  : 1960  MRN: [id number]  Referring provider: RAMO Schroeder  Dx:   Encounter Diagnosis     ICD-10-CM    1  Acute left-sided low back pain with left-sided sciatica  M54 42 Ambulatory Referral to Physical Therapy   2  BPH with obstruction/lower urinary tract symptoms  N40 1 Ambulatory Referral to Physical Therapy    N13 8                   Assessment  Assessment details: Pt is a 62y o  year old male presenting to physical therapy for Acute left-sided low back pain with left-sided sciatica and BPH with obstruction/lower urinary tract symptoms  He presents with the following impairments: lumbar and thoracic ROM deficits, LE weakness worse on the L LE, hypomobility, poor TA activation, and (+) L slump and pSLR affecting his function with walking, standing, navigating stairs, squatting, bending, lifting, twisting, going to the bathroom, and sleeping  Pt will benefit from skilled physical therapy to address functional limitations noted in evaluation and meet patient goals  Impairments: abnormal gait, abnormal muscle firing, abnormal muscle tone, abnormal or restricted ROM, abnormal movement, activity intolerance, impaired physical strength, lacks appropriate home exercise program, pain with function and poor body mechanics    Symptom irritability: moderate  Goals  ST  Pt will reduce pain to 4/10 at rest   2  Pt will demonstrate good TA activation    LT  Pt will improve b/l hip flexion to 4/5 for improved ability to navigate stairs  2  Pt will improve lumbar flexion to min deficits for improved ability to forward bend  3  Pt will be I w HEP      Plan  Patient would benefit from: PT eval and skilled physical therapy  Planned modality interventions: biofeedback, cryotherapy, electrical stimulation/Russian stimulation, TENS, thermotherapy: hydrocollator packs and unattended electrical stimulation  Planned therapy interventions: abdominal trunk stabilization, joint mobilization, manual therapy, Tinoco taping, balance, neuromuscular re-education, patient education, strengthening, stretching, therapeutic activities, therapeutic exercise, flexibility, functional ROM exercises, home exercise program, body mechanics training, postural training and sensory integrative techniques  Frequency: 2x week  Duration in weeks: 6  Treatment plan discussed with: patient        Subjective Evaluation    History of Present Illness  Mechanism of injury: Pt reports back pain that radiates into his L foot  His back has been bothering him since 2019  He also reports L LE weakness affecting his walking and driving  Denies any falls or trauma  He reports numbness and tingling in his L LE and difficulty sleeping  He reports taking gabapentin for his leg muscles  He also reports continued feeling of needing to go to the bathroom, and high frequency of going to the bathroom  Recurrent probem    Pain  Current pain ratin          Objective     Palpation   Left   Hypertonic in the erector spinae and lumbar paraspinals  Tenderness of the erector spinae and lumbar paraspinals  Right   Hypertonic in the erector spinae and lumbar paraspinals  Tenderness of the erector spinae and lumbar paraspinals       Neurological Testing     Reflexes   Left   Patellar (L4): absent (0)  Achilles (S1): absent (0)    Right   Patellar (L4): absent (0)  Achilles (S1): absent (0)    Active Range of Motion   Cervical/Thoracic Spine       Thoracic    Flexion:  Restriction level: moderate  Extension:  Restriction level: moderate  Left lateral flexion:  Restriction level: moderate  Right lateral flexion:  Restriction level: moderate  Left rotation:  Restriction level: maximal  Right rotation:  Restriction level: maximal    Lumbar   Flexion:  Restriction level: maximal  Extension:  Restriction level: moderate  Left lateral flexion:  Restriction level: minimal  Right lateral flexion:  Restriction level: moderate  Left rotation:  Restriction level: moderate  Right rotation:  Restriction level: moderate    Joint Play     Hypomobile: T1, T2, T3, T4, T5, T6, T7, T8, T9, T10, T11, T12, L1, L2, L3, L4, L5 and S1     Pain: T3, T4, T5, T6, T7, T8, T9, T10, T11, T12, L1, L2, L3, L4, L5 and S1     Strength/Myotome Testing     Lumbar   Left   Heel walk: normal  Toe walk: abnormal    Right   Heel walk: normal  Toe walk: normal    Left Hip   Planes of Motion   Flexion: 3+  Abduction: 3+    Right Hip   Planes of Motion   Flexion: 3+  Abduction: 3+    Left Knee   Flexion: 4-  Extension: 4-    Right Knee   Flexion: 4  Extension: 4    Muscle Activation   Patient able to activate left transverse abdominals and right transverse abdominals       Tests     Lumbar     Left   Positive passive SLR and slump test      Right   Negative passive SLR and slump test               Precautions: LBP w L sciatica and overactive bladder    Date 8/12            Visit # 1            FOTO 19/41             Re-eval IE              Manuals 8/12            Lumbar Gapping             Paraspinals STM             MFD (cupping)                          Neuro Re-Ed 8/12            TA Bracing 5x10"            Supine Marches 10x            Kegels 5x10"            Bridges 10x            Clams             Sciatic Nerve Glides             Pt Edu Holding bladder for urinary incontinence            Ther Ex             Bike             Leg Press             UTR             Child's Pose             Rhomboid str             Piriformis str             SLR                          Ther Activity                                       Gait Training                                       Modalities

## 2022-08-14 RX ORDER — DULOXETIN HYDROCHLORIDE 30 MG/1
30 CAPSULE, DELAYED RELEASE ORAL 2 TIMES DAILY
COMMUNITY
Start: 2022-08-10 | End: 2022-10-24

## 2022-08-15 ENCOUNTER — OFFICE VISIT (OUTPATIENT)
Dept: PHYSICAL THERAPY | Facility: CLINIC | Age: 62
End: 2022-08-15
Payer: MEDICARE

## 2022-08-15 DIAGNOSIS — M54.42 ACUTE LEFT-SIDED LOW BACK PAIN WITH LEFT-SIDED SCIATICA: Primary | ICD-10-CM

## 2022-08-15 PROCEDURE — 97140 MANUAL THERAPY 1/> REGIONS: CPT

## 2022-08-15 PROCEDURE — 97112 NEUROMUSCULAR REEDUCATION: CPT

## 2022-08-15 PROCEDURE — 97110 THERAPEUTIC EXERCISES: CPT

## 2022-08-15 NOTE — PROGRESS NOTES
Daily Note     Today's date: 8/15/2022  Patient name: Lanie Estrada  : 1960  MRN: [id number]  Referring provider: RAMO Rodriguez  Dx:   Encounter Diagnosis     ICD-10-CM    1  Acute left-sided low back pain with left-sided sciatica  M54 42        Start Time: 910  Stop Time: 1005  Total time in clinic (min): 55 minutes    Subjective: Pt reports he has a lot of pain in the LLE, states he has been getting a lot of "burning" pain in the LE  Objective: See treatment diary below      Assessment: Tolerated treatment well  Patient demonstrated fatigue post treatment and would benefit from continued PT  Pt shows moderate soft tissue restrictions in B paraspinals with manual therapy  Pt shows gross tenderness through the thoracic spine with palpation  Pt shows reduction of symptoms at end of manual therapy  Pt will benefit from further skilled PT to increase strength, flexibility and function  Continue to progress as able  Plan: Continue per plan of care        Precautions: LBP w L sciatica and overactive bladder    Date 8/12 8/15           Visit # 1 2           FOTO              Re-eval IE              Manuals 8/12 8/15           Lumbar Gapping             Paraspinals STM  HY           MFD (cupping)                          Neuro Re-Ed 8/12 8/15           TA Bracing 5x10" 10x10"           Supine Marches 10x 10x           Kegels 5x10" 10x10"           Bridges 10x 10x           Clams             Sciatic Nerve Glides  Supine 20x           Pt Edu Holding bladder for urinary incontinence            Ther Ex  8/15           Bike  6'           Leg Press  75# 2x10           UTR  nv           Child's Pose  nv           Rhomboid str  nv           Piriformis str  3x20"           SLR  15x ea                        Ther Activity  8/15                                     Gait Training  8/15                                     Modalities  8/15

## 2022-08-16 ENCOUNTER — OFFICE VISIT (OUTPATIENT)
Dept: DENTISTRY | Facility: CLINIC | Age: 62
End: 2022-08-16

## 2022-08-16 VITALS — HEART RATE: 76 BPM | DIASTOLIC BLOOD PRESSURE: 74 MMHG | SYSTOLIC BLOOD PRESSURE: 112 MMHG | TEMPERATURE: 98.4 F

## 2022-08-16 DIAGNOSIS — Z01.20 ENCOUNTER FOR DENTAL EXAMINATION: Primary | ICD-10-CM

## 2022-08-16 DIAGNOSIS — K03.6 ACCRETIONS ON TEETH: ICD-10-CM

## 2022-08-16 PROCEDURE — D1110 PROPHYLAXIS - ADULT: HCPCS

## 2022-08-16 PROCEDURE — D0120 PERIODIC ORAL EVALUATION - ESTABLISHED PATIENT: HCPCS

## 2022-08-16 NOTE — PROGRESS NOTES
PERIODIC EXAM, ADULT PROPHY,     REVIEWED MED HX: meds, allergies, health changes reviewed in EPIC  CHIEF CONCERN:  none   PAIN SCALE:  0  ASA CLASS:  2  PLAQUE: moderate  CALCULUS: light -/mod calculus  BLEEDING:    light   STAIN :   none   ORAL HYGIENE:  Good- fair,  PERIO: reviewed probe  Stage 2 grade A/B    Hand scaled, polished and flossed  Oral Hygiene Instruction:  recommended brushing 2 x daily for 2 minutes MIN, recommended flossing daily, reviewed dietary precautions  Visual and Tactile Intraoral/ Extraoral evaluation: Oral and Oropharyngeal cancer evaluation  No findings     Dr Vazquez exam=   Reviewed with patient clinical and radiographic findings and patient verbalized understanding  All questions and concerns addressed       REFERRALS: no referrals needed   CARIES FINDINGS: caries noted # 15     Interested in replacing missing teeth  Will need Treatment Plan appt with study models to go over options for RPDs    Next Visit: shakira # 15  Will likely be a build up for a crown   NV 2 TX plan study models for RPDs  eval prognosis and therapy for # 17 and # 18    Next Recall: 6 month recall with BWX    Last BWX:   Last Panorex/ FMX :  2/2022

## 2022-08-17 ENCOUNTER — OFFICE VISIT (OUTPATIENT)
Dept: DENTISTRY | Facility: CLINIC | Age: 62
End: 2022-08-17

## 2022-08-17 ENCOUNTER — OFFICE VISIT (OUTPATIENT)
Dept: PHYSICAL THERAPY | Facility: CLINIC | Age: 62
End: 2022-08-17
Payer: MEDICARE

## 2022-08-17 VITALS — HEART RATE: 72 BPM | DIASTOLIC BLOOD PRESSURE: 83 MMHG | TEMPERATURE: 98 F | SYSTOLIC BLOOD PRESSURE: 130 MMHG

## 2022-08-17 DIAGNOSIS — M54.42 ACUTE LEFT-SIDED LOW BACK PAIN WITH LEFT-SIDED SCIATICA: Primary | ICD-10-CM

## 2022-08-17 DIAGNOSIS — Z01.21 ENCOUNTER FOR DENTAL EXAMINATION AND CLEANING WITH ABNORMAL FINDINGS: Primary | ICD-10-CM

## 2022-08-17 PROCEDURE — D2393 RESIN-BASED COMPOSITE - 3 SURFACES, POSTERIOR: HCPCS | Performed by: DENTIST

## 2022-08-17 PROCEDURE — 97110 THERAPEUTIC EXERCISES: CPT

## 2022-08-17 PROCEDURE — 97112 NEUROMUSCULAR REEDUCATION: CPT

## 2022-08-17 PROCEDURE — 97140 MANUAL THERAPY 1/> REGIONS: CPT

## 2022-08-17 NOTE — PROGRESS NOTES
Composite Filling    Vilma Dimas presents for composite filling  PMDH , Existing X-Rays reviewed, no changes  Discussed with patient need for RCT if pulp exposure occurs or in future if pulp is inflamed  Pt understands and consents  Applied topical benzocaine, administered one carp 2% lido 1:100k by local infiltration  Prepped tooth # 15 ( Removing old broken large amalgam filling )  with 245 carbide on high speed,managing the gingival overgrown invasion to the palato-distal crater,  Then caries removed with round carbide on slow speed  Placed tofflemire matrix  Isolation with cotton rolls and dri-angles    Etch with 37% H2PO4, rinse, dry  Applied Adhese with 20 second scrub once, gentle air dry and light cured for 10s  Restored with Tetric bulk michael shade A2 and light cured  Refined with finishing burs, polished with enhance point  Verified occlusion and contacts  Pt  Informed about the possibility of needing  a RCT in the future to this tooth    And the need for crown later to prevent possible breakage of th DP cusp   Pt  Understood and agrees, Post op instructions given  NV : Continue with his existed Tx  Plan

## 2022-08-17 NOTE — PROGRESS NOTES
Daily Note     Today's date: 2022  Patient name: Vilma Dimas  : 1960  MRN: [id number]  Referring provider: RAMO Bailey  Dx:   Encounter Diagnosis     ICD-10-CM    1  Acute left-sided low back pain with left-sided sciatica  M54 42        Start Time: 0900  Stop Time: 0930  Total time in clinic (min): 30 minutes    Subjective: Pt reports he has a lot of pain in the LLE  Pt states he has a dentist appointment today at 8, states it was the only appointment they had for the next 2 months, and needed a procedure  Pt requested to leave by 930  Objective: See treatment diary below      Assessment: Tolerated treatment well  Patient demonstrated fatigue post treatment and would benefit from continued PT  Pt continues to show moderate soft tissue restrictions in B paraspinals with manual therapy  Performed activity as able within time constraints, reissue exercises held at next session  Pt will benefit from further skilled PT to increase strength, flexibility and function  Continue to progress as able  Plan: Continue per plan of care        Precautions: LBP w L sciatica and overactive bladder    Date 8/12 8/15 8/17          Visit # 1 2 3          FOTO              Re-eval IE              Manuals 8/12 8/15 8/17          Lumbar Gapping             Paraspinals STM  HY HY          MFD (cupping)                          Neuro Re-Ed 8/12 8/15 8/17          TA Bracing 5x10" 10x10" 10x10"          Supine Marches 10x 10x 10x10"          Kegels 5x10" 10x10"           Bridges 10x 10x           Clams             Sciatic Nerve Glides  Supine 20x           Pt Edu Holding bladder for urinary incontinence            Ther Ex  8/15 8/17          Bike  6' 8'          Leg Press  75# 2x10 75# 2x15          UTR  nv           Child's Pose  nv           Rhomboid str  nv           Piriformis str  3x20" 3x30"          SLR  15x ea                        Ther Activity  8/15 8/17 Gait Training  8/15 8/17                                    Modalities  8/15 8/17

## 2022-08-22 ENCOUNTER — OFFICE VISIT (OUTPATIENT)
Dept: PHYSICAL THERAPY | Facility: CLINIC | Age: 62
End: 2022-08-22
Payer: MEDICARE

## 2022-08-22 DIAGNOSIS — M54.42 ACUTE LEFT-SIDED LOW BACK PAIN WITH LEFT-SIDED SCIATICA: Primary | ICD-10-CM

## 2022-08-22 PROCEDURE — 97140 MANUAL THERAPY 1/> REGIONS: CPT

## 2022-08-22 PROCEDURE — 97112 NEUROMUSCULAR REEDUCATION: CPT

## 2022-08-22 PROCEDURE — 97110 THERAPEUTIC EXERCISES: CPT

## 2022-08-22 NOTE — PROGRESS NOTES
Daily Note     Today's date: 2022  Patient name: Gabriel Simpson  : 1960  MRN: [id number]  Referring provider: RAMO Lynne  Dx:   Encounter Diagnosis     ICD-10-CM    1  Acute left-sided low back pain with left-sided sciatica  M54 42        Start Time: 0900  Stop Time: 0945  Total time in clinic (min): 45 minutes    Subjective: Pt reports he is feeling ok today, no new c/o pain or symptoms  Objective: See treatment diary below      Assessment: Tolerated treatment well  Patient demonstrated fatigue post treatment and would benefit from continued PT  Pt demonstrates good form and technique with current exercise program  Pt needed minimal rest breaks throughout today's session  Pt will benefit from further skilled PT to increase strength, flexibility and function  Continue to progress as able  Plan: Continue per plan of care        Precautions: LBP w L sciatica and overactive bladder    Date 8/12 8/15 8/17 8/22         Visit # 1 2 3 4         FOTO              Re-eval IE              Manuals 8/12 8/15 8/17 8/22         Lumbar Gapping             Paraspinals STM  HY HY HY         MFD (cupping)                          Neuro Re-Ed 8/12 8/15 8/17 8/22         TA Bracing 5x10" 10x10" 10x10" 10x10"         Supine Marches 10x 10x 10x10" 15x         Kegels 5x10" 10x10"  10x10"         Bridges 10x 10x  10x         Clams    2x10         Sciatic Nerve Glides  Supine 20x  Supine 20x         Pt Edu Holding bladder for urinary incontinence            Ther Ex  8/15 8/17 8/22         Bike  6' 8' 8'         Leg Press  75# 2x10 75# 2x15 75# 2x15         UTR  nv           Child's Pose  nv           Rhomboid str  nv           Piriformis str  3x20" 3x30" 3x30"         SLR  15x ea  15x                      Ther Activity  8/15 8/17 8/22                                   Gait Training  8/15 8/17 8/22                                   Modalities  8/15 8/17 8/22

## 2022-08-24 ENCOUNTER — OFFICE VISIT (OUTPATIENT)
Dept: PHYSICAL THERAPY | Facility: CLINIC | Age: 62
End: 2022-08-24
Payer: MEDICARE

## 2022-08-24 DIAGNOSIS — M54.42 ACUTE LEFT-SIDED LOW BACK PAIN WITH LEFT-SIDED SCIATICA: Primary | ICD-10-CM

## 2022-08-24 PROCEDURE — 97112 NEUROMUSCULAR REEDUCATION: CPT | Performed by: PHYSICAL THERAPIST

## 2022-08-24 PROCEDURE — 97110 THERAPEUTIC EXERCISES: CPT | Performed by: PHYSICAL THERAPIST

## 2022-08-24 NOTE — PROGRESS NOTES
Daily Note     Today's date: 2022  Patient name: Lolita De Jesus  : 1960  MRN: [id number]  Referring provider: RAMO Dubose  Dx:   Encounter Diagnosis     ICD-10-CM    1  Acute left-sided low back pain with left-sided sciatica  M54 42                   Subjective: Pt reports his back is feeling a little better today  Objective: See treatment diary below      Assessment: Pt does well w progression of exercises during today's session and is challenged appropriately  He has some piriformis soreness after stretching  He has some back soreness and pain w bridges and SLR  Patient demonstrated fatigue post treatment and would benefit from continued PT  Plan: Continue per plan of care  Progress treatment as tolerated         Precautions: LBP w L sciatica and overactive bladder    Date 8/12 8/15 8/17 8/22 8/24        Visit # 1 2 3 4 5        FOTO              Re-eval IE              Manuals 8/12 8/15 8/17 8/22 8/24        Lumbar Gapping             Paraspinals STM  HY HY HY         MFD (cupping)                          Neuro Re-Ed 8/12 8/15 8/17 8/22 8/24        TA Bracing 5x10" 10x10" 10x10" 10x10" 10x10" w ADD sq        Supine Marches 10x 10x 10x10" 15x 20x ea        Kegels 5x10" 10x10"  10x10" 10x10"        Bridges 10x 10x  10x 10x        Clams    2x10 2x15 BTB HL        Sciatic Nerve Glides  Supine 20x  Supine 20x         Pt Edu Holding bladder for urinary incontinence            Ther Ex  8/15 8/17 8/22 8/24        Bike  6' 8' 8' 8'        Leg Press  75# 2x10 75# 2x15 75# 2x15 85# 3x10        UTR  nv           Child's Pose  nv   5x10" ea        Rhomboid str  nv           Piriformis str  3x20" 3x30" 3x30" 3x20"        SLR  15x ea  15x 2x10                     Ther Activity  8/15 8/17 8/22                                   Gait Training  8/15 8/17 8/22                                   Modalities  8/15 8/17 8/22

## 2022-08-29 ENCOUNTER — APPOINTMENT (OUTPATIENT)
Dept: PHYSICAL THERAPY | Facility: CLINIC | Age: 62
End: 2022-08-29
Payer: MEDICARE

## 2022-08-29 DIAGNOSIS — I10 BENIGN ESSENTIAL HTN: ICD-10-CM

## 2022-08-29 RX ORDER — AMLODIPINE BESYLATE 10 MG/1
TABLET ORAL
Qty: 90 TABLET | Refills: 1 | Status: SHIPPED | OUTPATIENT
Start: 2022-08-29

## 2022-08-31 ENCOUNTER — OFFICE VISIT (OUTPATIENT)
Dept: PHYSICAL THERAPY | Facility: CLINIC | Age: 62
End: 2022-08-31
Payer: MEDICARE

## 2022-08-31 DIAGNOSIS — M54.42 ACUTE LEFT-SIDED LOW BACK PAIN WITH LEFT-SIDED SCIATICA: Primary | ICD-10-CM

## 2022-08-31 PROCEDURE — 97110 THERAPEUTIC EXERCISES: CPT | Performed by: PHYSICAL THERAPIST

## 2022-08-31 PROCEDURE — 97112 NEUROMUSCULAR REEDUCATION: CPT | Performed by: PHYSICAL THERAPIST

## 2022-08-31 NOTE — PROGRESS NOTES
Daily Note     Today's date: 2022  Patient name: Milas Essex  : 1960  MRN: [id number]  Referring provider: RAMO Carbajal  Dx:   Encounter Diagnosis     ICD-10-CM    1  Acute left-sided low back pain with left-sided sciatica  M54 42                   Subjective: patient reports no issues after lv      Objective: See treatment diary below      Assessment: Patient tolerated treatment well  He is challenged with current program  He requires some cuing throughout session but able to maintain corrections after cuing  He would benefit from continued PT      Plan: Continue per plan of care        Precautions: LBP w L sciatica and overactive bladder    Date 8/12 8/15 8/17 8/22 8/24 8/31       Visit # 1 2 3 4 5 6       FOTO              Re-eval IE              Manuals 8/12 8/15 8/17 8/22 8/24 8/31       Lumbar Gapping             Paraspinals STM  HY HY HY         MFD (cupping)                          Neuro Re-Ed 8/12 8/15 8/17 8/22 8/24 8/31       TA Bracing 5x10" 10x10" 10x10" 10x10" 10x10" w ADD sq 10x10" w ADD sq       Supine Marches 10x 10x 10x10" 15x 20x ea 20x ea       Kegels 5x10" 10x10"  10x10" 10x10" 10x10"        Bridges 10x 10x  10x 10x 10x       Clams    2x10 2x15 BTB HL 2x15 BTB HL        Sciatic Nerve Glides  Supine 20x  Supine 20x         Pt Edu Holding bladder for urinary incontinence            Ther Ex  8/15 8/17 8/22 8/24 8/31       Bike  6' 8' 8' 8' 8'       Leg Press  75# 2x10 75# 2x15 75# 2x15 85# 3x10 nv       UTR  nv           Child's Pose  nv   5x10" ea 5x10" ea       Rhomboid str  nv           Piriformis str  3x20" 3x30" 3x30" 3x20" 3x20"        SLR  15x ea  15x 2x10 2x10                    Ther Activity  8/15 8/17 8/22                                   Gait Training  8/15 8/17 8/22                                   Modalities  8/15 8/17 8/22

## 2022-09-01 ENCOUNTER — TELEPHONE (OUTPATIENT)
Dept: FAMILY MEDICINE CLINIC | Facility: CLINIC | Age: 62
End: 2022-09-01

## 2022-09-01 NOTE — TELEPHONE ENCOUNTER
Patient is asking for refills for his HYDROcodone-acetaminophen ([facility]) 7 5-325 mg per tablet    Please advise

## 2022-09-05 ENCOUNTER — TELEPHONE (OUTPATIENT)
Dept: OTHER | Facility: OTHER | Age: 62
End: 2022-09-05

## 2022-09-05 NOTE — TELEPHONE ENCOUNTER
Patient called for the status of his medication refill of pain med  I advise it has not been called in offered triage A/S he stated no to leave a message to follow up on the request for refill

## 2022-09-06 ENCOUNTER — OFFICE VISIT (OUTPATIENT)
Dept: FAMILY MEDICINE CLINIC | Facility: CLINIC | Age: 62
End: 2022-09-06

## 2022-09-06 VITALS
DIASTOLIC BLOOD PRESSURE: 84 MMHG | SYSTOLIC BLOOD PRESSURE: 136 MMHG | HEIGHT: 67 IN | BODY MASS INDEX: 31.08 KG/M2 | HEART RATE: 86 BPM | RESPIRATION RATE: 18 BRPM | TEMPERATURE: 97.8 F | OXYGEN SATURATION: 97 % | WEIGHT: 198 LBS

## 2022-09-06 DIAGNOSIS — R05.1 ACUTE COUGH: ICD-10-CM

## 2022-09-06 DIAGNOSIS — J45.41 MODERATE PERSISTENT REACTIVE AIRWAY DISEASE WITH ACUTE EXACERBATION: ICD-10-CM

## 2022-09-06 DIAGNOSIS — J45.40 MODERATE PERSISTENT REACTIVE AIRWAY DISEASE WITHOUT COMPLICATION: ICD-10-CM

## 2022-09-06 PROCEDURE — 99213 OFFICE O/P EST LOW 20 MIN: CPT | Performed by: PHYSICIAN ASSISTANT

## 2022-09-06 PROCEDURE — 3075F SYST BP GE 130 - 139MM HG: CPT | Performed by: PHYSICIAN ASSISTANT

## 2022-09-06 PROCEDURE — 3079F DIAST BP 80-89 MM HG: CPT | Performed by: PHYSICIAN ASSISTANT

## 2022-09-06 RX ORDER — ALBUTEROL SULFATE 90 UG/1
2 AEROSOL, METERED RESPIRATORY (INHALATION) EVERY 4 HOURS PRN
Qty: 6.7 G | Refills: 0 | Status: SHIPPED | OUTPATIENT
Start: 2022-09-06 | End: 2022-10-14

## 2022-09-06 RX ORDER — BENZONATATE 100 MG/1
100 CAPSULE ORAL 3 TIMES DAILY PRN
Qty: 30 CAPSULE | Refills: 0 | Status: SHIPPED | OUTPATIENT
Start: 2022-09-06

## 2022-09-06 RX ORDER — BUDESONIDE AND FORMOTEROL FUMARATE DIHYDRATE 160; 4.5 UG/1; UG/1
2 AEROSOL RESPIRATORY (INHALATION) 2 TIMES DAILY
Qty: 10.2 G | Refills: 3 | Status: SHIPPED | OUTPATIENT
Start: 2022-09-06 | End: 2022-10-24 | Stop reason: SDUPTHER

## 2022-09-06 RX ORDER — ALBUTEROL SULFATE 2.5 MG/3ML
2.5 SOLUTION RESPIRATORY (INHALATION) EVERY 6 HOURS PRN
Qty: 180 ML | Refills: 5 | Status: SHIPPED | OUTPATIENT
Start: 2022-09-06

## 2022-09-06 NOTE — TELEPHONE ENCOUNTER
Pt came in requesting refill again for the below    HYDROcodone-acetaminophen (NORCO) 7 5-325 mg per tablet

## 2022-09-06 NOTE — PROGRESS NOTES
Assessment/Plan:    Asthma/acute cough  - Continue Symbicort twice daily  - Continue albuterol inhaler as needed and nebulizer as needed  - Will prescribe Tessalon Perles 100 mg, to be taken 3 times daily as needed for cough  - Advised to contact the office or report to ED if symptoms persist, worsen, or new symptoms arise  Diagnoses and all orders for this visit:    Acute cough  -     albuterol (PROVENTIL HFA,VENTOLIN HFA) 90 mcg/act inhaler; Inhale 2 puffs every 4 (four) hours as needed (coughing)  -     benzonatate (TESSALON PERLES) 100 mg capsule; Take 1 capsule (100 mg total) by mouth 3 (three) times a day as needed for cough    Moderate persistent reactive airway disease without complication  -     albuterol (2 5 mg/3 mL) 0 083 % nebulizer solution; Take 3 mL (2 5 mg total) by nebulization every 6 (six) hours as needed for wheezing or shortness of breath    Moderate persistent reactive airway disease with acute exacerbation  -     budesonide-formoterol (Symbicort) 160-4 5 mcg/act inhaler; Inhale 2 puffs 2 (two) times a day Rinse mouth after use  All of patients questions were answered  Patient understands and agrees with the above plan  Return if symptoms worsen or fail to improve  Jennifer Barkley PA-C  09/06/22  [facility] RADHA Muniz          Subjective:     Patient ID: Miroslava Davis  is a 62 y o  male with known PHM of hypertension, hyperlipidemia, type 2 diabetes, BPH, chronic pain syndrome, lumbar radiculopathy, depression, asthma who presents today in office for same-day visit for chest tightness      - Patient is a 62 y o  male who presents today for same-day visit for chest tightness  Patient notes for the past 2 days he has been experiencing some chest tightness with associated epigastric pain  Patient admits to associated cough  Patient denies any associated fevers, chills, headaches, dizziness, sore throat, nasal congestion, ear pain, shortness of breath, nausea, vomiting  Patient notes he has been using his albuterol inhaler and nebulizer which have been helping to open up the chest tightness  The following portions of the patient's history were reviewed and updated as appropriate: allergies, current medications, past family history, past medical history, past social history, past surgical history and problem list         Review of Systems   Constitutional: Negative for chills and fever  HENT: Negative for congestion, ear pain, rhinorrhea and sore throat  Eyes: Negative for pain and visual disturbance  Respiratory: Positive for cough and chest tightness  Negative for shortness of breath and wheezing  Cardiovascular: Negative for chest pain and palpitations  Gastrointestinal: Negative for constipation, diarrhea, nausea and vomiting  Skin: Negative for rash  Neurological: Negative for dizziness and headaches  Objective:   Vitals:    09/06/22 1103   BP: 136/84   BP Location: Left arm   Patient Position: Sitting   Cuff Size: Standard   Pulse: 86   Resp: 18   Temp: 97 8 °F (36 6 °C)   TempSrc: Temporal   SpO2: 97%   Weight: 89 8 kg (198 lb)   Height: 5' 7" (1 702 m)         Physical Exam  Vitals and nursing note reviewed  Constitutional:       General: He is not in acute distress  Appearance: He is well-developed  HENT:      Head: Normocephalic and atraumatic  Right Ear: External ear normal       Left Ear: External ear normal       Nose: Nose normal    Eyes:      Conjunctiva/sclera: Conjunctivae normal    Cardiovascular:      Rate and Rhythm: Normal rate and regular rhythm  Pulses: Normal pulses  Heart sounds: Normal heart sounds  Pulmonary:      Effort: Pulmonary effort is normal  No respiratory distress  Breath sounds: Normal breath sounds  No wheezing  Abdominal:      General: Bowel sounds are normal       Palpations: Abdomen is soft  Tenderness: There is no abdominal tenderness     Musculoskeletal:      Cervical back: Normal range of motion and neck supple  Skin:     General: Skin is warm and dry  Neurological:      Mental Status: He is alert and oriented to person, place, and time     Psychiatric:         Behavior: Behavior normal

## 2022-09-07 DIAGNOSIS — M54.50 CHRONIC LOW BACK PAIN WITHOUT SCIATICA, UNSPECIFIED BACK PAIN LATERALITY: ICD-10-CM

## 2022-09-07 DIAGNOSIS — M54.16 LUMBAR RADICULOPATHY: ICD-10-CM

## 2022-09-07 DIAGNOSIS — G89.29 CHRONIC LOW BACK PAIN WITHOUT SCIATICA, UNSPECIFIED BACK PAIN LATERALITY: ICD-10-CM

## 2022-09-07 RX ORDER — HYDROCODONE BITARTRATE AND ACETAMINOPHEN 7.5; 325 MG/1; MG/1
1 TABLET ORAL EVERY 8 HOURS PRN
Qty: 90 TABLET | Refills: 0 | Status: SHIPPED | OUTPATIENT
Start: 2022-09-07 | End: 2022-10-04 | Stop reason: SDUPTHER

## 2022-09-07 NOTE — TELEPHONE ENCOUNTER
Patient came into the office asking about the status on HYDROcodone-acetaminophen (NORCO) 7 5-325 mg per tablet  Messages were sent on 09/01 and 09/06

## 2022-09-09 ENCOUNTER — TELEPHONE (OUTPATIENT)
Dept: FAMILY MEDICINE CLINIC | Facility: CLINIC | Age: 62
End: 2022-09-09

## 2022-09-09 NOTE — TELEPHONE ENCOUNTER
09/09/22    PCP SIGNATURE NEEDED FOR [facility] / Medicare Part B Detailed Written Order FORM RECEIVED VIA FAX AND PLACED IN PCP FOLDER TO BE DELIVERED AT ASSIGNED TIMES

## 2022-09-14 ENCOUNTER — OFFICE VISIT (OUTPATIENT)
Dept: DENTISTRY | Facility: CLINIC | Age: 62
End: 2022-09-14

## 2022-09-14 VITALS — DIASTOLIC BLOOD PRESSURE: 73 MMHG | HEART RATE: 81 BPM | TEMPERATURE: 99.1 F | SYSTOLIC BLOOD PRESSURE: 117 MMHG

## 2022-09-14 DIAGNOSIS — K02.9 TEETH DECAYED: Primary | ICD-10-CM

## 2022-09-14 PROCEDURE — D2392 RESIN-BASED COMPOSITE - 2 SURFACES, POSTERIOR: HCPCS | Performed by: DENTIST

## 2022-09-14 NOTE — PROGRESS NOTES
Composite Filling    Garcia Mireles presents for composite filling  PMH reviewed,: ASA II, no changes  Discussed with patient need for RCT if pulp exposure occurs or in future if pulp is inflamed  Pt understands and consents  Applied topical benzocaine, administered 1 carps 4% articaine 1:100k epi via buccal infiltrn in reln to ur side  Pt inf #3(MO) decay visible clin and on pa, #4 (MOD), comp fill Fractured (D), redo fill    Prepped tooth # 3(MO), 4(DO) with 245 carbide on high speed  Placed tofflemire/ matrix  Isolation with cotton rolls and dri-angles  Placed limelite #3, 4 (mod  Deep), cured   Etch with 37% H2PO4, rinse, dry  Applied Adhese with 20 second scrub once, gentle air dry and light cured for 10s  Restored with Tetric bulk michael shade A2 and light cured  Refined with finishing burs, polished with enhance point  Verified occlusion and contacts  POI givem   Pt inf: #1(OB) decay, #17, mesilly tilted, (M) decay, 19(DO) deep subging decay, furca inv  Poor prognosis , restorability is questionable, adv Extrn,    OS ref given for future extrn #1,17,18, decayed  Pt understood, and was comf   With tr  Plan   pt left office ambulatory  Nv: fill #21

## 2022-09-22 ENCOUNTER — TELEPHONE (OUTPATIENT)
Dept: NEUROLOGY | Facility: CLINIC | Age: 62
End: 2022-09-22

## 2022-09-23 ENCOUNTER — OFFICE VISIT (OUTPATIENT)
Dept: DENTISTRY | Facility: CLINIC | Age: 62
End: 2022-09-23

## 2022-09-23 VITALS — TEMPERATURE: 97.7 F

## 2022-09-23 DIAGNOSIS — K02.9 CARIES: Primary | ICD-10-CM

## 2022-09-23 PROCEDURE — D0470 DIAGNOSTIC CASTS: HCPCS | Performed by: DENTIST

## 2022-09-23 NOTE — PROGRESS NOTES
63 yo patient presents for Diagnostic casts and to discuss RPD plan  ASA Type 2 significant for diabetes controlled with medications  Pain level: none  Evaluated #1,17 and 18  #1 and #17 should still be extracted due to difficulty for patient to clean  #1 has caries on the distal and has no opposing tooth  #17 mesially tilted and has no opposing tooth  #18 has grade 1 furcation involvement but is not mobile and patient does a good job keeping it clean  Patient would like to save tooth  Explained to patient that there is decay on the distal of #18 but we are unable to clinically see how deep it goes it due to the position of #17  New OS referral written for #1 and #17 EXT  Patient is aware that #18 can need extraction in the future  We can make a better decision after #17 EXT  Discussed replacing mandibular missing teeth with Cast-metal framework RPD  Patient is aware metal will show and is ok with it  Discussed options for replacing maxillary edentulous space 13-14 but recommended no treatment due to unilateral space  Patient is ok with leaving maxillary edentulous space  Took maxillary and mandibular alginate impressions for diagnostic cast     Pt  Left satisfied an ambulatory  N v: #21 MO composite  Nnv: Evaluate #18 and fill #18  Begin fabricating mandibular RPD  (send to lab to have custom trays made)

## 2022-09-27 NOTE — TELEPHONE ENCOUNTER
FAXED ON 09/27/22 TO [facility] / Medicare Part B Detailed Written Order at [phone]  FAX CONFIRMATION RECEIVED        Form scanned into pt chart

## 2022-09-28 ENCOUNTER — OFFICE VISIT (OUTPATIENT)
Dept: DENTISTRY | Facility: CLINIC | Age: 62
End: 2022-09-28

## 2022-09-28 VITALS — DIASTOLIC BLOOD PRESSURE: 88 MMHG | SYSTOLIC BLOOD PRESSURE: 143 MMHG | TEMPERATURE: 98 F | HEART RATE: 83 BPM

## 2022-09-28 DIAGNOSIS — K02.9 CARIES: Primary | ICD-10-CM

## 2022-09-28 PROCEDURE — D2392 RESIN-BASED COMPOSITE - 2 SURFACES, POSTERIOR: HCPCS | Performed by: DENTIST

## 2022-09-28 NOTE — PROGRESS NOTES
Composite Filling # 21MO  ASA II  Pain scale 0    Anola Said presents for composite filling  PMH reviewed, no changes  CC- none    Discussed with patient need for RCT if pulp exposure occurs or in future if pulp is inflamed  Pt understands and consents  Applied topical benzocaine, administered 1x carps 4% articaine 1:100k epi via buccal infiltration LLQ    Prepped tooth #21-MO with 245 carbide on high speed  Caries removed with round carbide on slow speed  Placed tofflemire/palodent matrix  Isolation with cotton rolls and dri-angles  Used caries detector and spoon excavator as well for caries debridement  Etch with 37% H2PO4, rinse, dry  Applied Adhese with 20 second scrub once, gentle air dry and light cured for 10s  Restored with Tetric bulk michael shade A2 and light cured  Refined with finishing burs, polished with enhance point  Verified occlusion and contacts  Pt left satisfied    NV- Pt state he has appt for Ext #18

## 2022-09-29 ENCOUNTER — CONSULT (OUTPATIENT)
Dept: NEUROLOGY | Facility: CLINIC | Age: 62
End: 2022-09-29
Payer: MEDICARE

## 2022-09-29 VITALS
BODY MASS INDEX: 31.55 KG/M2 | SYSTOLIC BLOOD PRESSURE: 132 MMHG | HEIGHT: 67 IN | DIASTOLIC BLOOD PRESSURE: 81 MMHG | TEMPERATURE: 97 F | HEART RATE: 82 BPM | WEIGHT: 201 LBS

## 2022-09-29 DIAGNOSIS — M54.42 CHRONIC BILATERAL LOW BACK PAIN WITH LEFT-SIDED SCIATICA: ICD-10-CM

## 2022-09-29 DIAGNOSIS — G89.29 CHRONIC BILATERAL LOW BACK PAIN WITH LEFT-SIDED SCIATICA: ICD-10-CM

## 2022-09-29 DIAGNOSIS — M62.838 NECK MUSCLE SPASM: ICD-10-CM

## 2022-09-29 PROCEDURE — 99203 OFFICE O/P NEW LOW 30 MIN: CPT | Performed by: STUDENT IN AN ORGANIZED HEALTH CARE EDUCATION/TRAINING PROGRAM

## 2022-09-29 NOTE — PATIENT INSTRUCTIONS
You were seen today for ongoing low back pain and spasms  You have tried several medication and non-medication options, but will try 2 things you have not tired  The first is a topical ointment that can be rubbed into the skin that is a topical antiinflammatory medication  The other is trigger point injections which is a set of injexctions into the tight muscles in the low back to try and get them to relax which can provide usually longer relief and the goal is 3 months of relief  Continue with your home stretching and strengthening program and avoid prolonged positions especially sitting      We will call you tomorrow to checkt o see if available for October 6th for the injection

## 2022-09-29 NOTE — PROGRESS NOTES
Physical Medicine & Rehabilitation New patient Evaluation  New Patient Evaluation  Benton Venetta Moritz 62 y o  male      HPI/SUBJECTIVE: Teresita Sarabia 62 y o  male right handed, with  has a past medical history of Asthma, Back pain, Back pain, Diabetes mellitus ([address] ), Hyperlipidemia, and Hypertension  Old records were reviewed personally  He presents with symptoms likel;y related to lumbar spine pathology, MRI demonstrates degeneration specifically at L3-4 and L4-5 with development of bilateral foraminal stenosis    As far as symptoms, he endorses low back pain that is burning on the left low back with radiation of pain down the lateral right leg to the foot with numbness in the foot  It keeps him up at night, sometimes helps sleeping on floor  Was previously going to physical therapy- doing the therapy stretching at home  Has been using epson salt baths  Worse during cold weather  Feels like he has a knot in the low back  Pain anywhere from a 5-10/10  He did see neurosurgery back in March 2022 who opted to try and maximize non-surgical care and treatment prior to consideration for surgical intervention  Cymbalta, Gabapentin, Norco, lidocaine cream, flexeril but has not helped significantly  Follows with pain management and has explored interventional treatments as well  Tried swimming, PT, Chiropractor,     It has been since 2019  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 on disability for this  Lumbar MRI in 2/8/22:    L1-L2:  No disc bulge  Mild facet arthropathy  No canal or foraminal stenosis  L2-L3:  No disc bulge  Moderate facet arthropathy  No significant canal or foraminal stenosis  L3-L4:  Left foraminal disc protrusion  Moderate bilateral facet arthropathy  No canal stenosis  Moderate left and mild right foraminal stenosis  L4-L5:  Left foraminal disc protrusion  Moderate bilateral facet arthropathy  No significant canal stenosis    Moderate left and mild right foraminal stenosis  L5-S1:  No disc bulge  Mild facet arthropathy  No canal or foraminal stenosis  IMPRESSION:     No significant change in left foraminal disc protrusion at levels L3-4 and L4-5 with moderate left foraminal stenosis  Correlate for possible left L3 and L4 radiculopathy  ASSESSMENT/PLAN:     Diagnoses and all orders for this visit:    Neck muscle spasm  -     Ambulatory Referral to Physiatry  -     Diclofenac Sodium (VOLTAREN) 1 %; Apply 2 g topically 4 (four) times a day as needed (low back or neck pain)    Chronic bilateral low back pain with left-sided sciatica  -     Ambulatory Referral to Physiatry  -     Diclofenac Sodium (VOLTAREN) 1 %; Apply 2 g topically 4 (four) times a day as needed (low back or neck pain)      Mr Madonna Bahena has tried many medical, non-medical treatments for his ongoing back pain with radicular like pain in the left leg  We will try topical diclofenac gel  I also offered local trigger point injections, however he is unsure if he will pursue this currently  He would benefit from continued therapy and addressing his pain from many angles including the mind-body connection  Limited benefit from various therapies and may be a candidate to pursue surgical re-eval  Currently this would be for intractable pain as there is no neurologic compromise on examination, no bowel/bladder issues, or other red flags to warrant a more immediate consultation  Based on exam, history and correlated imaging, I believe the pathology at the L4-L5 level is more consistent with the pattern of his radicular symptoms  He is also on chronic opioids and has hyperalgesia and is likely hypersensitized from this  Could consider med rotation, but ultimately providing limited benefit  Review of Systems   Constitutional: Negative  Negative for appetite change and fever  HENT: Negative  Negative for hearing loss, tinnitus, trouble swallowing and voice change  Eyes: Negative    Negative for photophobia and pain  Respiratory: Negative  Negative for shortness of breath  Cardiovascular: Negative  Negative for palpitations  Gastrointestinal: Positive for nausea  Negative for vomiting  Endocrine: Negative  Negative for cold intolerance  Genitourinary: Negative  Negative for dysuria, frequency and urgency  Musculoskeletal: Positive for back pain and gait problem  Negative for myalgias and neck pain  Skin: Negative  Negative for rash  Neurological: Positive for dizziness, weakness and numbness  Negative for tremors, seizures, syncope, facial asymmetry, speech difficulty, light-headedness and headaches  Hematological: Negative  Does not bruise/bleed easily  Psychiatric/Behavioral: Positive for confusion and sleep disturbance  Negative for hallucinations  ROS personally reviewed and updated    OBJECTIVE:   /81 (BP Location: Left arm)   Pulse 82   Temp (!) 97 °F (36 1 °C)   Ht 5' 7" (1 702 m)   Wt 91 2 kg (201 lb)   BMI 31 48 kg/m²      Physical Exam  Constitutional:       General: He is not in acute distress  HENT:      Head: Normocephalic and atraumatic  Right Ear: External ear normal       Left Ear: External ear normal       Nose: Nose normal  No rhinorrhea  Mouth/Throat:      Mouth: Mucous membranes are moist       Pharynx: Oropharynx is clear  Eyes:      General: No scleral icterus  Cardiovascular:      Rate and Rhythm: Normal rate  Pulses: Normal pulses  Pulmonary:      Effort: Pulmonary effort is normal  No respiratory distress  Abdominal:      General: There is no distension  Musculoskeletal:      Cervical back: Normal range of motion  Right lower leg: No edema  Left lower leg: No edema  Comments: Low back pain over the bilateral paraspinal muscles in the lumbar region to even light palpation  This is also evident in the left quadratus lumborum muscle  Skin:     General: Skin is warm and dry     Neurological:      Mental Status: He is alert and oriented to person, place, and time  Sensory: No sensory deficit  Motor: No weakness  Gait: Gait normal       Comments: No upper motor neuron signs   Psychiatric:         Mood and Affect: Mood normal          Behavior: Behavior normal            Imaging: I personally reviewed pertinent imaging including the MRI of the lumbar spine done on 2/3/22  I agree with the read of the far left lateral disc protrusion at L3-L4 and L4-L5       Labs: Personally reviewed  Lab Results   Component Value Date    WBC 5 31 06/02/2022    HGB 13 9 06/02/2022    HCT 39 7 06/02/2022    MCV 83 06/02/2022     06/02/2022     Lab Results   Component Value Date    CALCIUM 9 5 06/02/2022    K 3 9 06/02/2022    CO2 26 06/02/2022     06/02/2022    BUN 16 06/02/2022    CREATININE 1 15 06/02/2022         The following portions of the patient's history were reviewed and updated as appropriate: allergies, current medications, past family history, past medical history, past social history, past surgical history and problem list     Past Medical History:   Diagnosis Date    Asthma     Back pain     Back pain     Diabetes mellitus ([address] )     Hyperlipidemia     Hypertension        Patient Active Problem List    Diagnosis Date Noted    Need for follow-up by  06/15/2022    Continuous opioid dependence ([address] ) 04/13/2022    Neck muscle spasm 03/18/2022    Tinnitus of left ear 03/18/2022    Paresthesia of both hands 03/14/2022    Memory difficulties 03/14/2022    Chronic prostatitis 02/10/2022    Lumbar foraminal stenosis 02/10/2022    Facet arthropathy, lumbosacral 95/43/9335    Diastolic dysfunction 02/23/3477    Current mild episode of major depressive disorder without prior episode ([address] ) 10/16/2021    Reactive airway disease 10/06/2021    Post-acute sequelae of COVID-19 ([facility]) 09/10/2021    Class 1 obesity due to excess calories with serious comorbidity and body mass index (BMI) of 30 0 to 30 9 in adult 03/05/2021    Lumbar radiculopathy 03/05/2021    Encounter for narcotic contract discussion 03/05/2021    Low back pain with left-sided sciatica 03/03/2021    Chronic pain syndrome 03/03/2021    Benign essential HTN 10/16/2020    Hyperlipidemia 10/16/2020    Type 2 diabetes mellitus without complication, without long-term current use of insulin ([facility] [address] ) 10/16/2020    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       Past Surgical History:   Procedure Laterality Date    CYST REMOVAL  2017       Family History   Problem Relation Age of Onset    Hypertension Mother     Diabetes Mother     Cancer Father     Diabetes Family     Hypertension Family        Social History     No Known Allergies      Current Outpatient Medications:     acetaminophen (TYLENOL) 500 mg tablet, Take 1 tablet (500 mg total) by mouth every 6 (six) hours as needed for mild pain, Disp: 30 tablet, Rfl: 0    albuterol (2 5 mg/3 mL) 0 083 % nebulizer solution, Take 3 mL (2 5 mg total) by nebulization every 6 (six) hours as needed for wheezing or shortness of breath, Disp: 180 mL, Rfl: 5    albuterol (PROVENTIL HFA,VENTOLIN HFA) 90 mcg/act inhaler, Inhale 2 puffs every 4 (four) hours as needed (coughing), Disp: 6 7 g, Rfl: 0    aluminum-magnesium hydroxide-simethicone (MAALOX) 200-200-20 MG/5ML SUSP, Take 15 mL by mouth 4 (four) times a day (before meals and at bedtime), Disp: 150 mL, Rfl: 0    amLODIPine (NORVASC) 10 mg tablet, take 1 tablet by mouth once daily, Disp: 90 tablet, Rfl: 1    atorvastatin (LIPITOR) 20 mg tablet, Take 1 tablet (20 mg total) by mouth daily, Disp: 90 tablet, Rfl: 1    benzonatate (TESSALON PERLES) 100 mg capsule, Take 1 capsule (100 mg total) by mouth 3 (three) times a day as needed for cough, Disp: 30 capsule, Rfl: 0    Bioflavonoid Products (RA Vitamin C CR) TBCR, take 1 tablet by mouth twice a day for 14 days, Disp: , Rfl:     Blood Glucose Monitoring Suppl (OneTouch Verio) w/Device KIT, Use daily, Disp: 1 kit, Rfl: 0    Blood Glucose Monitoring Suppl KIT, Use in the morning Please give test strips and lancets Dx  E11 9, Disp: 1 kit, Rfl: 0    Blood Pressure KIT, Use in the morning With appropriate size cuff, Disp: 1 kit, Rfl: 0    Blood Pressure Monitoring (Blood Pressure Monitor/Arm) EMILY, Use daily, Disp: 1 each, Rfl: 0    budesonide-formoterol (Symbicort) 160-4 5 mcg/act inhaler, Inhale 2 puffs 2 (two) times a day Rinse mouth after use , Disp: 10 2 g, Rfl: 3    cyclobenzaprine (FLEXERIL) 10 mg tablet, Take 1 tablet (10 mg total) by mouth daily at bedtime as needed for muscle spasms, Disp: 30 tablet, Rfl: 0    Diclofenac Sodium (VOLTAREN) 1 %, Apply 2 g topically 4 (four) times a day as needed (low back or neck pain), Disp: 150 g, Rfl: 2    DULoxetine (CYMBALTA) 30 mg delayed release capsule, Take 30 mg by mouth 2 (two) times a day, Disp: , Rfl:     fluticasone (FLONASE) 50 mcg/act nasal spray, 1 spray into each nostril daily, Disp: 16 g, Rfl: 0    gabapentin (NEURONTIN) 800 mg tablet, Take 1 tablet (800 mg total) by mouth 3 (three) times a day, Disp: 90 tablet, Rfl: 3    glucose monitoring kit (FREESTYLE) monitoring kit, Use 1 each in the morning TESTING DAILY IN THE AM, Disp: 1 each, Rfl: 0    HYDROcodone-acetaminophen (NORCO) 7 5-325 mg per tablet, Take 1 tablet by mouth every 8 (eight) hours as needed for pain For ongoing pain Max Daily Amount: 3 tablets, Disp: 90 tablet, Rfl: 0    Lancet Devices (ONE TOUCH DELICA LANCING DEV) MISC, Use daily, Disp: 1 each, Rfl: 2    Lancets (freestyle) lancets, TESTING DAILY IN THE AM, Disp: 200 each, Rfl: 3    lidocaine (Lidoderm) 5 %, Apply 1 patch topically daily Remove & Discard patch within 12 hours or as directed by MD, Disp: 30 patch, Rfl: 1    lidocaine (LMX) 4 % cream, Apply topically as needed for mild pain, Disp: 30 g, Rfl: 0    menthol-cetylpyridinium (CEPACOL) 3 MG lozenge, Take 1 lozenge (3 mg total) by mouth as needed for sore throat, Disp: 30 lozenge, Rfl: 0    metFORMIN (GLUCOPHAGE) 500 mg tablet, Take 1 tablet (500 mg total) by mouth 2 (two) times a day with meals, Disp: 60 tablet, Rfl: 3    Mirabegron ER 50 MG TB24, Take 1 tablet (50 mg total) by mouth daily, Disp: 60 tablet, Rfl: 6    naloxone (NARCAN) 4 mg/0 1 mL nasal spray, Administer 1 spray into a nostril   If no response after 2-3 minutes, give another dose in the other nostril using a new spray , Disp: 1 each, Rfl: 1    ondansetron (Zofran ODT) 4 mg disintegrating tablet, Take 1 tablet (4 mg total) by mouth every 6 (six) hours as needed for nausea or vomiting, Disp: 20 tablet, Rfl: 0    tamsulosin (FLOMAX) 0 4 mg, Take 1 capsule (0 4 mg total) by mouth daily with dinner, Disp: 90 capsule, Rfl: 1    venlafaxine (EFFEXOR-XR) 150 mg 24 hr capsule, take 1 capsule by mouth once daily WITH BREAKFAST, Disp: 30 capsule, Rfl: 2    zolpidem (AMBIEN) 5 mg tablet, Take 1 tablet (5 mg total) by mouth daily at bedtime as needed for sleep, Disp: 30 tablet, Rfl: 0    famotidine (PEPCID) 20 mg tablet, Take 1 tablet (20 mg total) by mouth 2 (two) times a day As needed for acid reflux, Disp: 90 tablet, Rfl: 0    fluticasone (FLONASE) 50 mcg/act nasal spray, 1 spray into each nostril daily (Patient not taking: Reported on 9/29/2022), Disp: 16 g, Rfl: 0    glucose blood (OneTouch Verio) test strip, Check blood sugar daily, Disp: 100 each, Rfl: 2    Lancets (onetouch ultrasoft) lancets, Use daily Use as instructed to check sugars daily (Patient not taking: Reported on 9/29/2022), Disp: 100 each, Rfl: 1    Multiple Vitamin (multivitamin) capsule, Take 1 capsule by mouth daily for 14 days, Disp: 14 capsule, Rfl: 0    OneTouch Delica Lancets 56Z MISC, Use daily (Patient not taking: Reported on 9/29/2022), Disp: 100 each, Rfl: 1    OneTouch Delica Lancets 15Y MISC, Use in the morning (Patient not taking: Reported on 9/29/2022), Disp: 100 each, Rfl: [id number]  Physical Medicine and [place] [id number]

## 2022-10-04 DIAGNOSIS — K21.9 GASTROESOPHAGEAL REFLUX DISEASE, UNSPECIFIED WHETHER ESOPHAGITIS PRESENT: ICD-10-CM

## 2022-10-04 DIAGNOSIS — M54.16 LUMBAR RADICULOPATHY: ICD-10-CM

## 2022-10-04 DIAGNOSIS — M54.50 CHRONIC LOW BACK PAIN WITHOUT SCIATICA, UNSPECIFIED BACK PAIN LATERALITY: ICD-10-CM

## 2022-10-04 DIAGNOSIS — E11.9 TYPE 2 DIABETES MELLITUS WITHOUT COMPLICATION, WITHOUT LONG-TERM CURRENT USE OF INSULIN (HCC): ICD-10-CM

## 2022-10-04 DIAGNOSIS — G89.29 CHRONIC LOW BACK PAIN WITHOUT SCIATICA, UNSPECIFIED BACK PAIN LATERALITY: ICD-10-CM

## 2022-10-04 RX ORDER — FAMOTIDINE 20 MG/1
20 TABLET, FILM COATED ORAL 2 TIMES DAILY
Qty: 90 TABLET | Refills: 0 | Status: SHIPPED | OUTPATIENT
Start: 2022-10-04

## 2022-10-04 RX ORDER — BLOOD SUGAR DIAGNOSTIC
STRIP MISCELLANEOUS
Qty: 100 EACH | Refills: 2 | Status: SHIPPED | OUTPATIENT
Start: 2022-10-04

## 2022-10-04 RX ORDER — HYDROCODONE BITARTRATE AND ACETAMINOPHEN 7.5; 325 MG/1; MG/1
1 TABLET ORAL EVERY 8 HOURS PRN
Qty: 90 TABLET | Refills: 0 | Status: SHIPPED | OUTPATIENT
Start: 2022-10-04

## 2022-10-04 NOTE — TELEPHONE ENCOUNTER
PATIENT IS ASKING FOR REFILLS ON THESE MEDICATIONS:    ONETOUCH VERIO TEST STRIPS      famotidine (PEPCID) 20 mg tablet  HYDROcodone-acetaminophen (NORCO) 7 5-325 mg per tablet

## 2022-10-12 DIAGNOSIS — R05.1 ACUTE COUGH: ICD-10-CM

## 2022-10-19 ENCOUNTER — OFFICE VISIT (OUTPATIENT)
Dept: FAMILY MEDICINE CLINIC | Facility: CLINIC | Age: 62
End: 2022-10-19

## 2022-10-19 VITALS
TEMPERATURE: 97 F | RESPIRATION RATE: 18 BRPM | OXYGEN SATURATION: 97 % | BODY MASS INDEX: 31.96 KG/M2 | WEIGHT: 203.6 LBS | HEART RATE: 77 BPM | SYSTOLIC BLOOD PRESSURE: 146 MMHG | DIASTOLIC BLOOD PRESSURE: 88 MMHG | HEIGHT: 67 IN

## 2022-10-19 DIAGNOSIS — J01.10 ACUTE NON-RECURRENT FRONTAL SINUSITIS: Primary | ICD-10-CM

## 2022-10-19 DIAGNOSIS — E11.9 TYPE 2 DIABETES MELLITUS WITHOUT COMPLICATION, WITHOUT LONG-TERM CURRENT USE OF INSULIN (HCC): ICD-10-CM

## 2022-10-19 PROCEDURE — 3077F SYST BP >= 140 MM HG: CPT | Performed by: FAMILY MEDICINE

## 2022-10-19 PROCEDURE — 99213 OFFICE O/P EST LOW 20 MIN: CPT | Performed by: FAMILY MEDICINE

## 2022-10-19 PROCEDURE — 3079F DIAST BP 80-89 MM HG: CPT | Performed by: FAMILY MEDICINE

## 2022-10-19 RX ORDER — AMOXICILLIN AND CLAVULANATE POTASSIUM 875; 125 MG/1; MG/1
1 TABLET, FILM COATED ORAL EVERY 12 HOURS SCHEDULED
Qty: 20 TABLET | Refills: 0 | Status: SHIPPED | OUTPATIENT
Start: 2022-10-19 | End: 2022-10-29

## 2022-10-19 RX ORDER — PREDNISONE 20 MG/1
40 TABLET ORAL DAILY
Qty: 10 TABLET | Refills: 0 | Status: SHIPPED | OUTPATIENT
Start: 2022-10-19 | End: 2022-10-24

## 2022-10-19 NOTE — PROGRESS NOTES
Name: Mili Delarosa      : 1960      MRN: [id number]  Encounter Provider: [address]  Encounter Date: 10/19/2022   Encounter department: [facility]     1  Acute non-recurrent frontal sinusitis  Comments:  treat with antibiotics and 5 day course of steroids  Orders:  -     predniSONE 20 mg tablet; Take 2 tablets (40 mg total) by mouth daily for 5 days  -     amoxicillin-clavulanate (Augmentin) 875-125 mg per tablet; Take 1 tablet by mouth every 12 (twelve) hours for 10 days    2  Type 2 diabetes mellitus without complication, without long-term current use of insulin ([facility])  -     Glucometer test strips           Subjective      For the past five days, coughing up yellow sputum  Associated with headache and chest tightness  No fevers, congestion, chest pain  Tried tea  Tried OTC medications with no relief  Has been using albuterol inhaler 4-5 times per day  When not sick, does not need inhaler  Sick contacts: 15year old daughter    Review of Systems   Constitutional: Negative for appetite change, fatigue, fever and unexpected weight change  HENT: Positive for sinus pressure  Negative for congestion, ear pain, postnasal drip, rhinorrhea and sore throat  Eyes: Negative for pain and visual disturbance  Respiratory: Positive for cough, chest tightness, shortness of breath and wheezing  Cardiovascular: Negative for chest pain, palpitations and leg swelling  Gastrointestinal: Negative for abdominal pain, diarrhea, nausea and vomiting  Musculoskeletal: Negative for arthralgias and myalgias  Neurological: Negative for dizziness and headaches         Current Outpatient Medications on File Prior to Visit   Medication Sig   • acetaminophen (TYLENOL) 500 mg tablet Take 1 tablet (500 mg total) by mouth every 6 (six) hours as needed for mild pain   • albuterol (2 5 mg/3 mL) 0 083 % nebulizer solution Take 3 mL (2 5 mg total) by nebulization every 6 (six) hours as needed for wheezing or shortness of breath   • aluminum-magnesium hydroxide-simethicone (MAALOX) 200-200-20 MG/5ML SUSP Take 15 mL by mouth 4 (four) times a day (before meals and at bedtime)   • amLODIPine (NORVASC) 10 mg tablet take 1 tablet by mouth once daily   • atorvastatin (LIPITOR) 20 mg tablet Take 1 tablet (20 mg total) by mouth daily   • benzonatate (TESSALON PERLES) 100 mg capsule Take 1 capsule (100 mg total) by mouth 3 (three) times a day as needed for cough   • Bioflavonoid Products (RA Vitamin C CR) TBCR take 1 tablet by mouth twice a day for 14 days   • Blood Glucose Monitoring Suppl (OneTouch Verio) w/Device KIT Use daily   • Blood Glucose Monitoring Suppl KIT Use in the morning Please give test strips and lancets  Dx  E11 9   • Blood Pressure KIT Use in the morning With appropriate size cuff   • Blood Pressure Monitoring (Blood Pressure Monitor/Arm) EMILY Use daily   • budesonide-formoterol (Symbicort) 160-4 5 mcg/act inhaler Inhale 2 puffs 2 (two) times a day Rinse mouth after use     • cyclobenzaprine (FLEXERIL) 10 mg tablet Take 1 tablet (10 mg total) by mouth daily at bedtime as needed for muscle spasms   • Diclofenac Sodium (VOLTAREN) 1 % Apply 2 g topically 4 (four) times a day as needed (low back or neck pain)   • DULoxetine (CYMBALTA) 30 mg delayed release capsule Take 30 mg by mouth 2 (two) times a day   • famotidine (PEPCID) 20 mg tablet Take 1 tablet (20 mg total) by mouth 2 (two) times a day As needed for acid reflux   • fluticasone (FLONASE) 50 mcg/act nasal spray 1 spray into each nostril daily   • fluticasone (FLONASE) 50 mcg/act nasal spray 1 spray into each nostril daily (Patient not taking: Reported on 9/29/2022)   • gabapentin (NEURONTIN) 800 mg tablet Take 1 tablet (800 mg total) by mouth 3 (three) times a day   • glucose blood (OneTouch Verio) test strip Check blood sugar daily   • glucose monitoring kit (FREESTYLE) monitoring kit Use 1 each in the morning TESTING DAILY IN THE AM   • HYDROcodone-acetaminophen (NORCO) 7 5-325 mg per tablet Take 1 tablet by mouth every 8 (eight) hours as needed for pain For ongoing pain Max Daily Amount: 3 tablets   • Lancet Devices (ONE TOUCH DELICA LANCING DEV) MISC Use daily   • Lancets (freestyle) lancets TESTING DAILY IN THE AM   • Lancets (onetouch ultrasoft) lancets Use daily Use as instructed to check sugars daily (Patient not taking: Reported on 9/29/2022)   • lidocaine (Lidoderm) 5 % Apply 1 patch topically daily Remove & Discard patch within 12 hours or as directed by MD   • lidocaine (LMX) 4 % cream Apply topically as needed for mild pain   • menthol-cetylpyridinium (CEPACOL) 3 MG lozenge Take 1 lozenge (3 mg total) by mouth as needed for sore throat   • metFORMIN (GLUCOPHAGE) 500 mg tablet Take 1 tablet (500 mg total) by mouth 2 (two) times a day with meals   • Mirabegron ER 50 MG TB24 Take 1 tablet (50 mg total) by mouth daily   • Multiple Vitamin (multivitamin) capsule Take 1 capsule by mouth daily for 14 days   • naloxone (NARCAN) 4 mg/0 1 mL nasal spray Administer 1 spray into a nostril  If no response after 2-3 minutes, give another dose in the other nostril using a new spray     • ondansetron (Zofran ODT) 4 mg disintegrating tablet Take 1 tablet (4 mg total) by mouth every 6 (six) hours as needed for nausea or vomiting   • OneTouch Delica Lancets 69Y MISC Use daily (Patient not taking: Reported on 9/29/2022)   • OneTouch Delica Lancets 74U MISC Use in the morning (Patient not taking: Reported on 9/29/2022)   • tamsulosin (FLOMAX) 0 4 mg Take 1 capsule (0 4 mg total) by mouth daily with dinner   • venlafaxine (EFFEXOR-XR) 150 mg 24 hr capsule take 1 capsule by mouth once daily WITH BREAKFAST   • Ventolin  (90 Base) MCG/ACT inhaler INHALE 2 PUFFS EVERY 4 HOURS AS NEEDED FOR COUGHING   • zolpidem (AMBIEN) 5 mg tablet Take 1 tablet (5 mg total) by mouth daily at bedtime as needed for sleep Objective     /88 (BP Location: Right arm, Patient Position: Sitting, Cuff Size: Standard)   Pulse 77   Temp (!) 97 °F (36 1 °C) (Temporal)   Resp 18   Ht 5' 7" (1 702 m)   Wt 92 4 kg (203 lb 9 6 oz)   SpO2 97%   BMI 31 89 kg/m²     Physical Exam  Constitutional:       Appearance: Normal appearance  He is normal weight  HENT:      Head: Normocephalic and atraumatic  Right Ear: Tympanic membrane normal       Left Ear: Tympanic membrane normal       Nose:      Right Sinus: Maxillary sinus tenderness and frontal sinus tenderness present  Left Sinus: Maxillary sinus tenderness and frontal sinus tenderness present  Mouth/Throat:      Mouth: Mucous membranes are moist    Eyes:      Extraocular Movements: Extraocular movements intact  Conjunctiva/sclera: Conjunctivae normal       Pupils: Pupils are equal, round, and reactive to light  Cardiovascular:      Rate and Rhythm: Normal rate and regular rhythm  Pulmonary:      Breath sounds: Wheezing present  Abdominal:      General: Abdomen is flat  Bowel sounds are normal       Palpations: Abdomen is soft  Musculoskeletal:      Cervical back: Normal range of motion and neck supple  Neurological:      Mental Status: He is alert         [address]

## 2022-10-24 ENCOUNTER — OFFICE VISIT (OUTPATIENT)
Dept: FAMILY MEDICINE CLINIC | Facility: CLINIC | Age: 62
End: 2022-10-24

## 2022-10-24 VITALS
DIASTOLIC BLOOD PRESSURE: 84 MMHG | RESPIRATION RATE: 18 BRPM | BODY MASS INDEX: 31.48 KG/M2 | WEIGHT: 200.6 LBS | HEIGHT: 67 IN | OXYGEN SATURATION: 97 % | HEART RATE: 86 BPM | SYSTOLIC BLOOD PRESSURE: 122 MMHG | TEMPERATURE: 98.8 F

## 2022-10-24 DIAGNOSIS — Z00.00 MEDICARE ANNUAL WELLNESS VISIT, SUBSEQUENT: Primary | ICD-10-CM

## 2022-10-24 DIAGNOSIS — E11.9 TYPE 2 DIABETES MELLITUS WITHOUT COMPLICATION, WITHOUT LONG-TERM CURRENT USE OF INSULIN (HCC): ICD-10-CM

## 2022-10-24 DIAGNOSIS — R20.2 PARESTHESIA OF BOTH HANDS: ICD-10-CM

## 2022-10-24 DIAGNOSIS — J45.41 MODERATE PERSISTENT REACTIVE AIRWAY DISEASE WITH ACUTE EXACERBATION: ICD-10-CM

## 2022-10-24 DIAGNOSIS — Z23 ENCOUNTER FOR IMMUNIZATION: ICD-10-CM

## 2022-10-24 DIAGNOSIS — R41.3 MEMORY DIFFICULTIES: ICD-10-CM

## 2022-10-24 PROBLEM — N41.1 CHRONIC PROSTATITIS: Status: RESOLVED | Noted: 2022-02-10 | Resolved: 2022-10-24

## 2022-10-24 RX ORDER — DULOXETIN HYDROCHLORIDE 60 MG/1
60 CAPSULE, DELAYED RELEASE ORAL 2 TIMES DAILY
COMMUNITY
Start: 2022-10-06

## 2022-10-24 RX ORDER — BUDESONIDE AND FORMOTEROL FUMARATE DIHYDRATE 160; 4.5 UG/1; UG/1
2 AEROSOL RESPIRATORY (INHALATION) 2 TIMES DAILY
Qty: 10.2 G | Refills: 3 | Status: SHIPPED | OUTPATIENT
Start: 2022-10-24 | End: 2022-10-28

## 2022-10-24 NOTE — PROGRESS NOTES
Assessment and Plan:     Problem List Items Addressed This Visit        Endocrine    Type 2 diabetes mellitus without complication, without long-term current use of insulin ([address] )    Relevant Orders    IRIS Diabetic eye exam       Respiratory    Reactive airway disease    Relevant Medications    budesonide-formoterol (Symbicort) 160-4 5 mcg/act inhaler       Other    Memory difficulties - Primary    Relevant Orders    Ambulatory Referral to [facility]      Other Visit Diagnoses     Medicare annual wellness visit, subsequent        Encounter for immunization        Relevant Orders    influenza vaccine, quadrivalent, recombinant, PF, 0 5 mL, for patients 18 yr+ (FLUBLOK)           Preventive health issues were discussed with patient, and age appropriate screening tests were ordered as noted in patient's After Visit Summary  Personalized health advice and appropriate referrals for health education or preventive services given if needed, as noted in patient's After Visit Summary  History of Present Illness:     Patient presents for a Medicare Wellness Visit    Wendy Antoine is a 62year old male who presents for an Annual Wellness Medicare Visit  He is concerned today about chornic pain in his back, and chronic paresthesias of his bilateral fingers which cause him to drop objects at times  He also feels like he is havingmemoery issue for a while now and can't remember recent events or dates  He does have a h/o of depression and is being tretaed at [facility]  He lives with his 15year old daughter, no pets         Patient Care Team:  Mesha Monterroso DO as PCP - General (Family Medicine)  Alfornia Sandhoff, MD as PCP - [address] [place], [place] [id number] [facility] ([facility])     Review of Systems:     Review of Systems     Problem List:     Patient Active Problem List   Diagnosis   • Benign essential HTN   • Hyperlipidemia   • Type 2 diabetes mellitus without complication, without long-term current use of insulin ([address] )   • BPH with obstruction/lower urinary tract symptoms   • Low back pain with left-sided sciatica   • Chronic pain syndrome   • Class 1 obesity due to excess calories with serious comorbidity and body mass index (BMI) of 30 0 to 30 9 in adult   • Lumbar radiculopathy   • Encounter for narcotic contract discussion   • Post-acute sequelae of COVID-19 (PASC)   • Reactive airway disease   • Current mild episode of major depressive disorder without prior episode (HCC)   • Diastolic dysfunction   • Lumbar foraminal stenosis   • Facet arthropathy, lumbosacral   • Paresthesia of both hands   • Memory difficulties   • Neck muscle spasm   • Tinnitus of left ear   • Continuous opioid dependence ([facility] [address] )   • Need for follow-up by       Past Medical and Surgical History:     Past Medical History:   Diagnosis Date   • Asthma    • Back pain    • Back pain    • 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   • Depression    • Diabetes mellitus ([address] )    • Hyperlipidemia    • Hypertension    • Type 2 diabetes mellitus without complication, without long-term current use of insulin ([facility] [address] ) 10/16/2020     Past Surgical History:   Procedure Laterality Date   • CYST REMOVAL  2017      Family History:     Family History   Problem Relation Age of Onset   • Hypertension Mother    • Diabetes Mother    • Cancer Father    • Diabetes Family    • Hypertension Family       Social History:     Social History     Socioeconomic History   • Marital status: /Civil Union     Spouse name: None   • Number of children: None   • Years of education: None   • Highest education level: None   Occupational History   • None   Tobacco Use   • Smoking status: Never Smoker   • Smokeless tobacco: Never Used   Vaping Use   • Vaping Use: Never used   Substance and Sexual Activity   • Alcohol use: Not Currently     Comment: rare   • Drug use: Never   • Sexual activity: Not Currently   Other Topics Concern   • None   Social History Narrative   • None Social Determinants of Health     Financial Resource Strain: Low Risk    • Difficulty of Paying Living Expenses: Not hard at all   Food Insecurity: No Food Insecurity   • Worried About Running Out of Food in the Last Year: Never true   • Ran Out of Food in the Last Year: Never true   Transportation Needs: No Transportation Needs   • Lack of Transportation (Medical): No   • Lack of Transportation (Non-Medical): No   Physical Activity: Not on file   Stress: Not on file   Social Connections: Not on file   Intimate Partner Violence: Not on file   Housing Stability: Unknown   • Unable to Pay for Housing in the Last Year: No   • Number of Places Lived in the Last Year: Not on file   • Unstable Housing in the Last Year: No      Medications and Allergies:     Current Outpatient Medications   Medication Sig Dispense Refill   • budesonide-formoterol (Symbicort) 160-4 5 mcg/act inhaler Inhale 2 puffs 2 (two) times a day Rinse mouth after use   10 2 g 3   • acetaminophen (TYLENOL) 500 mg tablet Take 1 tablet (500 mg total) by mouth every 6 (six) hours as needed for mild pain 30 tablet 0   • albuterol (2 5 mg/3 mL) 0 083 % nebulizer solution Take 3 mL (2 5 mg total) by nebulization every 6 (six) hours as needed for wheezing or shortness of breath 180 mL 5   • aluminum-magnesium hydroxide-simethicone (MAALOX) 200-200-20 MG/5ML SUSP Take 15 mL by mouth 4 (four) times a day (before meals and at bedtime) 150 mL 0   • amLODIPine (NORVASC) 10 mg tablet take 1 tablet by mouth once daily 90 tablet 1   • amoxicillin-clavulanate (Augmentin) 875-125 mg per tablet Take 1 tablet by mouth every 12 (twelve) hours for 10 days 20 tablet 0   • atorvastatin (LIPITOR) 20 mg tablet Take 1 tablet (20 mg total) by mouth daily 90 tablet 1   • benzonatate (TESSALON PERLES) 100 mg capsule Take 1 capsule (100 mg total) by mouth 3 (three) times a day as needed for cough 30 capsule 0   • Bioflavonoid Products (RA Vitamin C CR) TBCR take 1 tablet by mouth twice a day for 14 days     • Blood Glucose Monitoring Suppl (OneTouch Verio) w/Device KIT Use daily 1 kit 0   • Blood Glucose Monitoring Suppl KIT Use in the morning Please give test strips and lancets  Dx  E11 9 1 kit 0   • Blood Pressure KIT Use in the morning With appropriate size cuff 1 kit 0   • Blood Pressure Monitoring (Blood Pressure Monitor/Arm) EMILY Use daily 1 each 0   • cyclobenzaprine (FLEXERIL) 10 mg tablet Take 1 tablet (10 mg total) by mouth daily at bedtime as needed for muscle spasms 30 tablet 0   • Diclofenac Sodium (VOLTAREN) 1 % Apply 2 g topically 4 (four) times a day as needed (low back or neck pain) 150 g 2   • DULoxetine (CYMBALTA) 60 mg delayed release capsule Take 60 mg by mouth 2 (two) times a day     • famotidine (PEPCID) 20 mg tablet Take 1 tablet (20 mg total) by mouth 2 (two) times a day As needed for acid reflux 90 tablet 0   • fluticasone (FLONASE) 50 mcg/act nasal spray 1 spray into each nostril daily 16 g 0   • fluticasone (FLONASE) 50 mcg/act nasal spray 1 spray into each nostril daily (Patient not taking: Reported on 9/29/2022) 16 g 0   • gabapentin (NEURONTIN) 800 mg tablet Take 1 tablet (800 mg total) by mouth 3 (three) times a day 90 tablet 3   • glucose blood (OneTouch Verio) test strip Check blood sugar daily 100 each 2   • glucose monitoring kit (FREESTYLE) monitoring kit Use 1 each in the morning TESTING DAILY IN THE AM 1 each 0   • HYDROcodone-acetaminophen (NORCO) 7 5-325 mg per tablet Take 1 tablet by mouth every 8 (eight) hours as needed for pain For ongoing pain Max Daily Amount: 3 tablets 90 tablet 0   • Lancet Devices (ONE TOUCH DELICA LANCING DEV) MISC Use daily 1 each 2   • Lancets (freestyle) lancets TESTING DAILY IN THE  each 3   • Lancets (onetouch ultrasoft) lancets Use daily Use as instructed to check sugars daily (Patient not taking: Reported on 9/29/2022) 100 each 1   • lidocaine (Lidoderm) 5 % Apply 1 patch topically daily Remove & Discard patch within 12 hours or as directed by MD 30 patch 1   • lidocaine (LMX) 4 % cream Apply topically as needed for mild pain 30 g 0   • menthol-cetylpyridinium (CEPACOL) 3 MG lozenge Take 1 lozenge (3 mg total) by mouth as needed for sore throat 30 lozenge 0   • metFORMIN (GLUCOPHAGE) 500 mg tablet Take 1 tablet (500 mg total) by mouth 2 (two) times a day with meals 60 tablet 3   • Mirabegron ER 50 MG TB24 Take 1 tablet (50 mg total) by mouth daily 60 tablet 6   • Multiple Vitamin (multivitamin) capsule Take 1 capsule by mouth daily for 14 days 14 capsule 0   • naloxone (NARCAN) 4 mg/0 1 mL nasal spray Administer 1 spray into a nostril  If no response after 2-3 minutes, give another dose in the other nostril using a new spray  1 each 1   • ondansetron (Zofran ODT) 4 mg disintegrating tablet Take 1 tablet (4 mg total) by mouth every 6 (six) hours as needed for nausea or vomiting 20 tablet 0   • OneTouch Delica Lancets 86F MISC Use daily (Patient not taking: Reported on 9/29/2022) 100 each 1   • OneTouch Delica Lancets 67M MISC Use in the morning (Patient not taking: Reported on 9/29/2022) 100 each 6   • predniSONE 20 mg tablet Take 2 tablets (40 mg total) by mouth daily for 5 days 10 tablet 0   • tamsulosin (FLOMAX) 0 4 mg Take 1 capsule (0 4 mg total) by mouth daily with dinner 90 capsule 1   • venlafaxine (EFFEXOR-XR) 150 mg 24 hr capsule take 1 capsule by mouth once daily WITH BREAKFAST 30 capsule 2   • Ventolin  (90 Base) MCG/ACT inhaler INHALE 2 PUFFS EVERY 4 HOURS AS NEEDED FOR COUGHING 18 g 0   • zolpidem (AMBIEN) 5 mg tablet Take 1 tablet (5 mg total) by mouth daily at bedtime as needed for sleep 30 tablet 0     No current facility-administered medications for this visit       No Known Allergies   Immunizations:     Immunization History   Administered Date(s) Administered   • COVID-19 MODERNA VACC 0 5 ML IM 04/12/2021, 05/10/2021   • Influenza, recombinant, quadrivalent,injectable, preservative free 03/17/2021, 10/14/2021   • Pneumococcal Polysaccharide PPV23 10/19/2019      Health Maintenance:         Topic Date Due   • Colorectal Cancer Screening  10/10/2026   • HIV Screening  Completed   • Hepatitis C Screening  Completed         Topic Date Due   • COVID-19 Vaccine (3 - Booster for Moderna series) 10/10/2021   • Influenza Vaccine (1) 09/01/2022      Medicare Screening Tests and Risk Assessments:     Harriet Boyd is here for his Subsequent Wellness visit  Health Risk Assessment:   Patient rates overall health as poor  Patient feels that their physical health rating is slightly worse  Patient is dissatisfied with their life  Eyesight was rated as slightly worse  Hearing was rated as same  Patient feels that their emotional and mental health rating is slightly worse  Patients states they are sometimes angry  Patient states they are sometimes unusually tired/fatigued  Pain experienced in the last 7 days has been a lot  Patient's pain rating has been 8/10  Patient states that he has experienced no weight loss or gain in last 6 months  Lower back pain  He feels like he may have memory issue as well, + h/o of depression    Depression Screening:   PHQ-9 Score: 10      Fall Risk Screening: In the past year, patient has experienced: history of falling in past year    Number of falls: 2 or more  Injured during fall?: Yes    Feels unsteady when standing or walking?: Yes    Worried about falling?: Yes      Home Safety:  Patient has trouble with stairs inside or outside of their home  Patient has working smoke alarms and has working carbon monoxide detector  Home safety hazards include: none  His legs feel weak and has fallen more than twice    Nutrition:   Current diet is Diabetic and Low Cholesterol  Medications:   Patient is not currently taking any over-the-counter supplements  Patient is able to manage medications       Activities of Daily Living (ADLs)/Instrumental Activities of Daily Living (IADLs):   Walk and transfer into and out of bed and chair?: Yes  Dress and groom yourself?: Yes    Bathe or shower yourself?: No    Feed yourself? Yes  Do your laundry/housekeeping?: No  Manage your money, pay your bills and track your expenses?: No  Make your own meals?: No    Do your own shopping?: No    ADL comments: Pt has helper 2-3 hours/day help him get dress, shopping    Previous Hospitalizations:   Any hospitalizations or ED visits within the last 12 months?: No      Advance Care Planning:   Living will: No    Durable POA for healthcare: No    Advanced directive: No    Advanced directive counseling given: No    Five wishes given: Yes    Patient declined ACP directive: No    End of Life Decisions reviewed with patient: No    Provider agrees with end of life decisions: No      PREVENTIVE SCREENINGS      Cardiovascular Screening:    General: History Lipid Disorder and Screening Current      Diabetes Screening:     General: History Diabetes and Screening Current      Colorectal Cancer Screening:     General: Screening Current      Prostate Cancer Screening:    General: Screening Current      Osteoporosis Screening:    General: Screening Not Indicated      Abdominal Aortic Aneurysm (AAA) Screening:        General: Screening Not Indicated      Lung Cancer Screening:     General: Screening Not Indicated      Hepatitis C Screening:    General: Screening Current    Screening, Brief Intervention, and Referral to Treatment (SBIRT)    Screening  Typical number of drinks in a day: 0  Typical number of drinks in a week: 0  Interpretation: Low risk drinking behavior  Single Item Drug Screening:  How often have you used an illegal drug (including marijuana) or a prescription medication for non-medical reasons in the past year? never    Single Item Drug Screen Score: 0  Interpretation: Negative screen for possible drug use disorder    Brief Intervention  Alcohol & drug use screenings were reviewed   No concerns regarding substance use disorder identified  Other Counseling Topics:   Car/seat belt/driving safety and regular weightbearing exercise       No exam data present     Physical Exam:     /84 (BP Location: Left arm, Patient Position: Sitting, Cuff Size: Standard)   Pulse 86   Temp 98 8 °F (37 1 °C) (Temporal)   Resp 18   Ht 5' 7" (1 702 m)   Wt 91 kg (200 lb 9 6 oz)   SpO2 97%   BMI 31 42 kg/m²     Physical Exam     Twanna Aase, MD

## 2022-10-24 NOTE — PATIENT INSTRUCTIONS
Medicare Preventive Visit Patient Instructions  Thank you for completing your Welcome to Medicare Visit or Medicare Annual Wellness Visit today  Your next wellness visit will be due in one year (10/25/2023)  The screening/preventive services that you may require over the next 5-10 years are detailed below  Some tests may not apply to you based off risk factors and/or age  Screening tests ordered at today's visit but not completed yet may show as past due  Also, please note that scanned in results may not display below  Preventive Screenings:  Service Recommendations Previous Testing/Comments   Colorectal Cancer Screening  · Colonoscopy    · Fecal Occult Blood Test (FOBT)/Fecal Immunochemical Test (FIT)  · Fecal DNA/Cologuard Test  · Flexible Sigmoidoscopy Age: 39-70 years old   Colonoscopy: every 10 years (May be performed more frequently if at higher risk)  OR  FOBT/FIT: every 1 year  OR  Cologuard: every 3 years  OR  Sigmoidoscopy: every 5 years  Screening may be recommended earlier than age 39 if at higher risk for colorectal cancer  Also, an individualized decision between you and your healthcare provider will decide whether screening between the ages of 74-80 would be appropriate   Colonoscopy: 10/11/2021  FOBT/FIT: Not on file  Cologuard: Not on file  Sigmoidoscopy: Not on file    Screening Current     Prostate Cancer Screening Individualized decision between patient and health care provider in men between ages of 53-78   Medicare will cover every 12 months beginning on the day after your 50th birthday PSA: 1 6 ng/mL     Screening Current     Hepatitis C Screening Once for adults born between 1945 and 1965  More frequently in patients at high risk for Hepatitis C Hep C Antibody: 02/12/2021    Screening Current   Diabetes Screening 1-2 times per year if you're at risk for diabetes or have pre-diabetes Fasting glucose: 114 mg/dL (2/12/2021)  A1C: 6 8 (8/9/2022)  Screening Not Indicated  History Diabetes Cholesterol Screening Once every 5 years if you don't have a lipid disorder  May order more often based on risk factors  Lipid panel: 02/12/2021  Screening Not Indicated  History Lipid Disorder      Other Preventive Screenings Covered by Medicare:  1  Abdominal Aortic Aneurysm (AAA) Screening: covered once if your at risk  You're considered to be at risk if you have a family history of AAA or a male between the age of 73-68 who smoking at least 100 cigarettes in your lifetime  2  Lung Cancer Screening: covers low dose CT scan once per year if you meet all of the following conditions: (1) Age 50-69; (2) No signs or symptoms of lung cancer; (3) Current smoker or have quit smoking within the last 15 years; (4) You have a tobacco smoking history of at least 20 pack years (packs per day x number of years you smoked); (5) You get a written order from a healthcare provider  3  Glaucoma Screening: covered annually if you're considered high risk: (1) You have diabetes OR (2) Family history of glaucoma OR (3)  aged 48 and older OR (3)  American aged 72 and older  3  Osteoporosis Screening: covered every 2 years if you meet one of the following conditions: (1) Have a vertebral abnormality; (2) On glucocorticoid therapy for more than 3 months; (3) Have primary hyperparathyroidism; (4) On osteoporosis medications and need to assess response to drug therapy  5  HIV Screening: covered annually if you're between the age of 12-76  Also covered annually if you are younger than 13 and older than 72 with risk factors for HIV infection  For pregnant patients, it is covered up to 3 times per pregnancy      Immunizations:  Immunization Recommendations   Influenza Vaccine Annual influenza vaccination during flu season is recommended for all persons aged >= 6 months who do not have contraindications   Pneumococcal Vaccine   * Pneumococcal conjugate vaccine = PCV13 (Prevnar 13), PCV15 (Vaxneuvance), PCV20 (Prevnar 20)  * Pneumococcal polysaccharide vaccine = PPSV23 (Pneumovax) Adults 2364 years old: 1-3 doses may be recommended based on certain risk factors  Adults 72 years old: 1-2 doses may be recommended based off what pneumonia vaccine you previously received   Hepatitis B Vaccine 3 dose series if at intermediate or high risk (ex: diabetes, end stage renal disease, liver disease)   Tetanus (Td) Vaccine - COST NOT COVERED BY MEDICARE PART B Following completion of primary series, a booster dose should be given every 10 years to maintain immunity against tetanus  Td may also be given as tetanus wound prophylaxis  Tdap Vaccine - COST NOT COVERED BY MEDICARE PART B Recommended at least once for all adults  For pregnant patients, recommended with each pregnancy  Shingles Vaccine (Shingrix) - COST NOT COVERED BY MEDICARE PART B  2 shot series recommended in those aged 48 and above     Health Maintenance Due:      Topic Date Due   • Colorectal Cancer Screening  10/10/2026   • HIV Screening  Completed   • Hepatitis C Screening  Completed     Immunizations Due:      Topic Date Due   • Pneumococcal Vaccine: Pediatrics (0 to 5 Years) and At-Risk Patients (6 to 59 Years) (2 - PCV) 10/19/2020   • COVID-19 Vaccine (3 - Booster for Moderna series) 10/10/2021   • Influenza Vaccine (1) 09/01/2022     Advance Directives   What are advance directives? Advance directives are legal documents that state your wishes and plans for medical care  These plans are made ahead of time in case you lose your ability to make decisions for yourself  Advance directives can apply to any medical decision, such as the treatments you want, and if you want to donate organs  What are the types of advance directives? There are many types of advance directives, and each state has rules about how to use them  You may choose a combination of any of the following:  · Living will: This is a written record of the treatment you want   You can also choose which treatments you do not want, which to limit, and which to stop at a certain time  This includes surgery, medicine, IV fluid, and tube feedings  · Durable power of  for healthcare [place] [facility]): This is a written record that states who you want to make healthcare choices for you when you are unable to make them for yourself  This person, called a proxy, is usually a family member or a friend  You may choose more than 1 proxy  · Do not resuscitate (DNR) order:  A DNR order is used in case your heart stops beating or you stop breathing  It is a request not to have certain forms of treatment, such as CPR  A DNR order may be included in other types of advance directives  · Medical directive: This covers the care that you want if you are in a coma, near death, or unable to make decisions for yourself  You can list the treatments you want for each condition  Treatment may include pain medicine, surgery, blood transfusions, dialysis, IV or tube feedings, and a ventilator (breathing machine)  · Values history: This document has questions about your views, beliefs, and how you feel and think about life  This information can help others choose the care that you would choose  Why are advance directives important? An advance directive helps you control your care  Although spoken wishes may be used, it is better to have your wishes written down  Spoken wishes can be misunderstood, or not followed  Treatments may be given even if you do not want them  An advance directive may make it easier for your family to make difficult choices about your care  Weight Management   Why it is important to manage your weight:  Being overweight increases your risk of health conditions such as heart disease, high blood pressure, type 2 diabetes, and certain types of cancer  It can also increase your risk for osteoarthritis, sleep apnea, and other respiratory problems  Aim for a slow, steady weight loss   Even a small amount of weight loss can lower your risk of health problems  How to lose weight safely:  A safe and healthy way to lose weight is to eat fewer calories and get regular exercise  You can lose up about 1 pound a week by decreasing the number of calories you eat by 500 calories each day  Healthy meal plan for weight management:  A healthy meal plan includes a variety of foods, contains fewer calories, and helps you stay healthy  A healthy meal plan includes the following:  · Eat whole-grain foods more often  A healthy meal plan should contain fiber  Fiber is the part of grains, fruits, and vegetables that is not broken down by your body  Whole-grain foods are healthy and provide extra fiber in your diet  Some examples of whole-grain foods are whole-wheat breads and pastas, oatmeal, brown rice, and bulgur  · Eat a variety of vegetables every day  Include dark, leafy greens such as spinach, kale, nai greens, and mustard greens  Eat yellow and orange vegetables such as carrots, sweet potatoes, and winter squash  · Eat a variety of fruits every day  Choose fresh or canned fruit (canned in its own juice or light syrup) instead of juice  Fruit juice has very little or no fiber  · Eat low-fat dairy foods  Drink fat-free (skim) milk or 1% milk  Eat fat-free yogurt and low-fat cottage cheese  Try low-fat cheeses such as mozzarella and other reduced-fat cheeses  · Choose meat and other protein foods that are low in fat  Choose beans or other legumes such as split peas or lentils  Choose fish, skinless poultry (chicken or turkey), or lean cuts of red meat (beef or pork)  Before you cook meat or poultry, cut off any visible fat  · Use less fat and oil  Try baking foods instead of frying them  Add less fat, such as margarine, sour cream, regular salad dressing and mayonnaise to foods  Eat fewer high-fat foods  Some examples of high-fat foods include french fries, doughnuts, ice cream, and cakes  · Eat fewer sweets    Limit foods and drinks that are high in sugar  This includes candy, cookies, regular soda, and sweetened drinks  Exercise:  Exercise at least 30 minutes per day on most days of the week  Some examples of exercise include walking, biking, dancing, and swimming  You can also fit in more physical activity by taking the stairs instead of the elevator or parking farther away from stores  Ask your healthcare provider about the best exercise plan for you  © Copyright [employer] 2018 Information is for End User's use only and may not be sold, redistributed or otherwise used for commercial purposes  All illustrations and images included in CareNotes® are the copyrighted property of [employer]  or [facility] Preventive Visit Patient Instructions  Thank you for completing your Welcome to Medicare Visit or Medicare Annual Wellness Visit today  Your next wellness visit will be due in one year (10/25/2023)  The screening/preventive services that you may require over the next 5-10 years are detailed below  Some tests may not apply to you based off risk factors and/or age  Screening tests ordered at today's visit but not completed yet may show as past due  Also, please note that scanned in results may not display below  Preventive Screenings:  Service Recommendations Previous Testing/Comments   Colorectal Cancer Screening  · Colonoscopy    · Fecal Occult Blood Test (FOBT)/Fecal Immunochemical Test (FIT)  · Fecal DNA/Cologuard Test  · Flexible Sigmoidoscopy Age: 39-70 years old   Colonoscopy: every 10 years (May be performed more frequently if at higher risk)  OR  FOBT/FIT: every 1 year  OR  Cologuard: every 3 years  OR  Sigmoidoscopy: every 5 years  Screening may be recommended earlier than age 39 if at higher risk for colorectal cancer  Also, an individualized decision between you and your healthcare provider will decide whether screening between the ages of 74-80 would be appropriate   Colonoscopy: 10/11/2021  FOBT/FIT: Not on file  Cologuard: Not on file  Sigmoidoscopy: Not on file    Screening Current     Prostate Cancer Screening Individualized decision between patient and health care provider in men between ages of 53-78   Medicare will cover every 12 months beginning on the day after your 50th birthday PSA: 1 6 ng/mL     Screening Current     Hepatitis C Screening Once for adults born between 1945 and 1965  More frequently in patients at high risk for Hepatitis C Hep C Antibody: 02/12/2021    Screening Current   Diabetes Screening 1-2 times per year if you're at risk for diabetes or have pre-diabetes Fasting glucose: 114 mg/dL (2/12/2021)  A1C: 6 8 (8/9/2022)  Screening Not Indicated  History Diabetes   Cholesterol Screening Once every 5 years if you don't have a lipid disorder  May order more often based on risk factors  Lipid panel: 02/12/2021  Screening Not Indicated  History Lipid Disorder      Other Preventive Screenings Covered by Medicare:  6  Abdominal Aortic Aneurysm (AAA) Screening: covered once if your at risk  You're considered to be at risk if you have a family history of AAA or a male between the age of 73-68 who smoking at least 100 cigarettes in your lifetime  7  Lung Cancer Screening: covers low dose CT scan once per year if you meet all of the following conditions: (1) Age 50-69; (2) No signs or symptoms of lung cancer; (3) Current smoker or have quit smoking within the last 15 years; (4) You have a tobacco smoking history of at least 20 pack years (packs per day x number of years you smoked); (5) You get a written order from a healthcare provider  8  Glaucoma Screening: covered annually if you're considered high risk: (1) You have diabetes OR (2) Family history of glaucoma OR (3)  aged 48 and older OR (3)  American aged 72 and older  5   Osteoporosis Screening: covered every 2 years if you meet one of the following conditions: (1) Have a vertebral abnormality; (2) On glucocorticoid therapy for more than 3 months; (3) Have primary hyperparathyroidism; (4) On osteoporosis medications and need to assess response to drug therapy  10  HIV Screening: covered annually if you're between the age of 12-76  Also covered annually if you are younger than 13 and older than 72 with risk factors for HIV infection  For pregnant patients, it is covered up to 3 times per pregnancy  Immunizations:  Immunization Recommendations   Influenza Vaccine Annual influenza vaccination during flu season is recommended for all persons aged >= 6 months who do not have contraindications   Pneumococcal Vaccine   * Pneumococcal conjugate vaccine = PCV13 (Prevnar 13), PCV15 (Vaxneuvance), PCV20 (Prevnar 20)  * Pneumococcal polysaccharide vaccine = PPSV23 (Pneumovax) Adults 25-60 years old: 1-3 doses may be recommended based on certain risk factors  Adults 72 years old: 1-2 doses may be recommended based off what pneumonia vaccine you previously received   Hepatitis B Vaccine 3 dose series if at intermediate or high risk (ex: diabetes, end stage renal disease, liver disease)   Tetanus (Td) Vaccine - COST NOT COVERED BY MEDICARE PART B Following completion of primary series, a booster dose should be given every 10 years to maintain immunity against tetanus  Td may also be given as tetanus wound prophylaxis  Tdap Vaccine - COST NOT COVERED BY MEDICARE PART B Recommended at least once for all adults  For pregnant patients, recommended with each pregnancy     Shingles Vaccine (Shingrix) - COST NOT COVERED BY MEDICARE PART B  2 shot series recommended in those aged 48 and above     Health Maintenance Due:      Topic Date Due   • Colorectal Cancer Screening  10/10/2026   • HIV Screening  Completed   • Hepatitis C Screening  Completed     Immunizations Due:      Topic Date Due   • Pneumococcal Vaccine: Pediatrics (0 to 5 Years) and At-Risk Patients (6 to 59 Years) (2 - PCV) 10/19/2020   • COVID-19 Vaccine (3 - Booster for Moderna series) 10/10/2021   • Influenza Vaccine (1) 09/01/2022     Advance Directives   What are advance directives? Advance directives are legal documents that state your wishes and plans for medical care  These plans are made ahead of time in case you lose your ability to make decisions for yourself  Advance directives can apply to any medical decision, such as the treatments you want, and if you want to donate organs  What are the types of advance directives? There are many types of advance directives, and each state has rules about how to use them  You may choose a combination of any of the following:  · Living will: This is a written record of the treatment you want  You can also choose which treatments you do not want, which to limit, and which to stop at a certain time  This includes surgery, medicine, IV fluid, and tube feedings  · Durable power of  for healthcare [place] [facility]): This is a written record that states who you want to make healthcare choices for you when you are unable to make them for yourself  This person, called a proxy, is usually a family member or a friend  You may choose more than 1 proxy  · Do not resuscitate (DNR) order:  A DNR order is used in case your heart stops beating or you stop breathing  It is a request not to have certain forms of treatment, such as CPR  A DNR order may be included in other types of advance directives  · Medical directive: This covers the care that you want if you are in a coma, near death, or unable to make decisions for yourself  You can list the treatments you want for each condition  Treatment may include pain medicine, surgery, blood transfusions, dialysis, IV or tube feedings, and a ventilator (breathing machine)  · Values history: This document has questions about your views, beliefs, and how you feel and think about life  This information can help others choose the care that you would choose    Why are advance directives important? An advance directive helps you control your care  Although spoken wishes may be used, it is better to have your wishes written down  Spoken wishes can be misunderstood, or not followed  Treatments may be given even if you do not want them  An advance directive may make it easier for your family to make difficult choices about your care  Weight Management   Why it is important to manage your weight:  Being overweight increases your risk of health conditions such as heart disease, high blood pressure, type 2 diabetes, and certain types of cancer  It can also increase your risk for osteoarthritis, sleep apnea, and other respiratory problems  Aim for a slow, steady weight loss  Even a small amount of weight loss can lower your risk of health problems  How to lose weight safely:  A safe and healthy way to lose weight is to eat fewer calories and get regular exercise  You can lose up about 1 pound a week by decreasing the number of calories you eat by 500 calories each day  Healthy meal plan for weight management:  A healthy meal plan includes a variety of foods, contains fewer calories, and helps you stay healthy  A healthy meal plan includes the following:  · Eat whole-grain foods more often  A healthy meal plan should contain fiber  Fiber is the part of grains, fruits, and vegetables that is not broken down by your body  Whole-grain foods are healthy and provide extra fiber in your diet  Some examples of whole-grain foods are whole-wheat breads and pastas, oatmeal, brown rice, and bulgur  · Eat a variety of vegetables every day  Include dark, leafy greens such as spinach, kale, nai greens, and mustard greens  Eat yellow and orange vegetables such as carrots, sweet potatoes, and winter squash  · Eat a variety of fruits every day  Choose fresh or canned fruit (canned in its own juice or light syrup) instead of juice  Fruit juice has very little or no fiber  · Eat low-fat dairy foods    Drink fat-free (skim) milk or 1% milk  Eat fat-free yogurt and low-fat cottage cheese  Try low-fat cheeses such as mozzarella and other reduced-fat cheeses  · Choose meat and other protein foods that are low in fat  Choose beans or other legumes such as split peas or lentils  Choose fish, skinless poultry (chicken or turkey), or lean cuts of red meat (beef or pork)  Before you cook meat or poultry, cut off any visible fat  · Use less fat and oil  Try baking foods instead of frying them  Add less fat, such as margarine, sour cream, regular salad dressing and mayonnaise to foods  Eat fewer high-fat foods  Some examples of high-fat foods include french fries, doughnuts, ice cream, and cakes  · Eat fewer sweets  Limit foods and drinks that are high in sugar  This includes candy, cookies, regular soda, and sweetened drinks  Exercise:  Exercise at least 30 minutes per day on most days of the week  Some examples of exercise include walking, biking, dancing, and swimming  You can also fit in more physical activity by taking the stairs instead of the elevator or parking farther away from stores  Ask your healthcare provider about the best exercise plan for you  © Copyright [employer] 2018 Information is for End User's use only and may not be sold, redistributed or otherwise used for commercial purposes   All illustrations and images included in CareNotes® are the copyrighted property of [employer]  or [address] [place] [address]

## 2022-10-26 ENCOUNTER — TELEPHONE (OUTPATIENT)
Dept: GERIATRICS | Age: 62
End: 2022-10-26

## 2022-10-26 NOTE — TELEPHONE ENCOUNTER
Spoke with patient to advise due to his age (58 years old)  he would need to be seen by Neurology  Patient expressed understanding, and transferred call  to Neurology

## 2022-10-26 NOTE — TELEPHONE ENCOUNTER
Ayan Mojica [facility]  [address], [address] [place] [address]    [phone]    Telephone Intake: Geriatric Assessment     Referral source: doctor    Caller who is scheduling/relationship to pt: Sara lennon phone number: [phone]    Reason for referral: Patient concerns , Family member concerns and Provider concerns regarding memory concerns  If there are behavioral concerns, is the pt prescribed medications to manage these? no   If so, how many? none   Has the patient ever had an inpatient psychiatric hospitalization? No,   What is the goal of the visit? initial assessment and diagnosis     Has the patient been seen by a Neurologist or Geriatrician? No   If yes, is this appointment for a second opinion? No  Has the patient ever been diagnosed with dementia? No      Preferred language? English  Highest education level? 8th Grade  Does the patient wear glasses? Yes   Does the patient use hearing aids? No     Is there a living will/healthcare POA in place/If so, who? No,     Does the pt/caregiver have access for a virtual visit (computer/smart phone with audio/video)? Yes     Caller was informed:   Please make sure the pt is accompanied by someone who knows them well / caregiver / family member to participate in this appointment  Who will accompany the pt (name and relationship)? SisterMack   Phone number of person accompanying pt: [phone]  Please make sure the pt attends all appointments, including the assessment, care conference, follow-up, whether in-person or virtual     Office packet mailed out to: [id number] [place] [facility] [id number]    Added to wait list for sooner appointments?  Yes     NOTE FOR : Please route to provider for chart review prior to scheduling and let the caller know that this phone intake will be reviewed IF -  • Pt was recently hospitalized  • Pt is prescribed medications for behavior management or has a history of psychiatric hospitalization  • Pt plans to attend alone

## 2022-10-28 RX ORDER — FLUTICASONE PROPIONATE AND SALMETEROL XINAFOATE 115; 21 UG/1; UG/1
2 AEROSOL, METERED RESPIRATORY (INHALATION) 2 TIMES DAILY
Qty: 36 G | Refills: 0 | Status: SHIPPED | OUTPATIENT
Start: 2022-10-28

## 2022-10-31 ENCOUNTER — TELEPHONE (OUTPATIENT)
Dept: FAMILY MEDICINE CLINIC | Facility: CLINIC | Age: 62
End: 2022-10-31

## 2022-10-31 NOTE — TELEPHONE ENCOUNTER
10/31/22    PCP SIGNATURE NEEDED FOR CoverMyMed [employer] Auth / [employer] FORM RECEIVED VIA FAX AND PLACED IN PCP FOLDER TO BE DELIVERED AT ASSIGNED TIMES

## 2022-11-02 ENCOUNTER — TELEPHONE (OUTPATIENT)
Dept: FAMILY MEDICINE CLINIC | Facility: CLINIC | Age: 62
End: 2022-11-02

## 2022-11-02 NOTE — ASSESSMENT & PLAN NOTE
Memory difficulties may be related to pt's Depression  Reminded pt that in the past was referred to geriatrics  Will place new referral order

## 2022-11-02 NOTE — PROGRESS NOTES
Assessment and Plan:     Problem List Items Addressed This Visit        Endocrine    Type 2 diabetes mellitus without complication, without long-term current use of insulin ([facility] [address] )    Relevant Orders    IRIS Diabetic eye exam       Respiratory    Reactive airway disease    Relevant Medications    fluticasone-salmeterol (Advair HFA) 115-21 MCG/ACT inhaler       Other    Paresthesia of both hands     Chronic paresthesias and wekanes of bilateral hands  Cervical radiculopathy vs Carpel tunnel  Refer Orthopedics         Relevant Orders    Ambulatory Referral to Orthopedic Surgery    Memory difficulties     Memory difficulties may be related to pt's Depression  Reminded pt that in the past was referred to geriatrics  Will place new referral order           Relevant Orders    Ambulatory Referral to [facility]      Other Visit Diagnoses     Medicare annual wellness visit, subsequent    -  Primary    Encounter for immunization        Relevant Orders    influenza vaccine, quadrivalent, recombinant, PF, 0 5 mL, for patients 18 yr+ (FLUBLOK) (Completed)           Preventive health issues were discussed with patient, and age appropriate screening tests were ordered as noted in patient's After Visit Summary  Personalized health advice and appropriate referrals for health education or preventive services given if needed, as noted in patient's After Visit Summary  History of Present Illness:     Patient presents for a Medicare Wellness Visit    Desi Romo is a 62year old male who presents for an Annual Wellness Medicare Visit  He is concerned today about chornic pain in his back, and chronic paresthesias of his bilateral fingers which cause him to drop objects at times  He also feels like he is having memory issues for a while now and can't remember recent events or dates  He does have a h/o of depression and is being tretaed at [facility]  He lives with his 15year old daughter at home       Patient Care Team:  Rehab Ayaan Lacey as PCP - General (Family Medicine)  Francisco Gilman MD as PCP - [address] [place], [place] [address]Th [facility] ([facility])     Review of Systems:     Review of Systems   Constitutional: Negative for chills and fever  HENT: Negative for congestion, rhinorrhea and sore throat  Respiratory: Negative for cough and shortness of breath  Cardiovascular: Negative for chest pain  Gastrointestinal: Negative for diarrhea, nausea and vomiting  Skin: Negative for rash  Neurological: Positive for weakness and numbness  Negative for dizziness and headaches          Problem List:     Patient Active Problem List   Diagnosis   • Benign essential HTN   • Hyperlipidemia   • Type 2 diabetes mellitus without complication, without long-term current use of insulin ([facility])   • BPH with obstruction/lower urinary tract symptoms   • Low back pain with left-sided sciatica   • Chronic pain syndrome   • Class 1 obesity due to excess calories with serious comorbidity and body mass index (BMI) of 30 0 to 30 9 in adult   • Lumbar radiculopathy   • Encounter for narcotic contract discussion   • Post-acute sequelae of COVID-19 ([facility])   • Reactive airway disease   • Current mild episode of major depressive disorder without prior episode ([facility])   • Diastolic dysfunction   • Lumbar foraminal stenosis   • Facet arthropathy, lumbosacral   • Paresthesia of both hands   • Memory difficulties   • Neck muscle spasm   • Tinnitus of left ear   • Continuous opioid dependence ([address] )   • Need for follow-up by       Past Medical and Surgical History:     Past Medical History:   Diagnosis Date   • Asthma    • Back pain    • Back pain    • 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   • Depression    • Diabetes mellitus ([address] )    • Hyperlipidemia    • Hypertension    • Type 2 diabetes mellitus without complication, without long-term current use of insulin ([address] ) 10/16/2020     Past Surgical History:   Procedure Laterality Date   • CYST REMOVAL 2017      Family History:     Family History   Problem Relation Age of Onset   • Hypertension Mother    • Diabetes Mother    • Cancer Father    • Diabetes Family    • Hypertension Family       Social History:     Social History     Socioeconomic History   • Marital status: /Civil Union     Spouse name: None   • Number of children: None   • Years of education: None   • Highest education level: None   Occupational History   • None   Tobacco Use   • Smoking status: Never Smoker   • Smokeless tobacco: Never Used   Vaping Use   • Vaping Use: Never used   Substance and Sexual Activity   • Alcohol use: Not Currently     Comment: rare   • Drug use: Never   • Sexual activity: Not Currently   Other Topics Concern   • None   Social History Narrative   • None     Social Determinants of Health     Financial Resource Strain: Low Risk    • Difficulty of Paying Living Expenses: Not hard at all   Food Insecurity: No Food Insecurity   • Worried About Running Out of Food in the Last Year: Never true   • Ran Out of Food in the Last Year: Never true   Transportation Needs: No Transportation Needs   • Lack of Transportation (Medical): No   • Lack of Transportation (Non-Medical): No   Physical Activity: Not on file   Stress: Not on file   Social Connections: Not on file   Intimate Partner Violence: Not on file   Housing Stability: Unknown   • Unable to Pay for Housing in the Last Year: No   • Number of Places Lived in the Last Year: Not on file   • Unstable Housing in the Last Year: No      Medications and Allergies:     Current Outpatient Medications   Medication Sig Dispense Refill   • fluticasone-salmeterol (Advair HFA) 115-21 MCG/ACT inhaler Inhale 2 puffs 2 (two) times a day Rinse mouth after use   36 g 0   • acetaminophen (TYLENOL) 500 mg tablet Take 1 tablet (500 mg total) by mouth every 6 (six) hours as needed for mild pain 30 tablet 0   • albuterol (2 5 mg/3 mL) 0 083 % nebulizer solution Take 3 mL (2 5 mg total) by nebulization every 6 (six) hours as needed for wheezing or shortness of breath 180 mL 5   • aluminum-magnesium hydroxide-simethicone (MAALOX) 200-200-20 MG/5ML SUSP Take 15 mL by mouth 4 (four) times a day (before meals and at bedtime) 150 mL 0   • amLODIPine (NORVASC) 10 mg tablet take 1 tablet by mouth once daily 90 tablet 1   • atorvastatin (LIPITOR) 20 mg tablet take 1 tablet by mouth once daily 90 tablet 0   • benzonatate (TESSALON PERLES) 100 mg capsule Take 1 capsule (100 mg total) by mouth 3 (three) times a day as needed for cough 30 capsule 0   • Bioflavonoid Products (RA Vitamin C CR) TBCR take 1 tablet by mouth twice a day for 14 days     • Blood Glucose Monitoring Suppl (OneTouch Verio) w/Device KIT Use daily 1 kit 0   • Blood Glucose Monitoring Suppl KIT Use in the morning Please give test strips and lancets  Dx  E11 9 1 kit 0   • Blood Pressure KIT Use in the morning With appropriate size cuff 1 kit 0   • Blood Pressure Monitoring (Blood Pressure Monitor/Arm) EMILY Use daily 1 each 0   • cyclobenzaprine (FLEXERIL) 10 mg tablet Take 1 tablet (10 mg total) by mouth daily at bedtime as needed for muscle spasms 30 tablet 0   • Diclofenac Sodium (VOLTAREN) 1 % Apply 2 g topically 4 (four) times a day as needed (low back or neck pain) 150 g 2   • DULoxetine (CYMBALTA) 60 mg delayed release capsule Take 60 mg by mouth 2 (two) times a day     • famotidine (PEPCID) 20 mg tablet Take 1 tablet (20 mg total) by mouth 2 (two) times a day As needed for acid reflux 90 tablet 0   • fluticasone (FLONASE) 50 mcg/act nasal spray 1 spray into each nostril daily 16 g 0   • fluticasone (FLONASE) 50 mcg/act nasal spray 1 spray into each nostril daily (Patient not taking: Reported on 9/29/2022) 16 g 0   • gabapentin (NEURONTIN) 800 mg tablet Take 1 tablet (800 mg total) by mouth 3 (three) times a day 90 tablet 3   • glucose blood (OneTouch Verio) test strip Check blood sugar daily 100 each 2   • glucose monitoring kit (FREESTYLE) monitoring kit Use 1 each in the morning TESTING DAILY IN THE AM 1 each 0   • HYDROcodone-acetaminophen (NORCO) 7 5-325 mg per tablet Take 1 tablet by mouth every 8 (eight) hours as needed for pain For ongoing pain Max Daily Amount: 3 tablets 90 tablet 0   • Lancet Devices (ONE TOUCH DELICA LANCING DEV) MISC Use daily 1 each 2   • Lancets (freestyle) lancets TESTING DAILY IN THE  each 3   • Lancets (onetouch ultrasoft) lancets Use daily Use as instructed to check sugars daily (Patient not taking: Reported on 9/29/2022) 100 each 1   • lidocaine (Lidoderm) 5 % Apply 1 patch topically daily Remove & Discard patch within 12 hours or as directed by MD 30 patch 1   • lidocaine (LMX) 4 % cream Apply topically as needed for mild pain 30 g 0   • menthol-cetylpyridinium (CEPACOL) 3 MG lozenge Take 1 lozenge (3 mg total) by mouth as needed for sore throat 30 lozenge 0   • metFORMIN (GLUCOPHAGE) 500 mg tablet Take 1 tablet (500 mg total) by mouth 2 (two) times a day with meals 60 tablet 3   • Mirabegron ER 50 MG TB24 Take 1 tablet (50 mg total) by mouth daily 60 tablet 6   • Multiple Vitamin (multivitamin) capsule Take 1 capsule by mouth daily for 14 days 14 capsule 0   • naloxone (NARCAN) 4 mg/0 1 mL nasal spray Administer 1 spray into a nostril  If no response after 2-3 minutes, give another dose in the other nostril using a new spray   1 each 1   • ondansetron (Zofran ODT) 4 mg disintegrating tablet Take 1 tablet (4 mg total) by mouth every 6 (six) hours as needed for nausea or vomiting 20 tablet 0   • OneTouch Delica Lancets 33S MISC Use daily (Patient not taking: Reported on 9/29/2022) 100 each 1   • OneTouch Delica Lancets 80O MISC Use in the morning (Patient not taking: Reported on 9/29/2022) 100 each 6   • tamsulosin (FLOMAX) 0 4 mg Take 1 capsule (0 4 mg total) by mouth daily with dinner 90 capsule 1   • venlafaxine (EFFEXOR-XR) 150 mg 24 hr capsule take 1 capsule by mouth once daily WITH BREAKFAST 30 capsule 2   • Ventolin  (90 Base) MCG/ACT inhaler INHALE 2 PUFFS EVERY 4 HOURS AS NEEDED FOR COUGHING 18 g 0   • zolpidem (AMBIEN) 5 mg tablet Take 1 tablet (5 mg total) by mouth daily at bedtime as needed for sleep 30 tablet 0     No current facility-administered medications for this visit  No Known Allergies   Immunizations:     Immunization History   Administered Date(s) Administered   • COVID-19 MODERNA VACC 0 5 ML IM 04/12/2021, 05/10/2021   • Influenza, recombinant, quadrivalent,injectable, preservative free 03/17/2021, 10/14/2021, 10/24/2022   • Pneumococcal Polysaccharide PPV23 10/19/2019      Health Maintenance:         Topic Date Due   • Colorectal Cancer Screening  10/10/2026   • HIV Screening  Completed   • Hepatitis C Screening  Completed         Topic Date Due   • COVID-19 Vaccine (3 - Booster for Lars Diaz series) 10/10/2021      Medicare Screening Tests and Risk Assessments:     Valeri Gutierrez is here for his Subsequent Wellness visit  Health Risk Assessment:   Patient rates overall health as poor  Patient feels that their physical health rating is slightly worse  Patient is dissatisfied with their life  Eyesight was rated as slightly worse  Hearing was rated as same  Patient feels that their emotional and mental health rating is slightly worse  Patients states they are sometimes angry  Patient states they are sometimes unusually tired/fatigued  Pain experienced in the last 7 days has been a lot  Patient's pain rating has been 8/10  Patient states that he has experienced no weight loss or gain in last 6 months  Lower back pain  He feels like he may have memory issue as well, + h/o of depression    Depression Screening:   PHQ-9 Score: 10      Fall Risk Screening:    In the past year, patient has experienced: history of falling in past year    Number of falls: 2 or more  Injured during fall?: Yes    Feels unsteady when standing or walking?: Yes    Worried about falling?: Yes      Home Safety:  Patient has trouble with stairs inside or outside of their home  Patient has working smoke alarms and has working carbon monoxide detector  Home safety hazards include: none  His legs feel weak and has fallen more than twice    Nutrition:   Current diet is Diabetic and Low Cholesterol  Medications:   Patient is not currently taking any over-the-counter supplements  Patient is able to manage medications  Activities of Daily Living (ADLs)/Instrumental Activities of Daily Living (IADLs):   Walk and transfer into and out of bed and chair?: Yes  Dress and groom yourself?: Yes    Bathe or shower yourself?: No    Feed yourself?  Yes  Do your laundry/housekeeping?: No  Manage your money, pay your bills and track your expenses?: No  Make your own meals?: No    Do your own shopping?: No    ADL comments: Pt has helper 2-3 hours/day help him get dress, shopping    Previous Hospitalizations:   Any hospitalizations or ED visits within the last 12 months?: No      Advance Care Planning:   Living will: No    Durable POA for healthcare: No    Advanced directive: No    Advanced directive counseling given: No    Five wishes given: Yes    Patient declined ACP directive: No    End of Life Decisions reviewed with patient: No    Provider agrees with end of life decisions: No      PREVENTIVE SCREENINGS      Cardiovascular Screening:    General: History Lipid Disorder and Screening Current      Diabetes Screening:     General: History Diabetes and Screening Current      Colorectal Cancer Screening:     General: Screening Current      Prostate Cancer Screening:    General: Screening Current      Osteoporosis Screening:    General: Screening Not Indicated      Abdominal Aortic Aneurysm (AAA) Screening:        General: Screening Not Indicated      Lung Cancer Screening:     General: Screening Not Indicated      Hepatitis C Screening:    General: Screening Current    Screening, Brief Intervention, and Referral to Treatment (SBIRT)    Screening  Typical number of drinks in a day: 0  Typical number of drinks in a week: 0  Interpretation: Low risk drinking behavior  Single Item Drug Screening:  How often have you used an illegal drug (including marijuana) or a prescription medication for non-medical reasons in the past year? never    Single Item Drug Screen Score: 0  Interpretation: Negative screen for possible drug use disorder    Brief Intervention  Alcohol & drug use screenings were reviewed  No concerns regarding substance use disorder identified  Other Counseling Topics:   Car/seat belt/driving safety and regular weightbearing exercise  No exam data present     Physical Exam:     /84 (BP Location: Left arm, Patient Position: Sitting, Cuff Size: Standard)   Pulse 86   Temp 98 8 °F (37 1 °C) (Temporal)   Resp 18   Ht 5' 7" (1 702 m)   Wt 91 kg (200 lb 9 6 oz)   SpO2 97%   BMI 31 42 kg/m²     Physical Exam  Vitals and nursing note reviewed  Constitutional:       Appearance: He is well-developed  HENT:      Head: Normocephalic and atraumatic  Eyes:      Conjunctiva/sclera: Conjunctivae normal    Cardiovascular:      Rate and Rhythm: Normal rate and regular rhythm  Heart sounds: No murmur heard  Pulmonary:      Effort: Pulmonary effort is normal  Prolonged expiration present  No respiratory distress  Breath sounds: Examination of the right-lower field reveals decreased breath sounds  Examination of the left-lower field reveals decreased breath sounds  Decreased breath sounds present  Abdominal:      Palpations: Abdomen is soft  Tenderness: There is no abdominal tenderness  Musculoskeletal:      Cervical back: Neck supple  Skin:     General: Skin is warm and dry  Neurological:      Mental Status: He is alert            Tadeo Mckenzie MD

## 2022-11-02 NOTE — TELEPHONE ENCOUNTER
Patient is asking for refills for     HYDROcodone-acetaminophen (NORCO) 7 5-325 mg per tablet      Please advise

## 2022-11-02 NOTE — ASSESSMENT & PLAN NOTE
Chronic paresthesias and wekanes of bilateral hands  Cervical radiculopathy vs Carpel tunnel  Refer Orthopedics

## 2022-11-03 DIAGNOSIS — R41.3 MEMORY DIFFICULTIES: Primary | ICD-10-CM

## 2022-11-03 NOTE — TELEPHONE ENCOUNTER
I changed the inhaler, so it should be covered by the insurance now  Could you please call the patient and make sure he received his inhaler?   Thank you

## 2022-11-04 DIAGNOSIS — M54.50 CHRONIC LOW BACK PAIN WITHOUT SCIATICA, UNSPECIFIED BACK PAIN LATERALITY: ICD-10-CM

## 2022-11-04 DIAGNOSIS — M54.16 LUMBAR RADICULOPATHY: ICD-10-CM

## 2022-11-04 DIAGNOSIS — G89.29 CHRONIC LOW BACK PAIN WITHOUT SCIATICA, UNSPECIFIED BACK PAIN LATERALITY: ICD-10-CM

## 2022-11-04 RX ORDER — HYDROCODONE BITARTRATE AND ACETAMINOPHEN 7.5; 325 MG/1; MG/1
1 TABLET ORAL EVERY 8 HOURS PRN
Qty: 90 TABLET | Refills: 0 | Status: SHIPPED | OUTPATIENT
Start: 2022-11-04 | End: 2022-12-07 | Stop reason: SDUPTHER

## 2022-11-04 NOTE — PROGRESS NOTES
Received message from Geriatrics that due to patient's age, he should be seeing neurology for memory difficulties  So referral to neurology has been placed

## 2022-11-08 ENCOUNTER — TELEPHONE (OUTPATIENT)
Dept: FAMILY MEDICINE CLINIC | Facility: CLINIC | Age: 62
End: 2022-11-08

## 2022-11-08 ENCOUNTER — PATIENT OUTREACH (OUTPATIENT)
Dept: FAMILY MEDICINE CLINIC | Facility: CLINIC | Age: 62
End: 2022-11-08

## 2022-11-08 NOTE — TELEPHONE ENCOUNTER
11/08/22    Called pt, no answer, left detailed message about Med being changed / relating PCP Message  If Pt contacts office in regard of Med, Please assist pt with relating message from PCP Dr Zaragoza Soo

## 2022-11-08 NOTE — TELEPHONE ENCOUNTER
11/08/22    Pt came into the office today requesting a medical necessity letter for [facility]  Pt stated that his Gas has been turned off and company is requesting a letter that states all his Medical Conditions and the reason he should be accommodate in the meantime  Informed Pt the process / steps for the request, Pt UNDERSTOOD & AGREED, but was requested “please reach out to [facility] I have no gas”     Pt provided Contact & Fax # for any questions  [facility] [place] # [phone]  Fax # [phone]  Att: Mr Christianson Phillip / Supervisor       Any questions Please contact Pt

## 2022-11-08 NOTE — PROGRESS NOTES
JEROD ALMARAZ received IB message from the  regarding patient call the office asking for a letter for [facility] as he service has been turned off  Per chart review a [facility] form was given to patient on 7/28/22 for the same reason  JEROD ALMARAZ placed call to patient to further assess  No answer, left a details vm and ask for a return call  Dr Gerald Vega is out of the office and would not be able to sing the form  Dr Master Win would be signing the form  JEROD ALMARAZ placed call to I [phone]  JEROD ALMARAZ requested the Emergency medical Certification form  Form was received  Form was signed and faxed to [facility]  Fax notification was received  JEROD ALMARAZ will follow-up with patient

## 2022-11-09 ENCOUNTER — PATIENT OUTREACH (OUTPATIENT)
Dept: FAMILY MEDICINE CLINIC | Facility: CLINIC | Age: 62
End: 2022-11-09

## 2022-11-09 ENCOUNTER — TELEPHONE (OUTPATIENT)
Dept: FAMILY MEDICINE CLINIC | Facility: CLINIC | Age: 62
End: 2022-11-09

## 2022-11-09 NOTE — PROGRESS NOTES
JEROD ALMARAZ placed call to patient to follow-up and further assess  Pr reports JONATHONI called him that they are putting his service back today pt states the house is very cold and the hot water and heating is gas  JEROD ALMARAZ verbalized understanding and informs patient with the Medical form he has 30 days to make a payment or a payment plan to avoid getting service turned off again  Pt states [employer] [facility] [employer] is going to help him apply for the CAP program  Pt states he complete the application for LIHEAP and CAP  JEROD ALMARAZ verbalized understanding and informs patient if he need additional help completing the applications to call JEROD ALMARAZ  Pt verbalized understanding  No additional outreach scheduled at this time  Will remains available as needed

## 2022-11-11 DIAGNOSIS — K21.9 GASTROESOPHAGEAL REFLUX DISEASE, UNSPECIFIED WHETHER ESOPHAGITIS PRESENT: ICD-10-CM

## 2022-11-14 RX ORDER — FAMOTIDINE 20 MG/1
TABLET, FILM COATED ORAL
Qty: 90 TABLET | Refills: 0 | Status: SHIPPED | OUTPATIENT
Start: 2022-11-14

## 2022-11-24 DIAGNOSIS — N40.1 BENIGN PROSTATIC HYPERPLASIA WITH LOWER URINARY TRACT SYMPTOMS, SYMPTOM DETAILS UNSPECIFIED: ICD-10-CM

## 2022-11-24 NOTE — TELEPHONE ENCOUNTER
FAXED ON 08/05/22 TO HOME CARE DELIVERED at [phone]  FAX CONFIRMATION RECEIVED  NOTE: form with Confirmation Fax has been scanned into Pt Chart 
Form was completed on 08/05/22 and placed in to be faxed bin   Thanks
PCP SIGNATURE NEEDED FOR HOME CARE DELIVERED  FORM RECEIVED VIA FAX AND PLACED IN PCP FOLDER TO BE DELIVERED AT ASSIGNED TIMES      INCONTINENCE SUPPLY
1

## 2022-11-25 RX ORDER — TAMSULOSIN HYDROCHLORIDE 0.4 MG/1
CAPSULE ORAL
Qty: 90 CAPSULE | Refills: 1 | Status: SHIPPED | OUTPATIENT
Start: 2022-11-25

## 2022-11-28 ENCOUNTER — TELEPHONE (OUTPATIENT)
Dept: FAMILY MEDICINE CLINIC | Facility: CLINIC | Age: 62
End: 2022-11-28

## 2022-11-28 NOTE — TELEPHONE ENCOUNTER
11/28/22    PCP SIGNATURE NEEDED FOR MEDICARE Part B Detailed Written ORDER  FORM RECEIVED VIA FAX AND PLACED IN PCP FOLDER TO BE DELIVERED AT ASSIGNED TIMES      [employer]

## 2022-12-01 DIAGNOSIS — M54.16 LUMBAR RADICULOPATHY: ICD-10-CM

## 2022-12-01 DIAGNOSIS — M54.50 CHRONIC LOW BACK PAIN WITHOUT SCIATICA, UNSPECIFIED BACK PAIN LATERALITY: ICD-10-CM

## 2022-12-01 DIAGNOSIS — G89.29 CHRONIC LOW BACK PAIN WITHOUT SCIATICA, UNSPECIFIED BACK PAIN LATERALITY: ICD-10-CM

## 2022-12-01 DIAGNOSIS — G89.29 CHRONIC BILATERAL LOW BACK PAIN WITH LEFT-SIDED SCIATICA: ICD-10-CM

## 2022-12-01 DIAGNOSIS — M54.42 CHRONIC BILATERAL LOW BACK PAIN WITH LEFT-SIDED SCIATICA: ICD-10-CM

## 2022-12-01 DIAGNOSIS — M62.838 NECK MUSCLE SPASM: ICD-10-CM

## 2022-12-01 NOTE — TELEPHONE ENCOUNTER
Medicare Part B form received on 12/01/22  to be completed by PCP  Copy made and placed in PCP folder  Forms to be delivered to PCP mailbox at assigned time

## 2022-12-01 NOTE — TELEPHONE ENCOUNTER
Pt needs refill    HYDROcodone-acetaminophen (NORCO) 7 5-325 mg per tablet     gabapentin (NEURONTIN) 800 mg tablet    lidocaine (Lidoderm) 5 %     Diclofenac Sodium (VOLTAREN) 1 %

## 2022-12-03 NOTE — TELEPHONE ENCOUNTER
FAXED ON 12/01/2022 TO [facility] at [phone]  FAX CONFIRMATION RECEIVED   SCANNED IN CHART

## 2022-12-06 RX ORDER — GABAPENTIN 800 MG/1
800 TABLET ORAL 3 TIMES DAILY
Qty: 90 TABLET | Refills: 0 | Status: CANCELLED | OUTPATIENT
Start: 2022-12-06

## 2022-12-06 RX ORDER — LIDOCAINE 50 MG/G
1 PATCH TOPICAL DAILY
Qty: 30 PATCH | Refills: 1 | Status: SHIPPED | OUTPATIENT
Start: 2022-12-06 | End: 2022-12-07 | Stop reason: SDUPTHER

## 2022-12-06 RX ORDER — HYDROCODONE BITARTRATE AND ACETAMINOPHEN 7.5; 325 MG/1; MG/1
1 TABLET ORAL EVERY 8 HOURS PRN
Qty: 90 TABLET | Refills: 0 | Status: CANCELLED | OUTPATIENT
Start: 2022-12-06

## 2022-12-07 DIAGNOSIS — M54.16 LUMBAR RADICULOPATHY: ICD-10-CM

## 2022-12-07 DIAGNOSIS — E11.9 TYPE 2 DIABETES MELLITUS WITHOUT COMPLICATION, WITHOUT LONG-TERM CURRENT USE OF INSULIN (HCC): ICD-10-CM

## 2022-12-07 DIAGNOSIS — M62.838 NECK MUSCLE SPASM: ICD-10-CM

## 2022-12-07 DIAGNOSIS — M54.50 CHRONIC LOW BACK PAIN WITHOUT SCIATICA, UNSPECIFIED BACK PAIN LATERALITY: ICD-10-CM

## 2022-12-07 DIAGNOSIS — G89.29 CHRONIC LOW BACK PAIN WITHOUT SCIATICA, UNSPECIFIED BACK PAIN LATERALITY: ICD-10-CM

## 2022-12-07 DIAGNOSIS — M54.42 CHRONIC BILATERAL LOW BACK PAIN WITH LEFT-SIDED SCIATICA: ICD-10-CM

## 2022-12-07 DIAGNOSIS — G89.29 CHRONIC BILATERAL LOW BACK PAIN WITH LEFT-SIDED SCIATICA: ICD-10-CM

## 2022-12-07 RX ORDER — GABAPENTIN 800 MG/1
800 TABLET ORAL 3 TIMES DAILY
Qty: 90 TABLET | Refills: 3 | Status: SHIPPED | OUTPATIENT
Start: 2022-12-07 | End: 2023-03-23

## 2022-12-07 RX ORDER — LIDOCAINE 50 MG/G
1 PATCH TOPICAL DAILY
Qty: 30 PATCH | Refills: 1 | Status: SHIPPED | OUTPATIENT
Start: 2022-12-07

## 2022-12-07 RX ORDER — HYDROCODONE BITARTRATE AND ACETAMINOPHEN 7.5; 325 MG/1; MG/1
1 TABLET ORAL EVERY 8 HOURS PRN
Qty: 90 TABLET | Refills: 0 | Status: SHIPPED | OUTPATIENT
Start: 2022-12-07 | End: 2023-01-03 | Stop reason: SDUPTHER

## 2022-12-07 RX ORDER — BLOOD SUGAR DIAGNOSTIC
STRIP MISCELLANEOUS
Qty: 100 EACH | Refills: 2 | Status: SHIPPED | OUTPATIENT
Start: 2022-12-07

## 2022-12-25 DIAGNOSIS — E11.9 TYPE 2 DIABETES MELLITUS WITHOUT COMPLICATION, WITHOUT LONG-TERM CURRENT USE OF INSULIN (HCC): ICD-10-CM

## 2022-12-26 DIAGNOSIS — K21.9 GASTROESOPHAGEAL REFLUX DISEASE, UNSPECIFIED WHETHER ESOPHAGITIS PRESENT: ICD-10-CM

## 2022-12-27 RX ORDER — FAMOTIDINE 20 MG/1
TABLET, FILM COATED ORAL
Qty: 90 TABLET | Refills: 0 | Status: SHIPPED | OUTPATIENT
Start: 2022-12-27

## 2022-12-30 ENCOUNTER — TELEPHONE (OUTPATIENT)
Dept: FAMILY MEDICINE CLINIC | Facility: CLINIC | Age: 62
End: 2022-12-30

## 2022-12-30 NOTE — TELEPHONE ENCOUNTER
PCP SIGNATURE NEEDED FOR [facility] FORM RECEIVED VIA FAX AND PLACED IN PCP FOLDER TO BE DELIVERED AT ASSIGNED TIMES

## 2022-12-31 ENCOUNTER — APPOINTMENT (EMERGENCY)
Dept: RADIOLOGY | Facility: HOSPITAL | Age: 62
End: 2022-12-31

## 2022-12-31 ENCOUNTER — HOSPITAL ENCOUNTER (EMERGENCY)
Facility: HOSPITAL | Age: 62
Discharge: HOME | End: 2022-12-31
Attending: EMERGENCY MEDICINE

## 2022-12-31 VITALS
DIASTOLIC BLOOD PRESSURE: 95 MMHG | RESPIRATION RATE: 20 BRPM | BODY MASS INDEX: 32.12 KG/M2 | TEMPERATURE: 98 F | OXYGEN SATURATION: 98 % | HEART RATE: 83 BPM | SYSTOLIC BLOOD PRESSURE: 157 MMHG | WEIGHT: 205.1 LBS

## 2022-12-31 DIAGNOSIS — J40 BRONCHITIS: Primary | ICD-10-CM

## 2022-12-31 LAB
ALBUMIN SERPL BCP-MCNC: 4.6 G/DL (ref 3.5–5)
ALP SERPL-CCNC: 58 U/L (ref 43–122)
ALT SERPL W P-5'-P-CCNC: 38 U/L
ANION GAP SERPL CALCULATED.3IONS-SCNC: 8 MMOL/L (ref 5–14)
AST SERPL W P-5'-P-CCNC: 38 U/L (ref 17–59)
BASOPHILS # BLD AUTO: 0.02 THOUSANDS/ÂΜL (ref 0–0.1)
BASOPHILS NFR BLD AUTO: 0 % (ref 0–1)
BILIRUB SERPL-MCNC: 0.67 MG/DL (ref 0.2–1)
BUN SERPL-MCNC: 14 MG/DL (ref 5–25)
CALCIUM SERPL-MCNC: 9.6 MG/DL (ref 8.4–10.2)
CARDIAC TROPONIN I PNL SERPL HS: 5 NG/L
CHLORIDE SERPL-SCNC: 104 MMOL/L (ref 96–108)
CO2 SERPL-SCNC: 27 MMOL/L (ref 21–32)
CREAT SERPL-MCNC: 0.97 MG/DL (ref 0.7–1.5)
EOSINOPHIL # BLD AUTO: 0.11 THOUSAND/ÂΜL (ref 0–0.61)
EOSINOPHIL NFR BLD AUTO: 2 % (ref 0–6)
ERYTHROCYTE [DISTWIDTH] IN BLOOD BY AUTOMATED COUNT: 13.3 % (ref 11.6–15.1)
GFR SERPL CREATININE-BSD FRML MDRD: 83 ML/MIN/1.73SQ M
GLUCOSE SERPL-MCNC: 158 MG/DL (ref 70–99)
HCT VFR BLD AUTO: 42.8 % (ref 36.5–49.3)
HGB BLD-MCNC: 14.9 G/DL (ref 12–17)
IMM GRANULOCYTES # BLD AUTO: 0.01 THOUSAND/UL (ref 0–0.2)
IMM GRANULOCYTES NFR BLD AUTO: 0 % (ref 0–2)
LYMPHOCYTES # BLD AUTO: 2.28 THOUSANDS/ÂΜL (ref 0.6–4.47)
LYMPHOCYTES NFR BLD AUTO: 48 % (ref 14–44)
MAGNESIUM SERPL-MCNC: 2.1 MG/DL (ref 1.6–2.3)
MCH RBC QN AUTO: 29.3 PG (ref 26.8–34.3)
MCHC RBC AUTO-ENTMCNC: 34.8 G/DL (ref 31.4–37.4)
MCV RBC AUTO: 84 FL (ref 82–98)
MONOCYTES # BLD AUTO: 0.46 THOUSAND/ÂΜL (ref 0.17–1.22)
MONOCYTES NFR BLD AUTO: 10 % (ref 4–12)
NEUTROPHILS # BLD AUTO: 1.91 THOUSANDS/ÂΜL (ref 1.85–7.62)
NEUTS SEG NFR BLD AUTO: 40 % (ref 43–75)
NRBC BLD AUTO-RTO: 0 /100 WBCS
NT-PROBNP SERPL-MCNC: 26.1 PG/ML (ref 0–299)
PLATELET # BLD AUTO: 204 THOUSANDS/UL (ref 149–390)
PMV BLD AUTO: 9.3 FL (ref 8.9–12.7)
POTASSIUM SERPL-SCNC: 4.3 MMOL/L (ref 3.5–5.3)
PROT SERPL-MCNC: 8.5 G/DL (ref 6.4–8.4)
RBC # BLD AUTO: 5.09 MILLION/UL (ref 3.88–5.62)
SODIUM SERPL-SCNC: 139 MMOL/L (ref 135–147)
WBC # BLD AUTO: 4.79 THOUSAND/UL (ref 4.31–10.16)

## 2022-12-31 RX ORDER — ASPIRIN 81 MG/1
324 TABLET, CHEWABLE ORAL ONCE
Status: COMPLETED | OUTPATIENT
Start: 2022-12-31 | End: 2022-12-31

## 2022-12-31 RX ORDER — AZITHROMYCIN 250 MG/1
500 TABLET, FILM COATED ORAL ONCE
Status: COMPLETED | OUTPATIENT
Start: 2022-12-31 | End: 2022-12-31

## 2022-12-31 RX ORDER — BENZONATATE 100 MG/1
100 CAPSULE ORAL ONCE
Status: COMPLETED | OUTPATIENT
Start: 2022-12-31 | End: 2022-12-31

## 2022-12-31 RX ORDER — AZITHROMYCIN 250 MG/1
TABLET, FILM COATED ORAL
Qty: 6 TABLET | Refills: 0 | Status: SHIPPED | OUTPATIENT
Start: 2022-12-31 | End: 2023-01-04

## 2022-12-31 RX ORDER — BENZONATATE 100 MG/1
100 CAPSULE ORAL 3 TIMES DAILY PRN
Qty: 20 CAPSULE | Refills: 0 | Status: SHIPPED | OUTPATIENT
Start: 2022-12-31

## 2022-12-31 RX ADMIN — AZITHROMYCIN MONOHYDRATE 500 MG: 250 TABLET ORAL at 11:09

## 2022-12-31 RX ADMIN — ASPIRIN 81 MG CHEWABLE TABLET 324 MG: 81 TABLET CHEWABLE at 10:22

## 2022-12-31 RX ADMIN — BENZONATATE 100 MG: 100 CAPSULE ORAL at 10:22

## 2022-12-31 NOTE — Clinical Note
Makenna Meehan was seen and treated in our emergency department on 12/31/2022  Diagnosis:     Ruth Angel  may return to work on return date  He may return on this date: 01/02/2023         If you have any questions or concerns, please don't hesitate to call        Pablo Eid MD    ______________________________           _______________          _______________  Hospital Representative                              Date                                Time

## 2022-12-31 NOTE — ED PROVIDER NOTES
History  Chief Complaint   Patient presents with   • Cough     Cough which has been making his chest and back hurt; sometimes productive, denies fevers/n/v/d; no meds used for symptoms     81-year-old male with history of hypertension hyperlipidemia diabetes presenting emerged department with chest pain  Patient notes that 3 days of substernal soreness and pressure-like pain  No nausea or vomiting  Nonradiating pain  Worse with cough  Has been having a cough for few days  No fever chills  No nasal congestion or sore throat  No nausea vomiting diarrhea  History provided by:  Patient  Chest Pain  Pain location:  Substernal area  Pain quality: dull    Pain radiates to:  Does not radiate  Pain radiates to the back: no    Pain severity:  Moderate  Onset quality:  Gradual  Duration:  3 days  Timing:  Constant  Progression:  Unchanged  Chronicity:  New  Context: not breathing    Relieved by:  Nothing  Worsened by:  Coughing  Associated symptoms: cough and shortness of breath    Associated symptoms: no abdominal pain, no back pain, no fever, no palpitations and not vomiting        Prior to Admission Medications   Prescriptions Last Dose Informant Patient Reported? Taking?    Bioflavonoid Products (RA Vitamin C CR) TBCR   Yes No   Sig: take 1 tablet by mouth twice a day for 14 days   Blood Glucose Monitoring Suppl (OneTouch Verio) w/Device KIT   No No   Sig: Use daily   Blood Glucose Monitoring Suppl KIT   No No   Sig: Use in the morning Please give test strips and lancets  Dx  E11 9   Blood Pressure KIT   No No   Sig: Use in the morning With appropriate size cuff   Blood Pressure Monitoring (Blood Pressure Monitor/Arm) EMILY   No No   Sig: Use daily   DULoxetine (CYMBALTA) 60 mg delayed release capsule   Yes No   Sig: Take 60 mg by mouth 2 (two) times a day   Diclofenac Sodium (VOLTAREN) 1 %   No No   Sig: Apply 2 g topically 4 (four) times a day as needed (low back or neck pain)   HYDROcodone-acetaminophen (NORCO) 7 5-325 mg per tablet   No No   Sig: Take 1 tablet by mouth every 8 (eight) hours as needed for pain For ongoing pain Max Daily Amount: 3 tablets   Lancet Devices (ONE TOUCH DELICA LANCING DEV) MISC   No No   Sig: Use daily   Lancets (freestyle) lancets   No No   Sig: TESTING DAILY IN THE AM   Lancets (onetouch ultrasoft) lancets   No No   Sig: Use daily Use as instructed to check sugars daily   Patient not taking: Reported on 9/29/2022   Mirabegron ER 50 MG TB24   No No   Sig: Take 1 tablet (50 mg total) by mouth daily   Multiple Vitamin (multivitamin) capsule   No No   Sig: Take 1 capsule by mouth daily for 14 days   OneTouch Delica Lancets 63E MISC   No No   Sig: Use daily   Patient not taking: Reported on 0/45/3166   OneTouch Delica Lancets 44E MISC   No No   Sig: Use in the morning   Patient not taking: Reported on 9/29/2022   Ventolin  (90 Base) MCG/ACT inhaler   No No   Sig: INHALE 2 PUFFS EVERY 4 HOURS AS NEEDED FOR COUGHING   acetaminophen (TYLENOL) 500 mg tablet   No No   Sig: Take 1 tablet (500 mg total) by mouth every 6 (six) hours as needed for mild pain   albuterol (2 5 mg/3 mL) 0 083 % nebulizer solution   No No   Sig: Take 3 mL (2 5 mg total) by nebulization every 6 (six) hours as needed for wheezing or shortness of breath   aluminum-magnesium hydroxide-simethicone (MAALOX) 200-200-20 MG/5ML SUSP   No No   Sig: Take 15 mL by mouth 4 (four) times a day (before meals and at bedtime)   amLODIPine (NORVASC) 10 mg tablet   No No   Sig: take 1 tablet by mouth once daily   atorvastatin (LIPITOR) 20 mg tablet   No No   Sig: take 1 tablet by mouth once daily   benzonatate (TESSALON PERLES) 100 mg capsule   No No   Sig: Take 1 capsule (100 mg total) by mouth 3 (three) times a day as needed for cough   cyclobenzaprine (FLEXERIL) 10 mg tablet   No No   Sig: Take 1 tablet (10 mg total) by mouth daily at bedtime as needed for muscle spasms   famotidine (PEPCID) 20 mg tablet   No No   Sig: take 1 tablet by mouth twice a day if needed for ACID REFLUX   fluticasone (FLONASE) 50 mcg/act nasal spray   No No   Si spray into each nostril daily   fluticasone (FLONASE) 50 mcg/act nasal spray   No No   Si spray into each nostril daily   Patient not taking: Reported on 2022   fluticasone-salmeterol (Advair HFA) 115-21 MCG/ACT inhaler   No No   Sig: Inhale 2 puffs 2 (two) times a day Rinse mouth after use    gabapentin (NEURONTIN) 800 mg tablet   No No   Sig: Take 1 tablet (800 mg total) by mouth 3 (three) times a day   glucose blood (OneTouch Verio) test strip   No No   Sig: Check blood sugar daily   glucose monitoring kit (FREESTYLE) monitoring kit   No No   Sig: Use 1 each in the morning TESTING DAILY IN THE AM   lidocaine (LMX) 4 % cream   No No   Sig: Apply topically as needed for mild pain   lidocaine (Lidoderm) 5 %   No No   Sig: Apply 1 patch topically daily Remove & Discard patch within 12 hours or as directed by MD   menthol-cetylpyridinium (CEPACOL) 3 MG lozenge   No No   Sig: Take 1 lozenge (3 mg total) by mouth as needed for sore throat   metFORMIN (GLUCOPHAGE) 500 mg tablet   No No   Sig: take 1 tablet by mouth twice a day with meals   naloxone (NARCAN) 4 mg/0 1 mL nasal spray   No No   Sig: Administer 1 spray into a nostril  If no response after 2-3 minutes, give another dose in the other nostril using a new spray     ondansetron (Zofran ODT) 4 mg disintegrating tablet   No No   Sig: Take 1 tablet (4 mg total) by mouth every 6 (six) hours as needed for nausea or vomiting   tamsulosin (FLOMAX) 0 4 mg   No No   Sig: take 1 capsule by mouth once daily WITH DINNER   venlafaxine (EFFEXOR-XR) 150 mg 24 hr capsule   No No   Sig: take 1 capsule by mouth once daily WITH BREAKFAST   zolpidem (AMBIEN) 5 mg tablet   No No   Sig: Take 1 tablet (5 mg total) by mouth daily at bedtime as needed for sleep      Facility-Administered Medications: None       Past Medical History:   Diagnosis Date   • Asthma    • Back pain    • Back pain    • 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   • Depression    • Diabetes mellitus ([facility] )    • Hyperlipidemia    • Hypertension    • Type 2 diabetes mellitus without complication, without long-term current use of insulin ([facility] ) 10/16/2020       Past Surgical History:   Procedure Laterality Date   • CYST REMOVAL  2017       Family History   Problem Relation Age of Onset   • Hypertension Mother    • Diabetes Mother    • Cancer Father    • Diabetes Family    • Hypertension Family      I have reviewed and agree with the history as documented  E-Cigarette/Vaping   • E-Cigarette Use Never User      E-Cigarette/Vaping Substances   • Nicotine No    • THC No    • CBD No    • Flavoring No    • Other No    • Unknown No      Social History     Tobacco Use   • Smoking status: Never   • Smokeless tobacco: Never   Vaping Use   • Vaping Use: Never used   Substance Use Topics   • Alcohol use: Not Currently     Comment: rare   • Drug use: Never       Review of Systems   Constitutional: Negative for chills and fever  HENT: Negative for ear pain and sore throat  Eyes: Negative for pain and visual disturbance  Respiratory: Positive for cough and shortness of breath  Cardiovascular: Positive for chest pain  Negative for palpitations  Gastrointestinal: Negative for abdominal pain and vomiting  Genitourinary: Negative for dysuria and hematuria  Musculoskeletal: Negative for arthralgias and back pain  Skin: Negative for color change and rash  Neurological: Negative for seizures and syncope  All other systems reviewed and are negative  Physical Exam  Physical Exam  Vitals and nursing note reviewed  Constitutional:       General: He is not in acute distress  Appearance: He is well-developed  HENT:      Head: Normocephalic and atraumatic        Right Ear: Ear canal normal       Left Ear: Ear canal normal       Nose: Nose normal       Mouth/Throat:      Mouth: Mucous membranes are moist    Eyes:      Conjunctiva/sclera: Conjunctivae normal    Cardiovascular:      Rate and Rhythm: Normal rate and regular rhythm  Heart sounds: No murmur heard  Pulmonary:      Effort: Pulmonary effort is normal  No respiratory distress  Breath sounds: Rhonchi present  Abdominal:      Palpations: Abdomen is soft  Tenderness: There is no abdominal tenderness  Musculoskeletal:         General: No swelling  Cervical back: Neck supple  Skin:     General: Skin is warm and dry  Capillary Refill: Capillary refill takes less than 2 seconds  Neurological:      Mental Status: He is alert and oriented to person, place, and time     Psychiatric:         Mood and Affect: Mood normal          Vital Signs  ED Triage Vitals [12/31/22 0958]   Temperature Pulse Respirations Blood Pressure SpO2   98 °F (36 7 °C) 83 20 157/95 98 %      Temp Source Heart Rate Source Patient Position - Orthostatic VS BP Location FiO2 (%)   Oral -- -- -- --      Pain Score       --           Vitals:    12/31/22 0958   BP: 157/95   Pulse: 83         Visual Acuity      ED Medications  Medications   aspirin chewable tablet 324 mg (324 mg Oral Given 12/31/22 1022)   benzonatate (TESSALON PERLES) capsule 100 mg (100 mg Oral Given 12/31/22 1022)   azithromycin (ZITHROMAX) tablet 500 mg (500 mg Oral Given 12/31/22 1109)       Diagnostic Studies  Results Reviewed     Procedure Component Value Units Date/Time    HS Troponin 0hr (reflex protocol) [[id number]]  (Normal) Collected: 12/31/22 1018    Lab Status: Final result Specimen: Blood from Arm, Left Updated: 12/31/22 1048     hs TnI 0hr 5 ng/L     NT-BNP PRO [[id number]]  (Normal) Collected: 12/31/22 1018    Lab Status: Final result Specimen: Blood from Arm, Left Updated: 12/31/22 1045     NT-proBNP 26 1 pg/mL     Magnesium [[id number]]  (Normal) Collected: 12/31/22 1018    Lab Status: Final result Specimen: Blood from Arm, Left Updated: 12/31/22 1043 Magnesium 2 1 mg/dL     Narrative:      Hemolysis    Comprehensive metabolic panel [[id number]]  (Abnormal) Collected: 12/31/22 1018    Lab Status: Final result Specimen: Blood from Arm, Left Updated: 12/31/22 1043     Sodium 139 mmol/L      Potassium 4 3 mmol/L      Chloride 104 mmol/L      CO2 27 mmol/L      ANION GAP 8 mmol/L      BUN 14 mg/dL      Creatinine 0 97 mg/dL      Glucose 158 mg/dL      Calcium 9 6 mg/dL      AST 38 U/L      ALT 38 U/L      Alkaline Phosphatase 58 U/L      Total Protein 8 5 g/dL      Albumin 4 6 g/dL      Total Bilirubin 0 67 mg/dL      eGFR 83 ml/min/1 73sq m     Narrative:      Hemolysis  National Kidney Disease Foundation guidelines for Chronic Kidney Disease (CKD):   •  Stage 1 with normal or high GFR (GFR > 90 mL/min/1 73 square meters)  •  Stage 2 Mild CKD (GFR = 60-89 mL/min/1 73 square meters)  •  Stage 3A Moderate CKD (GFR = 45-59 mL/min/1 73 square meters)  •  Stage 3B Moderate CKD (GFR = 30-44 mL/min/1 73 square meters)  •  Stage 4 Severe CKD (GFR = 15-29 mL/min/1 73 square meters)  •  Stage 5 End Stage CKD (GFR <15 mL/min/1 73 square meters)  Note: GFR calculation is accurate only with a steady state creatinine    CBC and differential [[id number]]  (Abnormal) Collected: 12/31/22 1018    Lab Status: Final result Specimen: Blood from Arm, Left Updated: 12/31/22 1023     WBC 4 79 Thousand/uL      RBC 5 09 Million/uL      Hemoglobin 14 9 g/dL      Hematocrit 42 8 %      MCV 84 fL      MCH 29 3 pg      MCHC 34 8 g/dL      RDW 13 3 %      MPV 9 3 fL      Platelets 405 Thousands/uL      nRBC 0 /100 WBCs      Neutrophils Relative 40 %      Immat GRANS % 0 %      Lymphocytes Relative 48 %      Monocytes Relative 10 %      Eosinophils Relative 2 %      Basophils Relative 0 %      Neutrophils Absolute 1 91 Thousands/µL      Immature Grans Absolute 0 01 Thousand/uL      Lymphocytes Absolute 2 28 Thousands/µL      Monocytes Absolute 0 46 Thousand/µL      Eosinophils Absolute 0 11 Thousand/µL      Basophils Absolute 0 02 Thousands/µL                  X-ray chest 1 view portable   Final Result by Isabela Knowles MD (12/31 1427)      No acute cardiopulmonary disease  Workstation performed: [id number]                    Procedures  Procedures         ED Course             HEART Risk Score    Flowsheet Row Most Recent Value   Heart Score Risk Calculator    History 1 Filed at: 12/31/2022 1442   ECG 0 Filed at: 12/31/2022 1442   Age 1 Filed at: 12/31/2022 1442   Risk Factors 1 Filed at: 12/31/2022 1442   Troponin 0 Filed at: 12/31/2022 1442   HEART Score 3 Filed at: 12/31/2022 1442                        SBIRT 22yo+    Flowsheet Row Most Recent Value   SBIRT (23 yo +)    In order to provide better care to our patients, we are screening all of our patients for alcohol and drug use  Would it be okay to ask you these screening questions? Yes Filed at: 12/31/2022 1003   Initial Alcohol Screen: US AUDIT-C     1  How often do you have a drink containing alcohol? 0 Filed at: 12/31/2022 1003   2  How many drinks containing alcohol do you have on a typical day you are drinking? 0 Filed at: 12/31/2022 1003   3a  Male UNDER 65: How often do you have five or more drinks on one occasion? 0 Filed at: 12/31/2022 1003   3b  FEMALE Any Age, or MALE 65+: How often do you have 4 or more drinks on one occassion? 0 Filed at: 12/31/2022 1003   Audit-C Score 0 Filed at: 12/31/2022 1003   DANIELLE: How many times in the past year have you    Used an illegal drug or used a prescription medication for non-medical reasons? Never Filed at: 12/31/2022 1003                    MDM  Number of Diagnoses or Management Options  Bronchitis  Diagnosis management comments: Low risk for ACS by heart score  Troponin negative  Chest x-ray without any acute cardiopulmonary disease  Patient with some rhonchi on exam, likely bronchitis  Will start on azithromycin  PCP follow-up        Disposition  Final diagnoses: Bronchitis     Time reflects when diagnosis was documented in both MDM as applicable and the Disposition within this note     Time User Action Codes Description Comment    12/31/2022 11:07 AM Izaiah Coto Add [J40] Bronchitis       ED Disposition     ED Disposition   Discharge    Condition   Stable    Date/Time   Sat Dec 31, 2022 11:07 AM    Comment   Jung Expose discharge to home/self care  Follow-up Information     Follow up With Specialties Details Why Contact Info    Rehab [address], [facility]  [address] [facility]  [address]  [facility]             Discharge Medication List as of 12/31/2022 11:09 AM      START taking these medications    Details   azithromycin (ZITHROMAX) 250 mg tablet Take 2 tablets today then 1 tablet daily x 4 days, Normal      !! benzonatate (TESSALON PERLES) 100 mg capsule Take 1 capsule (100 mg total) by mouth 3 (three) times a day as needed for cough, Starting Sat 12/31/2022, Normal       !! - Potential duplicate medications found  Please discuss with provider        CONTINUE these medications which have NOT CHANGED    Details   acetaminophen (TYLENOL) 500 mg tablet Take 1 tablet (500 mg total) by mouth every 6 (six) hours as needed for mild pain, Starting Thu 6/2/2022, Normal      albuterol (2 5 mg/3 mL) 0 083 % nebulizer solution Take 3 mL (2 5 mg total) by nebulization every 6 (six) hours as needed for wheezing or shortness of breath, Starting Tue 9/6/2022, Normal      aluminum-magnesium hydroxide-simethicone (MAALOX) 200-200-20 MG/5ML SUSP Take 15 mL by mouth 4 (four) times a day (before meals and at bedtime), Starting Thu 1/7/2021, Normal      amLODIPine (NORVASC) 10 mg tablet take 1 tablet by mouth once daily, Normal      atorvastatin (LIPITOR) 20 mg tablet take 1 tablet by mouth once daily, Normal      !! benzonatate (TESSALON PERLES) 100 mg capsule Take 1 capsule (100 mg total) by mouth 3 (three) times a day as needed for cough, Starting Tue 9/6/2022, Normal      Bioflavonoid Products (RA Vitamin C CR) TBCR take 1 tablet by mouth twice a day for 14 days, Historical Med      Blood Glucose Monitoring Suppl (OneTouch Verio) w/Device KIT Use daily, Starting Tue 6/14/2022, Normal      !!  Blood Glucose Monitoring Suppl KIT Use in the morning Please give test strips and lancets  Dx  E11 9, Starting Wed 4/13/2022, Print      Blood Pressure KIT Use in the morning With appropriate size cuff, Starting Wed 4/13/2022, Print      Blood Pressure Monitoring (Blood Pressure Monitor/Arm) EMILY Use daily, Starting Tue 6/14/2022, Print      cyclobenzaprine (FLEXERIL) 10 mg tablet Take 1 tablet (10 mg total) by mouth daily at bedtime as needed for muscle spasms, Starting Fri 3/18/2022, Normal      Diclofenac Sodium (VOLTAREN) 1 % Apply 2 g topically 4 (four) times a day as needed (low back or neck pain), Starting Wed 12/7/2022, Normal      DULoxetine (CYMBALTA) 60 mg delayed release capsule Take 60 mg by mouth 2 (two) times a day, Starting Thu 10/6/2022, Historical Med      famotidine (PEPCID) 20 mg tablet take 1 tablet by mouth twice a day if needed for ACID REFLUX, Normal      !! fluticasone (FLONASE) 50 mcg/act nasal spray 1 spray into each nostril daily, Starting Thu 12/16/2021, Normal      !! fluticasone (FLONASE) 50 mcg/act nasal spray 1 spray into each nostril daily, Starting Thu 6/2/2022, Normal      fluticasone-salmeterol (Advair HFA) 115-21 MCG/ACT inhaler Inhale 2 puffs 2 (two) times a day Rinse mouth after use , Starting Fri 10/28/2022, Normal      gabapentin (NEURONTIN) 800 mg tablet Take 1 tablet (800 mg total) by mouth 3 (three) times a day, Starting Wed 12/7/2022, Normal      glucose blood (OneTouch Verio) test strip Check blood sugar daily, Normal      !! glucose monitoring kit (FREESTYLE) monitoring kit Use 1 each in the morning TESTING DAILY IN THE AM, Starting Mon 5/9/2022, Print      HYDROcodone-acetaminophen (NORCO) 7 5-325 mg per tablet Take 1 tablet by mouth every 8 (eight) hours as needed for pain For ongoing pain Max Daily Amount: 3 tablets, Starting Wed 12/7/2022, Normal      Lancet Devices (ONE TOUCH DELICA LANCING DEV) MISC Use daily, Starting Fri 8/12/2022, Normal      !! Lancets (freestyle) lancets TESTING DAILY IN THE AM, Print      !! Lancets (onetouch ultrasoft) lancets Use daily Use as instructed to check sugars daily, Starting Tue 5/24/2022, Normal      lidocaine (Lidoderm) 5 % Apply 1 patch topically daily Remove & Discard patch within 12 hours or as directed by MD, Starting Wed 12/7/2022, Normal      lidocaine (LMX) 4 % cream Apply topically as needed for mild pain, Starting Fri 8/12/2022, Normal      menthol-cetylpyridinium (CEPACOL) 3 MG lozenge Take 1 lozenge (3 mg total) by mouth as needed for sore throat, Starting Thu 6/2/2022, Normal      metFORMIN (GLUCOPHAGE) 500 mg tablet take 1 tablet by mouth twice a day with meals, Normal      Mirabegron ER 50 MG TB24 Take 1 tablet (50 mg total) by mouth daily, Starting Mon 6/20/2022, Normal      Multiple Vitamin (multivitamin) capsule Take 1 capsule by mouth daily for 14 days, Starting Fri 1/22/2021, Until Wed 4/13/2022, Print      naloxone (NARCAN) 4 mg/0 1 mL nasal spray Administer 1 spray into a nostril  If no response after 2-3 minutes, give another dose in the other nostril using a new spray , Normal      ondansetron (Zofran ODT) 4 mg disintegrating tablet Take 1 tablet (4 mg total) by mouth every 6 (six) hours as needed for nausea or vomiting, Starting Sun 3/27/2022, Normal      !! OneTouch Delica Lancets 66T MISC Use daily, Starting Thu 10/14/2021, Normal      !!  OneTouch Delica Lancets 45I MISC Use in the morning, Starting Tue 6/14/2022, Normal      tamsulosin (FLOMAX) 0 4 mg take 1 capsule by mouth once daily WITH DINNER, Normal      venlafaxine (EFFEXOR-XR) 150 mg 24 hr capsule take 1 capsule by mouth once daily WITH BREAKFAST, Normal      Ventolin  (90 Base) MCG/ACT inhaler INHALE 2 PUFFS EVERY 4 HOURS AS NEEDED FOR COUGHING, Normal      zolpidem (AMBIEN) 5 mg tablet Take 1 tablet (5 mg total) by mouth daily at bedtime as needed for sleep, Starting Fri 12/10/2021, Normal       !! - Potential duplicate medications found  Please discuss with provider  No discharge procedures on file      PDMP Review       Value Time User    PDMP Reviewed  Yes 12/7/2022  1:24 PM Rehab Tigist Baumann DO          ED Provider  Electronically Signed by           Pablo Eid MD  12/31/22 4169

## 2023-01-01 LAB
ATRIAL RATE: 79 BPM
P AXIS: 58 DEGREES
PR INTERVAL: 184 MS
QRS AXIS: 52 DEGREES
QRSD INTERVAL: 82 MS
QT INTERVAL: 386 MS
QTC INTERVAL: 442 MS
T WAVE AXIS: 27 DEGREES
VENTRICULAR RATE: 79 BPM

## 2023-01-03 ENCOUNTER — TELEPHONE (OUTPATIENT)
Dept: FAMILY MEDICINE CLINIC | Facility: CLINIC | Age: 63
End: 2023-01-03

## 2023-01-03 DIAGNOSIS — M54.16 LUMBAR RADICULOPATHY: ICD-10-CM

## 2023-01-03 DIAGNOSIS — G89.29 CHRONIC LOW BACK PAIN WITHOUT SCIATICA, UNSPECIFIED BACK PAIN LATERALITY: ICD-10-CM

## 2023-01-03 DIAGNOSIS — J45.41 MODERATE PERSISTENT REACTIVE AIRWAY DISEASE WITH ACUTE EXACERBATION: ICD-10-CM

## 2023-01-03 DIAGNOSIS — M54.50 CHRONIC LOW BACK PAIN WITHOUT SCIATICA, UNSPECIFIED BACK PAIN LATERALITY: ICD-10-CM

## 2023-01-03 RX ORDER — FLUTICASONE PROPIONATE AND SALMETEROL XINAFOATE 115; 21 UG/1; UG/1
2 AEROSOL, METERED RESPIRATORY (INHALATION) 2 TIMES DAILY
Qty: 36 G | Refills: 0 | Status: SHIPPED | OUTPATIENT
Start: 2023-01-03

## 2023-01-03 RX ORDER — HYDROCODONE BITARTRATE AND ACETAMINOPHEN 7.5; 325 MG/1; MG/1
1 TABLET ORAL EVERY 8 HOURS PRN
Qty: 90 TABLET | Refills: 0 | Status: SHIPPED | OUTPATIENT
Start: 2023-01-03

## 2023-01-03 NOTE — TELEPHONE ENCOUNTER
Pt came into office requesting medication refill for     HYDROcodone-acetaminophen (NORCO) 7 5-325 mg     fluticasone-salmeterol (Advair HFA) 115-21 MCG/ACT inhaler    Please send to pharmacy on file

## 2023-01-03 NOTE — TELEPHONE ENCOUNTER
PCP SIGNATURE NEEDED FOR Medicare Part B detailed written order  FORM RECEIVED VIA FAX AND PLACED IN PCP FOLDER TO BE DELIVERED AT ASSIGNED TIMES

## 2023-01-05 NOTE — TELEPHONE ENCOUNTER
01/05/23    Form: Medicare Part B detailed written order , was faxed over back to us  Please complete highlighted section of form         Thank You

## 2023-01-10 ENCOUNTER — OFFICE VISIT (OUTPATIENT)
Dept: FAMILY MEDICINE CLINIC | Facility: CLINIC | Age: 63
End: 2023-01-10

## 2023-01-10 ENCOUNTER — TELEPHONE (OUTPATIENT)
Dept: FAMILY MEDICINE CLINIC | Facility: CLINIC | Age: 63
End: 2023-01-10

## 2023-01-10 VITALS
HEART RATE: 85 BPM | TEMPERATURE: 97.5 F | RESPIRATION RATE: 16 BRPM | HEIGHT: 67 IN | OXYGEN SATURATION: 98 % | DIASTOLIC BLOOD PRESSURE: 70 MMHG | WEIGHT: 204 LBS | BODY MASS INDEX: 32.02 KG/M2 | SYSTOLIC BLOOD PRESSURE: 150 MMHG

## 2023-01-10 DIAGNOSIS — M54.42 ACUTE LEFT-SIDED LOW BACK PAIN WITH LEFT-SIDED SCIATICA: ICD-10-CM

## 2023-01-10 DIAGNOSIS — M48.061 LUMBAR FORAMINAL STENOSIS: ICD-10-CM

## 2023-01-10 DIAGNOSIS — I10 BENIGN ESSENTIAL HTN: ICD-10-CM

## 2023-01-10 DIAGNOSIS — M54.16 LUMBAR RADICULOPATHY: Primary | ICD-10-CM

## 2023-01-10 RX ORDER — CYCLOBENZAPRINE HCL 10 MG
10 TABLET ORAL
Qty: 30 TABLET | Refills: 0 | Status: SHIPPED | OUTPATIENT
Start: 2023-01-10

## 2023-01-10 NOTE — ASSESSMENT & PLAN NOTE
-Blood pressure of 150/70 today as well as ambulatory BP readings at 150/90  -Takes amlodipine daily  -Blood pressure likely elevated due to pain  -Continue ambulatory BP monitoring particularly during times of minimal pain  -DASH diet discussed with patient  -Will bring blood pressure log next visit to consider additional medication

## 2023-01-10 NOTE — PROGRESS NOTES
Name: Lolita De Jesus      : 1960      MRN: [id number]  Encounter Provider: Shon Urbina MD  Encounter Date: 1/10/2023   Encounter department: [facility]     1  Lumbar radiculopathy  Assessment & Plan:  -Acute on chronic low back pain  -MRI of his lumbar spine demonstrates degeneration specifically at L3-4 and L4-5 with development of bilateral foraminal stenosis  -Continue current pain management regimen  -Providing refill for muscle relaxants  -He has found minimal relief of symptoms with conservative management  -Recommend follow-up outpatient with neurosurgery to discuss surgical option    Orders:  -     cyclobenzaprine (FLEXERIL) 10 mg tablet; Take 1 tablet (10 mg total) by mouth daily at bedtime as needed for muscle spasms  -     Ambulatory Referral to Neurosurgery; Future    2  Benign essential HTN  Assessment & Plan:  -Blood pressure of 150/70 today as well as ambulatory BP readings at 150/90  -Takes amlodipine daily  -Blood pressure likely elevated due to pain  -Continue ambulatory BP monitoring particularly during times of minimal pain  -DASH diet discussed with patient  -Will bring blood pressure log next visit to consider additional medication      3  Acute left-sided low back pain with left-sided sciatica  -     cyclobenzaprine (FLEXERIL) 10 mg tablet; Take 1 tablet (10 mg total) by mouth daily at bedtime as needed for muscle spasms  -     Ambulatory Referral to Neurosurgery; Future    4  Lumbar foraminal stenosis  Assessment & Plan:  See management above           Subjective      81-year-old male with a history of chronic low back pain, lumbar radiculopathy, degenerative arthritis who presents today for follow-up for his back pain  He started having back pain back in 2019 after work-related injury  He has been to physical therapy and pain management as well as neurosurgery consultation for his back pain    He recently had a fall after he lost his footing 1 week ago and fell on his side  He states that since then his back pain is flared up again  He currently ambulates with a rolling walker  He currently takes gabapentin,Narco Q8, lidocaine patch, & cymblata 60 mg  He states that for the most part the pain regimen is effective  He has had epidural injections in the past which provides short-term relief  He is currently doing home exercises learned from physical therapy sessions  He denies any fever , chills, bowel or bladder incontinence, and saddle anesthesia  Review of Systems   Constitutional: Negative for chills, fatigue and fever  Respiratory: Negative for cough, shortness of breath and wheezing  Cardiovascular: Negative for chest pain, palpitations and leg swelling  Gastrointestinal: Negative for diarrhea, nausea and vomiting  Musculoskeletal: Positive for arthralgias, back pain and gait problem  Neurological: Negative for dizziness, seizures and syncope         Current Outpatient Medications on File Prior to Visit   Medication Sig   • amLODIPine (NORVASC) 10 mg tablet take 1 tablet by mouth once daily   • acetaminophen (TYLENOL) 500 mg tablet Take 1 tablet (500 mg total) by mouth every 6 (six) hours as needed for mild pain   • albuterol (2 5 mg/3 mL) 0 083 % nebulizer solution Take 3 mL (2 5 mg total) by nebulization every 6 (six) hours as needed for wheezing or shortness of breath   • aluminum-magnesium hydroxide-simethicone (MAALOX) 200-200-20 MG/5ML SUSP Take 15 mL by mouth 4 (four) times a day (before meals and at bedtime)   • atorvastatin (LIPITOR) 20 mg tablet take 1 tablet by mouth once daily   • benzonatate (TESSALON PERLES) 100 mg capsule Take 1 capsule (100 mg total) by mouth 3 (three) times a day as needed for cough   • benzonatate (TESSALON PERLES) 100 mg capsule Take 1 capsule (100 mg total) by mouth 3 (three) times a day as needed for cough   • Bioflavonoid Products (RA Vitamin C CR) TBCR take 1 tablet by mouth twice a day for 14 days   • Blood Glucose Monitoring Suppl (OneTouch Verio) w/Device KIT Use daily   • Blood Glucose Monitoring Suppl KIT Use in the morning Please give test strips and lancets  Dx  E11 9   • Blood Pressure KIT Use in the morning With appropriate size cuff   • Blood Pressure Monitoring (Blood Pressure Monitor/Arm) EMILY Use daily   • Diclofenac Sodium (VOLTAREN) 1 % Apply 2 g topically 4 (four) times a day as needed (low back or neck pain)   • DULoxetine (CYMBALTA) 60 mg delayed release capsule Take 60 mg by mouth 2 (two) times a day   • famotidine (PEPCID) 20 mg tablet take 1 tablet by mouth twice a day if needed for ACID REFLUX   • fluticasone (FLONASE) 50 mcg/act nasal spray 1 spray into each nostril daily   • fluticasone (FLONASE) 50 mcg/act nasal spray 1 spray into each nostril daily (Patient not taking: Reported on 9/29/2022)   • fluticasone-salmeterol (Advair HFA) 115-21 MCG/ACT inhaler Inhale 2 puffs 2 (two) times a day Rinse mouth after use     • gabapentin (NEURONTIN) 800 mg tablet Take 1 tablet (800 mg total) by mouth 3 (three) times a day   • glucose blood (OneTouch Verio) test strip Check blood sugar daily   • glucose monitoring kit (FREESTYLE) monitoring kit Use 1 each in the morning TESTING DAILY IN THE AM   • HYDROcodone-acetaminophen (NORCO) 7 5-325 mg per tablet Take 1 tablet by mouth every 8 (eight) hours as needed for pain For ongoing pain Max Daily Amount: 3 tablets   • Lancet Devices (ONE TOUCH DELICA LANCING DEV) MISC Use daily   • Lancets (freestyle) lancets TESTING DAILY IN THE AM   • Lancets (onetouch ultrasoft) lancets Use daily Use as instructed to check sugars daily (Patient not taking: Reported on 9/29/2022)   • lidocaine (Lidoderm) 5 % Apply 1 patch topically daily Remove & Discard patch within 12 hours or as directed by MD   • lidocaine (LMX) 4 % cream Apply topically as needed for mild pain   • menthol-cetylpyridinium (CEPACOL) 3 MG lozenge Take 1 lozenge (3 mg total) by mouth as needed for sore throat   • metFORMIN (GLUCOPHAGE) 500 mg tablet take 1 tablet by mouth twice a day with meals   • Mirabegron ER 50 MG TB24 Take 1 tablet (50 mg total) by mouth daily   • Multiple Vitamin (multivitamin) capsule Take 1 capsule by mouth daily for 14 days   • naloxone (NARCAN) 4 mg/0 1 mL nasal spray Administer 1 spray into a nostril  If no response after 2-3 minutes, give another dose in the other nostril using a new spray  • ondansetron (Zofran ODT) 4 mg disintegrating tablet Take 1 tablet (4 mg total) by mouth every 6 (six) hours as needed for nausea or vomiting   • OneTouch Delica Lancets 32V MISC Use daily (Patient not taking: Reported on 9/29/2022)   • OneTouch Delica Lancets 12Y MISC Use in the morning (Patient not taking: Reported on 9/29/2022)   • tamsulosin (FLOMAX) 0 4 mg take 1 capsule by mouth once daily WITH DINNER   • venlafaxine (EFFEXOR-XR) 150 mg 24 hr capsule take 1 capsule by mouth once daily WITH BREAKFAST   • Ventolin  (90 Base) MCG/ACT inhaler INHALE 2 PUFFS EVERY 4 HOURS AS NEEDED FOR COUGHING   • zolpidem (AMBIEN) 5 mg tablet Take 1 tablet (5 mg total) by mouth daily at bedtime as needed for sleep   • [DISCONTINUED] cyclobenzaprine (FLEXERIL) 10 mg tablet Take 1 tablet (10 mg total) by mouth daily at bedtime as needed for muscle spasms       Objective     /70 (BP Location: Left arm, Patient Position: Sitting, Cuff Size: Large)   Pulse 85   Temp 97 5 °F (36 4 °C) (Temporal)   Resp 16   Ht 5' 7" (1 702 m)   Wt 92 5 kg (204 lb)   SpO2 98%   BMI 31 95 kg/m²     Physical Exam  Constitutional:       General: He is not in acute distress  Appearance: Normal appearance  He is well-developed  He is not ill-appearing, toxic-appearing or diaphoretic  HENT:      Head: Normocephalic and atraumatic        Right Ear: External ear normal       Left Ear: External ear normal       Nose: Nose normal       Mouth/Throat:      Pharynx: No oropharyngeal exudate  Eyes:      Extraocular Movements: Extraocular movements intact  Conjunctiva/sclera: Conjunctivae normal       Pupils: Pupils are equal, round, and reactive to light  Cardiovascular:      Rate and Rhythm: Normal rate and regular rhythm  Heart sounds: Normal heart sounds  No murmur heard  No friction rub  No gallop  Pulmonary:      Effort: Pulmonary effort is normal  No respiratory distress  Breath sounds: Normal breath sounds  No stridor  No wheezing or rhonchi  Abdominal:      General: Bowel sounds are normal  There is no distension  Palpations: Abdomen is soft  There is no mass  Musculoskeletal:      Cervical back: Normal range of motion and neck supple  Lumbar back: Spasms, tenderness and bony tenderness present  No swelling, edema or deformity  Positive right straight leg raise test and positive left straight leg raise test    Skin:     General: Skin is warm  Capillary Refill: Capillary refill takes less than 2 seconds  Neurological:      Mental Status: He is alert and oriented to person, place, and time  Cranial Nerves: No cranial nerve deficit  Motor: No weakness        Comments: -Ambulates with a rolling walker  -Shopping cart sign noted with patient leaning forward on ambulation       Vincent Willoughby MD

## 2023-01-10 NOTE — TELEPHONE ENCOUNTER
first attempt to contact patient   left message to return my call on answering machine    RESCHEDULE 01/13/2022 APPT

## 2023-01-10 NOTE — ASSESSMENT & PLAN NOTE
-Acute on chronic low back pain  -MRI of his lumbar spine demonstrates degeneration specifically at L3-4 and L4-5 with development of bilateral foraminal stenosis  -Continue current pain management regimen  -Providing refill for muscle relaxants  -He has found minimal relief of symptoms with conservative management  -Recommend follow-up outpatient with neurosurgery to discuss surgical option

## 2023-01-20 ENCOUNTER — OFFICE VISIT (OUTPATIENT)
Dept: FAMILY MEDICINE CLINIC | Facility: CLINIC | Age: 63
End: 2023-01-20

## 2023-01-20 VITALS
HEIGHT: 67 IN | SYSTOLIC BLOOD PRESSURE: 120 MMHG | HEART RATE: 91 BPM | OXYGEN SATURATION: 98 % | WEIGHT: 205 LBS | RESPIRATION RATE: 16 BRPM | BODY MASS INDEX: 32.18 KG/M2 | TEMPERATURE: 98.9 F | DIASTOLIC BLOOD PRESSURE: 80 MMHG

## 2023-01-20 DIAGNOSIS — E78.5 HYPERLIPIDEMIA, UNSPECIFIED HYPERLIPIDEMIA TYPE: ICD-10-CM

## 2023-01-20 DIAGNOSIS — E11.9 TYPE 2 DIABETES MELLITUS WITHOUT COMPLICATION, WITHOUT LONG-TERM CURRENT USE OF INSULIN (HCC): Primary | ICD-10-CM

## 2023-01-20 DIAGNOSIS — I10 BENIGN ESSENTIAL HTN: ICD-10-CM

## 2023-01-20 DIAGNOSIS — J45.40 MODERATE PERSISTENT REACTIVE AIRWAY DISEASE WITHOUT COMPLICATION: ICD-10-CM

## 2023-01-20 LAB — SL AMB POCT HEMOGLOBIN AIC: 6.9 (ref ?–6.5)

## 2023-01-20 RX ORDER — ALBUTEROL SULFATE 90 UG/1
2 AEROSOL, METERED RESPIRATORY (INHALATION) EVERY 4 HOURS PRN
Qty: 18 G | Refills: 2 | Status: SHIPPED | OUTPATIENT
Start: 2023-01-20

## 2023-01-20 NOTE — PATIENT INSTRUCTIONS
Asthma   AMBULATORY CARE:   Asthma  is a lung disease that makes breathing difficult  Chronic inflammation and reactions to triggers narrow the airways in your lungs  Asthma can become life-threatening if it is not managed  Cough-variant asthma  is a type of asthma that causes a dry cough that keeps coming back  A dry cough may be your only symptom, or you may also have chest tightness  These symptoms may be caused by exercise or exposure to odors, allergens, or respiratory tract infections  Cough-variant asthma is treated the same way as typical asthma  Common symptoms include the following:   Coughing    Wheezing    Shortness of breath    Chest tightness    Call your local emergency number (911 in the [address]) if:   You have severe shortness of breath  The skin around your neck and ribs pulls in with each breath  Your peak flow numbers are in the red zone of your AAP  Seek care immediately if:   You have shortness of breath, even after you take your short-term medicine as directed  Your lips or nails turn blue or gray  Call your doctor or asthma specialist if:   You run out of medicine before your next refill is due  Your symptoms get worse  You need to take more medicine than usual to control your symptoms  You have questions or concerns about your condition or care  Treatment for asthma  will depend on how severe your asthma is  Medicine may be used to decrease inflammation, open airways, and make it easier to breathe  Medicines may be inhaled, taken as a pill, or injected  Short-term medicines relieve your symptoms quickly  Long-term medicines are used to prevent future attacks  Other medicines may be needed if your regular medicines are not able to prevent attacks  You may also need medicine to help control your allergies  Manage and prevent future asthma attacks: Follow your asthma action plan  This is a written plan that you and your healthcare provider create   It explains which medicine you need and when to change doses if necessary  It also explains how you can monitor symptoms and use a peak flow meter  The meter measures how well your lungs are working  Manage other health conditions , such as allergies, acid reflux, and sleep apnea  Identify and avoid triggers  These may include pets, dust mites, mold, and cockroaches  Do not smoke or be around others who smoke  Nicotine and other chemicals in cigarettes and cigars can cause lung damage  Ask your healthcare provider for information if you currently smoke and need help to quit  E-cigarettes or smokeless tobacco still contain nicotine  Talk to your healthcare provider before you use these products  Ask about the flu vaccine  The flu can make your asthma worse  You may need a yearly flu shot  Follow up with your healthcare provider as directed: You will need to return to make sure your medicine is working and your symptoms are controlled  You may be referred to an asthma or allergy specialist  Robles Milo may be asked to keep a record of your peak flow values and bring it with you to your appointments  Write down your questions so you remember to ask them during your visits  © Copyright [employer] 2022 Information is for End User's use only and may not be sold, redistributed or otherwise used for commercial purposes  All illustrations and images included in CareNotes® are the copyrighted property of [employer]  or [facility] [employer]   The above information is an  only  It is not intended as medical advice for individual conditions or treatments  Talk to your doctor, nurse or pharmacist before following any medical regimen to see if it is safe and effective for you

## 2023-01-20 NOTE — PROGRESS NOTES
Name: Carolina Thomas      : 1960      MRN: [id number]  Encounter Provider: Erika Alston MD  Encounter Date: 2023   Encounter department: [address] [place] [address]  Type 2 diabetes mellitus without complication, without long-term current use of insulin ([facility])  Assessment & Plan:    Lab Results   Component Value Date    HGBA1C 6 8 (A) 2022    HGBA1C 6 2 (H) 03/15/2022    HGBA1C 6 1 12/10/2021    HGBA1C 5 8 09/10/2021       - Current medications:  Metformin 500 mg bid, will increase next visit if still elevated, pt states just started taking it bid 2 months ago  - Home glucose readings: fasting 90s  - Diet: well balanced, drinks plenty of water, vegetable soups, limits bread  - +Htn: on Amlodipine  - +Hld: on Atorvastatin  - Aspirin: no  - Urine microalb: 2022 - normal  - Ophthalmology: pending  - DM Foot exam: pending  - Dentist:  Has follow up appt in 2023  - Pneumo vaccine: 2019  - Influenza Vaccine:    - Smoker: no  - Keep log of blood glucose readings  - Encouraged: Diabetic Diet, Regular exercise, Weight loss         Orders:  -     POCT hemoglobin A1c  -     HEMOGLOBIN A1C W/ EAG ESTIMATION; Future  -     Lipid Panel with Direct LDL reflex; Future    2  Benign essential HTN  Assessment & Plan:  BP at goal in office today: 120/80 mmHg  Continue Amlodipine 10 mg daily    Orders:  -     Lipid Panel with Direct LDL reflex; Future    3  Hyperlipidemia, unspecified hyperlipidemia type  -     Lipid Panel with Direct LDL reflex; Future    4  Moderate persistent reactive airway disease without complication  -     albuterol (Ventolin HFA) 90 mcg/act inhaler; Inhale 2 puffs every 4 (four) hours as needed for wheezing         Subjective      Marv Rogers is a 65 y o  male who presented to the office today for follow up of his Diabetes  He states he started taking his Metformin twice daily about two months ago    His diet consists of vegetable soups, bread, and states he has improved his diet, but has still gained weight  Pt continue to be concerned about his chronic back pain, for which he has an upcoming consultation with Neurosurgery  The chronic pain causes the patient to feel stressed and has feelings of saddness due to this  Review of Systems   Constitutional: Negative for chills and fever  HENT: Negative for congestion, rhinorrhea and sore throat  Respiratory: Positive for cough  Negative for shortness of breath  Cardiovascular: Negative for chest pain  Gastrointestinal: Negative for diarrhea, nausea and vomiting  Musculoskeletal: Positive for back pain and gait problem  Skin: Negative for rash  Neurological: Negative for dizziness and headaches         Current Outpatient Medications on File Prior to Visit   Medication Sig   • acetaminophen (TYLENOL) 500 mg tablet Take 1 tablet (500 mg total) by mouth every 6 (six) hours as needed for mild pain   • albuterol (2 5 mg/3 mL) 0 083 % nebulizer solution Take 3 mL (2 5 mg total) by nebulization every 6 (six) hours as needed for wheezing or shortness of breath   • aluminum-magnesium hydroxide-simethicone (MAALOX) 200-200-20 MG/5ML SUSP Take 15 mL by mouth 4 (four) times a day (before meals and at bedtime)   • amLODIPine (NORVASC) 10 mg tablet take 1 tablet by mouth once daily   • atorvastatin (LIPITOR) 20 mg tablet take 1 tablet by mouth once daily   • benzonatate (TESSALON PERLES) 100 mg capsule Take 1 capsule (100 mg total) by mouth 3 (three) times a day as needed for cough   • benzonatate (TESSALON PERLES) 100 mg capsule Take 1 capsule (100 mg total) by mouth 3 (three) times a day as needed for cough   • Bioflavonoid Products (RA Vitamin C CR) TBCR take 1 tablet by mouth twice a day for 14 days   • Blood Glucose Monitoring Suppl (OneTouch Verio) w/Device KIT Use daily   • Blood Glucose Monitoring Suppl KIT Use in the morning Please give test strips and lancets  Dx  E11 9   • Blood Pressure KIT Use in the morning With appropriate size cuff   • Blood Pressure Monitoring (Blood Pressure Monitor/Arm) EMILY Use daily   • cyclobenzaprine (FLEXERIL) 10 mg tablet Take 1 tablet (10 mg total) by mouth daily at bedtime as needed for muscle spasms   • Diclofenac Sodium (VOLTAREN) 1 % Apply 2 g topically 4 (four) times a day as needed (low back or neck pain)   • DULoxetine (CYMBALTA) 60 mg delayed release capsule Take 60 mg by mouth 2 (two) times a day   • famotidine (PEPCID) 20 mg tablet take 1 tablet by mouth twice a day if needed for ACID REFLUX   • fluticasone (FLONASE) 50 mcg/act nasal spray 1 spray into each nostril daily   • fluticasone (FLONASE) 50 mcg/act nasal spray 1 spray into each nostril daily (Patient not taking: Reported on 9/29/2022)   • fluticasone-salmeterol (Advair HFA) 115-21 MCG/ACT inhaler Inhale 2 puffs 2 (two) times a day Rinse mouth after use     • gabapentin (NEURONTIN) 800 mg tablet Take 1 tablet (800 mg total) by mouth 3 (three) times a day   • glucose blood (OneTouch Verio) test strip Check blood sugar daily   • glucose monitoring kit (FREESTYLE) monitoring kit Use 1 each in the morning TESTING DAILY IN THE AM   • HYDROcodone-acetaminophen (NORCO) 7 5-325 mg per tablet Take 1 tablet by mouth every 8 (eight) hours as needed for pain For ongoing pain Max Daily Amount: 3 tablets   • Lancet Devices (ONE TOUCH DELICA LANCING DEV) MISC Use daily   • Lancets (freestyle) lancets TESTING DAILY IN THE AM   • Lancets (onetouch ultrasoft) lancets Use daily Use as instructed to check sugars daily (Patient not taking: Reported on 9/29/2022)   • lidocaine (Lidoderm) 5 % Apply 1 patch topically daily Remove & Discard patch within 12 hours or as directed by MD   • lidocaine (LMX) 4 % cream Apply topically as needed for mild pain   • menthol-cetylpyridinium (CEPACOL) 3 MG lozenge Take 1 lozenge (3 mg total) by mouth as needed for sore throat   • metFORMIN (GLUCOPHAGE) 500 mg tablet take 1 tablet by mouth twice a day with meals   • Mirabegron ER 50 MG TB24 Take 1 tablet (50 mg total) by mouth daily   • Multiple Vitamin (multivitamin) capsule Take 1 capsule by mouth daily for 14 days   • naloxone (NARCAN) 4 mg/0 1 mL nasal spray Administer 1 spray into a nostril  If no response after 2-3 minutes, give another dose in the other nostril using a new spray  • ondansetron (Zofran ODT) 4 mg disintegrating tablet Take 1 tablet (4 mg total) by mouth every 6 (six) hours as needed for nausea or vomiting   • OneTouch Delica Lancets 74I MISC Use daily (Patient not taking: Reported on 9/29/2022)   • OneTouch Delica Lancets 80S MISC Use in the morning (Patient not taking: Reported on 9/29/2022)   • tamsulosin (FLOMAX) 0 4 mg take 1 capsule by mouth once daily WITH DINNER   • venlafaxine (EFFEXOR-XR) 150 mg 24 hr capsule take 1 capsule by mouth once daily WITH BREAKFAST   • zolpidem (AMBIEN) 5 mg tablet Take 1 tablet (5 mg total) by mouth daily at bedtime as needed for sleep   • [DISCONTINUED] Ventolin  (90 Base) MCG/ACT inhaler INHALE 2 PUFFS EVERY 4 HOURS AS NEEDED FOR COUGHING       Objective     /80 (BP Location: Right arm, Patient Position: Sitting, Cuff Size: Large)   Pulse 91   Temp 98 9 °F (37 2 °C) (Temporal)   Resp 16   Ht 5' 7" (1 702 m)   Wt 93 kg (205 lb)   SpO2 98%   BMI 32 11 kg/m²     Physical Exam  Vitals and nursing note reviewed  Constitutional:       Appearance: He is well-developed  HENT:      Head: Normocephalic and atraumatic  Cardiovascular:      Rate and Rhythm: Normal rate and regular rhythm  Heart sounds: Normal heart sounds  No murmur heard  Pulmonary:      Effort: Pulmonary effort is normal  No respiratory distress  Breath sounds: Normal breath sounds  Abdominal:      General: There is no distension  Palpations: Abdomen is soft  Skin:     General: Skin is warm     Neurological:      Mental Status: He is alert and oriented to person, place, and time     Psychiatric:         Mood and Affect: Mood normal        Mansi Levin MD

## 2023-01-20 NOTE — ASSESSMENT & PLAN NOTE
Lab Results   Component Value Date    HGBA1C 6 8 (A) 08/09/2022    HGBA1C 6 2 (H) 03/15/2022    HGBA1C 6 1 12/10/2021    HGBA1C 5 8 09/10/2021       - Current medications:  Metformin 500 mg bid, will increase next visit if still elevated, pt states just started taking it bid 2 months ago  - Home glucose readings: fasting 90s  - Diet: well balanced, drinks plenty of water, vegetable soups, limits bread  - +Htn: on Amlodipine  - +Hld: on Atorvastatin  - Aspirin: no  - Urine microalb: 4/21/2022 - normal  - Ophthalmology: pending  - DM Foot exam: pending  - Dentist:  Has follow up appt in 4/2023  - Pneumo vaccine: 2019  - Influenza Vaccine:  2022  - Smoker: no  - Keep log of blood glucose readings  - Encouraged: Diabetic Diet, Regular exercise, Weight loss

## 2023-01-24 ENCOUNTER — OFFICE VISIT (OUTPATIENT)
Dept: NEUROSURGERY | Facility: CLINIC | Age: 63
End: 2023-01-24

## 2023-01-24 VITALS
BODY MASS INDEX: 30.92 KG/M2 | WEIGHT: 197 LBS | DIASTOLIC BLOOD PRESSURE: 78 MMHG | SYSTOLIC BLOOD PRESSURE: 120 MMHG | RESPIRATION RATE: 16 BRPM | TEMPERATURE: 98.7 F | HEIGHT: 67 IN | HEART RATE: 86 BPM | OXYGEN SATURATION: 99 %

## 2023-01-24 DIAGNOSIS — M54.42 ACUTE LEFT-SIDED LOW BACK PAIN WITH LEFT-SIDED SCIATICA: ICD-10-CM

## 2023-01-24 DIAGNOSIS — M54.16 LUMBAR RADICULOPATHY: ICD-10-CM

## 2023-01-24 NOTE — PROGRESS NOTES
Neurosurgery Office Note  Idalmis Nichols 65 y o  male MRN: [id number]      Assessment/Plan     Low back pain with left-sided sciatica   Patient presents today as a consult for progressive low back pain and lumbar radiculopathy  • Complaining of left greater than right burning spasm low back pain with radiation into the posterior lateral aspect of the left leg down to the foot as well as associated numbness  Describes left leg giving out at times  • States he ambulates with a Rollator since 2022 as he frequently needs to sit after short duration of ambulation  Imaging:   • MRI lumbar spine without 2/8/22: Left foraminal disc protrusion at L3-4 and L4-5 with moderate left foraminal stenosis  Plan:   • Continue to monitor neurological symptoms  • MRI imaging reviewed  Patient's pain appears most consistent with an L5 distribution  • Given progressive nature of symptoms as well as complaints of left leg weakness worse with ambulation, new MRI lumbar spine without contrast has been ordered to ensure no progressive disease or new disc herniations  • Discussed ongoing recommendation for trial of epidural steroid injections  Patient was hesitant secondary to having these completed in 2019/2020 in [place] without significant improvement  • Discussed both the therapeutic and diagnostic value of epidural steroid injections  New pain management referral placed  • Recommended ongoing use of gabapentin  Patient states he takes this at times  Discussed need to take gabapentin routinely to maximize efficacy  • Patient may follow-up after completion of new MRI and trial of epidural steroid injections or sooner with any new or progressive symptoms  • All questions were answered  Patient agreeable plan of care           Diagnoses and all orders for this visit:    Lumbar radiculopathy  -     Ambulatory Referral to Neurosurgery  -     MRI lumbar spine wo contrast; Future  -     Ambulatory Referral to Pain Management; Future    Acute left-sided low back pain with left-sided sciatica  -     Ambulatory Referral to Neurosurgery    Other orders  -     Cholecalciferol (D3 PO); Take by mouth daily          I spent 30 minutes with the patient today in which >50% of the time was spent counseling/coordination of care regarding diagnosis, imaging review, symptoms and treatment plan  CHIEF COMPLAINT    Chief Complaint   Patient presents with   • Follow-up     Referred back for lumbar spine        HISTORY    History of Present Illness     This is 66 yo male with PMH significant for Dm (A1C 6 9), HTN, HLD, Asthma, depression and BPH who presents to reevaluation for LBP and LLE radiculopathy  Patient was last seen by Dr Edwin Yousif in 3/2022 with similar complaints  At that time, MRI imaging demonstrated degenerative changes at L3/4 and L4/5 with developmental bilateral stenosis  He was recommended to undergo BRIAN at L3/4 and L4/5 with consideration for surgery after optimization of conservative management  Today, he states symptoms have been progressive since his last visit  He endorses constant LBP described as burning/spasms, L>R, with radiation to the posterior lateral aspect of the left leg to his toes  There is associated numbness and tingling in a similar distribution  Complaining left leg giving out at times  Endorses urinary frequency and urgency with incontinence at times  Admits to following with urology and some improvement with addition of medications  He ambulates with the use of rollater since last year given need to take frequent rest during ambulation  Prior to that patient was able to ambulate with a cane  He admits to completing PT though at times appeared to make pain worse  He continues his stretching and exercises at home with no significant improvement of his symptoms  Patient did not undergo any injections/interevntions since his last visit    He admits to have BRIAN x4 in [id number] while in [place] with maybe a few hours of relief  REVIEW OF SYSTEMS    Review of Systems   Constitutional: Negative  HENT: Negative  Eyes: Negative  Respiratory: Positive for shortness of breath  Gastrointestinal: Negative  Endocrine: Positive for cold intolerance  Genitourinary: Positive for frequency and urgency  Some incontinence   Musculoskeletal: Positive for back pain (low back pain L>R, radiates to left leg), gait problem (using standard cane and walker, falls) and myalgias  Skin: Negative  Allergic/Immunologic: Negative  Neurological: Positive for weakness (left leg), light-headedness and numbness (N/T left leg)  Hematological: Negative  Psychiatric/Behavioral: Positive for sleep disturbance  ROS obtained by MA  Reviewed   See HPI    Meds/Allergies     Current Outpatient Medications   Medication Sig Dispense Refill   • acetaminophen (TYLENOL) 500 mg tablet Take 1 tablet (500 mg total) by mouth every 6 (six) hours as needed for mild pain 30 tablet 0   • albuterol (2 5 mg/3 mL) 0 083 % nebulizer solution Take 3 mL (2 5 mg total) by nebulization every 6 (six) hours as needed for wheezing or shortness of breath 180 mL 5   • albuterol (Ventolin HFA) 90 mcg/act inhaler Inhale 2 puffs every 4 (four) hours as needed for wheezing 18 g 2   • aluminum-magnesium hydroxide-simethicone (MAALOX) 200-200-20 MG/5ML SUSP Take 15 mL by mouth 4 (four) times a day (before meals and at bedtime) 150 mL 0   • amLODIPine (NORVASC) 10 mg tablet take 1 tablet by mouth once daily 90 tablet 1   • atorvastatin (LIPITOR) 20 mg tablet take 1 tablet by mouth once daily 90 tablet 0   • benzonatate (TESSALON PERLES) 100 mg capsule Take 1 capsule (100 mg total) by mouth 3 (three) times a day as needed for cough 30 capsule 0   • Cholecalciferol (D3 PO) Take by mouth daily     • cyclobenzaprine (FLEXERIL) 10 mg tablet Take 1 tablet (10 mg total) by mouth daily at bedtime as needed for muscle spasms 30 tablet 0   • Diclofenac Sodium (VOLTAREN) 1 % Apply 2 g topically 4 (four) times a day as needed (low back or neck pain) 150 g 2   • DULoxetine (CYMBALTA) 60 mg delayed release capsule Take 60 mg by mouth 2 (two) times a day     • fluticasone (FLONASE) 50 mcg/act nasal spray 1 spray into each nostril daily 16 g 0   • fluticasone-salmeterol (Advair HFA) 115-21 MCG/ACT inhaler Inhale 2 puffs 2 (two) times a day Rinse mouth after use   36 g 0   • gabapentin (NEURONTIN) 800 mg tablet Take 1 tablet (800 mg total) by mouth 3 (three) times a day 90 tablet 3   • HYDROcodone-acetaminophen (NORCO) 7 5-325 mg per tablet Take 1 tablet by mouth every 8 (eight) hours as needed for pain For ongoing pain Max Daily Amount: 3 tablets 90 tablet 0   • lidocaine (Lidoderm) 5 % Apply 1 patch topically daily Remove & Discard patch within 12 hours or as directed by MD 30 patch 1   • lidocaine (LMX) 4 % cream Apply topically as needed for mild pain 30 g 0   • menthol-cetylpyridinium (CEPACOL) 3 MG lozenge Take 1 lozenge (3 mg total) by mouth as needed for sore throat 30 lozenge 0   • metFORMIN (GLUCOPHAGE) 500 mg tablet take 1 tablet by mouth twice a day with meals 60 tablet 3   • Mirabegron ER 50 MG TB24 Take 1 tablet (50 mg total) by mouth daily 60 tablet 6   • ondansetron (Zofran ODT) 4 mg disintegrating tablet Take 1 tablet (4 mg total) by mouth every 6 (six) hours as needed for nausea or vomiting 20 tablet 0   • tamsulosin (FLOMAX) 0 4 mg take 1 capsule by mouth once daily WITH DINNER 90 capsule 1   • benzonatate (TESSALON PERLES) 100 mg capsule Take 1 capsule (100 mg total) by mouth 3 (three) times a day as needed for cough 20 capsule 0   • Bioflavonoid Products (RA Vitamin C CR) TBCR take 1 tablet by mouth twice a day for 14 days     • Blood Glucose Monitoring Suppl (OneTouch Verio) w/Device KIT Use daily 1 kit 0   • Blood Glucose Monitoring Suppl KIT Use in the morning Please give test strips and lancets  Dx  E11 9 1 kit 0   • Blood Pressure KIT Use in the morning With appropriate size cuff 1 kit 0   • Blood Pressure Monitoring (Blood Pressure Monitor/Arm) EMILY Use daily 1 each 0   • famotidine (PEPCID) 20 mg tablet take 1 tablet by mouth twice a day if needed for ACID REFLUX 90 tablet 0   • fluticasone (FLONASE) 50 mcg/act nasal spray 1 spray into each nostril daily (Patient not taking: Reported on 9/29/2022) 16 g 0   • glucose blood (OneTouch Verio) test strip Check blood sugar daily 100 each 2   • glucose monitoring kit (FREESTYLE) monitoring kit Use 1 each in the morning TESTING DAILY IN THE AM 1 each 0   • Lancet Devices (ONE TOUCH DELICA LANCING DEV) MISC Use daily 1 each 2   • Lancets (freestyle) lancets TESTING DAILY IN THE  each 3   • Lancets (onetouch ultrasoft) lancets Use daily Use as instructed to check sugars daily (Patient not taking: Reported on 9/29/2022) 100 each 1   • Multiple Vitamin (multivitamin) capsule Take 1 capsule by mouth daily for 14 days (Patient not taking: Reported on 1/24/2023) 14 capsule 0   • naloxone (NARCAN) 4 mg/0 1 mL nasal spray Administer 1 spray into a nostril  If no response after 2-3 minutes, give another dose in the other nostril using a new spray  (Patient not taking: Reported on 1/24/2023) 1 each 1   • OneTouch Delica Lancets 04V MISC Use daily (Patient not taking: Reported on 9/29/2022) 100 each 1   • OneTouch Delica Lancets 08O MISC Use in the morning (Patient not taking: Reported on 9/29/2022) 100 each 6   • venlafaxine (EFFEXOR-XR) 150 mg 24 hr capsule take 1 capsule by mouth once daily WITH BREAKFAST 30 capsule 2   • zolpidem (AMBIEN) 5 mg tablet Take 1 tablet (5 mg total) by mouth daily at bedtime as needed for sleep 30 tablet 0     No current facility-administered medications for this visit         No Known Allergies    PAST HISTORY    Past Medical History:   Diagnosis Date   • Asthma    • Back pain    • Back pain    • 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   • Depression    • Diabetes mellitus ([facility] )    • Hyperlipidemia    • Hypertension    • Type 2 diabetes mellitus without complication, without long-term current use of insulin ([facility] ) 10/16/2020       Past Surgical History:   Procedure Laterality Date   • CYST REMOVAL  2017       Social History     Tobacco Use   • Smoking status: Never   • Smokeless tobacco: Never   Vaping Use   • Vaping Use: Never used   Substance Use Topics   • Alcohol use: Not Currently     Comment: rare   • Drug use: Never       Family History   Problem Relation Age of Onset   • Hypertension Mother    • Diabetes Mother    • Cancer Father    • Diabetes Family    • Hypertension Family          Above history personally reviewed  EXAM    Vitals:Blood pressure 120/78, pulse 86, temperature 98 7 °F (37 1 °C), temperature source Tympanic, resp  rate 16, height 5' 7" (1 702 m), weight 89 4 kg (197 lb), SpO2 99 %  ,Body mass index is 30 85 kg/m²  Physical Exam  Constitutional:       General: He is not in acute distress  Appearance: Normal appearance  He is well-developed  He is not ill-appearing  HENT:      Head: Normocephalic and atraumatic  Right Ear: External ear normal       Left Ear: External ear normal       Nose: Nose normal       Mouth/Throat:      Mouth: Mucous membranes are moist    Eyes:      General: No scleral icterus  Right eye: No discharge  Left eye: No discharge  Extraocular Movements: EOM normal       Conjunctiva/sclera: Conjunctivae normal    Cardiovascular:      Rate and Rhythm: Normal rate  Pulmonary:      Effort: Pulmonary effort is normal  No respiratory distress  Abdominal:      General: There is no distension  Musculoskeletal:         General: Tenderness (diffuse tenderness throacolumbar spine  Left paraspinal tenderness adn mild pain at left SI) present  Cervical back: Normal range of motion and neck supple  Skin:     General: Skin is warm and dry     Neurological:      Mental Status: He is alert  Deep Tendon Reflexes:      Reflex Scores:       Patellar reflexes are 1+ on the right side and 1+ on the left side  Psychiatric:         Speech: Speech normal          Behavior: Behavior normal          Thought Content: Thought content normal          Judgment: Judgment normal          Neurologic Exam     Mental Status   Follows 3 step commands  Attention: normal  Concentration: normal    Speech: speech is normal   Level of consciousness: alert  Knowledge: good  Normal comprehension  Cranial Nerves     CN III, IV, VI   Extraocular motions are normal    Conjugate gaze: present    CN VII   Facial expression full, symmetric  CN VIII   Hearing: intact    CN XII   Tongue: not atrophic  Fasciculations: absent    Motor Exam   Muscle bulk: normal  Overall muscle tone: normal    Strength   Strength 5/5 except as noted  LLE diffuse 4-4+ with giveway at times     Sensory Exam   Right arm light touch: normal  Left arm light touch: normal  Right leg light touch: normal  Decreased to LT left lateral/anterior thigh     Gait, Coordination, and Reflexes     Gait  Gait: (antalgic with cane)    Tremor   Resting tremor: absent  Intention tremor: absent  Action tremor: absent    Reflexes   Right patellar: 1+  Left patellar: 1+        MEDICAL DECISION MAKING    Imaging Studies:     MRI lumbar wo    I have personally reviewed pertinent reports     and I have personally reviewed pertinent films in PACS

## 2023-01-24 NOTE — ASSESSMENT & PLAN NOTE
Patient presents today as a consult for progressive low back pain and lumbar radiculopathy  • Complaining of left greater than right burning spasm low back pain with radiation into the posterior lateral aspect of the left leg down to the foot as well as associated numbness  Describes left leg giving out at times  • States he ambulates with a Rollator since 2022 as he frequently needs to sit after short duration of ambulation  Imaging:   • MRI lumbar spine without 2/8/22: Left foraminal disc protrusion at L3-4 and L4-5 with moderate left foraminal stenosis  Plan:   • Continue to monitor neurological symptoms  • MRI imaging reviewed  Patient's pain appears most consistent with an L5 distribution  • Given progressive nature of symptoms as well as complaints of left leg weakness worse with ambulation, new MRI lumbar spine without contrast has been ordered to ensure no progressive disease or new disc herniations  • Discussed ongoing recommendation for trial of epidural steroid injections  Patient was hesitant secondary to having these completed in 2019/2020 in [place] without significant improvement  • Discussed both the therapeutic and diagnostic value of epidural steroid injections  New pain management referral placed  • Recommended ongoing use of gabapentin  Patient states he takes this at times  Discussed need to take gabapentin routinely to maximize efficacy  • Patient to follow-up after completion of new MRI and trial of epidural steroid injections or sooner with any new or progressive symptoms  • All questions were answered  Patient agreeable plan of care

## 2023-01-30 DIAGNOSIS — E78.5 HYPERLIPIDEMIA, UNSPECIFIED HYPERLIPIDEMIA TYPE: ICD-10-CM

## 2023-01-30 NOTE — TELEPHONE ENCOUNTER
Pt called in states he has been coughing quite a lot and is requesting a medication please advise thank you

## 2023-01-31 RX ORDER — ATORVASTATIN CALCIUM 20 MG/1
TABLET, FILM COATED ORAL
Qty: 90 TABLET | Refills: 0 | Status: SHIPPED | OUTPATIENT
Start: 2023-01-31

## 2023-01-31 NOTE — TELEPHONE ENCOUNTER
Please advise patient to bee seen in office for the persistent cough  I will send refill for the Atorvastatin

## 2023-02-01 DIAGNOSIS — G89.29 CHRONIC LOW BACK PAIN WITHOUT SCIATICA, UNSPECIFIED BACK PAIN LATERALITY: ICD-10-CM

## 2023-02-01 DIAGNOSIS — M54.50 CHRONIC LOW BACK PAIN WITHOUT SCIATICA, UNSPECIFIED BACK PAIN LATERALITY: ICD-10-CM

## 2023-02-01 DIAGNOSIS — M54.16 LUMBAR RADICULOPATHY: ICD-10-CM

## 2023-02-02 RX ORDER — HYDROCODONE BITARTRATE AND ACETAMINOPHEN 7.5; 325 MG/1; MG/1
1 TABLET ORAL EVERY 8 HOURS PRN
Qty: 90 TABLET | Refills: 0 | Status: SHIPPED | OUTPATIENT
Start: 2023-02-02

## 2023-02-08 ENCOUNTER — HOSPITAL ENCOUNTER (OUTPATIENT)
Dept: RADIOLOGY | Facility: HOSPITAL | Age: 63
Discharge: HOME | End: 2023-02-08

## 2023-02-08 DIAGNOSIS — M54.16 LUMBAR RADICULOPATHY: ICD-10-CM

## 2023-02-09 DIAGNOSIS — K21.9 GASTROESOPHAGEAL REFLUX DISEASE, UNSPECIFIED WHETHER ESOPHAGITIS PRESENT: ICD-10-CM

## 2023-02-09 RX ORDER — FAMOTIDINE 20 MG/1
TABLET, FILM COATED ORAL
Qty: 90 TABLET | Refills: 0 | Status: SHIPPED | OUTPATIENT
Start: 2023-02-09

## 2023-02-17 PROBLEM — K05.30 PERIODONTITIS: Status: ACTIVE | Noted: 2023-02-17

## 2023-02-20 ENCOUNTER — TELEPHONE (OUTPATIENT)
Dept: NEUROSURGERY | Facility: CLINIC | Age: 63
End: 2023-02-20

## 2023-02-20 ENCOUNTER — OFFICE VISIT (OUTPATIENT)
Dept: DENTISTRY | Facility: CLINIC | Age: 63
End: 2023-02-20

## 2023-02-20 VITALS — HEART RATE: 77 BPM | DIASTOLIC BLOOD PRESSURE: 86 MMHG | TEMPERATURE: 97.8 F | SYSTOLIC BLOOD PRESSURE: 132 MMHG

## 2023-02-20 DIAGNOSIS — Z01.20 ENCOUNTER FOR DENTAL EXAMINATION: Primary | ICD-10-CM

## 2023-02-20 DIAGNOSIS — K03.6 DENTAL CALCULUS: ICD-10-CM

## 2023-02-20 DIAGNOSIS — K08.89 NON-RESTORABLE TOOTH: ICD-10-CM

## 2023-02-20 DIAGNOSIS — K03.6 ACCRETIONS ON TEETH: ICD-10-CM

## 2023-02-20 NOTE — DENTAL PROCEDURE DETAILS
Vilma Dimas presents for a Periodic exam  Verbal consent for treatment given in addition to the forms  Reviewed health history - Patient is ASA II  Consents signed: Yes     Perio: Generalized   STAGE 2 GRADE B  Pain Scale: 0  Caries Assessment: High  Radiographs: Bitewings x4     Oral Hygiene instruction reviewed and given  Recommended Hygiene recall visits with  Marv  Treatment Plan:  1  Infection control:   2  Periodontal therapy: adult prophy/  3  Caries control: as charted    PATIENT HAS BEEN WAITING ON GETTING LOWER RPD   INSURANCE APPROVED  needs to have # 17, 17 and # 1 extracted      Prognosis is Good    Referrals needed: OS  has appt in April  gave new referral adding # 18 non restorable tooth    Next Visit:  pre paul impressions after healing from extractions    6 mos recall

## 2023-02-20 NOTE — TELEPHONE ENCOUNTER
Called patient phone disconnected, called sister listed wrong number, called brother not a working number  Could not confiem appointment for 02/22/23

## 2023-02-20 NOTE — DENTAL PROCEDURE DETAILS
Prophylaxis completed with ultrasonic  and hand instrumentation  Soft plaque removed and supragingival calculus removed   Polished with prophy cup and paste  Flossed and provided Oral Health Instructions  Demonstrated proper brushing and flossing technique  Patient left satisfied and ambulatory

## 2023-02-23 DIAGNOSIS — I10 BENIGN ESSENTIAL HTN: ICD-10-CM

## 2023-02-23 RX ORDER — AMLODIPINE BESYLATE 10 MG/1
TABLET ORAL
Qty: 90 TABLET | Refills: 1 | Status: SHIPPED | OUTPATIENT
Start: 2023-02-23

## 2023-03-02 ENCOUNTER — PATIENT OUTREACH (OUTPATIENT)
Dept: FAMILY MEDICINE CLINIC | Facility: CLINIC | Age: 63
End: 2023-03-02

## 2023-03-02 NOTE — PROGRESS NOTES
Incoming Call:  3/2/2023    [facility] received a call from pt stating that he received his recertification for MA and SNAP benefits and wants [facility] to complete application  [facility] informed pt he can complete in person at local [facility] office or call the Renewal  Line and complete this recertification via phone  [facility] provided number which is [phone]  Pt expressed understanding and agreed to call and complete  No further outreach is scheduled 
2 seconds or less

## 2023-03-16 ENCOUNTER — OFFICE VISIT (OUTPATIENT)
Dept: NEUROSURGERY | Facility: CLINIC | Age: 63
End: 2023-03-16

## 2023-03-16 ENCOUNTER — TELEPHONE (OUTPATIENT)
Dept: NEUROSURGERY | Facility: CLINIC | Age: 63
End: 2023-03-16

## 2023-03-16 VITALS
BODY MASS INDEX: 31.52 KG/M2 | HEART RATE: 82 BPM | DIASTOLIC BLOOD PRESSURE: 96 MMHG | TEMPERATURE: 97.8 F | HEIGHT: 67 IN | SYSTOLIC BLOOD PRESSURE: 150 MMHG | WEIGHT: 200.8 LBS | RESPIRATION RATE: 18 BRPM

## 2023-03-16 DIAGNOSIS — M51.26 PROTRUSION OF LUMBAR INTERVERTEBRAL DISC: ICD-10-CM

## 2023-03-16 DIAGNOSIS — R20.2 PARESTHESIA OF BOTH FEET: Primary | ICD-10-CM

## 2023-03-16 DIAGNOSIS — M62.838 MUSCLE SPASM OF LEFT LOWER EXTREMITY: ICD-10-CM

## 2023-03-16 NOTE — PROGRESS NOTES
Assessment/Plan:    Protrusion of lumbar intervertebral disc  · As addressed in HPI  Symptoms associated with abnormalities on MRI lumbar   · He has not received interventional treatments with PM, 3/3/22 consulted with Dr Don Vides  No f/u visits      · Reports progressive worsening symptom s   · He has not complied with routine dosing with gabapentin , instead take when he has uncontrolled pain   · He ambulates solely using the Rolator walker , has an intolerance for distance walking  Cannot walk in a sotre , he must sit intermittently   · He reports muscle spasm in lower extremities occur at night tonny he tries to stretch his legs    · He reports numbness and cramping in legs and feet , symptoms are worse on the left  · Reports increased pain with weight bearing left leg  · Reports due to the pain , numbness and weakness in left leg he has increased falls  · Severity of symptoms is 8/10  · Sitting sloughed in chairwith left leg stretched /extended out for comfort  Reports sitting straight with left leg flexed at hip elicits worse pain  Imagining   · 2/8/23 MRI LUMBAR SPINE WITHOUT CONTRASTLeft foraminal protrusion type disc herniation L3-4 and L4-5 results in moderate left foraminal narrowing  Correlate for left L3 or left L4 radiculopathy  Findings are unchanged from the prior study  Plan   · Reviewed MRI Lumbar spine   · Referral to PM with recommendation same as previously form Dr Andrew Ford  Interventional injections   · L 3 -4 L 4-5   · Ordered EMG BLE secondary to weakness, numbness and spasms in bilateral  legs and feet   · RTO 2-3 months after completion of EMG and goal of completing a series of spine injections w/ Dr Don Vides  · Reviewed Red flag signs if develop go to closest ED fr assessment   · Advised if he has additional questions or concerns call the opffice        Muscle spasm of left lower extremity  · As addressed in HPI  · As addressed in protrusion of lumbar disc      Paresthesia of both feet  As addressed in HPI  As addressed in protrusion of lumbar disc  Subjective: RTO as a f/u to last visit 1/24/2022     Patient ID: Kassie Santana is a 65 y o  male     HPI   He is well known to neurosurgery since initial consult visit in 2021 and has had several office visits since   H reports pain involving bilateral low back, buttocks,   posterior  lateral , anterior thighs with distribution into lateral aspect of bilateral lower legs , ankle and lateral feet, in the right leg similar symptoms but no thigh pain, has the  associated paresthesia sensation in bilateral  legs , feet and toes  Has cramping/musclespasma  and numbness in legs when when rising form a squatting posture  has pain and tightness across lower back  Symptoms worse on left side  The most severe pian is bilateral low back left greater than right for  He underwent conservative treatments in 2019 - 2020 injections x 4 from PM in [place]  and physical therapy with no significant symptom relief  [facility] He reports failing various medications gabapentin, tramadol, hydrocodone, and advil  The onset of his symptoms  He reports are related to a work incident  In 2019  He fell into a ditch  He reported mobility deficits using a SPC and has progressed to a Rolator walker  MRI 2/8/2022 demonstrated degeneration specifically at L3-4 and L4-5 with development of bilateral foraminal stenosis  Since conservative treatments were offered several years ago it had been recommended he attend physical therapy and pain management again , both have been ordered  The PM recommendation is for L3-L4 and L4- L5 injections   Surgica options were discussed by Dr Kellie Boogie at  [facility] 3/14/2022 with plan  to pursue after he has maximized conservative measures  The plan was for RTO prn  His last visit was 1/24/2023 , he presented with similar complaints   Reported a decline in ambulation ability, has left leg weakness and the left leg was giving out, now with continuous use of a Rolator walker  Recommended ongoing use of gabapentin regularly, not prn as he was doing  He reported completing physical therapy which caused worsening symptoms  He had not attended pain management , a referral was again  Ordered  An MRI of lumbar was ordered since he complained of left leg weakness  He returns today as a f/u visit for imagining review and reassessment         REVIEW OF SYSTEMS  Review of Systems   Constitutional: Negative  HENT: Negative  Eyes: Negative  Respiratory: Positive for shortness of breath  Cardiovascular: Negative  Gastrointestinal: Negative  Endocrine: Positive for cold intolerance  Genitourinary: Positive for frequency and urgency  Some incontinence   Musculoskeletal: Positive for back pain (low back pain L>R, radiates to left leg), gait problem (currently using roller walker more, using standard cane and rolling walker, falls, left leg gives out on him) and myalgias  Skin: Negative  Allergic/Immunologic: Negative  Neurological: Positive for weakness (left leg, left leg gives out on him), light-headedness and numbness (N/T left leg)  Hematological: Negative  Psychiatric/Behavioral: Positive for sleep disturbance           Meds/Allergies     Current Outpatient Medications   Medication Sig Dispense Refill   • acetaminophen (TYLENOL) 500 mg tablet Take 1 tablet (500 mg total) by mouth every 6 (six) hours as needed for mild pain 30 tablet 0   • albuterol (2 5 mg/3 mL) 0 083 % nebulizer solution Take 3 mL (2 5 mg total) by nebulization every 6 (six) hours as needed for wheezing or shortness of breath 180 mL 5   • albuterol (Ventolin HFA) 90 mcg/act inhaler Inhale 2 puffs every 4 (four) hours as needed for wheezing 18 g 2   • aluminum-magnesium hydroxide-simethicone (MAALOX) 200-200-20 MG/5ML SUSP Take 15 mL by mouth 4 (four) times a day (before meals and at bedtime) 150 mL 0   • amLODIPine (NORVASC) 10 mg tablet take 1 tablet by mouth once daily 90 tablet 1   • atorvastatin (LIPITOR) 20 mg tablet take 1 tablet by mouth once daily (Patient taking differently: Take 20 mg by mouth daily) 90 tablet 0   • Bioflavonoid Products (RA Vitamin C CR) TBCR take 1 tablet by mouth twice a day for 14 days     • Blood Glucose Monitoring Suppl (OneTouch Verio) w/Device KIT Use daily 1 kit 0   • Blood Glucose Monitoring Suppl KIT Use in the morning Please give test strips and lancets  Dx  E11 9 1 kit 0   • Blood Pressure KIT Use in the morning With appropriate size cuff 1 kit 0   • Blood Pressure Monitoring (Blood Pressure Monitor/Arm) EMILY Use daily 1 each 0   • Cholecalciferol (D3 PO) Take by mouth daily     • cyclobenzaprine (FLEXERIL) 10 mg tablet Take 1 tablet (10 mg total) by mouth daily at bedtime as needed for muscle spasms 30 tablet 0   • Diclofenac Sodium (VOLTAREN) 1 % Apply 2 g topically 4 (four) times a day as needed (low back or neck pain) 150 g 2   • DULoxetine (CYMBALTA) 60 mg delayed release capsule Take 60 mg by mouth 2 (two) times a day     • famotidine (PEPCID) 20 mg tablet take 1 tablet by mouth twice a day if needed for ACID REFLUX 90 tablet 0   • fluticasone (FLONASE) 50 mcg/act nasal spray 1 spray into each nostril daily 16 g 0   • fluticasone-salmeterol (Advair HFA) 115-21 MCG/ACT inhaler Inhale 2 puffs 2 (two) times a day Rinse mouth after use   36 g 0   • gabapentin (NEURONTIN) 800 mg tablet Take 1 tablet (800 mg total) by mouth 3 (three) times a day 90 tablet 3   • glucose blood (OneTouch Verio) test strip Check blood sugar daily 100 each 2   • glucose monitoring kit (FREESTYLE) monitoring kit Use 1 each in the morning TESTING DAILY IN THE AM 1 each 0   • HYDROcodone-acetaminophen (NORCO) 7 5-325 mg per tablet Take 1 tablet by mouth every 8 (eight) hours as needed for pain For ongoing pain Max Daily Amount: 3 tablets 90 tablet 0   • Lancet Devices (ONE TOUCH DELICA LANCING DEV) MISC Use daily 1 each 2   • Lancets (freestyle) lancets TESTING DAILY IN THE  each 3   • Lancets (onetouch ultrasoft) lancets Use daily Use as instructed to check sugars daily 100 each 1   • lidocaine (Lidoderm) 5 % Apply 1 patch topically daily Remove & Discard patch within 12 hours or as directed by MD 30 patch 1   • lidocaine (LMX) 4 % cream Apply topically as needed for mild pain 30 g 0   • metFORMIN (GLUCOPHAGE) 500 mg tablet take 1 tablet by mouth twice a day with meals 60 tablet 3   • Mirabegron ER 50 MG TB24 Take 1 tablet (50 mg total) by mouth daily 60 tablet 6   • naloxone (NARCAN) 4 mg/0 1 mL nasal spray Administer 1 spray into a nostril  If no response after 2-3 minutes, give another dose in the other nostril using a new spray   1 each 1   • ondansetron (Zofran ODT) 4 mg disintegrating tablet Take 1 tablet (4 mg total) by mouth every 6 (six) hours as needed for nausea or vomiting 20 tablet 0   • OneTouch Delica Lancets 27N MISC Use daily 100 each 1   • OneTouch Delica Lancets 31Z MISC Use in the morning 100 each 6   • tamsulosin (FLOMAX) 0 4 mg take 1 capsule by mouth once daily WITH DINNER 90 capsule 1   • venlafaxine (EFFEXOR-XR) 150 mg 24 hr capsule take 1 capsule by mouth once daily WITH BREAKFAST 30 capsule 2   • zolpidem (AMBIEN) 5 mg tablet Take 1 tablet (5 mg total) by mouth daily at bedtime as needed for sleep 30 tablet 0   • benzonatate (TESSALON PERLES) 100 mg capsule Take 1 capsule (100 mg total) by mouth 3 (three) times a day as needed for cough (Patient not taking: Reported on 2/20/2023) 30 capsule 0   • benzonatate (TESSALON PERLES) 100 mg capsule Take 1 capsule (100 mg total) by mouth 3 (three) times a day as needed for cough (Patient not taking: Reported on 2/20/2023) 20 capsule 0   • fluticasone (FLONASE) 50 mcg/act nasal spray 1 spray into each nostril daily (Patient not taking: Reported on 9/29/2022) 16 g 0   • menthol-cetylpyridinium (CEPACOL) 3 MG lozenge Take 1 lozenge (3 mg total) by mouth as needed for sore throat (Patient not taking: Reported on 2/20/2023) 30 lozenge 0   • Multiple Vitamin (multivitamin) capsule Take 1 capsule by mouth daily for 14 days (Patient not taking: Reported on 1/24/2023) 14 capsule 0     No current facility-administered medications for this visit  No Known Allergies    PAST HISTORY    Past Medical History:   Diagnosis Date   • Asthma    • Back pain    • Back pain    • 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   • Depression    • Diabetes mellitus ([place] [id number] )    • Hyperlipidemia    • Hypertension    • Type 2 diabetes mellitus without complication, without long-term current use of insulin ([facility] ) 10/16/2020       Past Surgical History:   Procedure Laterality Date   • CYST REMOVAL  2017       Social History     Tobacco Use   • Smoking status: Never   • Smokeless tobacco: Never   Vaping Use   • Vaping Use: Never used   Substance Use Topics   • Alcohol use: Not Currently     Comment: rare   • Drug use: Never       Family History   Problem Relation Age of Onset   • Hypertension Mother    • Diabetes Mother    • Cancer Father    • Diabetes Family    • Hypertension Family        The following portions of the patient's history were reviewed and updated as appropriate: allergies, current medications, past family history, past medical history, past social history, past surgical history and problem list       EXAM    Vitals:Blood pressure 150/96, pulse 82, temperature 97 8 °F (36 6 °C), resp  rate 18, height 5' 7" (1 702 m), weight 91 1 kg (200 lb 12 8 oz)  ,Body mass index is 31 45 kg/m²  Physical Exam  Vitals and nursing note reviewed  Constitutional:       General: He is not in acute distress  Appearance: He is not ill-appearing, toxic-appearing or diaphoretic  Pulmonary:      Effort: Pulmonary effort is normal  No respiratory distress  Musculoskeletal:      Comments: Diminished lumbar ROM elicits pain    Skin:     General: Skin is warm and dry     Neurological:      Mental Status: He is alert and oriented to person, place, and time  Sensory: Sensory deficit present  Motor: Weakness present  Gait: Gait abnormal       Deep Tendon Reflexes: Reflexes abnormal       Reflex Scores:       Patellar reflexes are 1+ on the right side and 1+ on the left side  Achilles reflexes are 1+ on the right side and 1+ on the left side  Psychiatric:         Mood and Affect: Mood normal          Behavior: Behavior normal          Neurologic Exam     Mental Status   Oriented to person, place, and time  Level of consciousness: alert    Motor Exam   Right leg tone: normal  Left leg tone: normal    Strength   Right iliopsoas: 5/5  Left iliopsoas: 4/5  Right quadriceps: 5/5  Left quadriceps: 4/5  Right hamstrin/5  Left hamstrin/5  Right glutei: 5/5  Left glutei: 4/5  Right anterior tibial: 5/5  Left anterior tibial: 4/5  Right gastroc: 5/5  Left gastroc: 4/5    Sensory Exam   Right leg light touch: decreased from ankle  Left leg light touch: decreased from ankle  Right leg vibration: decreased from ankle  Left leg vibration: decreased from ankle    Gait, Coordination, and Reflexes     Gait  Gait: (antalgic , Rolator walker )    Reflexes   Right patellar: 1+  Left patellar: 1+  Right achilles: 1+  Left achilles: 1+  Right ankle clonus: absent  Left ankle clonus: absent      Imaging Studies    23 MRI LUMBAR SPINE WITHOUT CONTRAST     INDICATION: [id number]: Radiculopathy, lumbar region  Progressive lumbar radiculopathy      COMPARISON:  2022     TECHNIQUE:  Multiplanar, multisequence imaging of the lumbar spine was performed             IMAGE QUALITY:  Diagnostic     FINDINGS:     VERTEBRAL BODIES:  There are 5 lumbar type vertebral bodies  Normal alignment of the lumbar spine  No spondylolysis or spondylolisthesis  No scoliosis  No compression fracture  Normal marrow signal is identified within the visualized bony   structures    No discrete marrow lesion      SACRUM:  Normal signal within the sacrum  No evidence of insufficiency or stress fracture      DISTAL CORD AND CONUS:  Normal size and signal within the distal cord and conus      PARASPINAL SOFT TISSUES:  T2 hyperintense right renal lesions likely cyst      LOWER THORACIC DISC SPACES:  Normal disc height and signal   No disc herniation, canal stenosis or foraminal narrowing      LUMBAR DISC SPACES:     L1-L2:  Moderate facet hypertrophy without central or foraminal narrowing      L2-L3:  Moderate facet hypertrophy  No significant central or foraminal narrowing      L3-L4:  Moderate facet hypertrophy  Mild annular bulge with left foraminal protrusion type disc herniation results in moderate left foraminal narrowing      L4-L5:  Moderate facet hypertrophy  Left foraminal protrusion type disc herniation with annular fissure and marginal osteophyte results in moderate left foraminal narrowing      L5-S1:  Mild facet hypertrophy without central or foraminal narrowing      OTHER FINDINGS:  None      IMPRESSION:     Left foraminal protrusion type disc herniation L3-4 and L4-5 results in moderate left foraminal narrowing  Correlate for left L3 or left L4 radiculopathy  Findings are unchanged from the prior study  I have personally reviewed pertinent reports  and I have personally reviewed pertinent films in PACS    I have spent a total time of 45 minutes on 03/16/23 in caring for this patient including Diagnostic results, Risks and benefits of tx options, Instructions for management, Patient and family education, Importance of tx compliance, Risk factor reductions, Impressions, Counseling / Coordination of care, Documenting in the medical record, Reviewing / ordering tests, medicine, procedures   and Obtaining or reviewing history

## 2023-03-16 NOTE — TELEPHONE ENCOUNTER
03/16/2023-PT SAW Rosie 29  CHECKED OUT AND NEEDED:  Call pain need appointment -RTO after series of injections  and after completion od EMG Both legs --  Try to schedule EMG in 30 - 60 days then RTO appointment as snpx with Dr Roxnae Disla --    PAIN MANAGEMENT: 03/28/2023 APT W/RUDDY HANKINS (PT LAST SEEN A YEAR WITH [facility]INE [facility] PAIN AND NEEDS A RE-EVAL BEFORE SCHEDULING INJECTIONS     EMG: SCHEDULED ON 10/31/2023, EMAIL WAS SENT TO MOVE APT UP 30-60 DAYS FROM TODAY EARLIER      PT WAS TOLD ONCE EMG IS MOVED UP AND HE COMPLETES IINJECTIONS WITH SPINE & PAIN TO CALL OFFICE TO SCHEDULE SNPX W/CINTIA & CATRINA     **WAITING FOR EMG AND INJECTIONS TO BE COMPLETED SO F/U SNPX W/CINTIA & OLGA CAN BE SCHEDULED**

## 2023-03-17 NOTE — ASSESSMENT & PLAN NOTE
· As addressed in HPI  Symptoms associated with abnormalities on MRI lumbar   · He has not received interventional treatments with PM, 3/3/22 consulted with Dr Mimi Urias  No f/u visits      · Reports progressive worsening symptom s   · He has not complied with routine dosing with gabapentin , instead take when he has uncontrolled pain   · He ambulates solely using the Rolator walker , has an intolerance for distance walking  Cannot walk in a sotre , he must sit intermittently   · He reports muscle spasm in lower extremities occur at night tonny he tries to stretch his legs    · He reports numbness and cramping in legs and feet , symptoms are worse on the left  · Reports increased pain with weight bearing left leg  · Reports due to the pain , numbness and weakness in left leg he has increased falls  · Severity of symptoms is 8/10  · Sitting sloughed in chairwith left leg stretched /extended out for comfort  Reports sitting straight with left leg flexed at hip elicits worse pain  Imagining   · 2/8/23 MRI LUMBAR SPINE WITHOUT CONTRASTLeft foraminal protrusion type disc herniation L3-4 and L4-5 results in moderate left foraminal narrowing  Correlate for left L3 or left L4 radiculopathy  Findings are unchanged from the prior study  Plan   · Reviewed MRI Lumbar spine   · Referral to PM with recommendation same as previously form Dr Fortune Scales  Interventional injections   · L 3 -4 L 4-5   · Ordered EMG BLE secondary to weakness, numbness and spasms in bilateral  legs and feet   · RTO 2-3 months after completion of EMG and goal of completing a series of spine injections w/ Dr Mimi Urias    · Reviewed Red flag signs if develop go to closest ED fr assessment   · Advised if he has additional questions or concerns call the opffice

## 2023-03-20 ENCOUNTER — HOSPITAL ENCOUNTER (EMERGENCY)
Facility: HOSPITAL | Age: 63
Discharge: HOME | End: 2023-03-21
Attending: EMERGENCY MEDICINE

## 2023-03-20 VITALS
TEMPERATURE: 98.3 F | WEIGHT: 195 LBS | BODY MASS INDEX: 30.54 KG/M2 | SYSTOLIC BLOOD PRESSURE: 158 MMHG | RESPIRATION RATE: 16 BRPM | OXYGEN SATURATION: 94 % | HEART RATE: 80 BPM | DIASTOLIC BLOOD PRESSURE: 89 MMHG

## 2023-03-20 DIAGNOSIS — J45.909 ASTHMA: ICD-10-CM

## 2023-03-20 DIAGNOSIS — S20.211A CONTUSION OF RIGHT CHEST WALL, INITIAL ENCOUNTER: Primary | ICD-10-CM

## 2023-03-20 LAB
BASOPHILS # BLD AUTO: 0.02 THOUSANDS/ÂΜL (ref 0–0.1)
BASOPHILS NFR BLD AUTO: 0 % (ref 0–1)
BILIRUB UR QL STRIP: NEGATIVE
CLARITY UR: CLEAR
COLOR UR: ABNORMAL
EOSINOPHIL # BLD AUTO: 0.06 THOUSAND/ÂΜL (ref 0–0.61)
EOSINOPHIL NFR BLD AUTO: 1 % (ref 0–6)
ERYTHROCYTE [DISTWIDTH] IN BLOOD BY AUTOMATED COUNT: 13.2 % (ref 11.6–15.1)
GLUCOSE UR STRIP-MCNC: NEGATIVE MG/DL
HCT VFR BLD AUTO: 38.4 % (ref 36.5–49.3)
HGB BLD-MCNC: 13.3 G/DL (ref 12–17)
HGB UR QL STRIP.AUTO: 25
IMM GRANULOCYTES # BLD AUTO: 0 THOUSAND/UL (ref 0–0.2)
IMM GRANULOCYTES NFR BLD AUTO: 0 % (ref 0–2)
KETONES UR STRIP-MCNC: NEGATIVE MG/DL
LEUKOCYTE ESTERASE UR QL STRIP: NEGATIVE
LYMPHOCYTES # BLD AUTO: 3.1 THOUSANDS/ÂΜL (ref 0.6–4.47)
LYMPHOCYTES NFR BLD AUTO: 53 % (ref 14–44)
MCH RBC QN AUTO: 29.1 PG (ref 26.8–34.3)
MCHC RBC AUTO-ENTMCNC: 34.6 G/DL (ref 31.4–37.4)
MCV RBC AUTO: 84 FL (ref 82–98)
MONOCYTES # BLD AUTO: 0.44 THOUSAND/ÂΜL (ref 0.17–1.22)
MONOCYTES NFR BLD AUTO: 8 % (ref 4–12)
NEUTROPHILS # BLD AUTO: 2.16 THOUSANDS/ÂΜL (ref 1.85–7.62)
NEUTS SEG NFR BLD AUTO: 38 % (ref 43–75)
NITRITE UR QL STRIP: NEGATIVE
NRBC BLD AUTO-RTO: 0 /100 WBCS
PH UR STRIP.AUTO: 5 [PH]
PLATELET # BLD AUTO: 206 THOUSANDS/UL (ref 149–390)
PMV BLD AUTO: 9.1 FL (ref 8.9–12.7)
PROT UR STRIP-MCNC: NEGATIVE MG/DL
RBC # BLD AUTO: 4.57 MILLION/UL (ref 3.88–5.62)
SP GR UR STRIP.AUTO: 1.02 (ref 1–1.04)
UROBILINOGEN UA: NEGATIVE MG/DL
WBC # BLD AUTO: 5.78 THOUSAND/UL (ref 4.31–10.16)

## 2023-03-20 RX ORDER — IPRATROPIUM BROMIDE AND ALBUTEROL SULFATE 2.5; .5 MG/3ML; MG/3ML
3 SOLUTION RESPIRATORY (INHALATION) ONCE
Status: COMPLETED | OUTPATIENT
Start: 2023-03-20 | End: 2023-03-20

## 2023-03-20 RX ADMIN — IPRATROPIUM BROMIDE AND ALBUTEROL SULFATE 3 ML: .5; 3 SOLUTION RESPIRATORY (INHALATION) at 23:35

## 2023-03-21 ENCOUNTER — APPOINTMENT (EMERGENCY)
Dept: CT IMAGING | Facility: HOSPITAL | Age: 63
End: 2023-03-21

## 2023-03-21 LAB
ALBUMIN SERPL BCP-MCNC: 4.1 G/DL (ref 3.5–5)
ALP SERPL-CCNC: 48 U/L (ref 34–104)
ALT SERPL W P-5'-P-CCNC: 23 U/L (ref 7–52)
ANION GAP SERPL CALCULATED.3IONS-SCNC: 8 MMOL/L (ref 4–13)
AST SERPL W P-5'-P-CCNC: 20 U/L (ref 13–39)
BACTERIA UR QL AUTO: NORMAL /HPF
BILIRUB SERPL-MCNC: 0.27 MG/DL (ref 0.2–1)
BUN SERPL-MCNC: 24 MG/DL (ref 5–25)
CALCIUM SERPL-MCNC: 9.2 MG/DL (ref 8.4–10.2)
CHLORIDE SERPL-SCNC: 102 MMOL/L (ref 96–108)
CO2 SERPL-SCNC: 26 MMOL/L (ref 21–32)
CREAT SERPL-MCNC: 1.19 MG/DL (ref 0.6–1.3)
GFR SERPL CREATININE-BSD FRML MDRD: 65 ML/MIN/1.73SQ M
GLUCOSE SERPL-MCNC: 202 MG/DL (ref 65–140)
NON-SQ EPI CELLS URNS QL MICRO: NORMAL /HPF
POTASSIUM SERPL-SCNC: 4.1 MMOL/L (ref 3.5–5.3)
PROT SERPL-MCNC: 7 G/DL (ref 6.4–8.4)
RBC #/AREA URNS AUTO: NORMAL /HPF
SODIUM SERPL-SCNC: 136 MMOL/L (ref 135–147)
WBC #/AREA URNS AUTO: NORMAL /HPF

## 2023-03-21 RX ORDER — ALBUTEROL SULFATE 90 UG/1
2 AEROSOL, METERED RESPIRATORY (INHALATION) EVERY 6 HOURS PRN
Qty: 8.5 G | Refills: 0 | Status: SHIPPED | OUTPATIENT
Start: 2023-03-21

## 2023-03-21 RX ORDER — ACETAMINOPHEN 500 MG
500 TABLET ORAL EVERY 6 HOURS PRN
Qty: 30 TABLET | Refills: 0 | Status: SHIPPED | OUTPATIENT
Start: 2023-03-21

## 2023-03-21 RX ADMIN — IOHEXOL 98 ML: 350 INJECTION, SOLUTION INTRAVENOUS at 01:07

## 2023-03-21 NOTE — ED PROVIDER NOTES
History  Chief Complaint   Patient presents with   • Back Pain     Patient states he fell tonight and hit his R flank  • Shortness of Breath     Patient states that he started feeling SOB at 1500 today  64-year-old male with history of asthma presents complaining of right-sided flank pain after fall  Patient states that he was having an asthma exacerbation and was going to the pharmacy to get albuterol when he lost his balance and fell landing on his side  Patient states that he hit his right flank and since then has been having right flank pain  Denies head strike or LOC  Admits to mild right upper quadrant pain in the stomach as well  Denies any other complaints  History provided by:  Patient   used: No        Prior to Admission Medications   Prescriptions Last Dose Informant Patient Reported? Taking?    Bioflavonoid Products (RA Vitamin C CR) TBCR   Yes No   Sig: take 1 tablet by mouth twice a day for 14 days   Blood Glucose Monitoring Suppl (OneTouch Verio) w/Device KIT   No No   Sig: Use daily   Blood Glucose Monitoring Suppl KIT   No No   Sig: Use in the morning Please give test strips and lancets  Dx  E11 9   Blood Pressure KIT   No No   Sig: Use in the morning With appropriate size cuff   Blood Pressure Monitoring (Blood Pressure Monitor/Arm) EMILY   No No   Sig: Use daily   Cholecalciferol (D3 PO)   Yes No   Sig: Take by mouth daily   DULoxetine (CYMBALTA) 60 mg delayed release capsule   Yes No   Sig: Take 60 mg by mouth 2 (two) times a day   Diclofenac Sodium (VOLTAREN) 1 %   No No   Sig: Apply 2 g topically 4 (four) times a day as needed (low back or neck pain)   HYDROcodone-acetaminophen (NORCO) 7 5-325 mg per tablet   No No   Sig: Take 1 tablet by mouth every 8 (eight) hours as needed for pain For ongoing pain Max Daily Amount: 3 tablets   Lancet Devices (ONE TOUCH DELICA LANCING DEV) MISC   No No   Sig: Use daily   Lancets (freestyle) lancets   No No   Sig: TESTING DAILY IN THE AM   Lancets (onetouch ultrasoft) lancets   No No   Sig: Use daily Use as instructed to check sugars daily   Mirabegron ER 50 MG TB24   No No   Sig: Take 1 tablet (50 mg total) by mouth daily   Multiple Vitamin (multivitamin) capsule   No No   Sig: Take 1 capsule by mouth daily for 14 days   Patient not taking: Reported on 6/71/7409   OneTouch Delica Lancets 32M MISC   No No   Sig: Use daily   OneTouch Delica Lancets 50X MISC   No No   Sig: Use in the morning   acetaminophen (TYLENOL) 500 mg tablet   No No   Sig: Take 1 tablet (500 mg total) by mouth every 6 (six) hours as needed for mild pain   albuterol (2 5 mg/3 mL) 0 083 % nebulizer solution   No No   Sig: Take 3 mL (2 5 mg total) by nebulization every 6 (six) hours as needed for wheezing or shortness of breath   albuterol (Ventolin HFA) 90 mcg/act inhaler   No No   Sig: Inhale 2 puffs every 4 (four) hours as needed for wheezing   aluminum-magnesium hydroxide-simethicone (MAALOX) 200-200-20 MG/5ML SUSP   No No   Sig: Take 15 mL by mouth 4 (four) times a day (before meals and at bedtime)   amLODIPine (NORVASC) 10 mg tablet   No No   Sig: take 1 tablet by mouth once daily   atorvastatin (LIPITOR) 20 mg tablet   No No   Sig: take 1 tablet by mouth once daily   Patient taking differently: Take 20 mg by mouth daily   benzonatate (TESSALON PERLES) 100 mg capsule   No No   Sig: Take 1 capsule (100 mg total) by mouth 3 (three) times a day as needed for cough   Patient not taking: Reported on 2/20/2023   benzonatate (TESSALON PERLES) 100 mg capsule   No No   Sig: Take 1 capsule (100 mg total) by mouth 3 (three) times a day as needed for cough   Patient not taking: Reported on 2/20/2023   cyclobenzaprine (FLEXERIL) 10 mg tablet   No No   Sig: Take 1 tablet (10 mg total) by mouth daily at bedtime as needed for muscle spasms   famotidine (PEPCID) 20 mg tablet   No No   Sig: take 1 tablet by mouth twice a day if needed for ACID REFLUX   fluticasone (FLONASE) 50 mcg/act nasal spray   No No   Si spray into each nostril daily   fluticasone (FLONASE) 50 mcg/act nasal spray   No No   Si spray into each nostril daily   Patient not taking: Reported on 2022   fluticasone-salmeterol (Advair HFA) 115-21 MCG/ACT inhaler   No No   Sig: Inhale 2 puffs 2 (two) times a day Rinse mouth after use    gabapentin (NEURONTIN) 800 mg tablet   No No   Sig: Take 1 tablet (800 mg total) by mouth 3 (three) times a day   glucose blood (OneTouch Verio) test strip   No No   Sig: Check blood sugar daily   glucose monitoring kit (FREESTYLE) monitoring kit   No No   Sig: Use 1 each in the morning TESTING DAILY IN THE AM   lidocaine (LMX) 4 % cream   No No   Sig: Apply topically as needed for mild pain   lidocaine (Lidoderm) 5 %   No No   Sig: Apply 1 patch topically daily Remove & Discard patch within 12 hours or as directed by MD   menthol-cetylpyridinium (CEPACOL) 3 MG lozenge   No No   Sig: Take 1 lozenge (3 mg total) by mouth as needed for sore throat   Patient not taking: Reported on 2023   metFORMIN (GLUCOPHAGE) 500 mg tablet   No No   Sig: take 1 tablet by mouth twice a day with meals   naloxone (NARCAN) 4 mg/0 1 mL nasal spray   No No   Sig: Administer 1 spray into a nostril  If no response after 2-3 minutes, give another dose in the other nostril using a new spray     ondansetron (Zofran ODT) 4 mg disintegrating tablet   No No   Sig: Take 1 tablet (4 mg total) by mouth every 6 (six) hours as needed for nausea or vomiting   tamsulosin (FLOMAX) 0 4 mg   No No   Sig: take 1 capsule by mouth once daily WITH DINNER   venlafaxine (EFFEXOR-XR) 150 mg 24 hr capsule   No No   Sig: take 1 capsule by mouth once daily WITH BREAKFAST   zolpidem (AMBIEN) 5 mg tablet   No No   Sig: Take 1 tablet (5 mg total) by mouth daily at bedtime as needed for sleep      Facility-Administered Medications: None       Past Medical History:   Diagnosis Date   • Asthma    • Back pain    • Back pain    • 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   • Depression    • Diabetes mellitus ([facility] )    • Hyperlipidemia    • Hypertension    • Type 2 diabetes mellitus without complication, without long-term current use of insulin ([facility] ) 10/16/2020       Past Surgical History:   Procedure Laterality Date   • CYST REMOVAL  2017       Family History   Problem Relation Age of Onset   • Hypertension Mother    • Diabetes Mother    • Cancer Father    • Diabetes Family    • Hypertension Family      I have reviewed and agree with the history as documented  E-Cigarette/Vaping   • E-Cigarette Use Never User      E-Cigarette/Vaping Substances   • Nicotine No    • THC No    • CBD No    • Flavoring No    • Other No    • Unknown No      Social History     Tobacco Use   • Smoking status: Never   • Smokeless tobacco: Never   Vaping Use   • Vaping Use: Never used   Substance Use Topics   • Alcohol use: Not Currently     Comment: rare   • Drug use: Never       Review of Systems   Constitutional: Negative  Negative for chills and fatigue  HENT: Negative for ear pain and sore throat  Eyes: Negative for photophobia and redness  Respiratory: Positive for wheezing  Negative for apnea, cough and shortness of breath  Cardiovascular: Negative for chest pain  Gastrointestinal: Positive for abdominal pain  Negative for nausea and vomiting  Genitourinary: Negative for dysuria  Musculoskeletal: Negative for arthralgias, neck pain and neck stiffness  R flank pain    Skin: Negative for rash  Neurological: Negative for dizziness, tremors, syncope and weakness  Psychiatric/Behavioral: Negative for suicidal ideas  Physical Exam  Physical Exam  Constitutional:       General: He is not in acute distress  Appearance: He is well-developed  He is not diaphoretic  Eyes:      Pupils: Pupils are equal, round, and reactive to light  Cardiovascular:      Rate and Rhythm: Normal rate and regular rhythm     Pulmonary: Effort: Pulmonary effort is normal  No respiratory distress  Breath sounds: Wheezing present  Comments: Mild diffuse bilateral expiratory wheezes no distress  Abdominal:      General: Bowel sounds are normal  There is no distension  Palpations: Abdomen is soft  Musculoskeletal:         General: Normal range of motion  Cervical back: Normal range of motion and neck supple  Skin:     General: Skin is warm and dry  Neurological:      Mental Status: He is alert and oriented to person, place, and time           Vital Signs  ED Triage Vitals [03/20/23 2252]   Temperature Pulse Respirations Blood Pressure SpO2   98 3 °F (36 8 °C) 80 16 158/89 94 %      Temp Source Heart Rate Source Patient Position - Orthostatic VS BP Location FiO2 (%)   Oral Monitor Sitting Right arm --      Pain Score       9           Vitals:    03/20/23 2252   BP: 158/89   Pulse: 80   Patient Position - Orthostatic VS: Sitting         Visual Acuity      ED Medications  Medications   ipratropium-albuterol (DUO-NEB) 0 5-2 5 mg/3 mL inhalation solution 3 mL (3 mL Nebulization Given 3/20/23 4505)   iohexol (OMNIPAQUE) 350 MG/ML injection (SINGLE-DOSE) 98 mL (98 mL Intravenous Given 3/21/23 0107)       Diagnostic Studies  Results Reviewed     Procedure Component Value Units Date/Time    Urine Microscopic [[id number]]  (Normal) Collected: 03/20/23 2344    Lab Status: Final result Specimen: Urine, Clean Catch Updated: 03/21/23 0039     RBC, UA 0-1 /hpf      WBC, UA None Seen /hpf      Epithelial Cells Occasional /hpf      Bacteria, UA None Seen /hpf     Comprehensive metabolic panel [[id number]]  (Abnormal) Collected: 03/20/23 2344    Lab Status: Final result Specimen: Blood from Line, Venous Updated: 03/21/23 0011     Sodium 136 mmol/L      Potassium 4 1 mmol/L      Chloride 102 mmol/L      CO2 26 mmol/L      ANION GAP 8 mmol/L      BUN 24 mg/dL      Creatinine 1 19 mg/dL      Glucose 202 mg/dL      Calcium 9 2 mg/dL      AST 20 U/L      ALT 23 U/L      Alkaline Phosphatase 48 U/L      Total Protein 7 0 g/dL      Albumin 4 1 g/dL      Total Bilirubin 0 27 mg/dL      eGFR 65 ml/min/1 73sq m     Narrative:      [place] guidelines for Chronic Kidney Disease (CKD):   •  Stage 1 with normal or high GFR (GFR > 90 mL/min/1 73 square meters)  •  Stage 2 Mild CKD (GFR = 60-89 mL/min/1 73 square meters)  •  Stage 3A Moderate CKD (GFR = 45-59 mL/min/1 73 square meters)  •  Stage 3B Moderate CKD (GFR = 30-44 mL/min/1 73 square meters)  •  Stage 4 Severe CKD (GFR = 15-29 mL/min/1 73 square meters)  •  Stage 5 End Stage CKD (GFR <15 mL/min/1 73 square meters)  Note: GFR calculation is accurate only with a steady state creatinine    UA (URINE) with reflex to Scope [[id number]]  (Abnormal) Collected: 03/20/23 2344    Lab Status: Final result Specimen: Urine, Clean Catch Updated: 03/20/23 2357     Color, UA Straw     Clarity, UA Clear     Specific Gravity, UA 1 025     pH, UA 5 0     Leukocytes, UA Negative     Nitrite, UA Negative     Protein, UA Negative mg/dl      Glucose, UA Negative mg/dl      Ketones, UA Negative mg/dl      Bilirubin, UA Negative     Occult Blood, UA 25 0     UROBILINOGEN UA Negative mg/dL     CBC and differential [[id number]]  (Abnormal) Collected: 03/20/23 2344    Lab Status: Final result Specimen: Blood from Line, Venous Updated: 03/20/23 2355     WBC 5 78 Thousand/uL      RBC 4 57 Million/uL      Hemoglobin 13 3 g/dL      Hematocrit 38 4 %      MCV 84 fL      MCH 29 1 pg      MCHC 34 6 g/dL      RDW 13 2 %      MPV 9 1 fL      Platelets 791 Thousands/uL      nRBC 0 /100 WBCs      Neutrophils Relative 38 %      Immat GRANS % 0 %      Lymphocytes Relative 53 %      Monocytes Relative 8 %      Eosinophils Relative 1 %      Basophils Relative 0 %      Neutrophils Absolute 2 16 Thousands/µL      Immature Grans Absolute 0 00 Thousand/uL      Lymphocytes Absolute 3 10 Thousands/µL      Monocytes Absolute 0 44 Thousand/µL      Eosinophils Absolute 0 06 Thousand/µL      Basophils Absolute 0 02 Thousands/µL                  CT chest abdomen pelvis w contrast   Final Result by Abbie Thrasher MD (03/21 0153)      No acute traumatic injury identified within the chest, abdomen, or pelvis  Workstation performed: [id number]                    Procedures  Procedures         ED Course                                             Medical Decision Making  Patient presented with right flank pain and shortness of breath after fall  Shortness of breath thought to be related to asthma exacerbation that began before the fall however pneumothorax pulmonary contusion and rib fractures were considered  No evidence of these processes on CT  Patient also with mild right upper quadrant pain  However no intra-abdominal pathology was found on CT  Patient had significant relief of symptoms following nebulizer and was educated on supportive care given return precautions and ambulated the department  Amount and/or Complexity of Data Reviewed  Labs: ordered  Radiology: ordered  Risk  OTC drugs  Prescription drug management  Disposition  Final diagnoses:   Contusion of right chest wall, initial encounter   Asthma     Time reflects when diagnosis was documented in both MDM as applicable and the Disposition within this note     Time User Action Codes Description Comment    3/21/2023  1:56 AM Marlon Smalls Add [S20 211A] Contusion of right chest wall, initial encounter     3/21/2023  1:56 AM Marlon Smalls Add [H76 627] Asthma       ED Disposition     ED Disposition   Discharge    Condition   Stable    Date/Time   Tue Mar 21, 2023  1:56 AM    Comment   Cori Allred discharge to home/self care                 Follow-up Information     Follow up With Specialties Details Why Contact Info Additional [address] [facility] Emergency Medicine Go to  If symptoms worsen [id number] [address] [place] [address]  [address] [facility]    Miko Gutierrez MD Family Medicine Call  As needed [address]  [phone]             Patient's Medications   Discharge Prescriptions    ACETAMINOPHEN (TYLENOL) 500 MG TABLET    Take 1 tablet (500 mg total) by mouth every 6 (six) hours as needed for moderate pain       Start Date: 3/21/2023 End Date: --       Order Dose: 500 mg       Quantity: 30 tablet    Refills: 0    ALBUTEROL (5 MG/ML) 0 5 % NEBULIZER SOLUTION    Take 0 5 mL (2 5 mg total) by nebulization every 6 (six) hours as needed for wheezing       Start Date: 3/21/2023 End Date: --       Order Dose: 2 5 mg       Quantity: 20 mL    Refills: 0    ALBUTEROL (PROAIR HFA) 90 MCG/ACT INHALER    Inhale 2 puffs every 6 (six) hours as needed for wheezing       Start Date: 3/21/2023 End Date: --       Order Dose: 2 puffs       Quantity: 8 5 g    Refills: 0       No discharge procedures on file      PDMP Review       Value Time User    PDMP Reviewed  Yes 1/3/2023  4:52 PM Bill Click, [address]          ED Provider  Electronically Signed by           Lawrence Riddle PA-C  03/21/23 3328

## 2023-03-23 ENCOUNTER — TELEPHONE (OUTPATIENT)
Dept: FAMILY MEDICINE CLINIC | Facility: CLINIC | Age: 63
End: 2023-03-23

## 2023-03-23 DIAGNOSIS — G89.29 CHRONIC LOW BACK PAIN WITHOUT SCIATICA, UNSPECIFIED BACK PAIN LATERALITY: ICD-10-CM

## 2023-03-23 DIAGNOSIS — M54.16 LUMBAR RADICULOPATHY: ICD-10-CM

## 2023-03-23 DIAGNOSIS — M54.50 CHRONIC LOW BACK PAIN WITHOUT SCIATICA, UNSPECIFIED BACK PAIN LATERALITY: ICD-10-CM

## 2023-03-23 RX ORDER — HYDROCODONE BITARTRATE AND ACETAMINOPHEN 7.5; 325 MG/1; MG/1
1 TABLET ORAL EVERY 8 HOURS PRN
Qty: 90 TABLET | Refills: 0 | Status: SHIPPED | OUTPATIENT
Start: 2023-03-23

## 2023-03-23 NOTE — TELEPHONE ENCOUNTER
PATIENT IS REQUESTING REFILL ON HIS PAIN MEDICATION     PLEASE ADVISE     HYDROcodone-acetaminophen (NORCO) 7 5-325 mg

## 2023-03-23 NOTE — TELEPHONE ENCOUNTER
Refilled The Hydrocodone-Acetaminophen 7 5 mg-325, #90  Haspp appoitnemnt 4//21/2023, will discuss slowly tapering his dose

## 2023-03-24 NOTE — PROGRESS NOTES
Assessment:  1  Lumbar radiculopathy    2  Protrusion of lumbar intervertebral disc        Plan:  1  Patient will be scheduled for a left L3 and L4 TFESI to address the inflammatory component of the patient's pain  Complete risks and benefits including bleeding, infection, tissue reaction, nerve injury and allergic reaction were discussed  The patient was agreeable and verbalized an understanding  2  Patient was encouraged to move forward with EMG as scheduled  3  Patient may continue gabapentin, cyclobenzaprine, Norco and duloxetine as prescribed  4  Patient not interested in physical therapy, stating that he has done physical therapy in the past which only worsened his complaints  5  Continue to follow with neurosurgery as scheduled  6  Follow-up after procedure or sooner if needed    History of Present Illness: The patient is a 65 y o  male last seen on 03/03/2022 who presents for a follow up office visit in regards to chronic left-sided low back pain that radiates into the anterior lateral aspect of the left lower extremity to the shin  He denies right lower extremity symptoms, bowel or bladder incontinence and saddle anesthesia  He was recently seen by neurosurgery who recommended that he follow-up with our office for conservative treatment did not feel that surgical intervention was required at that time  He is currently managing his pain with Norco 7 5/325 mg every 8 hours as needed pain, gabapentin 100 mg 3 times a day, cyclobenzaprine as needed and diclofenac 60 mg twice daily all prescribed by his PCP  He states he has done physical therapy in the past which only worsened his complaints  He rates his pain a 9 out of 10 on the numeric pain rating scale    He has pain in the morning and at night which is described as burning, sharp and numbness    I have personally reviewed and/or updated the patient's past medical history, past surgical history, family history, social history, current medications, allergies, and vital signs today  Review of Systems:    Review of Systems   Respiratory: Positive for shortness of breath  Cardiovascular: Positive for chest pain  Gastrointestinal: Positive for nausea  Negative for constipation, diarrhea and vomiting  Musculoskeletal: Positive for gait problem  Negative for arthralgias, joint swelling and myalgias  Skin: Negative for rash  Neurological: Negative for dizziness, seizures and weakness  All other systems reviewed and are negative          Past Medical History:   Diagnosis Date   • Asthma    • Back pain    • Back pain    • 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   • Depression    • Diabetes mellitus ([facility] )    • Hyperlipidemia    • Hypertension    • Type 2 diabetes mellitus without complication, without long-term current use of insulin ([facility] ) 10/16/2020       Past Surgical History:   Procedure Laterality Date   • CYST REMOVAL  2017       Family History   Problem Relation Age of Onset   • Hypertension Mother    • Diabetes Mother    • Cancer Father    • Diabetes Family    • Hypertension Family        Social History     Occupational History   • Not on file   Tobacco Use   • Smoking status: Never   • Smokeless tobacco: Never   Vaping Use   • Vaping Use: Never used   Substance and Sexual Activity   • Alcohol use: Not Currently     Comment: rare   • Drug use: Never   • Sexual activity: Not Currently         Current Outpatient Medications:   •  acetaminophen (TYLENOL) 500 mg tablet, Take 1 tablet (500 mg total) by mouth every 6 (six) hours as needed for mild pain, Disp: 30 tablet, Rfl: 0  •  atorvastatin (LIPITOR) 20 mg tablet, take 1 tablet by mouth once daily (Patient taking differently: Take 20 mg by mouth daily), Disp: 90 tablet, Rfl: 0  •  Cholecalciferol (D3 PO), Take by mouth daily, Disp: , Rfl:   •  cyclobenzaprine (FLEXERIL) 10 mg tablet, Take 1 tablet (10 mg total) by mouth daily at bedtime as needed for muscle spasms, Disp: 30 tablet, Rfl: 0  •  Diclofenac Sodium (VOLTAREN) 1 %, Apply 2 g topically 4 (four) times a day as needed (low back or neck pain), Disp: 150 g, Rfl: 2  •  DULoxetine (CYMBALTA) 60 mg delayed release capsule, Take 60 mg by mouth 2 (two) times a day, Disp: , Rfl:   •  gabapentin (NEURONTIN) 800 mg tablet, take 1 tablet by mouth three times a day, Disp: 90 tablet, Rfl: 0  •  HYDROcodone-acetaminophen (NORCO) 7 5-325 mg per tablet, Take 1 tablet by mouth every 8 (eight) hours as needed for pain For ongoing pain Max Daily Amount: 3 tablets, Disp: 90 tablet, Rfl: 0  •  Lancet Devices (ONE TOUCH DELICA LANCING DEV) MISC, Use daily, Disp: 1 each, Rfl: 2  •  lidocaine (Lidoderm) 5 %, Apply 1 patch topically daily Remove & Discard patch within 12 hours or as directed by MD, Disp: 30 patch, Rfl: 1  •  metFORMIN (GLUCOPHAGE) 500 mg tablet, take 1 tablet by mouth twice a day with meals, Disp: 60 tablet, Rfl: 3  •  ondansetron (Zofran ODT) 4 mg disintegrating tablet, Take 1 tablet (4 mg total) by mouth every 6 (six) hours as needed for nausea or vomiting, Disp: 20 tablet, Rfl: 0  •  tamsulosin (FLOMAX) 0 4 mg, take 1 capsule by mouth once daily WITH DINNER, Disp: 90 capsule, Rfl: 1  •  venlafaxine (EFFEXOR-XR) 150 mg 24 hr capsule, take 1 capsule by mouth once daily WITH BREAKFAST, Disp: 30 capsule, Rfl: 2  •  zolpidem (AMBIEN) 5 mg tablet, Take 1 tablet (5 mg total) by mouth daily at bedtime as needed for sleep, Disp: 30 tablet, Rfl: 0  •  acetaminophen (TYLENOL) 500 mg tablet, Take 1 tablet (500 mg total) by mouth every 6 (six) hours as needed for moderate pain (Patient not taking: Reported on 3/28/2023), Disp: 30 tablet, Rfl: 0  •  Advair -21 MCG/ACT inhaler, inhale 2 puffs by mouth and INTO THE LUNGS twice a day Rinse mouth after use, Disp: 36 g, Rfl: 0  •  albuterol (2 5 mg/3 mL) 0 083 % nebulizer solution, Take 3 mL (2 5 mg total) by nebulization every 6 (six) hours as needed for wheezing or shortness of breath, Disp: 180 mL, Rfl: 5  •  albuterol (5 mg/mL) 0 5 % nebulizer solution, Take 0 5 mL (2 5 mg total) by nebulization every 6 (six) hours as needed for wheezing, Disp: 20 mL, Rfl: 0  •  albuterol (ProAir HFA) 90 mcg/act inhaler, Inhale 2 puffs every 6 (six) hours as needed for wheezing, Disp: 8 5 g, Rfl: 0  •  albuterol (Ventolin HFA) 90 mcg/act inhaler, Inhale 2 puffs every 4 (four) hours as needed for wheezing, Disp: 18 g, Rfl: 2  •  aluminum-magnesium hydroxide-simethicone (MAALOX) 200-200-20 MG/5ML SUSP, Take 15 mL by mouth 4 (four) times a day (before meals and at bedtime), Disp: 150 mL, Rfl: 0  •  amLODIPine (NORVASC) 10 mg tablet, take 1 tablet by mouth once daily, Disp: 90 tablet, Rfl: 1  •  benzonatate (TESSALON PERLES) 100 mg capsule, Take 1 capsule (100 mg total) by mouth 3 (three) times a day as needed for cough (Patient not taking: Reported on 2/20/2023), Disp: 30 capsule, Rfl: 0  •  benzonatate (TESSALON PERLES) 100 mg capsule, Take 1 capsule (100 mg total) by mouth 3 (three) times a day as needed for cough (Patient not taking: Reported on 2/20/2023), Disp: 20 capsule, Rfl: 0  •  Bioflavonoid Products (RA Vitamin C CR) TBCR, take 1 tablet by mouth twice a day for 14 days, Disp: , Rfl:   •  Blood Glucose Monitoring Suppl (OneTouch Verio) w/Device KIT, Use daily, Disp: 1 kit, Rfl: 0  •  Blood Glucose Monitoring Suppl KIT, Use in the morning Please give test strips and lancets Dx  E11 9, Disp: 1 kit, Rfl: 0  •  Blood Pressure KIT, Use in the morning With appropriate size cuff, Disp: 1 kit, Rfl: 0  •  Blood Pressure Monitoring (Blood Pressure Monitor/Arm) EMILY, Use daily, Disp: 1 each, Rfl: 0  •  famotidine (PEPCID) 20 mg tablet, take 1 tablet by mouth twice a day if needed for ACID REFLUX, Disp: 60 tablet, Rfl: 0  •  fluticasone (FLONASE) 50 mcg/act nasal spray, 1 spray into each nostril daily, Disp: 16 g, Rfl: 0  •  fluticasone (FLONASE) 50 mcg/act nasal spray, 1 spray into each nostril daily "(Patient not taking: Reported on 9/29/2022), Disp: 16 g, Rfl: 0  •  glucose blood (OneTouch Verio) test strip, Check blood sugar daily, Disp: 100 each, Rfl: 2  •  glucose monitoring kit (FREESTYLE) monitoring kit, Use 1 each in the morning TESTING DAILY IN THE AM, Disp: 1 each, Rfl: 0  •  Lancets (freestyle) lancets, TESTING DAILY IN THE AM, Disp: 200 each, Rfl: 3  •  Lancets (onetouch ultrasoft) lancets, Use daily Use as instructed to check sugars daily, Disp: 100 each, Rfl: 1  •  lidocaine (LMX) 4 % cream, Apply topically as needed for mild pain, Disp: 30 g, Rfl: 0  •  menthol-cetylpyridinium (CEPACOL) 3 MG lozenge, Take 1 lozenge (3 mg total) by mouth as needed for sore throat (Patient not taking: Reported on 2/20/2023), Disp: 30 lozenge, Rfl: 0  •  Mirabegron ER 50 MG TB24, Take 1 tablet (50 mg total) by mouth daily, Disp: 60 tablet, Rfl: 6  •  Multiple Vitamin (multivitamin) capsule, Take 1 capsule by mouth daily for 14 days (Patient not taking: Reported on 1/24/2023), Disp: 14 capsule, Rfl: 0  •  naloxone (NARCAN) 4 mg/0 1 mL nasal spray, Administer 1 spray into a nostril  If no response after 2-3 minutes, give another dose in the other nostril using a new spray , Disp: 1 each, Rfl: 1  •  OneTouch Delica Lancets 66E MISC, Use daily, Disp: 100 each, Rfl: 1  •  OneTouch Delica Lancets 27Y MISC, Use in the morning, Disp: 100 each, Rfl: 6    No Known Allergies    Physical Exam:    /83   Pulse 80   Ht 5' 7\" (1 702 m)   Wt 88 5 kg (195 lb)   BMI 30 54 kg/m²     Constitutional:normal, well developed, well nourished, alert, in no distress and non-toxic and no overt pain behavior    Eyes:anicteric  HEENT:grossly intact  Neck:supple, symmetric, trachea midline and no masses   Pulmonary:even and unlabored  Cardiovascular:No edema or pitting edema present  Skin:Normal without rashes or lesions and well hydrated  Psychiatric:Mood and affect appropriate  Neurologic:Cranial Nerves II-XII grossly " intact  Musculoskeletal:antalgic gait, ambulates with a cane  Bilateral lower extremity strength 5 out of 5 in all muscle groups  Lateral lumbar paraspinal musculature tender to palpation, left greater than right  Positive seated straight leg raise on the left and negative on the right      Imaging  FL spine and pain procedure    (Results Pending)   MRI LUMBAR SPINE WITHOUT CONTRAST   INDICATION: [id number]: Radiculopathy, lumbar region  Progressive lumbar radiculopathy  COMPARISON: February 8, 2022   TECHNIQUE: Multiplanar, multisequence imaging of the lumbar spine was performed      IMAGE QUALITY: Diagnostic   FINDINGS:   VERTEBRAL BODIES: There are 5 lumbar type vertebral bodies  Normal alignment of the lumbar spine  No spondylolysis or spondylolisthesis  No scoliosis  No compression fracture  Normal marrow signal is identified within the visualized bony   structures  No discrete marrow lesion  SACRUM: Normal signal within the sacrum  No evidence of insufficiency or stress fracture  DISTAL CORD AND CONUS: Normal size and signal within the distal cord and conus  PARASPINAL SOFT TISSUES: T2 hyperintense right renal lesions likely cyst    LOWER THORACIC DISC SPACES: Normal disc height and signal  No disc herniation, canal stenosis or foraminal narrowing  LUMBAR DISC SPACES:   L1-L2: Moderate facet hypertrophy without central or foraminal narrowing  L2-L3: Moderate facet hypertrophy  No significant central or foraminal narrowing  L3-L4: Moderate facet hypertrophy  Mild annular bulge with left foraminal protrusion type disc herniation results in moderate left foraminal narrowing  L4-L5: Moderate facet hypertrophy  Left foraminal protrusion type disc herniation with annular fissure and marginal osteophyte results in moderate left foraminal narrowing  L5-S1: Mild facet hypertrophy without central or foraminal narrowing  OTHER FINDINGS: None     IMPRESSION:   Left foraminal protrusion type disc herniation L3-4 and L4-5 results in moderate left foraminal narrowing  Correlate for left L3 or left L4 radiculopathy  Findings are unchanged from the prior study           Orders Placed This Encounter   Procedures   • FL spine and pain procedure

## 2023-03-25 DIAGNOSIS — J45.41 MODERATE PERSISTENT REACTIVE AIRWAY DISEASE WITH ACUTE EXACERBATION: ICD-10-CM

## 2023-03-25 DIAGNOSIS — K21.9 GASTROESOPHAGEAL REFLUX DISEASE, UNSPECIFIED WHETHER ESOPHAGITIS PRESENT: ICD-10-CM

## 2023-03-27 RX ORDER — FLUTICASONE PROPIONATE AND SALMETEROL XINAFOATE 115; 21 UG/1; UG/1
AEROSOL, METERED RESPIRATORY (INHALATION)
Qty: 36 G | Refills: 0 | Status: SHIPPED | OUTPATIENT
Start: 2023-03-27

## 2023-03-27 RX ORDER — FAMOTIDINE 20 MG/1
TABLET, FILM COATED ORAL
Qty: 60 TABLET | Refills: 0 | Status: SHIPPED | OUTPATIENT
Start: 2023-03-27

## 2023-03-28 ENCOUNTER — OFFICE VISIT (OUTPATIENT)
Dept: PAIN MEDICINE | Facility: CLINIC | Age: 63
End: 2023-03-28

## 2023-03-28 VITALS
BODY MASS INDEX: 30.61 KG/M2 | HEIGHT: 67 IN | SYSTOLIC BLOOD PRESSURE: 131 MMHG | WEIGHT: 195 LBS | HEART RATE: 80 BPM | DIASTOLIC BLOOD PRESSURE: 83 MMHG

## 2023-03-28 DIAGNOSIS — M54.16 LUMBAR RADICULOPATHY: Primary | ICD-10-CM

## 2023-03-28 DIAGNOSIS — M51.26 PROTRUSION OF LUMBAR INTERVERTEBRAL DISC: ICD-10-CM

## 2023-03-31 ENCOUNTER — TELEPHONE (OUTPATIENT)
Dept: RADIOLOGY | Facility: CLINIC | Age: 63
End: 2023-03-31

## 2023-03-31 NOTE — TELEPHONE ENCOUNTER
Caller: Lemmie Severe     Doctor/Office: Dr Dee Paul     Call regarding :      Call was transferred to: DR GURDEEP TAYLOR [facility]

## 2023-04-20 DIAGNOSIS — G89.29 CHRONIC LOW BACK PAIN WITHOUT SCIATICA, UNSPECIFIED BACK PAIN LATERALITY: ICD-10-CM

## 2023-04-20 DIAGNOSIS — M54.50 CHRONIC LOW BACK PAIN WITHOUT SCIATICA, UNSPECIFIED BACK PAIN LATERALITY: ICD-10-CM

## 2023-04-20 DIAGNOSIS — M54.16 LUMBAR RADICULOPATHY: ICD-10-CM

## 2023-04-20 RX ORDER — HYDROCODONE BITARTRATE AND ACETAMINOPHEN 7.5; 325 MG/1; MG/1
1 TABLET ORAL EVERY 8 HOURS PRN
Qty: 90 TABLET | Refills: 0 | Status: CANCELLED | OUTPATIENT
Start: 2023-04-20

## 2023-04-20 NOTE — TELEPHONE ENCOUNTER
Patient came in and asked for refill of following medication      HYDROcodone-acetaminophen (NORCO) 7 5-325 mg per tablet

## 2023-04-24 ENCOUNTER — OFFICE VISIT (OUTPATIENT)
Dept: FAMILY MEDICINE CLINIC | Facility: CLINIC | Age: 63
End: 2023-04-24

## 2023-04-24 VITALS
HEIGHT: 67 IN | WEIGHT: 200.8 LBS | SYSTOLIC BLOOD PRESSURE: 130 MMHG | BODY MASS INDEX: 31.52 KG/M2 | DIASTOLIC BLOOD PRESSURE: 82 MMHG | RESPIRATION RATE: 18 BRPM | OXYGEN SATURATION: 97 % | HEART RATE: 78 BPM | TEMPERATURE: 97 F

## 2023-04-24 DIAGNOSIS — E78.5 HYPERLIPIDEMIA, UNSPECIFIED HYPERLIPIDEMIA TYPE: ICD-10-CM

## 2023-04-24 DIAGNOSIS — F32.0 CURRENT MILD EPISODE OF MAJOR DEPRESSIVE DISORDER WITHOUT PRIOR EPISODE (HCC): ICD-10-CM

## 2023-04-24 DIAGNOSIS — G89.29 CHRONIC LOW BACK PAIN WITHOUT SCIATICA, UNSPECIFIED BACK PAIN LATERALITY: ICD-10-CM

## 2023-04-24 DIAGNOSIS — J45.41 MODERATE PERSISTENT REACTIVE AIRWAY DISEASE WITH ACUTE EXACERBATION: ICD-10-CM

## 2023-04-24 DIAGNOSIS — I10 BENIGN ESSENTIAL HTN: ICD-10-CM

## 2023-04-24 DIAGNOSIS — M54.16 LUMBAR RADICULOPATHY: ICD-10-CM

## 2023-04-24 DIAGNOSIS — F11.20 CONTINUOUS OPIOID DEPENDENCE (HCC): ICD-10-CM

## 2023-04-24 DIAGNOSIS — M54.50 CHRONIC LOW BACK PAIN WITHOUT SCIATICA, UNSPECIFIED BACK PAIN LATERALITY: ICD-10-CM

## 2023-04-24 DIAGNOSIS — E11.9 TYPE 2 DIABETES MELLITUS WITHOUT COMPLICATION, WITHOUT LONG-TERM CURRENT USE OF INSULIN (HCC): Primary | ICD-10-CM

## 2023-04-24 LAB — SL AMB POCT HEMOGLOBIN AIC: 7.3 (ref ?–6.5)

## 2023-04-24 RX ORDER — FLUTICASONE PROPIONATE AND SALMETEROL XINAFOATE 115; 21 UG/1; UG/1
2 AEROSOL, METERED RESPIRATORY (INHALATION) 2 TIMES DAILY
Qty: 36 G | Refills: 1 | Status: SHIPPED | OUTPATIENT
Start: 2023-04-24

## 2023-04-24 RX ORDER — DULOXETIN HYDROCHLORIDE 60 MG/1
60 CAPSULE, DELAYED RELEASE ORAL 2 TIMES DAILY
Qty: 60 CAPSULE | Refills: 5 | Status: SHIPPED | OUTPATIENT
Start: 2023-04-24

## 2023-04-24 RX ORDER — HYDROCODONE BITARTRATE AND ACETAMINOPHEN 7.5; 325 MG/1; MG/1
1 TABLET ORAL EVERY 8 HOURS PRN
Qty: 90 TABLET | Refills: 0 | Status: SHIPPED | OUTPATIENT
Start: 2023-04-24

## 2023-04-24 NOTE — ASSESSMENT & PLAN NOTE
Lab Results   Component Value Date    HGBA1C 7 3 (A) 04/24/2023    HGBA1C 6 9 (A) 01/20/2023    HGBA1C 6 8 (A) 08/09/2022    HGBA1C 6 2 (H) 03/15/2022     Increased  - Current medications:  Metformin 500 mg bid-discussed compliance  - Home glucose readings: fasting 90s  - Diet: well balanced, drinks plenty of water, vegetable soups, limits bread  - +Htn: on Amlodipine  - +Hld: on Atorvastatin  - Aspirin: no  - Urine microalb: 4/21/2022 - normal  - Ophthalmology: pending  - DM Foot exam: today  - Dentist:  Has follow up appt in 4/2023  - Pneumo vaccine: 2019  - Influenza Vaccine:  2022  - Smoker: no  - Keep log of blood glucose readings  - Encouraged: Diabetic Diet, Regular exercise, Weight loss

## 2023-04-24 NOTE — PROGRESS NOTES
Name: Reji Boyd      : 1960      MRN: [id number]  Encounter Provider: Arlette Ortiz MD  Encounter Date: 2023   Encounter department: [address] [place] [address]     1  Type 2 diabetes mellitus without complication, without long-term current use of insulin ([facility])  Assessment & Plan:    Lab Results   Component Value Date    HGBA1C 7 3 (A) 2023    HGBA1C 6 9 (A) 2023    HGBA1C 6 8 (A) 2022    HGBA1C 6 2 (H) 03/15/2022     Increased  - Current medications:  Metformin 500 mg bid-discussed compliance  - Home glucose readings: fasting 90s  - Diet: well balanced, drinks plenty of water, vegetable soups, limits bread  - +Htn: on Amlodipine  - +Hld: on Atorvastatin  - Aspirin: no  - Urine microalb: 2022 - normal  - Ophthalmology: pending  - DM Foot exam: today  - Dentist:  Has follow up appt in 2023  - Pneumo vaccine:   - Influenza Vaccine:    - Smoker: no  - Keep log of blood glucose readings  - Encouraged: Diabetic Diet, Regular exercise, Weight loss         Orders:  -     POCT hemoglobin A1c  -     metFORMIN (GLUCOPHAGE) 500 mg tablet; Take 1 tablet (500 mg total) by mouth 2 (two) times a day with meals  -     Urine Microalbumin/creatinine ratio; Future    2  Hyperlipidemia, unspecified hyperlipidemia type  Assessment & Plan:  Lab Results   Component Value Date/Time    CHOLESTEROL 186 2021 09:52 AM    TRIG 93 2021 09:52 AM    HDL 63 2021 09:52 AM    LDLCALC 104 (H) 2021 09:52 AM     The 10-year ASCVD risk score (Rajan ROMO, et al , 2019) is: 23 5%    Values used to calculate the score:      Age: 65 years      Sex: Male      Is Non- : Yes      Diabetic: Yes      Tobacco smoker: No      Systolic Blood Pressure: 532 mmHg      Is BP treated: Yes      HDL Cholesterol: 63 mg/dL      Total Cholesterol: 186 mg/dL  Continue Atorvastatin 20 mg  Low Fat Diet, regular Exercise        3   Benign essential HTN  Assessment & Plan:  BP at goal in office today: 120/80 mmHg  Continue Amlodipine 10 mg daily      4  Current mild episode of major depressive disorder without prior episode [facility])  Assessment & Plan:  Patient has not taking Effexor in many months  Discontinue Effexor  Refilled Cymbalta 60 mg twice daily  Discussed medication compliance    Orders:  -     DULoxetine (CYMBALTA) 60 mg delayed release capsule; Take 1 capsule (60 mg total) by mouth 2 (two) times a day    5  Moderate persistent reactive airway disease with acute exacerbation  -     fluticasone-salmeterol (Advair HFA) 115-21 MCG/ACT inhaler; Inhale 2 puffs 2 (two) times a day Rinse mouth after use  6  Chronic low back pain without sciatica, unspecified back pain laterality  -     HYDROcodone-acetaminophen (NORCO) 7 5-325 mg per tablet; Take 1 tablet by mouth every 8 (eight) hours as needed for pain For ongoing pain Max Daily Amount: 3 tablets    7  Lumbar radiculopathy  Assessment & Plan:  Reviewed neurosurgery and pain management consults  Refilled Flexeril, Voltaren gel, gabapentin  Discussed long-term side effects of chronic opioid use  Patient encouraged to start titrating the hydrocodone  Patient is agreeable      Orders:  -     HYDROcodone-acetaminophen (NORCO) 7 5-325 mg per tablet; Take 1 tablet by mouth every 8 (eight) hours as needed for pain For ongoing pain Max Daily Amount: 3 tablets    8  Continuous opioid dependence ([address] )         Subjective      Marv Gilmore is a 65 y o  male  has a past medical history of Asthma, Chronic back pain, BPH with obstruction/lower urinary tract symptoms, Depression, Diabetes mellitus ([address] ), Hyperlipidemia, Hypertension, and Type 2 diabetes mellitus who presented to the office today to follow-up for his chronic conditions  He states he is taking all his medications as prescribed, including the metformin amlodipine and atorvastatin      He has had consultations with neurosurgery and pain management for his chronic back pain  He is still using the rollator  He has increased pain especially when sleeping and when he wakes up in the morning he needs to roll off of his bed to get up  Patient is inquiring about possibly getting a sleeping chair that may help his pain  He is going to have an EMG study done tomorrow  Patient is still using Flexeril, gabapentin, Voltaren gel, hydrocodone  He states he is taking hydrocodone 3 times daily  He feels motivated to stop taking the hydrocodone in the future, but is fearful of his chronic back pain  Patient has a history of chronic depression and anxiety, but has not been taking the Effexor for some time now  He is requesting refills of the Cymbalta, and would like to start retaking it  The following portions of the patient's history were reviewed and updated as appropriate: allergies, current medications, past family history, past medical history, past social history, past surgical history and problem list       Review of Systems   Constitutional: Negative for chills and fever  HENT: Negative for congestion, rhinorrhea and sore throat  Respiratory: Positive for cough  Negative for shortness of breath  Cardiovascular: Negative for chest pain  Gastrointestinal: Negative for diarrhea, nausea and vomiting  Musculoskeletal: Positive for back pain and gait problem  Skin: Negative for rash  Neurological: Negative for dizziness and headaches         Current Outpatient Medications on File Prior to Visit   Medication Sig   • albuterol (2 5 mg/3 mL) 0 083 % nebulizer solution Take 3 mL (2 5 mg total) by nebulization every 6 (six) hours as needed for wheezing or shortness of breath   • albuterol (5 mg/mL) 0 5 % nebulizer solution Take 0 5 mL (2 5 mg total) by nebulization every 6 (six) hours as needed for wheezing   • albuterol (ProAir HFA) 90 mcg/act inhaler Inhale 2 puffs every 6 (six) hours as needed for wheezing   • albuterol (Ventolin HFA) 90 mcg/act inhaler Inhale 2 puffs every 4 (four) hours as needed for wheezing   • aluminum-magnesium hydroxide-simethicone (MAALOX) 200-200-20 MG/5ML SUSP Take 15 mL by mouth 4 (four) times a day (before meals and at bedtime)   • amLODIPine (NORVASC) 10 mg tablet take 1 tablet by mouth once daily   • atorvastatin (LIPITOR) 20 mg tablet take 1 tablet by mouth once daily (Patient taking differently: Take 20 mg by mouth daily)   • Bioflavonoid Products (RA Vitamin C CR) TBCR take 1 tablet by mouth twice a day for 14 days   • Blood Glucose Monitoring Suppl (OneTouch Verio) w/Device KIT Use daily   • Blood Glucose Monitoring Suppl KIT Use in the morning Please give test strips and lancets  Dx  E11 9   • Blood Pressure KIT Use in the morning With appropriate size cuff   • Blood Pressure Monitoring (Blood Pressure Monitor/Arm) EMILY Use daily   • Cholecalciferol (D3 PO) Take by mouth daily   • cyclobenzaprine (FLEXERIL) 10 mg tablet Take 1 tablet (10 mg total) by mouth daily at bedtime as needed for muscle spasms   • Diclofenac Sodium (VOLTAREN) 1 % Apply 2 g topically 4 (four) times a day as needed (low back or neck pain)   • famotidine (PEPCID) 20 mg tablet take 1 tablet by mouth twice a day if needed for ACID REFLUX   • fluticasone (FLONASE) 50 mcg/act nasal spray 1 spray into each nostril daily   • gabapentin (NEURONTIN) 800 mg tablet take 1 tablet by mouth three times a day   • glucose blood (OneTouch Verio) test strip Check blood sugar daily   • glucose monitoring kit (FREESTYLE) monitoring kit Use 1 each in the morning TESTING DAILY IN THE AM   • Lancet Devices (ONE TOUCH DELICA LANCING DEV) MISC Use daily   • Lancets (freestyle) lancets TESTING DAILY IN THE AM   • Lancets (onetouch ultrasoft) lancets Use daily Use as instructed to check sugars daily   • lidocaine (Lidoderm) 5 % Apply 1 patch topically daily Remove & Discard patch within 12 hours or as directed by MD   • lidocaine (LMX) 4 % cream Apply topically as needed for mild pain   • Mirabegron ER 50 MG TB24 Take 1 tablet (50 mg total) by mouth daily   • Multiple Vitamin (multivitamin) capsule Take 1 capsule by mouth daily for 14 days (Patient not taking: Reported on 1/24/2023)   • naloxone (NARCAN) 4 mg/0 1 mL nasal spray Administer 1 spray into a nostril  If no response after 2-3 minutes, give another dose in the other nostril using a new spray     • ondansetron (Zofran ODT) 4 mg disintegrating tablet Take 1 tablet (4 mg total) by mouth every 6 (six) hours as needed for nausea or vomiting   • OneTouch Delica Lancets 80F MISC Use daily   • OneTouch Delica Lancets 30H MISC Use in the morning   • tamsulosin (FLOMAX) 0 4 mg take 1 capsule by mouth once daily WITH DINNER   • zolpidem (AMBIEN) 5 mg tablet Take 1 tablet (5 mg total) by mouth daily at bedtime as needed for sleep   • [DISCONTINUED] acetaminophen (TYLENOL) 500 mg tablet Take 1 tablet (500 mg total) by mouth every 6 (six) hours as needed for mild pain   • [DISCONTINUED] acetaminophen (TYLENOL) 500 mg tablet Take 1 tablet (500 mg total) by mouth every 6 (six) hours as needed for moderate pain (Patient not taking: Reported on 3/28/2023)   • [DISCONTINUED] Advair -21 MCG/ACT inhaler inhale 2 puffs by mouth and INTO THE LUNGS twice a day Rinse mouth after use   • [DISCONTINUED] benzonatate (TESSALON PERLES) 100 mg capsule Take 1 capsule (100 mg total) by mouth 3 (three) times a day as needed for cough (Patient not taking: Reported on 2/20/2023)   • [DISCONTINUED] benzonatate (TESSALON PERLES) 100 mg capsule Take 1 capsule (100 mg total) by mouth 3 (three) times a day as needed for cough (Patient not taking: Reported on 2/20/2023)   • [DISCONTINUED] DULoxetine (CYMBALTA) 60 mg delayed release capsule Take 60 mg by mouth 2 (two) times a day   • [DISCONTINUED] fluticasone (FLONASE) 50 mcg/act nasal spray 1 spray into each nostril daily (Patient not taking: Reported on 9/29/2022)   • [DISCONTINUED] "HYDROcodone-acetaminophen (NORCO) 7 5-325 mg per tablet Take 1 tablet by mouth every 8 (eight) hours as needed for pain For ongoing pain Max Daily Amount: 3 tablets   • [DISCONTINUED] menthol-cetylpyridinium (CEPACOL) 3 MG lozenge Take 1 lozenge (3 mg total) by mouth as needed for sore throat (Patient not taking: Reported on 2/20/2023)   • [DISCONTINUED] metFORMIN (GLUCOPHAGE) 500 mg tablet take 1 tablet by mouth twice a day with meals   • [DISCONTINUED] venlafaxine (EFFEXOR-XR) 150 mg 24 hr capsule take 1 capsule by mouth once daily WITH BREAKFAST       Objective     /82 (BP Location: Left arm, Patient Position: Sitting, Cuff Size: Adult)   Pulse 78   Temp (!) 97 °F (36 1 °C) (Temporal)   Resp 18   Ht 5' 7\" (1 702 m)   Wt 91 1 kg (200 lb 12 8 oz)   SpO2 97%   BMI 31 45 kg/m²     Physical Exam  Vitals and nursing note reviewed  Constitutional:       Appearance: He is well-developed  Comments: Mild discomfort while seated  Rollator walker present   HENT:      Head: Normocephalic and atraumatic  Cardiovascular:      Rate and Rhythm: Normal rate and regular rhythm  Pulses: no weak pulses          Dorsalis pedis pulses are 2+ on the right side and 2+ on the left side  Heart sounds: Normal heart sounds  No murmur heard  Pulmonary:      Effort: Pulmonary effort is normal  No respiratory distress  Breath sounds: Normal breath sounds  Abdominal:      General: There is no distension  Palpations: Abdomen is soft  Feet:      Right foot:      Skin integrity: No ulcer, skin breakdown, erythema, warmth, callus or dry skin  Left foot:      Skin integrity: No ulcer, skin breakdown, erythema, warmth, callus or dry skin  Skin:     General: Skin is warm  Neurological:      Mental Status: He is alert and oriented to person, place, and time  Psychiatric:         Mood and Affect: Mood normal      Diabetic Foot Exam    Patient's shoes and socks removed      Right Foot/Ankle   Right " Foot Inspection  Skin Exam: skin normal and skin intact  No dry skin, no warmth, no callus, no erythema, no maceration, no abnormal color, no pre-ulcer, no ulcer and no callus  Toe Exam: ROM and strength within normal limits  Sensory   Proprioception: intact  Monofilament testing: intact    Vascular  The right DP pulse is 2+  Left Foot/Ankle  Left Foot Inspection  Skin Exam: skin normal and skin intact  No dry skin, no warmth, no erythema, no maceration, normal color, no pre-ulcer, no ulcer and no callus  Toe Exam: ROM and strength within normal limits  Sensory   Proprioception: intact  Monofilament testing: intact    Vascular  The left DP pulse is 2+       Assign Risk Category  No deformity present  No loss of protective sensation  No weak pulses  Risk: 0    Stephane Kathleen MD

## 2023-04-24 NOTE — ASSESSMENT & PLAN NOTE
Lab Results   Component Value Date/Time    CHOLESTEROL 186 02/12/2021 09:52 AM    TRIG 93 02/12/2021 09:52 AM    HDL 63 02/12/2021 09:52 AM    LDLCALC 104 (H) 02/12/2021 09:52 AM     The 10-year ASCVD risk score (Rajan ROMO, et al , 2019) is: 23 5%    Values used to calculate the score:      Age: 65 years      Sex: Male      Is Non- : Yes      Diabetic: Yes      Tobacco smoker: No      Systolic Blood Pressure: 176 mmHg      Is BP treated: Yes      HDL Cholesterol: 63 mg/dL      Total Cholesterol: 186 mg/dL  Continue Atorvastatin 20 mg  Low Fat Diet, regular Exercise

## 2023-04-24 NOTE — ASSESSMENT & PLAN NOTE
Reviewed neurosurgery and pain management consults  Refilled Flexeril, Voltaren gel, gabapentin  Discussed long-term side effects of chronic opioid use  Patient encouraged to start titrating the hydrocodone  Patient is agreeable

## 2023-04-24 NOTE — ASSESSMENT & PLAN NOTE
Patient has not taking Effexor in many months  Discontinue Effexor  Refilled Cymbalta 60 mg twice daily  Discussed medication compliance

## 2023-04-25 ENCOUNTER — HOSPITAL ENCOUNTER (OUTPATIENT)
Dept: NEUROLOGY | Facility: CLINIC | Age: 63
Discharge: HOME | End: 2023-04-25

## 2023-04-25 DIAGNOSIS — M51.26 PROTRUSION OF LUMBAR INTERVERTEBRAL DISC: ICD-10-CM

## 2023-04-25 DIAGNOSIS — R20.2 PARESTHESIA OF BOTH FEET: ICD-10-CM

## 2023-04-25 DIAGNOSIS — M54.50 CHRONIC LOW BACK PAIN WITHOUT SCIATICA, UNSPECIFIED BACK PAIN LATERALITY: ICD-10-CM

## 2023-04-25 DIAGNOSIS — G89.29 CHRONIC LOW BACK PAIN WITHOUT SCIATICA, UNSPECIFIED BACK PAIN LATERALITY: ICD-10-CM

## 2023-04-25 DIAGNOSIS — M62.838 MUSCLE SPASM OF LEFT LOWER EXTREMITY: ICD-10-CM

## 2023-04-25 RX ORDER — GABAPENTIN 800 MG/1
800 TABLET ORAL 3 TIMES DAILY
Qty: 90 TABLET | Refills: 0 | Status: SHIPPED | OUTPATIENT
Start: 2023-04-25

## 2023-05-01 DIAGNOSIS — I10 BENIGN ESSENTIAL HTN: ICD-10-CM

## 2023-05-01 RX ORDER — AMLODIPINE BESYLATE 10 MG/1
10 TABLET ORAL DAILY
Qty: 90 TABLET | Refills: 1 | Status: SHIPPED | OUTPATIENT
Start: 2023-05-01

## 2023-05-08 DIAGNOSIS — K21.9 GASTROESOPHAGEAL REFLUX DISEASE, UNSPECIFIED WHETHER ESOPHAGITIS PRESENT: ICD-10-CM

## 2023-05-08 RX ORDER — FAMOTIDINE 20 MG/1
TABLET, FILM COATED ORAL
Qty: 60 TABLET | Refills: 0 | Status: SHIPPED | OUTPATIENT
Start: 2023-05-08

## 2023-05-15 ENCOUNTER — APPOINTMENT (OUTPATIENT)
Dept: LAB | Facility: CLINIC | Age: 63
End: 2023-05-15

## 2023-05-15 ENCOUNTER — OFFICE VISIT (OUTPATIENT)
Dept: FAMILY MEDICINE CLINIC | Facility: CLINIC | Age: 63
End: 2023-05-15

## 2023-05-15 VITALS
HEIGHT: 67 IN | TEMPERATURE: 97.9 F | SYSTOLIC BLOOD PRESSURE: 138 MMHG | BODY MASS INDEX: 31.71 KG/M2 | DIASTOLIC BLOOD PRESSURE: 84 MMHG | RESPIRATION RATE: 16 BRPM | OXYGEN SATURATION: 98 % | WEIGHT: 202 LBS | HEART RATE: 87 BPM

## 2023-05-15 DIAGNOSIS — I10 BENIGN ESSENTIAL HTN: ICD-10-CM

## 2023-05-15 DIAGNOSIS — G89.4 CHRONIC PAIN SYNDROME: ICD-10-CM

## 2023-05-15 DIAGNOSIS — M51.26 PROTRUSION OF LUMBAR INTERVERTEBRAL DISC: ICD-10-CM

## 2023-05-15 DIAGNOSIS — M54.42 CHRONIC LEFT-SIDED LOW BACK PAIN WITH LEFT-SIDED SCIATICA: Primary | ICD-10-CM

## 2023-05-15 DIAGNOSIS — M54.16 RADICULOPATHY, LUMBAR REGION: ICD-10-CM

## 2023-05-15 DIAGNOSIS — E78.5 HYPERLIPIDEMIA, UNSPECIFIED HYPERLIPIDEMIA TYPE: ICD-10-CM

## 2023-05-15 DIAGNOSIS — M48.061 LUMBAR FORAMINAL STENOSIS: ICD-10-CM

## 2023-05-15 DIAGNOSIS — G89.29 CHRONIC LEFT-SIDED LOW BACK PAIN WITH LEFT-SIDED SCIATICA: Primary | ICD-10-CM

## 2023-05-15 DIAGNOSIS — E11.9 TYPE 2 DIABETES MELLITUS WITHOUT COMPLICATION, WITHOUT LONG-TERM CURRENT USE OF INSULIN (HCC): ICD-10-CM

## 2023-05-15 DIAGNOSIS — Z12.5 PROSTATE CANCER SCREENING: ICD-10-CM

## 2023-05-15 LAB
CHOLEST SERPL-MCNC: 123 MG/DL
CREAT UR-MCNC: 69.9 MG/DL
EST. AVERAGE GLUCOSE BLD GHB EST-MCNC: 146 MG/DL
HBA1C MFR BLD: 6.7 %
HDLC SERPL-MCNC: 72 MG/DL
LDLC SERPL CALC-MCNC: 39 MG/DL (ref 0–100)
MICROALBUMIN UR-MCNC: <5 MG/L (ref 0–20)
MICROALBUMIN/CREAT 24H UR: <7 MG/G CREATININE (ref 0–30)
PSA SERPL-MCNC: 1.5 NG/ML (ref 0–4)
TRIGL SERPL-MCNC: 61 MG/DL

## 2023-05-15 NOTE — PROGRESS NOTES
Name: Karuna Felix      : 1960      MRN: [id number]  Encounter Provider: Aurelia Gaucher, MD  Encounter Date: 5/15/2023   Encounter department: [address] [place], [place] [address]     1  Chronic left-sided low back pain with left-sided sciatica  -     Ambulatory Referral to Physical Therapy; Future  -     Ambulatory Referral to Occupational Therapy; Future    2  Radiculopathy, lumbar region  Assessment & Plan:  Reviewed neurosurgery and pain management consults  Continue Flexeril, Voltaren gel, gabapentin  Discussed long-term side effects of chronic opioid use again  Patient encouraged to start titrating the hydrocodone from TID to BID to once daily  He is inquiring about switching to Oxycodone in the future  Patient is agreeable      Orders:  -     Ambulatory Referral to Physical Therapy; Future  -     Ambulatory Referral to Occupational Therapy; Future    3  Lumbar foraminal stenosis  -     Ambulatory Referral to Physical Therapy; Future  -     Ambulatory Referral to Occupational Therapy; Future    4  Protrusion of lumbar intervertebral disc  -     Ambulatory Referral to Physical Therapy; Future  -     Ambulatory Referral to Occupational Therapy; Future    5  Chronic pain syndrome  -     Ambulatory Referral to Physical Therapy; Future  -     Ambulatory Referral to Occupational Therapy; Future         Subjective      Marv Miranda is a 65 y o  male who presented today to follow-up for his chronic back pain  Patient is inquiring about getting a sleeping chair that may help his pain  He has had consultations with neurosurgery and pain management for his chronic back pain  He is still using the rollator  He has increased pain especially when sleeping and when he wakes up in the morning he needs to roll off of his bed to get up  Patient is still using Flexeril, gabapentin, Voltaren gel, hydrocodone  He states he is taking hydrocodone 3 times daily    He feels motivated to stop taking the hydrocodone in the future, but is fearful of his chronic back pain  Patient has started avoid for walks with his friend  He states that has helped to distract him from the chronic pain  The following portions of the patient's history were reviewed and updated as appropriate: allergies, current medications, past family history, past medical history, past social history, past surgical history and problem list       Review of Systems   Constitutional: Negative for chills and fever  HENT: Negative for congestion, rhinorrhea and sore throat  Respiratory: Positive for cough  Negative for shortness of breath  Cardiovascular: Negative for chest pain  Gastrointestinal: Negative for diarrhea, nausea and vomiting  Musculoskeletal: Positive for back pain and gait problem  Skin: Negative for rash  Neurological: Negative for dizziness and headaches         Current Outpatient Medications on File Prior to Visit   Medication Sig   • albuterol (2 5 mg/3 mL) 0 083 % nebulizer solution Take 3 mL (2 5 mg total) by nebulization every 6 (six) hours as needed for wheezing or shortness of breath   • albuterol (5 mg/mL) 0 5 % nebulizer solution Take 0 5 mL (2 5 mg total) by nebulization every 6 (six) hours as needed for wheezing   • albuterol (ProAir HFA) 90 mcg/act inhaler Inhale 2 puffs every 6 (six) hours as needed for wheezing   • albuterol (Ventolin HFA) 90 mcg/act inhaler Inhale 2 puffs every 4 (four) hours as needed for wheezing   • aluminum-magnesium hydroxide-simethicone (MAALOX) 200-200-20 MG/5ML SUSP Take 15 mL by mouth 4 (four) times a day (before meals and at bedtime)   • amLODIPine (NORVASC) 10 mg tablet Take 1 tablet (10 mg total) by mouth daily   • atorvastatin (LIPITOR) 20 mg tablet take 1 tablet by mouth once daily (Patient taking differently: Take 20 mg by mouth daily)   • Bioflavonoid Products (RA Vitamin C CR) TBCR take 1 tablet by mouth twice a day for 14 days   • Blood Glucose Monitoring Suppl (OneTouch Verio) w/Device KIT Use daily   • Blood Glucose Monitoring Suppl KIT Use in the morning Please give test strips and lancets  Dx  E11 9   • Blood Pressure KIT Use in the morning With appropriate size cuff   • Blood Pressure Monitoring (Blood Pressure Monitor/Arm) EMILY Use daily   • Cholecalciferol (D3 PO) Take by mouth daily   • cyclobenzaprine (FLEXERIL) 10 mg tablet Take 1 tablet (10 mg total) by mouth daily at bedtime as needed for muscle spasms   • Diclofenac Sodium (VOLTAREN) 1 % Apply 2 g topically 4 (four) times a day as needed (low back or neck pain)   • DULoxetine (CYMBALTA) 60 mg delayed release capsule Take 1 capsule (60 mg total) by mouth 2 (two) times a day   • famotidine (PEPCID) 20 mg tablet take 1 tablet by mouth twice a day if needed for ACID REFLUX   • fluticasone (FLONASE) 50 mcg/act nasal spray 1 spray into each nostril daily   • fluticasone-salmeterol (Advair HFA) 115-21 MCG/ACT inhaler Inhale 2 puffs 2 (two) times a day Rinse mouth after use     • gabapentin (NEURONTIN) 800 mg tablet Take 1 tablet (800 mg total) by mouth 3 (three) times a day   • glucose blood (OneTouch Verio) test strip Check blood sugar daily   • glucose monitoring kit (FREESTYLE) monitoring kit Use 1 each in the morning TESTING DAILY IN THE AM   • HYDROcodone-acetaminophen (NORCO) 7 5-325 mg per tablet Take 1 tablet by mouth every 8 (eight) hours as needed for pain For ongoing pain Max Daily Amount: 3 tablets   • Lancet Devices (ONE TOUCH DELICA LANCING DEV) MISC Use daily   • Lancets (freestyle) lancets TESTING DAILY IN THE AM   • Lancets (onetouch ultrasoft) lancets Use daily Use as instructed to check sugars daily   • lidocaine (Lidoderm) 5 % Apply 1 patch topically daily Remove & Discard patch within 12 hours or as directed by MD   • lidocaine (LMX) 4 % cream Apply topically as needed for mild pain   • metFORMIN (GLUCOPHAGE) 500 mg tablet Take 1 tablet (500 mg total) by mouth 2 "(two) times a day with meals   • Mirabegron ER 50 MG TB24 Take 1 tablet (50 mg total) by mouth daily   • Multiple Vitamin (multivitamin) capsule Take 1 capsule by mouth daily for 14 days (Patient not taking: Reported on 1/24/2023)   • naloxone (NARCAN) 4 mg/0 1 mL nasal spray Administer 1 spray into a nostril  If no response after 2-3 minutes, give another dose in the other nostril using a new spray  • ondansetron (Zofran ODT) 4 mg disintegrating tablet Take 1 tablet (4 mg total) by mouth every 6 (six) hours as needed for nausea or vomiting   • OneTouch Delica Lancets 42Q MISC Use daily   • OneTouch Delica Lancets 45I MISC Use in the morning   • tamsulosin (FLOMAX) 0 4 mg take 1 capsule by mouth once daily WITH DINNER   • zolpidem (AMBIEN) 5 mg tablet Take 1 tablet (5 mg total) by mouth daily at bedtime as needed for sleep       Objective     /84 (BP Location: Left arm, Patient Position: Sitting, Cuff Size: Large)   Pulse 87   Temp 97 9 °F (36 6 °C) (Temporal)   Resp 16   Ht 5' 7\" (1 702 m)   Wt 91 6 kg (202 lb)   SpO2 98%   BMI 31 64 kg/m²     Physical Exam  Vitals and nursing note reviewed  Constitutional:       Appearance: He is well-developed  Comments: Mild discomfort while seated  Rollator walker present   HENT:      Head: Normocephalic and atraumatic  Cardiovascular:      Rate and Rhythm: Normal rate and regular rhythm  Pulses: no weak pulses          Dorsalis pedis pulses are 2+ on the right side and 2+ on the left side  Heart sounds: Normal heart sounds  No murmur heard  Pulmonary:      Effort: Pulmonary effort is normal  No respiratory distress  Breath sounds: Normal breath sounds  Abdominal:      General: There is no distension  Palpations: Abdomen is soft  Feet:      Right foot:      Skin integrity: No ulcer, skin breakdown, erythema, warmth, callus or dry skin        Left foot:      Skin integrity: No ulcer, skin breakdown, erythema, warmth, callus or dry " skin    Skin:     General: Skin is warm  Neurological:      Mental Status: He is alert and oriented to person, place, and time     Psychiatric:         Mood and Affect: Mood normal      Venancio Jonas MD

## 2023-05-16 PROBLEM — Z02.9 ENCOUNTER FOR NARCOTIC CONTRACT DISCUSSION: Status: RESOLVED | Noted: 2021-03-05 | Resolved: 2023-05-16

## 2023-05-16 NOTE — ASSESSMENT & PLAN NOTE
Reviewed neurosurgery and pain management consults  Continue Flexeril, Voltaren gel, gabapentin  Discussed long-term side effects of chronic opioid use again  Patient encouraged to start titrating the hydrocodone from TID to BID to once daily  He is inquiring about switching to Oxycodone in the future  Patient is agreeable

## 2023-05-23 ENCOUNTER — TELEPHONE (OUTPATIENT)
Dept: FAMILY MEDICINE CLINIC | Facility: CLINIC | Age: 63
End: 2023-05-23

## 2023-05-23 NOTE — TELEPHONE ENCOUNTER
Patient is requersting refill on his medications     Please advise    benzonatate (TESSALON PERLES) 100 mg capsule     HYDROcodone-acetaminophen (NORCO) 7 5-325 mg per tablet

## 2023-05-24 DIAGNOSIS — N40.1 BENIGN PROSTATIC HYPERPLASIA WITH LOWER URINARY TRACT SYMPTOMS, SYMPTOM DETAILS UNSPECIFIED: ICD-10-CM

## 2023-05-25 ENCOUNTER — TELEPHONE (OUTPATIENT)
Dept: FAMILY MEDICINE CLINIC | Facility: CLINIC | Age: 63
End: 2023-05-25

## 2023-05-25 DIAGNOSIS — M54.50 CHRONIC LOW BACK PAIN WITHOUT SCIATICA, UNSPECIFIED BACK PAIN LATERALITY: ICD-10-CM

## 2023-05-25 DIAGNOSIS — M54.16 LUMBAR RADICULOPATHY: ICD-10-CM

## 2023-05-25 DIAGNOSIS — G89.29 CHRONIC LOW BACK PAIN WITHOUT SCIATICA, UNSPECIFIED BACK PAIN LATERALITY: ICD-10-CM

## 2023-05-25 RX ORDER — HYDROCODONE BITARTRATE AND ACETAMINOPHEN 7.5; 325 MG/1; MG/1
1 TABLET ORAL EVERY 8 HOURS PRN
Qty: 90 TABLET | Refills: 0 | Status: SHIPPED | OUTPATIENT
Start: 2023-05-25

## 2023-05-26 RX ORDER — TAMSULOSIN HYDROCHLORIDE 0.4 MG/1
CAPSULE ORAL
Qty: 90 CAPSULE | Refills: 1 | Status: SHIPPED | OUTPATIENT
Start: 2023-05-26

## 2023-06-04 ENCOUNTER — HOSPITAL ENCOUNTER (EMERGENCY)
Facility: HOSPITAL | Age: 63
Discharge: HOME | End: 2023-06-04
Attending: EMERGENCY MEDICINE | Admitting: EMERGENCY MEDICINE
Payer: MEDICARE

## 2023-06-04 VITALS
SYSTOLIC BLOOD PRESSURE: 157 MMHG | HEART RATE: 82 BPM | BODY MASS INDEX: 31.58 KG/M2 | TEMPERATURE: 97.7 F | OXYGEN SATURATION: 99 % | RESPIRATION RATE: 18 BRPM | DIASTOLIC BLOOD PRESSURE: 91 MMHG | WEIGHT: 201.6 LBS

## 2023-06-04 DIAGNOSIS — S61.211A LACERATION OF LEFT INDEX FINGER, FOREIGN BODY PRESENCE UNSPECIFIED, NAIL DAMAGE STATUS UNSPECIFIED, INITIAL ENCOUNTER: Primary | ICD-10-CM

## 2023-06-04 PROCEDURE — 99283 EMERGENCY DEPT VISIT LOW MDM: CPT

## 2023-06-04 RX ORDER — LIDOCAINE HYDROCHLORIDE 10 MG/ML
5 INJECTION, SOLUTION EPIDURAL; INFILTRATION; INTRACAUDAL; PERINEURAL ONCE
Status: COMPLETED | OUTPATIENT
Start: 2023-06-04 | End: 2023-06-04

## 2023-06-04 RX ADMIN — LIDOCAINE HYDROCHLORIDE 5 ML: 10 INJECTION, SOLUTION EPIDURAL; INFILTRATION; INTRACAUDAL; PERINEURAL at 11:33

## 2023-06-04 NOTE — Clinical Note
Dominguez Mccullough was seen and treated in our emergency department on 6/4/2023  Diagnosis:     Marv    He may return on this date: If you have any questions or concerns, please don't hesitate to call        Justin Mota MD    ______________________________           _______________          _______________  Hospital Representative                              Date                                Time

## 2023-06-04 NOTE — ED PROVIDER NOTES
History  Chief Complaint   Patient presents with   • Finger Laceration     Patient reports 1hr ago he fell and handed landed on glass, left 2nd digit horizontal laceration actively bleeding, tetanus UTD  Patient is a 43-year-old male coming in for evaluation of laceration to his left index finger after a fall  Is in no acute distress at this time  Bleeding is controlled  He is not on blood thinners  Did not hit his head  Tetanus is up-to-date      Finger Laceration  Location:  Finger  Finger laceration location:  L index finger  Depth: Through dermis  Time since incident:  1 hour  Laceration mechanism:  Broken glass  Tetanus status:  Up to date  Associated symptoms: no numbness and no redness        Prior to Admission Medications   Prescriptions Last Dose Informant Patient Reported? Taking?    Bioflavonoid Products (RA Vitamin C CR) TBCR  Self Yes No   Sig: take 1 tablet by mouth twice a day for 14 days   Blood Glucose Monitoring Suppl (OneTouch Verio) w/Device KIT  Self No No   Sig: Use daily   Blood Glucose Monitoring Suppl KIT  Self No No   Sig: Use in the morning Please give test strips and lancets  Dx  E11 9   Blood Pressure KIT Past Month Self No Yes   Sig: Use in the morning With appropriate size cuff   Blood Pressure Monitoring (Blood Pressure Monitor/Arm) EMILY Past Month Self No Yes   Sig: Use daily   Cholecalciferol (D3 PO)  Self Yes No   Sig: Take by mouth daily   DULoxetine (CYMBALTA) 60 mg delayed release capsule 6/4/2023  No Yes   Sig: Take 1 capsule (60 mg total) by mouth 2 (two) times a day   Diclofenac Sodium (VOLTAREN) 1 % Past Month Self No Yes   Sig: Apply 2 g topically 4 (four) times a day as needed (low back or neck pain)   HYDROcodone-acetaminophen (NORCO) 7 5-325 mg per tablet 6/3/2023  No Yes   Sig: Take 1 tablet by mouth every 8 (eight) hours as needed for pain For ongoing pain Max Daily Amount: 3 tablets   Lancet Devices (ONE TOUCH DELICA LANCING DEV) MISC Past Month Self No Yes Sig: Use daily   Lancets (freestyle) lancets Past Month Self No Yes   Sig: TESTING DAILY IN THE AM   Lancets (onetouch ultrasoft) lancets Past Month Self No Yes   Sig: Use daily Use as instructed to check sugars daily   Mirabegron ER 50 MG TB24  Self No No   Sig: Take 1 tablet (50 mg total) by mouth daily   Multiple Vitamin (multivitamin) capsule   No No   Sig: Take 1 capsule by mouth daily for 14 days   Patient not taking: Reported on 3/37/1741   OneTouch Delica Lancets 99X MISC  Self No No   Sig: Use daily   OneTouch Delica Lancets 53T MISC  Self No No   Sig: Use in the morning   albuterol (2 5 mg/3 mL) 0 083 % nebulizer solution  Self No No   Sig: Take 3 mL (2 5 mg total) by nebulization every 6 (six) hours as needed for wheezing or shortness of breath   albuterol (5 mg/mL) 0 5 % nebulizer solution   No No   Sig: Take 0 5 mL (2 5 mg total) by nebulization every 6 (six) hours as needed for wheezing   albuterol (ProAir HFA) 90 mcg/act inhaler   No No   Sig: Inhale 2 puffs every 6 (six) hours as needed for wheezing   albuterol (Ventolin HFA) 90 mcg/act inhaler  Self No No   Sig: Inhale 2 puffs every 4 (four) hours as needed for wheezing   aluminum-magnesium hydroxide-simethicone (MAALOX) 200-200-20 MG/5ML SUSP  Self No No   Sig: Take 15 mL by mouth 4 (four) times a day (before meals and at bedtime)   amLODIPine (NORVASC) 10 mg tablet 2023  No Yes   Sig: Take 1 tablet (10 mg total) by mouth daily   atorvastatin (LIPITOR) 20 mg tablet  Self No No   Sig: take 1 tablet by mouth once daily   Patient taking differently: Take 20 mg by mouth daily   cyclobenzaprine (FLEXERIL) 10 mg tablet  Self No No   Sig: Take 1 tablet (10 mg total) by mouth daily at bedtime as needed for muscle spasms   famotidine (PEPCID) 20 mg tablet 2023  No Yes   Sig: take 1 tablet by mouth twice a day if needed for ACID REFLUX   fluticasone (FLONASE) 50 mcg/act nasal spray 2023 Self No Yes   Si spray into each nostril daily fluticasone-salmeterol (Advair HFA) 115-21 MCG/ACT inhaler Past Month  No Yes   Sig: Inhale 2 puffs 2 (two) times a day Rinse mouth after use    gabapentin (NEURONTIN) 800 mg tablet 6/4/2023  No Yes   Sig: Take 1 tablet (800 mg total) by mouth 3 (three) times a day   glucose blood (OneTouch Verio) test strip Past Month Self No Yes   Sig: Check blood sugar daily   glucose monitoring kit (FREESTYLE) monitoring kit Past Month Self No Yes   Sig: Use 1 each in the morning TESTING DAILY IN THE AM   lidocaine (LMX) 4 % cream Past Month Self No Yes   Sig: Apply topically as needed for mild pain   lidocaine (Lidoderm) 5 % Past Month Self No Yes   Sig: Apply 1 patch topically daily Remove & Discard patch within 12 hours or as directed by MD   metFORMIN (GLUCOPHAGE) 500 mg tablet 6/4/2023  No Yes   Sig: Take 1 tablet (500 mg total) by mouth 2 (two) times a day with meals   naloxone (NARCAN) 4 mg/0 1 mL nasal spray  Self No No   Sig: Administer 1 spray into a nostril  If no response after 2-3 minutes, give another dose in the other nostril using a new spray     ondansetron (Zofran ODT) 4 mg disintegrating tablet  Self No No   Sig: Take 1 tablet (4 mg total) by mouth every 6 (six) hours as needed for nausea or vomiting   tamsulosin (FLOMAX) 0 4 mg   No No   Sig: take 1 capsule by mouth once daily with dinner   zolpidem (AMBIEN) 5 mg tablet  Self No No   Sig: Take 1 tablet (5 mg total) by mouth daily at bedtime as needed for sleep      Facility-Administered Medications: None       Past Medical History:   Diagnosis Date   • Asthma    • Back pain    • Back pain    • 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   • Depression    • Diabetes mellitus ([facility] [address] )    • Hyperlipidemia    • Hypertension    • Type 2 diabetes mellitus without complication, without long-term current use of insulin ([facility] [address] ) 10/16/2020       Past Surgical History:   Procedure Laterality Date   • CYST REMOVAL  2017       Family History   Problem Relation Age of Onset   • Hypertension Mother    • Diabetes Mother    • Cancer Father    • Diabetes Family    • Hypertension Family      I have reviewed and agree with the history as documented  E-Cigarette/Vaping   • E-Cigarette Use Never User      E-Cigarette/Vaping Substances   • Nicotine No    • THC No    • CBD No    • Flavoring No    • Other No    • Unknown No      Social History     Tobacco Use   • Smoking status: Never   • Smokeless tobacco: Never   Vaping Use   • Vaping Use: Never used   Substance Use Topics   • Alcohol use: Not Currently     Comment: rare   • Drug use: Never       Review of Systems   Musculoskeletal: Negative for arthralgias  Skin: Positive for wound  Physical Exam  Physical Exam  Vitals reviewed  Constitutional:       Appearance: Normal appearance  He is normal weight  HENT:      Head: Normocephalic and atraumatic  Right Ear: External ear normal       Left Ear: External ear normal       Nose: Nose normal    Eyes:      Conjunctiva/sclera: Conjunctivae normal    Cardiovascular:      Rate and Rhythm: Normal rate  Pulmonary:      Effort: Pulmonary effort is normal    Musculoskeletal:         General: Signs of injury present  Normal range of motion  Cervical back: Normal range of motion  Comments: Laceration of left index finger  Normal range of motion, normal sensation of the distal finger  Capillary refill less than 2 seconds   Skin:     General: Skin is warm and dry  Neurological:      Mental Status: He is alert           Vital Signs  ED Triage Vitals [06/04/23 1057]   Temperature Pulse Respirations Blood Pressure SpO2   97 7 °F (36 5 °C) 82 18 157/91 99 %      Temp Source Heart Rate Source Patient Position - Orthostatic VS BP Location FiO2 (%)   Tympanic Monitor Sitting Left arm --      Pain Score       --           Vitals:    06/04/23 1057   BP: 157/91   Pulse: 82   Patient Position - Orthostatic VS: Sitting         Visual Acuity      ED Medications  Medications   lidocaine (PF) (XYLOCAINE-MPF) 1 % injection 5 mL (5 mL Infiltration Given 6/4/23 1133)       Diagnostic Studies  Results Reviewed     None                 No orders to display              Procedures  Laceration repair    Date/Time: 6/4/2023 3:14 PM    Performed by: Gia Salinas PA-C  Authorized by: Gia Salinas PA-C  Body area: upper extremity  Location details: left index finger  Foreign bodies: no foreign bodies  Anesthesia: digital block    Anesthesia:  Local Anesthetic: lidocaine 1% without epinephrine    Wound Dehiscence:    Secondary closure or dehiscence: complex    Procedure Details:  Irrigation solution: tap water  Irrigation method: tap  Amount of cleaning: standard  Skin closure: 5-0 Prolene  Number of sutures: 5               ED Course                               SBIRT 22yo+    Flowsheet Row Most Recent Value   Initial Alcohol Screen: US AUDIT-C     1  How often do you have a drink containing alcohol? 0 Filed at: 06/04/2023 1110   2  How many drinks containing alcohol do you have on a typical day you are drinking? 0 Filed at: 06/04/2023 1110   3a  Male UNDER 65: How often do you have five or more drinks on one occasion? 0 Filed at: 06/04/2023 1110   3b  FEMALE Any Age, or MALE 65+: How often do you have 4 or more drinks on one occassion? 0 Filed at: 06/04/2023 1110   Audit-C Score 0 Filed at: 06/04/2023 1110   DANIELLE: How many times in the past year have you    Used an illegal drug or used a prescription medication for non-medical reasons? Never Filed at: 06/04/2023 1110                    Medical Decision Making  Patient is a 59-year-old male who comes in for evaluation of a laceration to his left index finger  Is in no acute distress at this time  Laceration was closed in emergency room, patient is up-to-date on his tetanus  Discharged home with supportive recommendations    Risk  Prescription drug management            Disposition  Final diagnoses: Laceration of left index finger, foreign body presence unspecified, nail damage status unspecified, initial encounter     Time reflects when diagnosis was documented in both MDM as applicable and the Disposition within this note     Time User Action Codes Description Comment    6/4/2023 11:36 AM Chloe Booker Add [[id number] [id number]] Laceration of left index finger, foreign body presence unspecified, nail damage status unspecified, initial encounter       ED Disposition     ED Disposition   Discharge    Condition   Stable    Date/Time   Sun Jun 4, 2023 11:36 AM    Comment   Paulina Aurelio discharge to home/self care                 Follow-up Information     Follow up With Specialties Details Why Contact Info Additional [address]oma Bailey MD Family Medicine   [address] [address] [phone]       [facility] Emergency Department Emergency Medicine  As needed, If symptoms worsen [address] [id number] [address] [facility] Emergency Department          Discharge Medication List as of 6/4/2023 11:37 AM      CONTINUE these medications which have NOT CHANGED    Details   amLODIPine (NORVASC) 10 mg tablet Take 1 tablet (10 mg total) by mouth daily, Starting Mon 5/1/2023, Normal      Blood Pressure KIT Use in the morning With appropriate size cuff, Starting Wed 4/13/2022, Print      Blood Pressure Monitoring (Blood Pressure Monitor/Arm) EMILY Use daily, Starting Tue 6/14/2022, Print      Diclofenac Sodium (VOLTAREN) 1 % Apply 2 g topically 4 (four) times a day as needed (low back or neck pain), Starting Wed 12/7/2022, Normal      DULoxetine (CYMBALTA) 60 mg delayed release capsule Take 1 capsule (60 mg total) by mouth 2 (two) times a day, Starting Mon 4/24/2023, Normal      famotidine (PEPCID) 20 mg tablet take 1 tablet by mouth twice a day if needed for ACID REFLUX, Normal      fluticasone (FLONASE) 50 mcg/act nasal spray 1 spray into each nostril daily, Starting Thu 12/16/2021, Normal      fluticasone-salmeterol (Advair HFA) 115-21 MCG/ACT inhaler Inhale 2 puffs 2 (two) times a day Rinse mouth after use , Starting Mon 4/24/2023, Normal      gabapentin (NEURONTIN) 800 mg tablet Take 1 tablet (800 mg total) by mouth 3 (three) times a day, Starting Tue 4/25/2023, Normal      glucose blood (OneTouch Verio) test strip Check blood sugar daily, Normal      !! glucose monitoring kit (FREESTYLE) monitoring kit Use 1 each in the morning TESTING DAILY IN THE AM, Starting Mon 5/9/2022, Print      HYDROcodone-acetaminophen (NORCO) 7 5-325 mg per tablet Take 1 tablet by mouth every 8 (eight) hours as needed for pain For ongoing pain Max Daily Amount: 3 tablets, Starting Thu 5/25/2023, Normal      Lancet Devices (ONE TOUCH DELICA LANCING DEV) MISC Use daily, Starting Fri 8/12/2022, Normal      !!  Lancets (freestyle) lancets TESTING DAILY IN THE AM, Print      !! Lancets (onetouch ultrasoft) lancets Use daily Use as instructed to check sugars daily, Starting Tue 5/24/2022, Normal      lidocaine (Lidoderm) 5 % Apply 1 patch topically daily Remove & Discard patch within 12 hours or as directed by MD, Starting Wed 12/7/2022, Normal      lidocaine (LMX) 4 % cream Apply topically as needed for mild pain, Starting Fri 8/12/2022, Normal      metFORMIN (GLUCOPHAGE) 500 mg tablet Take 1 tablet (500 mg total) by mouth 2 (two) times a day with meals, Starting Mon 4/24/2023, Normal      albuterol (2 5 mg/3 mL) 0 083 % nebulizer solution Take 3 mL (2 5 mg total) by nebulization every 6 (six) hours as needed for wheezing or shortness of breath, Starting Tue 9/6/2022, Normal      albuterol (5 mg/mL) 0 5 % nebulizer solution Take 0 5 mL (2 5 mg total) by nebulization every 6 (six) hours as needed for wheezing, Starting Tue 3/21/2023, Normal      !! albuterol (ProAir HFA) 90 mcg/act inhaler Inhale 2 puffs every 6 (six) hours as needed for wheezing, Starting Tue 3/21/2023, Normal      !! albuterol (Ventolin HFA) 90 mcg/act inhaler Inhale 2 puffs every 4 (four) hours as needed for wheezing, Starting Fri 1/20/2023, Normal      aluminum-magnesium hydroxide-simethicone (MAALOX) 200-200-20 MG/5ML SUSP Take 15 mL by mouth 4 (four) times a day (before meals and at bedtime), Starting Thu 1/7/2021, Normal      atorvastatin (LIPITOR) 20 mg tablet take 1 tablet by mouth once daily, Normal      Bioflavonoid Products (RA Vitamin C CR) TBCR take 1 tablet by mouth twice a day for 14 days, Historical Med      Blood Glucose Monitoring Suppl (OneTouch Verio) w/Device KIT Use daily, Starting Tue 6/14/2022, Normal      !! Blood Glucose Monitoring Suppl KIT Use in the morning Please give test strips and lancets  Dx  E11 9, Starting Wed 4/13/2022, Print      Cholecalciferol (D3 PO) Take by mouth daily, Historical Med      cyclobenzaprine (FLEXERIL) 10 mg tablet Take 1 tablet (10 mg total) by mouth daily at bedtime as needed for muscle spasms, Starting Tue 1/10/2023, Normal      Mirabegron ER 50 MG TB24 Take 1 tablet (50 mg total) by mouth daily, Starting Mon 6/20/2022, Normal      Multiple Vitamin (multivitamin) capsule Take 1 capsule by mouth daily for 14 days, Starting Fri 1/22/2021, Until Wed 4/13/2022, Print      naloxone (NARCAN) 4 mg/0 1 mL nasal spray Administer 1 spray into a nostril  If no response after 2-3 minutes, give another dose in the other nostril using a new spray , Normal      ondansetron (Zofran ODT) 4 mg disintegrating tablet Take 1 tablet (4 mg total) by mouth every 6 (six) hours as needed for nausea or vomiting, Starting Sun 3/27/2022, Normal      !! OneTouch Delica Lancets 93V MISC Use daily, Starting Thu 10/14/2021, Normal      !!  OneTouch Delica Lancets 16F MISC Use in the morning, Starting Tue 6/14/2022, Normal      tamsulosin (FLOMAX) 0 4 mg take 1 capsule by mouth once daily with dinner, Normal      zolpidem (AMBIEN) 5 mg tablet Take 1 tablet (5 mg total) by mouth daily at bedtime as needed for sleep, Starting Fri 12/10/2021, Normal       !! - Potential duplicate medications found  Please discuss with provider  No discharge procedures on file      PDMP Review       Value Time User    PDMP Reviewed  Yes 4/24/2023 11:40 AM Delmar Valle MD          ED Provider  Electronically Signed by           Daniel Noyola PA-C  06/04/23 [id number]

## 2023-06-05 NOTE — ED ATTENDING ATTESTATION
I was the attending physician on duty at the time the patient visited the emergency department  The patient was evaluated by the Advanced Practitioner  I was personally available for consultation; however the patient’s care, medical decisions and disposition fell within the Advanced Practitioner’s scope of practice to independently manage the patient, unless otherwise noted      Sean Baldwin MD

## 2023-06-07 ENCOUNTER — TELEPHONE (OUTPATIENT)
Dept: FAMILY MEDICINE CLINIC | Facility: CLINIC | Age: 63
End: 2023-06-07

## 2023-06-07 DIAGNOSIS — E11.9 TYPE 2 DIABETES MELLITUS WITHOUT COMPLICATION, WITHOUT LONG-TERM CURRENT USE OF INSULIN (HCC): Primary | ICD-10-CM

## 2023-06-07 NOTE — QUICK NOTE
[facility] - LACERATION RESPONSE NOTE    Based on the query that was sent to you, please document the length of the laceration that was repaired below      2cm

## 2023-06-08 RX ORDER — ASPIRIN 325 MG
325 TABLET ORAL DAILY
Qty: 90 TABLET | Refills: 3 | Status: SHIPPED | OUTPATIENT
Start: 2023-06-08

## 2023-06-09 DIAGNOSIS — E78.5 HYPERLIPIDEMIA, UNSPECIFIED HYPERLIPIDEMIA TYPE: ICD-10-CM

## 2023-06-09 DIAGNOSIS — K21.9 GASTROESOPHAGEAL REFLUX DISEASE, UNSPECIFIED WHETHER ESOPHAGITIS PRESENT: ICD-10-CM

## 2023-06-09 RX ORDER — FAMOTIDINE 20 MG/1
20 TABLET, FILM COATED ORAL DAILY
Qty: 60 TABLET | Refills: 2 | Status: SHIPPED | OUTPATIENT
Start: 2023-06-09

## 2023-06-09 RX ORDER — ATORVASTATIN CALCIUM 20 MG/1
20 TABLET, FILM COATED ORAL DAILY
Qty: 90 TABLET | Refills: 1 | Status: SHIPPED | OUTPATIENT
Start: 2023-06-09

## 2023-06-13 ENCOUNTER — OFFICE VISIT (OUTPATIENT)
Dept: FAMILY MEDICINE CLINIC | Facility: CLINIC | Age: 63
End: 2023-06-13

## 2023-06-13 ENCOUNTER — EVALUATION (OUTPATIENT)
Dept: PHYSICAL THERAPY | Facility: CLINIC | Age: 63
End: 2023-06-13
Payer: MEDICARE

## 2023-06-13 VITALS
DIASTOLIC BLOOD PRESSURE: 76 MMHG | HEIGHT: 67 IN | RESPIRATION RATE: 18 BRPM | SYSTOLIC BLOOD PRESSURE: 116 MMHG | HEART RATE: 83 BPM | TEMPERATURE: 98 F | OXYGEN SATURATION: 97 % | WEIGHT: 201.2 LBS | BODY MASS INDEX: 31.58 KG/M2

## 2023-06-13 DIAGNOSIS — G89.4 CHRONIC PAIN SYNDROME: ICD-10-CM

## 2023-06-13 DIAGNOSIS — M54.42 CHRONIC LEFT-SIDED LOW BACK PAIN WITH LEFT-SIDED SCIATICA: Primary | ICD-10-CM

## 2023-06-13 DIAGNOSIS — G89.29 CHRONIC LEFT-SIDED LOW BACK PAIN WITH LEFT-SIDED SCIATICA: Primary | ICD-10-CM

## 2023-06-13 DIAGNOSIS — M48.061 LUMBAR FORAMINAL STENOSIS: ICD-10-CM

## 2023-06-13 DIAGNOSIS — Z48.02 VISIT FOR SUTURE REMOVAL: Primary | ICD-10-CM

## 2023-06-13 DIAGNOSIS — M54.16 RADICULOPATHY, LUMBAR REGION: ICD-10-CM

## 2023-06-13 DIAGNOSIS — M51.26 PROTRUSION OF LUMBAR INTERVERTEBRAL DISC: ICD-10-CM

## 2023-06-13 PROCEDURE — 3078F DIAST BP <80 MM HG: CPT | Performed by: FAMILY MEDICINE

## 2023-06-13 PROCEDURE — 99213 OFFICE O/P EST LOW 20 MIN: CPT | Performed by: FAMILY MEDICINE

## 2023-06-13 PROCEDURE — 97161 PT EVAL LOW COMPLEX 20 MIN: CPT

## 2023-06-13 PROCEDURE — 3074F SYST BP LT 130 MM HG: CPT | Performed by: FAMILY MEDICINE

## 2023-06-13 PROCEDURE — 97112 NEUROMUSCULAR REEDUCATION: CPT

## 2023-06-13 NOTE — PROGRESS NOTES
"Name: Paulina Carey      : 1960      MRN: [id number]  Encounter Provider: Fatou Garay MD  Encounter Date: 2023   Encounter department: [address] [place] [address]    1  Visit for suture removal  Assessment & Plan:  5 sutures removed  Well healed laceration    Orders:  -     Suture removal    Subjective    HPI     Objective    /76 (BP Location: Left arm, Patient Position: Sitting, Cuff Size: Adult)   Pulse 83   Temp 98 °F (36 7 °C) (Temporal)   Resp 18   Ht 5' 7\" (1 702 m)   Wt 91 3 kg (201 lb 3 2 oz)   SpO2 97%   BMI 31 51 kg/m²      Suture removal    Date/Time: 2023 3:03 PM    Performed by: Fatou Garay MD  Authorized by: Fatou Garay MD  Universal Protocol:  Procedure performed by:  Consent: Verbal consent obtained  Written consent not obtained  Consent given by: patient  Timeout called at: 2023 3:03 PM   Patient understanding: patient states understanding of the procedure being performed  Patient consent: the patient's understanding of the procedure matches consent given  Patient identity confirmed: verbally with patient        Patient location:  Clinic  Location:     Laterality:  Right    Location:  Upper extremity    Upper extremity location:  Hand    Hand location:  R index finger  Procedure details: Tools used:  Suture removal kit    Wound appearance:  No sign(s) of infection and clean    Number of sutures removed:  5  Post-procedure details:     Post-removal:  Antibiotic ointment applied and Band-Aid applied    Patient tolerance of procedure:   Tolerated well, no immediate complications      Fatou Garay MD  "

## 2023-06-13 NOTE — PROGRESS NOTES
"PT Evaluation     Today's date: 2023  Patient name: Paulina Carey  : 1960  MRN: [id number]  Referring provider: Melissa Gan MD  Dx:   Encounter Diagnosis     ICD-10-CM    1  Chronic left-sided low back pain with left-sided sciatica  M54 42 Ambulatory Referral to Physical Therapy    G89 29       2  Radiculopathy, lumbar region  M54 16 Ambulatory Referral to Physical Therapy      3  Lumbar foraminal stenosis  M48 061 Ambulatory Referral to Physical Therapy      4  Protrusion of lumbar intervertebral disc  M51 26 Ambulatory Referral to Physical Therapy      5  Chronic pain syndrome  G89 4 Ambulatory Referral to Physical Therapy          Start Time: 76  Stop Time: 930  Total time in clinic (min): 44 minutes    Assessment  Assessment details: Pt is a 65 yom presenting to therapy with chronic L sided low back pain with L radiating symptoms due to lumbar disc herniations at L3-4 and L4-5  Pt displays significantly limited lumbar ROM in all planes, as well as LLE weakness with hip flexion, knee extension, knee flexion, DF, hip abduction, and hip ER  Pt also displays positive [place]'s sign upon rising from lumbar flexion with reports of \"cramping and spasming\" in low back  Due to increase pain and weakness, pt has difficulty with bending/lifting, standing/walking for long periods of time, and requires a [facility] for ambulation  Pt will benefit from continued therapy twice a week for 6-8 weeks to improve strength, mobility, and function  Impairments: abnormal or restricted ROM, activity intolerance, impaired physical strength, lacks appropriate home exercise program and pain with function    Symptom irritability: moderateUnderstanding of Dx/Px/POC: good   Prognosis: good    Goals  Short term goals (3-4 weeks)  1  Pt will display independence with understanding and performance of HEP to allow for carryover of plan of care at home    2  Pt will improve FOTO score from initial evaluation to show improvement " in pain and function  3  Pt will increase L DF strength to 3+/5 to improve gait and foot clearance during ambulation  4  Pt will increase L knee flexion strength to 4+/5 to improve ambulation and knee stability strength to reduce falls  5  Pt will increase L hip flexion strength to 4/5 to improve ambulation and LLE swing through  Long term goals (6-8 weeks)  1  Pt will score equal or better than projected score on FOTO to show improvement in pain and function  2  Pt will increase L DF strength to 4/5 to improve gait and foot clearance during ambulation  3  Pt will increase L knee flexion strength to 5/5 to improve ambulation and knee stability strength to reduce falls  4  Pt will increase bilateral knee extension strength to 5/5 to improve ambulation and knee stability strength to reduce falls  5  Pt will improve lumbar ROM to minimally limited in all planes without [place]'s sign to assist with bending and lifting  Plan  Patient would benefit from: skilled physical therapy  Planned modality interventions: TENS, thermotherapy: hydrocollator packs, traction, ultrasound, cryotherapy and low level laser therapy  Planned therapy interventions: body mechanics training, therapeutic training, therapeutic exercise, therapeutic activities, stretching, strengthening, neuromuscular re-education, patient education, home exercise program, functional ROM exercises, flexibility, manual therapy, [place] taping, joint mobilization and balance  Frequency: 2x week  Duration in weeks: 8  Treatment plan discussed with: patient        Subjective Evaluation    History of Present Illness  Mechanism of injury: Pt presents to therapy with chronic L sided low back pain with radiating symptoms into LLE to the toes  Pt has had previous multiple courses of therapy for chronic back pain  He reports the pain started in 2019 after a fall on the job  He reports  bilateral low back pain but only LLE radiating symptoms   Pt ambulates with SPC due to LLE and reports multiple falls over the last year due to LLE weakness, including last week when he fell and cut his finger on glass, requiring 5 stitches  Pt denies saddle anesthesia, changes in bowel and bladder control, and confirms night pain  MRI shows L3-4 and L4-5 disc herniations  Pt has difficulty bending, lifting, standing, and walking for long periods of time due to the pain  He also reports difficulty clearing his foot during gait due to L ankle weakness       Pain  Current pain ratin  At best pain ratin  At worst pain ratin  Quality: sharp (numbness)  Relieving factors: medications and heat (laying on back and R side is most comfortable; sitting up straight hurts more)  Aggravating factors: walking and standing  Progression: worsening      Diagnostic Tests  MRI studies: abnormal (disc herniation at multiple levels)  Patient Goals  Patient goals for therapy: decreased pain and increased strength (get back to work)          Objective     General Comments:      Lumbar Comments  Lumbar ROM  -Flexion: moderately limited with bilateral back pain  -Extension: maximally limited with bilateral low back pain  -R SB: moderately limited with pain in bilat low back   -L SB: moderately limited with L sided low back pain    Reflexes  -Patellar: absent bilat  -Achilles: absent bilat    LE strength  Hip flex: 4/5 L, 4+/5 R  Knee ext: 4+/5 bilat  Knee flex: 5/5 R, 4-/5 L  DF: 3/5 L, 5/5 R  PF: 5/5 R, 4+/5 L  Hip abduct: 3+/5 R  Hip ER: 5/5 R    Hip ROM  -Flexion: moderately limited bilat with pain at 90 degrees in low back   -ER: WNL R with pain in R sided back at end range, minimally limited L with pain in L side of low back  -IR: WNL R, WNL L with c-sign hip pain  -hamstring length: moderately limited bilat with ea side low back pain    Muscular engagament  Core: fair engagement bilaterally  Multifidi: unable to be tested due to discomfort    Special tests  -Sciatic tension test: (-) "bilat  -SI compression: (-)  -SI gapping: (-)  -Shara's sign: (+)    Palpation  -Lumbar PA and unilateral PA: L3, P4 painful PA mobilization, tender lumbar paraspinals bilaterally             Precautions: periodontitis, type 2 DM, reactive airway disease, HTN, low back pain with L sided sciatica, lumbar radiculopathy, lumbar foraminal stenosis, neck muscle spasm, protrusion of lumbar IV disc, hyperlipidemia, BPH, chronic pain syndrome, obesity, depression, diastolic dysfunction, paresthesia of both hands, memory difficulties, tinnitus of L ear, continuous opioid dependence, muscle spasm of LLE, paresthesia of both feet, depression      Manuals 6/13                                                                Neuro Re-Ed 6/13            TA iso (HEP) 10x 10\" hold            Prone hip ext (HEP) 10x            DF iso (HEP) 10x 10\" hold            Bridges (HEP) 10x            Fire hydrants nv            paloff press nv            SL abduct nv            Curl ups nv            SLR with TA iso nv            SL clams nv            Ther Ex 6/13            bike nv            LP nv            Prone on elbows/PPU nv            HS curls nv            DL HR nv            Lateral walks nv            pball roll out nv                         Ther Activity 6/13                                      Gait Training 6/13                                      Modalities 6/13                                         "

## 2023-06-13 NOTE — PROGRESS NOTES
Name: Gomez Singh      : 1960      MRN: [id number]  Encounter Provider: Jerel Ortiz MD  Encounter Date: 2023   Encounter department: [address] [place] [address]     1  Visit for suture removal           Jasmine Solorio Francisca Leal is a 65 y o  male who presented to the office today s/p finger laceration that was sutured on 2023  Today is day 11  He states he has not removed the dressing since then, and feels like the finger is better  The following portions of the patient's history were reviewed and updated as appropriate: allergies, current medications, past family history, past medical history, past social history, past surgical history and problem list       Review of Systems   Constitutional: Negative for chills and fever  HENT: Negative for congestion, rhinorrhea and sore throat  Respiratory: Positive for cough  Negative for shortness of breath  Cardiovascular: Negative for chest pain  Gastrointestinal: Negative for diarrhea, nausea and vomiting  Musculoskeletal: Positive for back pain and gait problem  Skin: Negative for rash  Neurological: Negative for dizziness and headaches         Current Outpatient Medications on File Prior to Visit   Medication Sig   • albuterol (2 5 mg/3 mL) 0 083 % nebulizer solution Take 3 mL (2 5 mg total) by nebulization every 6 (six) hours as needed for wheezing or shortness of breath   • albuterol (5 mg/mL) 0 5 % nebulizer solution Take 0 5 mL (2 5 mg total) by nebulization every 6 (six) hours as needed for wheezing   • albuterol (ProAir HFA) 90 mcg/act inhaler Inhale 2 puffs every 6 (six) hours as needed for wheezing   • albuterol (Ventolin HFA) 90 mcg/act inhaler Inhale 2 puffs every 4 (four) hours as needed for wheezing   • aluminum-magnesium hydroxide-simethicone (MAALOX) 200-200-20 MG/5ML SUSP Take 15 mL by mouth 4 (four) times a day (before meals and at bedtime)   • amLODIPine (NORVASC) 10 mg tablet Take 1 tablet (10 mg total) by mouth daily   • aspirin 325 mg tablet Take 1 tablet (325 mg total) by mouth daily   • atorvastatin (LIPITOR) 20 mg tablet Take 1 tablet (20 mg total) by mouth daily   • Bioflavonoid Products (RA Vitamin C CR) TBCR take 1 tablet by mouth twice a day for 14 days   • Blood Glucose Monitoring Suppl (OneTouch Verio) w/Device KIT Use daily   • Blood Glucose Monitoring Suppl KIT Use in the morning Please give test strips and lancets  Dx  E11 9   • Blood Pressure KIT Use in the morning With appropriate size cuff   • Blood Pressure Monitoring (Blood Pressure Monitor/Arm) EMILY Use daily   • Cholecalciferol (D3 PO) Take by mouth daily   • cyclobenzaprine (FLEXERIL) 10 mg tablet Take 1 tablet (10 mg total) by mouth daily at bedtime as needed for muscle spasms   • Diclofenac Sodium (VOLTAREN) 1 % Apply 2 g topically 4 (four) times a day as needed (low back or neck pain)   • DULoxetine (CYMBALTA) 60 mg delayed release capsule Take 1 capsule (60 mg total) by mouth 2 (two) times a day   • famotidine (PEPCID) 20 mg tablet Take 1 tablet (20 mg total) by mouth daily   • fluticasone (FLONASE) 50 mcg/act nasal spray 1 spray into each nostril daily   • fluticasone-salmeterol (Advair HFA) 115-21 MCG/ACT inhaler Inhale 2 puffs 2 (two) times a day Rinse mouth after use     • gabapentin (NEURONTIN) 800 mg tablet Take 1 tablet (800 mg total) by mouth 3 (three) times a day   • glucose blood (OneTouch Verio) test strip Check blood sugar daily   • glucose monitoring kit (FREESTYLE) monitoring kit Use 1 each in the morning TESTING DAILY IN THE AM   • HYDROcodone-acetaminophen (NORCO) 7 5-325 mg per tablet Take 1 tablet by mouth every 8 (eight) hours as needed for pain For ongoing pain Max Daily Amount: 3 tablets   • Lancet Devices (ONE TOUCH DELICA LANCING DEV) MISC Use daily   • Lancets (freestyle) lancets TESTING DAILY IN THE AM   • Lancets (onetouch ultrasoft) lancets Use daily Use as instructed to "check sugars daily   • lidocaine (Lidoderm) 5 % Apply 1 patch topically daily Remove & Discard patch within 12 hours or as directed by MD   • lidocaine (LMX) 4 % cream Apply topically as needed for mild pain   • metFORMIN (GLUCOPHAGE) 500 mg tablet Take 1 tablet (500 mg total) by mouth 2 (two) times a day with meals   • Mirabegron ER 50 MG TB24 Take 1 tablet (50 mg total) by mouth daily   • Multiple Vitamin (multivitamin) capsule Take 1 capsule by mouth daily for 14 days (Patient not taking: Reported on 1/24/2023)   • naloxone (NARCAN) 4 mg/0 1 mL nasal spray Administer 1 spray into a nostril  If no response after 2-3 minutes, give another dose in the other nostril using a new spray  • ondansetron (Zofran ODT) 4 mg disintegrating tablet Take 1 tablet (4 mg total) by mouth every 6 (six) hours as needed for nausea or vomiting   • OneTouch Delica Lancets 63M MISC Use daily   • OneTouch Delica Lancets 51Y MISC Use in the morning   • tamsulosin (FLOMAX) 0 4 mg take 1 capsule by mouth once daily with dinner   • zolpidem (AMBIEN) 5 mg tablet Take 1 tablet (5 mg total) by mouth daily at bedtime as needed for sleep       Objective     /76 (BP Location: Left arm, Patient Position: Sitting, Cuff Size: Adult)   Pulse 83   Temp 98 °F (36 7 °C) (Temporal)   Resp 18   Ht 5' 7\" (1 702 m)   Wt 91 3 kg (201 lb 3 2 oz)   SpO2 97%   BMI 31 51 kg/m²     Physical Exam  Vitals and nursing note reviewed  Constitutional:       Appearance: He is well-developed  HENT:      Head: Normocephalic and atraumatic  Cardiovascular:      Rate and Rhythm: Normal rate  Pulmonary:      Effort: Pulmonary effort is normal  No respiratory distress  Skin:     Comments: + well healed laceration on right index finger about 2 cm   +5 sutures present   Neurological:      General: No focal deficit present  Mental Status: He is alert and oriented to person, place, and time     Psychiatric:         Mood and Affect: Mood normal          " Behavior: Behavior normal            Bijan Chi MD

## 2023-06-15 ENCOUNTER — TELEPHONE (OUTPATIENT)
Dept: NEUROSURGERY | Facility: CLINIC | Age: 63
End: 2023-06-15

## 2023-06-15 ENCOUNTER — OFFICE VISIT (OUTPATIENT)
Dept: NEUROSURGERY | Facility: CLINIC | Age: 63
End: 2023-06-15
Payer: MEDICARE

## 2023-06-15 VITALS
HEIGHT: 67 IN | BODY MASS INDEX: 31.39 KG/M2 | SYSTOLIC BLOOD PRESSURE: 112 MMHG | OXYGEN SATURATION: 95 % | WEIGHT: 200 LBS | TEMPERATURE: 97.8 F | DIASTOLIC BLOOD PRESSURE: 70 MMHG | HEART RATE: 85 BPM

## 2023-06-15 DIAGNOSIS — G89.29 CHRONIC LEFT-SIDED LOW BACK PAIN WITH LEFT-SIDED SCIATICA: ICD-10-CM

## 2023-06-15 DIAGNOSIS — M54.42 CHRONIC LEFT-SIDED LOW BACK PAIN WITH LEFT-SIDED SCIATICA: ICD-10-CM

## 2023-06-15 DIAGNOSIS — M54.16 LUMBAR RADICULOPATHY: Primary | ICD-10-CM

## 2023-06-15 PROCEDURE — 99213 OFFICE O/P EST LOW 20 MIN: CPT | Performed by: STUDENT IN AN ORGANIZED HEALTH CARE EDUCATION/TRAINING PROGRAM

## 2023-06-15 NOTE — TELEPHONE ENCOUNTER
06/15/2023-CHECKING PT OUT: PT WILL CALL OFFICE ONCE HE SCHEDULES FOR [facility] TO MAKE A FOLLOW UP APT [facility]/OL[facility]

## 2023-06-15 NOTE — PROGRESS NOTES
Office Note - Neurosurgery   Marv Camacho 65 y o  male MRN: [id number]      Assessment and Plan: This is a 50-year-old male presenting for follow-up for low back pain and left lower extremity radicular pain and paresthesias  MRI of his lumbar spine demonstrates degeneration from L3-S1  The most significant is at L4-5 where the patient has a far lateral disc on the left with compression of the left L4 nerve root  There is hyperintense signal within the disc concerning for calcification  EMGs of his lower extremities demonstrate left L5-S1 radiculopathy with ongoing denervation  I had a long discussion with the patient about his symptoms as well as imaging findings  The patient's symptoms appear to be in the L5 distribution however he does not have any significant disease at the left L5-S1 level  I believe his symptoms are stemming from the disc compression at L4-5  To better determine the level where the patient's symptoms are stemming from, I believe injection therapy is warranted  I would like the patient to obtain a left-sided transforaminal L4 5 injection  This is both for therapeutic and diagnostic purposes  The patient is to come back to see me 1 week after his injection  History, physical examination and diagnostic tests were reviewed and questions answered  Diagnosis, care plan and treatment options were discussed  The patient understand instructions and will follow up as directed  Follow-up: PRN    I spent approximately 20 minutes with the patient  This time was dedicated to acquiring the patient's history, performing physical examination, reviewing pertinent imaging and discussing the treatment plan  Diagnoses and all orders for this visit:    Lumbar radiculopathy  -     Ambulatory Referral to Pain Management; Future    Chronic left-sided low back pain with left-sided sciatica  -     Ambulatory Referral to Pain Management;  Future        Subjective/Objective     Chief Complaint    Low back pain and left lower extremity pain    HPI    This is a 15-year-old male presenting for follow-up for low back pain and left lower extremity pain  The patient's history is well detailed in our prior clinic visit notes  The patient has longstanding low back pain with left-sided leg pain  He states the pain radiates from the buttock into the posterior lateral thigh to the lateral aspect of his leg to the lateral foot  The pain does not go into the shin  It is associated with numbness and tingling  The patient has treated his symptoms conservatively in the past without much improvement  He recently underwent EMGs of his lower extremities  He is here for further discussion  KEIRA CROCKETT personally reviewed and updated  Review of Systems   Constitutional: Negative  HENT: Negative  Eyes: Negative  Respiratory: Positive for shortness of breath  Cardiovascular: Negative  Gastrointestinal: Negative  Endocrine: Positive for cold intolerance  Genitourinary: Positive for frequency and urgency  Some incontinence   Musculoskeletal: Positive for back pain (low back pain L>R, radiates to left leg), gait problem (currently using roller walker more, using standard cane and rolling walker, falls, left leg gives out on him) and myalgias  Skin: Negative  Allergic/Immunologic: Negative  Neurological: Positive for weakness (left leg, left leg gives out on him, sometime on the right), light-headedness and numbness (N/T left leg,)  Hematological: Negative  Psychiatric/Behavioral: Positive for sleep disturbance (due to  back pain)         Family History    Family History   Problem Relation Age of Onset   • Hypertension Mother    • Diabetes Mother    • Cancer Father    • Diabetes Family    • Hypertension Family        Social History    Social History     Socioeconomic History   • Marital status: /Civil Union     Spouse name: Not on file   • Number of children: Not on file   • Years of education: Not on file   • Highest education level: Not on file   Occupational History   • Not on file   Tobacco Use   • Smoking status: Never   • Smokeless tobacco: Never   Vaping Use   • Vaping Use: Never used   Substance and Sexual Activity   • Alcohol use: Not Currently     Comment: rare   • Drug use: Never   • Sexual activity: Not Currently   Other Topics Concern   • Not on file   Social History Narrative   • Not on file     Social Determinants of Health     Financial Resource Strain: Low Risk  (10/24/2022)    Overall Financial Resource Strain (CARDIA)    • Difficulty of Paying Living Expenses: Not hard at all   Food Insecurity: No Food Insecurity (2/3/2022)    Hunger Vital Sign    • Worried About [employer] in the Last Year: Never true    • Ran Out of Food in the Last Year: Never true   Transportation Needs: No Transportation Needs (10/24/2022)    PRAPARE - Transportation    • Lack of Transportation (Medical): No    • Lack of Transportation (Non-Medical):  No   Physical Activity: Not on file   Stress: Not on file   Social Connections: Not on file   Intimate Partner Violence: Not on file   Housing Stability: Unknown (6/16/2022)    Housing Stability Vital Sign    • Unable to Pay for Housing in the Last Year: No    • Number of Places Lived in the Last Year: Not on file    • Unstable Housing in the Last Year: No       Past Medical History    Past Medical History:   Diagnosis Date   • Asthma    • Back pain    • Back pain    • 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   • Depression    • Diabetes mellitus ([facility] [address] )    • Hyperlipidemia    • Hypertension    • Type 2 diabetes mellitus without complication, without long-term current use of insulin ([facility] ) 10/16/2020       Surgical History    Past Surgical History:   Procedure Laterality Date   • CYST REMOVAL  2017       Medications      Current Outpatient Medications:   •  albuterol (2 5 mg/3 mL) 0 083 % nebulizer solution, Take 3 mL (2 5 mg total) by nebulization every 6 (six) hours as needed for wheezing or shortness of breath, Disp: 180 mL, Rfl: 5  •  albuterol (5 mg/mL) 0 5 % nebulizer solution, Take 0 5 mL (2 5 mg total) by nebulization every 6 (six) hours as needed for wheezing, Disp: 20 mL, Rfl: 0  •  albuterol (ProAir HFA) 90 mcg/act inhaler, Inhale 2 puffs every 6 (six) hours as needed for wheezing, Disp: 8 5 g, Rfl: 0  •  amLODIPine (NORVASC) 10 mg tablet, Take 1 tablet (10 mg total) by mouth daily, Disp: 90 tablet, Rfl: 1  •  aspirin 325 mg tablet, Take 1 tablet (325 mg total) by mouth daily, Disp: 90 tablet, Rfl: 3  •  atorvastatin (LIPITOR) 20 mg tablet, Take 1 tablet (20 mg total) by mouth daily, Disp: 90 tablet, Rfl: 1  •  Cholecalciferol (D3 PO), Take by mouth daily, Disp: , Rfl:   •  cyclobenzaprine (FLEXERIL) 10 mg tablet, Take 1 tablet (10 mg total) by mouth daily at bedtime as needed for muscle spasms, Disp: 30 tablet, Rfl: 0  •  Diclofenac Sodium (VOLTAREN) 1 %, Apply 2 g topically 4 (four) times a day as needed (low back or neck pain), Disp: 150 g, Rfl: 2  •  DULoxetine (CYMBALTA) 60 mg delayed release capsule, Take 1 capsule (60 mg total) by mouth 2 (two) times a day, Disp: 60 capsule, Rfl: 5  •  famotidine (PEPCID) 20 mg tablet, Take 1 tablet (20 mg total) by mouth daily, Disp: 60 tablet, Rfl: 2  •  fluticasone (FLONASE) 50 mcg/act nasal spray, 1 spray into each nostril daily, Disp: 16 g, Rfl: 0  •  fluticasone-salmeterol (Advair HFA) 115-21 MCG/ACT inhaler, Inhale 2 puffs 2 (two) times a day Rinse mouth after use , Disp: 36 g, Rfl: 1  •  gabapentin (NEURONTIN) 800 mg tablet, Take 1 tablet (800 mg total) by mouth 3 (three) times a day, Disp: 90 tablet, Rfl: 0  •  glucose blood (OneTouch Verio) test strip, Check blood sugar daily, Disp: 100 each, Rfl: 2  •  HYDROcodone-acetaminophen (NORCO) 7 5-325 mg per tablet, Take 1 tablet by mouth every 8 (eight) hours as needed for pain For ongoing pain Max Daily Amount: 3 tablets, Disp: 90 tablet, Rfl: 0  •  lidocaine (Lidoderm) 5 %, Apply 1 patch topically daily Remove & Discard patch within 12 hours or as directed by MD, Disp: 30 patch, Rfl: 1  •  lidocaine (LMX) 4 % cream, Apply topically as needed for mild pain, Disp: 30 g, Rfl: 0  •  metFORMIN (GLUCOPHAGE) 500 mg tablet, Take 1 tablet (500 mg total) by mouth 2 (two) times a day with meals, Disp: 60 tablet, Rfl: 3  •  Mirabegron ER 50 MG TB24, Take 1 tablet (50 mg total) by mouth daily, Disp: 60 tablet, Rfl: 6  •  albuterol (Ventolin HFA) 90 mcg/act inhaler, Inhale 2 puffs every 4 (four) hours as needed for wheezing, Disp: 18 g, Rfl: 2  •  aluminum-magnesium hydroxide-simethicone (MAALOX) 200-200-20 MG/5ML SUSP, Take 15 mL by mouth 4 (four) times a day (before meals and at bedtime) (Patient not taking: Reported on 6/15/2023), Disp: 150 mL, Rfl: 0  •  Bioflavonoid Products (RA Vitamin C CR) TBCR, take 1 tablet by mouth twice a day for 14 days (Patient not taking: Reported on 6/15/2023), Disp: , Rfl:   •  Blood Glucose Monitoring Suppl (OneTouch Verio) w/Device KIT, Use daily, Disp: 1 kit, Rfl: 0  •  Blood Glucose Monitoring Suppl KIT, Use in the morning Please give test strips and lancets Dx  E11 9, Disp: 1 kit, Rfl: 0  •  Blood Pressure KIT, Use in the morning With appropriate size cuff, Disp: 1 kit, Rfl: 0  •  Blood Pressure Monitoring (Blood Pressure Monitor/Arm) EMILY, Use daily, Disp: 1 each, Rfl: 0  •  glucose monitoring kit (FREESTYLE) monitoring kit, Use 1 each in the morning TESTING DAILY IN THE AM, Disp: 1 each, Rfl: 0  •  Lancet Devices (ONE TOUCH DELICA LANCING DEV) MISC, Use daily, Disp: 1 each, Rfl: 2  •  Lancets (freestyle) lancets, TESTING DAILY IN THE AM, Disp: 200 each, Rfl: 3  •  Lancets (onetouch ultrasoft) lancets, Use daily Use as instructed to check sugars daily, Disp: 100 each, Rfl: 1  •  Multiple Vitamin (multivitamin) capsule, Take 1 capsule by mouth daily for 14 days (Patient not taking: Reported on 1/24/2023), Disp: 14 "capsule, Rfl: 0  •  naloxone (NARCAN) 4 mg/0 1 mL nasal spray, Administer 1 spray into a nostril  If no response after 2-3 minutes, give another dose in the other nostril using a new spray , Disp: 1 each, Rfl: 1  •  ondansetron (Zofran ODT) 4 mg disintegrating tablet, Take 1 tablet (4 mg total) by mouth every 6 (six) hours as needed for nausea or vomiting, Disp: 20 tablet, Rfl: 0  •  OneTouch Delica Lancets 62B MISC, Use daily, Disp: 100 each, Rfl: 1  •  OneTouch Delica Lancets 41V MISC, Use in the morning, Disp: 100 each, Rfl: 6  •  tamsulosin (FLOMAX) 0 4 mg, take 1 capsule by mouth once daily with dinner, Disp: 90 capsule, Rfl: 1  •  zolpidem (AMBIEN) 5 mg tablet, Take 1 tablet (5 mg total) by mouth daily at bedtime as needed for sleep, Disp: 30 tablet, Rfl: 0    Allergies    No Known Allergies    The following portions of the patient's history were reviewed and updated as appropriate: allergies, current medications, past family history, past medical history, past social history, past surgical history and problem list     Investigations    I personally reviewed the MRI results with the patient:      Physical Exam    Vitals:  Blood pressure 112/70, pulse 85, temperature 97 8 °F (36 6 °C), temperature source Temporal, height 5' 7\" (1 702 m), weight 90 7 kg (200 lb), SpO2 95 %  ,Body mass index is 31 32 kg/m²      General:  Normal, well developed, not in distress/pain     Skin:   No issues, no rashes noted     Musculoskeletal:   5/5 strength throughout all muscle groups  No tenderness to palpation of the spine       Neurologic Exam:  Awake and alert  Oriented x3  Speech clear and fluent  ARSHAD   Sensation to light touch and pin prick intact throughout  No christopher's  No clonus  2+ patellar reflexes     Gait:   normal gait, normal posture    "

## 2023-06-19 ENCOUNTER — OFFICE VISIT (OUTPATIENT)
Dept: PHYSICAL THERAPY | Facility: CLINIC | Age: 63
End: 2023-06-19
Payer: MEDICARE

## 2023-06-19 ENCOUNTER — RA CDI HCC (OUTPATIENT)
Dept: OTHER | Facility: HOSPITAL | Age: 63
End: 2023-06-19

## 2023-06-19 DIAGNOSIS — M54.16 RADICULOPATHY, LUMBAR REGION: ICD-10-CM

## 2023-06-19 DIAGNOSIS — M51.26 PROTRUSION OF LUMBAR INTERVERTEBRAL DISC: ICD-10-CM

## 2023-06-19 DIAGNOSIS — M48.061 LUMBAR FORAMINAL STENOSIS: ICD-10-CM

## 2023-06-19 DIAGNOSIS — G89.4 CHRONIC PAIN SYNDROME: ICD-10-CM

## 2023-06-19 DIAGNOSIS — M54.42 CHRONIC LEFT-SIDED LOW BACK PAIN WITH LEFT-SIDED SCIATICA: Primary | ICD-10-CM

## 2023-06-19 DIAGNOSIS — G89.29 CHRONIC LEFT-SIDED LOW BACK PAIN WITH LEFT-SIDED SCIATICA: Primary | ICD-10-CM

## 2023-06-19 PROCEDURE — 97112 NEUROMUSCULAR REEDUCATION: CPT

## 2023-06-19 PROCEDURE — 97110 THERAPEUTIC EXERCISES: CPT

## 2023-06-19 NOTE — PROGRESS NOTES
"Daily Note     Today's date: 2023  Patient name: Abdirahman Gustafson  : 1960  MRN: [id number]  Referring provider: Morris Junior MD  Dx:   Encounter Diagnosis     ICD-10-CM    1  Chronic left-sided low back pain with left-sided sciatica  M54 42     G89 29       2  Radiculopathy, lumbar region  M54 16       3  Lumbar foraminal stenosis  M48 061       4  Protrusion of lumbar intervertebral disc  M51 26       5  Chronic pain syndrome  G89 4                      Subjective: Pt presents to PT reporting no change in pain but was complinat with HEP and states no questions or problems  Objective: See treatment diary below      Assessment: Tolerated treatment well  Patient demonstrated fatigue post treatment, exhibited good technique with therapeutic exercises and would benefit from continued PT to increase flexibility, strength and function  Plan: Continue per plan of care        Precautions: periodontitis, type 2 DM, reactive airway disease, HTN, low back pain with L sided sciatica, lumbar radiculopathy, lumbar foraminal stenosis, neck muscle spasm, protrusion of lumbar IV disc, hyperlipidemia, BPH, chronic pain syndrome, obesity, depression, diastolic dysfunction, paresthesia of both hands, memory difficulties, tinnitus of L ear, continuous opioid dependence, muscle spasm of LLE, paresthesia of both feet, depression      Manuals                                                                Neuro Re-Ed            TA iso (HEP) 10x 10\" hold            Prone hip ext (HEP) 10x 2 x 10           DF iso (HEP) 10x 10\" hold 10\" x 10            Bridges (HEP) 10x 2 x 10           Fire hydrants nv            paloff press nv 10x ea           SL abduct nv            Curl ups nv            SLR with TA iso nv 2 x 10           SL clams nv            Ther Ex            bike nv 6'           LP nv            Prone on elbows/PPU nv 10\" x 10 ea           HS curls nv            DL HR nv          " "  Lateral walks nv            pball roll out nv 10\" x 10                        Ther Activity 6/13 6/19                                     Gait Training 6/13 6/19                                     Modalities 6/13 6/19                                          "

## 2023-06-21 ENCOUNTER — TELEPHONE (OUTPATIENT)
Dept: FAMILY MEDICINE CLINIC | Facility: CLINIC | Age: 63
End: 2023-06-21

## 2023-06-21 ENCOUNTER — OFFICE VISIT (OUTPATIENT)
Dept: PHYSICAL THERAPY | Facility: CLINIC | Age: 63
End: 2023-06-21
Payer: MEDICARE

## 2023-06-21 DIAGNOSIS — M54.16 RADICULOPATHY, LUMBAR REGION: ICD-10-CM

## 2023-06-21 DIAGNOSIS — G89.29 CHRONIC LEFT-SIDED LOW BACK PAIN WITH LEFT-SIDED SCIATICA: Primary | ICD-10-CM

## 2023-06-21 DIAGNOSIS — G89.4 CHRONIC PAIN SYNDROME: ICD-10-CM

## 2023-06-21 DIAGNOSIS — M48.061 LUMBAR FORAMINAL STENOSIS: ICD-10-CM

## 2023-06-21 DIAGNOSIS — M51.26 PROTRUSION OF LUMBAR INTERVERTEBRAL DISC: ICD-10-CM

## 2023-06-21 DIAGNOSIS — M54.42 CHRONIC LEFT-SIDED LOW BACK PAIN WITH LEFT-SIDED SCIATICA: Primary | ICD-10-CM

## 2023-06-21 PROCEDURE — 97110 THERAPEUTIC EXERCISES: CPT

## 2023-06-21 PROCEDURE — 97112 NEUROMUSCULAR REEDUCATION: CPT

## 2023-06-21 NOTE — TELEPHONE ENCOUNTER
06/21/23    PCP SIGNATURE NEEDED FOR   [facility] DELIVERED / Physician Order    FORM RECEIVED VIA FAX AND PLACED IN PCP FOLDER TO BE DELIVERED AT ASSIGNED TIMES      Start Date: 06/21/23

## 2023-06-21 NOTE — PROGRESS NOTES
Daily Note     Today's date: 2023  Patient name: Sparkle Colon  : 1960  MRN: [id number]  Referring provider: Pastor Jenaro MD  Dx:   Encounter Diagnosis     ICD-10-CM    1  Chronic left-sided low back pain with left-sided sciatica  M54 42     G89 29       2  Radiculopathy, lumbar region  M54 16       3  Lumbar foraminal stenosis  M48 061       4  Protrusion of lumbar intervertebral disc  M51 26       5  Chronic pain syndrome  G89 4                      Subjective: Pt presents to PT reporting he feels weak in his L low back and from taking meds for his diabetes  He states he does not feel like he is going to fall  Pt was advised to let clinician know if he is too tired or has difficulty performing any exercise today  Pt reports clicking in low back with SLR but stated when finished  Pt reports abdominal soreness post PT session and not change in low back  Objective: See treatment diary below      Assessment: Pt demonstrates fair tolerance to PT session today requiring several breaks today  Pt advised to let clinician know if anything is painful while performing TE because TE can be modified or omitted  Pt required cuing and edu about proper posture noting pt seated in chair with slouch position  Patient demonstrated fatigue post treatment, exhibited good technique with therapeutic exercises and would benefit from continued PT to increase flexibility, strength and function  Plan: Continue per plan of care        Precautions: periodontitis, type 2 DM, reactive airway disease, HTN, low back pain with L sided sciatica, lumbar radiculopathy, lumbar foraminal stenosis, neck muscle spasm, protrusion of lumbar IV disc, hyperlipidemia, BPH, chronic pain syndrome, obesity, depression, diastolic dysfunction, paresthesia of both hands, memory difficulties, tinnitus of L ear, continuous opioid dependence, muscle spasm of LLE, paresthesia of both feet, depression      Manuals  "    BP    110/74 L UE seated          HR   72 bpm          SPO2    97%                       Neuro Re-Ed 6/13 6/19 6/21          TA iso (HEP) 10x 10\" hold            Prone hip ext (HEP) 10x 2 x 10 2 x 10          DF iso (HEP) 10x 10\" hold 10\" x 10            Bridges (HEP) 10x 2 x 10           Fire hydrants nv            paloff press nv 10x ea           SL abduct nv  15x ea          Curl ups nv            SLR with TA iso nv 2 x 10 2 x 10          SL clams nv            Ther Ex 6/13 6/19 6/21          bike nv 6' 6'          LP nv            Prone on elbows/PPU nv 10\" x 10 ea 10\" x 10 ea          HS curls nv            DL HR nv            Lateral walks nv            pball roll out nv 10\" x 10 10\" x 10                       Ther Activity 6/13 6/19 6/21                                    Gait Training 6/13 6/19 6/21                                    Modalities 6/13 6/19 6/21                                         "

## 2023-06-26 ENCOUNTER — OFFICE VISIT (OUTPATIENT)
Dept: FAMILY MEDICINE CLINIC | Facility: CLINIC | Age: 63
End: 2023-06-26

## 2023-06-26 VITALS
TEMPERATURE: 98.2 F | SYSTOLIC BLOOD PRESSURE: 126 MMHG | HEIGHT: 67 IN | HEART RATE: 84 BPM | DIASTOLIC BLOOD PRESSURE: 70 MMHG | RESPIRATION RATE: 18 BRPM | BODY MASS INDEX: 31.42 KG/M2 | WEIGHT: 200.2 LBS

## 2023-06-26 DIAGNOSIS — M54.42 CHRONIC LEFT-SIDED LOW BACK PAIN WITH LEFT-SIDED SCIATICA: ICD-10-CM

## 2023-06-26 DIAGNOSIS — M54.16 RADICULOPATHY, LUMBAR REGION: ICD-10-CM

## 2023-06-26 DIAGNOSIS — R11.0 NAUSEA: ICD-10-CM

## 2023-06-26 DIAGNOSIS — F11.20 CONTINUOUS OPIOID DEPENDENCE (HCC): Primary | ICD-10-CM

## 2023-06-26 DIAGNOSIS — G89.29 CHRONIC LEFT-SIDED LOW BACK PAIN WITH LEFT-SIDED SCIATICA: ICD-10-CM

## 2023-06-26 PROCEDURE — 99214 OFFICE O/P EST MOD 30 MIN: CPT | Performed by: FAMILY MEDICINE

## 2023-06-26 PROCEDURE — 3078F DIAST BP <80 MM HG: CPT | Performed by: FAMILY MEDICINE

## 2023-06-26 PROCEDURE — 3074F SYST BP LT 130 MM HG: CPT | Performed by: FAMILY MEDICINE

## 2023-06-26 RX ORDER — ONDANSETRON 4 MG/1
4 TABLET, ORALLY DISINTEGRATING ORAL EVERY 6 HOURS PRN
Qty: 20 TABLET | Refills: 0 | Status: SHIPPED | OUTPATIENT
Start: 2023-06-26

## 2023-06-26 NOTE — PROGRESS NOTES
Name: Marco Antonio Beebe      : 1960      MRN: [id number]  Encounter Provider: Woodrow Ornelas MD  Encounter Date: 2023   Encounter department: [address] [place] [address]     1  Continuous opioid dependence [facility])  Assessment & Plan: Will discontinue Norco 7 5 mg/325 mg TID  And start Oxycodone 9 mg bid  Recheck in 4-6 weeks    Orders:  -     oxyCODONE ER (Xtampza) 9 mg extended release capsule; Take 1 tablet (9 mg total) by mouth every 12 (twelve) hours Max Daily Amount: 18 mg    2  Chronic left-sided low back pain with left-sided sciatica  -     oxyCODONE ER (Xtampza) 9 mg extended release capsule; Take 1 tablet (9 mg total) by mouth every 12 (twelve) hours Max Daily Amount: 18 mg    3  Radiculopathy, lumbar region  -     oxyCODONE ER (Xtampza) 9 mg extended release capsule; Take 1 tablet (9 mg total) by mouth every 12 (twelve) hours Max Daily Amount: 18 mg    4  Nausea  -     ondansetron (Zofran ODT) 4 mg disintegrating tablet; Take 1 tablet (4 mg total) by mouth every 6 (six) hours as needed for nausea or vomiting         Subjective      Marv Salazar is a 65 y o  male  who presented to the office today for management of his chronic back pain  He recently had consultation with Neurosurgery, is completing physical Therapy and states he does not want to get any more injections for the back pain  He has tried it found times and he states it does not last   He is still taking the Hydrocodone about 2-3 times daily  He take son in the morning and one midday and then usually takes one in the middle of the night  He is interested in switching is to Oxycodone extended release today  He is going to [place] in one week after many years to visit his mother who is elderly and sick      The following portions of the patient's history were reviewed and updated as appropriate: allergies, current medications, past family history, past medical history, past social history, past surgical history and problem list     Review of Systems   Constitutional: Negative for chills and fever  HENT: Negative for congestion, rhinorrhea and sore throat  Respiratory: Negative for shortness of breath  Cardiovascular: Negative for chest pain  Gastrointestinal: Negative for diarrhea, nausea and vomiting  Musculoskeletal: Positive for back pain and gait problem  Skin: Negative for rash  Neurological: Negative for dizziness and headaches         Current Outpatient Medications on File Prior to Visit   Medication Sig   • albuterol (2 5 mg/3 mL) 0 083 % nebulizer solution Take 3 mL (2 5 mg total) by nebulization every 6 (six) hours as needed for wheezing or shortness of breath   • albuterol (5 mg/mL) 0 5 % nebulizer solution Take 0 5 mL (2 5 mg total) by nebulization every 6 (six) hours as needed for wheezing   • albuterol (ProAir HFA) 90 mcg/act inhaler Inhale 2 puffs every 6 (six) hours as needed for wheezing   • albuterol (Ventolin HFA) 90 mcg/act inhaler Inhale 2 puffs every 4 (four) hours as needed for wheezing   • aluminum-magnesium hydroxide-simethicone (MAALOX) 200-200-20 MG/5ML SUSP Take 15 mL by mouth 4 (four) times a day (before meals and at bedtime) (Patient not taking: Reported on 6/15/2023)   • amLODIPine (NORVASC) 10 mg tablet Take 1 tablet (10 mg total) by mouth daily   • aspirin 325 mg tablet Take 1 tablet (325 mg total) by mouth daily   • atorvastatin (LIPITOR) 20 mg tablet Take 1 tablet (20 mg total) by mouth daily   • Bioflavonoid Products (RA Vitamin C CR) TBCR take 1 tablet by mouth twice a day for 14 days (Patient not taking: Reported on 6/15/2023)   • Blood Glucose Monitoring Suppl (OneTouch Verio) w/Device KIT Use daily   • Blood Glucose Monitoring Suppl KIT Use in the morning Please give test strips and lancets  Dx  E11 9   • Blood Pressure KIT Use in the morning With appropriate size cuff   • Blood Pressure Monitoring (Blood Pressure Monitor/Arm) EMILY Use daily   • Cholecalciferol (D3 PO) Take by mouth daily   • cyclobenzaprine (FLEXERIL) 10 mg tablet Take 1 tablet (10 mg total) by mouth daily at bedtime as needed for muscle spasms   • Diclofenac Sodium (VOLTAREN) 1 % Apply 2 g topically 4 (four) times a day as needed (low back or neck pain)   • DULoxetine (CYMBALTA) 60 mg delayed release capsule Take 1 capsule (60 mg total) by mouth 2 (two) times a day   • famotidine (PEPCID) 20 mg tablet Take 1 tablet (20 mg total) by mouth daily   • fluticasone (FLONASE) 50 mcg/act nasal spray 1 spray into each nostril daily   • fluticasone-salmeterol (Advair HFA) 115-21 MCG/ACT inhaler Inhale 2 puffs 2 (two) times a day Rinse mouth after use  • gabapentin (NEURONTIN) 800 mg tablet Take 1 tablet (800 mg total) by mouth 3 (three) times a day   • glucose blood (OneTouch Verio) test strip Check blood sugar daily   • glucose monitoring kit (FREESTYLE) monitoring kit Use 1 each in the morning TESTING DAILY IN THE AM   • HYDROcodone-acetaminophen (NORCO) 7 5-325 mg per tablet Take 1 tablet by mouth every 8 (eight) hours as needed for pain For ongoing pain Max Daily Amount: 3 tablets   • Lancet Devices (ONE TOUCH DELICA LANCING DEV) MISC Use daily   • Lancets (freestyle) lancets TESTING DAILY IN THE AM   • Lancets (onetouch ultrasoft) lancets Use daily Use as instructed to check sugars daily   • lidocaine (Lidoderm) 5 % Apply 1 patch topically daily Remove & Discard patch within 12 hours or as directed by MD   • lidocaine (LMX) 4 % cream Apply topically as needed for mild pain   • metFORMIN (GLUCOPHAGE) 500 mg tablet Take 1 tablet (500 mg total) by mouth 2 (two) times a day with meals   • Mirabegron ER 50 MG TB24 Take 1 tablet (50 mg total) by mouth daily   • Multiple Vitamin (multivitamin) capsule Take 1 capsule by mouth daily for 14 days (Patient not taking: Reported on 1/24/2023)   • naloxone (NARCAN) 4 mg/0 1 mL nasal spray Administer 1 spray into a nostril   If no response after 2-3 minutes, "give another dose in the other nostril using a new spray  • OneTouch Delica Lancets 03B MISC Use daily   • OneTouch Delica Lancets 79Q MISC Use in the morning   • tamsulosin (FLOMAX) 0 4 mg take 1 capsule by mouth once daily with dinner   • zolpidem (AMBIEN) 5 mg tablet Take 1 tablet (5 mg total) by mouth daily at bedtime as needed for sleep   • [DISCONTINUED] ondansetron (Zofran ODT) 4 mg disintegrating tablet Take 1 tablet (4 mg total) by mouth every 6 (six) hours as needed for nausea or vomiting       Objective     /70 (BP Location: Right arm, Patient Position: Sitting, Cuff Size: Standard)   Pulse 84   Temp 98 2 °F (36 8 °C) (Temporal)   Resp 18   Ht 5' 7\" (1 702 m)   Wt 90 8 kg (200 lb 3 2 oz)   BMI 31 36 kg/m²     Physical Exam  Vitals and nursing note reviewed  Constitutional:       Appearance: He is well-developed  Comments: Mild discomfort while seated  Rollator walker present   HENT:      Head: Normocephalic and atraumatic  Cardiovascular:      Rate and Rhythm: Normal rate and regular rhythm  Pulses: no weak pulses          Dorsalis pedis pulses are 2+ on the right side and 2+ on the left side  Heart sounds: Normal heart sounds  No murmur heard  Pulmonary:      Effort: Pulmonary effort is normal  No respiratory distress  Breath sounds: Normal breath sounds  Abdominal:      General: There is no distension  Palpations: Abdomen is soft  Feet:      Right foot:      Skin integrity: No ulcer, skin breakdown, erythema, warmth, callus or dry skin  Left foot:      Skin integrity: No ulcer, skin breakdown, erythema, warmth, callus or dry skin  Skin:     General: Skin is warm  Neurological:      Mental Status: He is alert and oriented to person, place, and time     Psychiatric:         Mood and Affect: Mood normal      Alfred Hernandez MD  "

## 2023-06-27 ENCOUNTER — TELEPHONE (OUTPATIENT)
Dept: FAMILY MEDICINE CLINIC | Facility: CLINIC | Age: 63
End: 2023-06-27

## 2023-06-27 NOTE — TELEPHONE ENCOUNTER
Pt called in stating his medication requires a PA, Form will be placed in your bin for further completion

## 2023-06-28 ENCOUNTER — TELEPHONE (OUTPATIENT)
Dept: FAMILY MEDICINE CLINIC | Facility: CLINIC | Age: 63
End: 2023-06-28

## 2023-06-28 ENCOUNTER — OFFICE VISIT (OUTPATIENT)
Dept: PHYSICAL THERAPY | Facility: CLINIC | Age: 63
End: 2023-06-28
Payer: MEDICARE

## 2023-06-28 DIAGNOSIS — M54.16 RADICULOPATHY, LUMBAR REGION: ICD-10-CM

## 2023-06-28 DIAGNOSIS — G89.4 CHRONIC PAIN SYNDROME: ICD-10-CM

## 2023-06-28 DIAGNOSIS — M51.26 PROTRUSION OF LUMBAR INTERVERTEBRAL DISC: ICD-10-CM

## 2023-06-28 DIAGNOSIS — M48.061 LUMBAR FORAMINAL STENOSIS: ICD-10-CM

## 2023-06-28 DIAGNOSIS — M54.42 CHRONIC LEFT-SIDED LOW BACK PAIN WITH LEFT-SIDED SCIATICA: Primary | ICD-10-CM

## 2023-06-28 DIAGNOSIS — G89.29 CHRONIC LEFT-SIDED LOW BACK PAIN WITH LEFT-SIDED SCIATICA: Primary | ICD-10-CM

## 2023-06-28 PROCEDURE — 97110 THERAPEUTIC EXERCISES: CPT

## 2023-06-28 PROCEDURE — 97112 NEUROMUSCULAR REEDUCATION: CPT

## 2023-06-28 NOTE — TELEPHONE ENCOUNTER
PCP SIGNATURE NEEDED FOR [employer] FORM RECEIVED VIA FAX AND PLACED IN PCP FOLDER TO BE DELIVERED AT ASSIGNED TIMES        Coverage determination form

## 2023-06-28 NOTE — TELEPHONE ENCOUNTER
PCP [facility] Physician's Order FORM RECEIVED VIA FAX AND PLACED IN PCP FOLDER TO BE DELIVERED AT ASSIGNED TIMES

## 2023-06-28 NOTE — PROGRESS NOTES
"Daily Note     Today's date: 2023  Patient name: Jamee Mcneal  : 1960  MRN: [id number]  Referring provider: Nano Alfaro MD  Dx:   Encounter Diagnosis     ICD-10-CM    1  Chronic left-sided low back pain with left-sided sciatica  M54 42     G89 29       2  Radiculopathy, lumbar region  M54 16       3  Lumbar foraminal stenosis  M48 061       4  Protrusion of lumbar intervertebral disc  M51 26       5  Chronic pain syndrome  G89 4                      Subjective: Pt presents to PT reporting increased pain secondary to rainy weather over the weekend  He reports having \"spasms\" in the back at times  He grades his pain an 8/10 pre TE session  Pt reports decreased pain post PT session grading the pain a 5/10 and reporting less tightness  Objective: See treatment diary below      Assessment: Pt demonstrates good tolerance to TE performed  He demonstrates good form and only needs minor cuing for TE however pt continues to demonstrate poor sitting and standing posture  Added MHP and TENS post PT session after consulting with PT NS  Continued to work on core stability  Patient demonstrated fatigue post treatment, exhibited good technique with therapeutic exercises and would benefit from continued PT to increase flexibility, strength and function  Plan: Continue per plan of care        Precautions: periodontitis, type 2 DM, reactive airway disease, HTN, low back pain with L sided sciatica, lumbar radiculopathy, lumbar foraminal stenosis, neck muscle spasm, protrusion of lumbar IV disc, hyperlipidemia, BPH, chronic pain syndrome, obesity, depression, diastolic dysfunction, paresthesia of both hands, memory difficulties, tinnitus of L ear, continuous opioid dependence, muscle spasm of LLE, paresthesia of both feet, depression      Manuals          BP    110/74 L UE seated 122/82 L UE seated         HR   72 bpm 75 bpm         SPO2    97% 98%                      Neuro Re-Ed " "6/13 6/19 6/21 6/28         TA iso (HEP) 10x 10\" hold            Prone hip ext (HEP) 10x 2 x 10 2 x 10 2 x 10         DF iso (HEP) 10x 10\" hold 10\" x 10            Bridges (HEP) 10x 2 x 10  2 x 10         Fire hydrants nv            paloff press nv 10x ea  5R 10\" x 10         SL abduct nv  15x ea          Curl ups nv            SLR with TA iso nv 2 x 10 2 x 10          SL clams nv            Ther Ex 6/13 6/19 6/21 6/28         bike nv 6' 6' 6'         LP nv            Prone on elbows/PPU nv 10\" x 10 ea 10\" x 10 ea 1' x2 elb, 10\" x 10 PPU         HS curls nv            DL HR nv            Lateral walks nv            pball roll out nv 10\" x 10 10\" x 10 10\" x 10                      Ther Activity 6/13 6/19 6/21 6/28                                   Gait Training 6/13 6/19 6/21 6/28                                   Modalities 6/13 6/19 6/21 6/28         MHP/TENS    10'                           "

## 2023-06-30 ENCOUNTER — HOSPITAL ENCOUNTER (EMERGENCY)
Facility: HOSPITAL | Age: 63
Discharge: HOME | End: 2023-06-30
Attending: EMERGENCY MEDICINE
Payer: MEDICARE

## 2023-06-30 ENCOUNTER — OFFICE VISIT (OUTPATIENT)
Dept: PHYSICAL THERAPY | Facility: CLINIC | Age: 63
End: 2023-06-30
Payer: MEDICARE

## 2023-06-30 VITALS
OXYGEN SATURATION: 98 % | HEART RATE: 75 BPM | RESPIRATION RATE: 20 BRPM | BODY MASS INDEX: 31.94 KG/M2 | DIASTOLIC BLOOD PRESSURE: 92 MMHG | TEMPERATURE: 98.1 F | WEIGHT: 203.93 LBS | SYSTOLIC BLOOD PRESSURE: 143 MMHG

## 2023-06-30 DIAGNOSIS — M54.16 RADICULOPATHY, LUMBAR REGION: ICD-10-CM

## 2023-06-30 DIAGNOSIS — G89.4 CHRONIC PAIN SYNDROME: ICD-10-CM

## 2023-06-30 DIAGNOSIS — M48.061 LUMBAR FORAMINAL STENOSIS: ICD-10-CM

## 2023-06-30 DIAGNOSIS — M54.42 CHRONIC LEFT-SIDED LOW BACK PAIN WITH LEFT-SIDED SCIATICA: Primary | ICD-10-CM

## 2023-06-30 DIAGNOSIS — R53.83 FATIGUE: Primary | ICD-10-CM

## 2023-06-30 DIAGNOSIS — M51.26 PROTRUSION OF LUMBAR INTERVERTEBRAL DISC: ICD-10-CM

## 2023-06-30 DIAGNOSIS — G89.29 CHRONIC LEFT-SIDED LOW BACK PAIN WITH LEFT-SIDED SCIATICA: Primary | ICD-10-CM

## 2023-06-30 LAB
ALBUMIN SERPL BCP-MCNC: 4.4 G/DL (ref 3.5–5)
ALP SERPL-CCNC: 48 U/L (ref 34–104)
ALT SERPL W P-5'-P-CCNC: 23 U/L (ref 7–52)
ANION GAP SERPL CALCULATED.3IONS-SCNC: 7 MMOL/L
AST SERPL W P-5'-P-CCNC: 17 U/L (ref 13–39)
ATRIAL RATE: 73 BPM
BASOPHILS # BLD AUTO: 0.02 THOUSANDS/ÂΜL (ref 0–0.1)
BASOPHILS NFR BLD AUTO: 0 % (ref 0–1)
BILIRUB SERPL-MCNC: 0.47 MG/DL (ref 0.2–1)
BUN SERPL-MCNC: 14 MG/DL (ref 5–25)
CALCIUM SERPL-MCNC: 9.6 MG/DL (ref 8.4–10.2)
CHLORIDE SERPL-SCNC: 104 MMOL/L (ref 96–108)
CO2 SERPL-SCNC: 28 MMOL/L (ref 21–32)
CREAT SERPL-MCNC: 1.13 MG/DL (ref 0.6–1.3)
EOSINOPHIL # BLD AUTO: 0.05 THOUSAND/ÂΜL (ref 0–0.61)
EOSINOPHIL NFR BLD AUTO: 1 % (ref 0–6)
ERYTHROCYTE [DISTWIDTH] IN BLOOD BY AUTOMATED COUNT: 13 % (ref 11.6–15.1)
GFR SERPL CREATININE-BSD FRML MDRD: 69 ML/MIN/1.73SQ M
GLUCOSE SERPL-MCNC: 126 MG/DL (ref 65–140)
GLUCOSE SERPL-MCNC: 131 MG/DL (ref 65–140)
HCT VFR BLD AUTO: 39.2 % (ref 36.5–49.3)
HGB BLD-MCNC: 13.7 G/DL (ref 12–17)
IMM GRANULOCYTES # BLD AUTO: 0 THOUSAND/UL (ref 0–0.2)
IMM GRANULOCYTES NFR BLD AUTO: 0 % (ref 0–2)
LIPASE SERPL-CCNC: 25 U/L (ref 11–82)
LYMPHOCYTES # BLD AUTO: 2.47 THOUSANDS/ÂΜL (ref 0.6–4.47)
LYMPHOCYTES NFR BLD AUTO: 56 % (ref 14–44)
MAGNESIUM SERPL-MCNC: 2.3 MG/DL (ref 1.9–2.7)
MCH RBC QN AUTO: 29.1 PG (ref 26.8–34.3)
MCHC RBC AUTO-ENTMCNC: 34.9 G/DL (ref 31.4–37.4)
MCV RBC AUTO: 83 FL (ref 82–98)
MONOCYTES # BLD AUTO: 0.37 THOUSAND/ÂΜL (ref 0.17–1.22)
MONOCYTES NFR BLD AUTO: 8 % (ref 4–12)
NEUTROPHILS # BLD AUTO: 1.55 THOUSANDS/ÂΜL (ref 1.85–7.62)
NEUTS SEG NFR BLD AUTO: 35 % (ref 43–75)
NRBC BLD AUTO-RTO: 0 /100 WBCS
P AXIS: 54 DEGREES
PLATELET # BLD AUTO: 209 THOUSANDS/UL (ref 149–390)
PMV BLD AUTO: 9.6 FL (ref 8.9–12.7)
POTASSIUM SERPL-SCNC: 4.1 MMOL/L (ref 3.5–5.3)
PR INTERVAL: 194 MS
PROT SERPL-MCNC: 7.4 G/DL (ref 6.4–8.4)
QRS AXIS: 50 DEGREES
QRSD INTERVAL: 84 MS
QT INTERVAL: 396 MS
QTC INTERVAL: 436 MS
RBC # BLD AUTO: 4.71 MILLION/UL (ref 3.88–5.62)
SODIUM SERPL-SCNC: 139 MMOL/L (ref 135–147)
T WAVE AXIS: 19 DEGREES
VENTRICULAR RATE: 73 BPM
WBC # BLD AUTO: 4.46 THOUSAND/UL (ref 4.31–10.16)

## 2023-06-30 PROCEDURE — 36415 COLL VENOUS BLD VENIPUNCTURE: CPT

## 2023-06-30 PROCEDURE — 99284 EMERGENCY DEPT VISIT MOD MDM: CPT

## 2023-06-30 PROCEDURE — 93005 ELECTROCARDIOGRAM TRACING: CPT

## 2023-06-30 PROCEDURE — 82948 REAGENT STRIP/BLOOD GLUCOSE: CPT

## 2023-06-30 PROCEDURE — 93010 ELECTROCARDIOGRAM REPORT: CPT | Performed by: INTERNAL MEDICINE

## 2023-06-30 PROCEDURE — 85025 COMPLETE CBC W/AUTO DIFF WBC: CPT

## 2023-06-30 PROCEDURE — 97110 THERAPEUTIC EXERCISES: CPT

## 2023-06-30 PROCEDURE — 96360 HYDRATION IV INFUSION INIT: CPT

## 2023-06-30 PROCEDURE — 80053 COMPREHEN METABOLIC PANEL: CPT

## 2023-06-30 PROCEDURE — 83735 ASSAY OF MAGNESIUM: CPT

## 2023-06-30 PROCEDURE — 83690 ASSAY OF LIPASE: CPT

## 2023-06-30 PROCEDURE — 97112 NEUROMUSCULAR REEDUCATION: CPT

## 2023-06-30 RX ORDER — ACETAMINOPHEN 325 MG/1
650 TABLET ORAL ONCE
Status: COMPLETED | OUTPATIENT
Start: 2023-06-30 | End: 2023-06-30

## 2023-06-30 RX ADMIN — SODIUM CHLORIDE 1000 ML: 0.9 INJECTION, SOLUTION INTRAVENOUS at 12:49

## 2023-06-30 RX ADMIN — ACETAMINOPHEN 650 MG: 325 TABLET ORAL at 12:51

## 2023-06-30 NOTE — Clinical Note
Tayler Boyer was seen and treated in our emergency department on 6/30/2023  No restrictions            Diagnosis:     Marv    He may return on this date: 07/01/2023         If you have any questions or concerns, please don't hesitate to call        Александр Amaod PA-C    ______________________________           _______________          _______________  Hospital Representative                              Date                                Time

## 2023-06-30 NOTE — PROGRESS NOTES
Daily Note     Today's date: 2023  Patient name: Bobetta Shone  : 1960  MRN: [id number]  Referring provider: Dolores Delvalle MD  Dx:   Encounter Diagnosis     ICD-10-CM    1  Chronic left-sided low back pain with left-sided sciatica  M54 42     G89 29       2  Radiculopathy, lumbar region  M54 16       3  Lumbar foraminal stenosis  M48 061       4  Protrusion of lumbar intervertebral disc  M51 26       5  Chronic pain syndrome  G89 4           Start Time: 08  Stop Time:   Total time in clinic (min): 30 minutes    Subjective: Pt reports 8/10 pain in the low back today  He reports he has to leave by 65 because his daughter has a doctor's appointment  Pt reports increased pain/tightness with left thoracic rotation  He reports the majority of his pain is in the low back today, but he has minor LLE symptoms  Objective: See treatment diary below      Assessment: Tolerated treatment well  Due to pt report of increased low back tightness with thoracic L rotation at home, open books were added to exercises  R lateral glides and PPU were performed this session for mobility and LLE radiating pain  Pt displayed significant challenged with bridges progressed with added abduction to improve low back and hip stability strength, requiring increased rest breaks  Pt also displayed decreased height in bridges due to weakness, requiring VC to increase height as able  Continue to progress pt as tolerated next visit  Plan: Continue per plan of care        Precautions: periodontitis, type 2 DM, reactive airway disease, HTN, low back pain with L sided sciatica, lumbar radiculopathy, lumbar foraminal stenosis, neck muscle spasm, protrusion of lumbar IV disc, hyperlipidemia, BPH, chronic pain syndrome, obesity, depression, diastolic dysfunction, paresthesia of both hands, memory difficulties, tinnitus of L ear, continuous opioid dependence, muscle spasm of LLE, paresthesia of both feet, "depression      Manuals 6/13 6/19 6/21 6/28 6/30        BP    110/74 L UE seated 122/82 L UE seated         HR   72 bpm 75 bpm         SPO2    97% 98%                      Neuro Re-Ed 6/13 6/19 6/21 6/28 6/30        TA iso (HEP) 10x 10\" hold    10x 5\" hold         Prone hip ext (HEP) 10x 2 x 10 2 x 10 2 x 10         DF iso (HEP) 10x 10\" hold 10\" x 10            Bridges (HEP) 10x 2 x 10  2 x 10 2x10 ptb abduction        Fire hydrants nv            paloff press nv 10x ea  5R 10\" x 10         SL abduct nv  15x ea          Curl ups nv            SLR with TA iso nv 2 x 10 2 x 10          SL clams nv            Ther Ex 6/13 6/19 6/21 6/28 6/30        bike nv 6' 6' 6' 6'        R lateral glides     2x10        LP nv            Prone on elbows/PPU nv 10\" x 10 ea 10\" x 10 ea 1' x2 elb, 10\" x 10 PPU 2x10 PPU        HS curls nv            DL HR nv            Lateral walks nv            pball roll out nv 10\" x 10 10\" x 10 10\" x 10         Open books     2x10 ea side        Ther Activity 6/13 6/19 6/21 6/28 6/30                                  Gait Training 6/13 6/19 6/21 6/28 6/30                                  Modalities 6/13 6/19 6/21 6/28 6/30        MHP/TENS    10'                           "

## 2023-06-30 NOTE — ED PROVIDER NOTES
History  Chief Complaint   Patient presents with   • Back Pain     Back pain and weakness since 6am     • Nausea     Since 6am  No vomiting  Patient is a 71-year-old male coming in for evaluation of nausea, his chronic back pain, as well as fatigue  Patient denies any abdominal pain, states his back pain has been chronic for a long time, has not gotten any worse  Just feels fatigued  No other significant complaints at this time      Fatigue  Associated symptoms: lethargy    Associated symptoms: no abdominal pain, no dysuria, no falls, no fever, no nausea and no vomiting        Prior to Admission Medications   Prescriptions Last Dose Informant Patient Reported? Taking?    Bioflavonoid Products (RA Vitamin C CR) TBCR  Self Yes No   Sig: take 1 tablet by mouth twice a day for 14 days   Patient not taking: Reported on 6/15/2023   Blood Glucose Monitoring Suppl (OneTouch Verio) w/Device KIT  Self No No   Sig: Use daily   Blood Glucose Monitoring Suppl KIT  Self No No   Sig: Use in the morning Please give test strips and lancets  Dx  E11 9   Blood Pressure KIT  Self No No   Sig: Use in the morning With appropriate size cuff   Blood Pressure Monitoring (Blood Pressure Monitor/Arm) EMILY  Self No No   Sig: Use daily   Cholecalciferol (D3 PO)  Self Yes No   Sig: Take by mouth daily   DULoxetine (CYMBALTA) 60 mg delayed release capsule   No No   Sig: Take 1 capsule (60 mg total) by mouth 2 (two) times a day   Diclofenac Sodium (VOLTAREN) 1 %  Self No No   Sig: Apply 2 g topically 4 (four) times a day as needed (low back or neck pain)   HYDROcodone-acetaminophen (NORCO) 7 5-325 mg per tablet   No No   Sig: Take 1 tablet by mouth every 8 (eight) hours as needed for pain For ongoing pain Max Daily Amount: 3 tablets   Lancet Devices (ONE TOUCH DELICA LANCING DEV) MISC  Self No No   Sig: Use daily   Lancets (freestyle) lancets  Self No No   Sig: TESTING DAILY IN THE AM   Lancets (onetouch ultrasoft) lancets  Self No No Sig: Use daily Use as instructed to check sugars daily   Mirabegron ER 50 MG TB24  Self No No   Sig: Take 1 tablet (50 mg total) by mouth daily   Multiple Vitamin (multivitamin) capsule   No No   Sig: Take 1 capsule by mouth daily for 14 days   Patient not taking: Reported on 127   OneTouch Delica Lancets 74Z MISC  Self No No   Sig: Use daily   OneTouch Delica Lancets 64X MISC  Self No No   Sig: Use in the morning   albuterol (2 5 mg/3 mL) 0 083 % nebulizer solution  Self No No   Sig: Take 3 mL (2 5 mg total) by nebulization every 6 (six) hours as needed for wheezing or shortness of breath   albuterol (5 mg/mL) 0 5 % nebulizer solution   No No   Sig: Take 0 5 mL (2 5 mg total) by nebulization every 6 (six) hours as needed for wheezing   albuterol (ProAir HFA) 90 mcg/act inhaler   No No   Sig: Inhale 2 puffs every 6 (six) hours as needed for wheezing   albuterol (Ventolin HFA) 90 mcg/act inhaler  Self No No   Sig: Inhale 2 puffs every 4 (four) hours as needed for wheezing   aluminum-magnesium hydroxide-simethicone (MAALOX) 200-200-20 MG/5ML SUSP  Self No No   Sig: Take 15 mL by mouth 4 (four) times a day (before meals and at bedtime)   Patient not taking: Reported on 6/15/2023   amLODIPine (NORVASC) 10 mg tablet   No No   Sig: Take 1 tablet (10 mg total) by mouth daily   aspirin 325 mg tablet   No No   Sig: Take 1 tablet (325 mg total) by mouth daily   atorvastatin (LIPITOR) 20 mg tablet   No No   Sig: Take 1 tablet (20 mg total) by mouth daily   cyclobenzaprine (FLEXERIL) 10 mg tablet  Self No No   Sig: Take 1 tablet (10 mg total) by mouth daily at bedtime as needed for muscle spasms   famotidine (PEPCID) 20 mg tablet   No No   Sig: Take 1 tablet (20 mg total) by mouth daily   fluticasone (FLONASE) 50 mcg/act nasal spray  Self No No   Si spray into each nostril daily   fluticasone-salmeterol (Advair HFA) 115-21 MCG/ACT inhaler   No No   Sig: Inhale 2 puffs 2 (two) times a day Rinse mouth after use  gabapentin (NEURONTIN) 800 mg tablet   No No   Sig: Take 1 tablet (800 mg total) by mouth 3 (three) times a day   glucose blood (OneTouch Verio) test strip  Self No No   Sig: Check blood sugar daily   glucose monitoring kit (FREESTYLE) monitoring kit  Self No No   Sig: Use 1 each in the morning TESTING DAILY IN THE AM   lidocaine (LMX) 4 % cream  Self No No   Sig: Apply topically as needed for mild pain   lidocaine (Lidoderm) 5 %  Self No No   Sig: Apply 1 patch topically daily Remove & Discard patch within 12 hours or as directed by MD   metFORMIN (GLUCOPHAGE) 500 mg tablet   No No   Sig: Take 1 tablet (500 mg total) by mouth 2 (two) times a day with meals   naloxone (NARCAN) 4 mg/0 1 mL nasal spray  Self No No   Sig: Administer 1 spray into a nostril  If no response after 2-3 minutes, give another dose in the other nostril using a new spray     ondansetron (Zofran ODT) 4 mg disintegrating tablet   No No   Sig: Take 1 tablet (4 mg total) by mouth every 6 (six) hours as needed for nausea or vomiting   oxyCODONE ER (Xtampza) 9 mg extended release capsule   No No   Sig: Take 1 tablet (9 mg total) by mouth every 12 (twelve) hours Max Daily Amount: 18 mg   tamsulosin (FLOMAX) 0 4 mg   No No   Sig: take 1 capsule by mouth once daily with dinner   zolpidem (AMBIEN) 5 mg tablet  Self No No   Sig: Take 1 tablet (5 mg total) by mouth daily at bedtime as needed for sleep      Facility-Administered Medications: None       Past Medical History:   Diagnosis Date   • Asthma    • Back pain    • Back pain    • 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   • Depression    • Diabetes mellitus ([facility] [address] )    • Hyperlipidemia    • Hypertension    • Type 2 diabetes mellitus without complication, without long-term current use of insulin ([facility] [address] ) 10/16/2020       Past Surgical History:   Procedure Laterality Date   • CYST REMOVAL  2017       Family History   Problem Relation Age of Onset   • Hypertension Mother    • Diabetes Mother    • Cancer Father    • Diabetes Family    • Hypertension Family      I have reviewed and agree with the history as documented  E-Cigarette/Vaping   • E-Cigarette Use Never User      E-Cigarette/Vaping Substances   • Nicotine No    • THC No    • CBD No    • Flavoring No    • Other No    • Unknown No      Social History     Tobacco Use   • Smoking status: Never   • Smokeless tobacco: Never   Vaping Use   • Vaping Use: Never used   Substance Use Topics   • Alcohol use: Not Currently     Comment: rare   • Drug use: Never       Review of Systems   Constitutional: Positive for fatigue  Negative for chills and fever  Gastrointestinal: Negative for abdominal pain, nausea and vomiting  Genitourinary: Negative for dysuria  Musculoskeletal: Negative for falls  Physical Exam  Physical Exam  Vitals reviewed  Constitutional:       Appearance: Normal appearance  He is normal weight  HENT:      Head: Normocephalic and atraumatic  Right Ear: External ear normal       Left Ear: External ear normal       Nose: Nose normal    Eyes:      Conjunctiva/sclera: Conjunctivae normal    Cardiovascular:      Rate and Rhythm: Normal rate  Pulmonary:      Effort: Pulmonary effort is normal    Abdominal:      Palpations: Abdomen is soft  Tenderness: There is no abdominal tenderness  There is no guarding  Musculoskeletal:         General: Normal range of motion  Cervical back: Normal range of motion  Skin:     General: Skin is warm and dry  Neurological:      Mental Status: He is alert           Vital Signs  ED Triage Vitals [06/30/23 1216]   Temperature Pulse Respirations Blood Pressure SpO2   98 1 °F (36 7 °C) 75 20 143/92 98 %      Temp Source Heart Rate Source Patient Position - Orthostatic VS BP Location FiO2 (%)   Oral Monitor Lying Left arm --      Pain Score       --           Vitals:    06/30/23 1216   BP: 143/92   Pulse: 75   Patient Position - Orthostatic VS: Lying         Visual Acuity      ED Medications  Medications   acetaminophen (TYLENOL) tablet 650 mg (650 mg Oral Given 6/30/23 1251)   sodium chloride 0 9 % bolus 1,000 mL (0 mL Intravenous Stopped 6/30/23 1406)       Diagnostic Studies  Results Reviewed     Procedure Component Value Units Date/Time    Comprehensive metabolic panel [[id number]] Collected: 06/30/23 1249    Lab Status: Final result Specimen: Blood from Arm, Right Updated: 06/30/23 1333     Sodium 139 mmol/L      Potassium 4 1 mmol/L      Chloride 104 mmol/L      CO2 28 mmol/L      ANION GAP 7 mmol/L      BUN 14 mg/dL      Creatinine 1 13 mg/dL      Glucose 126 mg/dL      Calcium 9 6 mg/dL      AST 17 U/L      ALT 23 U/L      Alkaline Phosphatase 48 U/L      Total Protein 7 4 g/dL      Albumin 4 4 g/dL      Total Bilirubin 0 47 mg/dL      eGFR 69 ml/min/1 73sq m     Narrative:      [place] guidelines for Chronic Kidney Disease (CKD):   •  Stage 1 with normal or high GFR (GFR > 90 mL/min/1 73 square meters)  •  Stage 2 Mild CKD (GFR = 60-89 mL/min/1 73 square meters)  •  Stage 3A Moderate CKD (GFR = 45-59 mL/min/1 73 square meters)  •  Stage 3B Moderate CKD (GFR = 30-44 mL/min/1 73 square meters)  •  Stage 4 Severe CKD (GFR = 15-29 mL/min/1 73 square meters)  •  Stage 5 End Stage CKD (GFR <15 mL/min/1 73 square meters)  Note: GFR calculation is accurate only with a steady state creatinine    Magnesium [[id number]]  (Normal) Collected: 06/30/23 1249    Lab Status: Final result Specimen: Blood from Arm, Right Updated: 06/30/23 1333     Magnesium 2 3 mg/dL     Lipase [[id number]]  (Normal) Collected: 06/30/23 1249    Lab Status: Final result Specimen: Blood from Arm, Right Updated: 06/30/23 1333     Lipase 25 u/L     CBC and differential [[id number]]  (Abnormal) Collected: 06/30/23 1249    Lab Status: Final result Specimen: Blood from Arm, Right Updated: 06/30/23 1317     WBC 4 46 Thousand/uL      RBC 4 71 Million/uL      Hemoglobin 13 7 g/dL Hematocrit 39 2 %      MCV 83 fL      MCH 29 1 pg      MCHC 34 9 g/dL      RDW 13 0 %      MPV 9 6 fL      Platelets 160 Thousands/uL      nRBC 0 /100 WBCs      Neutrophils Relative 35 %      Immat GRANS % 0 %      Lymphocytes Relative 56 %      Monocytes Relative 8 %      Eosinophils Relative 1 %      Basophils Relative 0 %      Neutrophils Absolute 1 55 Thousands/µL      Immature Grans Absolute 0 00 Thousand/uL      Lymphocytes Absolute 2 47 Thousands/µL      Monocytes Absolute 0 37 Thousand/µL      Eosinophils Absolute 0 05 Thousand/µL      Basophils Absolute 0 02 Thousands/µL     Fingerstick Glucose (POCT) [[id number]]  (Normal) Collected: 06/30/23 1213    Lab Status: Final result Updated: 06/30/23 1215     POC Glucose 131 mg/dl                  No orders to display              Procedures  Procedures         ED Course                               SBIRT 20yo+    Flowsheet Row Most Recent Value   Initial Alcohol Screen: US AUDIT-C     1  How often do you have a drink containing alcohol? 0 Filed at: 06/30/2023 1207   2  How many drinks containing alcohol do you have on a typical day you are drinking? 0 Filed at: 06/30/2023 1207   3a  Male UNDER 65: How often do you have five or more drinks on one occasion? 0 Filed at: 06/30/2023 1207   Audit-C Score 0 Filed at: 06/30/2023 1207   DANIELLE: How many times in the past year have you    Used an illegal drug or used a prescription medication for non-medical reasons? Never Filed at: 06/30/2023 1207                    Medical Decision Making  Patient is a 71-year-old male who comes in for complaint of fatigue  Feel better after fluids and Tylenol  No acute complaints, denies chest pain, no worsening of back pain  Labs within normal limits, discharged home    Amount and/or Complexity of Data Reviewed  Labs: ordered  Risk  OTC drugs            Disposition  Final diagnoses:   Fatigue     Time reflects when diagnosis was documented in both MDM as applicable and the Disposition within this note     Time User Action Codes Description Comment    6/30/2023  1:48 PM Constantin Oliva Add [H11 93] Fatigue       ED Disposition     ED Disposition   Discharge    Condition   Stable    Date/Time   Fri Jun 30, 2023  1:48 PM    Comment   Eugenia Pratima discharge to home/self care                 Follow-up Information     Follow up With Specialties Details Why Contact Info Additional Information    José Miguel Lopez MD Family Medicine   [address] [address] [address] [place] [address] Emergency Department Emergency Medicine  As needed, If symptoms worsen [address] [facility] [id number]  [facility] Emergency Department          Discharge Medication List as of 6/30/2023  1:49 PM      CONTINUE these medications which have NOT CHANGED    Details   albuterol (2 5 mg/3 mL) 0 083 % nebulizer solution Take 3 mL (2 5 mg total) by nebulization every 6 (six) hours as needed for wheezing or shortness of breath, Starting Tue 9/6/2022, Normal      albuterol (5 mg/mL) 0 5 % nebulizer solution Take 0 5 mL (2 5 mg total) by nebulization every 6 (six) hours as needed for wheezing, Starting Tue 3/21/2023, Normal      !! albuterol (ProAir HFA) 90 mcg/act inhaler Inhale 2 puffs every 6 (six) hours as needed for wheezing, Starting Tue 3/21/2023, Normal      !! albuterol (Ventolin HFA) 90 mcg/act inhaler Inhale 2 puffs every 4 (four) hours as needed for wheezing, Starting Fri 1/20/2023, Normal      aluminum-magnesium hydroxide-simethicone (MAALOX) 200-200-20 MG/5ML SUSP Take 15 mL by mouth 4 (four) times a day (before meals and at bedtime), Starting Thu 1/7/2021, Normal      amLODIPine (NORVASC) 10 mg tablet Take 1 tablet (10 mg total) by mouth daily, Starting Mon 5/1/2023, Normal      aspirin 325 mg tablet Take 1 tablet (325 mg total) by mouth daily, Starting Thu 6/8/2023, Normal      atorvastatin (LIPITOR) 20 mg tablet Take 1 tablet (20 mg total) by mouth daily, Starting Fri 6/9/2023, Normal      Bioflavonoid Products (RA Vitamin C CR) TBCR take 1 tablet by mouth twice a day for 14 days, Historical Med      Blood Glucose Monitoring Suppl (OneTouch Verio) w/Device KIT Use daily, Starting Tue 6/14/2022, Normal      !!  Blood Glucose Monitoring Suppl KIT Use in the morning Please give test strips and lancets  Dx  E11 9, Starting Wed 4/13/2022, Print      Blood Pressure KIT Use in the morning With appropriate size cuff, Starting Wed 4/13/2022, Print      Blood Pressure Monitoring (Blood Pressure Monitor/Arm) EMILY Use daily, Starting Tue 6/14/2022, Print      Cholecalciferol (D3 PO) Take by mouth daily, Historical Med      cyclobenzaprine (FLEXERIL) 10 mg tablet Take 1 tablet (10 mg total) by mouth daily at bedtime as needed for muscle spasms, Starting Tue 1/10/2023, Normal      Diclofenac Sodium (VOLTAREN) 1 % Apply 2 g topically 4 (four) times a day as needed (low back or neck pain), Starting Wed 12/7/2022, Normal      DULoxetine (CYMBALTA) 60 mg delayed release capsule Take 1 capsule (60 mg total) by mouth 2 (two) times a day, Starting Mon 4/24/2023, Normal      famotidine (PEPCID) 20 mg tablet Take 1 tablet (20 mg total) by mouth daily, Starting Fri 6/9/2023, Normal      fluticasone (FLONASE) 50 mcg/act nasal spray 1 spray into each nostril daily, Starting Thu 12/16/2021, Normal      fluticasone-salmeterol (Advair HFA) 115-21 MCG/ACT inhaler Inhale 2 puffs 2 (two) times a day Rinse mouth after use , Starting Mon 4/24/2023, Normal      gabapentin (NEURONTIN) 800 mg tablet Take 1 tablet (800 mg total) by mouth 3 (three) times a day, Starting Tue 4/25/2023, Normal      glucose blood (OneTouch Verio) test strip Check blood sugar daily, Normal      !! glucose monitoring kit (FREESTYLE) monitoring kit Use 1 each in the morning TESTING DAILY IN THE AM, Starting Mon 5/9/2022, Print      HYDROcodone-acetaminophen (NORCO) 7 5-325 mg per tablet Take 1 tablet by mouth every 8 (eight) hours as needed for pain For ongoing pain Max Daily Amount: 3 tablets, Starting Thu 5/25/2023, Normal      Lancet Devices (ONE TOUCH DELICA LANCING DEV) MISC Use daily, Starting Fri 8/12/2022, Normal      !! Lancets (freestyle) lancets TESTING DAILY IN THE AM, Print      !! Lancets (onetouch ultrasoft) lancets Use daily Use as instructed to check sugars daily, Starting Tue 5/24/2022, Normal      lidocaine (Lidoderm) 5 % Apply 1 patch topically daily Remove & Discard patch within 12 hours or as directed by MD, Starting Wed 12/7/2022, Normal      lidocaine (LMX) 4 % cream Apply topically as needed for mild pain, Starting Fri 8/12/2022, Normal      metFORMIN (GLUCOPHAGE) 500 mg tablet Take 1 tablet (500 mg total) by mouth 2 (two) times a day with meals, Starting Mon 4/24/2023, Normal      Mirabegron ER 50 MG TB24 Take 1 tablet (50 mg total) by mouth daily, Starting Mon 6/20/2022, Normal      Multiple Vitamin (multivitamin) capsule Take 1 capsule by mouth daily for 14 days, Starting Fri 1/22/2021, Until Wed 4/13/2022, Print      naloxone (NARCAN) 4 mg/0 1 mL nasal spray Administer 1 spray into a nostril  If no response after 2-3 minutes, give another dose in the other nostril using a new spray , Normal      ondansetron (Zofran ODT) 4 mg disintegrating tablet Take 1 tablet (4 mg total) by mouth every 6 (six) hours as needed for nausea or vomiting, Starting Mon 6/26/2023, Normal      !! OneTouch Delica Lancets 84F MISC Use daily, Starting Thu 10/14/2021, Normal      !!  OneTouch Delica Lancets 56N MISC Use in the morning, Starting Tue 6/14/2022, Normal      oxyCODONE ER (Xtampza) 9 mg extended release capsule Take 1 tablet (9 mg total) by mouth every 12 (twelve) hours Max Daily Amount: 18 mg, Starting Mon 6/26/2023, Normal      tamsulosin (FLOMAX) 0 4 mg take 1 capsule by mouth once daily with dinner, Normal      zolpidem (AMBIEN) 5 mg tablet Take 1 tablet (5 mg total) by mouth daily at bedtime as needed for sleep, Starting Fri 12/10/2021, Normal       !! - Potential duplicate medications found  Please discuss with provider  No discharge procedures on file      PDMP Review       Value Time User    PDMP Reviewed  Yes 6/29/2023  7:44 AM RAMO Mayes          ED Provider  Electronically Signed by           Tanisha Forte PA-C  06/30/23 5804

## 2023-07-03 ENCOUNTER — OFFICE VISIT (OUTPATIENT)
Dept: FAMILY MEDICINE CLINIC | Facility: CLINIC | Age: 63
End: 2023-07-03

## 2023-07-03 ENCOUNTER — APPOINTMENT (OUTPATIENT)
Dept: PHYSICAL THERAPY | Facility: CLINIC | Age: 63
End: 2023-07-03
Payer: MEDICARE

## 2023-07-03 ENCOUNTER — TELEPHONE (OUTPATIENT)
Dept: FAMILY MEDICINE CLINIC | Facility: CLINIC | Age: 63
End: 2023-07-03

## 2023-07-03 VITALS
BODY MASS INDEX: 31.58 KG/M2 | TEMPERATURE: 97.9 F | HEART RATE: 78 BPM | HEIGHT: 67 IN | OXYGEN SATURATION: 99 % | WEIGHT: 201.2 LBS | SYSTOLIC BLOOD PRESSURE: 122 MMHG | DIASTOLIC BLOOD PRESSURE: 84 MMHG | RESPIRATION RATE: 17 BRPM

## 2023-07-03 DIAGNOSIS — E11.9 TYPE 2 DIABETES MELLITUS WITHOUT COMPLICATION, WITHOUT LONG-TERM CURRENT USE OF INSULIN (HCC): ICD-10-CM

## 2023-07-03 DIAGNOSIS — M54.16 LUMBAR RADICULOPATHY: ICD-10-CM

## 2023-07-03 DIAGNOSIS — M54.42 CHRONIC BILATERAL LOW BACK PAIN WITH LEFT-SIDED SCIATICA: ICD-10-CM

## 2023-07-03 DIAGNOSIS — M62.838 NECK MUSCLE SPASM: ICD-10-CM

## 2023-07-03 DIAGNOSIS — G89.29 CHRONIC BILATERAL LOW BACK PAIN WITH LEFT-SIDED SCIATICA: ICD-10-CM

## 2023-07-03 DIAGNOSIS — M54.42 ACUTE LEFT-SIDED LOW BACK PAIN WITH LEFT-SIDED SCIATICA: ICD-10-CM

## 2023-07-03 PROCEDURE — 99213 OFFICE O/P EST LOW 20 MIN: CPT | Performed by: PHYSICIAN ASSISTANT

## 2023-07-03 PROCEDURE — 3074F SYST BP LT 130 MM HG: CPT | Performed by: PHYSICIAN ASSISTANT

## 2023-07-03 PROCEDURE — 3079F DIAST BP 80-89 MM HG: CPT | Performed by: PHYSICIAN ASSISTANT

## 2023-07-03 RX ORDER — LANCETS 33 GAUGE
EACH MISCELLANEOUS DAILY
Qty: 100 EACH | Refills: 6 | Status: SHIPPED | OUTPATIENT
Start: 2023-07-03

## 2023-07-03 RX ORDER — CYCLOBENZAPRINE HCL 10 MG
10 TABLET ORAL 2 TIMES DAILY PRN
Qty: 60 TABLET | Refills: 2 | Status: SHIPPED | OUTPATIENT
Start: 2023-07-03

## 2023-07-03 NOTE — TELEPHONE ENCOUNTER
07/03/23    PCP SIGNATURE NEEDED FOR MEDICARE PART B Detailed Written Order  FORM RECEIVED VIA FAX AND PLACED IN PCP FOLDER TO BE DELIVERED AT ASSIGNED TIMES.

## 2023-07-03 NOTE — PROGRESS NOTES
Assessment/Plan:    Chronic back pain/lumbar radiculopathy  - Patient notes he has yet to receive the oxycodone which was prescribed at last visit. Per chart review, prior authorization was completed but was unable to be processed further due to patient having Medicare. It is recommended to send PA to Medicare. Will place previously completed prior authorization form in "Ready to fax" bin in preceptor room to be faxed to Medicare.   -Continue Flexeril 10 mg, twice daily as needed. Continue Voltaren gel 4 times daily as needed. - Continue follow-up with physical therapy as scheduled. - Continue to apply ice/heating pad/topical therapies as needed to affected area. Diagnoses and all orders for this visit:    Type 2 diabetes mellitus without complication, without long-term current use of insulin ([facility])  -     OneTouch Delica Lancets 11M MISC; Use in the morning    Lumbar radiculopathy  -     cyclobenzaprine (FLEXERIL) 10 mg tablet; Take 1 tablet (10 mg total) by mouth 2 (two) times a day as needed for muscle spasms    Acute left-sided low back pain with left-sided sciatica  -     cyclobenzaprine (FLEXERIL) 10 mg tablet; Take 1 tablet (10 mg total) by mouth 2 (two) times a day as needed for muscle spasms    Neck muscle spasm  -     Diclofenac Sodium (VOLTAREN) 1 %; Apply 2 g topically 4 (four) times a day as needed (low back or neck pain)    Chronic bilateral low back pain with left-sided sciatica  -     Diclofenac Sodium (VOLTAREN) 1 %; Apply 2 g topically 4 (four) times a day as needed (low back or neck pain)          All of patients questions were answered. Patient understands and agrees with the above plan. Return for Next scheduled follow up as scheduled on 8/28/23 with Dr. Caryle Salters.     Yessenia Trevizo PA-C  07/03/23  [address]  [place]          Subjective:     Patient ID: Deven Rob  is a [address] y.o. male with known PMH of hypertension, hyperlipidemia, type 2 diabetes, BPH, chronic back pain, lumbar radiculopathy, depression who presents today in office for ED visit follow up. - Patient is a 65 y.o. male who presents today for ED visit follow up. Patient presented to ED on 6/30/2023 due to back pain and fatigue. Patient felt better after fluids and Tylenol. Labs within normal limits. - Patient notes he does not wish to pursue further back injections because his previous injections provided minimal relief. Patient notes he experiences frequent cramping of his bilateral lower legs. He notes his legs suddenly become stiff and it makes it difficult to walk. He does use either his cane or rollator at all times. - At last visit, [place] was switched to oxycodone. Patient notes he has yet to receive oxycodone because it requires prior authorization. Patient notes he has not been without any pain medication since June 26th, 2023. The following portions of the patient's history were reviewed and updated as appropriate: allergies, current medications, past family history, past medical history, past social history, past surgical history and problem list.        Review of Systems   Constitutional: Negative for chills and fever. HENT: Negative for congestion, rhinorrhea and sore throat. Respiratory: Negative for cough and shortness of breath. Cardiovascular: Negative for chest pain. Gastrointestinal: Negative for diarrhea, nausea and vomiting. Musculoskeletal: Positive for arthralgias, back pain and gait problem. Skin: Negative for rash. Neurological: Negative for dizziness and headaches. Objective:   Vitals:    07/03/23 0907   BP: 122/84   BP Location: Right arm   Patient Position: Sitting   Cuff Size: Standard   Pulse: 78   Resp: 17   Temp: 97.9 °F (36.6 °C)   TempSrc: Temporal   SpO2: 99%   Weight: 91.3 kg (201 lb 3.2 oz)   Height: 5' 7" (1.702 m)         Physical Exam  Vitals and nursing note reviewed. Constitutional:       General: He is not in acute distress. Appearance: He is well-developed. Comments: Ambulating with rollator   HENT:      Head: Normocephalic and atraumatic. Eyes:      Conjunctiva/sclera: Conjunctivae normal.   Cardiovascular:      Rate and Rhythm: Normal rate and regular rhythm. Pulses: Normal pulses. Heart sounds: Normal heart sounds. Pulmonary:      Effort: Pulmonary effort is normal. No respiratory distress. Breath sounds: Normal breath sounds. No wheezing. Musculoskeletal:      Lumbar back: Tenderness present. No swelling. Decreased range of motion. Skin:     General: Skin is warm and dry. Neurological:      Mental Status: He is alert and oriented to person, place, and time.    Psychiatric:         Behavior: Behavior normal.

## 2023-07-05 ENCOUNTER — OFFICE VISIT (OUTPATIENT)
Dept: PHYSICAL THERAPY | Facility: CLINIC | Age: 63
End: 2023-07-05
Payer: MEDICARE

## 2023-07-05 DIAGNOSIS — M54.16 RADICULOPATHY, LUMBAR REGION: ICD-10-CM

## 2023-07-05 DIAGNOSIS — G89.29 CHRONIC LEFT-SIDED LOW BACK PAIN WITH LEFT-SIDED SCIATICA: Primary | ICD-10-CM

## 2023-07-05 DIAGNOSIS — M48.061 LUMBAR FORAMINAL STENOSIS: ICD-10-CM

## 2023-07-05 DIAGNOSIS — G89.4 CHRONIC PAIN SYNDROME: ICD-10-CM

## 2023-07-05 DIAGNOSIS — M54.42 CHRONIC LEFT-SIDED LOW BACK PAIN WITH LEFT-SIDED SCIATICA: Primary | ICD-10-CM

## 2023-07-05 DIAGNOSIS — M51.26 PROTRUSION OF LUMBAR INTERVERTEBRAL DISC: ICD-10-CM

## 2023-07-05 PROCEDURE — 97110 THERAPEUTIC EXERCISES: CPT

## 2023-07-05 PROCEDURE — 97112 NEUROMUSCULAR REEDUCATION: CPT

## 2023-07-05 NOTE — PROGRESS NOTES
Daily Note     Today's date: 2023  Patient name: Buster Jensen  : 1960  MRN: [id number]  Referring provider: Ria Masterson MD  Dx:   Encounter Diagnosis     ICD-10-CM    1. Chronic left-sided low back pain with left-sided sciatica  M54.42     G89.29       2. Radiculopathy, lumbar region  M54.16       3. Protrusion of lumbar intervertebral disc  M51.26       4. Lumbar foraminal stenosis  M48.061       5. Chronic pain syndrome  G89.4           Start Time: 08  Stop Time: 310  Total time in clinic (min): 36 minutes    Subjective: Pt reports recently he has started to have radiating pain into RLE along with LLE. He also reports he went to the ED after last visit because he felt fatigued. He was seen by his PCP following with no new concerns. Objective: See treatment diary below      Assessment: Tolerated treatment fair. Patient experienced more low back pain bilaterally across the low back and less pain in the LEs following PPU. Pt was educated on performing exercise as much as he can at home as he continues to require VC for set up despite exercise being included on his HEP. Pt was educated again on centralization versus peripheralization. Following directional preference exercises, LE strengthening was emphasized to improve cramping likely due to weakness. Pt fatigued quickly and requested the session end soon after starting strengthening exercises. Increased rest breaks were taken due to fatigue. Pt experienced "fuzziness" after paloff press and took a seated rest break with vitals taken as below twice. Pt was educated on following up with PCP due to symptoms experienced with exertion and normal vitals (slightly elevated BP, but not concerning). When questioned, pt reports he ate and took his HTN medication this morning. He was instructed to wait in waiting room for symptoms to subside before driving home and to check in with PT before leaving.  Against instruction, pt left before checking with PT but did wait in waiting room for 5-10 minutes. Plan: Continue per plan of care.       Precautions: periodontitis, type 2 DM, reactive airway disease, HTN, low back pain with L sided sciatica, lumbar radiculopathy, lumbar foraminal stenosis, neck muscle spasm, protrusion of lumbar IV disc, hyperlipidemia, BPH, chronic pain syndrome, obesity, depression, diastolic dysfunction, paresthesia of both hands, memory difficulties, tinnitus of L ear, continuous opioid dependence, muscle spasm of LLE, paresthesia of both feet, depression      Manuals 6/13 6/19 6/21 6/28 6/30 7/5       BP    110/74 L UE seated 122/82 L UE seated  140/82 x2       HR   72 bpm 75 bpm  78       SPO2    97% 98%  96                    Neuro Re-Ed 6/13 6/19 6/21 6/28 6/30 7/5       TA iso (HEP) 10x 10" hold    10x 5" hold         Prone hip ext (HEP) 10x 2 x 10 2 x 10 2 x 10         Pt edu      NS       DF iso (HEP) 10x 10" hold 10" x 10            Bridges (HEP) 10x 2 x 10  2 x 10 2x10 ptb abduction        Fire hydrants nv            paloff press nv 10x ea  5R 10" x 10  6R 15x ea side       SL abduct nv  15x ea          Curl ups nv            SLR with TA iso nv 2 x 10 2 x 10          SL clams nv            Ther Ex 6/13 6/19 6/21 6/28 6/30 7/5       bike nv 6' 6' 6' 6' 6'       R lateral glides     2x10 2x10       LP nv     95# 15x, 105# 15x, 55# SL 15x ea side       Prone on elbows/PPU nv 10" x 10 ea 10" x 10 ea 1' x2 elb, 10" x 10 PPU 2x10 PPU 2x10 PPU       HS curls nv            DL HR nv            Lateral walks nv            pball roll out nv 10" x 10 10" x 10 10" x 10         Open books     2x10 ea side        Ther Activity 6/13 6/19 6/21 6/28 6/30 7/5                                 Gait Training 6/13 6/19 6/21 6/28 6/30 7/5                                 Modalities 6/13 6/19 6/21 6/28 6/30 7/5       MHP/TENS    10'

## 2023-07-25 ENCOUNTER — OFFICE VISIT (OUTPATIENT)
Dept: FAMILY MEDICINE CLINIC | Facility: CLINIC | Age: 63
End: 2023-07-25

## 2023-07-25 VITALS
OXYGEN SATURATION: 98 % | SYSTOLIC BLOOD PRESSURE: 146 MMHG | HEIGHT: 67 IN | BODY MASS INDEX: 31.11 KG/M2 | RESPIRATION RATE: 18 BRPM | DIASTOLIC BLOOD PRESSURE: 72 MMHG | TEMPERATURE: 97.5 F | WEIGHT: 198.2 LBS | HEART RATE: 75 BPM

## 2023-07-25 DIAGNOSIS — M54.42 CHRONIC LEFT-SIDED LOW BACK PAIN WITH LEFT-SIDED SCIATICA: ICD-10-CM

## 2023-07-25 DIAGNOSIS — F11.20 CONTINUOUS OPIOID DEPENDENCE (HCC): Primary | ICD-10-CM

## 2023-07-25 DIAGNOSIS — G89.29 CHRONIC LEFT-SIDED LOW BACK PAIN WITH LEFT-SIDED SCIATICA: ICD-10-CM

## 2023-07-25 DIAGNOSIS — G89.4 CHRONIC PAIN SYNDROME: ICD-10-CM

## 2023-07-25 DIAGNOSIS — E11.9 TYPE 2 DIABETES MELLITUS WITHOUT COMPLICATION, WITHOUT LONG-TERM CURRENT USE OF INSULIN (HCC): ICD-10-CM

## 2023-07-25 PROCEDURE — 3078F DIAST BP <80 MM HG: CPT | Performed by: FAMILY MEDICINE

## 2023-07-25 PROCEDURE — 99213 OFFICE O/P EST LOW 20 MIN: CPT | Performed by: FAMILY MEDICINE

## 2023-07-25 PROCEDURE — 3077F SYST BP >= 140 MM HG: CPT | Performed by: FAMILY MEDICINE

## 2023-07-25 RX ORDER — NALOXONE HYDROCHLORIDE 4 MG/.1ML
SPRAY NASAL
Qty: 1 EACH | Refills: 1 | Status: SHIPPED | OUTPATIENT
Start: 2023-07-25 | End: 2024-07-24

## 2023-07-25 RX ORDER — OXYCODONE HYDROCHLORIDE 10 MG/1
10 TABLET ORAL 2 TIMES DAILY
Qty: 60 TABLET | Refills: 0 | Status: SHIPPED | OUTPATIENT
Start: 2023-07-25

## 2023-07-25 NOTE — PROGRESS NOTES
Name: Jazmine Jara      : 1960      MRN: [id number]  Encounter Provider: Ramonita Hollins MD  Encounter Date: 2023   Encounter department: [address] [facility]     1. Continuous opioid dependence [facility])  Assessment & Plan: Will discontinue Oxycodone ([place]) 9 mg bid  And start Oxycodone (Roxicodone) 10 mg bid    (H/o of taking Norco 7.5 mg/325 mg TID for greater than 2 years, tried to taper, but aunable to tolerate pain, and discontinued last month and switched to Rizwan Schmidt)    Recheck in 4-6 weeks    Orders:  -     oxyCODONE (ROXICODONE) 10 MG TABS; Take 1 tablet (10 mg total) by mouth 2 (two) times a day Max Daily Amount: 20 mg  -     naloxone (NARCAN) 4 mg/0.1 mL nasal spray; Administer 1 spray into a nostril. If no response after 2-3 minutes, give another dose in the other nostril using a new spray. 2. Chronic left-sided low back pain with left-sided sciatica  -     oxyCODONE (ROXICODONE) 10 MG TABS; Take 1 tablet (10 mg total) by mouth 2 (two) times a day Max Daily Amount: 20 mg    3. Type 2 diabetes mellitus without complication, without long-term current use of insulin [facility])  Assessment & Plan:    Lab Results   Component Value Date    HGBA1C 6.7 (H) 05/15/2023    HGBA1C 7.3 (A) 2023    HGBA1C 6.9 (A) 2023    HGBA1C 6.8 (A) 2022     Improved  - Current medications:  Metformin 500 mg bid-  - Home glucose readings: fasting 90s  - Diet: well balanced, drinks plenty of water, vegetable soups, limits bread  - +Htn: on Amlodipine  - +Hld: on Atorvastatin  - Aspirin: no  - Urine microalb: 2022 - normal  - Ophthalmology: pending  - DM Foot exam: today  - Dentist:   - Pneumo vaccine: 2019  - Influenza Vaccine:    - Smoker: no  - Keep log of blood glucose readings  - Encouraged: Diabetic Diet, Regular exercise, Weight loss           4.  Chronic pain syndrome  -     oxyCODONE (ROXICODONE) 10 MG TABS; Take 1 tablet (10 mg total) by mouth 2 (two) times a day Max Daily Amount: 20 mg           Subjective      Benton Princeton Hashimoto is a 65 y.o. male who presented to the office today to follow up for chronic back pain. Patient states that the new medication, Xtampza ER 9 mg, caused him to have heaviness in the lips, some numbness, and also generalized itchy skin. The pt discontinued the medication due to these. He is still in significant pain and would like to try Oxycodone instead of the Percocet. He used to take Oxycodone when he lived in [place] about 2 years ago, and believes it may help alleviate his pain better. Recently visited [facility] with family. The following portions of the patient's history were reviewed and updated as appropriate: allergies, current medications, past family history, past medical history, past social history, past surgical history and problem list.      Review of Systems   Constitutional: Negative for chills and fever. HENT: Negative for congestion, rhinorrhea and sore throat. Respiratory: Negative for cough and shortness of breath. Cardiovascular: Negative for chest pain. Gastrointestinal: Negative for diarrhea, nausea and vomiting. Musculoskeletal: Positive for back pain and gait problem. Skin: Negative for rash. Neurological: Negative for dizziness and headaches.        Current Outpatient Medications on File Prior to Visit   Medication Sig   • albuterol (2.5 mg/3 mL) 0.083 % nebulizer solution Take 3 mL (2.5 mg total) by nebulization every 6 (six) hours as needed for wheezing or shortness of breath   • albuterol (5 mg/mL) 0.5 % nebulizer solution Take 0.5 mL (2.5 mg total) by nebulization every 6 (six) hours as needed for wheezing   • albuterol (ProAir HFA) 90 mcg/act inhaler Inhale 2 puffs every 6 (six) hours as needed for wheezing   • albuterol (Ventolin HFA) 90 mcg/act inhaler Inhale 2 puffs every 4 (four) hours as needed for wheezing   • aluminum-magnesium hydroxide-simethicone (MAALOX) 982-247-25 MG/5ML SUSP Take 15 mL by mouth 4 (four) times a day (before meals and at bedtime) (Patient not taking: Reported on 6/15/2023)   • amLODIPine (NORVASC) 10 mg tablet Take 1 tablet (10 mg total) by mouth daily   • aspirin 325 mg tablet Take 1 tablet (325 mg total) by mouth daily   • atorvastatin (LIPITOR) 20 mg tablet Take 1 tablet (20 mg total) by mouth daily   • Bioflavonoid Products (RA Vitamin C CR) TBCR take 1 tablet by mouth twice a day for 14 days (Patient not taking: Reported on 6/15/2023)   • Blood Glucose Monitoring Suppl (OneTouch Verio) w/Device KIT Use daily   • Blood Glucose Monitoring Suppl KIT Use in the morning Please give test strips and lancets  Dx. E11.9   • Blood Pressure KIT Use in the morning With appropriate size cuff   • Blood Pressure Monitoring (Blood Pressure Monitor/Arm) EMILY Use daily   • Cholecalciferol (D3 PO) Take by mouth daily   • cyclobenzaprine (FLEXERIL) 10 mg tablet Take 1 tablet (10 mg total) by mouth 2 (two) times a day as needed for muscle spasms   • Diclofenac Sodium (VOLTAREN) 1 % Apply 2 g topically 4 (four) times a day as needed (low back or neck pain)   • DULoxetine (CYMBALTA) 60 mg delayed release capsule Take 1 capsule (60 mg total) by mouth 2 (two) times a day   • famotidine (PEPCID) 20 mg tablet Take 1 tablet (20 mg total) by mouth daily   • fluticasone (FLONASE) 50 mcg/act nasal spray 1 spray into each nostril daily   • fluticasone-salmeterol (Advair HFA) 115-21 MCG/ACT inhaler Inhale 2 puffs 2 (two) times a day Rinse mouth after use.    • gabapentin (NEURONTIN) 800 mg tablet Take 1 tablet (800 mg total) by mouth 3 (three) times a day   • glucose blood (OneTouch Verio) test strip Check blood sugar daily   • glucose monitoring kit (FREESTYLE) monitoring kit Use 1 each in the morning TESTING DAILY IN THE AM   • HYDROcodone-acetaminophen (NORCO) 7.5-325 mg per tablet Take 1 tablet by mouth every 8 (eight) hours as needed for pain For ongoing pain Max Daily Amount: 3 tablets   • Lancet Devices (ONE TOUCH DELICA LANCING DEV) MISC Use daily   • Lancets (freestyle) lancets TESTING DAILY IN THE AM   • Lancets (onetouch ultrasoft) lancets Use daily Use as instructed to check sugars daily   • lidocaine (Lidoderm) 5 % Apply 1 patch topically daily Remove & Discard patch within 12 hours or as directed by MD   • lidocaine (LMX) 4 % cream Apply topically as needed for mild pain   • Mirabegron ER 50 MG TB24 Take 1 tablet (50 mg total) by mouth daily   • Multiple Vitamin (multivitamin) capsule Take 1 capsule by mouth daily for 14 days (Patient not taking: Reported on 1/24/2023)   • naloxone (NARCAN) 4 mg/0.1 mL nasal spray Administer 1 spray into a nostril. If no response after 2-3 minutes, give another dose in the other nostril using a new spray. • ondansetron (Zofran ODT) 4 mg disintegrating tablet Take 1 tablet (4 mg total) by mouth every 6 (six) hours as needed for nausea or vomiting   • OneTouch Delica Lancets 48W MISC Use daily   • OneTouch Delica Lancets 26J MISC Use in the morning   • oxyCODONE ER (Xtampza) 9 mg extended release capsule Take 1 tablet (9 mg total) by mouth every 12 (twelve) hours Max Daily Amount: 18 mg   • tamsulosin (FLOMAX) 0.4 mg take 1 capsule by mouth once daily with dinner   • zolpidem (AMBIEN) 5 mg tablet Take 1 tablet (5 mg total) by mouth daily at bedtime as needed for sleep       Objective     /72 (BP Location: Right arm, Patient Position: Sitting, Cuff Size: Standard)   Pulse 75   Temp 97.5 °F (36.4 °C) (Temporal)   Resp 18   Ht 5' 7" (1.702 m)   Wt 89.9 kg (198 lb 3.2 oz)   SpO2 98%   BMI 31.04 kg/m²     Physical Exam  Vitals and nursing note reviewed. Constitutional:       Appearance: He is well-developed. Comments: + rollator walker   HENT:      Head: Normocephalic and atraumatic. Cardiovascular:      Rate and Rhythm: Normal rate. Pulmonary:      Effort: Pulmonary effort is normal. No respiratory distress. Neurological:      Mental Status: He is alert and oriented to person, place, and time.    Psychiatric:         Mood and Affect: Mood normal.       Dony Ward MD

## 2023-08-01 ENCOUNTER — TELEPHONE (OUTPATIENT)
Dept: FAMILY MEDICINE CLINIC | Facility: CLINIC | Age: 63
End: 2023-08-01

## 2023-08-01 DIAGNOSIS — E11.9 TYPE 2 DIABETES MELLITUS WITHOUT COMPLICATION, WITHOUT LONG-TERM CURRENT USE OF INSULIN (HCC): ICD-10-CM

## 2023-08-03 ENCOUNTER — TELEPHONE (OUTPATIENT)
Dept: FAMILY MEDICINE CLINIC | Facility: CLINIC | Age: 63
End: 2023-08-03

## 2023-08-03 NOTE — TELEPHONE ENCOUNTER
PCP SIGNATURE NEEDED FOR [facility] FORM RECEIVED VIA FAX AND PLACED IN PCP FOLDER TO BE DELIVERED AT ASSIGNED TIMES.     One touch delica

## 2023-08-07 ENCOUNTER — TELEPHONE (OUTPATIENT)
Dept: FAMILY MEDICINE CLINIC | Facility: CLINIC | Age: 63
End: 2023-08-07

## 2023-08-07 NOTE — TELEPHONE ENCOUNTER
Patient came in and stated he needs a letter for his insurance stating his medical conditions. They also need an updated medication list which I have provided to him.

## 2023-08-10 NOTE — ASSESSMENT & PLAN NOTE
Will discontinue Oxycodone ([place]) 9 mg bid  And start Oxycodone (Roxicodone) 10 mg bid    (H/o of taking Norco 7.5 mg/325 mg TID for greater than 2 years, tried to taper, but aunable to tolerate pain, and discontinued last month and switched to [address])    Recheck in 4-6 weeks

## 2023-08-10 NOTE — ASSESSMENT & PLAN NOTE
Lab Results   Component Value Date    HGBA1C 6.7 (H) 05/15/2023    HGBA1C 7.3 (A) 04/24/2023    HGBA1C 6.9 (A) 01/20/2023    HGBA1C 6.8 (A) 08/09/2022     Improved  - Current medications:  Metformin 500 mg bid-  - Home glucose readings: fasting 90s  - Diet: well balanced, drinks plenty of water, vegetable soups, limits bread  - +Htn: on Amlodipine  - +Hld: on Atorvastatin  - Aspirin: no  - Urine microalb: 4/21/2022 - normal  - Ophthalmology: pending  - DM Foot exam: today  - Dentist: 2023  - Pneumo vaccine: 2019  - Influenza Vaccine:  2022  - Smoker: no  - Keep log of blood glucose readings  - Encouraged: Diabetic Diet, Regular exercise, Weight loss

## 2023-08-10 NOTE — TELEPHONE ENCOUNTER
He has an appointment with me 8/15/2023, can he wait until then? I could provide him the letter then.

## 2023-08-15 ENCOUNTER — OFFICE VISIT (OUTPATIENT)
Dept: FAMILY MEDICINE CLINIC | Facility: CLINIC | Age: 63
End: 2023-08-15

## 2023-08-15 VITALS
RESPIRATION RATE: 16 BRPM | HEART RATE: 84 BPM | BODY MASS INDEX: 31.39 KG/M2 | DIASTOLIC BLOOD PRESSURE: 100 MMHG | OXYGEN SATURATION: 96 % | TEMPERATURE: 98.7 F | WEIGHT: 200 LBS | HEIGHT: 67 IN | SYSTOLIC BLOOD PRESSURE: 140 MMHG

## 2023-08-15 DIAGNOSIS — F11.20 CONTINUOUS OPIOID DEPENDENCE (HCC): ICD-10-CM

## 2023-08-15 DIAGNOSIS — Z02.89 ENCOUNTER FOR COMPLETION OF FORM WITH PATIENT: Primary | ICD-10-CM

## 2023-08-15 PROCEDURE — 99213 OFFICE O/P EST LOW 20 MIN: CPT | Performed by: FAMILY MEDICINE

## 2023-08-15 PROCEDURE — 3077F SYST BP >= 140 MM HG: CPT | Performed by: FAMILY MEDICINE

## 2023-08-15 PROCEDURE — 3080F DIAST BP >= 90 MM HG: CPT | Performed by: FAMILY MEDICINE

## 2023-08-15 NOTE — ASSESSMENT & PLAN NOTE
Pain contract completed today  Continue Oxycodone (Roxicodone) 10 mg bid    (H/o of taking Norco 7.5 mg/325 mg TID for greater than 2 years, tried to taper, but aunable to tolerate pain, and discontinued last month and switched to Carolene Harness, then discontinued Oxycodone (Carolene Harness) 9 due to side effects )

## 2023-08-15 NOTE — PROGRESS NOTES
Name: Annette Sanchez      : 1960      MRN: [id number]  Encounter Provider: Ron Maravilla MD  Encounter Date: 8/15/2023   Encounter department: [address] [facility],[address]     1. Encounter for completion of form with patient  Assessment & Plan:  Medical Letter completed for patient's insurance      2. Continuous opioid dependence ([address])  Assessment & Plan:  Pain contract completed today  Continue Oxycodone (Roxicodone) 10 mg bid    (H/o of taking Norco 7.5 mg/325 mg TID for greater than 2 years, tried to taper, but aunable to tolerate pain, and discontinued last month and switched to [address], then discontinued Oxycodone ([address]) 9 due to side effects )           Subjective      HPI   Annette Sanchez is a 65 y.o. male  has a past medical history of Asthma, Back pain, Back pain, BPH with obstruction/lower urinary tract symptoms, Depression, Diabetes mellitus ([address]), Hyperlipidemia, Hypertension, and Type 2 diabetes mellitus without complication, without long-term current use of insulin ([address] who presented to the office today for completion of a medical letter for continued Social Security benefits and insurance purposes. States today he is in his usual state of health and no concerns at this time    The following portions of the patient's history were reviewed and updated as appropriate: allergies, current medications, past family history, past medical history, past social history, past surgical history and problem list.    Review of Systems   Constitutional: Negative for chills and fever. HENT: Negative for congestion, rhinorrhea and sore throat. Respiratory: Negative for cough and shortness of breath. Cardiovascular: Negative for chest pain. Gastrointestinal: Negative for diarrhea, nausea and vomiting. Musculoskeletal: Positive for back pain and gait problem. Skin: Negative for rash. Neurological: Negative for dizziness and headaches. Current Outpatient Medications on File Prior to Visit   Medication Sig   • albuterol (2.5 mg/3 mL) 0.083 % nebulizer solution Take 3 mL (2.5 mg total) by nebulization every 6 (six) hours as needed for wheezing or shortness of breath   • albuterol (5 mg/mL) 0.5 % nebulizer solution Take 0.5 mL (2.5 mg total) by nebulization every 6 (six) hours as needed for wheezing   • albuterol (ProAir HFA) 90 mcg/act inhaler Inhale 2 puffs every 6 (six) hours as needed for wheezing   • albuterol (Ventolin HFA) 90 mcg/act inhaler Inhale 2 puffs every 4 (four) hours as needed for wheezing   • aluminum-magnesium hydroxide-simethicone (MAALOX) 200-200-20 MG/5ML SUSP Take 15 mL by mouth 4 (four) times a day (before meals and at bedtime) (Patient not taking: Reported on 6/15/2023)   • amLODIPine (NORVASC) 10 mg tablet Take 1 tablet (10 mg total) by mouth daily   • aspirin 325 mg tablet Take 1 tablet (325 mg total) by mouth daily   • atorvastatin (LIPITOR) 20 mg tablet Take 1 tablet (20 mg total) by mouth daily   • Bioflavonoid Products (RA Vitamin C CR) TBCR take 1 tablet by mouth twice a day for 14 days (Patient not taking: Reported on 6/15/2023)   • Blood Glucose Monitoring Suppl (OneTouch Verio) w/Device KIT Use daily   • Blood Glucose Monitoring Suppl KIT Use in the morning Please give test strips and lancets  Dx. E11.9   • Blood Pressure KIT Use in the morning With appropriate size cuff   • Blood Pressure Monitoring (Blood Pressure Monitor/Arm) EMILY Use daily   • Cholecalciferol (D3 PO) Take by mouth daily   • cyclobenzaprine (FLEXERIL) 10 mg tablet Take 1 tablet (10 mg total) by mouth 2 (two) times a day as needed for muscle spasms   • Diclofenac Sodium (VOLTAREN) 1 % Apply 2 g topically 4 (four) times a day as needed (low back or neck pain)   • DULoxetine (CYMBALTA) 60 mg delayed release capsule Take 1 capsule (60 mg total) by mouth 2 (two) times a day   • famotidine (PEPCID) 20 mg tablet Take 1 tablet (20 mg total) by mouth daily   • fluticasone (FLONASE) 50 mcg/act nasal spray 1 spray into each nostril daily   • fluticasone-salmeterol (Advair HFA) 115-21 MCG/ACT inhaler Inhale 2 puffs 2 (two) times a day Rinse mouth after use. • gabapentin (NEURONTIN) 800 mg tablet Take 1 tablet (800 mg total) by mouth 3 (three) times a day   • glucose blood (OneTouch Verio) test strip Check blood sugar daily   • glucose monitoring kit (FREESTYLE) monitoring kit Use 1 each in the morning TESTING DAILY IN THE AM   • HYDROcodone-acetaminophen (NORCO) 7.5-325 mg per tablet Take 1 tablet by mouth every 8 (eight) hours as needed for pain For ongoing pain Max Daily Amount: 3 tablets   • Lancet Devices (ONE TOUCH DELICA LANCING DEV) MISC Use daily   • Lancets (freestyle) lancets TESTING DAILY IN THE AM   • Lancets (onetouch ultrasoft) lancets Use daily Use as instructed to check sugars daily   • lidocaine (Lidoderm) 5 % Apply 1 patch topically daily Remove & Discard patch within 12 hours or as directed by MD   • lidocaine (LMX) 4 % cream Apply topically as needed for mild pain   • metFORMIN (GLUCOPHAGE) 500 mg tablet Take 1 tablet (500 mg total) by mouth 2 (two) times a day with meals   • Mirabegron ER 50 MG TB24 Take 1 tablet (50 mg total) by mouth daily   • Multiple Vitamin (multivitamin) capsule Take 1 capsule by mouth daily for 14 days (Patient not taking: Reported on 1/24/2023)   • naloxone (NARCAN) 4 mg/0.1 mL nasal spray Administer 1 spray into a nostril. If no response after 2-3 minutes, give another dose in the other nostril using a new spray. • naloxone (NARCAN) 4 mg/0.1 mL nasal spray Administer 1 spray into a nostril. If no response after 2-3 minutes, give another dose in the other nostril using a new spray.    • ondansetron (Zofran ODT) 4 mg disintegrating tablet Take 1 tablet (4 mg total) by mouth every 6 (six) hours as needed for nausea or vomiting   • OneTouch Delica Lancets 36A MISC Use daily   • OneTouch Delica Lancets 92D MISC Use in the morning   • oxyCODONE (ROXICODONE) 10 MG TABS Take 1 tablet (10 mg total) by mouth 2 (two) times a day Max Daily Amount: 20 mg   • oxyCODONE ER (Xtampza) 9 mg extended release capsule Take 1 tablet (9 mg total) by mouth every 12 (twelve) hours Max Daily Amount: 18 mg   • tamsulosin (FLOMAX) 0.4 mg take 1 capsule by mouth once daily with dinner   • zolpidem (AMBIEN) 5 mg tablet Take 1 tablet (5 mg total) by mouth daily at bedtime as needed for sleep       Objective     /100 (BP Location: Right arm, Patient Position: Sitting, Cuff Size: Standard)   Pulse 84   Temp 98.7 °F (37.1 °C) (Temporal)   Resp 16   Ht 5' 7" (1.702 m)   Wt 90.7 kg (200 lb)   SpO2 96%   BMI 31.32 kg/m²     Physical Exam  Vitals and nursing note reviewed. Constitutional:       Appearance: He is well-developed. Comments: + rollator walker   HENT:      Head: Normocephalic and atraumatic. Cardiovascular:      Rate and Rhythm: Normal rate. Pulmonary:      Effort: Pulmonary effort is normal. No respiratory distress. Neurological:      Mental Status: He is alert and oriented to person, place, and time.    Psychiatric:         Mood and Affect: Mood normal.       Jitendra Ramirez MD

## 2023-08-15 NOTE — LETTER
August 15, 2023     Patient: Arlette Gonzalez   YOB: 1960   Date of Visit: 8/15/2023       To Whom it May Concern:    Arlette Gonzalez is under my professional care. Mr. Bennie Hallman has multiple chronic conditions and would benefit from continued services. He has a past medical history significant for chronic back pain with lumbar radiculopathy, chronic pain syndrome, lumbar foraminal stenosis, protrusion of lumbar intervertebral disc, chronic opiod use,hypertension, type 2 diabetes mellitus, hyperlipidemia, depression, paresthesias of both hands and feet, ambulatory dysfunction with the need of using a rollator walker, and BPH. A medication list has been attached to this letter for your review. If you have any questions or concerns, please don't hesitate to call.          Sincerely,          Fawad Boudreaux MD

## 2023-08-29 DIAGNOSIS — E11.9 TYPE 2 DIABETES MELLITUS WITHOUT COMPLICATION, WITHOUT LONG-TERM CURRENT USE OF INSULIN (HCC): ICD-10-CM

## 2023-08-30 ENCOUNTER — TELEPHONE (OUTPATIENT)
Dept: FAMILY MEDICINE CLINIC | Facility: CLINIC | Age: 63
End: 2023-08-30

## 2023-08-30 DIAGNOSIS — F11.20 CONTINUOUS OPIOID DEPENDENCE (HCC): ICD-10-CM

## 2023-08-30 DIAGNOSIS — G89.29 CHRONIC LEFT-SIDED LOW BACK PAIN WITH LEFT-SIDED SCIATICA: ICD-10-CM

## 2023-08-30 DIAGNOSIS — M54.42 CHRONIC LEFT-SIDED LOW BACK PAIN WITH LEFT-SIDED SCIATICA: ICD-10-CM

## 2023-08-30 DIAGNOSIS — G89.4 CHRONIC PAIN SYNDROME: ICD-10-CM

## 2023-08-30 NOTE — TELEPHONE ENCOUNTER
Pt came in needs refill for the below, also requested 90 tablets instead of 60, please advise    oxyCODONE (ROXICODONE) 10 MG TABS

## 2023-09-01 PROCEDURE — 99283 EMERGENCY DEPT VISIT LOW MDM: CPT

## 2023-09-01 NOTE — TELEPHONE ENCOUNTER
Pt came into office asking for status of medication. Advised pt message was sent high priority yesterday. Awaiting provider response. Pt insisted I send another message regarding status of medication.

## 2023-09-02 ENCOUNTER — HOSPITAL ENCOUNTER (EMERGENCY)
Facility: HOSPITAL | Age: 63
Discharge: HOME | End: 2023-09-02
Attending: EMERGENCY MEDICINE
Payer: MEDICARE

## 2023-09-02 VITALS
SYSTOLIC BLOOD PRESSURE: 147 MMHG | BODY MASS INDEX: 31.75 KG/M2 | WEIGHT: 202.7 LBS | TEMPERATURE: 98.5 F | DIASTOLIC BLOOD PRESSURE: 87 MMHG | RESPIRATION RATE: 20 BRPM | OXYGEN SATURATION: 99 % | HEART RATE: 90 BPM

## 2023-09-02 DIAGNOSIS — M54.50 CHRONIC LEFT-SIDED LOW BACK PAIN WITHOUT SCIATICA: Primary | ICD-10-CM

## 2023-09-02 DIAGNOSIS — G89.29 CHRONIC LEFT-SIDED LOW BACK PAIN WITHOUT SCIATICA: Primary | ICD-10-CM

## 2023-09-02 PROCEDURE — 96372 THER/PROPH/DIAG INJ SC/IM: CPT

## 2023-09-02 PROCEDURE — 99284 EMERGENCY DEPT VISIT MOD MDM: CPT | Performed by: EMERGENCY MEDICINE

## 2023-09-02 RX ORDER — ACETAMINOPHEN 500 MG
500 TABLET ORAL EVERY 6 HOURS PRN
Qty: 20 TABLET | Refills: 0 | Status: SHIPPED | OUTPATIENT
Start: 2023-09-02 | End: 2023-09-09

## 2023-09-02 RX ORDER — METHOCARBAMOL 500 MG/1
500 TABLET, FILM COATED ORAL ONCE
Status: COMPLETED | OUTPATIENT
Start: 2023-09-02 | End: 2023-09-02

## 2023-09-02 RX ORDER — LIDOCAINE 50 MG/G
1 PATCH TOPICAL ONCE
Status: DISCONTINUED | OUTPATIENT
Start: 2023-09-02 | End: 2023-09-02 | Stop reason: HOSPADM

## 2023-09-02 RX ORDER — KETOROLAC TROMETHAMINE 30 MG/ML
15 INJECTION, SOLUTION INTRAMUSCULAR; INTRAVENOUS ONCE
Status: COMPLETED | OUTPATIENT
Start: 2023-09-02 | End: 2023-09-02

## 2023-09-02 RX ORDER — ACETAMINOPHEN 325 MG/1
975 TABLET ORAL ONCE
Status: COMPLETED | OUTPATIENT
Start: 2023-09-02 | End: 2023-09-02

## 2023-09-02 RX ORDER — NAPROXEN 500 MG/1
500 TABLET ORAL 2 TIMES DAILY WITH MEALS
Qty: 15 TABLET | Refills: 0 | Status: SHIPPED | OUTPATIENT
Start: 2023-09-02

## 2023-09-02 RX ORDER — LIDOCAINE 50 MG/G
1 PATCH TOPICAL DAILY
Qty: 2 PATCH | Refills: 0 | Status: SHIPPED | OUTPATIENT
Start: 2023-09-02

## 2023-09-02 RX ADMIN — METHOCARBAMOL 500 MG: 500 TABLET, FILM COATED ORAL at 01:17

## 2023-09-02 RX ADMIN — KETOROLAC TROMETHAMINE 15 MG: 30 INJECTION, SOLUTION INTRAMUSCULAR; INTRAVENOUS at 01:17

## 2023-09-02 RX ADMIN — ACETAMINOPHEN 975 MG: 325 TABLET ORAL at 01:17

## 2023-09-02 RX ADMIN — LIDOCAINE 1 PATCH: 50 PATCH CUTANEOUS at 01:17

## 2023-09-02 NOTE — ED PROVIDER NOTES
History  Chief Complaint   Patient presents with   • Back Pain     Pt c/o lower left sided back pain that radiates down left leg that started 2 days ago. HPI     66 yo M hx of chronic Back pain, opioid dependence, DM HLD HTN presents for the same left side back pain. He states he hasn't gone to his therapy for past three weeks, unclear why. Patient states he has the tingling down his left leg. Takes gabapentin, robaxin. Last dose 7 pm. No tylenol or motrin taken. Denies trauma, hematuria, fevers chills, saddle anesthesia, bowel or bladder incontinence, or urinary retention; no hx of IV drug use or cancer, not on anticoagulants. Already had an MRI done this year: Left foraminal protrusion type disc herniation L3-4 and L4-5 results in moderate left foraminal narrowing. Correlate for left L3 or left L4 radiculopathy. Findings are unchanged from the prior study. States he is compliant with his home meds. Denies drug use    Prior to Admission Medications   Prescriptions Last Dose Informant Patient Reported? Taking?    Bioflavonoid Products (RA Vitamin C CR) TBCR  Self Yes No   Sig: take 1 tablet by mouth twice a day for 14 days   Patient not taking: Reported on 6/15/2023   Blood Glucose Monitoring Suppl (OneTouch Verio) w/Device KIT  Self No No   Sig: Use daily   Blood Glucose Monitoring Suppl KIT  Self No No   Sig: Use in the morning Please give test strips and lancets  Dx. E11.9   Blood Pressure KIT  Self No No   Sig: Use in the morning With appropriate size cuff   Blood Pressure Monitoring (Blood Pressure Monitor/Arm) EMILY  Self No No   Sig: Use daily   Cholecalciferol (D3 PO)  Self Yes No   Sig: Take by mouth daily   DULoxetine (CYMBALTA) 60 mg delayed release capsule   No No   Sig: Take 1 capsule (60 mg total) by mouth 2 (two) times a day   Diclofenac Sodium (VOLTAREN) 1 %   No No   Sig: Apply 2 g topically 4 (four) times a day as needed (low back or neck pain)   HYDROcodone-acetaminophen (NORCO) 7.5-325 mg per tablet   No No   Sig: Take 1 tablet by mouth every 8 (eight) hours as needed for pain For ongoing pain Max Daily Amount: 3 tablets   Lancet Devices (ONE TOUCH DELICA LANCING DEV) MISC  Self No No   Sig: Use daily   Lancets (freestyle) lancets  Self No No   Sig: TESTING DAILY IN THE AM   Lancets (onetouch ultrasoft) lancets  Self No No   Sig: Use daily Use as instructed to check sugars daily   Mirabegron ER 50 MG TB24  Self No No   Sig: Take 1 tablet (50 mg total) by mouth daily   Multiple Vitamin (multivitamin) capsule   No No   Sig: Take 1 capsule by mouth daily for 14 days   Patient not taking: Reported on 1/06/0077   OneTouch Delica Lancets 85X MISC  Self No No   Sig: Use daily   OneTouch Delica Lancets 68J MISC   No No   Sig: Use in the morning   albuterol (2.5 mg/3 mL) 0.083 % nebulizer solution  Self No No   Sig: Take 3 mL (2.5 mg total) by nebulization every 6 (six) hours as needed for wheezing or shortness of breath   albuterol (5 mg/mL) 0.5 % nebulizer solution   No No   Sig: Take 0.5 mL (2.5 mg total) by nebulization every 6 (six) hours as needed for wheezing   albuterol (ProAir HFA) 90 mcg/act inhaler   No No   Sig: Inhale 2 puffs every 6 (six) hours as needed for wheezing   albuterol (Ventolin HFA) 90 mcg/act inhaler  Self No No   Sig: Inhale 2 puffs every 4 (four) hours as needed for wheezing   aluminum-magnesium hydroxide-simethicone (MAALOX) 200-200-20 MG/5ML SUSP  Self No No   Sig: Take 15 mL by mouth 4 (four) times a day (before meals and at bedtime)   Patient not taking: Reported on 6/15/2023   amLODIPine (NORVASC) 10 mg tablet   No No   Sig: Take 1 tablet (10 mg total) by mouth daily   aspirin 325 mg tablet   No No   Sig: Take 1 tablet (325 mg total) by mouth daily   atorvastatin (LIPITOR) 20 mg tablet   No No   Sig: Take 1 tablet (20 mg total) by mouth daily   cyclobenzaprine (FLEXERIL) 10 mg tablet   No No   Sig: Take 1 tablet (10 mg total) by mouth 2 (two) times a day as needed for muscle spasms   famotidine (PEPCID) 20 mg tablet   No No   Sig: Take 1 tablet (20 mg total) by mouth daily   fluticasone (FLONASE) 50 mcg/act nasal spray  Self No No   Si spray into each nostril daily   fluticasone-salmeterol (Advair HFA) 115-21 MCG/ACT inhaler   No No   Sig: Inhale 2 puffs 2 (two) times a day Rinse mouth after use.   gabapentin (NEURONTIN) 800 mg tablet   No No   Sig: Take 1 tablet (800 mg total) by mouth 3 (three) times a day   glucose blood (OneTouch Verio) test strip  Self No No   Sig: Check blood sugar daily   glucose monitoring kit (FREESTYLE) monitoring kit  Self No No   Sig: Use 1 each in the morning TESTING DAILY IN THE AM   lidocaine (LMX) 4 % cream  Self No No   Sig: Apply topically as needed for mild pain   lidocaine (Lidoderm) 5 %  Self No No   Sig: Apply 1 patch topically daily Remove & Discard patch within 12 hours or as directed by MD   metFORMIN (GLUCOPHAGE) 500 mg tablet   No No   Sig: take 1 tablet by mouth twice a day with meals   naloxone (NARCAN) 4 mg/0.1 mL nasal spray  Self No No   Sig: Administer 1 spray into a nostril. If no response after 2-3 minutes, give another dose in the other nostril using a new spray. naloxone (NARCAN) 4 mg/0.1 mL nasal spray   No No   Sig: Administer 1 spray into a nostril. If no response after 2-3 minutes, give another dose in the other nostril using a new spray.    ondansetron (Zofran ODT) 4 mg disintegrating tablet   No No   Sig: Take 1 tablet (4 mg total) by mouth every 6 (six) hours as needed for nausea or vomiting   oxyCODONE (ROXICODONE) 10 MG TABS   No No   Sig: Take 1 tablet (10 mg total) by mouth 2 (two) times a day Max Daily Amount: 20 mg   oxyCODONE ER (Xtampza) 9 mg extended release capsule   No No   Sig: Take 1 tablet (9 mg total) by mouth every 12 (twelve) hours Max Daily Amount: 18 mg   tamsulosin (FLOMAX) 0.4 mg   No No   Sig: take 1 capsule by mouth once daily with dinner   zolpidem (AMBIEN) 5 mg tablet  Self No No   Sig: Take 1 tablet (5 mg total) by mouth daily at bedtime as needed for sleep      Facility-Administered Medications: None       Past Medical History:   Diagnosis Date   • Asthma    • Back pain    • Back pain    • 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   • Depression    • Diabetes mellitus ([address])    • Hyperlipidemia    • Hypertension    • Type 2 diabetes mellitus without complication, without long-term current use of insulin ([address]) 10/16/2020       Past Surgical History:   Procedure Laterality Date   • CYST REMOVAL  2017       Family History   Problem Relation Age of Onset   • Hypertension Mother    • Diabetes Mother    • Cancer Father    • Diabetes Family    • Hypertension Family      I have reviewed and agree with the history as documented. E-Cigarette/Vaping   • E-Cigarette Use Never User      E-Cigarette/Vaping Substances   • Nicotine No    • THC No    • CBD No    • Flavoring No    • Other No    • Unknown No      Social History     Tobacco Use   • Smoking status: Never   • Smokeless tobacco: Never   Vaping Use   • Vaping Use: Never used   Substance Use Topics   • Alcohol use: Not Currently     Comment: rare   • Drug use: Never       Review of Systems   Constitutional: Negative for chills, fatigue and fever. HENT: Negative for nosebleeds and sore throat. Eyes: Negative for redness and visual disturbance. Respiratory: Negative for shortness of breath and wheezing. Cardiovascular: Negative for chest pain and palpitations. Gastrointestinal: Negative for abdominal pain and diarrhea. Endocrine: Negative for polyuria. Genitourinary: Negative for difficulty urinating and testicular pain. Musculoskeletal: Positive for back pain. Negative for neck stiffness. Skin: Negative for rash and wound. Neurological: Negative for seizures, speech difficulty and headaches. Psychiatric/Behavioral: Negative for dysphoric mood and hallucinations.    All other systems reviewed and are negative. Physical Exam  Physical Exam  Vitals and nursing note reviewed. Constitutional:       Appearance: He is well-developed. HENT:      Head: Normocephalic and atraumatic. Right Ear: External ear normal.      Left Ear: External ear normal.   Eyes:      Conjunctiva/sclera: Conjunctivae normal.   Cardiovascular:      Rate and Rhythm: Normal rate and regular rhythm. Heart sounds: Normal heart sounds. Pulmonary:      Effort: Pulmonary effort is normal.      Breath sounds: Normal breath sounds. Abdominal:      General: There is no distension. Tenderness: There is no guarding. Musculoskeletal:         General: Normal range of motion. Cervical back: Normal range of motion. Comments: No midline c t l spine tenderness  ehl in tact b/l  No overlying rashes / skin changes     Skin:     General: Skin is warm and dry. Findings: No rash. Neurological:      Mental Status: He is alert and oriented to person, place, and time. Cranial Nerves: No cranial nerve deficit. Sensory: No sensory deficit. Motor: No abnormal muscle tone.       Coordination: Coordination normal.         Vital Signs  ED Triage Vitals [09/02/23 0008]   Temperature Pulse Respirations Blood Pressure SpO2   98.5 °F (36.9 °C) 90 20 147/87 99 %      Temp Source Heart Rate Source Patient Position - Orthostatic VS BP Location FiO2 (%)   Oral Monitor Sitting Left arm --      Pain Score       --           Vitals:    09/02/23 0008   BP: 147/87   Pulse: 90   Patient Position - Orthostatic VS: Sitting         Visual Acuity      ED Medications  Medications   ketorolac (TORADOL) injection 15 mg (15 mg Intramuscular Given 9/2/23 0117)   acetaminophen (TYLENOL) tablet 975 mg (975 mg Oral Given 9/2/23 0117)   methocarbamol (ROBAXIN) tablet 500 mg (500 mg Oral Given 9/2/23 0117)       Diagnostic Studies  Results Reviewed     None                 No orders to display              Procedures  Procedures         ED Course                                             MDM     Amount and/or Complexity of Data Reviewed  Reviewed past medical records: yes hx of chronic back pain  History Provided by patient    Differential considered: musculoskeletal back pain, no red flags on history or examination to suggest Traumatic fx, nephrolithiasis, cauda equina, spinal abscess or hematoma. Consideration of tests: low risk musculoskeletal back pain, does not requiring imaging. The policy on low back pain by the Public Health and Injury Prevention Committee, [employer], [employer] [facility], [employer], have all independently advised that acute imaging studies in patients with low back pain are inappropriate and not necessary. Patient does not meet criteria for emergent MRI. Patient advised if they develop sudden incontinence or retention, weakness in lower limbs, to return to ER. Regarding treatment, [facility] Clinical Policies recommend non pharamacologic therapies I.e. heat massage, and if using medications, NSAIDs are first line with consideration of muscle relaxers. Opioids are not recommend till all other analgesia modalities have been exhausted. Delivered Toradol tylenol robaxin emergency department; patient verbalizes decrease in back pain status post medication delivery    Home medications sent, follow-up with [facility] and PCP. The patient was instructed to follow up as documented. Strict return precautions were discussed with the patient and the patient was instructed to return to the emergency department immediately if symptoms worsen. The patient/patient family member acknowledged and were in agreement with plan.      Disposition  Final diagnoses:   Chronic left-sided low back pain without sciatica     Time reflects when diagnosis was documented in both MDM as applicable and the Disposition within this note     Time User Action Codes Description Comment 9/2/2023  1:20 AM Gio Barboza Add [M54.50,  G89.29] Chronic left-sided low back pain without sciatica       ED Disposition     ED Disposition   Discharge    Condition   Stable    Date/Time   Sat Sep 2, 2023  1:20 AM    Comment   Sushma Ballesteros discharge to home/self care. Follow-up Information     Follow up With Specialties Details Why Contact Info    Yash Lipscomb MD [facility] Medicine Schedule an appointment as soon as possible for a visit in 2 days For follow up regarding your symptoms and recheck [address] [place] [address]  [phone]            Discharge Medication List as of 9/2/2023  1:21 AM      START taking these medications    Details   acetaminophen (TYLENOL) 500 mg tablet Take 1 tablet (500 mg total) by mouth every 6 (six) hours as needed for mild pain for up to 7 days, Starting Sat 9/2/2023, Until Sat 9/9/2023 at 2359, Normal      !! Diclofenac Sodium (VOLTAREN) 1 % Apply 2 g topically 4 (four) times a day, Starting Sat 9/2/2023, Normal      !! lidocaine (Lidoderm) 5 % Apply 1 patch topically over 12 hours daily Remove & Discard patch within 12 hours or as directed by MD, Starting Sat 9/2/2023, Print      naproxen (NAPROSYN) 500 mg tablet Take 1 tablet (500 mg total) by mouth 2 (two) times a day with meals, Starting Sat 9/2/2023, Normal       !! - Potential duplicate medications found. Please discuss with provider.       CONTINUE these medications which have NOT CHANGED    Details   albuterol (2.5 mg/3 mL) 0.083 % nebulizer solution Take 3 mL (2.5 mg total) by nebulization every 6 (six) hours as needed for wheezing or shortness of breath, Starting Tue 9/6/2022, Normal      albuterol (5 mg/mL) 0.5 % nebulizer solution Take 0.5 mL (2.5 mg total) by nebulization every 6 (six) hours as needed for wheezing, Starting Tue 3/21/2023, Normal      !! albuterol (ProAir HFA) 90 mcg/act inhaler Inhale 2 puffs every 6 (six) hours as needed for wheezing, Starting Tue 3/21/2023, Normal      !! albuterol (Ventolin HFA) 90 mcg/act inhaler Inhale 2 puffs every 4 (four) hours as needed for wheezing, Starting Fri 1/20/2023, Normal      aluminum-magnesium hydroxide-simethicone (MAALOX) 200-200-20 MG/5ML SUSP Take 15 mL by mouth 4 (four) times a day (before meals and at bedtime), Starting Thu 1/7/2021, Normal      amLODIPine (NORVASC) 10 mg tablet Take 1 tablet (10 mg total) by mouth daily, Starting Mon 5/1/2023, Normal      aspirin 325 mg tablet Take 1 tablet (325 mg total) by mouth daily, Starting Thu 6/8/2023, Normal      atorvastatin (LIPITOR) 20 mg tablet Take 1 tablet (20 mg total) by mouth daily, Starting Fri 6/9/2023, Normal      Bioflavonoid Products (RA Vitamin C CR) TBCR take 1 tablet by mouth twice a day for 14 days, Historical Med      Blood Glucose Monitoring Suppl (OneTouch Verio) w/Device KIT Use daily, Starting Tue 6/14/2022, Normal      !! Blood Glucose Monitoring Suppl KIT Use in the morning Please give test strips and lancets  Dx. E11.9, Starting Wed 4/13/2022, Print      Blood Pressure KIT Use in the morning With appropriate size cuff, Starting Wed 4/13/2022, Print      Blood Pressure Monitoring (Blood Pressure Monitor/Arm) EMILY Use daily, Starting Tue 6/14/2022, Print      Cholecalciferol (D3 PO) Take by mouth daily, Historical Med      cyclobenzaprine (FLEXERIL) 10 mg tablet Take 1 tablet (10 mg total) by mouth 2 (two) times a day as needed for muscle spasms, Starting Mon 7/3/2023, Normal      !!  Diclofenac Sodium (VOLTAREN) 1 % Apply 2 g topically 4 (four) times a day as needed (low back or neck pain), Starting Mon 7/3/2023, Normal      DULoxetine (CYMBALTA) 60 mg delayed release capsule Take 1 capsule (60 mg total) by mouth 2 (two) times a day, Starting Mon 4/24/2023, Normal      famotidine (PEPCID) 20 mg tablet Take 1 tablet (20 mg total) by mouth daily, Starting Fri 6/9/2023, Normal      fluticasone (FLONASE) 50 mcg/act nasal spray 1 spray into each nostril daily, Starting Thu 12/16/2021, Normal      fluticasone-salmeterol (Advair HFA) 115-21 MCG/ACT inhaler Inhale 2 puffs 2 (two) times a day Rinse mouth after use., Starting Mon 4/24/2023, Normal      gabapentin (NEURONTIN) 800 mg tablet Take 1 tablet (800 mg total) by mouth 3 (three) times a day, Starting Tue 4/25/2023, Normal      glucose blood (OneTouch Verio) test strip Check blood sugar daily, Normal      !! glucose monitoring kit (FREESTYLE) monitoring kit Use 1 each in the morning TESTING DAILY IN THE AM, Starting Mon 5/9/2022, Print      HYDROcodone-acetaminophen (NORCO) 7.5-325 mg per tablet Take 1 tablet by mouth every 8 (eight) hours as needed for pain For ongoing pain Max Daily Amount: 3 tablets, Starting Thu 5/25/2023, Normal      Lancet Devices (ONE TOUCH DELICA LANCING DEV) MISC Use daily, Starting Fri 8/12/2022, Normal      !! Lancets (freestyle) lancets TESTING DAILY IN THE AM, Print      !! Lancets (onetouch ultrasoft) lancets Use daily Use as instructed to check sugars daily, Starting Tue 5/24/2022, Normal      !! lidocaine (Lidoderm) 5 % Apply 1 patch topically daily Remove & Discard patch within 12 hours or as directed by MD, Starting Wed 12/7/2022, Normal      lidocaine (LMX) 4 % cream Apply topically as needed for mild pain, Starting Fri 8/12/2022, Normal      metFORMIN (GLUCOPHAGE) 500 mg tablet take 1 tablet by mouth twice a day with meals, Starting Tue 8/29/2023, Normal      Mirabegron ER 50 MG TB24 Take 1 tablet (50 mg total) by mouth daily, Starting Mon 6/20/2022, Normal      Multiple Vitamin (multivitamin) capsule Take 1 capsule by mouth daily for 14 days, Starting Fri 1/22/2021, Until Wed 4/13/2022, Print      !! naloxone (NARCAN) 4 mg/0.1 mL nasal spray Administer 1 spray into a nostril. If no response after 2-3 minutes, give another dose in the other nostril using a new spray., Normal      !! naloxone (NARCAN) 4 mg/0.1 mL nasal spray Administer 1 spray into a nostril.  If no response after 2-3 minutes, give another dose in the other nostril using a new spray., Normal      ondansetron (Zofran ODT) 4 mg disintegrating tablet Take 1 tablet (4 mg total) by mouth every 6 (six) hours as needed for nausea or vomiting, Starting Mon 6/26/2023, Normal      !! OneTouch Delica Lancets 07M MISC Use daily, Starting Thu 10/14/2021, Normal      !! OneTouch Delica Lancets 98J MISC Use in the morning, Starting Mon 7/3/2023, Normal      oxyCODONE (ROXICODONE) 10 MG TABS Take 1 tablet (10 mg total) by mouth 2 (two) times a day Max Daily Amount: 20 mg, Starting Tue 7/25/2023, Normal      oxyCODONE ER (Xtampza) 9 mg extended release capsule Take 1 tablet (9 mg total) by mouth every 12 (twelve) hours Max Daily Amount: 18 mg, Starting Mon 6/26/2023, Normal      tamsulosin (FLOMAX) 0.4 mg take 1 capsule by mouth once daily with dinner, Normal      zolpidem (AMBIEN) 5 mg tablet Take 1 tablet (5 mg total) by mouth daily at bedtime as needed for sleep, Starting Fri 12/10/2021, Normal       !! - Potential duplicate medications found. Please discuss with provider.               PDMP Review       Value Time User    PDMP Reviewed  Yes 7/25/2023 10:56 AM Nelly Johnson MD          ED Provider  Electronically Signed by           Willie Campos MD  09/02/23 1284

## 2023-09-05 ENCOUNTER — TELEPHONE (OUTPATIENT)
Dept: PHYSICAL THERAPY | Facility: OTHER | Age: 63
End: 2023-09-05

## 2023-09-05 RX ORDER — OXYCODONE HYDROCHLORIDE 10 MG/1
10 TABLET ORAL 3 TIMES DAILY
Qty: 90 TABLET | Refills: 0 | Status: SHIPPED | OUTPATIENT
Start: 2023-09-05

## 2023-09-05 NOTE — TELEPHONE ENCOUNTER
Call placed to the patient per [facility] referral.    Patient already in PT (last visit 07/05) at [facility] site. Patient can call back an schedule a f/up-referral still active. (after 30 days patient might need a new referral.    Patient has a referral for PM (pain management) phone number provided. This is the 1st attempt to reach the patient. Will defer referral per protocol.

## 2023-09-11 ENCOUNTER — OFFICE VISIT (OUTPATIENT)
Dept: FAMILY MEDICINE CLINIC | Facility: CLINIC | Age: 63
End: 2023-09-11

## 2023-09-11 VITALS
BODY MASS INDEX: 31.08 KG/M2 | WEIGHT: 198 LBS | RESPIRATION RATE: 22 BRPM | HEART RATE: 91 BPM | OXYGEN SATURATION: 98 % | HEIGHT: 67 IN | TEMPERATURE: 98 F | DIASTOLIC BLOOD PRESSURE: 70 MMHG | SYSTOLIC BLOOD PRESSURE: 138 MMHG

## 2023-09-11 DIAGNOSIS — M48.061 LUMBAR FORAMINAL STENOSIS: ICD-10-CM

## 2023-09-11 DIAGNOSIS — M54.16 RADICULOPATHY, LUMBAR REGION: ICD-10-CM

## 2023-09-11 DIAGNOSIS — E11.9 TYPE 2 DIABETES MELLITUS WITHOUT COMPLICATION, WITHOUT LONG-TERM CURRENT USE OF INSULIN (HCC): ICD-10-CM

## 2023-09-11 DIAGNOSIS — M54.42 ACUTE LEFT-SIDED LOW BACK PAIN WITH LEFT-SIDED SCIATICA: Primary | ICD-10-CM

## 2023-09-11 PROCEDURE — 3075F SYST BP GE 130 - 139MM HG: CPT | Performed by: FAMILY MEDICINE

## 2023-09-11 PROCEDURE — 3078F DIAST BP <80 MM HG: CPT | Performed by: FAMILY MEDICINE

## 2023-09-11 PROCEDURE — 99213 OFFICE O/P EST LOW 20 MIN: CPT | Performed by: FAMILY MEDICINE

## 2023-09-11 RX ORDER — METHOCARBAMOL 500 MG/1
500 TABLET, FILM COATED ORAL 3 TIMES DAILY
Qty: 30 TABLET | Refills: 0 | Status: SHIPPED | OUTPATIENT
Start: 2023-09-11

## 2023-09-11 RX ORDER — BLOOD SUGAR DIAGNOSTIC
STRIP MISCELLANEOUS
Qty: 100 EACH | Refills: 2 | Status: SHIPPED | OUTPATIENT
Start: 2023-09-11

## 2023-09-11 NOTE — PROGRESS NOTES
Name: Dexter Hines      : 1960      MRN: [id number]  Encounter Provider: Carolina Finney MD  Encounter Date: 2023   Encounter department: [facility]     1. Acute left-sided low back pain with left-sided sciatica  Assessment & Plan:  Acute exacerbation of lower back pain  S/p ER visit on 2023  Refilled methocarbamol  Continue current management    Orders:  -     methocarbamol (ROBAXIN) 500 mg tablet; Take 1 tablet (500 mg total) by mouth 3 (three) times a day    2. Type 2 diabetes mellitus without complication, without long-term current use of insulin ([facility])  -     glucose blood (OneTouch Verio) test strip; Check blood sugar daily    3. Radiculopathy, lumbar region  -     Nerve Stimulator (TENS Therapy Pain Relief) EMILY; Use as directed    4. Lumbar foraminal stenosis  -     Nerve Stimulator (TENS Therapy Pain Relief) EMILY; Use as directed           Subjective      HPI   Dexter Hines is a 65 y.o. male  who presented to the office today for chronic lower back pain. Wenceslao Reis is s/p an ER visit on 2023 for an acute exacerbation of his chronic left-sided lower back pain with radiculopathy. He states that the Toradol injection he received in the emergency room did help to relieve his pain. He is currently doing home exercises using lidocaine cream topically naproxen, gabapentin, and oxycodone for his chronic pain. He was taking Percocet switched 2 months ago to oxycodone daily, and then recently increased to oxycodone twice daily. He does use a cane to assist him with walking as well as the rollator walker. The following portions of the patient's history were reviewed and updated as appropriate: allergies, current medications, past family history, past medical history, past social history, past surgical history and problem list.    Review of Systems   Constitutional: Negative for chills and fever.    HENT: Negative for congestion, rhinorrhea and sore throat. Respiratory: Negative for cough and shortness of breath. Cardiovascular: Negative for chest pain. Gastrointestinal: Negative for diarrhea, nausea and vomiting. Musculoskeletal: Positive for back pain and gait problem. Skin: Negative for rash. Neurological: Negative for dizziness and headaches.        Current Outpatient Medications on File Prior to Visit   Medication Sig   • albuterol (2.5 mg/3 mL) 0.083 % nebulizer solution Take 3 mL (2.5 mg total) by nebulization every 6 (six) hours as needed for wheezing or shortness of breath   • albuterol (5 mg/mL) 0.5 % nebulizer solution Take 0.5 mL (2.5 mg total) by nebulization every 6 (six) hours as needed for wheezing   • albuterol (ProAir HFA) 90 mcg/act inhaler Inhale 2 puffs every 6 (six) hours as needed for wheezing   • albuterol (Ventolin HFA) 90 mcg/act inhaler Inhale 2 puffs every 4 (four) hours as needed for wheezing   • aluminum-magnesium hydroxide-simethicone (MAALOX) 200-200-20 MG/5ML SUSP Take 15 mL by mouth 4 (four) times a day (before meals and at bedtime) (Patient not taking: Reported on 6/15/2023)   • amLODIPine (NORVASC) 10 mg tablet Take 1 tablet (10 mg total) by mouth daily   • aspirin 325 mg tablet Take 1 tablet (325 mg total) by mouth daily   • atorvastatin (LIPITOR) 20 mg tablet Take 1 tablet (20 mg total) by mouth daily   • Bioflavonoid Products (RA Vitamin C CR) TBCR take 1 tablet by mouth twice a day for 14 days (Patient not taking: Reported on 6/15/2023)   • Blood Glucose Monitoring Suppl (OneTouch Verio) w/Device KIT Use daily   • Blood Glucose Monitoring Suppl KIT Use in the morning Please give test strips and lancets  Dx. E11.9   • Blood Pressure KIT Use in the morning With appropriate size cuff   • Blood Pressure Monitoring (Blood Pressure Monitor/Arm) EMILY Use daily   • Cholecalciferol (D3 PO) Take by mouth daily   • Diclofenac Sodium (VOLTAREN) 1 % Apply 2 g topically 4 (four) times a day as needed (low back or neck pain)   • Diclofenac Sodium (VOLTAREN) 1 % Apply 2 g topically 4 (four) times a day   • DULoxetine (CYMBALTA) 60 mg delayed release capsule Take 1 capsule (60 mg total) by mouth 2 (two) times a day   • famotidine (PEPCID) 20 mg tablet Take 1 tablet (20 mg total) by mouth daily   • fluticasone (FLONASE) 50 mcg/act nasal spray 1 spray into each nostril daily   • fluticasone-salmeterol (Advair HFA) 115-21 MCG/ACT inhaler Inhale 2 puffs 2 (two) times a day Rinse mouth after use. • gabapentin (NEURONTIN) 800 mg tablet Take 1 tablet (800 mg total) by mouth 3 (three) times a day   • glucose monitoring kit (FREESTYLE) monitoring kit Use 1 each in the morning TESTING DAILY IN THE AM   • Lancet Devices (ONE TOUCH DELICA LANCING DEV) MISC Use daily   • Lancets (freestyle) lancets TESTING DAILY IN THE AM   • Lancets (onetouch ultrasoft) lancets Use daily Use as instructed to check sugars daily   • lidocaine (Lidoderm) 5 % Apply 1 patch topically daily Remove & Discard patch within 12 hours or as directed by MD   • lidocaine (Lidoderm) 5 % Apply 1 patch topically over 12 hours daily Remove & Discard patch within 12 hours or as directed by MD   • lidocaine (LMX) 4 % cream Apply topically as needed for mild pain   • metFORMIN (GLUCOPHAGE) 500 mg tablet take 1 tablet by mouth twice a day with meals   • Mirabegron ER 50 MG TB24 Take 1 tablet (50 mg total) by mouth daily   • Multiple Vitamin (multivitamin) capsule Take 1 capsule by mouth daily for 14 days (Patient not taking: Reported on 1/24/2023)   • naloxone (NARCAN) 4 mg/0.1 mL nasal spray Administer 1 spray into a nostril. If no response after 2-3 minutes, give another dose in the other nostril using a new spray. • naloxone (NARCAN) 4 mg/0.1 mL nasal spray Administer 1 spray into a nostril. If no response after 2-3 minutes, give another dose in the other nostril using a new spray.    • naproxen (NAPROSYN) 500 mg tablet Take 1 tablet (500 mg total) by mouth 2 (two) times a day with meals   • ondansetron (Zofran ODT) 4 mg disintegrating tablet Take 1 tablet (4 mg total) by mouth every 6 (six) hours as needed for nausea or vomiting   • OneTouch Delica Lancets 83J MISC Use daily   • OneTouch Delica Lancets 81A MISC Use in the morning   • oxyCODONE (ROXICODONE) 10 MG TABS Take 1 tablet (10 mg total) by mouth 3 (three) times a day Max Daily Amount: 30 mg   • tamsulosin (FLOMAX) 0.4 mg take 1 capsule by mouth once daily with dinner   • zolpidem (AMBIEN) 5 mg tablet Take 1 tablet (5 mg total) by mouth daily at bedtime as needed for sleep   • [DISCONTINUED] cyclobenzaprine (FLEXERIL) 10 mg tablet Take 1 tablet (10 mg total) by mouth 2 (two) times a day as needed for muscle spasms   • [DISCONTINUED] HYDROcodone-acetaminophen (NORCO) 7.5-325 mg per tablet Take 1 tablet by mouth every 8 (eight) hours as needed for pain For ongoing pain Max Daily Amount: 3 tablets       Objective     /70 (BP Location: Left arm, Patient Position: Sitting, Cuff Size: Standard)   Pulse 91   Temp 98 °F (36.7 °C) (Temporal)   Resp 22   Ht 5' 7" (1.702 m)   Wt 89.8 kg (198 lb)   SpO2 98%   BMI 31.01 kg/m²     Physical Exam  Vitals and nursing note reviewed. Constitutional:       Appearance: He is well-developed. Comments: + cane   HENT:      Head: Normocephalic and atraumatic. Cardiovascular:      Rate and Rhythm: Normal rate. Pulmonary:      Effort: Pulmonary effort is normal. No respiratory distress. Musculoskeletal:      Thoracic back: Spasms and tenderness present. Lumbar back: Spasms and tenderness present. Back:       Right lower leg: No edema. Left lower leg: No edema. Comments: + Significant left paraspinal muscle spasm thoracic/lumbar area   Neurological:      Mental Status: He is alert and oriented to person, place, and time.       Gait: Gait abnormal.   Psychiatric:         Mood and Affect: Mood normal.         Behavior: Behavior normal. Yash Lipscomb MD

## 2023-09-12 NOTE — ASSESSMENT & PLAN NOTE
Acute exacerbation of lower back pain  S/p ER visit on 9/2/2023  Refilled methocarbamol  Continue current management

## 2023-09-18 ENCOUNTER — TELEPHONE (OUTPATIENT)
Dept: FAMILY MEDICINE CLINIC | Facility: CLINIC | Age: 63
End: 2023-09-18

## 2023-09-19 ENCOUNTER — TELEPHONE (OUTPATIENT)
Dept: FAMILY MEDICINE CLINIC | Facility: CLINIC | Age: 63
End: 2023-09-19

## 2023-09-19 NOTE — TELEPHONE ENCOUNTER
PCP SIGNATURE NEEDED FOR Cover my meds FORM RECEIVED VIA FAX AND PLACED IN PCP FOLDER TO BE DELIVERED AT ASSIGNED TIMES.       Methocarbamol 500mg

## 2023-09-20 ENCOUNTER — HOSPITAL ENCOUNTER (EMERGENCY)
Facility: HOSPITAL | Age: 63
Discharge: HOME | End: 2023-09-20
Attending: EMERGENCY MEDICINE
Payer: MEDICARE

## 2023-09-20 ENCOUNTER — APPOINTMENT (EMERGENCY)
Dept: RADIOLOGY | Facility: HOSPITAL | Age: 63
End: 2023-09-20
Payer: MEDICARE

## 2023-09-20 VITALS
DIASTOLIC BLOOD PRESSURE: 110 MMHG | WEIGHT: 195.8 LBS | BODY MASS INDEX: 30.67 KG/M2 | OXYGEN SATURATION: 98 % | TEMPERATURE: 98.2 F | HEART RATE: 93 BPM | SYSTOLIC BLOOD PRESSURE: 166 MMHG | RESPIRATION RATE: 18 BRPM

## 2023-09-20 DIAGNOSIS — B34.9 VIRAL ILLNESS: Primary | ICD-10-CM

## 2023-09-20 LAB
ALBUMIN SERPL BCP-MCNC: 4.1 G/DL (ref 3.5–5)
ALP SERPL-CCNC: 63 U/L (ref 34–104)
ALT SERPL W P-5'-P-CCNC: 29 U/L (ref 7–52)
ANION GAP SERPL CALCULATED.3IONS-SCNC: 10 MMOL/L
AST SERPL W P-5'-P-CCNC: 21 U/L (ref 13–39)
ATRIAL RATE: 82 BPM
BASOPHILS # BLD AUTO: 0.02 THOUSANDS/ÂΜL (ref 0–0.1)
BASOPHILS NFR BLD AUTO: 0 % (ref 0–1)
BILIRUB SERPL-MCNC: 0.41 MG/DL (ref 0.2–1)
BUN SERPL-MCNC: 15 MG/DL (ref 5–25)
CALCIUM SERPL-MCNC: 9.3 MG/DL (ref 8.4–10.2)
CHLORIDE SERPL-SCNC: 101 MMOL/L (ref 96–108)
CO2 SERPL-SCNC: 24 MMOL/L (ref 21–32)
CREAT SERPL-MCNC: 1.1 MG/DL (ref 0.6–1.3)
EOSINOPHIL # BLD AUTO: 0.1 THOUSAND/ÂΜL (ref 0–0.61)
EOSINOPHIL NFR BLD AUTO: 2 % (ref 0–6)
ERYTHROCYTE [DISTWIDTH] IN BLOOD BY AUTOMATED COUNT: 13.2 % (ref 11.6–15.1)
FLUAV RNA RESP QL NAA+PROBE: NEGATIVE
FLUBV RNA RESP QL NAA+PROBE: NEGATIVE
GFR SERPL CREATININE-BSD FRML MDRD: 72 ML/MIN/1.73SQ M
GLUCOSE SERPL-MCNC: 235 MG/DL (ref 65–140)
HCT VFR BLD AUTO: 40.1 % (ref 36.5–49.3)
HGB BLD-MCNC: 14.5 G/DL (ref 12–17)
IMM GRANULOCYTES # BLD AUTO: 0.01 THOUSAND/UL (ref 0–0.2)
IMM GRANULOCYTES NFR BLD AUTO: 0 % (ref 0–2)
LYMPHOCYTES # BLD AUTO: 2.7 THOUSANDS/ÂΜL (ref 0.6–4.47)
LYMPHOCYTES NFR BLD AUTO: 50 % (ref 14–44)
MAGNESIUM SERPL-MCNC: 2 MG/DL (ref 1.9–2.7)
MCH RBC QN AUTO: 29.7 PG (ref 26.8–34.3)
MCHC RBC AUTO-ENTMCNC: 36.2 G/DL (ref 31.4–37.4)
MCV RBC AUTO: 82 FL (ref 82–98)
MONOCYTES # BLD AUTO: 0.35 THOUSAND/ÂΜL (ref 0.17–1.22)
MONOCYTES NFR BLD AUTO: 6 % (ref 4–12)
NEUTROPHILS # BLD AUTO: 2.33 THOUSANDS/ÂΜL (ref 1.85–7.62)
NEUTS SEG NFR BLD AUTO: 42 % (ref 43–75)
NRBC BLD AUTO-RTO: 0 /100 WBCS
P AXIS: 52 DEGREES
PLATELET # BLD AUTO: 231 THOUSANDS/UL (ref 149–390)
PMV BLD AUTO: 9.1 FL (ref 8.9–12.7)
POTASSIUM SERPL-SCNC: 4 MMOL/L (ref 3.5–5.3)
PR INTERVAL: 188 MS
PROT SERPL-MCNC: 7.2 G/DL (ref 6.4–8.4)
QRS AXIS: 41 DEGREES
QRSD INTERVAL: 84 MS
QT INTERVAL: 382 MS
QTC INTERVAL: 446 MS
RBC # BLD AUTO: 4.88 MILLION/UL (ref 3.88–5.62)
RSV RNA RESP QL NAA+PROBE: NEGATIVE
SARS-COV-2 RNA RESP QL NAA+PROBE: NEGATIVE
SODIUM SERPL-SCNC: 135 MMOL/L (ref 135–147)
T WAVE AXIS: 14 DEGREES
VENTRICULAR RATE: 82 BPM
WBC # BLD AUTO: 5.51 THOUSAND/UL (ref 4.31–10.16)

## 2023-09-20 PROCEDURE — 85025 COMPLETE CBC W/AUTO DIFF WBC: CPT

## 2023-09-20 PROCEDURE — 96361 HYDRATE IV INFUSION ADD-ON: CPT

## 2023-09-20 PROCEDURE — 96374 THER/PROPH/DIAG INJ IV PUSH: CPT

## 2023-09-20 PROCEDURE — 93010 ELECTROCARDIOGRAM REPORT: CPT

## 2023-09-20 PROCEDURE — 36415 COLL VENOUS BLD VENIPUNCTURE: CPT

## 2023-09-20 PROCEDURE — 99285 EMERGENCY DEPT VISIT HI MDM: CPT

## 2023-09-20 PROCEDURE — 71046 X-RAY EXAM CHEST 2 VIEWS: CPT

## 2023-09-20 PROCEDURE — 93005 ELECTROCARDIOGRAM TRACING: CPT

## 2023-09-20 PROCEDURE — 99284 EMERGENCY DEPT VISIT MOD MDM: CPT

## 2023-09-20 PROCEDURE — 83735 ASSAY OF MAGNESIUM: CPT

## 2023-09-20 PROCEDURE — 0241U HB NFCT DS VIR RESP RNA 4 TRGT: CPT

## 2023-09-20 PROCEDURE — 80053 COMPREHEN METABOLIC PANEL: CPT

## 2023-09-20 RX ORDER — ONDANSETRON 2 MG/ML
4 INJECTION INTRAMUSCULAR; INTRAVENOUS ONCE
Status: COMPLETED | OUTPATIENT
Start: 2023-09-20 | End: 2023-09-20

## 2023-09-20 RX ADMIN — ONDANSETRON 4 MG: 2 INJECTION INTRAMUSCULAR; INTRAVENOUS at 01:17

## 2023-09-20 RX ADMIN — SODIUM CHLORIDE 1000 ML: 0.9 INJECTION, SOLUTION INTRAVENOUS at 01:17

## 2023-09-20 NOTE — Clinical Note
Saw Schofield was seen and treated in our emergency department on 9/20/2023. No restrictions            Diagnosis:     Fair  . He may return on this date: 09/21/2023         If you have any questions or concerns, please don't hesitate to call.       Clarence Magaña PA-C    ______________________________           _______________          _______________  Hospital Representative                              Date                                Time

## 2023-09-20 NOTE — ED PROVIDER NOTES
History  Chief Complaint   Patient presents with   • Cold Like Symptoms     Pt c/o of mucus in throat and feeling weak since 1pm. Also states pain in bilat legs      Patient is a 64-year-old male coming in for complaint of mucus, nausea, and for the last 12 hours. Also complaining of chronic bilateral leg pain. Has taken Motrin without significant relief. Patient had to be woken up from sleep, to answer my questions at the initial interview      URI  Presenting symptoms: congestion    Presenting symptoms: no fatigue, no fever and no sore throat        Prior to Admission Medications   Prescriptions Last Dose Informant Patient Reported? Taking?    Bioflavonoid Products (RA Vitamin C CR) TBCR  Self Yes No   Sig: take 1 tablet by mouth twice a day for 14 days   Patient not taking: Reported on 6/15/2023   Blood Glucose Monitoring Suppl (OneTouch Verio) w/Device KIT  Self No No   Sig: Use daily   Blood Glucose Monitoring Suppl KIT  Self No No   Sig: Use in the morning Please give test strips and lancets  Dx. E11.9   Blood Pressure KIT  Self No No   Sig: Use in the morning With appropriate size cuff   Blood Pressure Monitoring (Blood Pressure Monitor/Arm) EMILY  Self No No   Sig: Use daily   Cholecalciferol (D3 PO)  Self Yes No   Sig: Take by mouth daily   DULoxetine (CYMBALTA) 60 mg delayed release capsule   No No   Sig: Take 1 capsule (60 mg total) by mouth 2 (two) times a day   Diclofenac Sodium (VOLTAREN) 1 %   No No   Sig: Apply 2 g topically 4 (four) times a day as needed (low back or neck pain)   Diclofenac Sodium (VOLTAREN) 1 %   No No   Sig: Apply 2 g topically 4 (four) times a day   Lancet Devices (ONE TOUCH DELICA LANCING DEV) MISC  Self No No   Sig: Use daily   Lancets (freestyle) lancets  Self No No   Sig: TESTING DAILY IN THE AM   Lancets (onetouch ultrasoft) lancets  Self No No   Sig: Use daily Use as instructed to check sugars daily   Mirabegron ER 50 MG TB24  Self No No   Sig: Take 1 tablet (50 mg total) by mouth daily   Multiple Vitamin (multivitamin) capsule   No No   Sig: Take 1 capsule by mouth daily for 14 days   Patient not taking: Reported on 2023   Nerve Stimulator (TENS Therapy Pain Relief) EMILY   No No   Sig: Use as directed   OneTouch Delica Lancets 53O MISC  Self No No   Sig: Use daily   OneTouch Delica Lancets 06P MISC   No No   Sig: Use in the morning   albuterol (2.5 mg/3 mL) 0.083 % nebulizer solution  Self No No   Sig: Take 3 mL (2.5 mg total) by nebulization every 6 (six) hours as needed for wheezing or shortness of breath   albuterol (5 mg/mL) 0.5 % nebulizer solution   No No   Sig: Take 0.5 mL (2.5 mg total) by nebulization every 6 (six) hours as needed for wheezing   albuterol (ProAir HFA) 90 mcg/act inhaler   No No   Sig: Inhale 2 puffs every 6 (six) hours as needed for wheezing   albuterol (Ventolin HFA) 90 mcg/act inhaler  Self No No   Sig: Inhale 2 puffs every 4 (four) hours as needed for wheezing   aluminum-magnesium hydroxide-simethicone (MAALOX) 200-200-20 MG/5ML SUSP  Self No No   Sig: Take 15 mL by mouth 4 (four) times a day (before meals and at bedtime)   Patient not taking: Reported on 6/15/2023   amLODIPine (NORVASC) 10 mg tablet   No No   Sig: Take 1 tablet (10 mg total) by mouth daily   aspirin 325 mg tablet   No No   Sig: Take 1 tablet (325 mg total) by mouth daily   atorvastatin (LIPITOR) 20 mg tablet   No No   Sig: Take 1 tablet (20 mg total) by mouth daily   famotidine (PEPCID) 20 mg tablet   No No   Sig: Take 1 tablet (20 mg total) by mouth daily   fluticasone (FLONASE) 50 mcg/act nasal spray  Self No No   Si spray into each nostril daily   fluticasone-salmeterol (Advair HFA) 115-21 MCG/ACT inhaler   No No   Sig: Inhale 2 puffs 2 (two) times a day Rinse mouth after use.   gabapentin (NEURONTIN) 800 mg tablet   No No   Sig: Take 1 tablet (800 mg total) by mouth 3 (three) times a day   glucose blood (OneTouch Verio) test strip   No No   Sig: Check blood sugar daily glucose monitoring kit (FREESTYLE) monitoring kit  Self No No   Sig: Use 1 each in the morning TESTING DAILY IN THE AM   lidocaine (LMX) 4 % cream  Self No No   Sig: Apply topically as needed for mild pain   lidocaine (Lidoderm) 5 %  Self No No   Sig: Apply 1 patch topically daily Remove & Discard patch within 12 hours or as directed by MD   lidocaine (Lidoderm) 5 %   No No   Sig: Apply 1 patch topically over 12 hours daily Remove & Discard patch within 12 hours or as directed by MD   metFORMIN (GLUCOPHAGE) 500 mg tablet   No No   Sig: take 1 tablet by mouth twice a day with meals   methocarbamol (ROBAXIN) 500 mg tablet   No No   Sig: Take 1 tablet (500 mg total) by mouth 3 (three) times a day   naloxone (NARCAN) 4 mg/0.1 mL nasal spray  Self No No   Sig: Administer 1 spray into a nostril. If no response after 2-3 minutes, give another dose in the other nostril using a new spray. naloxone (NARCAN) 4 mg/0.1 mL nasal spray   No No   Sig: Administer 1 spray into a nostril. If no response after 2-3 minutes, give another dose in the other nostril using a new spray.    naproxen (NAPROSYN) 500 mg tablet   No No   Sig: Take 1 tablet (500 mg total) by mouth 2 (two) times a day with meals   ondansetron (Zofran ODT) 4 mg disintegrating tablet   No No   Sig: Take 1 tablet (4 mg total) by mouth every 6 (six) hours as needed for nausea or vomiting   oxyCODONE (ROXICODONE) 10 MG TABS   No No   Sig: Take 1 tablet (10 mg total) by mouth 3 (three) times a day Max Daily Amount: 30 mg   tamsulosin (FLOMAX) 0.4 mg   No No   Sig: take 1 capsule by mouth once daily with dinner   zolpidem (AMBIEN) 5 mg tablet  Self No No   Sig: Take 1 tablet (5 mg total) by mouth daily at bedtime as needed for sleep      Facility-Administered Medications: None       Past Medical History:   Diagnosis Date   • Asthma    • Back pain    • Back pain    • 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   • Depression    • Diabetes mellitus ([address]) • Hyperlipidemia    • Hypertension    • Type 2 diabetes mellitus without complication, without long-term current use of insulin ([address]) 10/16/2020       Past Surgical History:   Procedure Laterality Date   • CYST REMOVAL  2017       Family History   Problem Relation Age of Onset   • Hypertension Mother    • Diabetes Mother    • Cancer Father    • Diabetes Family    • Hypertension Family      I have reviewed and agree with the history as documented. E-Cigarette/Vaping   • E-Cigarette Use Never User      E-Cigarette/Vaping Substances   • Nicotine No    • THC No    • CBD No    • Flavoring No    • Other No    • Unknown No      Social History     Tobacco Use   • Smoking status: Never   • Smokeless tobacco: Never   Vaping Use   • Vaping Use: Never used   Substance Use Topics   • Alcohol use: Not Currently     Comment: rare   • Drug use: Never       Review of Systems   Constitutional: Negative for fatigue and fever. HENT: Positive for congestion. Negative for sore throat and trouble swallowing. Respiratory: Negative for shortness of breath. Gastrointestinal: Positive for nausea. Negative for abdominal pain and vomiting. Physical Exam  Physical Exam  Vitals reviewed. Constitutional:       Appearance: Normal appearance. He is normal weight. HENT:      Head: Normocephalic and atraumatic. Right Ear: External ear normal.      Left Ear: External ear normal.      Nose: Nose normal.   Eyes:      Conjunctiva/sclera: Conjunctivae normal.   Cardiovascular:      Rate and Rhythm: Normal rate. Pulmonary:      Effort: Pulmonary effort is normal.   Abdominal:      Palpations: Abdomen is soft. Tenderness: There is no abdominal tenderness. There is no guarding. Musculoskeletal:         General: Normal range of motion. Cervical back: Normal range of motion. Skin:     General: Skin is warm and dry. Neurological:      Mental Status: He is alert.          Vital Signs  ED Triage Vitals [09/20/23 0050] Temperature Pulse Respirations Blood Pressure SpO2   98.2 °F (36.8 °C) 93 18 (!) 166/110 98 %      Temp Source Heart Rate Source Patient Position - Orthostatic VS BP Location FiO2 (%)   Oral Monitor Sitting Left arm --      Pain Score       --           Vitals:    09/20/23 0050   BP: (!) 166/110   Pulse: 93   Patient Position - Orthostatic VS: Sitting         Visual Acuity      ED Medications  Medications   ondansetron (ZOFRAN) injection 4 mg (4 mg Intravenous Given 9/20/23 0117)   sodium chloride 0.9 % bolus 1,000 mL (0 mL Intravenous Stopped 9/20/23 0206)       Diagnostic Studies  Results Reviewed     Procedure Component Value Units Date/Time    FLU/RSV/COVID - if FLU/RSV clinically relevant [[id number]]  (Normal) Collected: 09/20/23 0116    Lab Status: Final result Specimen: Nares from Nose Updated: 09/20/23 0200     SARS-CoV-2 Negative     INFLUENZA A PCR Negative     INFLUENZA B PCR Negative     RSV PCR Negative    Narrative:      FOR PEDIATRIC PATIENTS - copy/paste COVID Guidelines URL to browser: [email or web address]    SARS-CoV-2 assay is a Nucleic Acid Amplification assay intended for the  qualitative detection of nucleic acid from SARS-CoV-2 in nasopharyngeal  swabs. Results are for the presumptive identification of SARS-CoV-2 RNA. Positive results are indicative of infection with SARS-CoV-2, the virus  causing COVID-19, but do not rule out bacterial infection or co-infection  with other viruses. Laboratories within the [facility] and its  territories are required to report all positive results to the appropriate  public health authorities. Negative results do not preclude SARS-CoV-2  infection and should not be used as the sole basis for treatment or other  patient management decisions. Negative results must be combined with  clinical observations, patient history, and epidemiological information.   This test has not been FDA cleared or approved. This test has been authorized by FDA under an Emergency Use Authorization  (EUA). This test is only authorized for the duration of time the  declaration that circumstances exist justifying the authorization of the  emergency use of an in vitro diagnostic tests for detection of SARS-CoV-2  virus and/or diagnosis of COVID-19 infection under section 564(b)(1) of  the Act, 21 U. S.C. 971ABN-2(M)(5), unless the authorization is terminated  or revoked sooner. The test has been validated but independent review by FDA  and CLIA is pending. Test performed using [employer] GeneXpert: This RT-PCR assay targets N2,  a region unique to SARS-CoV-2. A conserved region in the E-gene was chosen  for pan-Sarbecovirus detection which includes SARS-CoV-2. According to CMS-2020-01-R, this platform meets the definition of high-throughput technology.     Comprehensive metabolic panel [[id number]]  (Abnormal) Collected: 09/20/23 0116    Lab Status: Final result Specimen: Blood from Arm, Right Updated: 09/20/23 0139     Sodium 135 mmol/L      Potassium 4.0 mmol/L      Chloride 101 mmol/L      CO2 24 mmol/L      ANION GAP 10 mmol/L      BUN 15 mg/dL      Creatinine 1.10 mg/dL      Glucose 235 mg/dL      Calcium 9.3 mg/dL      AST 21 U/L      ALT 29 U/L      Alkaline Phosphatase 63 U/L      Total Protein 7.2 g/dL      Albumin 4.1 g/dL      Total Bilirubin 0.41 mg/dL      eGFR 72 ml/min/1.73sq m     Narrative:      [place] guidelines for Chronic Kidney Disease (CKD):   •  Stage 1 with normal or high GFR (GFR > 90 mL/min/1.73 square meters)  •  Stage 2 Mild CKD (GFR = 60-89 mL/min/1.73 square meters)  •  Stage 3A Moderate CKD (GFR = 45-59 mL/min/1.73 square meters)  •  Stage 3B Moderate CKD (GFR = 30-44 mL/min/1.73 square meters)  •  Stage 4 Severe CKD (GFR = 15-29 mL/min/1.73 square meters)  •  Stage 5 End Stage CKD (GFR <15 mL/min/1.73 square meters)  Note: GFR calculation is accurate only with a steady state creatinine    Magnesium [[id number]]  (Normal) Collected: 09/20/23 0116    Lab Status: Final result Specimen: Blood from Arm, Right Updated: 09/20/23 0139     Magnesium 2.0 mg/dL     CBC and differential [[id number]]  (Abnormal) Collected: 09/20/23 0116    Lab Status: Final result Specimen: Blood from Arm, Right Updated: 09/20/23 0123     WBC 5.51 Thousand/uL      RBC 4.88 Million/uL      Hemoglobin 14.5 g/dL      Hematocrit 40.1 %      MCV 82 fL      MCH 29.7 pg      MCHC 36.2 g/dL      RDW 13.2 %      MPV 9.1 fL      Platelets 392 Thousands/uL      nRBC 0 /100 WBCs      Neutrophils Relative 42 %      Immat GRANS % 0 %      Lymphocytes Relative 50 %      Monocytes Relative 6 %      Eosinophils Relative 2 %      Basophils Relative 0 %      Neutrophils Absolute 2.33 Thousands/µL      Immature Grans Absolute 0.01 Thousand/uL      Lymphocytes Absolute 2.70 Thousands/µL      Monocytes Absolute 0.35 Thousand/µL      Eosinophils Absolute 0.10 Thousand/µL      Basophils Absolute 0.02 Thousands/µL                  XR chest pa & lateral   ED Interpretation by Mary Manzano PA-C (09/20 0147)   No acute cardiopulmonary disease                   Procedures  Procedures         ED Course  ED Course as of 09/20/23 0326   Wed Sep 20, 2023   0200 ECG 12 lead  NSR> No STEMI                                             Medical Decision Making  Patient is a 58-year-old male coming in for evaluation of nausea, and weakness. Is in no acute distress, is also complaining of congestion. This is most likely an upper respiratory infection, no sign of acute pathology, labs within normal    Amount and/or Complexity of Data Reviewed  Labs: ordered. Radiology: ordered and independent interpretation performed. ECG/medicine tests:  Decision-making details documented in ED Course. Risk  Prescription drug management.           Disposition  Final diagnoses:   Viral illness     Time reflects when diagnosis was documented in both MDM as applicable and the Disposition within this note     Time User Action Codes Description Comment    9/20/2023  2:00 AM Alexis Cadenaa Add [B34.9] Viral illness       ED Disposition     ED Disposition   Discharge    Condition   Stable    Date/Time   Wed Sep 20, 2023  2:00 AM    Comment   Miracle Johnson discharge to home/self care.                Follow-up Information     Follow up With Specialties Details Why Contact Info Additional Information    Gavino Hicks MD Family Medicine   [address] [place] [address] Heart Emergency Department Emergency Medicine  As needed, If symptoms worsen [address] [phone] [facility] Emergency Department          Discharge Medication List as of 9/20/2023  2:01 AM      CONTINUE these medications which have NOT CHANGED    Details   albuterol (2.5 mg/3 mL) 0.083 % nebulizer solution Take 3 mL (2.5 mg total) by nebulization every 6 (six) hours as needed for wheezing or shortness of breath, Starting Tue 9/6/2022, Normal      albuterol (5 mg/mL) 0.5 % nebulizer solution Take 0.5 mL (2.5 mg total) by nebulization every 6 (six) hours as needed for wheezing, Starting Tue 3/21/2023, Normal      !! albuterol (ProAir HFA) 90 mcg/act inhaler Inhale 2 puffs every 6 (six) hours as needed for wheezing, Starting Tue 3/21/2023, Normal      !! albuterol (Ventolin HFA) 90 mcg/act inhaler Inhale 2 puffs every 4 (four) hours as needed for wheezing, Starting Fri 1/20/2023, Normal      aluminum-magnesium hydroxide-simethicone (MAALOX) 200-200-20 MG/5ML SUSP Take 15 mL by mouth 4 (four) times a day (before meals and at bedtime), Starting Thu 1/7/2021, Normal      amLODIPine (NORVASC) 10 mg tablet Take 1 tablet (10 mg total) by mouth daily, Starting Mon 5/1/2023, Normal      aspirin 325 mg tablet Take 1 tablet (325 mg total) by mouth daily, Starting Thu 6/8/2023, Normal      atorvastatin (LIPITOR) 20 mg tablet Take 1 tablet (20 mg total) by mouth daily, Starting Fri 6/9/2023, Normal      Bioflavonoid Products (RA Vitamin C CR) TBCR take 1 tablet by mouth twice a day for 14 days, Historical Med      Blood Glucose Monitoring Suppl (OneTouch Verio) w/Device KIT Use daily, Starting Tue 6/14/2022, Normal      !! Blood Glucose Monitoring Suppl KIT Use in the morning Please give test strips and lancets  Dx. E11.9, Starting Wed 4/13/2022, Print      Blood Pressure KIT Use in the morning With appropriate size cuff, Starting Wed 4/13/2022, Print      Blood Pressure Monitoring (Blood Pressure Monitor/Arm) EMILY Use daily, Starting Tue 6/14/2022, Print      Cholecalciferol (D3 PO) Take by mouth daily, Historical Med      !! Diclofenac Sodium (VOLTAREN) 1 % Apply 2 g topically 4 (four) times a day as needed (low back or neck pain), Starting Mon 7/3/2023, Normal      !!  Diclofenac Sodium (VOLTAREN) 1 % Apply 2 g topically 4 (four) times a day, Starting Sat 9/2/2023, Normal      DULoxetine (CYMBALTA) 60 mg delayed release capsule Take 1 capsule (60 mg total) by mouth 2 (two) times a day, Starting Mon 4/24/2023, Normal      famotidine (PEPCID) 20 mg tablet Take 1 tablet (20 mg total) by mouth daily, Starting Fri 6/9/2023, Normal      fluticasone (FLONASE) 50 mcg/act nasal spray 1 spray into each nostril daily, Starting Thu 12/16/2021, Normal      fluticasone-salmeterol (Advair HFA) 115-21 MCG/ACT inhaler Inhale 2 puffs 2 (two) times a day Rinse mouth after use., Starting Mon 4/24/2023, Normal      gabapentin (NEURONTIN) 800 mg tablet Take 1 tablet (800 mg total) by mouth 3 (three) times a day, Starting Tue 4/25/2023, Normal      glucose blood (OneTouch Verio) test strip Check blood sugar daily, Normal      !! glucose monitoring kit (FREESTYLE) monitoring kit Use 1 each in the morning TESTING DAILY IN THE AM, Starting Mon 5/9/2022, Print      Lancet Devices (ONE TOUCH DELICA LANCING DEV) MISC Use daily, Starting Fri 8/12/2022, Normal      !! Lancets (freestyle) lancets TESTING DAILY IN THE AM, Print      !! Lancets (onetouch ultrasoft) lancets Use daily Use as instructed to check sugars daily, Starting Tue 5/24/2022, Normal      !! lidocaine (Lidoderm) 5 % Apply 1 patch topically daily Remove & Discard patch within 12 hours or as directed by MD, Starting Wed 12/7/2022, Normal      !! lidocaine (Lidoderm) 5 % Apply 1 patch topically over 12 hours daily Remove & Discard patch within 12 hours or as directed by MD, Starting Sat 9/2/2023, Print      lidocaine (LMX) 4 % cream Apply topically as needed for mild pain, Starting Fri 8/12/2022, Normal      metFORMIN (GLUCOPHAGE) 500 mg tablet take 1 tablet by mouth twice a day with meals, Starting Tue 8/29/2023, Normal      methocarbamol (ROBAXIN) 500 mg tablet Take 1 tablet (500 mg total) by mouth 3 (three) times a day, Starting Mon 9/11/2023, Normal      Mirabegron ER 50 MG TB24 Take 1 tablet (50 mg total) by mouth daily, Starting Mon 6/20/2022, Normal      Multiple Vitamin (multivitamin) capsule Take 1 capsule by mouth daily for 14 days, Starting Fri 1/22/2021, Until Wed 4/13/2022, Print      !! naloxone (NARCAN) 4 mg/0.1 mL nasal spray Administer 1 spray into a nostril. If no response after 2-3 minutes, give another dose in the other nostril using a new spray., Normal      !! naloxone (NARCAN) 4 mg/0.1 mL nasal spray Administer 1 spray into a nostril. If no response after 2-3 minutes, give another dose in the other nostril using a new spray., Normal      naproxen (NAPROSYN) 500 mg tablet Take 1 tablet (500 mg total) by mouth 2 (two) times a day with meals, Starting Sat 9/2/2023, Normal      Nerve Stimulator (TENS Therapy Pain Relief) EMILY Use as directed, Print      ondansetron (Zofran ODT) 4 mg disintegrating tablet Take 1 tablet (4 mg total) by mouth every 6 (six) hours as needed for nausea or vomiting, Starting Mon 6/26/2023, Normal      !!  OneTouch Delica Lancets 50T MISC Use daily, Starting Thu 10/14/2021, Normal      !! OneTouch Delica Lancets 21E MISC Use in the morning, Starting Mon 7/3/2023, Normal      oxyCODONE (ROXICODONE) 10 MG TABS Take 1 tablet (10 mg total) by mouth 3 (three) times a day Max Daily Amount: 30 mg, Starting Tue 9/5/2023, Normal      tamsulosin (FLOMAX) 0.4 mg take 1 capsule by mouth once daily with dinner, Normal      zolpidem (AMBIEN) 5 mg tablet Take 1 tablet (5 mg total) by mouth daily at bedtime as needed for sleep, Starting Fri 12/10/2021, Normal       !! - Potential duplicate medications found. Please discuss with provider. No discharge procedures on file.     PDMP Review       Value Time User    PDMP Reviewed  Yes 7/25/2023 10:56 AM Brook Babinski, MD          ED Provider  Electronically Signed by           Evon Sanchez PA-C  09/20/23 9846

## 2023-09-26 ENCOUNTER — TELEPHONE (OUTPATIENT)
Dept: FAMILY MEDICINE CLINIC | Facility: CLINIC | Age: 63
End: 2023-09-26

## 2023-09-26 DIAGNOSIS — M48.061 LUMBAR FORAMINAL STENOSIS: ICD-10-CM

## 2023-09-26 DIAGNOSIS — G89.29 CHRONIC LEFT-SIDED LOW BACK PAIN WITH LEFT-SIDED SCIATICA: ICD-10-CM

## 2023-09-26 DIAGNOSIS — M54.16 RADICULOPATHY, LUMBAR REGION: Primary | ICD-10-CM

## 2023-09-26 DIAGNOSIS — M51.26 PROTRUSION OF LUMBAR INTERVERTEBRAL DISC: ICD-10-CM

## 2023-09-26 DIAGNOSIS — M54.42 CHRONIC LEFT-SIDED LOW BACK PAIN WITH LEFT-SIDED SCIATICA: ICD-10-CM

## 2023-09-26 DIAGNOSIS — M47.817 FACET ARTHROPATHY, LUMBOSACRAL: ICD-10-CM

## 2023-09-26 NOTE — TELEPHONE ENCOUNTER
Pt came into office requesting physical therapy referral be updated to [facility].  Questions or concerns [phone]

## 2023-09-29 ENCOUNTER — OFFICE VISIT (OUTPATIENT)
Dept: DENTISTRY | Facility: CLINIC | Age: 63
End: 2023-09-29

## 2023-09-29 VITALS — DIASTOLIC BLOOD PRESSURE: 87 MMHG | SYSTOLIC BLOOD PRESSURE: 136 MMHG | TEMPERATURE: 98.4 F | HEART RATE: 88 BPM

## 2023-09-29 DIAGNOSIS — Z01.20 ENCOUNTER FOR DENTAL EXAMINATION: Primary | ICD-10-CM

## 2023-09-29 PROCEDURE — D0191 ASSESSMENT OF A PATIENT: HCPCS

## 2023-10-02 ENCOUNTER — APPOINTMENT (EMERGENCY)
Dept: RADIOLOGY | Facility: HOSPITAL | Age: 63
End: 2023-10-02
Payer: MEDICARE

## 2023-10-02 ENCOUNTER — TELEPHONE (OUTPATIENT)
Dept: FAMILY MEDICINE CLINIC | Facility: CLINIC | Age: 63
End: 2023-10-02

## 2023-10-02 ENCOUNTER — HOSPITAL ENCOUNTER (EMERGENCY)
Facility: HOSPITAL | Age: 63
Discharge: HOME | End: 2023-10-02
Attending: EMERGENCY MEDICINE
Payer: MEDICARE

## 2023-10-02 VITALS
RESPIRATION RATE: 16 BRPM | WEIGHT: 192.5 LBS | TEMPERATURE: 98.4 F | SYSTOLIC BLOOD PRESSURE: 150 MMHG | DIASTOLIC BLOOD PRESSURE: 88 MMHG | OXYGEN SATURATION: 98 % | BODY MASS INDEX: 30.15 KG/M2 | HEART RATE: 86 BPM

## 2023-10-02 DIAGNOSIS — F11.20 CONTINUOUS OPIOID DEPENDENCE (HCC): ICD-10-CM

## 2023-10-02 DIAGNOSIS — M54.42 CHRONIC LEFT-SIDED LOW BACK PAIN WITH LEFT-SIDED SCIATICA: ICD-10-CM

## 2023-10-02 DIAGNOSIS — G89.4 CHRONIC PAIN SYNDROME: ICD-10-CM

## 2023-10-02 DIAGNOSIS — M54.9 BACK PAIN: ICD-10-CM

## 2023-10-02 DIAGNOSIS — E11.9 TYPE 2 DIABETES MELLITUS WITHOUT COMPLICATION, WITHOUT LONG-TERM CURRENT USE OF INSULIN (HCC): ICD-10-CM
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
## 2023-10-02 DIAGNOSIS — G89.29 CHRONIC LEFT-SIDED LOW BACK PAIN WITH LEFT-SIDED SCIATICA: ICD-10-CM

## 2023-10-02 DIAGNOSIS — J45.909 ASTHMA: ICD-10-CM

## 2023-10-02 PROCEDURE — 73030 X-RAY EXAM OF SHOULDER: CPT

## 2023-10-02 PROCEDURE — 99284 EMERGENCY DEPT VISIT MOD MDM: CPT

## 2023-10-02 PROCEDURE — 99284 EMERGENCY DEPT VISIT MOD MDM: CPT | Performed by: EMERGENCY MEDICINE

## 2023-10-02 PROCEDURE — 72100 X-RAY EXAM L-S SPINE 2/3 VWS: CPT

## 2023-10-02 RX ORDER — ACETAMINOPHEN 325 MG/1
975 TABLET ORAL ONCE
Status: COMPLETED | OUTPATIENT
Start: 2023-10-02 | End: 2023-10-02

## 2023-10-02 RX ORDER — LIDOCAINE 50 MG/G
1 PATCH TOPICAL ONCE
Status: DISCONTINUED | OUTPATIENT
Start: 2023-10-02 | End: 2023-10-02 | Stop reason: HOSPADM

## 2023-10-02 RX ADMIN — ACETAMINOPHEN 975 MG: 325 TABLET ORAL at 20:35

## 2023-10-02 RX ADMIN — LIDOCAINE 1 PATCH: 50 PATCH CUTANEOUS at 21:14

## 2023-10-02 NOTE — PROGRESS NOTES
Dental procedures in this visit   •  - ASSESSMENT OF A PATIENT (Completed)     Service provider: Tyorn Hagen DMD     Billing provider: Tyron Hagen DMD     Subjective   Patient ID: Jarvis Sanchez is a 65 y.o. male. No chief complaint on file. "I'm here to start my partial"    HPI  The following portions of the chart were reviewed this encounter and updated as appropriate:    No text in SmartText           Objective   Soft Tissue Exam  No findings documented this visit      Dental Exam    Radiographic Interpretation:   Associated radiographs for today's visit were reviewed and finding(s) were discussed with the patient. Findings include: No findings  Hard Tissue Exam:  chart updated since OS extractions performed  Reference tooth chart for additional findings. Assessment/Plan   Problem List Items Addressed This Visit    None  Visit Diagnoses     Encounter for dental examination    -  Primary    Relevant Orders    ASSESSMENT OF A PATIENT (Completed)      Patient presented for preliminary impressions to start fabrication of lower partial. However, prior authorization for lower partial has . I updated I\his chart today to reflect recent OS extractions, generated a removable treatment plan to include both upper and lower cast metal partials and requested a new preauth.     [place]: recall, start partials once preauthed

## 2023-10-02 NOTE — TELEPHONE ENCOUNTER
Patient needs refills on:        albuterol (Ventolin HFA) 90 mcg/act inhaler    oxyCODONE (ROXICODONE) 10 MG TABS    OneTouch Delica Lancets 64Z MISC    OneTouch Delica Lancets 20X MISC

## 2023-10-03 RX ORDER — ALBUTEROL SULFATE 90 UG/1
2 AEROSOL, METERED RESPIRATORY (INHALATION) EVERY 6 HOURS PRN
Qty: 8.5 G | Refills: 1 | Status: SHIPPED | OUTPATIENT
Start: 2023-10-03

## 2023-10-03 RX ORDER — OXYCODONE HYDROCHLORIDE 10 MG/1
10 TABLET ORAL 3 TIMES DAILY
Qty: 90 TABLET | Refills: 0 | Status: SHIPPED | OUTPATIENT
Start: 2023-10-03

## 2023-10-03 RX ORDER — LANCETS 33 GAUGE
EACH MISCELLANEOUS DAILY
Qty: 100 EACH | Refills: 1 | Status: SHIPPED | OUTPATIENT
Start: 2023-10-03

## 2023-10-03 NOTE — ED PROVIDER NOTES
History  Chief Complaint   Patient presents with   • Fall     Pt states their back was hurting them, and they tried to get up to go to the bathroom and they fell. No loc. Hit r shoulder and r leg. No medications pta     Patient is a 78-year-old male who presents to the ER with right shoulder and lower back pain after a fall. States has balance issues. Charjeannee Caprice trying to stand up. Hit right shoulder. Pain worse with movement. No numbness, tingling, bowel or bladder incontinence. On neurontin and pain meds at home. Only took neurontin after fall. Does use a walker due to his balance issues. Able to get up on own after fall and ambulate. Prior to Admission Medications   Prescriptions Last Dose Informant Patient Reported? Taking?    Bioflavonoid Products (RA Vitamin C CR) TBCR  Self Yes No   Sig: take 1 tablet by mouth twice a day for 14 days   Patient not taking: Reported on 6/15/2023   Blood Glucose Monitoring Suppl (OneTouch Verio) w/Device KIT  Self No No   Sig: Use daily   Blood Glucose Monitoring Suppl KIT  Self No No   Sig: Use in the morning Please give test strips and lancets  Dx. E11.9   Blood Pressure KIT  Self No No   Sig: Use in the morning With appropriate size cuff   Blood Pressure Monitoring (Blood Pressure Monitor/Arm) EMILY  Self No No   Sig: Use daily   Cholecalciferol (D3 PO)  Self Yes No   Sig: Take by mouth daily   DULoxetine (CYMBALTA) 60 mg delayed release capsule   No No   Sig: Take 1 capsule (60 mg total) by mouth 2 (two) times a day   Diclofenac Sodium (VOLTAREN) 1 %   No No   Sig: Apply 2 g topically 4 (four) times a day as needed (low back or neck pain)   Diclofenac Sodium (VOLTAREN) 1 %   No No   Sig: Apply 2 g topically 4 (four) times a day   Lancet Devices (ONE TOUCH DELICA LANCING DEV) MISC  Self No No   Sig: Use daily   Lancets (freestyle) lancets  Self No No   Sig: TESTING DAILY IN THE AM   Lancets (onetouch ultrasoft) lancets  Self No No   Sig: Use daily Use as instructed to check sugars daily   Mirabegron ER 50 MG TB24  Self No No   Sig: Take 1 tablet (50 mg total) by mouth daily   Multiple Vitamin (multivitamin) capsule   No No   Sig: Take 1 capsule by mouth daily for 14 days   Patient not taking: Reported on 2023   Nerve Stimulator (TENS Therapy Pain Relief) EMILY   No No   Sig: Use as directed   OneTouch Delica Lancets 40K MISC  Self No No   Sig: Use daily   OneTouch Delica Lancets 03A MISC   No No   Sig: Use in the morning   albuterol (2.5 mg/3 mL) 0.083 % nebulizer solution  Self No No   Sig: Take 3 mL (2.5 mg total) by nebulization every 6 (six) hours as needed for wheezing or shortness of breath   albuterol (5 mg/mL) 0.5 % nebulizer solution   No No   Sig: Take 0.5 mL (2.5 mg total) by nebulization every 6 (six) hours as needed for wheezing   albuterol (ProAir HFA) 90 mcg/act inhaler   No No   Sig: Inhale 2 puffs every 6 (six) hours as needed for wheezing   albuterol (Ventolin HFA) 90 mcg/act inhaler  Self No No   Sig: Inhale 2 puffs every 4 (four) hours as needed for wheezing   aluminum-magnesium hydroxide-simethicone (MAALOX) 200-200-20 MG/5ML SUSP  Self No No   Sig: Take 15 mL by mouth 4 (four) times a day (before meals and at bedtime)   Patient not taking: Reported on 6/15/2023   amLODIPine (NORVASC) 10 mg tablet   No No   Sig: Take 1 tablet (10 mg total) by mouth daily   aspirin 325 mg tablet   No No   Sig: Take 1 tablet (325 mg total) by mouth daily   atorvastatin (LIPITOR) 20 mg tablet   No No   Sig: Take 1 tablet (20 mg total) by mouth daily   famotidine (PEPCID) 20 mg tablet   No No   Sig: Take 1 tablet (20 mg total) by mouth daily   fluticasone (FLONASE) 50 mcg/act nasal spray  Self No No   Si spray into each nostril daily   fluticasone-salmeterol (Advair HFA) 115-21 MCG/ACT inhaler   No No   Sig: Inhale 2 puffs 2 (two) times a day Rinse mouth after use.   gabapentin (NEURONTIN) 800 mg tablet   No No   Sig: Take 1 tablet (800 mg total) by mouth 3 (three) times a day   glucose blood (OneTouch Verio) test strip   No No   Sig: Check blood sugar daily   glucose monitoring kit (FREESTYLE) monitoring kit  Self No No   Sig: Use 1 each in the morning TESTING DAILY IN THE AM   lidocaine (LMX) 4 % cream  Self No No   Sig: Apply topically as needed for mild pain   lidocaine (Lidoderm) 5 %  Self No No   Sig: Apply 1 patch topically daily Remove & Discard patch within 12 hours or as directed by MD   lidocaine (Lidoderm) 5 %   No No   Sig: Apply 1 patch topically over 12 hours daily Remove & Discard patch within 12 hours or as directed by MD   metFORMIN (GLUCOPHAGE) 500 mg tablet   No No   Sig: take 1 tablet by mouth twice a day with meals   methocarbamol (ROBAXIN) 500 mg tablet   No No   Sig: Take 1 tablet (500 mg total) by mouth 3 (three) times a day   naloxone (NARCAN) 4 mg/0.1 mL nasal spray  Self No No   Sig: Administer 1 spray into a nostril. If no response after 2-3 minutes, give another dose in the other nostril using a new spray. naloxone (NARCAN) 4 mg/0.1 mL nasal spray   No No   Sig: Administer 1 spray into a nostril. If no response after 2-3 minutes, give another dose in the other nostril using a new spray.    naproxen (NAPROSYN) 500 mg tablet   No No   Sig: Take 1 tablet (500 mg total) by mouth 2 (two) times a day with meals   ondansetron (Zofran ODT) 4 mg disintegrating tablet   No No   Sig: Take 1 tablet (4 mg total) by mouth every 6 (six) hours as needed for nausea or vomiting   oxyCODONE (ROXICODONE) 10 MG TABS   No No   Sig: Take 1 tablet (10 mg total) by mouth 3 (three) times a day Max Daily Amount: 30 mg   tamsulosin (FLOMAX) 0.4 mg   No No   Sig: take 1 capsule by mouth once daily with dinner   zolpidem (AMBIEN) 5 mg tablet  Self No No   Sig: Take 1 tablet (5 mg total) by mouth daily at bedtime as needed for sleep      Facility-Administered Medications: None       Past Medical History:   Diagnosis Date   • Asthma    • Back pain    • Back pain    • 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   • Depression    • Diabetes mellitus ([address])    • Hyperlipidemia    • Hypertension    • Type 2 diabetes mellitus without complication, without long-term current use of insulin ([address]) 10/16/2020       Past Surgical History:   Procedure Laterality Date   • CYST REMOVAL  2017       Family History   Problem Relation Age of Onset   • Hypertension Mother    • Diabetes Mother    • Cancer Father    • Diabetes Family    • Hypertension Family      I have reviewed and agree with the history as documented. E-Cigarette/Vaping   • E-Cigarette Use Never User      E-Cigarette/Vaping Substances   • Nicotine No    • THC No    • CBD No    • Flavoring No    • Other No    • Unknown No      Social History     Tobacco Use   • Smoking status: Never   • Smokeless tobacco: Never   Vaping Use   • Vaping Use: Never used   Substance Use Topics   • Alcohol use: Not Currently     Comment: rare   • Drug use: Never       Review of Systems   Constitutional: Negative. HENT: Negative. Eyes: Negative. Respiratory: Negative. Cardiovascular: Negative. Gastrointestinal: Negative. Endocrine: Negative. Genitourinary: Negative. Musculoskeletal: Positive for arthralgias and back pain. Skin: Negative. Allergic/Immunologic: Negative. Neurological: Negative. Hematological: Negative. Psychiatric/Behavioral: Negative. All other systems reviewed and are negative. Physical Exam  Physical Exam  Vitals and nursing note reviewed. Constitutional:       Appearance: Normal appearance. He is obese. HENT:      Head: Normocephalic and atraumatic. Right Ear: Tympanic membrane, ear canal and external ear normal.      Left Ear: Tympanic membrane, ear canal and external ear normal.      Nose: Nose normal.      Mouth/Throat:      Mouth: Mucous membranes are moist.   Cardiovascular:      Rate and Rhythm: Normal rate and regular rhythm. Pulses: Normal pulses. Heart sounds: Normal heart sounds. Pulmonary:      Effort: Pulmonary effort is normal.      Breath sounds: Normal breath sounds. Abdominal:      General: Bowel sounds are normal.      Palpations: Abdomen is soft. Musculoskeletal:         General: Tenderness present. Cervical back: Normal range of motion and neck supple. Comments: +ttp right post. Shoulder. No deformity. No clavicular or elbow pain. +ttp left lower back. No midline ttp, stepoff or deformity. Skin:     General: Skin is warm and dry. Capillary Refill: Capillary refill takes less than 2 seconds. Findings: No lesion. Neurological:      General: No focal deficit present. Mental Status: He is alert and oriented to person, place, and time. Psychiatric:         Mood and Affect: Mood normal.         Behavior: Behavior normal.         Vital Signs  ED Triage Vitals   Temperature Pulse Respirations Blood Pressure SpO2   10/02/23 2010 10/02/23 2010 10/02/23 2010 10/02/23 2010 10/02/23 2010   98.4 °F (36.9 °C) 86 16 150/88 98 %      Temp Source Heart Rate Source Patient Position - Orthostatic VS BP Location FiO2 (%)   10/02/23 2010 10/02/23 2010 10/02/23 2010 10/02/23 2010 --   Tympanic Monitor Sitting Left arm       Pain Score       10/02/23 2035       9           Vitals:    10/02/23 2010   BP: 150/88   Pulse: 86   Patient Position - Orthostatic VS: Sitting         Visual Acuity      ED Medications  Medications   lidocaine (LIDODERM) 5 % patch 1 patch (1 patch Topical Medication Applied 10/2/23 2114)   acetaminophen (TYLENOL) tablet 975 mg (975 mg Oral Given 10/2/23 2035)       Diagnostic Studies  Results Reviewed     None                 XR shoulder 2+ views RIGHT    (Results Pending)   XR spine lumbar 2 or 3 views injury    (Results Pending)              Procedures  Procedures         ED Course                               SBIRT 20yo+    Flowsheet Row Most Recent Value   Initial Alcohol Screen: US AUDIT-C     1.  How often do you have a drink containing alcohol? 0 Filed at: 10/02/2023 2009   2. How many drinks containing alcohol do you have on a typical day you are drinking? 0 Filed at: 10/02/2023 2009   3a. Male UNDER 65: How often do you have five or more drinks on one occasion? 0 Filed at: 10/02/2023 2009   Audit-C Score 0 Filed at: 10/02/2023 2009   DANIELLE: How many times in the past year have you. .. Used an illegal drug or used a prescription medication for non-medical reasons? Never Filed at: 10/02/2023 2009                    Medical Decision Making  Back pain: acute illness or injury     Details: xray neg by me. no neuro deficits/red flags. has meds at home. Fall, initial encounter: acute illness or injury  Amount and/or Complexity of Data Reviewed  Radiology: ordered and independent interpretation performed. Risk  OTC drugs. Prescription drug management. Disposition  Final diagnoses:   Fall, initial encounter   Back pain     Time reflects when diagnosis was documented in both MDM as applicable and the Disposition within this note     Time User Action Codes Description Comment    10/2/2023  9:11 PM Jaquelin Lee Add [Z62. RVZZ] Fall, initial encounter     10/2/2023  9:11 PM Jaquelin Lee Add [M54.9] Back pain       ED Disposition     ED Disposition   Discharge    Condition   Stable    Date/Time   Mon Oct 2, 2023  9:11 PM    Comment   Tanisha Bhatia discharge to home/self care.                Follow-up Information     Follow up With Specialties Details Why Contact Info    Trupti Wiggins MD Family Medicine   [address] [place] [address]  [phone]            Discharge Medication List as of 10/2/2023  9:11 PM      CONTINUE these medications which have NOT CHANGED    Details   albuterol (2.5 mg/3 mL) 0.083 % nebulizer solution Take 3 mL (2.5 mg total) by nebulization every 6 (six) hours as needed for wheezing or shortness of breath, Starting Tue 9/6/2022, Normal      albuterol (5 mg/mL) 0.5 % nebulizer solution Take 0.5 mL (2.5 mg total) by nebulization every 6 (six) hours as needed for wheezing, Starting Tue 3/21/2023, Normal      !! albuterol (ProAir HFA) 90 mcg/act inhaler Inhale 2 puffs every 6 (six) hours as needed for wheezing, Starting Tue 3/21/2023, Normal      !! albuterol (Ventolin HFA) 90 mcg/act inhaler Inhale 2 puffs every 4 (four) hours as needed for wheezing, Starting Fri 1/20/2023, Normal      aluminum-magnesium hydroxide-simethicone (MAALOX) 200-200-20 MG/5ML SUSP Take 15 mL by mouth 4 (four) times a day (before meals and at bedtime), Starting Thu 1/7/2021, Normal      amLODIPine (NORVASC) 10 mg tablet Take 1 tablet (10 mg total) by mouth daily, Starting Mon 5/1/2023, Normal      aspirin 325 mg tablet Take 1 tablet (325 mg total) by mouth daily, Starting Thu 6/8/2023, Normal      atorvastatin (LIPITOR) 20 mg tablet Take 1 tablet (20 mg total) by mouth daily, Starting Fri 6/9/2023, Normal      Bioflavonoid Products (RA Vitamin C CR) TBCR take 1 tablet by mouth twice a day for 14 days, Historical Med      Blood Glucose Monitoring Suppl (OneTouch Verio) w/Device KIT Use daily, Starting Tue 6/14/2022, Normal      !! Blood Glucose Monitoring Suppl KIT Use in the morning Please give test strips and lancets  Dx. E11.9, Starting Wed 4/13/2022, Print      Blood Pressure KIT Use in the morning With appropriate size cuff, Starting Wed 4/13/2022, Print      Blood Pressure Monitoring (Blood Pressure Monitor/Arm) EMILY Use daily, Starting Tue 6/14/2022, Print      Cholecalciferol (D3 PO) Take by mouth daily, Historical Med      !! Diclofenac Sodium (VOLTAREN) 1 % Apply 2 g topically 4 (four) times a day as needed (low back or neck pain), Starting Mon 7/3/2023, Normal      !!  Diclofenac Sodium (VOLTAREN) 1 % Apply 2 g topically 4 (four) times a day, Starting Sat 9/2/2023, Normal      DULoxetine (CYMBALTA) 60 mg delayed release capsule Take 1 capsule (60 mg total) by mouth 2 (two) times a day, Starting Mon 4/24/2023, Normal      famotidine (PEPCID) 20 mg tablet Take 1 tablet (20 mg total) by mouth daily, Starting Fri 6/9/2023, Normal      fluticasone (FLONASE) 50 mcg/act nasal spray 1 spray into each nostril daily, Starting Thu 12/16/2021, Normal      fluticasone-salmeterol (Advair HFA) 115-21 MCG/ACT inhaler Inhale 2 puffs 2 (two) times a day Rinse mouth after use., Starting Mon 4/24/2023, Normal      gabapentin (NEURONTIN) 800 mg tablet Take 1 tablet (800 mg total) by mouth 3 (three) times a day, Starting Tue 4/25/2023, Normal      glucose blood (OneTouch Verio) test strip Check blood sugar daily, Normal      !! glucose monitoring kit (FREESTYLE) monitoring kit Use 1 each in the morning TESTING DAILY IN THE AM, Starting Mon 5/9/2022, Print      Lancet Devices (ONE TOUCH DELICA LANCING DEV) MISC Use daily, Starting Fri 8/12/2022, Normal      !!  Lancets (freestyle) lancets TESTING DAILY IN THE AM, Print      !! Lancets (onetouch ultrasoft) lancets Use daily Use as instructed to check sugars daily, Starting Tue 5/24/2022, Normal      !! lidocaine (Lidoderm) 5 % Apply 1 patch topically daily Remove & Discard patch within 12 hours or as directed by MD, Starting Wed 12/7/2022, Normal      !! lidocaine (Lidoderm) 5 % Apply 1 patch topically over 12 hours daily Remove & Discard patch within 12 hours or as directed by MD, Starting Sat 9/2/2023, Print      lidocaine (LMX) 4 % cream Apply topically as needed for mild pain, Starting Fri 8/12/2022, Normal      metFORMIN (GLUCOPHAGE) 500 mg tablet take 1 tablet by mouth twice a day with meals, Starting Tue 8/29/2023, Normal      methocarbamol (ROBAXIN) 500 mg tablet Take 1 tablet (500 mg total) by mouth 3 (three) times a day, Starting Mon 9/11/2023, Normal      Mirabegron ER 50 MG TB24 Take 1 tablet (50 mg total) by mouth daily, Starting Mon 6/20/2022, Normal      Multiple Vitamin (multivitamin) capsule Take 1 capsule by mouth daily for 14 days, Starting Fri 1/22/2021, Until Wed 4/13/2022, Print      !! naloxone (NARCAN) 4 mg/0.1 mL nasal spray Administer 1 spray into a nostril. If no response after 2-3 minutes, give another dose in the other nostril using a new spray., Normal      !! naloxone (NARCAN) 4 mg/0.1 mL nasal spray Administer 1 spray into a nostril. If no response after 2-3 minutes, give another dose in the other nostril using a new spray., Normal      naproxen (NAPROSYN) 500 mg tablet Take 1 tablet (500 mg total) by mouth 2 (two) times a day with meals, Starting Sat 9/2/2023, Normal      Nerve Stimulator (TENS Therapy Pain Relief) EMILY Use as directed, Print      ondansetron (Zofran ODT) 4 mg disintegrating tablet Take 1 tablet (4 mg total) by mouth every 6 (six) hours as needed for nausea or vomiting, Starting Mon 6/26/2023, Normal      !! OneTouch Delica Lancets 65Z MISC Use daily, Starting Thu 10/14/2021, Normal      !! OneTouch Delica Lancets 39L MISC Use in the morning, Starting Mon 7/3/2023, Normal      oxyCODONE (ROXICODONE) 10 MG TABS Take 1 tablet (10 mg total) by mouth 3 (three) times a day Max Daily Amount: 30 mg, Starting Tue 9/5/2023, Normal      tamsulosin (FLOMAX) 0.4 mg take 1 capsule by mouth once daily with dinner, Normal      zolpidem (AMBIEN) 5 mg tablet Take 1 tablet (5 mg total) by mouth daily at bedtime as needed for sleep, Starting Fri 12/10/2021, Normal       !! - Potential duplicate medications found. Please discuss with provider. No discharge procedures on file.     PDMP Review       Value Time User    PDMP Reviewed  Yes 7/25/2023 10:56 AM Pedro Hayes MD          ED Provider  Electronically Signed by           Khai Vasquez MD  10/02/23 4752

## 2023-10-10 ENCOUNTER — OFFICE VISIT (OUTPATIENT)
Dept: PHYSICAL THERAPY | Facility: CLINIC | Age: 63
End: 2023-10-10
Payer: MEDICARE

## 2023-10-10 ENCOUNTER — HOSPITAL ENCOUNTER (INPATIENT)
Facility: HOSPITAL | Age: 63
LOS: 7 days | Discharge: HOME | DRG: 885 | End: 2023-10-17
Attending: EMERGENCY MEDICINE | Admitting: STUDENT IN AN ORGANIZED HEALTH CARE EDUCATION/TRAINING PROGRAM
Payer: MEDICARE

## 2023-10-10 ENCOUNTER — OFFICE VISIT (OUTPATIENT)
Dept: FAMILY MEDICINE CLINIC | Facility: CLINIC | Age: 63
End: 2023-10-10

## 2023-10-10 VITALS — DIASTOLIC BLOOD PRESSURE: 82 MMHG | SYSTOLIC BLOOD PRESSURE: 130 MMHG

## 2023-10-10 VITALS
SYSTOLIC BLOOD PRESSURE: 140 MMHG | WEIGHT: 193 LBS | BODY MASS INDEX: 30.29 KG/M2 | DIASTOLIC BLOOD PRESSURE: 100 MMHG | HEART RATE: 88 BPM | TEMPERATURE: 98.6 F | OXYGEN SATURATION: 99 % | RESPIRATION RATE: 16 BRPM | HEIGHT: 67 IN

## 2023-10-10 DIAGNOSIS — M54.16 RADICULOPATHY, LUMBAR REGION: ICD-10-CM

## 2023-10-10 DIAGNOSIS — F32.2 CURRENT SEVERE EPISODE OF MAJOR DEPRESSIVE DISORDER WITHOUT PRIOR EPISODE (HCC): ICD-10-CM

## 2023-10-10 DIAGNOSIS — E11.9 TYPE 2 DIABETES MELLITUS WITHOUT COMPLICATION, WITHOUT LONG-TERM CURRENT USE OF INSULIN (HCC): ICD-10-CM

## 2023-10-10 DIAGNOSIS — N40.1 BPH WITH OBSTRUCTION/LOWER URINARY TRACT SYMPTOMS: ICD-10-CM

## 2023-10-10 DIAGNOSIS — I10 BENIGN ESSENTIAL HTN: ICD-10-CM

## 2023-10-10 DIAGNOSIS — Z00.8 MEDICAL CLEARANCE FOR PSYCHIATRIC ADMISSION: ICD-10-CM

## 2023-10-10 DIAGNOSIS — Z78.9 NEEDS ASSISTANCE WITH COMMUNITY RESOURCES: Primary | ICD-10-CM

## 2023-10-10 DIAGNOSIS — E78.5 HYPERLIPIDEMIA, UNSPECIFIED HYPERLIPIDEMIA TYPE: ICD-10-CM

## 2023-10-10 DIAGNOSIS — M47.817 FACET ARTHROPATHY, LUMBOSACRAL: ICD-10-CM

## 2023-10-10 DIAGNOSIS — R45.851 SUICIDAL IDEATION: Primary | ICD-10-CM

## 2023-10-10 DIAGNOSIS — Z78.9 ON COMPLEX MEDICATION REGIMEN: ICD-10-CM

## 2023-10-10 DIAGNOSIS — M51.26 PROTRUSION OF LUMBAR INTERVERTEBRAL DISC: ICD-10-CM

## 2023-10-10 DIAGNOSIS — N13.8 BPH WITH OBSTRUCTION/LOWER URINARY TRACT SYMPTOMS: ICD-10-CM

## 2023-10-10 DIAGNOSIS — M48.061 LUMBAR FORAMINAL STENOSIS: ICD-10-CM

## 2023-10-10 DIAGNOSIS — M62.838 MUSCLE SPASM OF LEFT LOWER EXTREMITY: ICD-10-CM

## 2023-10-10 DIAGNOSIS — R45.851 SUICIDAL INTENT: Primary | ICD-10-CM

## 2023-10-10 DIAGNOSIS — F32.A DEPRESSION, UNSPECIFIED DEPRESSION TYPE: ICD-10-CM

## 2023-10-10 DIAGNOSIS — G89.29 CHRONIC LEFT-SIDED LOW BACK PAIN WITH LEFT-SIDED SCIATICA: ICD-10-CM

## 2023-10-10 DIAGNOSIS — M54.42 CHRONIC LEFT-SIDED LOW BACK PAIN WITH LEFT-SIDED SCIATICA: ICD-10-CM

## 2023-10-10 LAB
ALBUMIN SERPL BCP-MCNC: 5 G/DL (ref 3.5–5)
ALP SERPL-CCNC: 57 U/L (ref 34–104)
ALT SERPL W P-5'-P-CCNC: 37 U/L (ref 7–52)
AMPHETAMINES SERPL QL SCN: NEGATIVE
ANION GAP SERPL CALCULATED.3IONS-SCNC: 9 MMOL/L
AST SERPL W P-5'-P-CCNC: 21 U/L (ref 13–39)
ATRIAL RATE: 76 BPM
BACTERIA UR QL AUTO: NORMAL /HPF
BARBITURATES UR QL: NEGATIVE
BASOPHILS # BLD AUTO: 0.03 THOUSANDS/ÂΜL (ref 0–0.1)
BASOPHILS NFR BLD AUTO: 1 % (ref 0–1)
BENZODIAZ UR QL: NEGATIVE
BILIRUB SERPL-MCNC: 0.78 MG/DL (ref 0.2–1)
BILIRUB UR QL STRIP: NEGATIVE
BUN SERPL-MCNC: 15 MG/DL (ref 5–25)
CALCIUM SERPL-MCNC: 10 MG/DL (ref 8.4–10.2)
CHLORIDE SERPL-SCNC: 100 MMOL/L (ref 96–108)
CLARITY UR: CLEAR
CO2 SERPL-SCNC: 30 MMOL/L (ref 21–32)
COCAINE UR QL: NEGATIVE
COLOR UR: ABNORMAL
CREAT SERPL-MCNC: 1.18 MG/DL (ref 0.6–1.3)
EOSINOPHIL # BLD AUTO: 0.05 THOUSAND/ÂΜL (ref 0–0.61)
EOSINOPHIL NFR BLD AUTO: 1 % (ref 0–6)
ERYTHROCYTE [DISTWIDTH] IN BLOOD BY AUTOMATED COUNT: 13.3 % (ref 11.6–15.1)
ETHANOL EXG-MCNC: 0 MG/DL
GFR SERPL CREATININE-BSD FRML MDRD: 66 ML/MIN/1.73SQ M
GLUCOSE SERPL-MCNC: 134 MG/DL (ref 65–140)
GLUCOSE SERPL-MCNC: 178 MG/DL (ref 65–140)
GLUCOSE SERPL-MCNC: 191 MG/DL (ref 65–140)
GLUCOSE UR STRIP-MCNC: NEGATIVE MG/DL
HCT VFR BLD AUTO: 43 % (ref 36.5–49.3)
HGB BLD-MCNC: 15.3 G/DL (ref 12–17)
HGB UR QL STRIP.AUTO: 10
IMM GRANULOCYTES # BLD AUTO: 0 THOUSAND/UL (ref 0–0.2)
IMM GRANULOCYTES NFR BLD AUTO: 0 % (ref 0–2)
KETONES UR STRIP-MCNC: NEGATIVE MG/DL
LEUKOCYTE ESTERASE UR QL STRIP: NEGATIVE
LYMPHOCYTES # BLD AUTO: 2.36 THOUSANDS/ÂΜL (ref 0.6–4.47)
LYMPHOCYTES NFR BLD AUTO: 47 % (ref 14–44)
MCH RBC QN AUTO: 29.4 PG (ref 26.8–34.3)
MCHC RBC AUTO-ENTMCNC: 35.6 G/DL (ref 31.4–37.4)
MCV RBC AUTO: 83 FL (ref 82–98)
METHADONE UR QL: NEGATIVE
MONOCYTES # BLD AUTO: 0.33 THOUSAND/ÂΜL (ref 0.17–1.22)
MONOCYTES NFR BLD AUTO: 7 % (ref 4–12)
NEUTROPHILS # BLD AUTO: 2.15 THOUSANDS/ÂΜL (ref 1.85–7.62)
NEUTS SEG NFR BLD AUTO: 44 % (ref 43–75)
NITRITE UR QL STRIP: NEGATIVE
NON-SQ EPI CELLS URNS QL MICRO: NORMAL /HPF
NRBC BLD AUTO-RTO: 0 /100 WBCS
OPIATES UR QL SCN: NEGATIVE
OXYCODONE+OXYMORPHONE UR QL SCN: NEGATIVE
P AXIS: 60 DEGREES
PCP UR QL: NEGATIVE
PH UR STRIP.AUTO: 6.5 [PH]
PLATELET # BLD AUTO: 243 THOUSANDS/UL (ref 149–390)
PMV BLD AUTO: 9.4 FL (ref 8.9–12.7)
POTASSIUM SERPL-SCNC: 3.6 MMOL/L (ref 3.5–5.3)
PR INTERVAL: 196 MS
PROT SERPL-MCNC: 8.3 G/DL (ref 6.4–8.4)
PROT UR STRIP-MCNC: NEGATIVE MG/DL
QRS AXIS: 47 DEGREES
QRSD INTERVAL: 82 MS
QT INTERVAL: 400 MS
QTC INTERVAL: 450 MS
RBC # BLD AUTO: 5.21 MILLION/UL (ref 3.88–5.62)
RBC #/AREA URNS AUTO: NORMAL /HPF
SL AMB POCT HEMOGLOBIN AIC: 7 (ref ?–6.5)
SODIUM SERPL-SCNC: 139 MMOL/L (ref 135–147)
SP GR UR STRIP.AUTO: 1.01 (ref 1–1.04)
T WAVE AXIS: 31 DEGREES
THC UR QL: NEGATIVE
TSH SERPL DL<=0.05 MIU/L-ACNC: 0.98 UIU/ML (ref 0.45–4.5)
UROBILINOGEN UA: NEGATIVE MG/DL
VENTRICULAR RATE: 76 BPM
WBC # BLD AUTO: 4.92 THOUSAND/UL (ref 4.31–10.16)
WBC #/AREA URNS AUTO: NORMAL /HPF

## 2023-10-10 PROCEDURE — 80053 COMPREHEN METABOLIC PANEL: CPT | Performed by: PHYSICIAN ASSISTANT

## 2023-10-10 PROCEDURE — 93010 ELECTROCARDIOGRAM REPORT: CPT

## 2023-10-10 PROCEDURE — 82948 REAGENT STRIP/BLOOD GLUCOSE: CPT

## 2023-10-10 PROCEDURE — 97110 THERAPEUTIC EXERCISES: CPT | Performed by: PHYSICAL THERAPIST

## 2023-10-10 PROCEDURE — 3077F SYST BP >= 140 MM HG: CPT | Performed by: FAMILY MEDICINE

## 2023-10-10 PROCEDURE — 97161 PT EVAL LOW COMPLEX 20 MIN: CPT | Performed by: PHYSICAL THERAPIST

## 2023-10-10 PROCEDURE — 84443 ASSAY THYROID STIM HORMONE: CPT | Performed by: PHYSICIAN ASSISTANT

## 2023-10-10 PROCEDURE — 99213 OFFICE O/P EST LOW 20 MIN: CPT | Performed by: FAMILY MEDICINE

## 2023-10-10 PROCEDURE — 85025 COMPLETE CBC W/AUTO DIFF WBC: CPT | Performed by: PHYSICIAN ASSISTANT

## 2023-10-10 PROCEDURE — G0427 INPT/ED TELECONSULT70: HCPCS | Performed by: PSYCHIATRY & NEUROLOGY

## 2023-10-10 PROCEDURE — 80307 DRUG TEST PRSMV CHEM ANLYZR: CPT | Performed by: PHYSICIAN ASSISTANT

## 2023-10-10 PROCEDURE — 93005 ELECTROCARDIOGRAM TRACING: CPT

## 2023-10-10 PROCEDURE — 81001 URINALYSIS AUTO W/SCOPE: CPT | Performed by: PHYSICIAN ASSISTANT

## 2023-10-10 PROCEDURE — 36415 COLL VENOUS BLD VENIPUNCTURE: CPT | Performed by: PHYSICIAN ASSISTANT

## 2023-10-10 PROCEDURE — 3080F DIAST BP >= 90 MM HG: CPT | Performed by: FAMILY MEDICINE

## 2023-10-10 PROCEDURE — 82075 ASSAY OF BREATH ETHANOL: CPT | Performed by: PHYSICIAN ASSISTANT

## 2023-10-10 PROCEDURE — 83036 HEMOGLOBIN GLYCOSYLATED A1C: CPT | Performed by: FAMILY MEDICINE

## 2023-10-10 PROCEDURE — 99285 EMERGENCY DEPT VISIT HI MDM: CPT

## 2023-10-10 RX ORDER — RISPERIDONE 0.25 MG/1
0.25 TABLET ORAL
Status: DISCONTINUED | OUTPATIENT
Start: 2023-10-10 | End: 2023-10-17 | Stop reason: HOSPADM

## 2023-10-10 RX ORDER — GABAPENTIN 400 MG/1
800 CAPSULE ORAL ONCE
Status: COMPLETED | OUTPATIENT
Start: 2023-10-10 | End: 2023-10-10

## 2023-10-10 RX ORDER — TRAZODONE HYDROCHLORIDE 50 MG/1
50 TABLET ORAL
Status: DISCONTINUED | OUTPATIENT
Start: 2023-10-10 | End: 2023-10-17 | Stop reason: HOSPADM

## 2023-10-10 RX ORDER — AMLODIPINE BESYLATE 10 MG/1
10 TABLET ORAL DAILY
Status: DISCONTINUED | OUTPATIENT
Start: 2023-10-11 | End: 2023-10-17 | Stop reason: HOSPADM

## 2023-10-10 RX ORDER — TAMSULOSIN HYDROCHLORIDE 0.4 MG/1
0.4 CAPSULE ORAL
Status: DISCONTINUED | OUTPATIENT
Start: 2023-10-10 | End: 2023-10-17 | Stop reason: HOSPADM

## 2023-10-10 RX ORDER — GABAPENTIN 400 MG/1
800 CAPSULE ORAL 3 TIMES DAILY
Status: DISCONTINUED | OUTPATIENT
Start: 2023-10-10 | End: 2023-10-10 | Stop reason: SDUPTHER

## 2023-10-10 RX ORDER — RISPERIDONE 0.5 MG/1
0.5 TABLET ORAL
Status: DISCONTINUED | OUTPATIENT
Start: 2023-10-10 | End: 2023-10-17 | Stop reason: HOSPADM

## 2023-10-10 RX ORDER — LANOLIN ALCOHOL/MO/W.PET/CERES
3 CREAM (GRAM) TOPICAL
Status: DISCONTINUED | OUTPATIENT
Start: 2023-10-10 | End: 2023-10-17 | Stop reason: HOSPADM

## 2023-10-10 RX ORDER — HALOPERIDOL 5 MG/ML
5 INJECTION INTRAMUSCULAR
Status: DISCONTINUED | OUTPATIENT
Start: 2023-10-10 | End: 2023-10-17 | Stop reason: HOSPADM

## 2023-10-10 RX ORDER — ZOLPIDEM TARTRATE 5 MG/1
5 TABLET ORAL
Status: DISCONTINUED | OUTPATIENT
Start: 2023-10-10 | End: 2023-10-17 | Stop reason: HOSPADM

## 2023-10-10 RX ORDER — DULOXETIN HYDROCHLORIDE 60 MG/1
60 CAPSULE, DELAYED RELEASE ORAL 2 TIMES DAILY
Status: DISCONTINUED | OUTPATIENT
Start: 2023-10-10 | End: 2023-10-17 | Stop reason: HOSPADM

## 2023-10-10 RX ORDER — GABAPENTIN 400 MG/1
800 CAPSULE ORAL 3 TIMES DAILY
Status: DISCONTINUED | OUTPATIENT
Start: 2023-10-10 | End: 2023-10-11

## 2023-10-10 RX ORDER — RISPERIDONE 1 MG/1
1 TABLET ORAL
Status: DISCONTINUED | OUTPATIENT
Start: 2023-10-10 | End: 2023-10-17 | Stop reason: HOSPADM

## 2023-10-10 RX ORDER — ATORVASTATIN CALCIUM 20 MG/1
20 TABLET, FILM COATED ORAL
Status: DISCONTINUED | OUTPATIENT
Start: 2023-10-10 | End: 2023-10-17 | Stop reason: HOSPADM

## 2023-10-10 RX ORDER — METHOCARBAMOL 500 MG/1
500 TABLET, FILM COATED ORAL 3 TIMES DAILY
Status: DISCONTINUED | OUTPATIENT
Start: 2023-10-10 | End: 2023-10-17 | Stop reason: HOSPADM

## 2023-10-10 RX ORDER — ATORVASTATIN CALCIUM 20 MG/1
20 TABLET, FILM COATED ORAL DAILY
Status: DISCONTINUED | OUTPATIENT
Start: 2023-10-11 | End: 2023-10-10 | Stop reason: SDUPTHER

## 2023-10-10 RX ORDER — HYDROXYZINE HYDROCHLORIDE 25 MG/1
25 TABLET, FILM COATED ORAL
Status: DISCONTINUED | OUTPATIENT
Start: 2023-10-10 | End: 2023-10-17 | Stop reason: HOSPADM

## 2023-10-10 RX ORDER — INSULIN LISPRO 100 [IU]/ML
1-6 INJECTION, SOLUTION INTRAVENOUS; SUBCUTANEOUS
Status: DISCONTINUED | OUTPATIENT
Start: 2023-10-10 | End: 2023-10-17 | Stop reason: HOSPADM

## 2023-10-10 RX ORDER — MAGNESIUM HYDROXIDE/ALUMINUM HYDROXICE/SIMETHICONE 120; 1200; 1200 MG/30ML; MG/30ML; MG/30ML
30 SUSPENSION ORAL EVERY 4 HOURS PRN
Status: DISCONTINUED | OUTPATIENT
Start: 2023-10-10 | End: 2023-10-17 | Stop reason: HOSPADM

## 2023-10-10 RX ORDER — METHOCARBAMOL 500 MG/1
500 TABLET, FILM COATED ORAL 3 TIMES DAILY
Status: DISCONTINUED | OUTPATIENT
Start: 2023-10-10 | End: 2023-10-10 | Stop reason: SDUPTHER

## 2023-10-10 RX ORDER — LORAZEPAM 2 MG/ML
1 INJECTION INTRAMUSCULAR
Status: DISCONTINUED | OUTPATIENT
Start: 2023-10-10 | End: 2023-10-17 | Stop reason: HOSPADM

## 2023-10-10 RX ORDER — SERTRALINE HYDROCHLORIDE 25 MG/1
25 TABLET, FILM COATED ORAL DAILY
Status: DISCONTINUED | OUTPATIENT
Start: 2023-10-11 | End: 2023-10-11

## 2023-10-10 RX ORDER — AMLODIPINE BESYLATE 10 MG/1
10 TABLET ORAL DAILY
Status: DISCONTINUED | OUTPATIENT
Start: 2023-10-10 | End: 2023-10-10 | Stop reason: SDUPTHER

## 2023-10-10 RX ORDER — TAMSULOSIN HYDROCHLORIDE 0.4 MG/1
0.4 CAPSULE ORAL
Status: DISCONTINUED | OUTPATIENT
Start: 2023-10-10 | End: 2023-10-10 | Stop reason: SDUPTHER

## 2023-10-10 RX ORDER — ASPIRIN 325 MG
325 TABLET ORAL DAILY
Status: DISCONTINUED | OUTPATIENT
Start: 2023-10-11 | End: 2023-10-17 | Stop reason: HOSPADM

## 2023-10-10 RX ORDER — FAMOTIDINE 20 MG/1
20 TABLET, FILM COATED ORAL DAILY
Status: DISCONTINUED | OUTPATIENT
Start: 2023-10-11 | End: 2023-10-17 | Stop reason: HOSPADM

## 2023-10-10 RX ADMIN — GABAPENTIN 800 MG: 400 CAPSULE ORAL at 21:15

## 2023-10-10 RX ADMIN — TAMSULOSIN HYDROCHLORIDE 0.4 MG: 0.4 CAPSULE ORAL at 16:36

## 2023-10-10 RX ADMIN — MELATONIN TAB 3 MG 3 MG: 3 TAB at 21:15

## 2023-10-10 RX ADMIN — AMLODIPINE BESYLATE 10 MG: 5 TABLET ORAL at 16:37

## 2023-10-10 RX ADMIN — DICLOFENAC SODIUM 2 G: 10 GEL TOPICAL at 17:29

## 2023-10-10 RX ADMIN — ATORVASTATIN CALCIUM 20 MG: 20 TABLET, FILM COATED ORAL at 16:36

## 2023-10-10 RX ADMIN — METHOCARBAMOL 500 MG: 500 TABLET, FILM COATED ORAL at 21:15

## 2023-10-10 RX ADMIN — METFORMIN HYDROCHLORIDE 500 MG: 500 TABLET, FILM COATED ORAL at 16:36

## 2023-10-10 RX ADMIN — METHOCARBAMOL 500 MG: 500 TABLET, FILM COATED ORAL at 16:36

## 2023-10-10 RX ADMIN — INSULIN LISPRO 1 UNITS: 100 INJECTION, SOLUTION INTRAVENOUS; SUBCUTANEOUS at 21:41

## 2023-10-10 RX ADMIN — DULOXETINE HYDROCHLORIDE 60 MG: 60 CAPSULE, DELAYED RELEASE PELLETS ORAL at 21:14

## 2023-10-10 RX ADMIN — TRAZODONE HYDROCHLORIDE 50 MG: 50 TABLET ORAL at 21:15

## 2023-10-10 RX ADMIN — GABAPENTIN 800 MG: 400 CAPSULE ORAL at 14:57

## 2023-10-10 NOTE — ED CARE HANDOFF
Emergency Department Sign Out Note        Sign out and transfer of care from [facility]. See Separate Emergency Department note. The patient, Barbara Schwartz, was evaluated by the previous provider for suicidal ideation. Workup Completed:  Results Reviewed     Procedure Component Value Units Date/Time    Urine Microscopic [[id number]]  (Normal) Collected: 10/10/23 1301    Lab Status: Final result Specimen: Urine, Clean Catch Updated: 10/10/23 1822     RBC, UA 0-1 /hpf      WBC, UA None Seen /hpf      Epithelial Cells None Seen /hpf      Bacteria, UA None Seen /hpf     UA (URINE) with reflex to Scope [[id number]]  (Abnormal) Collected: 10/10/23 1301    Lab Status: Final result Specimen: Urine, Clean Catch Updated: 10/10/23 1817     Color, UA Straw     Clarity, UA Clear     Specific Gravity, UA 1.010     pH, UA 6.5     Leukocytes, UA Negative     Nitrite, UA Negative     Protein, UA Negative mg/dl      Glucose, UA Negative mg/dl      Ketones, UA Negative mg/dl      Bilirubin, UA Negative     Occult Blood, UA 10.0     UROBILINOGEN UA Negative mg/dL     TSH [[id number]]  (Normal) Collected: 10/10/23 1300    Lab Status: Final result Specimen: Blood from Arm, Right Updated: 10/10/23 1406     TSH 3RD GENERATON 0.985 uIU/mL     Rapid drug screen, urine [[id number]]  (Normal) Collected: 10/10/23 1301    Lab Status: Final result Specimen: Urine, Clean Catch Updated: 10/10/23 1349     Amph/Meth UR Negative     Barbiturate Ur Negative     Benzodiazepine Urine Negative     Cocaine Urine Negative     Methadone Urine Negative     Opiate Urine Negative     PCP Ur Negative     THC Urine Negative     Oxycodone Urine Negative    Narrative:      FOR MEDICAL PURPOSES ONLY. IF CONFIRMATION NEEDED PLEASE CONTACT THE LAB WITHIN 5 DAYS.     Drug Screen Cutoff Levels:  AMPHETAMINE/METHAMPHETAMINES  1000 ng/mL  BARBITURATES     200 ng/mL  BENZODIAZEPINES     200 ng/mL  COCAINE      300 ng/mL  METHADONE      300 ng/mL  OPIATES      300 ng/mL  PHENCYCLIDINE     25 ng/mL  THC       50 ng/mL  OXYCODONE      100 ng/mL    Comprehensive metabolic panel [[id number]] Collected: 10/10/23 1300    Lab Status: Final result Specimen: Blood from Arm, Right Updated: 10/10/23 1337     Sodium 139 mmol/L      Potassium 3.6 mmol/L      Chloride 100 mmol/L      CO2 30 mmol/L      ANION GAP 9 mmol/L      BUN 15 mg/dL      Creatinine 1.18 mg/dL      Glucose 134 mg/dL      Calcium 10.0 mg/dL      AST 21 U/L      ALT 37 U/L      Alkaline Phosphatase 57 U/L      Total Protein 8.3 g/dL      Albumin 5.0 g/dL      Total Bilirubin 0.78 mg/dL      eGFR 66 ml/min/1.73sq m     Narrative:      [employer] guidelines for Chronic Kidney Disease (CKD):   •  Stage 1 with normal or high GFR (GFR > 90 mL/min/1.73 square meters)  •  Stage 2 Mild CKD (GFR = 60-89 mL/min/1.73 square meters)  •  Stage 3A Moderate CKD (GFR = 45-59 mL/min/1.73 square meters)  •  Stage 3B Moderate CKD (GFR = 30-44 mL/min/1.73 square meters)  •  Stage 4 Severe CKD (GFR = 15-29 mL/min/1.73 square meters)  •  Stage 5 End Stage CKD (GFR <15 mL/min/1.73 square meters)  Note: GFR calculation is accurate only with a steady state creatinine    POCT alcohol breath test [[id number]]  (Normal) Resulted: 10/10/23 1315    Lab Status: Final result Updated: 10/10/23 1315     EXTBreath Alcohol 0.00    CBC and differential [[id number]]  (Abnormal) Collected: 10/10/23 1300    Lab Status: Final result Specimen: Blood from Arm, Right Updated: 10/10/23 1311     WBC 4.92 Thousand/uL      RBC 5.21 Million/uL      Hemoglobin 15.3 g/dL      Hematocrit 43.0 %      MCV 83 fL      MCH 29.4 pg      MCHC 35.6 g/dL      RDW 13.3 %      MPV 9.4 fL      Platelets 710 Thousands/uL      nRBC 0 /100 WBCs      Neutrophils Relative 44 %      Immat GRANS % 0 %      Lymphocytes Relative 47 %      Monocytes Relative 7 %      Eosinophils Relative 1 %      Basophils Relative 1 %      Neutrophils Absolute hospitalization. Disposition  Final diagnoses:   Suicidal ideation   Depression, unspecified depression type   On complex medication regimen     Time reflects when diagnosis was documented in both MDM as applicable and the Disposition within this note     Time User Action Codes Description Comment    10/10/2023  1:39 PM Laqueta Dexter Add [C92.931] Suicidal ideation     10/10/2023  2:57 PM Laqueta Dexter Add [email or web address] A] Depression, unspecified depression type     10/10/2023  4:30 PM Laqueta Dexter Add [Z78.9] On complex medication regimen     10/10/2023  5:48 PM William Rang Add [E87.5] Hyperkalemia     10/10/2023  5:49 PM William Rang Remove [E87.5] Hyperkalemia       ED Disposition     ED Disposition   Transfer to Behavioral Health Condition   --    Date/Time   Tue Oct 10, 2023  3:03 PM    Comment   Dexter Hines should be transferred out to [address] [place] [address]  and has been medically cleared. Recommendations for inpatient psychiatry for depression and suicidal ideation as per Dr. Patrick Goodman. MD Documentation    Elenita [place] Most Recent Value   Sending MD Dr. Venessa Hoskins up With Specialties Details Why Contact Info    Carolina Finney MD [facility] Medicine Call today Call today for follow up. [address]          Current Discharge Medication List      CONTINUE these medications which have NOT CHANGED    Details   albuterol (2.5 mg/3 mL) 0.083 % nebulizer solution Take 3 mL (2.5 mg total) by nebulization every 6 (six) hours as needed for wheezing or shortness of breath  Qty: 180 mL, Refills: 5    Associated Diagnoses:  Moderate persistent reactive airway disease without complication      albuterol (ProAir HFA) 90 mcg/act inhaler Inhale 2 puffs every 6 (six) hours as needed for wheezing  Qty: 8.5 g, Refills: 1    Comments: Substitution to a formulary equivalent within the same pharmaceutical class is authorized. Associated Diagnoses: Asthma      aluminum-magnesium hydroxide-simethicone (MAALOX) [phone] MG/5ML SUSP Take 15 mL by mouth 4 (four) times a day (before meals and at bedtime)  Qty: 150 mL, Refills: 0    Associated Diagnoses: Burning chest pain      amLODIPine (NORVASC) 10 mg tablet Take 1 tablet (10 mg total) by mouth daily  Qty: 90 tablet, Refills: 1    Associated Diagnoses: Benign essential HTN      aspirin 325 mg tablet Take 1 tablet (325 mg total) by mouth daily  Qty: 90 tablet, Refills: 3    Associated Diagnoses: Type 2 diabetes mellitus without complication, without long-term current use of insulin ([facility])      atorvastatin (LIPITOR) 20 mg tablet Take 1 tablet (20 mg total) by mouth daily  Qty: 90 tablet, Refills: 1    Associated Diagnoses: Hyperlipidemia, unspecified hyperlipidemia type      Bioflavonoid Products (RA Vitamin C CR) TBCR take 1 tablet by mouth twice a day for 14 days      Blood Glucose Monitoring Suppl (OneTouch Verio) w/Device KIT Use daily  Qty: 1 kit, Refills: 0    Associated Diagnoses: Type 2 diabetes mellitus without complication, without long-term current use of insulin ([facility])      ! ! Blood Glucose Monitoring Suppl KIT Use in the morning Please give test strips and lancets  Dx. E11.9  Qty: 1 kit, Refills: 0    Associated Diagnoses: Type 2 diabetes mellitus without complication, without long-term current use of insulin ([facility])      Blood Pressure KIT Use in the morning With appropriate size cuff  Qty: 1 kit, Refills: 0    Associated Diagnoses: Benign essential HTN      Blood Pressure Monitoring (Blood Pressure Monitor/Arm) EMILY Use daily  Qty: 1 each, Refills: 0    Associated Diagnoses: Benign essential HTN      Cholecalciferol (D3 PO) Take by mouth daily      !!  Diclofenac Sodium (VOLTAREN) 1 % Apply 2 g topically 4 (four) times a day as needed (low back or neck pain)  Qty: 150 g, Refills: 2    Associated Diagnoses: Neck muscle spasm; Chronic bilateral low back pain with left-sided sciatica      !! Diclofenac Sodium (VOLTAREN) 1 % Apply 2 g topically 4 (four) times a day  Qty: 100 g, Refills: 0    Associated Diagnoses: Chronic left-sided low back pain without sciatica      DULoxetine (CYMBALTA) 60 mg delayed release capsule Take 1 capsule (60 mg total) by mouth 2 (two) times a day  Qty: 60 capsule, Refills: 5    Associated Diagnoses: Current mild episode of major depressive disorder without prior episode ([facility])      famotidine (PEPCID) 20 mg tablet Take 1 tablet (20 mg total) by mouth daily  Qty: 60 tablet, Refills: 2    Associated Diagnoses: Gastroesophageal reflux disease, unspecified whether esophagitis present      fluticasone (FLONASE) 50 mcg/act nasal spray 1 spray into each nostril daily  Qty: 16 g, Refills: 0    Associated Diagnoses: URI (upper respiratory infection); Encounter for laboratory testing for COVID-19 virus      fluticasone-salmeterol (Advair HFA) 115-21 MCG/ACT inhaler Inhale 2 puffs 2 (two) times a day Rinse mouth after use. Qty: 36 g, Refills: 1    Comments: Substitution to a formulary equivalent within the same pharmaceutical class is authorized. Associated Diagnoses: Moderate persistent reactive airway disease with acute exacerbation      gabapentin (NEURONTIN) 800 mg tablet Take 1 tablet (800 mg total) by mouth 3 (three) times a day  Qty: 90 tablet, Refills: 0    Associated Diagnoses: Chronic low back pain without sciatica, unspecified back pain laterality      glucose blood (OneTouch Verio) test strip Check blood sugar daily  Qty: 100 each, Refills: 2    Associated Diagnoses: Type 2 diabetes mellitus without complication, without long-term current use of insulin ([address])      ! ! glucose monitoring kit (FREESTYLE) monitoring kit Use 1 each in the morning TESTING DAILY IN THE AM  Qty: 1 each, Refills: 0    Associated Diagnoses: Type 2 diabetes mellitus without complication, without long-term current use of insulin ([address])      Lancet Devices ([facility] LANCING DEV) MISC Use daily  Qty: 1 each, Refills: 2    Associated Diagnoses: Type 2 diabetes mellitus without complication, without long-term current use of insulin ([address])      ! ! lidocaine (Lidoderm) 5 % Apply 1 patch topically daily Remove & Discard patch within 12 hours or as directed by MD  Qty: 30 patch, Refills: 1    Associated Diagnoses: Chronic bilateral low back pain with left-sided sciatica      !! lidocaine (Lidoderm) 5 % Apply 1 patch topically over 12 hours daily Remove & Discard patch within 12 hours or as directed by MD  Qty: 2 patch, Refills: 0    Associated Diagnoses: Chronic left-sided low back pain without sciatica      lidocaine (LMX) 4 % cream Apply topically as needed for mild pain  Qty: 30 g, Refills: 0    Associated Diagnoses: Chronic bilateral low back pain with left-sided sciatica      metFORMIN (GLUCOPHAGE) 500 mg tablet take 1 tablet by mouth twice a day with meals  Qty: 180 tablet, Refills: 3    Associated Diagnoses: Type 2 diabetes mellitus without complication, without long-term current use of insulin ([facility])      methocarbamol (ROBAXIN) 500 mg tablet Take 1 tablet (500 mg total) by mouth 3 (three) times a day  Qty: 30 tablet, Refills: 0    Associated Diagnoses: Acute left-sided low back pain with left-sided sciatica      Mirabegron ER 50 MG TB24 Take 1 tablet (50 mg total) by mouth daily  Qty: 60 tablet, Refills: 6    Associated Diagnoses: BPH with obstruction/lower urinary tract symptoms      Multiple Vitamin (multivitamin) capsule Take 1 capsule by mouth daily for 14 days  Qty: 14 capsule, Refills: 0    Associated Diagnoses: URI (upper respiratory infection); Encounter for laboratory testing for COVID-19 virus      !! naloxone (NARCAN) 4 mg/0.1 mL nasal spray Administer 1 spray into a nostril. If no response after 2-3 minutes, give another dose in the other nostril using a new spray. Qty: 1 each, Refills: 1    Associated Diagnoses: Continuous opioid dependence ([address])      ! ! naloxone (NARCAN) 4 mg/0.1 mL nasal spray Administer 1 spray into a nostril. If no response after 2-3 minutes, give another dose in the other nostril using a new spray. Qty: 1 each, Refills: 1    Associated Diagnoses: Continuous opioid dependence ([facility])      naproxen (NAPROSYN) 500 mg tablet Take 1 tablet (500 mg total) by mouth 2 (two) times a day with meals  Qty: 15 tablet, Refills: 0    Associated Diagnoses: Chronic left-sided low back pain without sciatica      Nerve Stimulator (TENS Therapy Pain Relief) EMILY Use as directed  Qty: 1 each, Refills: 0    Associated Diagnoses: Radiculopathy, lumbar region; Lumbar foraminal stenosis      ondansetron (Zofran ODT) 4 mg disintegrating tablet Take 1 tablet (4 mg total) by mouth every 6 (six) hours as needed for nausea or vomiting  Qty: 20 tablet, Refills: 0    Associated Diagnoses: Nausea      OneTouch Delica Lancets 67B MISC Use daily  Qty: 100 each, Refills: 1    Associated Diagnoses: Type 2 diabetes mellitus without complication, without long-term current use of insulin ([facility])      oxyCODONE (ROXICODONE) 10 MG TABS Take 1 tablet (10 mg total) by mouth 3 (three) times a day Max Daily Amount: 30 mg  Qty: 90 tablet, Refills: 0    Associated Diagnoses: Chronic pain syndrome; Continuous opioid dependence ([address]); Chronic left-sided low back pain with left-sided sciatica      tamsulosin (FLOMAX) 0.4 mg take 1 capsule by mouth once daily with dinner  Qty: 90 capsule, Refills: 1    Associated Diagnoses: Benign prostatic hyperplasia with lower urinary tract symptoms, symptom details unspecified      zolpidem (AMBIEN) 5 mg tablet Take 1 tablet (5 mg total) by mouth daily at bedtime as needed for sleep  Qty: 30 tablet, Refills: 0    Associated Diagnoses: Insomnia due to medical condition       ! ! - Potential duplicate medications found. Please discuss with provider.                ED Provider  Electronically Signed by     Herlinda Tabor PA-C  10/10/23 5651

## 2023-10-10 NOTE — ASSESSMENT & PLAN NOTE
Office Crisis Protocol activated  Patient to be transferred to the ER for evaluation of active suicidal ideation

## 2023-10-10 NOTE — ED PROVIDER NOTES
History  Chief Complaint   Patient presents with   • Psychiatric Evaluation     Pt comes to us after being seen at PCP. He is tearful upon arrival states "life is hard and I'm in pain all the time I haven't been well and I cant sleep" pt states he wants to die but has no plan and is non-specific with other questions. Presents with poor eye contact states "I don't want to stay here, I just wish it wasn't so hard"      HPI    Patient referred to emergency department passive thoughts of suicidality from family medicine. PMHX DM2, HTN/HLD, BPH, occasional urinary incontinence and depression. Patient has significant financial difficulties and chronic low back pain. States he has difficulty rent and having enough money for food. He states has had passive thoughts of "not wanting to be here "and "just wanting to die "  No current active plan. No other complaints. Pedro Puentes is despondent due to having multiple medical problems and late bills. He is depressed because is overwhelmed with life, his medical problems as well as socioeconomics with being behind on his bills. He specifically states he has no plan to end his life, but states he is wishing his life would pass. Prior to Admission Medications   Prescriptions Last Dose Informant Patient Reported? Taking?    Bioflavonoid Products (RA Vitamin C CR) TBCR  Self Yes No   Sig: take 1 tablet by mouth twice a day for 14 days   Patient not taking: Reported on 6/15/2023   Blood Glucose Monitoring Suppl (OneTouch Verio) w/Device KIT  Self No No   Sig: Use daily   Blood Glucose Monitoring Suppl KIT  Self No No   Sig: Use in the morning Please give test strips and lancets  Dx. E11.9   Blood Pressure KIT  Self No No   Sig: Use in the morning With appropriate size cuff   Blood Pressure Monitoring (Blood Pressure Monitor/Arm) EMILY  Self No No   Sig: Use daily   Cholecalciferol (D3 PO)  Self Yes No   Sig: Take by mouth daily   DULoxetine (CYMBALTA) 60 mg delayed release capsule   No No   Sig: Take 1 capsule (60 mg total) by mouth 2 (two) times a day   Diclofenac Sodium (VOLTAREN) 1 %   No No   Sig: Apply 2 g topically 4 (four) times a day as needed (low back or neck pain)   Diclofenac Sodium (VOLTAREN) 1 %   No No   Sig: Apply 2 g topically 4 (four) times a day   Lancet Devices (ONE TOUCH DELICA LANCING DEV) MISC  Self No No   Sig: Use daily   Mirabegron ER 50 MG TB24  Self No No   Sig: Take 1 tablet (50 mg total) by mouth daily   Multiple Vitamin (multivitamin) capsule   No No   Sig: Take 1 capsule by mouth daily for 14 days   Patient not taking: Reported on 2023   Nerve Stimulator (TENS Therapy Pain Relief) EMILY   No No   Sig: Use as directed   OneTouch Delica Lancets 99C MISC   No No   Sig: Use daily   albuterol (2.5 mg/3 mL) 0.083 % nebulizer solution  Self No No   Sig: Take 3 mL (2.5 mg total) by nebulization every 6 (six) hours as needed for wheezing or shortness of breath   albuterol (ProAir HFA) 90 mcg/act inhaler   No No   Sig: Inhale 2 puffs every 6 (six) hours as needed for wheezing   aluminum-magnesium hydroxide-simethicone (MAALOX) 200-200-20 MG/5ML SUSP  Self No No   Sig: Take 15 mL by mouth 4 (four) times a day (before meals and at bedtime)   Patient not taking: Reported on 6/15/2023   amLODIPine (NORVASC) 10 mg tablet   No No   Sig: Take 1 tablet (10 mg total) by mouth daily   aspirin 325 mg tablet   No No   Sig: Take 1 tablet (325 mg total) by mouth daily   atorvastatin (LIPITOR) 20 mg tablet   No No   Sig: Take 1 tablet (20 mg total) by mouth daily   famotidine (PEPCID) 20 mg tablet   No No   Sig: Take 1 tablet (20 mg total) by mouth daily   fluticasone (FLONASE) 50 mcg/act nasal spray  Self No No   Si spray into each nostril daily   fluticasone-salmeterol (Advair HFA) 115-21 MCG/ACT inhaler   No No   Sig: Inhale 2 puffs 2 (two) times a day Rinse mouth after use.   gabapentin (NEURONTIN) 800 mg tablet   No No   Sig: Take 1 tablet (800 mg total) by mouth 3 (three) times a day   glucose blood (OneTouch Verio) test strip   No No   Sig: Check blood sugar daily   glucose monitoring kit (FREESTYLE) monitoring kit  Self No No   Sig: Use 1 each in the morning TESTING DAILY IN THE AM   lidocaine (LMX) 4 % cream  Self No No   Sig: Apply topically as needed for mild pain   lidocaine (Lidoderm) 5 %  Self No No   Sig: Apply 1 patch topically daily Remove & Discard patch within 12 hours or as directed by MD   lidocaine (Lidoderm) 5 %   No No   Sig: Apply 1 patch topically over 12 hours daily Remove & Discard patch within 12 hours or as directed by MD   metFORMIN (GLUCOPHAGE) 500 mg tablet   No No   Sig: take 1 tablet by mouth twice a day with meals   methocarbamol (ROBAXIN) 500 mg tablet   No No   Sig: Take 1 tablet (500 mg total) by mouth 3 (three) times a day   naloxone (NARCAN) 4 mg/0.1 mL nasal spray  Self No No   Sig: Administer 1 spray into a nostril. If no response after 2-3 minutes, give another dose in the other nostril using a new spray. naloxone (NARCAN) 4 mg/0.1 mL nasal spray   No No   Sig: Administer 1 spray into a nostril. If no response after 2-3 minutes, give another dose in the other nostril using a new spray.    naproxen (NAPROSYN) 500 mg tablet   No No   Sig: Take 1 tablet (500 mg total) by mouth 2 (two) times a day with meals   ondansetron (Zofran ODT) 4 mg disintegrating tablet   No No   Sig: Take 1 tablet (4 mg total) by mouth every 6 (six) hours as needed for nausea or vomiting   oxyCODONE (ROXICODONE) 10 MG TABS   No No   Sig: Take 1 tablet (10 mg total) by mouth 3 (three) times a day Max Daily Amount: 30 mg   tamsulosin (FLOMAX) 0.4 mg   No No   Sig: take 1 capsule by mouth once daily with dinner   zolpidem (AMBIEN) 5 mg tablet  Self No No   Sig: Take 1 tablet (5 mg total) by mouth daily at bedtime as needed for sleep      Facility-Administered Medications: None       Past Medical History:   Diagnosis Date   • Asthma    • Back pain    • Back pain    • 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   • Depression    • Diabetes mellitus ([address])    • Hyperlipidemia    • Hypertension    • Type 2 diabetes mellitus without complication, without long-term current use of insulin ([address]) 10/16/2020       Past Surgical History:   Procedure Laterality Date   • CYST REMOVAL  2017       Family History   Problem Relation Age of Onset   • Hypertension Mother    • Diabetes Mother    • Cancer Father    • Diabetes Family    • Hypertension Family      I have reviewed and agree with the history as documented. E-Cigarette/Vaping   • E-Cigarette Use Never User      E-Cigarette/Vaping Substances   • Nicotine No    • THC No    • CBD No    • Flavoring No    • Other No    • Unknown No      Social History     Tobacco Use   • Smoking status: Never   • Smokeless tobacco: Never   Vaping Use   • Vaping Use: Never used   Substance Use Topics   • Alcohol use: Not Currently     Comment: rare   • Drug use: Never       Review of Systems   Constitutional: Negative for chills and fever. HENT: Negative for ear pain and sore throat. Eyes: Negative for pain and visual disturbance. Respiratory: Negative for cough and shortness of breath. Cardiovascular: Negative for chest pain and palpitations. Gastrointestinal: Negative for abdominal pain and vomiting. Genitourinary: Negative for dysuria and hematuria. Musculoskeletal: Negative for arthralgias and back pain. Skin: Negative for color change and rash. Neurological: Negative for seizures and syncope. Psychiatric/Behavioral: Positive for dysphoric mood, sleep disturbance and suicidal ideas. All other systems reviewed and are negative. Physical Exam  Physical Exam  Vitals and nursing note reviewed. Constitutional:       General: He is not in acute distress. Appearance: He is well-developed. HENT:      Head: Normocephalic and atraumatic.    Eyes:      Conjunctiva/sclera: Conjunctivae normal.   Cardiovascular: Rate and Rhythm: Normal rate and regular rhythm. Heart sounds: No murmur heard. Pulmonary:      Effort: Pulmonary effort is normal. No respiratory distress. Breath sounds: Normal breath sounds. Abdominal:      Palpations: Abdomen is soft. Tenderness: There is no abdominal tenderness. Musculoskeletal:         General: No swelling. Cervical back: Neck supple. Skin:     General: Skin is warm and dry. Capillary Refill: Capillary refill takes less than 2 seconds. Neurological:      Mental Status: He is alert.    Psychiatric:         Mood and Affect: Mood normal.         Vital Signs  ED Triage Vitals [10/10/23 1217]   Temperature Pulse Respirations Blood Pressure SpO2   98.1 °F (36.7 °C) 104 18 (!) 184/103 99 %      Temp Source Heart Rate Source Patient Position - Orthostatic VS BP Location FiO2 (%)   Tympanic Monitor Lying Left arm --      Pain Score       --           Vitals:    10/10/23 1217 10/10/23 1632   BP: (!) 184/103 142/91   Pulse: 104 77   Patient Position - Orthostatic VS: Lying Lying         Visual Acuity      ED Medications  Medications   methocarbamol (ROBAXIN) tablet 500 mg (500 mg Oral Given 10/10/23 1636)   gabapentin (NEURONTIN) capsule 800 mg (800 mg Oral Not Given 10/10/23 1513)   metFORMIN (GLUCOPHAGE) tablet 500 mg (500 mg Oral Given 10/10/23 1636)   tamsulosin (FLOMAX) capsule 0.4 mg (0.4 mg Oral Given 10/10/23 1636)   Diclofenac Sodium (VOLTAREN) 1 % topical gel 2 g (has no administration in time range)   atorvastatin (LIPITOR) tablet 20 mg (20 mg Oral Given 10/10/23 1636)   amLODIPine (NORVASC) tablet 10 mg (10 mg Oral Given 10/10/23 1637)   gabapentin (NEURONTIN) capsule 800 mg (800 mg Oral Given 10/10/23 1457)       Diagnostic Studies  Results Reviewed     Procedure Component Value Units Date/Time    UA (URINE) with reflex to Scope [[id number]]     Lab Status: No result Specimen: Urine     TSH [[id number]]  (Normal) Collected: 10/10/23 1300    Lab Status: Final result Specimen: Blood from Arm, Right Updated: 10/10/23 1406     TSH 3RD GENERATON 0.985 uIU/mL     Rapid drug screen, urine [[id number]]  (Normal) Collected: 10/10/23 1301    Lab Status: Final result Specimen: Urine, Clean Catch Updated: 10/10/23 1349     Amph/Meth UR Negative     Barbiturate Ur Negative     Benzodiazepine Urine Negative     Cocaine Urine Negative     Methadone Urine Negative     Opiate Urine Negative     PCP Ur Negative     THC Urine Negative     Oxycodone Urine Negative    Narrative:      FOR MEDICAL PURPOSES ONLY. IF CONFIRMATION NEEDED PLEASE CONTACT THE LAB WITHIN 5 DAYS.     Drug Screen Cutoff Levels:  AMPHETAMINE/METHAMPHETAMINES  1000 ng/mL  BARBITURATES     200 ng/mL  BENZODIAZEPINES     200 ng/mL  COCAINE      300 ng/mL  METHADONE      300 ng/mL  OPIATES      300 ng/mL  PHENCYCLIDINE     25 ng/mL  THC       50 ng/mL  OXYCODONE      100 ng/mL    Comprehensive metabolic panel [[id number]] Collected: 10/10/23 1300    Lab Status: Final result Specimen: Blood from Arm, Right Updated: 10/10/23 1337     Sodium 139 mmol/L      Potassium 3.6 mmol/L      Chloride 100 mmol/L      CO2 30 mmol/L      ANION GAP 9 mmol/L      BUN 15 mg/dL      Creatinine 1.18 mg/dL      Glucose 134 mg/dL      Calcium 10.0 mg/dL      AST 21 U/L      ALT 37 U/L      Alkaline Phosphatase 57 U/L      Total Protein 8.3 g/dL      Albumin 5.0 g/dL      Total Bilirubin 0.78 mg/dL      eGFR 66 ml/min/1.73sq m     Narrative:      [facility] guidelines for Chronic Kidney Disease (CKD):   •  Stage 1 with normal or high GFR (GFR > 90 mL/min/1.73 square meters)  •  Stage 2 Mild CKD (GFR = 60-89 mL/min/1.73 square meters)  •  Stage 3A Moderate CKD (GFR = 45-59 mL/min/1.73 square meters)  •  Stage 3B Moderate CKD (GFR = 30-44 mL/min/1.73 square meters)  •  Stage 4 Severe CKD (GFR = 15-29 mL/min/1.73 square meters)  •  Stage 5 End Stage CKD (GFR <15 mL/min/1.73 square meters)  Note: GFR calculation is accurate only with a steady state creatinine    POCT alcohol breath test [[id number]]  (Normal) Resulted: 10/10/23 1315    Lab Status: Final result Updated: 10/10/23 1315     EXTBreath Alcohol 0.00    CBC and differential [[id number]]  (Abnormal) Collected: 10/10/23 1300    Lab Status: Final result Specimen: Blood from Arm, Right Updated: 10/10/23 1311     WBC 4.92 Thousand/uL      RBC 5.21 Million/uL      Hemoglobin 15.3 g/dL      Hematocrit 43.0 %      MCV 83 fL      MCH 29.4 pg      MCHC 35.6 g/dL      RDW 13.3 %      MPV 9.4 fL      Platelets 082 Thousands/uL      nRBC 0 /100 WBCs      Neutrophils Relative 44 %      Immat GRANS % 0 %      Lymphocytes Relative 47 %      Monocytes Relative 7 %      Eosinophils Relative 1 %      Basophils Relative 1 %      Neutrophils Absolute 2.15 Thousands/µL      Immature Grans Absolute 0.00 Thousand/uL      Lymphocytes Absolute 2.36 Thousands/µL      Monocytes Absolute 0.33 Thousand/µL      Eosinophils Absolute 0.05 Thousand/µL      Basophils Absolute 0.03 Thousands/µL                  No orders to display              Procedures  ECG 12 Lead Documentation Only    Date/Time: 10/10/2023 4:49 PM    Performed by: Key Asher PA-C  Authorized by: Key Asher PA-C    Indications / Diagnosis:  BH clearance  ECG reviewed by me, the ED Provider: yes    Patient location:  ED  Previous ECG:     Previous ECG:  Unavailable    Comparison to cardiac monitor: Yes    Interpretation:     Interpretation: normal    Rate:     ECG rate:  76    ECG rate assessment: normal    Rhythm:     Rhythm: sinus rhythm    Ectopy:     Ectopy: none    QRS:     QRS axis:  Normal  Conduction:     Conduction: normal    ST segments:     ST segments:  Normal  T waves:     T waves: normal    Comments:      Self interpreted by me.  Normal EKG             ED Course  ED Course as of 10/10/23 1716   Tue Oct 10, 2023   1339 Psych consult placed for depression and passive SI.   1656 Patient discussed that he has numerous issues                                              Medical Decision Making  Patient evaluated by psych and deemed appropriate for admission to inpatient psych. We have beds available in [facility]. Plan to admit. Pt signed 201. Ordered complex case management for patient in the outpatient setting for complex medical care management. Depression, unspecified depression type: acute illness or injury  On complex medication regimen: chronic illness or injury  Suicidal ideation: acute illness or injury  Amount and/or Complexity of Data Reviewed  Labs: ordered. Risk  Prescription drug management. Decision regarding hospitalization. Disposition  Final diagnoses:   Suicidal ideation   Depression, unspecified depression type   On complex medication regimen     Time reflects when diagnosis was documented in both MDM as applicable and the Disposition within this note     Time User Action Codes Description Comment    10/10/2023  1:39 PM Caprice Neighbours Add [E38.311] Suicidal ideation     10/10/2023  2:57 PM Caprice Neighbours Add Jake. A] Depression, unspecified depression type     10/10/2023  4:30 PM Caprice Neighbours Add [Z78.9] On complex medication regimen       ED Disposition     ED Disposition   Transfer to [address] [place] [address]   --    Date/Time   Tue Oct 10, 2023  3:03 PM    Comment   Angelic Ramos should be transferred out to [address] [place] [address]  and has been medically cleared. Recommendations for inpatient psychiatry for depression and suicidal ideation as per Dr. Kira Hahn. MD Documentation    Charlott Goldberg Most Recent Value   Sending MD Dr. Emmanuel Ramires up With Specialties Details Why Contact Info    Yahir Armenta MD [facility] Medicine Call today Call today for follow up.  [address]  [phone]            Patient's Medications   Discharge Prescriptions    No medications on file           PDMP Review       Value Time User    PDMP Reviewed  Yes 7/25/2023 10:56 AM Patty Hudson MD          ED Provider  Electronically Signed by           Clay Turk PA-C  10/10/23 6998

## 2023-10-10 NOTE — ED NOTES
Patient is a 65 yr old male with a hx of depression due to chronic pain. 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 Today, he continues to struggle with chronic pain, uses a walker, and is unable to work any more. He lives with his 15 yr old daughter, is  and allowed his x-wife with stay with him because she was going to have surgery and unable to do steps. As a result he lost food stamps due to a change in the family income. Patient was at his family doctor today, states that he is unable to live this way any more, and if he had a gun he would shoot self. He also wants to shoot the welfare man. He becomes very tearful, obviously overwhelmed with medical and financial problems.   However, he does not want to be hospitalized because he has no one to care for his 15 yr old dtr    Patient received cymbalta from his PCP but does not have a psychiatrist.    Haim Miranda

## 2023-10-10 NOTE — PROGRESS NOTES
JEROD ALMARAZ did receive in person consult from provider regarding Pt is at the exam room he seems sad, crying, hopeless, with suicidal ideation and hungry. JEROD ALMARAZ explained provider that there is a food pantry in the kitchen and JEROD ALMARAZ can pack up something for him. JEROD ALMARAZ met with Pt at the exam room and introduced herself. JEROD RUFINA did bring a bag of food for Pt but he was crying and did not pay attention to the food. Provider provided Pt with water, Pt expressed his feelings about his daughter. The provider explained Pt that he should go to the ED due to he needs to be assessed by a Psychiatric at ED. Pt agreeable to go to ED even though he expressed that he is worried about his daughter. Pt stated that if he goes to the hospital he can not stay with his daughter. His daughter is at home with her mom. Pt stated that he is struggling financially since the rent is getting high and only receives $1,400 from SSI. Pt does not receives food stamps because his high income. JEROD ALMARAZ explained Pt that there are food pantry in the community that he can receives food, once or twice a month. Pt seems frustrated and continued expressing his feelings. Pt stated that he does not understand why he is dealing with such difficult situation. JEROD ALMARAZ provided Pt with active listening and emotional support. Provider called [facility]'s ([facility]the crisis intervention. The police came into the exam room asked Pt some questions and asked Pt if he can walks with him. Pt seems sad but calm, Pt did walk with the police towards the ED. JEROD ALMARAZ did put the food back to the pantry and will follow-up with Pt to assure he is stable. JEROD ALMARAZ is remain available for further assistance as needed.

## 2023-10-10 NOTE — PROGRESS NOTES
Name: Cyndy Bruce      : 1960      MRN: [id number]  Encounter Provider: Melissa Sheppard MD  Encounter Date: 10/10/2023   Encounter department: [facility]     1. Suicidal intent  Assessment & Plan:  Office Crisis Protocol activated  Patient to be transferred to the ER for evaluation of active suicidal ideation    Orders:  -     Transfer to other facility    2. Type 2 diabetes mellitus without complication, without long-term current use of insulin ([facility])  -     POCT hemoglobin A1c           Subjective      HPI   Cyndy Bruce is a 65 y.o. male  who presented to the office today very depressed, stating he would like to share something in confidence with me. He states he is going through a very difficult time financially and with his daughter and ex-wife. He states he will end his life in the next two weeks. He has $2.18 currently, and owes money to the bank, and has no food for the next two weeks. Pateint states that welfare has taken away many benefits from him over the last few weeks and he is barely surviving. He feels hopeless and helpless. He states he has a plan to end his life, but does not want to share it with me. He will leave a note for his daughter whom he loves very much. The following portions of the patient's history were reviewed and updated as appropriate: allergies, current medications, past family history, past medical history, past social history, past surgical history and problem list.    Review of Systems   Constitutional: Positive for activity change and appetite change. Negative for fever. Psychiatric/Behavioral: Positive for sleep disturbance and suicidal ideas. The patient is nervous/anxious.         Current Outpatient Medications on File Prior to Visit   Medication Sig   • albuterol (2.5 mg/3 mL) 0.083 % nebulizer solution Take 3 mL (2.5 mg total) by nebulization every 6 (six) hours as needed for wheezing or shortness of breath   • albuterol (ProAir HFA) 90 mcg/act inhaler Inhale 2 puffs every 6 (six) hours as needed for wheezing   • aluminum-magnesium hydroxide-simethicone (MAALOX) 200-200-20 MG/5ML SUSP Take 15 mL by mouth 4 (four) times a day (before meals and at bedtime) (Patient not taking: Reported on 6/15/2023)   • amLODIPine (NORVASC) 10 mg tablet Take 1 tablet (10 mg total) by mouth daily   • aspirin 325 mg tablet Take 1 tablet (325 mg total) by mouth daily   • atorvastatin (LIPITOR) 20 mg tablet Take 1 tablet (20 mg total) by mouth daily   • Bioflavonoid Products (RA Vitamin C CR) TBCR take 1 tablet by mouth twice a day for 14 days (Patient not taking: Reported on 6/15/2023)   • Blood Glucose Monitoring Suppl (OneTouch Verio) w/Device KIT Use daily   • Blood Glucose Monitoring Suppl KIT Use in the morning Please give test strips and lancets  Dx. E11.9   • Blood Pressure KIT Use in the morning With appropriate size cuff   • Blood Pressure Monitoring (Blood Pressure Monitor/Arm) EMILY Use daily   • Cholecalciferol (D3 PO) Take by mouth daily   • Diclofenac Sodium (VOLTAREN) 1 % Apply 2 g topically 4 (four) times a day as needed (low back or neck pain)   • Diclofenac Sodium (VOLTAREN) 1 % Apply 2 g topically 4 (four) times a day   • DULoxetine (CYMBALTA) 60 mg delayed release capsule Take 1 capsule (60 mg total) by mouth 2 (two) times a day   • famotidine (PEPCID) 20 mg tablet Take 1 tablet (20 mg total) by mouth daily   • fluticasone (FLONASE) 50 mcg/act nasal spray 1 spray into each nostril daily   • fluticasone-salmeterol (Advair HFA) 115-21 MCG/ACT inhaler Inhale 2 puffs 2 (two) times a day Rinse mouth after use.    • gabapentin (NEURONTIN) 800 mg tablet Take 1 tablet (800 mg total) by mouth 3 (three) times a day   • glucose blood (OneTouch Verio) test strip Check blood sugar daily   • glucose monitoring kit (FREESTYLE) monitoring kit Use 1 each in the morning TESTING DAILY IN THE AM   • Lancet Devices (ONE TOUCH DELICA LANCING DEV) MISC Use daily   • lidocaine (Lidoderm) 5 % Apply 1 patch topically daily Remove & Discard patch within 12 hours or as directed by MD   • lidocaine (Lidoderm) 5 % Apply 1 patch topically over 12 hours daily Remove & Discard patch within 12 hours or as directed by MD   • lidocaine (LMX) 4 % cream Apply topically as needed for mild pain   • metFORMIN (GLUCOPHAGE) 500 mg tablet take 1 tablet by mouth twice a day with meals   • methocarbamol (ROBAXIN) 500 mg tablet Take 1 tablet (500 mg total) by mouth 3 (three) times a day   • Mirabegron ER 50 MG TB24 Take 1 tablet (50 mg total) by mouth daily   • Multiple Vitamin (multivitamin) capsule Take 1 capsule by mouth daily for 14 days (Patient not taking: Reported on 1/24/2023)   • naloxone (NARCAN) 4 mg/0.1 mL nasal spray Administer 1 spray into a nostril. If no response after 2-3 minutes, give another dose in the other nostril using a new spray. • naloxone (NARCAN) 4 mg/0.1 mL nasal spray Administer 1 spray into a nostril. If no response after 2-3 minutes, give another dose in the other nostril using a new spray.    • naproxen (NAPROSYN) 500 mg tablet Take 1 tablet (500 mg total) by mouth 2 (two) times a day with meals   • Nerve Stimulator (TENS Therapy Pain Relief) EMILY Use as directed   • ondansetron (Zofran ODT) 4 mg disintegrating tablet Take 1 tablet (4 mg total) by mouth every 6 (six) hours as needed for nausea or vomiting   • OneTouch Delica Lancets 49O MISC Use daily   • oxyCODONE (ROXICODONE) 10 MG TABS Take 1 tablet (10 mg total) by mouth 3 (three) times a day Max Daily Amount: 30 mg   • tamsulosin (FLOMAX) 0.4 mg take 1 capsule by mouth once daily with dinner   • zolpidem (AMBIEN) 5 mg tablet Take 1 tablet (5 mg total) by mouth daily at bedtime as needed for sleep       Objective     /100 (BP Location: Right arm, Patient Position: Sitting, Cuff Size: Standard)   Pulse 88   Temp 98.6 °F (37 °C) (Temporal)   Resp 16   Ht 5' 7" (1.702 m)   Wt 87.5 kg (193 lb)   SpO2 99%   BMI 30.23 kg/m²     Physical Exam  Vitals and nursing note reviewed. Constitutional:       General: He is in acute distress. Appearance: He is well-developed. Comments: Ambulating with walker   HENT:      Head: Normocephalic and atraumatic. Cardiovascular:      Rate and Rhythm: Normal rate. Pulmonary:      Effort: Pulmonary effort is normal. No respiratory distress. Neurological:      Mental Status: He is alert and oriented to person, place, and time. Psychiatric:         Mood and Affect: Mood is depressed. Affect is tearful. Behavior: Behavior is cooperative. Thought Content: Thought content includes suicidal ideation. Thought content includes suicidal plan.        Sandra Muse MD

## 2023-10-10 NOTE — ED NOTES
Patient assessed by Dr. Vale Young from [address] [place] [address], agreed to sign a 201  201 sent to intake

## 2023-10-10 NOTE — ED NOTES
Patient is accepted at [facility]  Patient is accepted by Dr. Radha He     Patient may go to the floor at **. Nurse report is to be called to [phone]  prior to patient transfer.

## 2023-10-10 NOTE — CONSULTS
TeleConsultation - [address] 65 y.o. male MRN: [id number]  Unit/Bed#: W4G3 Encounter: [id number]        REQUIRED DOCUMENTATION:     1. This service was provided via Telemedicine. 2. Provider located at [place]. 3. TeleMed provider: Reynold Chan MD.  4. Identify all parties in room with patient during tele consult:  Staff memeber  5. Patient was then informed that this was a Telemedicine visit and that the exam was being conducted confidentially over secure lines. My office door was closed. No one else was in the room. Patient acknowledged consent and understanding of privacy and security of the Telemedicine visit, and gave us permission to have the assistant stay in the room in order to assist with the history and to conduct the exam.  I informed the patient that I have reviewed their record in Epic and presented the opportunity for them to ask any questions regarding the visit today. The patient agreed to participate. Assessment/Plan     Active Problems: There are no active Hospital Problems. Assessment:    Major Depressive Disorder, single, severe without psychotic features    Treatment Plan:    Level of care Recommendation: Based on today's assessment and clinical criteria, Marv would get benefit from Voluntary Inpatient Psychiatric stabilization. Inpatient level of care is deemed appropriate at this present time. He agreed to sign voluntary 201 form. He should be transferred to Inpatient Psychiatric unit after medical stabilization. Continue with one-to-one level of observation for patient's safety. Planned Medication Changes:    Recommend to gradually taper down the Cymbalta as it can increase the blood pressure. Decrease to 60 mg in the morning and 30 mg at night. Add Zoloft 25 mg once daily for depression.     Trazodone 50 mg p.o. at bedtime as needed for insomnia        Risks / Benefits of Treatment:    Risks, benefits, and possible side effects of medications explained to patient and patient verbalizes understanding. Other treatment modalities ordered as indicated:    · Psychotherapy      Consults  Physician Requesting Consult: Lynn Odom DO  Principal Problem:<principal problem not specified>    Reason for Consult: suicidal ideation      History of Present Illness      Patient is a [address] y.o. male with a history of Major Depressive Disorder who was admitted to the ED on 10/10/2023 due to worsening of depression with suicidal ideation without plan. Reportedly, patient has been compliance with medications and follow-up care . Over the past few weeks, patient's  depressive symptoms has been worsening due to increased psychosocial stressors which includes financial problems, poor support system and relationship issues. Depressive symptoms includes: depressed mood most of the time, feeling of hopelessness, anhedonia, indecisiveness, decreased sleep and difficulty going to sleep. He reported having passive suicidal thoughts, and stated that he does not want to live anymore, but denied any intent or plan. No recent aggressive behavior was reported. No homicidal ideation was reported. No recent manic and psychotic symptoms was reported. No alcohol abuse problem was reported.          Psychiatric Review Of Systems:     Sleep changes: yes  appetite changes: no  weight changes: no  anxiety/panic: no  leah: no  guilty/hopeless: yes  self injurious behavior/risky behavior: no    Historical Information     Past Psychiatric History:     Psychiatric Hospitalizations: No history of past inpatient psychiatric admissions Outpatient Treatment History: current treatment with psychiatrist/Advanced Practitioner  Suicide Attempts:  No History of Suicidal attempt reported History of self-harm: No History of self injurious behavior was reported Violence History: No History of physically aggressive  behavior was reported    Substance Abuse History:    E-Cigarette/Vaping   • E-Cigarette Use Never User           Social History     Tobacco History     Smoking Status  Never    Smokeless Tobacco Use  Never          Alcohol History     Alcohol Use Status  Not Currently Comment  rare          Drug Use     Drug Use Status  Never          Sexual Activity     Sexually Active  Not Currently          Activities of Daily Living    Not Asked                       Social History:    Social History       Social History     Socioeconomic History   • Marital status: /Civil Union     Spouse name: Not on file   • Number of children: Not on file   • Years of education: Not on file   • Highest education level: Not on file   Occupational History   • Not on file   Tobacco Use   • Smoking status: Never   • Smokeless tobacco: Never   Vaping Use   • Vaping Use: Never used   Substance and Sexual Activity   • Alcohol use: Not Currently     Comment: rare   • Drug use: Never   • Sexual activity: Not Currently   Other Topics Concern   • Not on file   Social History Narrative   • Not on file     Social Determinants of Health     Financial Resource Strain: Low Risk  (10/24/2022)    Overall Financial Resource Strain (CARDIA)    • Difficulty of Paying Living Expenses: Not hard at all   Food Insecurity: No Food Insecurity (2/3/2022)    Hunger Vital Sign    • Worried About Running Out of Food in the Last Year: Never true    • Ran Out of Food in the Last Year: Never true   Transportation Needs: No Transportation Needs (10/24/2022)    PRAPARE - Transportation    • Lack of Transportation (Medical): No    • Lack of Transportation (Non-Medical):  No   Physical Activity: Not on file   Stress: Not on file   Social Connections: Not on file   Intimate Partner Violence: Not on file   Housing Stability: Unknown (6/16/2022)    Housing Stability Vital Sign    • Unable to Pay for Housing in the Last Year: No    • Number of Places Lived in the Last Year: Not on file    • Unstable Housing in the Last Year: No         Past Medical History:    Past Medical History:   Diagnosis Date   • Asthma    • Back pain    • Back pain    • 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   • Depression    • Diabetes mellitus ([address])    • Hyperlipidemia    • Hypertension    • Type 2 diabetes mellitus without complication, without long-term current use of insulin ([address]) 10/16/2020          Meds/Allergies     No Known Allergies  all current active meds have been reviewed    Medical Review Of Systems:    Review of Systems      Objective     Vital signs in last 24 hours:  Temp:  [98.1 °F (36.7 °C)-98.6 °F (37 °C)] 98.1 °F (36.7 °C)  HR:  [] 104  Resp:  [16-18] 18  BP: (130-184)/() 184/103    No intake or output data in the 24 hours ending 10/10/23 1420    Mental Status Evaluation[de-identified]    Appearance age appropriate, casually dressed   Behavior cooperative, calm   Speech normal rate, normal volume, normal pitch   Mood dysphoric   Affect normal range and intensity, appropriate   Thought Processes organized, goal directed   Associations intact associations   Thought Content no overt delusions   Perceptual Disturbances: no auditory hallucinations, no visual hallucinations   Abnormal Thoughts  Risk Potential Suicidal ideation - None  Homicidal ideation - None  Potential for aggression - No   Orientation oriented to person, place, time/date and situation   Memory recent and remote memory grossly intact   Consciousness alert and awake   Attention Span Concentration Span attention span and concentration are age appropriate   Intellect appears to be of average intelligence   Insight intact   Judgement intact   Muscle Strength and  Gait No assessed   Motor Activity no abnormal movements   Language no difficulty naming common objects, no difficulty repeating a phrase, no difficulty writing a sentence                   Lab Results: I have personally reviewed all pertinent laboratory/tests results.      Most Recent Labs:   Lab Results   Component Value Date WBC 4.92 10/10/2023    RBC 5.21 10/10/2023    HGB 15.3 10/10/2023    HCT 43.0 10/10/2023     10/10/2023    RDW 13.3 10/10/2023    NEUTROABS 2.15 10/10/2023    SODIUM 139 10/10/2023    K 3.6 10/10/2023     10/10/2023    CO2 30 10/10/2023    BUN 15 10/10/2023    CREATININE 1.18 10/10/2023    CALCIUM 10.0 10/10/2023    AST 21 10/10/2023    ALT 37 10/10/2023    ALKPHOS 57 10/10/2023    TP 8.3 10/10/2023    TBILI 0.78 10/10/2023    CHOLESTEROL 123 05/15/2023    TRIG 61 05/15/2023    HDL 72 05/15/2023    LDLCALC 39 05/15/2023    [address] 123 02/12/2021    [id number] 0.985 10/10/2023    FREET4 0.88 04/21/2022       Imaging Studies: XR shoulder 2+ views RIGHT    Result Date: 10/3/2023  Narrative: RIGHT SHOULDER INDICATION:   fal, pain. COMPARISON:  None VIEWS:  XR SHOULDER 2+ VW RIGHT Images: 3 FINDINGS: There is no acute fracture or dislocation. Mild osteoarthritis acromioclavicular joint. No lytic or blastic osseous lesion. Soft tissues are unremarkable. Impression: No acute osseous abnormality. Workstation performed: [id number]     XR spine lumbar 2 or 3 views injury    Result Date: 10/3/2023  Narrative: LUMBAR SPINE INDICATION:   pain post fall. COMPARISON: 2/22/2021 VIEWS:  XR SPINE LUMBAR 2 OR 3 VIEWS INJURY Images: 3 FINDINGS: There are 5 non rib bearing lumbar vertebral bodies. There is no evidence of acute fracture or destructive osseous lesion. Alignment is unremarkable. Mild lumbar degenerative change noted. The pedicles appear intact. Soft tissues are unremarkable. Impression: No acute osseous abnormality. Degenerative changes as described. Workstation performed: [id number]     XR chest pa & lateral    Result Date: 9/20/2023  Narrative: CHEST INDICATION:   Nausea and vomiting. COMPARISON: March 21, 2023 EXAM PERFORMED/VIEWS:  XR CHEST PA & LATERAL FINDINGS: Cardiomediastinal silhouette appears unremarkable. The lungs are clear. No pneumothorax or pleural effusion.  Osseous structures appear within normal limits for patient age. Impression: No acute cardiopulmonary disease. Workstation performed: [id number]     EKG/Pathology/Other Studies:   Lab Results   Component Value Date    VENTRATE 82 09/20/2023    ATRIALRATE 82 09/20/2023    PRINT 188 09/20/2023    QRSDINT 84 09/20/2023    QTINT 382 09/20/2023    QTCINT 446 09/20/2023    PAXIS 52 09/20/2023    QRSAXIS 41 09/20/2023    TWAVEAXIS 14 09/20/2023        Code Status: No Order  Advance Directive and Living Will:      Power of :    POLST:      Screenings:    1. Nutrition Screening  Nutrition Assessment (completed by Staff):      2. Pain Screening  Pain Screening:      3. Suicide Screening  •                                                        [place]-SUICIDE SEVERITY RATING SCALE (C-SSRS)                            • 1. In the last month have you wished you were dead or wished you could go to sleep and not wake up? Yes  • 2. In the last month, have you actually had thoughts about killing yourself? No  • 6. Have you done anything, started to do anything, or prepared to do anything to end your life in the last 3 months? No  • Suicide Risk Level : Moderate    Counseling / Coordination of Care: Total floor / unit time spent today 45 minutes. Greater than 50% of total time was spent with the patient and / or family counseling and / or coordination of care.  A description of the counseling / coordination of care: Direct Patient Care, Chart Review, and Documentation

## 2023-10-10 NOTE — PROGRESS NOTES
PT Evaluation     Today's date: 10/10/2023  Patient name: Cyndy Bruce  : 1960  MRN: [id number]  Referring provider: Melissa Sheppard MD  Dx:   Encounter Diagnosis     ICD-10-CM    1. Radiculopathy, lumbar region  M54.16 Ambulatory Referral to Physical Therapy      2. Chronic left-sided low back pain with left-sided sciatica  M54.42 Ambulatory Referral to Physical Therapy    G89.29       3. Facet arthropathy, lumbosacral  M47.817 Ambulatory Referral to Physical Therapy      4. Lumbar foraminal stenosis  M48.061 Ambulatory Referral to Physical Therapy      5. Protrusion of lumbar intervertebral disc  M51.26 Ambulatory Referral to Physical Therapy                     Assessment  Assessment details: Pt is a 65y.o. year old male presenting to physical therapy for Radiculopathy, lumbar region, Chronic left-sided low back pain with left-sided sciatica, Facet arthropathy, lumbosacral, Lumbar foraminal stenosis, and Protrusion of lumbar intervertebral disc. He presents with the following impairments: gait abnormalities w SPC, lumbar ROM deficits, LE weakness worse on the L LE, hypomobility, hypertonic paraspinals, and (+) neural tension b/l but worse on the L LE affecting his function with walking, bending, lifting, twisting, standing, squatting, sleeping, and getting out of chairs. Pt will benefit from skilled physical therapy to address functional limitations noted in evaluation and meet patient goals. Impairments: abnormal gait, abnormal muscle firing, abnormal muscle tone, abnormal or restricted ROM, abnormal movement, activity intolerance, impaired balance, impaired physical strength, lacks appropriate home exercise program, pain with function and poor body mechanics    Symptom irritability: high  Goals  ST. Pt will reduce pain to 7/10.  2. Pt will improve lumbar flexion to mod deficits. LT. Pt will improve lumbar flexion to min deficits for improved ability to forward bend.   2. Pt will improve b/l hip flexion to 4/5 for improved ability to squat and transfer sit-stand. 3. Pt will meet projected FOTO score. Plan  Patient would benefit from: PT eval and skilled physical therapy  Planned modality interventions: biofeedback, cryotherapy, electrical stimulation/Russian stimulation, TENS, thermotherapy: hydrocollator packs and unattended electrical stimulation  Planned therapy interventions: joint mobilization, abdominal trunk stabilization, manual therapy, body mechanics training, neuromuscular re-education, patient education, stretching, strengthening, therapeutic activities, therapeutic exercise, functional ROM exercises, flexibility, gait training, home exercise program and balance  Frequency: 2x week  Duration in weeks: 6  Treatment plan discussed with: patient        Subjective Evaluation    History of Present Illness  Mechanism of injury: Pt reports continued back pain as well as weakness in his legs and stiffness in his legs that has been present for a long time and has gradually worsened. He has numbness and burning down both legs, but worse down the left. He has pain and stiffness in both knees that limits him when standing up out of the chair. He has pain walking long distances and uses his cane and walker. He does not sleep well. He has some incontinence at night time. He does not work and is on disability. He takes pain medication daily. He had a fall last week that he went to the ER for because he fell in the bathroom due to leg pain and weakness. He reports some numbness in his hands, which is worse on his L hand. Recurrent probem    Pain  Current pain ratin          Objective     Palpation   Left   Hypertonic in the erector spinae and lumbar paraspinals. Tenderness of the erector spinae and lumbar paraspinals. Right   Hypertonic in the erector spinae and lumbar paraspinals. Tenderness of the erector spinae and lumbar paraspinals.      Active Range of Motion     Lumbar   Flexion:  with pain Restriction level: maximal  Extension:  with pain Restriction level: moderate  Left lateral flexion:  with pain Restriction level: moderate  Right lateral flexion:  Restriction level: moderate  Left rotation:  with pain Restriction level: minimal  Right rotation:  with pain Restriction level: moderate    Joint Play     Hypomobile: T8, T9, T10, T11, T12, L1, L2, L3, L4, L5 and S1     Pain: T9, T10, T11, T12, L1, L2, L3, L4, L5 and S1     Strength/Myotome Testing     Lumbar   Left   Heel walk: abnormal  Toe walk: abnormal    Right   Heel walk: abnormal  Toe walk: abnormal    Left Hip   Planes of Motion   Flexion: 3+    Right Hip   Planes of Motion   Flexion: 4-    Left Knee   Flexion: 3+  Extension: 3+    Right Knee   Flexion: 4-  Extension: 4-    Left Ankle/Foot   Dorsiflexion: 3-    Additional Strength Details  Unable to squat due to pain and LE weakness    Tests     Lumbar     Left   Positive passive SLR and slump test.     Right   Positive passive SLR and slump test.     Left Hip   Negative JOE and FADIR. Right Hip   Negative JOE and FADIR. Additional Tests Details  TUG = 33secs    Ambulation   Weight-Bearing Status   Assistive device used: single point cane    Observational Gait   Gait: antalgic and asymmetric   Decreased walking speed and stride length.               Precautions: stenosis    Date 10/10            Visit # IE            FOTO IE             Re-eval IE              Manuals 10/10            Lumbar gapping SF Gr III                                                   Neuro Re-Ed             TA bracing             Supine Marches             Clams                                                                 Ther Ex             Bike             SKTC 20" ea            Piriformis str 20" ea            Hamstring str             Leg press             SLR             SB rolling             LAQ's             Ther Activity             Squats/STS Pallof/UTR             Gait Training                                       Modalities

## 2023-10-10 NOTE — PLAN OF CARE
Problem: Potential for Falls  Goal: Patient will remain free of falls  Description: INTERVENTIONS:  - Educate patient/family on patient safety including physical limitations  - Instruct patient to call for assistance with activity   - Consult OT/PT to assist with strengthening/mobility   - Keep Call bell within reach  - Keep bed low and locked with side rails adjusted as appropriate  - Keep care items and personal belongings within reach  - Initiate and maintain comfort rounds  - Make Fall Risk Sign visible to staff  - Apply yellow socks and bracelet for high fall risk patients  - Consider moving patient to room near nurses station  Outcome: Not Progressing     Problem: Ineffective Coping  Goal: Cooperates with admission process  Description: Interventions:   - Complete admission process  Outcome: Not Progressing  Goal: Identifies ineffective coping skills  Outcome: Not Progressing  Goal: Identifies healthy coping skills  Outcome: Not Progressing  Goal: Demonstrates healthy coping skills  Outcome: Not Progressing  Goal: Patient/Family participate in treatment and DC plans  Description: Interventions:  - Provide therapeutic environment  Outcome: Not Progressing  Goal: Patient/Family verbalizes awareness of resources  Outcome: Not Progressing  Goal: Understands least restrictive measures  Description: Interventions:  - Utilize least restrictive behavior  Outcome: Not Progressing  Goal: Free from restraint events  Description: - Utilize least restrictive measures   - Provide behavioral interventions   - Redirect inappropriate behaviors   Outcome: Not Progressing     Problem: Risk for Self Injury/Neglect  Goal: Treatment Goal: Remain safe during length of stay, learn and adopt new coping skills, and be free of self-injurious ideation, impulses and acts at the time of discharge  Outcome: Not Progressing  Goal: Verbalize thoughts and feelings  Description: Interventions:  - Assess and re-assess patient's lethality and potential for self-injury  - Engage patient in 1:1 interactions, daily, for a minimum of 15 minutes  - Encourage patient to express feelings, fears, frustrations, hopes  - Establish rapport/trust with patient   Outcome: Not Progressing  Goal: Refrain from harming self  Description: Interventions:  - Monitor patient closely, per order  - Develop a trusting relationship  - Supervise medication ingestion, monitor effects and side effects   Outcome: Not Progressing  Goal: Recognize maladaptive responses and adopt new coping mechanisms  Outcome: Not Progressing  Goal: Complete daily ADLs, including personal hygiene independently, as able  Description: Interventions:  - Observe, teach, and assist patient with ADLS  - Monitor and promote a balance of rest/activity, with adequate nutrition and elimination  Outcome: Not Progressing     Problem: Depression  Goal: Treatment Goal: Demonstrate behavioral control of depressive symptoms, verbalize feelings of improved mood/affect, and adopt new coping skills prior to discharge  Outcome: Not Progressing  Goal: Verbalize thoughts and feelings  Description: Interventions:  - Assess and re-assess patient's level of risk   - Engage patient in 1:1 interactions, daily, for a minimum of 15 minutes   - Encourage patient to express feelings, fears, frustrations, hopes   Outcome: Not Progressing  Goal: Refrain from harming self  Description: Interventions:  - Monitor patient closely, per order   - Supervise medication ingestion, monitor effects and side effects   Outcome: Not Progressing  Goal: Refrain from isolation  Description: Interventions:  - Develop a trusting relationship   - Encourage socialization   Outcome: Not Progressing  Goal: Refrain from self-neglect  Outcome: Not Progressing  Goal: Complete daily ADLs, including personal hygiene independently, as able  Description: Interventions:  - Observe, teach, and assist patient with ADLS  -  Monitor and promote a balance of rest/activity, with adequate nutrition and elimination   Outcome: Not Progressing

## 2023-10-11 PROBLEM — Z00.8 MEDICAL CLEARANCE FOR PSYCHIATRIC ADMISSION: Status: ACTIVE | Noted: 2023-08-15

## 2023-10-11 PROBLEM — F32.2 CURRENT SEVERE EPISODE OF MAJOR DEPRESSIVE DISORDER WITHOUT PRIOR EPISODE (HCC): Status: ACTIVE | Noted: 2023-10-11

## 2023-10-11 LAB
25(OH)D3 SERPL-MCNC: 40.9 NG/ML (ref 30–100)
ANION GAP SERPL CALCULATED.3IONS-SCNC: 8 MMOL/L
BASOPHILS # BLD AUTO: 0.01 THOUSANDS/ÂΜL (ref 0–0.1)
BASOPHILS NFR BLD AUTO: 0 % (ref 0–1)
BUN SERPL-MCNC: 17 MG/DL (ref 5–25)
CALCIUM SERPL-MCNC: 9.5 MG/DL (ref 8.4–10.2)
CHLORIDE SERPL-SCNC: 103 MMOL/L (ref 96–108)
CHOLEST SERPL-MCNC: 141 MG/DL
CO2 SERPL-SCNC: 25 MMOL/L (ref 21–32)
CREAT SERPL-MCNC: 1.07 MG/DL (ref 0.6–1.3)
EOSINOPHIL # BLD AUTO: 0.04 THOUSAND/ÂΜL (ref 0–0.61)
EOSINOPHIL NFR BLD AUTO: 1 % (ref 0–6)
ERYTHROCYTE [DISTWIDTH] IN BLOOD BY AUTOMATED COUNT: 13.3 % (ref 11.6–15.1)
FOLATE SERPL-MCNC: 19.3 NG/ML
GFR SERPL CREATININE-BSD FRML MDRD: 74 ML/MIN/1.73SQ M
GLUCOSE P FAST SERPL-MCNC: 167 MG/DL (ref 65–99)
GLUCOSE SERPL-MCNC: 117 MG/DL (ref 65–140)
GLUCOSE SERPL-MCNC: 139 MG/DL (ref 65–140)
GLUCOSE SERPL-MCNC: 152 MG/DL (ref 65–140)
GLUCOSE SERPL-MCNC: 167 MG/DL (ref 65–140)
GLUCOSE SERPL-MCNC: 176 MG/DL (ref 65–140)
GLUCOSE SERPL-MCNC: 200 MG/DL (ref 65–140)
HCT VFR BLD AUTO: 41.4 % (ref 36.5–49.3)
HDLC SERPL-MCNC: 52 MG/DL
HGB BLD-MCNC: 14.2 G/DL (ref 12–17)
IMM GRANULOCYTES # BLD AUTO: 0.01 THOUSAND/UL (ref 0–0.2)
IMM GRANULOCYTES NFR BLD AUTO: 0 % (ref 0–2)
LDLC SERPL CALC-MCNC: 71 MG/DL (ref 0–100)
LYMPHOCYTES # BLD AUTO: 1.45 THOUSANDS/ÂΜL (ref 0.6–4.47)
LYMPHOCYTES NFR BLD AUTO: 27 % (ref 14–44)
MCH RBC QN AUTO: 28.6 PG (ref 26.8–34.3)
MCHC RBC AUTO-ENTMCNC: 34.3 G/DL (ref 31.4–37.4)
MCV RBC AUTO: 83 FL (ref 82–98)
MONOCYTES # BLD AUTO: 0.27 THOUSAND/ÂΜL (ref 0.17–1.22)
MONOCYTES NFR BLD AUTO: 5 % (ref 4–12)
NEUTROPHILS # BLD AUTO: 3.69 THOUSANDS/ÂΜL (ref 1.85–7.62)
NEUTS SEG NFR BLD AUTO: 67 % (ref 43–75)
NONHDLC SERPL-MCNC: 89 MG/DL
NRBC BLD AUTO-RTO: 0 /100 WBCS
PLATELET # BLD AUTO: 220 THOUSANDS/UL (ref 149–390)
PMV BLD AUTO: 9.1 FL (ref 8.9–12.7)
POTASSIUM SERPL-SCNC: 4.4 MMOL/L (ref 3.5–5.3)
RBC # BLD AUTO: 4.97 MILLION/UL (ref 3.88–5.62)
SODIUM SERPL-SCNC: 136 MMOL/L (ref 135–147)
TRIGL SERPL-MCNC: 92 MG/DL
VIT B12 SERPL-MCNC: 467 PG/ML (ref 180–914)
WBC # BLD AUTO: 5.47 THOUSAND/UL (ref 4.31–10.16)

## 2023-10-11 PROCEDURE — 99223 1ST HOSP IP/OBS HIGH 75: CPT | Performed by: STUDENT IN AN ORGANIZED HEALTH CARE EDUCATION/TRAINING PROGRAM

## 2023-10-11 PROCEDURE — 82948 REAGENT STRIP/BLOOD GLUCOSE: CPT

## 2023-10-11 PROCEDURE — 82306 VITAMIN D 25 HYDROXY: CPT | Performed by: STUDENT IN AN ORGANIZED HEALTH CARE EDUCATION/TRAINING PROGRAM

## 2023-10-11 PROCEDURE — 80061 LIPID PANEL: CPT | Performed by: PSYCHIATRY & NEUROLOGY

## 2023-10-11 PROCEDURE — 82746 ASSAY OF FOLIC ACID SERUM: CPT | Performed by: STUDENT IN AN ORGANIZED HEALTH CARE EDUCATION/TRAINING PROGRAM

## 2023-10-11 PROCEDURE — 80048 BASIC METABOLIC PNL TOTAL CA: CPT | Performed by: PSYCHIATRY & NEUROLOGY

## 2023-10-11 PROCEDURE — 82607 VITAMIN B-12: CPT | Performed by: STUDENT IN AN ORGANIZED HEALTH CARE EDUCATION/TRAINING PROGRAM

## 2023-10-11 PROCEDURE — 85025 COMPLETE CBC W/AUTO DIFF WBC: CPT | Performed by: PSYCHIATRY & NEUROLOGY

## 2023-10-11 PROCEDURE — 99222 1ST HOSP IP/OBS MODERATE 55: CPT | Performed by: STUDENT IN AN ORGANIZED HEALTH CARE EDUCATION/TRAINING PROGRAM

## 2023-10-11 RX ORDER — ACETAMINOPHEN 325 MG/1
650 TABLET ORAL EVERY 8 HOURS SCHEDULED
Status: DISCONTINUED | OUTPATIENT
Start: 2023-10-11 | End: 2023-10-17 | Stop reason: HOSPADM

## 2023-10-11 RX ORDER — MIRTAZAPINE 7.5 MG/1
7.5 TABLET, FILM COATED ORAL
Status: DISCONTINUED | OUTPATIENT
Start: 2023-10-11 | End: 2023-10-14

## 2023-10-11 RX ORDER — GABAPENTIN 300 MG/1
600 CAPSULE ORAL 3 TIMES DAILY
Status: DISCONTINUED | OUTPATIENT
Start: 2023-10-11 | End: 2023-10-17 | Stop reason: HOSPADM

## 2023-10-11 RX ORDER — ALBUTEROL SULFATE 90 UG/1
2 AEROSOL, METERED RESPIRATORY (INHALATION) EVERY 6 HOURS PRN
Status: DISCONTINUED | OUTPATIENT
Start: 2023-10-11 | End: 2023-10-17 | Stop reason: HOSPADM

## 2023-10-11 RX ORDER — GABAPENTIN 300 MG/1
600 CAPSULE ORAL 3 TIMES DAILY
Status: DISCONTINUED | OUTPATIENT
Start: 2023-10-11 | End: 2023-10-11

## 2023-10-11 RX ORDER — LIDOCAINE 50 MG/G
2 PATCH TOPICAL DAILY
Status: DISCONTINUED | OUTPATIENT
Start: 2023-10-11 | End: 2023-10-17 | Stop reason: HOSPADM

## 2023-10-11 RX ORDER — OXYCODONE HCL 10 MG/1
10 TABLET, FILM COATED, EXTENDED RELEASE ORAL 3 TIMES DAILY PRN
Status: DISCONTINUED | OUTPATIENT
Start: 2023-10-11 | End: 2023-10-12

## 2023-10-11 RX ORDER — GABAPENTIN 400 MG/1
800 CAPSULE ORAL 3 TIMES DAILY
Status: DISCONTINUED | OUTPATIENT
Start: 2023-10-11 | End: 2023-10-11

## 2023-10-11 RX ORDER — LIDOCAINE 50 MG/G
1 PATCH TOPICAL DAILY
Status: DISCONTINUED | OUTPATIENT
Start: 2023-10-11 | End: 2023-10-11

## 2023-10-11 RX ORDER — ARIPIPRAZOLE 2 MG/1
2 TABLET ORAL DAILY
Status: DISCONTINUED | OUTPATIENT
Start: 2023-10-11 | End: 2023-10-17 | Stop reason: HOSPADM

## 2023-10-11 RX ADMIN — MIRTAZAPINE 7.5 MG: 7.5 TABLET, FILM COATED ORAL at 21:17

## 2023-10-11 RX ADMIN — MELATONIN TAB 3 MG 3 MG: 3 TAB at 21:17

## 2023-10-11 RX ADMIN — TAMSULOSIN HYDROCHLORIDE 0.4 MG: 0.4 CAPSULE ORAL at 16:42

## 2023-10-11 RX ADMIN — LIDOCAINE 2 PATCH: 700 PATCH TOPICAL at 12:46

## 2023-10-11 RX ADMIN — GABAPENTIN 600 MG: 300 CAPSULE ORAL at 21:17

## 2023-10-11 RX ADMIN — INSULIN LISPRO 1 UNITS: 100 INJECTION, SOLUTION INTRAVENOUS; SUBCUTANEOUS at 08:18

## 2023-10-11 RX ADMIN — DICLOFENAC SODIUM 2 G: 10 GEL TOPICAL at 08:20

## 2023-10-11 RX ADMIN — DICLOFENAC SODIUM 2 G: 10 GEL TOPICAL at 11:53

## 2023-10-11 RX ADMIN — GABAPENTIN 600 MG: 300 CAPSULE ORAL at 16:41

## 2023-10-11 RX ADMIN — LIDOCAINE 5% 1 PATCH: 700 PATCH TOPICAL at 11:53

## 2023-10-11 RX ADMIN — DICLOFENAC SODIUM 2 G: 10 GEL TOPICAL at 21:17

## 2023-10-11 RX ADMIN — METHOCARBAMOL 500 MG: 500 TABLET, FILM COATED ORAL at 16:41

## 2023-10-11 RX ADMIN — GABAPENTIN 800 MG: 400 CAPSULE ORAL at 08:20

## 2023-10-11 RX ADMIN — AMLODIPINE BESYLATE 10 MG: 10 TABLET ORAL at 08:19

## 2023-10-11 RX ADMIN — SERTRALINE HYDROCHLORIDE 25 MG: 25 TABLET ORAL at 08:20

## 2023-10-11 RX ADMIN — DULOXETINE HYDROCHLORIDE 60 MG: 60 CAPSULE, DELAYED RELEASE PELLETS ORAL at 17:06

## 2023-10-11 RX ADMIN — INSULIN LISPRO 2 UNITS: 100 INJECTION, SOLUTION INTRAVENOUS; SUBCUTANEOUS at 21:38

## 2023-10-11 RX ADMIN — ATORVASTATIN CALCIUM 20 MG: 20 TABLET, FILM COATED ORAL at 16:41

## 2023-10-11 RX ADMIN — METFORMIN HYDROCHLORIDE 500 MG: 500 TABLET, FILM COATED ORAL at 16:41

## 2023-10-11 RX ADMIN — METHOCARBAMOL 500 MG: 500 TABLET, FILM COATED ORAL at 21:17

## 2023-10-11 RX ADMIN — ARIPIPRAZOLE 2 MG: 2 TABLET ORAL at 11:53

## 2023-10-11 RX ADMIN — DICLOFENAC SODIUM 2 G: 10 GEL TOPICAL at 17:06

## 2023-10-11 RX ADMIN — TRAZODONE HYDROCHLORIDE 50 MG: 50 TABLET ORAL at 21:17

## 2023-10-11 RX ADMIN — ASPIRIN 325 MG ORAL TABLET 325 MG: 325 PILL ORAL at 08:19

## 2023-10-11 RX ADMIN — METHOCARBAMOL 500 MG: 500 TABLET, FILM COATED ORAL at 08:20

## 2023-10-11 RX ADMIN — DULOXETINE HYDROCHLORIDE 60 MG: 60 CAPSULE, DELAYED RELEASE PELLETS ORAL at 08:20

## 2023-10-11 RX ADMIN — FAMOTIDINE 20 MG: 20 TABLET, FILM COATED ORAL at 08:20

## 2023-10-11 RX ADMIN — INSULIN LISPRO 1 UNITS: 100 INJECTION, SOLUTION INTRAVENOUS; SUBCUTANEOUS at 16:42

## 2023-10-11 RX ADMIN — METFORMIN HYDROCHLORIDE 500 MG: 500 TABLET, FILM COATED ORAL at 08:20

## 2023-10-11 NOTE — H&P
Psychiatric Evaluation - Behavioral Health   Marv Feng [facility] 65 y.o. male MRN: [id number]  Unit/Bed#: Shelly Garcia 565-83 Encounter: [id number]    Assessment/Plan   Principal Problem:    Current severe episode of major depressive disorder without prior episode ([address])  Active Problems:    Benign essential HTN    Hyperlipidemia    Type 2 diabetes mellitus without complication, without long-term current use of insulin (HCC)    BPH with obstruction/lower urinary tract symptoms    Medical clearance for psychiatric admission    Plan:   Start Remeron 7.5 mg nightly, for mood, sleep and appetite  Start Abilify 2 mg daily, for mood   Discontinue Zoloft  Continue Cymbalta 60 mg twice daily, for mood and pain  Continue gabapentin 600 mg 3 times daily, for pain and anxiety  Continue melatonin 3 mg nightly, for sleep  Continue trazodone 50 mg nightly, for sleep  Initiate fall precautions  Obtain PT eval  Obtain swallow eval  Add lidocaine patches for pain    Admission labs. Frequent safety checks and vitals per unit protocol. Collaborate with family for baseline assessment and disposition planning. Case discussed with treatment team.  Treatment options and alternatives were reviewed with the patient.        -----------------------------------    Chief Complaint: "I wanted to die"    History of Present Illness     Brian Alston is a 65 y.o.  English-speaking, [place] male,  but  father of 2 daughters, currently domiciled with estranged wife and 51-year-old daughter, unemployed on SSI disability, who ambulates with the assistance of a walker, and has with a past medical history of chronic lower back pain with lumbar radiculopathy/left-sided sciatica, peripheral neuropathy, hypertension, hyperlipidemia, type 2 diabetes, and BPH and a past psychiatric history of major depressive disorder-severe, currently active without prior episode who presents to the unit voluntarily via 201 in the context of multiple psychosocial and financial stressors, chronic pain and additional medical issues for worsening depressive symptoms and active suicidal ideation with unspecified plan. Per ED crisis note by Rosmery Poon:  "Patient is a 65 yr old male with a hx of depression due to chronic pain. 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 Today, he continues to struggle with chronic pain, uses a walker, and is unable to work any more. He lives with his 15 yr old daughter, is  and allowed his x-wife with stay with him because she was going to have surgery and unable to do steps. As a result he lost food stamps due to a change in the family income. Patient was at his family doctor today, states that he is unable to live this way any more, and if he had a gun he would shoot self. He also wants to shoot the welfare man. He becomes very tearful, obviously overwhelmed with medical and financial problems. However, he does not want to be hospitalized because he has no one to care for his 15 yr old dtr. Patient received cymbalta from his PCP but does not have a psychiatrist."    Patient states that in 2019 he suffered an on the job accident caused him to lose his job. Patient states his he has been experiencing chronic pain ever since. Patient states that he has had to go on SSI disability has to ambulate with the assistance of a walker. Over the past year, patient reports worsening depression due to several psychosocial and financial stressors. Patient states that his estranged wife recently came from [facility] to live with him and their 15year-old daughter temporarily for an upcoming surgery. Patient states that he required his estranged wife to pay $500 in rent and that when he subsequently reported this welfare office they said that his income was now too high and that they would be reducing his benefits.   Patient states that he is struggling with paying his increasing right as it is as he only gets $1400 for SSI and that he is now unable to afford all of his bills. Patient also reports a reduction in his food stamps and no longer having access to home health care as additional stressors. Patient also says that recently his daughter said that she would now like to live with her mother as opposed to the patient which caused him additional distress. Patient also reports increased difficulty with ambulation including multiple, recurrent falls over the past few weeks and worsening pain symptoms. Due to these factors occurring over the past year, patient has been reporting worsening depression along with decreased sleep, decreased appetite, 13 pound unintentional weight loss, decreased energy, decreased concentration, anhedonia, feelings of guilt and hopelessness, and suicidal ideation. Patient states that the "last straw" occurred recently when his bank account became overdraft and he realized he had no money in his account. Patient says that it was this moment when he decided he wanted to commit suicide. He does not go into details regarding his plan but says that he was going to leave a note for his daughter prior. Patient says that he told his PCP of his plan the other day which led to his current admission. Regarding leah screening, patient denies any history of significant sleep deficit with increased energy, racing thoughts, pressured speech, increasing goal-directed activities or indiscrete/risky behaviors. Patient does report some worsening anxiety over the past year related to worries regarding multiple psychosocial and financial stressors. He denies any history of trauma or abuse as well as any PTSD related symptoms. Patient denies any history of eating disorders or history of paranoia/delusions. Patient does report vague hypnagogic visual hallucinations in the form of seeing "shadows" when he is about to go to sleep.   Patient denies seeing these images while fully awake though admits that when he is dozing off that they sometimes occur. Patient does not report any history of auditory hallucinations. Patient denies any history of suicide attempts or self-harm behaviors. Currently, patient reports continued passive death wish but contracts for safety on the unit and denies any active suicidal ideation or plan, homicidal ideation, auditory or visual hallucinations. Medical Review Of Systems:  chronic pain which is unchanged from baseline, anxiety, recent lightheadedness and shortness of breath upon standing that occurred last night while going to the bathroom, Complete review of systems is negative except as noted above. Psychiatric Review Of Systems:  Problems with sleep: yes, decreased  Appetite changes: yes, decreased  Weight changes: yes, decreased  Low energy/anergy: yes  Low interest/pleasure/anhedonia: yes  Somatic symptoms: no  Anxiety/panic: yes  Nasreen: no  Guilt/hopeless: yes  Self injurious behavior/risky behavior: no    Historical Information     Psychiatric History:   Prior psychiatric diagnoses: Major depressive disorder  Inpatient hospitalizations: patient denies  Suicide attempts: patient denies  Self-harm behaviors: patient denies  Violent behavior: patient denies  Outpatient treatment: Obtains psych meds through PCP  Psychiatric medication trial: Cymbalta, gabapentin, Zoloft    Substance Abuse History:  Social History     Tobacco Use    Smoking status: Never    Smokeless tobacco: Never   Vaping Use    Vaping Use: Never used   Substance Use Topics    Alcohol use: Not Currently     Comment: rare    Drug use: Never      Patient denies use of tobacco, alcohol, or illicit drugs with the exception of occasional alcohol during the holidays. Pt reports drinking 1-2 standard cups of coffee per day. I have assessed this patient for substance use within the past 12 months.     Family Psychiatric History:   No other known family history of psychiatric illness, suicide attempt or substance abuse. Social History  Marital history: /Civil Union  Children: yes, two daughters, one 15 yo from current estranged wife, one from previous marriage  Living arrangement: Lives in a home with 17-yo daughter and estranged wife.   Functioning Relationships: poor support system, states main support is 15 yo daughter  Education:  6th grade  Occupational History: on SSI disability  Other Pertinent History: None      Traumatic History:   Abuse: none reported  Other Traumatic Events: none reported    Past Medical History:   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    Depression     Diabetes mellitus ([address])     Hyperlipidemia     Hypertension     Type 2 diabetes mellitus without complication, without long-term current use of insulin ([address]) 10/16/2020        -----------------------------------  Objective    Temp:  [97.6 °F (36.4 °C)-97.8 °F (36.6 °C)] 97.8 °F (36.6 °C)  HR:  [62-85] 74  Resp:  [16] 16  BP: (123-142)/(69-91) 127/69    Risk of Harm to Self:   The following ratings are based on review of the hospital stay progress  Demographic risk factors include: , male, age: over 48 or older  Historical Risk Factors include: history of depression  Current Specific Risk Factors include: recent suicidal ideation, diagnosis of depression, lack of support, health problems, chronic pain, multiple financial and psychosocial stressors, ongoing depressive symptoms  Protective Factors: ability to make plans for the future, no current suicidal plan or intent, being a parent, compliant with medications, compliant with mental health treatment, connection to own children, medical compliance, no substance use problems, resiliency, responsibilities and duties to others, restricted access to lethal means, supportive daughter, ability to contract for safety with staff, ability to communicate with staff  Weapons/Firearms: none. The following steps have been taken to ensure weapons are properly secured: not applicable  Based on today's assessment, Marv presents the following risk of harm to self: low    Risk of Harm to Others: The following ratings are based on assessment at the time of the interview  Demographic Risk Factors include: male, unemployed. Historical Risk Factors include: none. Current Specific Risk Factors include: recent thoughts to harm others, multiple stressors, social difficulties  Protective Factors: no current homicidal ideation, compliant with medications, compliant with treatment, willing to continue psychiatric treatment, no current substance use problems, no prior history of violence, responsibilities and duties to others, being a parent, medical compliance, resilience, restricted access to lethal means, access to mental health treatment  Weapons/Firearms: none.  The following steps have been taken to ensure weapons are properly secured: not applicable  Based on today's assessment, Marv presents the following risk of harm to others: minimal    Patient Strengths/Assets: average or above intelligence, capable of independent living, cooperative, communication skills, compliant with medication, general fund of knowledge, interpersonal skills, negotiates basic needs, patient is on a voluntary commitment, patient is willing to work on problems, supportive daughter, work skills      Patient Barriers/Limitations: chronic pain, family conflict, financial instability, lack of financial means, lack of social/family support, lack of stable employment, limited insight, medical problems, poor support system, unstable housing situation    Mental Status Exam:    Appearance:  age appropriate and casually dressed   Behavior:  cooperative   Speech:  Normal rate and volume, hyperverbal though nonpressured   Mood:  depressed, slightly irritable   Affect:  constricted   Language: naming objects and repeating phrases   Thought Process:  logical, coherent, perseverative   Thought Content:  No over delusions, ruminative thoughts, negative thinking   Thought Associations: intact associations   Perceptual Disturbances: Reports vague hypnagogic visual hallucinations in the form of "shadows" that occur when falling asleep or dosing off   Risk Potential: Suicidal ideation - Yes, passive death wish but contracts for safety on the unit. Prior to admission had active SI w/ plan. Homicidal ideation - None at present, but had homicidal ideation prior to admission.   Potential for aggression - No   Sensorium:  person, place, time/date, and situation   Cognition:  recent and remote memory grossly intact   Consciousness:  alert and awake    Attention: attention span and concentration were age appropriate   Intellect: within normal limits   Fund of Knowledge: awareness of current events: Yes, past history: Yes, and vocabulary: appropriate   Insight:  limited   Judgment: limited   Muscle Strength and Tone: Appears appropriate   Gait/Station: In bed, uses a walker for ambulation   Motor Activity: no abnormal movements       Meds/Allergies   No Known Allergies  all current active meds have been reviewed and current meds:   Current Facility-Administered Medications   Medication Dose Route Frequency    aluminum-magnesium hydroxide-simethicone (MAALOX) oral suspension 30 mL  30 mL Oral Q4H PRN    amLODIPine (NORVASC) tablet 10 mg  10 mg Oral Daily    ARIPiprazole (ABILIFY) tablet 2 mg  2 mg Oral Daily    aspirin tablet 325 mg  325 mg Oral Daily    atorvastatin (LIPITOR) tablet 20 mg  20 mg Oral Daily With Dinner    Diclofenac Sodium (VOLTAREN) 1 % topical gel 2 g  2 g Topical 4x Daily    DULoxetine (CYMBALTA) delayed release capsule 60 mg  60 mg Oral BID    famotidine (PEPCID) tablet 20 mg  20 mg Oral Daily    gabapentin (NEURONTIN) capsule 600 mg  600 mg Oral TID    haloperidol lactate (HALDOL) injection 5 mg  5 mg Intramuscular Q4H PRN Max 4/day    hydrOXYzine HCL (ATARAX) tablet 25 mg  25 mg Oral Q6H PRN Max 4/day    insulin lispro (HumaLOG) 100 units/mL subcutaneous injection 1-6 Units  1-6 Units Subcutaneous 4x Daily (AC & HS)    lidocaine (LIDODERM) 5 % patch 2 patch  2 patch Topical Daily    LORazepam (ATIVAN) injection 1 mg  1 mg Intramuscular Q6H PRN Max 3/day    melatonin tablet 3 mg  3 mg Oral HS    metFORMIN (GLUCOPHAGE) tablet 500 mg  500 mg Oral BID With Meals    methocarbamol (ROBAXIN) tablet 500 mg  500 mg Oral TID    mirtazapine (REMERON) tablet 7.5 mg  7.5 mg Oral HS    nicotine polacrilex (NICORETTE) gum 4 mg  4 mg Oral Q2H PRN    risperiDONE (RisperDAL) tablet 0.25 mg  0.25 mg Oral Q4H PRN Max 6/day    risperiDONE (RisperDAL) tablet 0.5 mg  0.5 mg Oral Q4H PRN Max 3/day    risperiDONE (RisperDAL) tablet 1 mg  1 mg Oral Q2H PRN Max 3/day    tamsulosin (FLOMAX) capsule 0.4 mg  0.4 mg Oral Daily With Dinner    traZODone (DESYREL) tablet 50 mg  50 mg Oral HS    zolpidem (AMBIEN) tablet 5 mg  5 mg Oral HS PRN       Behavioral Health Medications: all current active meds have been reviewed. Changes as in Plan section above. Laboratory results:  I have personally reviewed all pertinent laboratory/tests results.   Recent Results (from the past 48 hour(s))   POCT hemoglobin A1c    Collection Time: 10/10/23 11:08 AM   Result Value Ref Range    Hemoglobin A1C 7.0 (A) 6.5   CBC and differential    Collection Time: 10/10/23  1:00 PM   Result Value Ref Range    WBC 4.92 4.31 - 10.16 Thousand/uL    RBC 5.21 3.88 - 5.62 Million/uL    Hemoglobin 15.3 12.0 - 17.0 g/dL    Hematocrit 43.0 36.5 - 49.3 %    MCV 83 82 - 98 fL    MCH 29.4 26.8 - 34.3 pg    MCHC 35.6 31.4 - 37.4 g/dL    RDW 13.3 11.6 - 15.1 %    MPV 9.4 8.9 - 12.7 fL    Platelets 102 603 - 761 Thousands/uL    nRBC 0 /100 WBCs    Neutrophils Relative 44 43 - 75 %    Immat GRANS % 0 0 - 2 %    Lymphocytes Relative 47 (H) 14 - 44 %    Monocytes Relative 7 4 - 12 % Eosinophils Relative 1 0 - 6 %    Basophils Relative 1 0 - 1 %    Neutrophils Absolute 2.15 1.85 - 7.62 Thousands/µL    Immature Grans Absolute 0.00 0.00 - 0.20 Thousand/uL    Lymphocytes Absolute 2.36 0.60 - 4.47 Thousands/µL    Monocytes Absolute 0.33 0.17 - 1.22 Thousand/µL    Eosinophils Absolute 0.05 0.00 - 0.61 Thousand/µL    Basophils Absolute 0.03 0.00 - 0.10 Thousands/µL   Comprehensive metabolic panel    Collection Time: 10/10/23  1:00 PM   Result Value Ref Range    Sodium 139 135 - 147 mmol/L    Potassium 3.6 3.5 - 5.3 mmol/L    Chloride 100 96 - 108 mmol/L    CO2 30 21 - 32 mmol/L    ANION GAP 9 mmol/L    BUN 15 5 - 25 mg/dL    Creatinine 1.18 0.60 - 1.30 mg/dL    Glucose 134 65 - 140 mg/dL    Calcium 10.0 8.4 - 10.2 mg/dL    AST 21 13 - 39 U/L    ALT 37 7 - 52 U/L    Alkaline Phosphatase 57 34 - 104 U/L    Total Protein 8.3 6.4 - 8.4 g/dL    Albumin 5.0 3.5 - 5.0 g/dL    Total Bilirubin 0.78 0.20 - 1.00 mg/dL    eGFR 66 ml/min/1.73sq m   TSH    Collection Time: 10/10/23  1:00 PM   Result Value Ref Range    TSH 3RD GENERATON 0.985 0.450 - 4.500 uIU/mL   Rapid drug screen, urine    Collection Time: 10/10/23  1:01 PM   Result Value Ref Range    Amph/Meth UR Negative Negative    Barbiturate Ur Negative Negative    Benzodiazepine Urine Negative Negative    Cocaine Urine Negative Negative    Methadone Urine Negative Negative    Opiate Urine Negative Negative    PCP Ur Negative Negative    THC Urine Negative Negative    Oxycodone Urine Negative Negative   UA (URINE) with reflex to Scope    Collection Time: 10/10/23  1:01 PM   Result Value Ref Range    Color, UA Straw Straw, Yellow, Pale Yellow    Clarity, UA Clear Clear, Other    Specific Gravity, UA 1.010 1.003 - 1.040    pH, UA 6.5 4.5, 5.0, 5.5, 6.0, 6.5, 7.0, 7.5, 8.0    Leukocytes, UA Negative Negative    Nitrite, UA Negative Negative    Protein, UA Negative Negative mg/dl    Glucose, UA Negative Negative mg/dl    Ketones, UA Negative Negative mg/dl Bilirubin, UA Negative Negative    Occult Blood, UA 10.0 (A) Negative    UROBILINOGEN UA Negative 1.0, Negative mg/dL   Urine Microscopic    Collection Time: 10/10/23  1:01 PM   Result Value Ref Range    RBC, UA 0-1 None Seen, 0-1, 1-2, 2-4, 0-5 /hpf    WBC, UA None Seen None Seen, 0-1, 1-2, 0-5, 2-4 /hpf    Epithelial Cells None Seen None Seen, Occasional /hpf    Bacteria, UA None Seen None Seen, Occasional /hpf   POCT alcohol breath test    Collection Time: 10/10/23  1:15 PM   Result Value Ref Range    EXTBreath Alcohol 0.00    ECG 12 lead    Collection Time: 10/10/23  4:49 PM   Result Value Ref Range    Ventricular Rate 76 BPM    Atrial Rate 76 BPM    OH Interval 196 ms    QRSD Interval 82 ms    QT Interval 400 ms    QTC Interval 450 ms    P Axis 60 degrees    QRS Axis 47 degrees    T Wave Axis 31 degrees   Fingerstick Glucose (POCT)    Collection Time: 10/10/23  8:21 PM   Result Value Ref Range    POC Glucose 191 (H) 65 - 140 mg/dl   Fingerstick Glucose (POCT)    Collection Time: 10/10/23  9:38 PM   Result Value Ref Range    POC Glucose 178 (H) 65 - 140 mg/dl   Fingerstick Glucose (POCT)    Collection Time: 10/11/23  1:14 AM   Result Value Ref Range    POC Glucose 139 65 - 140 mg/dl   Basic metabolic panel    Collection Time: 10/11/23  4:56 AM   Result Value Ref Range    Sodium 136 135 - 147 mmol/L    Potassium 4.4 3.5 - 5.3 mmol/L    Chloride 103 96 - 108 mmol/L    CO2 25 21 - 32 mmol/L    ANION GAP 8 mmol/L    BUN 17 5 - 25 mg/dL    Creatinine 1.07 0.60 - 1.30 mg/dL    Glucose 167 (H) 65 - 140 mg/dL    Glucose, Fasting 167 (H) 65 - 99 mg/dL    Calcium 9.5 8.4 - 10.2 mg/dL    eGFR 74 ml/min/1.73sq m   CBC and differential    Collection Time: 10/11/23  4:56 AM   Result Value Ref Range    WBC 5.47 4.31 - 10.16 Thousand/uL    RBC 4.97 3.88 - 5.62 Million/uL    Hemoglobin 14.2 12.0 - 17.0 g/dL    Hematocrit 41.4 36.5 - 49.3 %    MCV 83 82 - 98 fL    MCH 28.6 26.8 - 34.3 pg    MCHC 34.3 31.4 - 37.4 g/dL    RDW 13.3 11.6 - 15.1 %    MPV 9.1 8.9 - 12.7 fL    Platelets 333 764 - 695 Thousands/uL    nRBC 0 /100 WBCs    Neutrophils Relative 67 43 - 75 %    Immat GRANS % 0 0 - 2 %    Lymphocytes Relative 27 14 - 44 %    Monocytes Relative 5 4 - 12 %    Eosinophils Relative 1 0 - 6 %    Basophils Relative 0 0 - 1 %    Neutrophils Absolute 3.69 1.85 - 7.62 Thousands/µL    Immature Grans Absolute 0.01 0.00 - 0.20 Thousand/uL    Lymphocytes Absolute 1.45 0.60 - 4.47 Thousands/µL    Monocytes Absolute 0.27 0.17 - 1.22 Thousand/µL    Eosinophils Absolute 0.04 0.00 - 0.61 Thousand/µL    Basophils Absolute 0.01 0.00 - 0.10 Thousands/µL   Lipid panel    Collection Time: 10/11/23  4:56 AM   Result Value Ref Range    Cholesterol 141 See Comment mg/dL    Triglycerides 92 See Comment mg/dL    HDL, Direct 52 >=40 mg/dL    LDL Calculated 71 0 - 100 mg/dL    Non-HDL-Chol (CHOL-HDL) 89 mg/dl   Fingerstick Glucose (POCT)    Collection Time: 10/11/23  8:08 AM   Result Value Ref Range    POC Glucose 152 (H) 65 - 140 mg/dl   Fingerstick Glucose (POCT)    Collection Time: 10/11/23 11:24 AM   Result Value Ref Range    POC Glucose 117 65 - 140 mg/dl        Imaging Studies:   No orders to display              -----------------------------------    Risks / Benefits of Treatment:  Risks, benefits, and possible side effects of medications were explained to patient. The patient was able to verbalize understanding and agree for treatment. Counseling / Coordination of Care:  Patient's presentation on admission and proposed treatment plan were discussed with the treatment team.  Diagnosis, medication changes and treatment plan were reviewed with the patient. Recent stressors were discussed with the patient. Events leading to admission were reviewed with the patient. Importance of medication and treatment compliance was reviewed with the patient.           Inpatient Psychiatric Certification:     Certification: Based upon physical, mental and social evaluations, I certify that inpatient psychiatric services are medically necessary for this patient for a duration of 7 midnights for the treatment of Current severe episode of major depressive disorder without prior episode ([address]). Available alternative community resources do not meet the patient's mental health care needs. I further attest that an established written individualized plan of care has been implemented and is outlined in the patient's medical records.     Elena Pendleton MD  Psychiatry Resident, PGY-2

## 2023-10-11 NOTE — NURSING NOTE
Patient vomited undigested food in toilet. Reported that he occasionally gets sick at night. Stated he still had mild nausea and requested warm tea stating that usually helps. , vss, resting quietly in bed at this time.

## 2023-10-11 NOTE — ASSESSMENT & PLAN NOTE
BP Readings from Last 3 Encounters:   10/11/23 127/69   10/10/23 140/100   10/10/23 130/82    Home meds: Amlodipine 10mg,    PLAN:  Amlodipine 10mg  Monitor blood pressure

## 2023-10-11 NOTE — CASE MANAGEMENT
Case Management Assessment      Psychosocial Assessment 1:1:   CM met privately with the patient to introduce self, role of CM, and to gather background information. The patient was talkative and cooperative throughout the conversation. The patient reports he lives at home with his 15year old daughter and ex wife. The patient reports he got his [place] in 2020 after immigrating from [facility] many years ago. He reports he rents the home from his oldest daughter who owns it. The patient reports he got $70,000 for a work injury, gave $40,000 to his oldest daughter to buy the house and "wasted" the other $30,000. He would not elaborate on what he spent the money on. The patient reports that his ex wife moved in a month ago because his 15year old daughter wanted her mom. He reports that once she moved in, the welfare office said they were going to lower his benefits. He reports he got a  from [employer] and appealed the decision. The patient reports that the welfare office said while in the appeal process, his benefits remain the same as always. The patient reports his ex wife also agreed to pay $500 of the rent each month, so if she pays and he doesn't lose his benefits, he will feel good about the situation. The patient reports he has an aid in the home 8 hours a day 5 days a week. The patient is open to an [facility] referral. The patient reports he thinks if his ex wife moves out, his daughter will go with her and that he is accepting of the situation. He reports he does not want to be home alone all of the time. The patient is open to a referral to the [facility]. The patient reports he intends to remain honest while he is in here and hopes to feel better. Admission / Details: The patient is currently admitted under a 201 status due to SI.     Residence: [address] [place] [address]  [address] [address] [address]   Lives with: Ex wife, daughter  [place]:  [facility]      Commitment Status: 201  Insurance: Waiting for insurance to be verified   Rx coverage: Waiting for insurance to be verified   Pharmacy:  [address], [address] [address]  Marital Status: Legally , but    Children: 7 children in total  Support System: Mother, brothers, and sisters  Level of Ed: 6th grade  Work History:   Income/Source: SSD, $1400/month  Mandaeism: [employer]   Transportation: Self, has car  Legal Issues: Denies  [address] Treatment Hx: Denies IP, reports he was going to [facility] for OP  Past Suicide Attempt: Denies  Current SI/HI: The patient continues to endorse passive SI, but reports his daughter is a protective factor  Trauma Hx: Denies  Family Hx: Denies  D&A Hx: Denies  Medical: Chronic pain, Type 2 Diabetes  DME: Walker, cane  Tobacco: Denies   Hx: Denies  Access to firearms: Denies  UDS Results: Negative  PCP: Dr. Thomas Stamp: Needs referral  Therapist: Needs referral   ICM/ACT:  needs referral for ICM  Stressors: change of benefits, asking for help  Strengths:  daughter  Coping Skills: spending time on the phone  ROIs Signed: Avery Rasheed  (ex wife) Mayur Bar  (brother) [facility] (Psych and Therapy) and [facility] services  Treatment Plan Signed: In process  IMM Signed: Yes      Additional/Collateral Information:   CM called Avery Rasheed, the patient's ex wife who lives with the patient. 21 . Sharon Found asked how the patient was doing and reported she just got surgery earlier this week and is home recovering and in a lot of pain, but would appreciate updates from  as the patient's treatment continues. RUFINA and Sharon Found agreed to remain in touch. RUFINA called the patient's brother, Jossue Lopes, [phone]. No answer, RUFINA left  requesting a call back.

## 2023-10-11 NOTE — TREATMENT TEAM
10/11/23 0728   Team Meeting   Meeting Type Daily Rounds   Initial Conference Date 10/11/23   Team Members Present   Team Members Present Physician;Nurse;;Occupational Therapist   Physician Team Member Dr. Edgar Mckeon, Dr. Dickson Hadley, [facility]   Nursing Team Member [address], [address] [facility] Management Team Member Festus Ahumada   OT Team Member Stas Pride   Patient/Family Present   Patient Present No   Patient's Family Present No     New 201 admit from yesterday due to passive SI. Patient has chronic pain which causes anxiety and depression. Patient not sleeping well. Patient has many psycho social stressors including financial and food insecurity. No d/c date pending at this time.

## 2023-10-11 NOTE — CONSULTS
Inpatient Consultation - [facility]    Patient Information: Luis Eduardo Sanon 65 y.o. male MRN: [id number]  Unit/Bed#: Timmy Ram 516-29 Encounter: [id number]  PCP: Jayshree Jane MD  Date of Admission:  10/10/2023  Date of Consultation: 10/12/23  Requesting Physician: Elise Corbin MD    Reason For Consultation:   Medical clearance for [address] admission    Assessment/Plan:    Medical clearance for psychiatric admission  Assessment & Plan  All available labs, imaging and relevant notes reviewed. Patient is medically stable for inpatient psych admission. Type 2 diabetes mellitus without complication, without long-term current use of insulin [facility])  Assessment & Plan  Lab Results   Component Value Date    HGBA1C 7.0 (A) 10/10/2023       Recent Labs     10/10/23  2138 10/11/23  0114 10/11/23  0808 10/11/23  1124   POCGLU 178* 139 152* 117       Blood Sugar Average: Last 72 hrs:  (P) 155.4    HOME MEDS: Metformin 500 mg BID  PLAN:  Diabetic diet  Metformin 500 mg  SSI  Fingerstick glucose     Benign essential HTN  Assessment & Plan  BP Readings from Last 3 Encounters:   10/11/23 127/69   10/10/23 140/100   10/10/23 130/82    Home meds: Amlodipine 10mg,    PLAN:  Amlodipine 10mg  Monitor blood pressure    Hyperlipidemia  Assessment & Plan  Lab Results   Component Value Date    LDLCALC 71 10/11/2023      PLAN:  Atorvastatin 20mg    BPH with obstruction/lower urinary tract symptoms  Assessment & Plan  PLAN:  Tamsulosin 0.4      Continuous opioid dependence ([address])  Assessment & Plan  Patient has hx opioid use   H/o of taking Norco 7.5 mg/325 mg TID for greater than 2 years, tried to taper, but was unable to tolerate pain, and discontinued in August. He switched to [facility], then it was discontinued due to side effects and oxycodone 10mg prescribed by PCP recently. Patient refers he doesn't take it everyday, just when needed.   CIWA 0 today  PLAN:  Oxycodone 10mg every 8 hrs  Watch for symptoms of withdrawal    Mild asthma  Assessment & Plan  2021 spirometry   Patient refers uses albuterol inhaler PRN  Doesn't recall last exacerbation    PLAN  Albuterol inhaler every 6 prn    Radiculopathy, lumbar region  Assessment & Plan  Chronic back pain  He has used different medicaitons for pain including: SSRI, gabapentin, lidocaine patch, tylenol, robaxin, as well as Nerve Stimulator (TENS Therapy Pain Relief) recently an order for physical therapy was placed. Hx of opioid dependence  Denies pain now  PLAN:  Oxycodone 10mg TID         VTE Prophylaxis: Patient ambulating    Recommendations for Discharge:  N/A    Counseling / Coordination of Care Time: 20 minutes. Greater than 50% of total time spent on patient counseling and coordination of care. Collaboration of Care: Were Recommendations Directly Discussed with Primary Treatment Team? - Yes       History of Present Illness:    Gill Aivlez is a 65 y.o. male who is originally admitted to the [facility] service on 10/10/2023 due to suicidal ideation. Mr. Bryanna Gaxiola has a PMH of hypertension, diabetes mellitus, hyperlipidemia, benign prostatic hyperplasia , asthma and radiculopathy lumbar region with chronic opioid use. Patient is compliant with home meds, chronic conditions are under control, blood pressure has remained at goal today, last hba1c 7, last ldl 71, patient denied urinary symptoms, he has been urinating with no issues, patient has a PMH of asthma he uses inhaler PRN. Today patient denied headache, chest pain, shortness of breath, palpitations, n/v/d/c he had an episode of vomiting yesterday, he doesn't recall color or amount, unable to describe it. He endorsed he is feeling well today , with no complains. We are consulted for medical clearance for [facility] admission. Review of Systems:    Review of Systems   Constitutional:  Negative for activity change, chills, fatigue and fever. HENT:  Negative for ear pain, facial swelling and sore throat.     Eyes: Negative for pain and visual disturbance. Respiratory:  Negative for cough, chest tightness and shortness of breath. Cardiovascular:  Negative for chest pain, palpitations and leg swelling. Gastrointestinal:  Negative for abdominal pain, constipation, diarrhea, nausea and vomiting. Genitourinary:  Negative for difficulty urinating, dysuria, frequency and hematuria. Musculoskeletal:  Negative for arthralgias and back pain. Skin:  Negative for color change and rash. Neurological:  Negative for seizures and syncope. Psychiatric/Behavioral:  Negative for agitation and confusion. All other systems reviewed and are negative. Past Medical and Surgical History:   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    Depression     Diabetes mellitus ([address])     Hyperlipidemia     Hypertension     Type 2 diabetes mellitus without complication, without long-term current use of insulin ([address]) 10/16/2020     Past Surgical History:   Procedure Laterality Date    CYST REMOVAL  2017     Meds/Allergies: Allergies: No Known Allergies  Prior to Admission Medications   Prescriptions Last Dose Informant Patient Reported? Taking?    Bioflavonoid Products (RA Vitamin C CR) TBCR  Self Yes No   Sig: take 1 tablet by mouth twice a day for 14 days   Patient not taking: Reported on 6/15/2023   Blood Glucose Monitoring Suppl (OneTouch Verio) w/Device KIT  Self No No   Sig: Use daily   Blood Glucose Monitoring Suppl KIT  Self No No   Sig: Use in the morning Please give test strips and lancets  Dx. E11.9   Blood Pressure KIT  Self No No   Sig: Use in the morning With appropriate size cuff   Blood Pressure Monitoring (Blood Pressure Monitor/Arm) EMILY  Self No No   Sig: Use daily   Cholecalciferol (D3 PO)  Self Yes No   Sig: Take by mouth daily   DULoxetine (CYMBALTA) 60 mg delayed release capsule   No No   Sig: Take 1 capsule (60 mg total) by mouth 2 (two) times a day   Diclofenac Sodium (VOLTAREN) 1 %   No No   Sig: Apply 2 g topically 4 (four) times a day as needed (low back or neck pain)   Diclofenac Sodium (VOLTAREN) 1 %   No No   Sig: Apply 2 g topically 4 (four) times a day   Lancet Devices (ONE TOUCH DELICA LANCING DEV) MISC  Self No No   Sig: Use daily   Mirabegron ER 50 MG TB24  Self No No   Sig: Take 1 tablet (50 mg total) by mouth daily   Multiple Vitamin (multivitamin) capsule   No No   Sig: Take 1 capsule by mouth daily for 14 days   Patient not taking: Reported on 2023   Nerve Stimulator (TENS Therapy Pain Relief) EMILY   No No   Sig: Use as directed   OneTouch Delica Lancets 81K MISC   No No   Sig: Use daily   albuterol (2.5 mg/3 mL) 0.083 % nebulizer solution  Self No No   Sig: Take 3 mL (2.5 mg total) by nebulization every 6 (six) hours as needed for wheezing or shortness of breath   albuterol (ProAir HFA) 90 mcg/act inhaler   No No   Sig: Inhale 2 puffs every 6 (six) hours as needed for wheezing   aluminum-magnesium hydroxide-simethicone (MAALOX) 200-200-20 MG/5ML SUSP  Self No No   Sig: Take 15 mL by mouth 4 (four) times a day (before meals and at bedtime)   Patient not taking: Reported on 6/15/2023   amLODIPine (NORVASC) 10 mg tablet   No No   Sig: Take 1 tablet (10 mg total) by mouth daily   aspirin 325 mg tablet   No No   Sig: Take 1 tablet (325 mg total) by mouth daily   atorvastatin (LIPITOR) 20 mg tablet   No No   Sig: Take 1 tablet (20 mg total) by mouth daily   famotidine (PEPCID) 20 mg tablet   No No   Sig: Take 1 tablet (20 mg total) by mouth daily   fluticasone (FLONASE) 50 mcg/act nasal spray  Self No No   Si spray into each nostril daily   fluticasone-salmeterol (Advair HFA) 115-21 MCG/ACT inhaler   No No   Sig: Inhale 2 puffs 2 (two) times a day Rinse mouth after use.   gabapentin (NEURONTIN) 800 mg tablet   No No   Sig: Take 1 tablet (800 mg total) by mouth 3 (three) times a day   glucose blood (OneTouch Verio) test strip   No No   Sig: Check blood sugar daily   glucose monitoring kit (FREESTYLE) monitoring kit  Self No No   Sig: Use 1 each in the morning TESTING DAILY IN THE AM   lidocaine (LMX) 4 % cream  Self No No   Sig: Apply topically as needed for mild pain   lidocaine (Lidoderm) 5 %  Self No No   Sig: Apply 1 patch topically daily Remove & Discard patch within 12 hours or as directed by MD   lidocaine (Lidoderm) 5 %   No No   Sig: Apply 1 patch topically over 12 hours daily Remove & Discard patch within 12 hours or as directed by MD   metFORMIN (GLUCOPHAGE) 500 mg tablet   No No   Sig: take 1 tablet by mouth twice a day with meals   methocarbamol (ROBAXIN) 500 mg tablet   No No   Sig: Take 1 tablet (500 mg total) by mouth 3 (three) times a day   naloxone (NARCAN) 4 mg/0.1 mL nasal spray  Self No No   Sig: Administer 1 spray into a nostril. If no response after 2-3 minutes, give another dose in the other nostril using a new spray. naloxone (NARCAN) 4 mg/0.1 mL nasal spray   No No   Sig: Administer 1 spray into a nostril. If no response after 2-3 minutes, give another dose in the other nostril using a new spray.    naproxen (NAPROSYN) 500 mg tablet   No No   Sig: Take 1 tablet (500 mg total) by mouth 2 (two) times a day with meals   ondansetron (Zofran ODT) 4 mg disintegrating tablet   No No   Sig: Take 1 tablet (4 mg total) by mouth every 6 (six) hours as needed for nausea or vomiting   oxyCODONE (ROXICODONE) 10 MG TABS   No No   Sig: Take 1 tablet (10 mg total) by mouth 3 (three) times a day Max Daily Amount: 30 mg   tamsulosin (FLOMAX) 0.4 mg   No No   Sig: take 1 capsule by mouth once daily with dinner   zolpidem (AMBIEN) 5 mg tablet  Self No No   Sig: Take 1 tablet (5 mg total) by mouth daily at bedtime as needed for sleep      Facility-Administered Medications: None     Social History:     Social History     Socioeconomic History    Marital status: /Civil Union     Spouse name: Not on file    Number of children: Not on file    Years of education: Not on file    Highest education level: Not on file   Occupational History    Not on file   Tobacco Use    Smoking status: Never    Smokeless tobacco: Never   Vaping Use    Vaping Use: Never used   Substance and Sexual Activity    Alcohol use: Not Currently     Comment: rare    Drug use: Never    Sexual activity: Not Currently   Other Topics Concern    Not on file   Social History Narrative    Not on file     Social Determinants of Health     Financial Resource Strain: Low Risk  (10/24/2022)    Overall Financial Resource Strain (CARDIA)     Difficulty of Paying Living Expenses: Not hard at all   Food Insecurity: No Food Insecurity (2/3/2022)    Hunger Vital Sign     Worried About Running Out of Food in the Last Year: Never true     [address] in the Last Year: Never true   Transportation Needs: No Transportation Needs (10/24/2022)    PRAPARE - Transportation     Lack of Transportation (Medical): No     Lack of Transportation (Non-Medical):  No   Physical Activity: Not on file   Stress: Not on file   Social Connections: Not on file   Intimate Partner Violence: Not on file   Housing Stability: Unknown (6/16/2022)    Housing Stability Vital Sign     Unable to Pay for Housing in the Last Year: No     Number of Places Lived in the Last Year: Not on file     Unstable Housing in the Last Year: No       Family History:  Family History   Problem Relation Age of Onset    Hypertension Mother     Diabetes Mother     Cancer Father     Diabetes Family     Hypertension Family        Physical Exam:     Vitals:   Blood Pressure: 126/64 (10/12/23 0734)  Pulse: 76 (10/12/23 0734)  Temperature: 98.6 °F (37 °C) (10/12/23 0734)  Temp Source: Temporal (10/12/23 0734)  Respirations: 16 (10/12/23 0734)  Height: 5' 7" (170.2 cm) (10/10/23 1900)  Weight - Scale: 85 kg (187 lb 6.4 oz) (10/10/23 1900)  SpO2: 95 % (10/12/23 0734)    Physical Exam  Constitutional: Appearance: Normal appearance. HENT:      Head: Normocephalic and atraumatic. Right Ear: External ear normal.      Left Ear: External ear normal.      Nose: Nose normal.      Mouth/Throat:      Mouth: Mucous membranes are moist.   Cardiovascular:      Rate and Rhythm: Normal rate and regular rhythm. Pulses: Normal pulses. Heart sounds: No murmur heard. No friction rub. No gallop. Pulmonary:      Effort: Pulmonary effort is normal. No respiratory distress. Breath sounds: Normal breath sounds. No wheezing. Abdominal:      General: Bowel sounds are normal. There is no distension. Tenderness: There is no abdominal tenderness. Musculoskeletal:         General: Normal range of motion. Cervical back: Normal range of motion and neck supple. Skin:     Capillary Refill: Capillary refill takes less than 2 seconds. Findings: No rash. Neurological:      General: No focal deficit present. Mental Status: He is alert. Additional Data:     Lab Results: I have personally reviewed pertinent reports. Results from last 7 days   Lab Units 10/11/23  0456   WBC Thousand/uL 5.47   HEMOGLOBIN g/dL 14.2   HEMATOCRIT % 41.4   PLATELETS Thousands/uL 220   NEUTROS PCT % 67   LYMPHS PCT % 27   MONOS PCT % 5   EOS PCT % 1     Results from last 7 days   Lab Units 10/11/23  0456 10/10/23  1300   POTASSIUM mmol/L 4.4 3.6   CHLORIDE mmol/L 103 100   CO2 mmol/L 25 30   BUN mg/dL 17 15   CREATININE mg/dL 1.07 1.18   CALCIUM mg/dL 9.5 10.0   ALK PHOS U/L  --  57   ALT U/L  --  37   AST U/L  --  21           Imaging: I have personally reviewed pertinent reports. XR shoulder 2+ views RIGHT    Result Date: 10/3/2023  Narrative: RIGHT SHOULDER INDICATION:   fal, pain. COMPARISON:  None VIEWS:  XR SHOULDER 2+ VW RIGHT Images: 3 FINDINGS: There is no acute fracture or dislocation. Mild osteoarthritis acromioclavicular joint. No lytic or blastic osseous lesion.  Soft tissues are unremarkable. Impression: No acute osseous abnormality. Workstation performed: [id number]     XR spine lumbar 2 or 3 views injury    Result Date: 10/3/2023  Narrative: LUMBAR SPINE INDICATION:   pain post fall. COMPARISON: 2/22/2021 VIEWS:  XR SPINE LUMBAR 2 OR 3 VIEWS INJURY Images: 3 FINDINGS: There are 5 non rib bearing lumbar vertebral bodies. There is no evidence of acute fracture or destructive osseous lesion. Alignment is unremarkable. Mild lumbar degenerative change noted. The pedicles appear intact. Soft tissues are unremarkable. Impression: No acute osseous abnormality. Degenerative changes as described. Workstation performed: [id number]     XR chest pa & lateral    Result Date: 9/20/2023  Narrative: CHEST INDICATION:   Nausea and vomiting. COMPARISON: March 21, 2023 EXAM PERFORMED/VIEWS:  XR CHEST PA & LATERAL FINDINGS: Cardiomediastinal silhouette appears unremarkable. The lungs are clear. No pneumothorax or pleural effusion. Osseous structures appear within normal limits for patient age. Impression: No acute cardiopulmonary disease. Workstation performed: [id number]       EKG, Pathology, and Other Studies Reviewed on Admission:   EKG  Result Date: 10/12/23  Impression:  N/A    Yesterday normal sinus rythm    ** Please Note: This note has been constructed using a voice recognition system.  Gia Woods MD  10/12/23  3:24 PM

## 2023-10-11 NOTE — ASSESSMENT & PLAN NOTE
2021 spirometry   Patient refers uses albuterol inhaler PRN  Doesn't recall last exacerbation    PLAN  Albuterol inhaler every 6 prn

## 2023-10-11 NOTE — ASSESSMENT & PLAN NOTE
Patient is medically cleared for admission to [facility] and treatment of underlying psychiatric illness based on available results  No acute medical needs. Medicine will sign off.  Please call with additional questions or concerns

## 2023-10-11 NOTE — PLAN OF CARE
Problem: Potential for Falls  Goal: Patient will remain free of falls  Description: INTERVENTIONS:  - Educate patient/family on patient safety including physical limitations  - Instruct patient to call for assistance with activity   - Consult OT/PT to assist with strengthening/mobility   - Keep Call bell within reach  - Keep bed low and locked with side rails adjusted as appropriate  - Keep care items and personal belongings within reach  - Initiate and maintain comfort rounds  - Make Fall Risk Sign visible to staff  - Offer Toileting every  Hours, in advance of need  - Initiate/Maintain alarm  - Obtain necessary fall risk management equipment  - Apply yellow socks and bracelet for high fall risk patients  - Consider moving patient to room near nurses station  Outcome: Progressing     Problem: Risk for Self Injury/Neglect  Goal: Treatment Goal: Remain safe during length of stay, learn and adopt new coping skills, and be free of self-injurious ideation, impulses and acts at the time of discharge  Outcome: Progressing  Goal: Verbalize thoughts and feelings  Description: Interventions:  - Assess and re-assess patient's lethality and potential for self-injury  - Engage patient in 1:1 interactions, daily, for a minimum of 15 minutes  - Encourage patient to express feelings, fears, frustrations, hopes  - Establish rapport/trust with patient   Outcome: Progressing  Goal: Refrain from harming self  Description: Interventions:  - Monitor patient closely, per order  - Develop a trusting relationship  - Supervise medication ingestion, monitor effects and side effects   Outcome: Progressing  Goal: Recognize maladaptive responses and adopt new coping mechanisms  Outcome: Progressing  Goal: Complete daily ADLs, including personal hygiene independently, as able  Description: Interventions:  - Observe, teach, and assist patient with ADLS  - Monitor and promote a balance of rest/activity, with adequate nutrition and elimination  Outcome: Progressing     Problem: Depression  Goal: Treatment Goal: Demonstrate behavioral control of depressive symptoms, verbalize feelings of improved mood/affect, and adopt new coping skills prior to discharge  Outcome: Progressing  Goal: Verbalize thoughts and feelings  Description: Interventions:  - Assess and re-assess patient's level of risk   - Engage patient in 1:1 interactions, daily, for a minimum of 15 minutes   - Encourage patient to express feelings, fears, frustrations, hopes   Outcome: Progressing  Goal: Refrain from harming self  Description: Interventions:  - Monitor patient closely, per order   - Supervise medication ingestion, monitor effects and side effects   Outcome: Progressing  Goal: Refrain from isolation  Description: Interventions:  - Develop a trusting relationship   - Encourage socialization   Outcome: Progressing  Goal: Refrain from self-neglect  Outcome: Progressing  Goal: Complete daily ADLs, including personal hygiene independently, as able  Description: Interventions:  - Observe, teach, and assist patient with ADLS  -  Monitor and promote a balance of rest/activity, with adequate nutrition and elimination   Outcome: Progressing

## 2023-10-11 NOTE — ASSESSMENT & PLAN NOTE
Chronic back pain  He has used different medicaitons for pain including: SSRI, gabapentin, lidocaine patch, tylenol, robaxin, as well as Nerve Stimulator (TENS Therapy Pain Relief) recently an order for physical therapy was placed.   Hx of opioid dependence  Denies pain now  PLAN:  Oxycodone 10mg TID

## 2023-10-11 NOTE — ASSESSMENT & PLAN NOTE
All available labs, imaging and relevant notes reviewed. Patient is medically stable for inpatient psych admission.

## 2023-10-11 NOTE — NURSING NOTE
Pt is visible on the unit and interacts with peers. Pt takes scheduled medications and insuline as ordered. Pt has good intake at meals time this shift. Pt reports some anxiety and depression this shift.

## 2023-10-11 NOTE — PROGRESS NOTES
10/11/23 1000 10/11/23 1045 10/11/23 1330   Activity/Group Checklist   Group Community meeting Admission/Discharge  (not available to self assessment at this time will attempt later.) Life Skills   Attendance Did not attend  --  Did not attend

## 2023-10-11 NOTE — CASE MANAGEMENT
RUFINA received a call back from the patient's brother, Parisa Kirkland , and was given the nurses station number so he could speak to his brother directly. Parisa Kirkland was made aware of the current hold on visitations due to COVID up on the floor. Parisa Kirkland asked RUFINA for updates as the patient's treatment goes on.

## 2023-10-11 NOTE — TREATMENT PLAN
TREATMENT PLAN REVIEW - [address] 65 y.o. 1960 male MRN: [id number]    [facility] [address] [place], [place] [address]U [facility] Encounter: [id number]        Admit Date/Time:  10/10/2023 12:18 PM    Treatment Team: Attending Provider: Meri Howard MD; Consulting Physician: Cory Kay MD; Patient Care Assistant: Evan Simental; Patient Care Technician: Shannon Antunez; Patient Care Assistant: Svetlana Brock;  Patient Care Assistant: Erna Ramos; : Mahi Franklin [facility]; Licensed Practical Nurse: John Dooley LPN; Occupational Therapy Assistant: Yonatan Dixon [facility]; Consulting Physician: Andrea Barrios MD; Nurse Practitioner: RAMO Tomlin    Diagnosis: Principal Problem:    Current severe episode of major depressive disorder without prior episode [facility])  Active Problems:    Benign essential HTN    Hyperlipidemia    Type 2 diabetes mellitus without complication, without long-term current use of insulin (HCC)    BPH with obstruction/lower urinary tract symptoms    Medical clearance for psychiatric admission    Patient Strengths/Assets: average or above intelligence, capable of independent living, cooperative, communication skills, compliant with medication, general fund of knowledge, interpersonal skills, negotiates basic needs, patient is on a voluntary commitment, patient is willing to work on problems, supportive daughter, work skills      Patient Barriers/Limitations: chronic pain, family conflict, financial instability, lack of financial means, lack of social/family support, lack of stable employment, limited insight, medical problems, poor support system, unstable housing situation    Short Term Goals: decrease in depressive symptoms, decrease in anxiety symptoms, decrease in suicidal thoughts, ability to stay safe on the unit, improvement in insight, improvement in self care, sleep improvement, improvement in appetite, tolerate medications, mood stabilization, increase in group attendance, increase in group participation, increase in socialization with peers on the unit    Long Term Goals: improvement in depression, improvement in anxiety, resolution of depressive symptoms, stabilization of mood, free of suicidal thoughts, free of homicidal thoughts, improved insight, adequate self care, adequate sleep, adequate appetite, adequate oral intake, appropriate interaction with peers, appropriate interaction with family, stable living arrangements upon discharge    Progress Towards Goals: starting psychiatric medications as prescribed, continue psychiatric medications as prescribed, no current active suicidal ideation    Recommended Treatment: medication management, patient medication education, group therapy, milieu therapy, continued Behavioral Health psychiatric evaluation/assessment process     Treatment Frequency: daily medication monitoring, group and milieu therapy daily, monitoring through interdisciplinary rounds, monitoring through weekly patient care conferences    Expected Discharge Date:  TBD    Discharge Plan: discharge to home, referral for outpatient medication management with a psychiatrist, referral for case management services, referrals as indicated    Treatment Plan Created/Updated By: Megan So MD

## 2023-10-11 NOTE — CMS CERTIFICATION NOTE
Certification: Based upon physical, mental and social evaluations, I certify that inpatient psychiatric services are medically necessary for this patient for a duration of 21 midnights for the treatment of Current severe episode of major depressive disorder without prior episode [facility])  Available alternative community resources do not meet the patient's mental health care needs. I further attest that an established written individualized plan of care has been implemented and is outlined in the patient's medical records.

## 2023-10-11 NOTE — PROGRESS NOTES
Spoke with Albino FRENCH From [facility] to initiate COB. UR to contact at discharge to complete COB.    ID# [id number]

## 2023-10-11 NOTE — NURSING NOTE
Patient admitted to unit under 201 status from [facility] ED for increase depression and SI without plan. Patient reporting depression started a couple months ago, with recent onset on suicidal thoughts without plan. Patient reporting stressor includes chronic back and leg pain. Patient reports poor appetite resulting in 15-20lb weight loss and difficulty with sleeping. Patient denies anxiety, HI/AVH. Patient BMAT 4, skin intact. Patient requesting diet change from regular to "softer foods" due to occasional difficulty swallowing. Patient's diet changed to dysphagia 3.

## 2023-10-11 NOTE — ASSESSMENT & PLAN NOTE
Lab Results   Component Value Date    HGBA1C 7.0 (A) 10/10/2023       Recent Labs     10/10/23  2138 10/11/23  0114 10/11/23  0808 10/11/23  1124   POCGLU 178* 139 152* 117       Blood Sugar Average: Last 72 hrs:  (P) 155.4    HOME MEDS: Metformin 500 mg BID  PLAN:  Diabetic diet  Metformin 500 mg  SSI  Fingerstick glucose

## 2023-10-11 NOTE — NURSING NOTE
Patient is isolated to self in room and visible in milieu  at times. Patient reports anxiety, depression, passive SI wish without a plan. Patient states, " Sometimes I just feel I want to die but then I dot feel like that". Patient agrees to contract for safety if needed. Patient is currently denying SI/HI/AVH at this time. Patient is medications and meal complaint. Patient expresses "feeling better than yesterday". Patient is calm and pleasant on approach. Able to make needs known.

## 2023-10-12 ENCOUNTER — APPOINTMENT (OUTPATIENT)
Dept: PHYSICAL THERAPY | Facility: CLINIC | Age: 63
End: 2023-10-12
Payer: MEDICARE

## 2023-10-12 LAB
GLUCOSE SERPL-MCNC: 140 MG/DL (ref 65–140)
GLUCOSE SERPL-MCNC: 159 MG/DL (ref 65–140)
GLUCOSE SERPL-MCNC: 213 MG/DL (ref 65–140)
GLUCOSE SERPL-MCNC: 97 MG/DL (ref 65–140)

## 2023-10-12 PROCEDURE — 99232 SBSQ HOSP IP/OBS MODERATE 35: CPT | Performed by: STUDENT IN AN ORGANIZED HEALTH CARE EDUCATION/TRAINING PROGRAM

## 2023-10-12 PROCEDURE — 82948 REAGENT STRIP/BLOOD GLUCOSE: CPT

## 2023-10-12 RX ORDER — OXYCODONE HYDROCHLORIDE 5 MG/1
10 TABLET ORAL EVERY 8 HOURS
Status: DISCONTINUED | OUTPATIENT
Start: 2023-10-12 | End: 2023-10-12

## 2023-10-12 RX ORDER — OXYCODONE HYDROCHLORIDE 5 MG/1
10 TABLET ORAL 2 TIMES DAILY PRN
Status: DISCONTINUED | OUTPATIENT
Start: 2023-10-12 | End: 2023-10-17 | Stop reason: HOSPADM

## 2023-10-12 RX ORDER — OXYCODONE HYDROCHLORIDE 5 MG/1
10 TABLET ORAL 2 TIMES DAILY
Status: DISCONTINUED | OUTPATIENT
Start: 2023-10-13 | End: 2023-10-12

## 2023-10-12 RX ORDER — OXYCODONE HYDROCHLORIDE 5 MG/1
10 TABLET ORAL 2 TIMES DAILY
Status: DISCONTINUED | OUTPATIENT
Start: 2023-10-12 | End: 2023-10-12

## 2023-10-12 RX ADMIN — ACETAMINOPHEN 650 MG: 325 TABLET ORAL at 13:50

## 2023-10-12 RX ADMIN — GABAPENTIN 600 MG: 300 CAPSULE ORAL at 17:06

## 2023-10-12 RX ADMIN — FAMOTIDINE 20 MG: 20 TABLET, FILM COATED ORAL at 08:10

## 2023-10-12 RX ADMIN — ACETAMINOPHEN 650 MG: 325 TABLET ORAL at 21:04

## 2023-10-12 RX ADMIN — METHOCARBAMOL 500 MG: 500 TABLET, FILM COATED ORAL at 08:10

## 2023-10-12 RX ADMIN — TRAZODONE HYDROCHLORIDE 50 MG: 50 TABLET ORAL at 21:04

## 2023-10-12 RX ADMIN — METHOCARBAMOL 500 MG: 500 TABLET, FILM COATED ORAL at 21:04

## 2023-10-12 RX ADMIN — DICLOFENAC SODIUM 2 G: 10 GEL TOPICAL at 08:11

## 2023-10-12 RX ADMIN — MELATONIN TAB 3 MG 3 MG: 3 TAB at 21:04

## 2023-10-12 RX ADMIN — INSULIN LISPRO 2 UNITS: 100 INJECTION, SOLUTION INTRAVENOUS; SUBCUTANEOUS at 21:05

## 2023-10-12 RX ADMIN — INSULIN LISPRO 1 UNITS: 100 INJECTION, SOLUTION INTRAVENOUS; SUBCUTANEOUS at 16:19

## 2023-10-12 RX ADMIN — METHOCARBAMOL 500 MG: 500 TABLET, FILM COATED ORAL at 17:06

## 2023-10-12 RX ADMIN — ARIPIPRAZOLE 2 MG: 2 TABLET ORAL at 08:10

## 2023-10-12 RX ADMIN — GABAPENTIN 600 MG: 300 CAPSULE ORAL at 21:03

## 2023-10-12 RX ADMIN — DULOXETINE HYDROCHLORIDE 60 MG: 60 CAPSULE, DELAYED RELEASE PELLETS ORAL at 08:10

## 2023-10-12 RX ADMIN — ASPIRIN 325 MG ORAL TABLET 325 MG: 325 PILL ORAL at 08:10

## 2023-10-12 RX ADMIN — GABAPENTIN 600 MG: 300 CAPSULE ORAL at 08:10

## 2023-10-12 RX ADMIN — METFORMIN HYDROCHLORIDE 500 MG: 500 TABLET, FILM COATED ORAL at 17:06

## 2023-10-12 RX ADMIN — DULOXETINE HYDROCHLORIDE 60 MG: 60 CAPSULE, DELAYED RELEASE PELLETS ORAL at 17:06

## 2023-10-12 RX ADMIN — MIRTAZAPINE 7.5 MG: 7.5 TABLET, FILM COATED ORAL at 21:04

## 2023-10-12 RX ADMIN — DICLOFENAC SODIUM 2 G: 10 GEL TOPICAL at 17:07

## 2023-10-12 RX ADMIN — METFORMIN HYDROCHLORIDE 500 MG: 500 TABLET, FILM COATED ORAL at 08:10

## 2023-10-12 RX ADMIN — LIDOCAINE 2 PATCH: 700 PATCH TOPICAL at 08:11

## 2023-10-12 RX ADMIN — TAMSULOSIN HYDROCHLORIDE 0.4 MG: 0.4 CAPSULE ORAL at 17:06

## 2023-10-12 RX ADMIN — DICLOFENAC SODIUM 2 G: 10 GEL TOPICAL at 11:37

## 2023-10-12 RX ADMIN — ATORVASTATIN CALCIUM 20 MG: 20 TABLET, FILM COATED ORAL at 17:06

## 2023-10-12 RX ADMIN — DICLOFENAC SODIUM 2 G: 10 GEL TOPICAL at 21:04

## 2023-10-12 RX ADMIN — AMLODIPINE BESYLATE 10 MG: 10 TABLET ORAL at 08:10

## 2023-10-12 NOTE — PROGRESS NOTES
Progress Note - Behavioral Health   Marv Albarado 65 y.o. male MRN: [id number]  Unit/Bed#: [facility] 492-83 Encounter: [id number]    Assessment/Plan   Principal Problem:    Current severe episode of major depressive disorder without prior episode ([address])  Active Problems:    Benign essential HTN    Hyperlipidemia    Type 2 diabetes mellitus without complication, without long-term current use of insulin (HCC)    BPH with obstruction/lower urinary tract symptoms    Radiculopathy, lumbar region    Mild asthma    Medical clearance for psychiatric admission      Recommended Treatment:   Continue Cymbalta 60 mg twice daily, for mood and pain  Continue Remeron 7.5 mg nightly, for mood, sleep, and appetite  Continue Abilify 2 mg daily, for mood  Continue gabapentin 600 mg 3 times daily, for pain and anxiety  Continue melatonin 3 mg nightly, for sleep  Continue trazodone 50 mg nightly, for sleep  Continue fall precautions    Continue with group therapy, milieu therapy and occupational therapy. Continue frequent safety checks and vitals per unit protocol. Case discussed with treatment team.  Continue with SLIM medical management as indicated  Continue coordinating with case management regarding disposition  Risks, benefits and possible side effects of Medications: Risks, benefits, and possible side effects of medications have been explained to the patient, who verbalizes understanding    Legal Status: 201  ------------------------------------------------------------    Subjective: Per nursing report, Toi Haro has been calm, cooperative and pleasant on approach isolative to self in room, though visible in milieu at times. Patient reporting anxiety, depression and passive death wish without plan, stating "sometimes I just feel and want to die but then I do not feel like that."  Patient also reported feeling "better than yesterday."  Patient has been meal and medication compliant.     Today, patient reports feeling "better" and reports that his depression is currently a 5/10 in severity, an improvement from previous encounter. Patient states that he slept well overnight and reports adequate appetite during the day. Patient states that when he does not think too much about all of his current psychosocial stressors that he feels better. Patient states that he wants to "think positive now" and "let go of the past."  Patient states that seeing other people on the unit and all of the problems they're going through helped him put his problems in perspective and to realize that he is not alone and having struggles. Patient also states that he is open to pursuing therapy following inpatient treatment. Patient does not report any current medication side effects at this time. Patient continues to report passive death wish but denies any current active suicidal ideation, homicidal ideation, auditory or visual hallucinations. PRNs overnight: None   VS: Reviewed, within normal limits    Progress Toward Goals: Noticeable improvement    Psychiatric Review of Systems:  Behavior over the last 24 hours: improved  Sleep: improving  Appetite: adequate, improving  Medication side effects: none verbalized  ROS: Complete review of systems is negative except as noted above.     Vital signs in last 24 hours:  Temp:  [98.1 °F (36.7 °C)-98.6 °F (37 °C)] 98.6 °F (37 °C)  HR:  [75-82] 76  Resp:  [16-18] 16  BP: (124-154)/(64-82) 126/64    Mental Status Exam:  Appearance:  age appropriate, casually dressed, dressed appropriately, adequate grooming, dressed in hospital attire   Behavior:  pleasant, cooperative, calm   Speech:  normal rate and volume, coherent   Mood:  "better"   Affect:  Brighter, more reactive, less constricted   Thought Process:  organized, logical, coherent, goal directed   Associations: intact associations   Thought Content:  no overt delusions   Perceptual Disturbances: Denies auditory or visual hallucinations and Does not appear to be responding to internal stimuli   Risk Potential: Suicidal ideation - Yes, passive death wish but denies any current active suicidal ideation or plan  Homicidal ideation - None  Potential for aggression - No   Sensorium:  oriented to person, place, time/date, and situation   Memory:  recent and remote memory grossly intact   Cognition: MMSE: 29/30   Consciousness:  alert and awake   Attention/Concentration: attention span and concentration are age appropriate   Insight:  limited   Judgment: limited   Gait/Station: unstable gait, uses walker   Motor Activity: no abnormal movements     Current Medications:  Current Facility-Administered Medications   Medication Dose Route Frequency Provider Last Rate    acetaminophen  650 mg Oral Q8H [address] Shaunna Chavez MD      albuterol  2 puff Inhalation Q6H PRN Shaunna Chavez MD      aluminum-magnesium hydroxide-simethicone  30 mL Oral Q4H PRN Glen Chahal MD      amLODIPine  10 mg Oral Daily Glen Chahal MD      ARIPiprazole  2 mg Oral Daily Jessica New MD      aspirin  325 mg Oral Daily Glen Chahal MD      atorvastatin  20 mg Oral Daily With [address], PAN[place]etuC      Diclofenac Sodium  2 g Topical 4x Daily Moise Garcia PA-C      DULoxetine  60 mg Oral BID Glen Chahal MD      famotidine  20 mg Oral Daily Glen Chahal MD      gabapentin  600 mg Oral TID Carli Martínez MD      haloperidol lactate  5 mg Intramuscular Q4H PRN Max 4/day Glen Chahal MD      hydrOXYzine HCL  25 mg Oral Q6H PRN Max 4/day Glen Chahal MD      insulin lispro  1-6 Units Subcutaneous 4x Daily (AC & HS) Will Rice DO      lidocaine  2 patch Topical Daily Carli Martínez MD      LORazepam  1 mg Intramuscular Q6H PRN Max 3/day Glen Chahal MD      melatonin  3 mg Oral HS Glen Chahal MD      metFORMIN  500 mg Oral BID With Meals Moise Garcia PA-C      methocarbamol  500 mg Oral TID Moise Garcia PA-C      mirtazapine 7.5 mg Oral HS Sole Lazo MD      nicotine polacrilex  4 mg Oral Q2H PRN Estee Voss MD      oxyCODONE  10 mg Oral TID PRN Lorenzo Lewis MD      risperiDONE  0.25 mg Oral Q4H PRN Max 6/day Estee Voss MD      risperiDONE  0.5 mg Oral Q4H PRN Max 3/day Estee Voss MD      risperiDONE  1 mg Oral Q2H PRN Max 3/day Estee Voss MD      tamsulosin  0.4 mg Oral Daily With Dinner Fiona Palomino PA-C      traZODone  50 mg Oral HS Fiona Palomino PA-C      zolpidem  5 mg Oral HS PRN Estee Voss MD         Behavioral Health Medications: all current active meds have been reviewed. Changes as in plan section above. Laboratory results:  I have personally reviewed all pertinent laboratory/tests results.   Recent Results (from the past 48 hour(s))   CBC and differential    Collection Time: 10/10/23  1:00 PM   Result Value Ref Range    WBC 4.92 4.31 - 10.16 Thousand/uL    RBC 5.21 3.88 - 5.62 Million/uL    Hemoglobin 15.3 12.0 - 17.0 g/dL    Hematocrit 43.0 36.5 - 49.3 %    MCV 83 82 - 98 fL    MCH 29.4 26.8 - 34.3 pg    MCHC 35.6 31.4 - 37.4 g/dL    RDW 13.3 11.6 - 15.1 %    MPV 9.4 8.9 - 12.7 fL    Platelets 931 994 - 371 Thousands/uL    nRBC 0 /100 WBCs    Neutrophils Relative 44 43 - 75 %    Immat GRANS % 0 0 - 2 %    Lymphocytes Relative 47 (H) 14 - 44 %    Monocytes Relative 7 4 - 12 %    Eosinophils Relative 1 0 - 6 %    Basophils Relative 1 0 - 1 %    Neutrophils Absolute 2.15 1.85 - 7.62 Thousands/µL    Immature Grans Absolute 0.00 0.00 - 0.20 Thousand/uL    Lymphocytes Absolute 2.36 0.60 - 4.47 Thousands/µL    Monocytes Absolute 0.33 0.17 - 1.22 Thousand/µL    Eosinophils Absolute 0.05 0.00 - 0.61 Thousand/µL    Basophils Absolute 0.03 0.00 - 0.10 Thousands/µL   Comprehensive metabolic panel    Collection Time: 10/10/23  1:00 PM   Result Value Ref Range    Sodium 139 135 - 147 mmol/L    Potassium 3.6 3.5 - 5.3 mmol/L    Chloride 100 96 - 108 mmol/L    CO2 30 21 - 32 mmol/L    ANION GAP 9 mmol/L    BUN 15 5 - 25 mg/dL    Creatinine 1.18 0.60 - 1.30 mg/dL    Glucose 134 65 - 140 mg/dL    Calcium 10.0 8.4 - 10.2 mg/dL    AST 21 13 - 39 U/L    ALT 37 7 - 52 U/L    Alkaline Phosphatase 57 34 - 104 U/L    Total Protein 8.3 6.4 - 8.4 g/dL    Albumin 5.0 3.5 - 5.0 g/dL    Total Bilirubin 0.78 0.20 - 1.00 mg/dL    eGFR 66 ml/min/1.73sq m   TSH    Collection Time: 10/10/23  1:00 PM   Result Value Ref Range    TSH 3RD GENERATON 0.985 0.450 - 4.500 uIU/mL   Rapid drug screen, urine    Collection Time: 10/10/23  1:01 PM   Result Value Ref Range    Amph/Meth UR Negative Negative    Barbiturate Ur Negative Negative    Benzodiazepine Urine Negative Negative    Cocaine Urine Negative Negative    Methadone Urine Negative Negative    Opiate Urine Negative Negative    PCP Ur Negative Negative    THC Urine Negative Negative    Oxycodone Urine Negative Negative   UA (URINE) with reflex to Scope    Collection Time: 10/10/23  1:01 PM   Result Value Ref Range    Color, UA Straw Straw, Yellow, Pale Yellow    Clarity, UA Clear Clear, Other    Specific Gravity, UA 1.010 1.003 - 1.040    pH, UA 6.5 4.5, 5.0, 5.5, 6.0, 6.5, 7.0, 7.5, 8.0    Leukocytes, UA Negative Negative    Nitrite, UA Negative Negative    Protein, UA Negative Negative mg/dl    Glucose, UA Negative Negative mg/dl    Ketones, UA Negative Negative mg/dl    Bilirubin, UA Negative Negative    Occult Blood, UA 10.0 (A) Negative    UROBILINOGEN UA Negative 1.0, Negative mg/dL   Urine Microscopic    Collection Time: 10/10/23  1:01 PM   Result Value Ref Range    RBC, UA 0-1 None Seen, 0-1, 1-2, 2-4, 0-5 /hpf    WBC, UA None Seen None Seen, 0-1, 1-2, 0-5, 2-4 /hpf    Epithelial Cells None Seen None Seen, Occasional /hpf    Bacteria, UA None Seen None Seen, Occasional /hpf   POCT alcohol breath test    Collection Time: 10/10/23  1:15 PM   Result Value Ref Range    EXTBreath Alcohol 0.00    ECG 12 lead    Collection Time: 10/10/23  4:49 PM   Result Value Ref Range    Ventricular Rate 76 BPM    Atrial Rate 76 BPM    MD Interval 196 ms    QRSD Interval 82 ms    QT Interval 400 ms    QTC Interval 450 ms    P Axis 60 degrees    QRS Axis 47 degrees    T Wave Axis 31 degrees   Fingerstick Glucose (POCT)    Collection Time: 10/10/23  8:21 PM   Result Value Ref Range    POC Glucose 191 (H) 65 - 140 mg/dl   Fingerstick Glucose (POCT)    Collection Time: 10/10/23  9:38 PM   Result Value Ref Range    POC Glucose 178 (H) 65 - 140 mg/dl   Fingerstick Glucose (POCT)    Collection Time: 10/11/23  1:14 AM   Result Value Ref Range    POC Glucose 139 65 - 140 mg/dl   Basic metabolic panel    Collection Time: 10/11/23  4:56 AM   Result Value Ref Range    Sodium 136 135 - 147 mmol/L    Potassium 4.4 3.5 - 5.3 mmol/L    Chloride 103 96 - 108 mmol/L    CO2 25 21 - 32 mmol/L    ANION GAP 8 mmol/L    BUN 17 5 - 25 mg/dL    Creatinine 1.07 0.60 - 1.30 mg/dL    Glucose 167 (H) 65 - 140 mg/dL    Glucose, Fasting 167 (H) 65 - 99 mg/dL    Calcium 9.5 8.4 - 10.2 mg/dL    eGFR 74 ml/min/1.73sq m   CBC and differential    Collection Time: 10/11/23  4:56 AM   Result Value Ref Range    WBC 5.47 4.31 - 10.16 Thousand/uL    RBC 4.97 3.88 - 5.62 Million/uL    Hemoglobin 14.2 12.0 - 17.0 g/dL    Hematocrit 41.4 36.5 - 49.3 %    MCV 83 82 - 98 fL    MCH 28.6 26.8 - 34.3 pg    MCHC 34.3 31.4 - 37.4 g/dL    RDW 13.3 11.6 - 15.1 %    MPV 9.1 8.9 - 12.7 fL    Platelets 286 144 - 509 Thousands/uL    nRBC 0 /100 WBCs    Neutrophils Relative 67 43 - 75 %    Immat GRANS % 0 0 - 2 %    Lymphocytes Relative 27 14 - 44 %    Monocytes Relative 5 4 - 12 %    Eosinophils Relative 1 0 - 6 %    Basophils Relative 0 0 - 1 %    Neutrophils Absolute 3.69 1.85 - 7.62 Thousands/µL    Immature Grans Absolute 0.01 0.00 - 0.20 Thousand/uL    Lymphocytes Absolute 1.45 0.60 - 4.47 Thousands/µL    Monocytes Absolute 0.27 0.17 - 1.22 Thousand/µL    Eosinophils Absolute 0.04 0.00 - 0.61 Thousand/µL    Basophils Absolute 0.01 0.00 - 0.10 Thousands/µL   Lipid panel    Collection Time: 10/11/23  4:56 AM   Result Value Ref Range    Cholesterol 141 See Comment mg/dL    Triglycerides 92 See Comment mg/dL    HDL, Direct 52 >=40 mg/dL    LDL Calculated 71 0 - 100 mg/dL    Non-HDL-Chol (CHOL-HDL) 89 mg/dl   Folate    Collection Time: 10/11/23  4:56 AM   Result Value Ref Range    Folate 19.3 >5.9 ng/mL   Vitamin B12    Collection Time: 10/11/23  4:56 AM   Result Value Ref Range    Vitamin B-12 467 180 - 914 pg/mL   Vitamin D 25 hydroxy    Collection Time: 10/11/23  4:56 AM   Result Value Ref Range    Vit D, 25-Hydroxy 40.9 30.0 - 100.0 ng/mL   Fingerstick Glucose (POCT)    Collection Time: 10/11/23  8:08 AM   Result Value Ref Range    POC Glucose 152 (H) 65 - 140 mg/dl   Fingerstick Glucose (POCT)    Collection Time: 10/11/23 11:24 AM   Result Value Ref Range    POC Glucose 117 65 - 140 mg/dl   Fingerstick Glucose (POCT)    Collection Time: 10/11/23  4:12 PM   Result Value Ref Range    POC Glucose 176 (H) 65 - 140 mg/dl   Fingerstick Glucose (POCT)    Collection Time: 10/11/23  9:14 PM   Result Value Ref Range    POC Glucose 200 (H) 65 - 140 mg/dl   Fingerstick Glucose (POCT)    Collection Time: 10/12/23  7:14 AM   Result Value Ref Range    POC Glucose 140 65 - 140 mg/dl   Fingerstick Glucose (POCT)    Collection Time: 10/12/23 11:37 AM   Result Value Ref Range    POC Glucose 97 65 - 140 mg/dl        This note has been constructed using a voice recognition system. There may be translation, syntax, or grammatical errors. If you have any questions, please contact the dictating author.     Bisi Cancino MD PGY2

## 2023-10-12 NOTE — PROGRESS NOTES
10/12/23 1115 10/12/23 4750   Activity/Group Checklist   Group Admission/Discharge  (completed both a self assessment and relapse prevention plan with staff assistance for spelling.) Life Skills   Attendance Attended Did not attend   Attendance Duration (min) 16-30  --    Interactions Interacted appropriately  (reapid speech. spoke of domestic stress and financial issues.)   (Pt.is social with select peers in dayroom.)   Affect/Mood Appropriate  (Pt. spoke of recent feelings of hopelessness. )  --    Goals Achieved Able to engage in interactions; Identified feelings; Identified triggers; Identified relapse prevention strategies; Discussed coping strategies; Identified resources and support systems  (Pt. aware of his personal signs of irritability and recent hopelessness. Cooperative and appreciative for staff support for spelling due to limited 6th grade education. )  --

## 2023-10-12 NOTE — NURSING NOTE
Patient visible intermittently in the milieu, social with peers at meal times. Brightens on approach, compliant with meals and scheduled medications. Reports anxiety, depression and passive SI with no plan, however, agrees to contract for safety. Safety checks ongoing.

## 2023-10-12 NOTE — PROGRESS NOTES
10/12/23 1021   Team Meeting   Meeting Type Tx Team Meeting   Initial Conference Date 10/12/23   Next Conference Date 11/10/23   Team Members Present   Team Members Present Physician;Nurse;   Physician Team Member Dr. Paul Fernández Team Member NIESHA [facility] Management Team Member Ryan Campbell   Patient/Family Present   Patient Present Yes   Patient's Family Present No     Treatment plan reviewed with the patient. The patient is in agreement with his treatment plan; document signed. The patient had no questions or concerns regarding his goals. A copy of the treatment plan was put into the patient's d/c folder and another copy was put in for scanning.

## 2023-10-12 NOTE — ASSESSMENT & PLAN NOTE
Patient has hx opioid use   H/o of taking Norco 7.5 mg/325 mg TID for greater than 2 years, tried to taper, but was unable to tolerate pain, and discontinued in August. He switched to [facility], then it was discontinued due to side effects and oxycodone 10mg prescribed by PCP recently. Patient refers he doesn't take it everyday, just when needed.   CIWA 0 today  PLAN:  Oxycodone 10mg every 8 hrs  Watch for symptoms of withdrawal

## 2023-10-12 NOTE — TREATMENT TEAM
10/12/23 0732   Team Meeting   Meeting Type Daily Rounds   Initial Conference Date 10/12/23   Team Members Present   Team Members Present Physician;Nurse;;Occupational Therapist   Physician Team Member Dr. Danica Snyder, [address] [facility], Dr. Dario Henderson, [address] [facility] Management Team Member Jose   OT Team Member Doug Owens   Patient/Family Present   Patient Present No   Patient's Family Present No     Continues to endorse major depression, anxiety, and passive SI. The patient has financial stressors. The patient is calm, cooperative, and pleasant. MD adjusting medications. CM will refer the patient to an ICM. No d/c date pending at this time.

## 2023-10-12 NOTE — CASE MANAGEMENT
CM spoke to the patient who reported feeling "better." The patient reports he is interested in rental assistance if possible. The patient is currently listed as self pay. According to chart review, the patient had/has Medicare. CM awaiting confirmation from  of insurance status in order to refer to outpatient providers/services such as ICM.

## 2023-10-12 NOTE — PLAN OF CARE
Problem: Risk for Self Injury/Neglect  Goal: Refrain from harming self  Description: Interventions:  - Monitor patient closely, per order  - Develop a trusting relationship  - Supervise medication ingestion, monitor effects and side effects   Outcome: Progressing     Problem: Depression  Goal: Complete daily ADLs, including personal hygiene independently, as able  Description: Interventions:  - Observe, teach, and assist patient with ADLS  -  Monitor and promote a balance of rest/activity, with adequate nutrition and elimination   Outcome: Progressing        Problem: Risk for Self Injury/Neglect  Goal: Refrain from harming self  Description: Interventions:  - Monitor patient closely, per order  - Develop a trusting relationship  - Supervise medication ingestion, monitor effects and side effects   Outcome: Progressing

## 2023-10-13 LAB
GLUCOSE SERPL-MCNC: 128 MG/DL (ref 65–140)
GLUCOSE SERPL-MCNC: 133 MG/DL (ref 65–140)
GLUCOSE SERPL-MCNC: 133 MG/DL (ref 65–140)
GLUCOSE SERPL-MCNC: 140 MG/DL (ref 65–140)

## 2023-10-13 PROCEDURE — 99232 SBSQ HOSP IP/OBS MODERATE 35: CPT | Performed by: STUDENT IN AN ORGANIZED HEALTH CARE EDUCATION/TRAINING PROGRAM

## 2023-10-13 PROCEDURE — 82948 REAGENT STRIP/BLOOD GLUCOSE: CPT

## 2023-10-13 RX ADMIN — ATORVASTATIN CALCIUM 20 MG: 20 TABLET, FILM COATED ORAL at 17:02

## 2023-10-13 RX ADMIN — DICLOFENAC SODIUM 2 G: 10 GEL TOPICAL at 21:13

## 2023-10-13 RX ADMIN — DICLOFENAC SODIUM 2 G: 10 GEL TOPICAL at 17:05

## 2023-10-13 RX ADMIN — ACETAMINOPHEN 650 MG: 325 TABLET ORAL at 05:41

## 2023-10-13 RX ADMIN — MIRTAZAPINE 7.5 MG: 7.5 TABLET, FILM COATED ORAL at 21:08

## 2023-10-13 RX ADMIN — METHOCARBAMOL 500 MG: 500 TABLET, FILM COATED ORAL at 21:08

## 2023-10-13 RX ADMIN — LIDOCAINE 2 PATCH: 700 PATCH TOPICAL at 08:13

## 2023-10-13 RX ADMIN — ACETAMINOPHEN 650 MG: 325 TABLET ORAL at 21:08

## 2023-10-13 RX ADMIN — ASPIRIN 325 MG ORAL TABLET 325 MG: 325 PILL ORAL at 08:12

## 2023-10-13 RX ADMIN — DICLOFENAC SODIUM 2 G: 10 GEL TOPICAL at 08:14

## 2023-10-13 RX ADMIN — METFORMIN HYDROCHLORIDE 500 MG: 500 TABLET, FILM COATED ORAL at 08:12

## 2023-10-13 RX ADMIN — GABAPENTIN 600 MG: 300 CAPSULE ORAL at 17:02

## 2023-10-13 RX ADMIN — DICLOFENAC SODIUM 2 G: 10 GEL TOPICAL at 11:57

## 2023-10-13 RX ADMIN — MELATONIN TAB 3 MG 3 MG: 3 TAB at 21:08

## 2023-10-13 RX ADMIN — FAMOTIDINE 20 MG: 20 TABLET, FILM COATED ORAL at 08:11

## 2023-10-13 RX ADMIN — ARIPIPRAZOLE 2 MG: 2 TABLET ORAL at 08:12

## 2023-10-13 RX ADMIN — TAMSULOSIN HYDROCHLORIDE 0.4 MG: 0.4 CAPSULE ORAL at 17:02

## 2023-10-13 RX ADMIN — METFORMIN HYDROCHLORIDE 500 MG: 500 TABLET, FILM COATED ORAL at 17:02

## 2023-10-13 RX ADMIN — GABAPENTIN 600 MG: 300 CAPSULE ORAL at 08:11

## 2023-10-13 RX ADMIN — METHOCARBAMOL 500 MG: 500 TABLET, FILM COATED ORAL at 17:02

## 2023-10-13 RX ADMIN — DULOXETINE HYDROCHLORIDE 60 MG: 60 CAPSULE, DELAYED RELEASE PELLETS ORAL at 17:02

## 2023-10-13 RX ADMIN — AMLODIPINE BESYLATE 10 MG: 10 TABLET ORAL at 08:11

## 2023-10-13 RX ADMIN — TRAZODONE HYDROCHLORIDE 50 MG: 50 TABLET ORAL at 21:08

## 2023-10-13 RX ADMIN — METHOCARBAMOL 500 MG: 500 TABLET, FILM COATED ORAL at 08:12

## 2023-10-13 RX ADMIN — ACETAMINOPHEN 650 MG: 325 TABLET ORAL at 14:08

## 2023-10-13 RX ADMIN — GABAPENTIN 600 MG: 300 CAPSULE ORAL at 21:08

## 2023-10-13 RX ADMIN — DULOXETINE HYDROCHLORIDE 60 MG: 60 CAPSULE, DELAYED RELEASE PELLETS ORAL at 08:11

## 2023-10-13 NOTE — NURSING NOTE
Pt is calm, cooperative and visible on unit. Pt reports depression and anxiety; denies SI/HI/AH/VH. Compliant with scheduled medications and meals. Pt showered today. Pt interacts appropriately with peers and is able to make needs known. Will maintain q7 min checks.

## 2023-10-13 NOTE — PROGRESS NOTES
Progress Note - Behavioral Health   Marv Oscar 65 y.o. male MRN: [id number]  Unit/Bed#: [facility] 211-21 Encounter: [id number]    Assessment/Plan   Principal Problem:    Current severe episode of major depressive disorder without prior episode ([address])  Active Problems:    Benign essential HTN    Hyperlipidemia    Type 2 diabetes mellitus without complication, without long-term current use of insulin (HCC)    BPH with obstruction/lower urinary tract symptoms    Radiculopathy, lumbar region    Mild asthma    Continuous opioid dependence ([facility])    Medical clearance for psychiatric admission      Recommended Treatment:   Continue Cymbalta 60 mg twice daily, for mood and pain  Continue Remeron 7.5 mg nightly, for mood, sleep, and appetite  Continue Abilify 2 mg daily, for mood  Continue gabapentin 600 mg 3 times daily, for pain and anxiety  Continue melatonin 3 mg nightly, for sleep  Continue trazodone 50 mg nightly, for sleep  Continue fall precautions    Continue with group therapy, milieu therapy and occupational therapy. Continue frequent safety checks and vitals per unit protocol. Case discussed with treatment team.  Continue with SLIM medical management as indicated  Continue coordinating with case management regarding disposition  Risks, benefits and possible side effects of Medications: Risks, benefits, and possible side effects of medications have been explained to the patient, who verbalizes understanding    Legal Status: 201  ------------------------------------------------------------    Subjective: Per nursing report, Yareli Thornton has been visible intermittently in the milieu, social with peers during mealtime, bright on approach, compliant with meals and scheduled medications. Today, patient reports his mood is "better" and reports 4/10 depression. Patient reports increased appetite and adequate sleep overnight. Patient does not report any current medication side effects.   Patient continues to report 8/10 lower back and left anterior thigh pain per baseline. Patient states that he is focused on thinking "more positive" regarding his psychosocial stressors. Patient states he was able to speak with his daughter last night who wanted him to come home. Patient states that he wishes to go home and see his daughter but knows he needs to stay in the hospital for little bit longer in order to work on himself before he can go home. Regarding passive death wishes, patient states "not right now."  Patient denies any current suicidal ideation, homicidal ideation, auditory or visual hallucinations. PRNs overnight: None  VS: Reviewed, within normal limits    Progress Toward Goals: Noticeable improvement    Psychiatric Review of Systems:  Behavior over the last 24 hours: improved  Sleep: normal  Appetite: adequate  Medication side effects: none verbalized  ROS: chronic pain which is unchanged from baseline, Complete review of systems is negative except as noted above.     Vital signs in last 24 hours:  Temp:  [97.5 °F (36.4 °C)-98.3 °F (36.8 °C)] 97.7 °F (36.5 °C)  HR:  [72-96] 88  Resp:  [16-18] 18  BP: (129-141)/(72-86) 138/76    Mental Status Exam:  Appearance:  age appropriate, casually dressed, adequate grooming   Behavior:  cooperative, calm   Speech:  normal rate and volume, coherent   Mood:  "Better"   Affect:  Less constricted, more reactive   Thought Process:  organized, coherent, goal directed, linear, normal rate of thoughts   Associations: intact associations   Thought Content:  no overt delusions, negative thoughts   Perceptual Disturbances: Denies auditory or visual hallucinations and Does not appear to be responding to internal stimuli   Risk Potential: Suicidal ideation - None at present  Homicidal ideation - None  Potential for aggression - No   Sensorium:  oriented to person, place, time/date, and situation   Memory:  recent and remote memory grossly intact   Consciousness:  alert and awake Attention/Concentration: attention span and concentration are age appropriate   Insight:  limited   Judgment: limited   Gait/Station: slow gait, uses walker   Motor Activity: no abnormal movements     Current Medications:  Current Facility-Administered Medications   Medication Dose Route Frequency Provider Last Rate    acetaminophen  650 mg Oral Q8H [address] Ji Peraza MD      albuterol  2 puff Inhalation Q6H PRN Ji Peraza MD      aluminum-magnesium hydroxide-simethicone  30 mL Oral Q4H PRN Maik Cortes MD      amLODIPine  10 mg Oral Daily Maik Cortes MD      ARIPiprazole  2 mg Oral Daily Grey Ocampo MD      aspirin  325 mg Oral Daily Maik Cortes MD      atorvastatin  20 mg Oral Daily With [address], PANeetuC      Diclofenac Sodium  2 g Topical 4x Daily Key Asher, PA-C      DULoxetine  60 mg Oral BID Maik Cortes MD      famotidine  20 mg Oral Daily Maik Cortes MD      gabapentin  600 mg Oral TID Mikie Kruger MD      haloperidol lactate  5 mg Intramuscular Q4H PRN Max 4/day Maik Cortes MD      hydrOXYzine HCL  25 mg Oral Q6H PRN Max 4/day Maik Cortes MD      insulin lispro  1-6 Units Subcutaneous 4x Daily (AC & HS) Hernando Pastures, DO      lidocaine  2 patch Topical Daily Mikie Kruger MD      LORazepam  1 mg Intramuscular Q6H PRN Max 3/day Maik Cortes MD      melatonin  3 mg Oral HS Maik Cortes MD      metFORMIN  500 mg Oral BID With Meals Key Asher PA-C      methocarbamol  500 mg Oral TID Key Asher PA-C      mirtazapine  7.5 mg Oral HS Grey Ocampo MD      nicotine polacrilex  4 mg Oral Q2H PRN Maik Cortes MD      oxyCODONE  10 mg Oral BID PRN Ji Peraza MD      risperiDONE  0.25 mg Oral Q4H PRN Max 6/day Maik Cortes MD      risperiDONE  0.5 mg Oral Q4H PRN Max 3/day Maik Cortes MD      risperiDONE  1 mg Oral Q2H PRN Max 3/day Maik Cortes MD      tamsulosin  0.4 mg Oral Daily With [address], PA-C      traZODone  50 mg Oral HS Vannessa Filter, PA-C      zolpidem  5 mg Oral HS PRN Vicki Gallardo MD         Behavioral Health Medications: all current active meds have been reviewed. Changes as in plan section above. Laboratory results:  I have personally reviewed all pertinent laboratory/tests results. Recent Results (from the past 48 hour(s))   Fingerstick Glucose (POCT)    Collection Time: 10/11/23  9:14 PM   Result Value Ref Range    POC Glucose 200 (H) 65 - 140 mg/dl   Fingerstick Glucose (POCT)    Collection Time: 10/12/23  7:14 AM   Result Value Ref Range    POC Glucose 140 65 - 140 mg/dl   Fingerstick Glucose (POCT)    Collection Time: 10/12/23 11:37 AM   Result Value Ref Range    POC Glucose 97 65 - 140 mg/dl   Fingerstick Glucose (POCT)    Collection Time: 10/12/23  4:12 PM   Result Value Ref Range    POC Glucose 159 (H) 65 - 140 mg/dl   Fingerstick Glucose (POCT)    Collection Time: 10/12/23  8:55 PM   Result Value Ref Range    POC Glucose 213 (H) 65 - 140 mg/dl   Fingerstick Glucose (POCT)    Collection Time: 10/13/23  7:29 AM   Result Value Ref Range    POC Glucose 140 65 - 140 mg/dl   Fingerstick Glucose (POCT)    Collection Time: 10/13/23 11:57 AM   Result Value Ref Range    POC Glucose 133 65 - 140 mg/dl   Fingerstick Glucose (POCT)    Collection Time: 10/13/23  4:14 PM   Result Value Ref Range    POC Glucose 133 65 - 140 mg/dl        This note has been constructed using a voice recognition system. There may be translation, syntax, or grammatical errors. If you have any questions, please contact the dictating author.     Uri Ruiz MD PGY2

## 2023-10-13 NOTE — PROGRESS NOTES
10/13/23 0752   Team Meeting   Meeting Type Daily Rounds   Initial Conference Date 10/13/23   Next Conference Date 10/16/23   Team Members Present   Team Members Present Physician;Nurse;;Occupational Therapist   Physician Team Member Dr Dennis Mario, Dr Jacqueline Durán, Dr Derek Ramirez, Dr Leia Casey Team Member [facility] Management Team Member Tenna Frankel   OT Team Member Heidi Matthews   Patient/Family Present   Patient Present No   Patient's Family Present No     Social with peers at meals times, minimizing everything with psychiatrist, said that he was trying to be positive, reporting anx, depression and passive SI to nursing, discharge pending, possible next week.

## 2023-10-13 NOTE — PROGRESS NOTES
10/13/23 1000 10/13/23 1100 10/13/23 1330   Activity/Group Checklist   Group [facility] Life Skills  (memory  match game on vitality)   Attendance Did not attend Did not attend Attended   Attendance Duration (min)  --   --  46-60   Interactions  --   --  Other (Comment)  (Pt. needed assistance for reading the cards and stood to stretch on the wall x2 in the hour session.)   Affect/Mood  --   --  Calm   Goals Achieved  --   --  Able to engage in interactions; Able to listen to others;Discussed coping strategies

## 2023-10-13 NOTE — NURSING NOTE
Patient intermittently visible. Patient calm and pleasant. Patient endorses some depression and anxiety as well as chronic back pain. Patient appetite intact. VSS. No coverage needed at dinner for BS. Patient showered today. No issues.  Continual rounding in place

## 2023-10-13 NOTE — PLAN OF CARE
Problem: Potential for Falls  Goal: Patient will remain free of falls  Description: INTERVENTIONS:  - Educate patient/family on patient safety including physical limitations  - Instruct patient to call for assistance with activity   - Consult OT/PT to assist with strengthening/mobility   - Keep Call bell within reach  - Keep bed low and locked with side rails adjusted as appropriate  - Keep care items and personal belongings within reach  - Initiate and maintain comfort rounds  - Make Fall Risk Sign visible to staff  - Offer Toileting every 2 Hours, in advance of need  - Initiate/Maintain bed alarm  - Obtain necessary fall risk management equipment:   - Apply yellow socks and bracelet for high fall risk patients  - Consider moving patient to room near nurses station  Outcome: Progressing     Problem: Ineffective Coping  Goal: Cooperates with admission process  Description: Interventions:   - Complete admission process  Outcome: Progressing  Goal: Identifies ineffective coping skills  Outcome: Progressing  Goal: Identifies healthy coping skills  Outcome: Progressing  Goal: Demonstrates healthy coping skills  Outcome: Progressing  Goal: Patient/Family participate in treatment and DC plans  Description: Interventions:  - Provide therapeutic environment  Outcome: Progressing  Goal: Patient/Family verbalizes awareness of resources  Outcome: Progressing  Goal: Understands least restrictive measures  Description: Interventions:  - Utilize least restrictive behavior  Outcome: Progressing  Goal: Free from restraint events  Description: - Utilize least restrictive measures   - Provide behavioral interventions   - Redirect inappropriate behaviors   Outcome: Progressing     Problem: Risk for Self Injury/Neglect  Goal: Treatment Goal: Remain safe during length of stay, learn and adopt new coping skills, and be free of self-injurious ideation, impulses and acts at the time of discharge  Outcome: Progressing  Goal: Verbalize thoughts and feelings  Description: Interventions:  - Assess and re-assess patient's lethality and potential for self-injury  - Engage patient in 1:1 interactions, daily, for a minimum of 15 minutes  - Encourage patient to express feelings, fears, frustrations, hopes  - Establish rapport/trust with patient   Outcome: Progressing  Goal: Refrain from harming self  Description: Interventions:  - Monitor patient closely, per order  - Develop a trusting relationship  - Supervise medication ingestion, monitor effects and side effects   Outcome: Progressing  Goal: Recognize maladaptive responses and adopt new coping mechanisms  Outcome: Progressing  Goal: Complete daily ADLs, including personal hygiene independently, as able  Description: Interventions:  - Observe, teach, and assist patient with ADLS  - Monitor and promote a balance of rest/activity, with adequate nutrition and elimination  Outcome: Progressing     Problem: Depression  Goal: Treatment Goal: Demonstrate behavioral control of depressive symptoms, verbalize feelings of improved mood/affect, and adopt new coping skills prior to discharge  Outcome: Progressing  Goal: Verbalize thoughts and feelings  Description: Interventions:  - Assess and re-assess patient's level of risk   - Engage patient in 1:1 interactions, daily, for a minimum of 15 minutes   - Encourage patient to express feelings, fears, frustrations, hopes   Outcome: Progressing  Goal: Refrain from harming self  Description: Interventions:  - Monitor patient closely, per order   - Supervise medication ingestion, monitor effects and side effects   Outcome: Progressing  Goal: Refrain from isolation  Description: Interventions:  - Develop a trusting relationship   - Encourage socialization   Outcome: Progressing  Goal: Refrain from self-neglect  Outcome: Progressing  Goal: Complete daily ADLs, including personal hygiene independently, as able  Description: Interventions:  - Observe, teach, and assist patient with ADLS  -  Monitor and promote a balance of rest/activity, with adequate nutrition and elimination   Outcome: Progressing     Problem: DISCHARGE PLANNING - CARE MANAGEMENT  Goal: Discharge to post-acute care or home with appropriate resources  Description: INTERVENTIONS:  - Conduct assessment to determine patient/family and health care team treatment goals, and need for post-acute services based on payer coverage, community resources, and patient preferences, and barriers to discharge  - Address psychosocial, clinical, and financial barriers to discharge as identified in assessment in conjunction with the patient/family and health care team  - Arrange appropriate level of post-acute services according to patient’s   needs and preference and payer coverage in collaboration with the physician and health care team  - Communicate with and update the patient/family, physician, and health care team regarding progress on the discharge plan  - Arrange appropriate transportation to post-acute venues  Outcome: Progressing     Problem: Ineffective Coping  Goal: Participates in unit activities  Description: Interventions:  - Provide therapeutic environment   - Provide required programming   - Redirect inappropriate behaviors   Outcome: Progressing

## 2023-10-14 LAB
GLUCOSE SERPL-MCNC: 122 MG/DL (ref 65–140)
GLUCOSE SERPL-MCNC: 135 MG/DL (ref 65–140)
GLUCOSE SERPL-MCNC: 142 MG/DL (ref 65–140)
GLUCOSE SERPL-MCNC: 149 MG/DL (ref 65–140)
GLUCOSE SERPL-MCNC: 190 MG/DL (ref 65–140)

## 2023-10-14 PROCEDURE — 99232 SBSQ HOSP IP/OBS MODERATE 35: CPT

## 2023-10-14 PROCEDURE — 82948 REAGENT STRIP/BLOOD GLUCOSE: CPT

## 2023-10-14 RX ORDER — MIRTAZAPINE 15 MG/1
15 TABLET, FILM COATED ORAL
Status: DISCONTINUED | OUTPATIENT
Start: 2023-10-14 | End: 2023-10-17 | Stop reason: HOSPADM

## 2023-10-14 RX ADMIN — METHOCARBAMOL 500 MG: 500 TABLET, FILM COATED ORAL at 21:07

## 2023-10-14 RX ADMIN — ASPIRIN 325 MG ORAL TABLET 325 MG: 325 PILL ORAL at 08:15

## 2023-10-14 RX ADMIN — TRAZODONE HYDROCHLORIDE 50 MG: 50 TABLET ORAL at 21:07

## 2023-10-14 RX ADMIN — TAMSULOSIN HYDROCHLORIDE 0.4 MG: 0.4 CAPSULE ORAL at 16:34

## 2023-10-14 RX ADMIN — LIDOCAINE 2 PATCH: 700 PATCH TOPICAL at 09:44

## 2023-10-14 RX ADMIN — GABAPENTIN 600 MG: 300 CAPSULE ORAL at 21:07

## 2023-10-14 RX ADMIN — GABAPENTIN 600 MG: 300 CAPSULE ORAL at 08:15

## 2023-10-14 RX ADMIN — AMLODIPINE BESYLATE 10 MG: 10 TABLET ORAL at 08:15

## 2023-10-14 RX ADMIN — ARIPIPRAZOLE 2 MG: 2 TABLET ORAL at 08:14

## 2023-10-14 RX ADMIN — DULOXETINE HYDROCHLORIDE 60 MG: 60 CAPSULE, DELAYED RELEASE PELLETS ORAL at 17:33

## 2023-10-14 RX ADMIN — METFORMIN HYDROCHLORIDE 500 MG: 500 TABLET, FILM COATED ORAL at 16:33

## 2023-10-14 RX ADMIN — MELATONIN TAB 3 MG 3 MG: 3 TAB at 21:07

## 2023-10-14 RX ADMIN — METHOCARBAMOL 500 MG: 500 TABLET, FILM COATED ORAL at 16:34

## 2023-10-14 RX ADMIN — ACETAMINOPHEN 650 MG: 325 TABLET ORAL at 21:07

## 2023-10-14 RX ADMIN — DICLOFENAC SODIUM 2 G: 10 GEL TOPICAL at 17:37

## 2023-10-14 RX ADMIN — GABAPENTIN 600 MG: 300 CAPSULE ORAL at 16:33

## 2023-10-14 RX ADMIN — ATORVASTATIN CALCIUM 20 MG: 20 TABLET, FILM COATED ORAL at 16:33

## 2023-10-14 RX ADMIN — INSULIN LISPRO 2 UNITS: 100 INJECTION, SOLUTION INTRAVENOUS; SUBCUTANEOUS at 21:06

## 2023-10-14 RX ADMIN — FAMOTIDINE 20 MG: 20 TABLET, FILM COATED ORAL at 08:16

## 2023-10-14 RX ADMIN — ALBUTEROL SULFATE 2 PUFF: 90 AEROSOL, METERED RESPIRATORY (INHALATION) at 13:29

## 2023-10-14 RX ADMIN — MIRTAZAPINE 15 MG: 15 TABLET, FILM COATED ORAL at 21:07

## 2023-10-14 RX ADMIN — DICLOFENAC SODIUM 2 G: 10 GEL TOPICAL at 09:45

## 2023-10-14 RX ADMIN — ACETAMINOPHEN 650 MG: 325 TABLET ORAL at 13:33

## 2023-10-14 RX ADMIN — DICLOFENAC SODIUM 2 G: 10 GEL TOPICAL at 21:07

## 2023-10-14 RX ADMIN — METFORMIN HYDROCHLORIDE 500 MG: 500 TABLET, FILM COATED ORAL at 08:15

## 2023-10-14 RX ADMIN — DULOXETINE HYDROCHLORIDE 60 MG: 60 CAPSULE, DELAYED RELEASE PELLETS ORAL at 08:15

## 2023-10-14 RX ADMIN — METHOCARBAMOL 500 MG: 500 TABLET, FILM COATED ORAL at 08:15

## 2023-10-14 NOTE — PROGRESS NOTES
Progress Note - Behavioral Health   Marv Antoine 65 y.o. male MRN: [id number]  Unit/Bed#: [facility] 981-38 Encounter: [id number]    Patient was seen today for continuation of care, records reviewed and patient was discussed with the morning case review team.  Per staff, Erica Bonilla has been pleasant and cooperative on the unit. He slept last night. No acute behaviors. Marv was seen today for psychiatric follow-up. On assessment today, Marv was seen resting in bed in his room. Marv reports some ongoing depression and intermittent passive thoughts of SI. Erica Bonilla is rosemarie for safety today and would talk to staff if he was feeling unsafe. Marv reports that he has had "a lot" of improvement since admitted to the unit. He notes improved depression. He notes he only has the passive SI thoughts when he is "alone for a while". He reports that he is going to groups and does speak with peers as coping skills to improve his mood. He is reporting adequate sleep at night and a good appetite. Marv remains behaviorally appropriate, no agitation or aggression noted on exam or in report. Marv also denies HI/AH/VH, and does not appear overtly manic. No overt delusions or paranoia are verbalized. Marv remains adherent to his current psychotropic medication regimen and denies any side effects from medications, as well as none noted on exam.    Marv is making slow improvement, he is rosemarie for safety but still noting some intermittent passive thoughts of SI. Will increase Remeron to 15mg PO at HS for continued depression symptoms. Continue all other medications. Vitals:  Vitals:    10/14/23 0748   BP: 135/75   Pulse: 83   Resp: 16   Temp: 97.7 °F (36.5 °C)   SpO2: 96%       Laboratory Results:  I have personally reviewed all pertinent laboratory/tests results.   Most Recent Labs:   Lab Results   Component Value Date    WBC 5.47 10/11/2023    RBC 4.97 10/11/2023    HGB 14.2 10/11/2023    HCT 41.4 10/11/2023     10/11/2023    RDW 13.3 10/11/2023    NEUTROABS 3.69 10/11/2023    SODIUM 136 10/11/2023    K 4.4 10/11/2023     10/11/2023    CO2 25 10/11/2023    BUN 17 10/11/2023    CREATININE 1.07 10/11/2023    GLUC 167 (H) 10/11/2023    GLUF 167 (H) 10/11/2023    CALCIUM 9.5 10/11/2023    AST 21 10/10/2023    ALT 37 10/10/2023    ALKPHOS 57 10/10/2023    TP 8.3 10/10/2023    ALB 5.0 10/10/2023    TBILI 0.78 10/10/2023    CHOLESTEROL 141 10/11/2023    HDL 52 10/11/2023    TRIG 92 10/11/2023    LDLCALC 71 10/11/2023    NONHDLC 89 10/11/2023    YBH7SMZQAMLQ 0.985 10/10/2023    FREET4 0.88 04/21/2022    HGBA1C 7.0 (A) 10/10/2023     05/15/2023       Psychiatric Review of Systems:  Behavior over the last 24 hours:  slowly improving   Sleep: adequate  Appetite: improving  Medication side effects:  denies  ROS: no complaints, denies shortness of breath or chest pain and all other systems are negative for acute changes    Mental Status Evaluation:    Appearance:  casually dressed, dressed appropriately   Behavior:  pleasant, cooperative, calm   Speech:  normal rate and volume, fluent, clear   Mood:  depressed   Affect:  constricted   Thought Process:  organized, linear   Associations: intact associations   Thought Content:  no overt delusions   Perceptual Disturbances: denies auditory or visual hallucinations when asked, does not appear responding to internal stimuli   Risk Potential: Suicidal ideation - Yes, fleeting suicidal thoughts, contracts for safety on the unit, would talk to staff if not feeling safe on the unit  Homicidal ideation - None  Potential for aggression - No   Sensorium:  oriented to person, place, and situation   Memory:  recent memory intact   Consciousness:  alert and awake   Attention/Concentration: attention span and concentration appear shorter than expected for age   Insight:  limited   Judgment: limited   Gait/Station: uses walker   Motor Activity: no abnormal movements     Progress Toward Goals:   Shana Bowen is progressing towards goals of inpatient psychiatric treatment by continued medication compliance and is attending therapeutic modalities on the milieu. However, the patient continues to require inpatient psychiatric hospitalization for continued medication management and titration to optimize symptom reduction, improve sleep hygiene, and demonstrate adequate self-care.     Risk of Harm to Self:   Nursing Suicide Risk Assessment Last 24 hours: C-SSRS Risk (Since Last Contact)  Calculated C-SSRS Risk Score (Since Last Contact): No Risk Indicated  Current Specific Risk Factors include: fleeting suicidal ideation  Protective Factors: ability to communicate with staff on the unit, able to contract for safety on the unit, taking medications as ordered on the unit, ability to manage anger well, improved depressive symptoms, improved anxiety symptoms, compliant with medications  Based on today's assessment, Marv presents the following risk of harm to self: low    Risk of Harm to Others:  Nursing Homicide Risk Assessment: Violence Risk to Others: Denies within past 6 months  Current Specific Risk Factors include: diagnosis of mood disorder  Protective Factors: no current homicidal ideation, able to communicate with staff on the unit, no current psychotic symptoms, compliant with medications on the unit as ordered, compliant with unit milieu  Based on today's assessment, Marv presents the following risk of harm to others: minimal    The following interventions are recommended: behavioral checks every 7 minutes, continued hospitalization on locked unit      Assessment/Plan   Principal Problem:    Current severe episode of major depressive disorder without prior episode ([address])  Active Problems:    Benign essential HTN    Hyperlipidemia    Type 2 diabetes mellitus without complication, without long-term current use of insulin ([address])    BPH with obstruction/lower urinary tract symptoms    Radiculopathy, lumbar region    Mild asthma    Continuous opioid dependence ([address])    Medical clearance for psychiatric admission      Recommended Treatment: Treatment plan and medication changes discussed and per the attending physician the plan is: 1. Continue with group therapy, milieu therapy and occupational therapy  2. Behavioral Health checks every 7 minutes  3. Continue frequent safety checks and vitals per unit protocol  4. Continue with SLIM medical management as indicated  5. Continue with current medication regimen  6. Will review labs in the a.m. 7.Disposition Planning: Discharge planning and efforts remain ongoing    Behavioral Health Medications: all current active meds have been reviewed and continue current psychiatric medications.   Current Facility-Administered Medications   Medication Dose Route Frequency Provider Last Rate    acetaminophen  650 mg Oral [facility] Demetrice Cooper MD      albuterol  2 puff Inhalation Q6H PRN Demetrice Cooper MD      aluminum-magnesium hydroxide-simethicone  30 mL Oral Q4H PRN Mableton Situ, MD      amLODIPine  10 mg Oral Daily Jose Situ, MD      ARIPiprazole  2 mg Oral Daily Kory Farzana, MD      aspirin  325 mg Oral Daily Jose Situ, MD      atorvastatin  20 mg Oral Daily With [address], PA-C      Diclofenac Sodium  2 g Topical 4x Daily Misti Skylar, PA-C      DULoxetine  60 mg Oral BID Jose Situ, MD      famotidine  20 mg Oral Daily Jose Situ, MD      gabapentin  600 mg Oral TID Zaire Ramey MD      haloperidol lactate  5 mg Intramuscular Q4H PRN Max 4/day Jose Situ, MD      hydrOXYzine HCL  25 mg Oral Q6H PRN Max 4/day Mableton Situ, MD      insulin lispro  1-6 Units Subcutaneous 4x Daily (AC & HS) April Gaffneyelor,       lidocaine  2 patch Topical Daily Zaire Ramey MD      LORazepam  1 mg Intramuscular Q6H PRN Max 3/day Jose Situ, MD      melatonin  3 mg Oral HS Jose Situ, MD      metFORMIN  500 mg Oral BID With Meals Henrique Esteban PA-C      methocarbamol  500 mg Oral TID Henrique Esteban PA-C      mirtazapine  7.5 mg Oral HS Elise Corbin MD      nicotine polacrilex  4 mg Oral Q2H PRN Frosty Floridalma, MD      oxyCODONE  10 mg Oral BID PRN Mateo Gonzalez MD      risperiDONE  0.25 mg Oral Q4H PRN Max 6/day Frosty Floridalma, MD      risperiDONE  0.5 mg Oral Q4H PRN Max 3/day Frosty Floridalma, MD      risperiDONE  1 mg Oral Q2H PRN Max 3/day Frosty Floridalma, MD      tamsulosin  0.4 mg Oral Daily With Dinner Henrique Esteban PA-C      traZODone  50 mg Oral HS Henrique Esteban PA-C      zolpidem  5 mg Oral HS PRN Frosty MD Floridalma         Risks / Benefits of Treatment:  Risks, benefits, and possible side effects of medications explained to patient and patient verbalizes understanding and agreement for treatment. Counseling / Coordination of Care:  Patient's progress reviewed with nursing staff. Medications, treatment progress and treatment plan reviewed with patient. Supportive counseling provided to the patient. Total floor/unit time spent today 25 minutes. Greater than 50% of total time was spent with the patient and / or family counseling and / or coordination of care. A description of the counseling / coordination of care: medication education, treatment plan, supportive therapy.

## 2023-10-14 NOTE — PLAN OF CARE
Problem: Potential for Falls  Goal: Patient will remain free of falls  Description: INTERVENTIONS:  - Educate patient/family on patient safety including physical limitations  - Instruct patient to call for assistance with activity   - Consult OT/PT to assist with strengthening/mobility   - Keep Call bell within reach  - Keep bed low and locked with side rails adjusted as appropriate  - Keep care items and personal belongings within reach  - Initiate and maintain comfort rounds  - Make Fall Risk Sign visible to staff  - Apply yellow socks and bracelet for high fall risk patients  - Consider moving patient to room near nurses station  Outcome: Progressing     Problem: Ineffective Coping  Goal: Cooperates with admission process  Description: Interventions:   - Complete admission process  Outcome: Progressing  Goal: Identifies ineffective coping skills  Outcome: Progressing  Goal: Identifies healthy coping skills  Outcome: Progressing  Goal: Demonstrates healthy coping skills  Outcome: Progressing  Goal: Patient/Family participate in treatment and DC plans  Description: Interventions:  - Provide therapeutic environment  Outcome: Progressing  Goal: Patient/Family verbalizes awareness of resources  Outcome: Progressing  Goal: Understands least restrictive measures  Description: Interventions:  - Utilize least restrictive behavior  Outcome: Progressing  Goal: Free from restraint events  Description: - Utilize least restrictive measures   - Provide behavioral interventions   - Redirect inappropriate behaviors   Outcome: Progressing     Problem: Risk for Self Injury/Neglect  Goal: Treatment Goal: Remain safe during length of stay, learn and adopt new coping skills, and be free of self-injurious ideation, impulses and acts at the time of discharge  Outcome: Progressing  Goal: Verbalize thoughts and feelings  Description: Interventions:  - Assess and re-assess patient's lethality and potential for self-injury  - Engage patient in 1:1 interactions, daily, for a minimum of 15 minutes  - Encourage patient to express feelings, fears, frustrations, hopes  - Establish rapport/trust with patient   Outcome: Progressing  Goal: Refrain from harming self  Description: Interventions:  - Monitor patient closely, per order  - Develop a trusting relationship  - Supervise medication ingestion, monitor effects and side effects   Outcome: Progressing  Goal: Recognize maladaptive responses and adopt new coping mechanisms  Outcome: Progressing  Goal: Complete daily ADLs, including personal hygiene independently, as able  Description: Interventions:  - Observe, teach, and assist patient with ADLS  - Monitor and promote a balance of rest/activity, with adequate nutrition and elimination  Outcome: Progressing     Problem: Depression  Goal: Treatment Goal: Demonstrate behavioral control of depressive symptoms, verbalize feelings of improved mood/affect, and adopt new coping skills prior to discharge  Outcome: Progressing  Goal: Verbalize thoughts and feelings  Description: Interventions:  - Assess and re-assess patient's level of risk   - Engage patient in 1:1 interactions, daily, for a minimum of 15 minutes   - Encourage patient to express feelings, fears, frustrations, hopes   Outcome: Progressing  Goal: Refrain from harming self  Description: Interventions:  - Monitor patient closely, per order   - Supervise medication ingestion, monitor effects and side effects   Outcome: Progressing  Goal: Refrain from isolation  Description: Interventions:  - Develop a trusting relationship   - Encourage socialization   Outcome: Progressing  Goal: Refrain from self-neglect  Outcome: Progressing  Goal: Complete daily ADLs, including personal hygiene independently, as able  Description: Interventions:  - Observe, teach, and assist patient with ADLS  -  Monitor and promote a balance of rest/activity, with adequate nutrition and elimination   Outcome: Progressing Problem: DISCHARGE PLANNING - CARE MANAGEMENT  Goal: Discharge to post-acute care or home with appropriate resources  Description: INTERVENTIONS:  - Conduct assessment to determine patient/family and health care team treatment goals, and need for post-acute services based on payer coverage, community resources, and patient preferences, and barriers to discharge  - Address psychosocial, clinical, and financial barriers to discharge as identified in assessment in conjunction with the patient/family and health care team  - Arrange appropriate level of post-acute services according to patient’s   needs and preference and payer coverage in collaboration with the physician and health care team  - Communicate with and update the patient/family, physician, and health care team regarding progress on the discharge plan  - Arrange appropriate transportation to post-acute venues  Outcome: Progressing

## 2023-10-14 NOTE — NURSING NOTE
Patient pleasant, cooperative, and visible on [facility] watching television with peers. Patient slept all night.

## 2023-10-14 NOTE — NURSING NOTE
Patient was calm and visible in day room today. He requested and was given albuterol inhaler at 1329 with some positive effect. No other s/s reported. Patient elli a shower, was med and meals compliant. Patient's BS stable with no coverage needed today. Will continue to monitor.

## 2023-10-15 LAB
GLUCOSE SERPL-MCNC: 110 MG/DL (ref 65–140)
GLUCOSE SERPL-MCNC: 119 MG/DL (ref 65–140)
GLUCOSE SERPL-MCNC: 152 MG/DL (ref 65–140)
GLUCOSE SERPL-MCNC: 164 MG/DL (ref 65–140)

## 2023-10-15 PROCEDURE — 99232 SBSQ HOSP IP/OBS MODERATE 35: CPT

## 2023-10-15 PROCEDURE — 82948 REAGENT STRIP/BLOOD GLUCOSE: CPT

## 2023-10-15 RX ADMIN — METHOCARBAMOL 500 MG: 500 TABLET, FILM COATED ORAL at 17:33

## 2023-10-15 RX ADMIN — MELATONIN TAB 3 MG 3 MG: 3 TAB at 21:20

## 2023-10-15 RX ADMIN — ACETAMINOPHEN 650 MG: 325 TABLET ORAL at 05:34

## 2023-10-15 RX ADMIN — AMLODIPINE BESYLATE 10 MG: 10 TABLET ORAL at 08:17

## 2023-10-15 RX ADMIN — INSULIN LISPRO 1 UNITS: 100 INJECTION, SOLUTION INTRAVENOUS; SUBCUTANEOUS at 21:20

## 2023-10-15 RX ADMIN — DULOXETINE HYDROCHLORIDE 60 MG: 60 CAPSULE, DELAYED RELEASE PELLETS ORAL at 08:17

## 2023-10-15 RX ADMIN — DICLOFENAC SODIUM 2 G: 10 GEL TOPICAL at 08:21

## 2023-10-15 RX ADMIN — ACETAMINOPHEN 650 MG: 325 TABLET ORAL at 21:21

## 2023-10-15 RX ADMIN — DICLOFENAC SODIUM 2 G: 10 GEL TOPICAL at 17:37

## 2023-10-15 RX ADMIN — ASPIRIN 325 MG ORAL TABLET 325 MG: 325 PILL ORAL at 08:17

## 2023-10-15 RX ADMIN — LIDOCAINE 2 PATCH: 700 PATCH TOPICAL at 08:20

## 2023-10-15 RX ADMIN — METFORMIN HYDROCHLORIDE 500 MG: 500 TABLET, FILM COATED ORAL at 08:17

## 2023-10-15 RX ADMIN — DULOXETINE HYDROCHLORIDE 60 MG: 60 CAPSULE, DELAYED RELEASE PELLETS ORAL at 17:33

## 2023-10-15 RX ADMIN — METHOCARBAMOL 500 MG: 500 TABLET, FILM COATED ORAL at 21:20

## 2023-10-15 RX ADMIN — GABAPENTIN 600 MG: 300 CAPSULE ORAL at 17:33

## 2023-10-15 RX ADMIN — GABAPENTIN 600 MG: 300 CAPSULE ORAL at 08:17

## 2023-10-15 RX ADMIN — MIRTAZAPINE 15 MG: 15 TABLET, FILM COATED ORAL at 21:20

## 2023-10-15 RX ADMIN — ATORVASTATIN CALCIUM 20 MG: 20 TABLET, FILM COATED ORAL at 17:33

## 2023-10-15 RX ADMIN — DICLOFENAC SODIUM 2 G: 10 GEL TOPICAL at 11:49

## 2023-10-15 RX ADMIN — FAMOTIDINE 20 MG: 20 TABLET, FILM COATED ORAL at 08:17

## 2023-10-15 RX ADMIN — METHOCARBAMOL 500 MG: 500 TABLET, FILM COATED ORAL at 08:17

## 2023-10-15 RX ADMIN — GABAPENTIN 600 MG: 300 CAPSULE ORAL at 21:20

## 2023-10-15 RX ADMIN — METFORMIN HYDROCHLORIDE 500 MG: 500 TABLET, FILM COATED ORAL at 17:33

## 2023-10-15 RX ADMIN — TRAZODONE HYDROCHLORIDE 50 MG: 50 TABLET ORAL at 21:20

## 2023-10-15 RX ADMIN — ARIPIPRAZOLE 2 MG: 2 TABLET ORAL at 08:17

## 2023-10-15 RX ADMIN — TAMSULOSIN HYDROCHLORIDE 0.4 MG: 0.4 CAPSULE ORAL at 17:33

## 2023-10-15 RX ADMIN — ACETAMINOPHEN 650 MG: 325 TABLET ORAL at 14:16

## 2023-10-15 RX ADMIN — INSULIN LISPRO 1 UNITS: 100 INJECTION, SOLUTION INTRAVENOUS; SUBCUTANEOUS at 17:34

## 2023-10-15 NOTE — NURSING NOTE
Patient calm, cooperative, pleasant, and visible on [facility]. He is sociable with peers and takes medications as scheduled. He denies depression, anxiety, HI/SI/AVH and is able to make needs known.

## 2023-10-15 NOTE — PROGRESS NOTES
Progress Note - Behavioral Health   Marv Kennedy 65 y.o. male MRN: [id number]  Unit/Bed#: [facility] 091-84 Encounter: [id number]    Patient was seen today for continuation of care, records reviewed and patient was discussed with the morning case review team.  Per staff, Marv is meal and medication compliant. He is visible and social with peers in the milieu. Marv was seen today for psychiatric follow-up. On assessment today, Marv was seen resting in bed in his room. Marv reports some ongoing depression and some flashbacks when he is alone but notes some improvement. He is denying any current suicidal ideation and is denying any acute passive death wish. He reports sleeping well last night. He is getting out of his room to talk with peers. He continues with some low back pain that his scheduled Tylenol is somewhat effective. Marv denies acute suicidal/self-harm ideation/intent/plan upon direct inquiry at this time. Marv remains behaviorally appropriate, no agitation or aggression noted on exam or in report. Marv also denies HI/AH/VH, and does not appear overtly manic. No overt delusions or paranoia are verbalized. Marv remains adherent to his current psychotropic medication regimen and denies any side effects from medications, as well as none noted on exam.    Marv is tolerating recent increase in Remeron to 15mg PO at HS. We will continue all other medications. Vitals:  Vitals:    10/15/23 0736   BP: 136/80   Pulse: 71   Resp: 16   Temp: (!) 97 °F (36.1 °C)   SpO2: 96%       Laboratory Results:  I have personally reviewed all pertinent laboratory/tests results.   Most Recent Labs:   Lab Results   Component Value Date    WBC 5.47 10/11/2023    RBC 4.97 10/11/2023    HGB 14.2 10/11/2023    HCT 41.4 10/11/2023     10/11/2023    RDW 13.3 10/11/2023    NEUTROABS 3.69 10/11/2023    SODIUM 136 10/11/2023    K 4.4 10/11/2023     10/11/2023    CO2 25 10/11/2023    BUN 17 10/11/2023    CREATININE 1.07 10/11/2023    GLUC 167 (H) 10/11/2023    GLUF 167 (H) 10/11/2023    CALCIUM 9.5 10/11/2023    AST 21 10/10/2023    ALT 37 10/10/2023    ALKPHOS 57 10/10/2023    TP 8.3 10/10/2023    ALB 5.0 10/10/2023    TBILI 0.78 10/10/2023    CHOLESTEROL 141 10/11/2023    HDL 52 10/11/2023    TRIG 92 10/11/2023    LDLCALC 71 10/11/2023    NONHDLC 89 10/11/2023    HSS1YOYGTFHF 0.985 10/10/2023    FREET4 0.88 04/21/2022    HGBA1C 7.0 (A) 10/10/2023     05/15/2023       Psychiatric Review of Systems:  Behavior over the last 24 hours:  slowly improving   Sleep: adequate  Appetite: adequate  Medication side effects:  denies  ROS: no complaints, denies shortness of breath or chest pain and all other systems are negative for acute changes    Mental Status Evaluation:    Appearance:  casually dressed, dressed appropriately   Behavior:  pleasant, cooperative, calm   Speech:  normal rate and volume, fluent, clear   Mood:  depressed   Affect:  constricted   Thought Process:  organized, linear   Associations: intact associations   Thought Content:  no overt delusions   Perceptual Disturbances: denies auditory or visual hallucinations when asked, does not appear responding to internal stimuli   Risk Potential: Suicidal ideation - Denies at present, rosemarie for safety, would talk to staff if feeling unsafe  Homicidal ideation - None  Potential for aggression - No   Sensorium:  oriented to person, place, and situation   Memory:  recent memory intact   Consciousness:  alert and awake   Attention/Concentration: attention span and concentration appear shorter than expected for age   Insight:  limited   Judgment: limited   Gait/Station: In bed   Motor Activity: no abnormal movements     Progress Toward Goals:   Erica Bonilla is progressing towards goals of inpatient psychiatric treatment by continued medication compliance and is attending therapeutic modalities on the milieu.  However, the patient continues to require inpatient psychiatric hospitalization for continued medication management and titration to optimize symptom reduction, improve sleep hygiene, and demonstrate adequate self-care. Risk of Harm to Self:   Nursing Suicide Risk Assessment Last 24 hours:   Current Specific Risk Factors include: dx of depression  Protective Factors: ability to communicate with staff on the unit, able to contract for safety on the unit, taking medications as ordered on the unit, ability to manage anger well, improved depressive symptoms, improved anxiety symptoms, compliant with medications  Based on today's assessment, Marv presents the following risk of harm to self: low    Risk of Harm to Others:  Nursing Homicide Risk Assessment: Violence Risk to Others: Denies within past 6 months  Current Specific Risk Factors include: diagnosis of mood disorder  Protective Factors: no current homicidal ideation, able to communicate with staff on the unit, no current psychotic symptoms, compliant with medications on the unit as ordered, compliant with unit milieu  Based on today's assessment, Marv presents the following risk of harm to others: minimal    The following interventions are recommended: behavioral checks every 7 minutes, continued hospitalization on locked unit      Assessment/Plan   Principal Problem:    Current severe episode of major depressive disorder without prior episode ([address])  Active Problems:    Benign essential HTN    Hyperlipidemia    Type 2 diabetes mellitus without complication, without long-term current use of insulin ([address])    BPH with obstruction/lower urinary tract symptoms    Radiculopathy, lumbar region    Mild asthma    Continuous opioid dependence ([address])    Medical clearance for psychiatric admission      Recommended Treatment: Treatment plan and medication changes discussed and per the attending physician the plan is: 1. Continue with group therapy, milieu therapy and occupational therapy  2. Behavioral Health checks every 7 minutes  3. Continue frequent safety checks and vitals per unit protocol  4. Continue with [facility] medical management as indicated  5. Continue with current medication regimen  6. Will review labs in the a.m. 7.Disposition Planning: Discharge planning and efforts remain ongoing    Behavioral Health Medications: all current active meds have been reviewed and continue current psychiatric medications.   Current Facility-Administered Medications   Medication Dose Route Frequency Provider Last Rate    acetaminophen  650 mg Oral [facility] Lora Esquivel MD      albuterol  2 puff Inhalation Q6H PRN Lora Esquivel MD      aluminum-magnesium hydroxide-simethicone  30 mL Oral Q4H PRN Baron Shahla MD      amLODIPine  10 mg Oral Daily Baron Shahla MD      ARIPiprazole  2 mg Oral Daily Barbara Martinez MD      aspirin  325 mg Oral Daily Baron Shahla MD      atorvastatin  20 mg Oral Daily With [address], [place]      Diclofenac Sodium  2 g Topical 4x Daily Rosette Giron PA-C      DULoxetine  60 mg Oral BID Baron Shahla MD      famotidine  20 mg Oral Daily Baron Shahla MD      gabapentin  600 mg Oral TID Mario Salmon MD      haloperidol lactate  5 mg Intramuscular Q4H PRN Max 4/day Baron Shahla MD      hydrOXYzine HCL  25 mg Oral Q6H PRN Max 4/day Baron Shahla MD      insulin lispro  1-6 Units Subcutaneous 4x Daily (AC & HS) Rustam Barragan,       lidocaine  2 patch Topical Daily Mario Salmon MD      LORazepam  1 mg Intramuscular Q6H PRN Max 3/day Baron Shahla MD      melatonin  3 mg Oral HS Baron Shahla MD      metFORMIN  500 mg Oral BID With Meals Rosette Giron PA-C      methocarbamol  500 mg Oral TID Rosette Giron PA-C      mirtazapine  15 mg Oral HS Ethelle Mantle Angélica, CRNP      nicotine polacrilex  4 mg Oral Q2H PRN Baron Shahla MD      oxyCODONE  10 mg Oral BID PRN Lora Esquivel MD      risperiDONE  0.25 mg Oral Q4H PRN Max 6/day Juanito Powers MD      risperiDONE  0.5 mg Oral Q4H PRN Max 3/day Juanito Powers MD      risperiDONE  1 mg Oral Q2H PRN Max 3/day Juanito Powers MD      tamsulosin  0.4 mg Oral Daily With Cesar Jo PA-C      traZODone  50 mg Oral HS Yong Fontaine PA-C      zolpidem  5 mg Oral HS PRN Juanito Powers MD         Risks / Benefits of Treatment:  Risks, benefits, and possible side effects of medications explained to patient and patient verbalizes understanding and agreement for treatment. Counseling / Coordination of Care:  Patient's progress reviewed with nursing staff. Medications, treatment progress and treatment plan reviewed with patient. Supportive counseling provided to the patient. Total floor/unit time spent today 25 minutes. Greater than 50% of total time was spent with the patient and / or family counseling and / or coordination of care. A description of the counseling / coordination of care: medication education, treatment plan, supportive therapy.

## 2023-10-15 NOTE — NURSING NOTE
Alert and cooperative. Appetite good. Present in milieu and interacting with peers. Denies SI. Took medication without difficulty.

## 2023-10-16 ENCOUNTER — TELEPHONE (OUTPATIENT)
Dept: PSYCHIATRY | Facility: CLINIC | Age: 63
End: 2023-10-16

## 2023-10-16 LAB
GLUCOSE SERPL-MCNC: 115 MG/DL (ref 65–140)
GLUCOSE SERPL-MCNC: 141 MG/DL (ref 65–140)
GLUCOSE SERPL-MCNC: 159 MG/DL (ref 65–140)
GLUCOSE SERPL-MCNC: 169 MG/DL (ref 65–140)

## 2023-10-16 PROCEDURE — 82948 REAGENT STRIP/BLOOD GLUCOSE: CPT

## 2023-10-16 PROCEDURE — 99232 SBSQ HOSP IP/OBS MODERATE 35: CPT | Performed by: STUDENT IN AN ORGANIZED HEALTH CARE EDUCATION/TRAINING PROGRAM

## 2023-10-16 RX ORDER — DULOXETIN HYDROCHLORIDE 60 MG/1
60 CAPSULE, DELAYED RELEASE ORAL 2 TIMES DAILY
Qty: 60 CAPSULE | Refills: 1 | Status: SHIPPED | OUTPATIENT
Start: 2023-10-16 | End: 2023-12-15

## 2023-10-16 RX ORDER — AMLODIPINE BESYLATE 10 MG/1
10 TABLET ORAL DAILY
Qty: 30 TABLET | Refills: 1 | Status: SHIPPED | OUTPATIENT
Start: 2023-10-17 | End: 2023-12-16

## 2023-10-16 RX ORDER — TAMSULOSIN HYDROCHLORIDE 0.4 MG/1
0.4 CAPSULE ORAL
Qty: 30 CAPSULE | Refills: 1 | Status: SHIPPED | OUTPATIENT
Start: 2023-10-16 | End: 2023-12-15

## 2023-10-16 RX ORDER — MIRTAZAPINE 15 MG/1
15 TABLET, FILM COATED ORAL
Qty: 30 TABLET | Refills: 1 | Status: SHIPPED | OUTPATIENT
Start: 2023-10-16 | End: 2023-12-15

## 2023-10-16 RX ORDER — ATORVASTATIN CALCIUM 20 MG/1
20 TABLET, FILM COATED ORAL
Qty: 30 TABLET | Refills: 1 | Status: SHIPPED | OUTPATIENT
Start: 2023-10-16 | End: 2023-12-15

## 2023-10-16 RX ORDER — TRAZODONE HYDROCHLORIDE 50 MG/1
50 TABLET ORAL
Qty: 30 TABLET | Refills: 1 | Status: SHIPPED | OUTPATIENT
Start: 2023-10-16 | End: 2023-12-15

## 2023-10-16 RX ORDER — METHOCARBAMOL 500 MG/1
500 TABLET, FILM COATED ORAL 3 TIMES DAILY
Qty: 90 TABLET | Refills: 1 | Status: SHIPPED | OUTPATIENT
Start: 2023-10-16 | End: 2023-12-15

## 2023-10-16 RX ORDER — ARIPIPRAZOLE 2 MG/1
2 TABLET ORAL DAILY
Qty: 30 TABLET | Refills: 1 | Status: SHIPPED | OUTPATIENT
Start: 2023-10-17 | End: 2023-12-16

## 2023-10-16 RX ORDER — GABAPENTIN 300 MG/1
600 CAPSULE ORAL 3 TIMES DAILY
Qty: 180 CAPSULE | Refills: 1 | Status: SHIPPED | OUTPATIENT
Start: 2023-10-16 | End: 2023-12-15

## 2023-10-16 RX ORDER — LANOLIN ALCOHOL/MO/W.PET/CERES
3 CREAM (GRAM) TOPICAL
Qty: 30 TABLET | Refills: 1 | Status: SHIPPED | OUTPATIENT
Start: 2023-10-16 | End: 2023-12-15

## 2023-10-16 RX ORDER — FAMOTIDINE 20 MG/1
20 TABLET, FILM COATED ORAL DAILY
Qty: 30 TABLET | Refills: 1 | Status: SHIPPED | OUTPATIENT
Start: 2023-10-17 | End: 2023-12-16

## 2023-10-16 RX ORDER — ASPIRIN 325 MG
325 TABLET ORAL DAILY
Qty: 30 TABLET | Refills: 1 | Status: SHIPPED | OUTPATIENT
Start: 2023-10-17 | End: 2023-12-16

## 2023-10-16 RX ADMIN — DULOXETINE HYDROCHLORIDE 60 MG: 60 CAPSULE, DELAYED RELEASE PELLETS ORAL at 17:01

## 2023-10-16 RX ADMIN — TRAZODONE HYDROCHLORIDE 50 MG: 50 TABLET ORAL at 21:24

## 2023-10-16 RX ADMIN — METHOCARBAMOL 500 MG: 500 TABLET, FILM COATED ORAL at 17:01

## 2023-10-16 RX ADMIN — METHOCARBAMOL 500 MG: 500 TABLET, FILM COATED ORAL at 08:49

## 2023-10-16 RX ADMIN — GABAPENTIN 600 MG: 300 CAPSULE ORAL at 21:24

## 2023-10-16 RX ADMIN — MELATONIN TAB 3 MG 3 MG: 3 TAB at 21:24

## 2023-10-16 RX ADMIN — ACETAMINOPHEN 650 MG: 325 TABLET ORAL at 14:27

## 2023-10-16 RX ADMIN — INSULIN LISPRO 1 UNITS: 100 INJECTION, SOLUTION INTRAVENOUS; SUBCUTANEOUS at 21:27

## 2023-10-16 RX ADMIN — METHOCARBAMOL 500 MG: 500 TABLET, FILM COATED ORAL at 21:24

## 2023-10-16 RX ADMIN — METFORMIN HYDROCHLORIDE 500 MG: 500 TABLET, FILM COATED ORAL at 08:49

## 2023-10-16 RX ADMIN — DICLOFENAC SODIUM 2 G: 10 GEL TOPICAL at 11:46

## 2023-10-16 RX ADMIN — GABAPENTIN 600 MG: 300 CAPSULE ORAL at 17:01

## 2023-10-16 RX ADMIN — DULOXETINE HYDROCHLORIDE 60 MG: 60 CAPSULE, DELAYED RELEASE PELLETS ORAL at 08:49

## 2023-10-16 RX ADMIN — MIRTAZAPINE 15 MG: 15 TABLET, FILM COATED ORAL at 21:24

## 2023-10-16 RX ADMIN — DICLOFENAC SODIUM 2 G: 10 GEL TOPICAL at 17:03

## 2023-10-16 RX ADMIN — INSULIN LISPRO 1 UNITS: 100 INJECTION, SOLUTION INTRAVENOUS; SUBCUTANEOUS at 17:02

## 2023-10-16 RX ADMIN — GABAPENTIN 600 MG: 300 CAPSULE ORAL at 08:49

## 2023-10-16 RX ADMIN — ACETAMINOPHEN 650 MG: 325 TABLET ORAL at 05:46

## 2023-10-16 RX ADMIN — ARIPIPRAZOLE 2 MG: 2 TABLET ORAL at 08:49

## 2023-10-16 RX ADMIN — FAMOTIDINE 20 MG: 20 TABLET, FILM COATED ORAL at 08:49

## 2023-10-16 RX ADMIN — ACETAMINOPHEN 650 MG: 325 TABLET ORAL at 21:24

## 2023-10-16 RX ADMIN — METFORMIN HYDROCHLORIDE 500 MG: 500 TABLET, FILM COATED ORAL at 17:01

## 2023-10-16 RX ADMIN — DICLOFENAC SODIUM 2 G: 10 GEL TOPICAL at 21:25

## 2023-10-16 RX ADMIN — LIDOCAINE 2 PATCH: 700 PATCH TOPICAL at 08:49

## 2023-10-16 RX ADMIN — AMLODIPINE BESYLATE 10 MG: 10 TABLET ORAL at 08:49

## 2023-10-16 RX ADMIN — ATORVASTATIN CALCIUM 20 MG: 20 TABLET, FILM COATED ORAL at 17:02

## 2023-10-16 RX ADMIN — ASPIRIN 325 MG ORAL TABLET 325 MG: 325 PILL ORAL at 08:49

## 2023-10-16 RX ADMIN — DICLOFENAC SODIUM 2 G: 10 GEL TOPICAL at 08:49

## 2023-10-16 RX ADMIN — TAMSULOSIN HYDROCHLORIDE 0.4 MG: 0.4 CAPSULE ORAL at 17:01

## 2023-10-16 NOTE — BH TRANSITION RECORD
Contact Information: If you have any questions, concerns, pended studies, tests and/or procedures, or emergencies regarding your inpatient behavioral health visit. Please contact [address] [facility] [phone] and ask to speak to a , nurse or physician. A contact is available 24 hours/ 7 days a week at this number. Summary of Procedures Performed During your Stay:  Below is a list of major procedures performed during your hospital stay and a summary of results:  - Cardiac Procedures/Studies: ECG- Normal Sinus Rhythm . Pending Studies (From admission, onward)      None          Please follow up on the above pending studies with your PCP and/or referring provider.

## 2023-10-16 NOTE — TELEPHONE ENCOUNTER
Pt has been scheduled for med mgnt at the [facility] on 12/12 @ 12 pm with Savanah Fatima as well as scheduled with Laila Wiggins for therapy on 10/30 @ 8 am at the [facility].

## 2023-10-16 NOTE — NURSING NOTE
Patient visible in the milieu, social with peers. Brightens on approach, compliant with meals and scheduled medications. Denies anxiety, depression, SI/HI/AH/VH. Expresses readiness and excitement for discharge. Safety checks ongoing.

## 2023-10-16 NOTE — DISCHARGE INSTR - OTHER ORDERS
You are being discharged home. [facility] [place], operated by [facility] Phone Number : [phone].     [facility] [id number]  [address]  130 [facility], [address] [place] [address]  [phone] Fax [address] [phone] [address] [facility] [address] [phone]  Medicaid, [place], TANF, [address] [place] [address]  [facility]  Address: [address] [address]  Phone: [phone]  Hours of Operation: Fridays 10:00AM-1:00PM  Seventh Day [facility]  Address: [address] [facility] [address]  Phone: [phone]  Hours of Operation: Thursdays 5:30PM-6:30PM    [address] [facility]  Address: [address]  Phone: [phone]  Hours of Operation: Monday-Friday 9:30AM-12:00PM  [facility]  Address: [address]  Phone: [phone]  Hours of Operation: By appointment -- Mondays, Tuesdays, Thursdays, & Fridays 8:30AM-4:00PM;  Wednesdays 8:30AM-12:00PM  [facility]  Address: [address]  Phone: [phone]  Hours of Operation: 1st & 3rd Saturdays 10:00AM-12:00PM  [facility]  Address: [address],[facility] [address] [place] [address]  Phone: [phone]  Hours of Operation: 2nd & 4th Thursdays 4:00PM-5:30PM (Only 4th Thursdays during the summer)  [facility]  Address: [address]  Phone: [phone]  Hours of Operation: By appointment -- Wednesdays 6:00PM  [facility]  Address: [address]  Phone: [phone]  Hours of Operation: Thursdays 11:00AM-2:00PM or By appointment  [facility]  Address: [address]  Phone: [phone]  Hours of Operation: Saturdays 9:00AM-11:30AM  [facility]  Address: [address]  Phone: [phone]  Hours of Operation: Fridays, 3rd & 4th Saturdays 9:00AM-11:00AM  [address] [place] [address]  Address: [address] GHAZALA Yuen, [facility], 630 [facility]  Phone: [phone]  Hours of Operation: Tuesdays 3:00PM-5:00PM or By appointment  [facility]  Address: [address] [facility] [address], [facility], 630 [facility]  Phone: [phone]  Hours of Operation: By appointment  [facility]  Address: [address], [facility], [address] [place] [address]  Phone: [phone]  Hours of Operation: 3rd Saturday, 8:00AM-11:00AM  [facility]  Address: [address], [address]  Phone: [phone]  Hours of Operation: 3rd Sunday 4:00PM-5:30PM  500 [facility])  Address: 3240  [facility], [address] [place] [address]  Phone: [phone]  Hours of Operation: 3rd Wednesday 9:00AM-4:00PM  [facility]  Address: 225 [facility], [address] [place] [address]  Phone: [phone]  Hours of Operation: 1st & 3rd Saturdays 9:00AM-11:30AM  [address]  Address: 7901 [facility], [address] [place] [address]  Phone: [phone]  Hours of Operation: By appointment    [facility]ugene [facility] Pant  Address: [facility], [address] [facility]  Phone: [phone]  Hours of Operation: 3rd Sunday 12:00PM-1:30PM  361 [facility]  Address: 4228 [facility], [address] [place] [address]  Phone: [phone]  Hours of Operation: 1st & 3rd Thursdays 6:30PM-7:30PM; By appointment -- Mondays  [facility]  Address: 225 [facility], [facility]  Phone: [phone]  Hours of Operation: By appointment  [facility]  Address: [address] [facility], [address] [facility]  Phone: [phone]  Hours of Operation: 1st & 3rd Wednesdays 4:00PM-5:30PM  [facility]  Address: [facility], [address] [place] [address]  Phone: [phone]  Hours of Operation: 4th Wednesday 6:30PM-7:30PM  [address] [facility] [employer]  Address: [address] [facility], [address] [facility]  Phone: [phone]  Hours of Operation: 2nd Tuesday 10:00AM-12:00PM; 4th Thursday 4:00PM-6:00PM    [address] [place] [address])  Address: [address] [facility] [address], 60 [facility], [address]  Phone: [phone]  Hours of Operation: By appointment -- Monday-Friday 9:00AM-5:00PM    [address]  Address: 1215 [facility],[address]., [facility], [address] [place] [address]  Phone: [phone]  Hours of Operation: 1st & 3rd Tuesdays 9:00AM-10:30AM; Thursdays 6:00PM-8:00PM    [facility]  Address: [address]  Phone: [phone]  Hours of Operation: Monday-Friday 1:30PM-2:30PM  [facility])  Address: [address]  Phone: [phone]  Hours of Operation: Monday-Friday 9:00AM-5:00PM  [facility]  Address: [address]  Phone: [phone]  Hours of Operation: Tuesday-Thursday 12:00PM-1:00PM  [address]  Address: 3908 [facility] [address]  Phone: [phone]  Hours of Operation: Sundays 8:00AM-9:00AM; Some holidays    [place] - Ragini Dorantes of the [facility]  Address: [address]  Phone: [phone]  Hours of Operation: Wednesdays 10:00AM-12:00PM  [facility] [place]  Address: [address] [place] [address]  Phone: [phone]  Hours of Operation: 1st & 3rd Tuesdays 5:00PM-6:00PM  [facility]  Address: [address] [facility] [place], [place], [address]  Phone: [phone]  Hours of Operation: Tuesdays 6:00PM-7:00PM  [facility]  Address: [address] [address]  Phone: [phone]  Hours of Operation: 1st & 2nd Saturdays 12:00PM- 4:00PM  [employer]  Address: [address] Justin [address]es , [address]  Phone: [phone]  Hours of Operation: Monday-Friday 10:30AM-11:30AM  [facility]  Address: [address] [facility], [place], [address] [facility] [address]  Phone: [phone]  Hours of Operation: 3rd & 4th Saturdays 9:00AM-11:00AM; [facility] residents only    [address]  Address: 30 [facility], [place], [address] [place] [address]  Phone: [phone]  Hours of Operation: 2nd Thursday 10:00AM-12:00PM  [address] [facility]  Address: 95 [facility] DEREK BarrettKELVINNICK, [address] [place] [address]  Phone: [phone]  Hours of Operation: Monday & Tuesday of the first full week of the month 9:00AM-11:00AM  Health system  Address: 160 [facility], [place], [address] [place] [address]  Phone: [phone]  Hours of Operation: Mondays, Wednesdays, & Thursdays 9:30AM-11:30AM  [address] [place] [address]  Address: [address] [place] [address], [address]  Phone: [phone]  Hours of Operation: 2nd & 4th Saturdays 10:00AM-12:00PM    [facility] - [address] [facility]  Address: [address] [place] ROWE  Phone: [phone]  Hours of Operation: By appointment -- Tuesdays & Wednesdays 10:00AM-4:00PM, Thursdays 10:00AM-  12:00PM, & Sundays 4:00PM-7:00PM  [facility]  Address: [address] [place] [address] [place]  Phone: [phone]  Hours of Operation: 3rd Saturday 10:00AM-12:00PM  [address]  Address: 1355 [facility], [place]  Phone: [phone]  Hours of Operation: 3rd Saturday 9:00AM-11:30AM or by appointment  [address] [place] [address]  Address: [address] [place] [address], A[place] ROWE  Phone: [phone]  Hours of Operation: 2nd & 4thTuesdays 10:00AM-12:00PM  [facility]  Address: [address] [place] [address], [place] ROWE  Phone: [phone]  Hours of Operation: 1st, 2nd, & 4th Wednesdays 11:00AM-1:00PM; 3rd Wednesday 5:00PM-7:00PM  [facility]  Address: [facility], [place]  Phone: [phone]  Hours of Operation: Wednesdays 10:00AM-12:00PM; Last Wednesday 6:00PM-8:00PM    [facility]  Address: [facility], [place] R[place]  Phone: [phone]  Ours of Operation: Sundays 1:00PM-2:00PM  [address]  Address: [address] [place]  Phone: [phone]  Hours of Operation: Saturdays 12:00PM-1:00PM  [facility]  Address: 240 [facility] [place] Hernandez, [address] [facility] [address]  Phone: [phone]  Hours of Operation: Monday-Friday 8:00AM-4:00PM  [address] [place] [address]  Address: [address] [place]  Phone: [phone]  Hours of Operation: Monday-Friday 12:00PM-1:00PM    Magaly Webb [facility]: [address] [place] [address] Street: [phone]  Mondays through Fridays: from 8:00 am to 2:63 pm *except certain holidays.  - Belinda Erazo is first and foremost a local community center that offers individuals with mental illness hope and opportunities to achieve their full potential. Much more than simply a program or social service, a Clubhouse is most importantly a community of people who are working together to achieve a common goal.  - offers persons with a mental illness a safe, healing environment to explore their personal and vocational potential and receive support in achieving their goals. Instead of traditional therapy, the work of the house is the rehabilitation. As members contribute meaningful work to the house, they build confidence and a sense of purpose.  -Adele De La Torre a Member - It’s Free, Membership is free for life. 1111 [facility]  1101 [facility], 630 [facility]  [phone]  Walk in Hours: Monday - Friday 9 AM - 3 PM  Open Monday - Friday 9 AM - 4 PM  *use of shower, computer, meals, housing assistance  Breakfast: 8 - 9 AM   Lunch: 11:45 AM - 12:45 PM  Dinner: 4 PM - 4:30 PM

## 2023-10-16 NOTE — PROGRESS NOTES
Progress Note - Behavioral Health   Marv Rodríguez 65 y.o. male MRN: [id number]  Unit/Bed#: Jony Grijalva 131-51 Encounter: [id number]    Assessment/Plan   Principal Problem:    Current severe episode of major depressive disorder without prior episode ([address])  Active Problems:    Benign essential HTN    Hyperlipidemia    Type 2 diabetes mellitus without complication, without long-term current use of insulin (HCC)    BPH with obstruction/lower urinary tract symptoms    Radiculopathy, lumbar region    Mild asthma    Continuous opioid dependence ([facility])    Medical clearance for psychiatric admission      Recommended Treatment:   Continue Cymbalta 60 mg twice daily, for mood and pain  Continue Remeron 15 mg nightly, for mood, sleep, and appetite  Continue Abilify 2 mg daily, for mood  Continue gabapentin 600 mg 3 times daily, for pain and anxiety  Continue melatonin 3 mg nightly, for sleep  Continue trazodone 50 mg nightly, for sleep  Continue fall precautions    Continue with group therapy, milieu therapy and occupational therapy. Continue frequent safety checks and vitals per unit protocol. Case discussed with treatment team.  Continue with [facility] medical management as indicated  Continue coordinating with case management regarding disposition  Risks, benefits and possible side effects of Medications: Risks, benefits, and possible side effects of medications have been explained to the patient, who verbalizes understanding    Legal Status: 201  ------------------------------------------------------------    Subjective: Per nursing report, Tosha Young has been calm, cooperative, pleasant and visible on the milieu. Patient has been sociable with peers and has been meal and medication compliant. Today, patient reports improved mood and states that his depression is currently a 4/10 in severity.   Patient reports passive death wishes "at times" when he is alone and his "negative thoughts" occupy his mind but states that he is trying to think positively and looks forward to continuing to work with an outpatient psychiatrist and therapist for his mental issues. Patient states that when he is alone, sometimes he gets very scared because he tends to fall a lot. Patient currently has a home health aide but is concerned moving forward. Patient is informed that he will have an outpatient  who should be able to help him to obtain and maintain all of the resources he needs upon discharge, including home health assistance. Patient states that he is looking forward to going home tomorrow. Patient denies any current medication side effects, any active SI, HI or AVH. PRNs overnight:    VS: Reviewed, within normal limits    Progress Toward Goals: Noticeable improvement    Psychiatric Review of Systems:  Behavior over the last 24 hours: improved  Sleep: normal  Appetite: adequate  Medication side effects: none verbalized  ROS: Complete review of systems is negative except as noted above.     Vital signs in last 24 hours:  Temp:  [97.8 °F (36.6 °C)-98 °F (36.7 °C)] 97.9 °F (36.6 °C)  HR:  [63-87] 75  Resp:  [16-18] 16  BP: (125-154)/(70-82) 125/72    Mental Status Exam:  Appearance:  age appropriate, casually dressed, dressed appropriately, adequate grooming   Behavior:  pleasant, cooperative, calm   Speech:  normal rate and volume, coherent   Mood:  "better"   Affect:  Brighter, less constricted, more reactive   Thought Process:  organized, logical, coherent, goal directed, normal rate of thoughts   Associations: intact associations   Thought Content:  no overt delusions, negative thoughts   Perceptual Disturbances: Denies auditory or visual hallucinations and Does not appear to be responding to internal stimuli   Risk Potential: Suicidal ideation - None at present  Homicidal ideation - None  Potential for aggression - No   Sensorium:  oriented to person, place, time/date, and situation   Memory:  recent and remote memory grossly intact Consciousness:  alert and awake   Attention/Concentration: attention span and concentration are age appropriate   Insight:  limited   Judgment: limited   Gait/Station: unstable gait, uses walker   Motor Activity: no abnormal movements     Current Medications:  Current Facility-Administered Medications   Medication Dose Route Frequency Provider Last Rate    acetaminophen  650 mg Oral [facility] Brandon Novak MD      albuterol  2 puff Inhalation Q6H PRN Brandon Novak MD      aluminum-magnesium hydroxide-simethicone  30 mL Oral Q4H PRN Denise Hernández MD      amLODIPine  10 mg Oral Daily Denise Hernández MD      ARIPiprazole  2 mg Oral Daily Chyrel Castleman, MD      aspirin  325 mg Oral Daily Denise Hernández MD      atorvastatin  20 mg Oral Daily With [address], PANeetuC      Diclofenac Sodium  2 g Topical 4x Daily Andria Sherman PA-C      DULoxetine  60 mg Oral BID Denise Hernández MD      famotidine  20 mg Oral Daily Denise Hernández MD      gabapentin  600 mg Oral TID Huan Haq MD      haloperidol lactate  5 mg Intramuscular Q4H PRN Max 4/day Denise Hernández MD      hydrOXYzine HCL  25 mg Oral Q6H PRN Max 4/day Denise Hernández MD      insulin lispro  1-6 Units Subcutaneous 4x Daily (AC & HS) Mercy Gaxiola DO      lidocaine  2 patch Topical Daily Huan Haq MD      LORazepam  1 mg Intramuscular Q6H PRN Max 3/day Denise Hernández MD      melatonin  3 mg Oral HS Denise Hernández MD      metFORMIN  500 mg Oral BID With Meals Andria Sherman PA-C      methocarbamol  500 mg Oral TID nAdria Sherman PA-C      mirtazapine  15 mg Oral HS RAMO Gtz      nicotine polacrilex  4 mg Oral Q2H PRN Denise Hernández MD      oxyCODONE  10 mg Oral BID PRN Brandon Novak MD      risperiDONE  0.25 mg Oral Q4H PRN Max 6/day Denise Hernández MD      risperiDONE  0.5 mg Oral Q4H PRN Max 3/day Denise Hernández MD      risperiDONE  1 mg Oral Q2H PRN Max 3/day Olga Thorne MD      tamsulosin  0.4 mg Oral Daily With [address], PANeetuC      traZODone  50 mg Oral HS Dontrell Bro PA-C      zolpidem  5 mg Oral HS PRN Olga Thorne MD         Behavioral Health Medications: all current active meds have been reviewed. Changes as in plan section above. Laboratory results:  I have personally reviewed all pertinent laboratory/tests results. Recent Results (from the past 48 hour(s))   Fingerstick Glucose (POCT)    Collection Time: 10/14/23  3:27 PM   Result Value Ref Range    POC Glucose 149 (H) 65 - 140 mg/dl   Fingerstick Glucose (POCT)    Collection Time: 10/14/23  4:16 PM   Result Value Ref Range    POC Glucose 142 (H) 65 - 140 mg/dl   Fingerstick Glucose (POCT)    Collection Time: 10/14/23  8:55 PM   Result Value Ref Range    POC Glucose 190 (H) 65 - 140 mg/dl   Fingerstick Glucose (POCT)    Collection Time: 10/15/23  7:30 AM   Result Value Ref Range    POC Glucose 110 65 - 140 mg/dl   Fingerstick Glucose (POCT)    Collection Time: 10/15/23 10:56 AM   Result Value Ref Range    POC Glucose 119 65 - 140 mg/dl   Fingerstick Glucose (POCT)    Collection Time: 10/15/23  4:20 PM   Result Value Ref Range    POC Glucose 164 (H) 65 - 140 mg/dl   Fingerstick Glucose (POCT)    Collection Time: 10/15/23  8:56 PM   Result Value Ref Range    POC Glucose 152 (H) 65 - 140 mg/dl   Fingerstick Glucose (POCT)    Collection Time: 10/16/23  7:23 AM   Result Value Ref Range    POC Glucose 115 65 - 140 mg/dl   Fingerstick Glucose (POCT)    Collection Time: 10/16/23 11:24 AM   Result Value Ref Range    POC Glucose 141 (H) 65 - 140 mg/dl        This note has been constructed using a voice recognition system. There may be translation, syntax, or grammatical errors. If you have any questions, please contact the dictating author.     Monserrat Gray MD PGY2

## 2023-10-16 NOTE — CASE MANAGEMENT
Patient signed ROIs for [facility] and for [facility] for OP psych/therapy. CM made referrals to both. CM spoke to the patient who was in agreement with d/c home tomorrow. The patient reported he would like to d/c at 11h. CM will arrange transport via [address]. Follow up appt Information placed onto the patient's AVS.    The patient was provided information on day programs and drop in centers.

## 2023-10-16 NOTE — PROGRESS NOTES
Pt.expressed upbeat demeanor re: tentative D/C tomorrow. 10/16/23 1000 10/16/23 1330   Activity/Group Checklist   Group Community meeting  (topic positive thoughts and journal prompts) Life Skills  (journaling)   Attendance Attended Attended   Attendance Duration (min) 31-45 46-60   Interactions Interacted appropriately  (Pt. stated a desire to improve on his physcial health,vague about how he plans to achieve this.) Interacted appropriately  (Pt. was provided with journal book and instructed in opportunities to [address] without words,(use of doodleing))   Affect/Mood Appropriate;Bright;Calm;Normal range Bright; Appropriate;Normal range   Goals Achieved Able to engage in interactions; Able to listen to others;Discussed coping strategies Able to engage in interactions; Identified feelings; Discussed coping strategies

## 2023-10-16 NOTE — PLAN OF CARE
Pt. Attending groups and displaying appropriate social enthusiasm when with peers.   Problem: Ineffective Coping  Goal: Participates in unit activities  Description: Interventions:  - Provide therapeutic environment   - Provide required programming   - Redirect inappropriate behaviors   Outcome: Progressing

## 2023-10-16 NOTE — NURSING NOTE
Patient calm, cooperative, visible on millieu, socializing with peers. He reports pain in lower back and recognizes it is chronic. He was offered Voltaren as scheduled and refused but took all other medications. He denies HI/SI/AVH, and reports some depression and little anxiety. Safety plan reviewed with patient.

## 2023-10-17 VITALS
OXYGEN SATURATION: 97 % | BODY MASS INDEX: 30.55 KG/M2 | SYSTOLIC BLOOD PRESSURE: 121 MMHG | DIASTOLIC BLOOD PRESSURE: 65 MMHG | WEIGHT: 194.67 LBS | HEART RATE: 75 BPM | TEMPERATURE: 98.6 F | RESPIRATION RATE: 16 BRPM | HEIGHT: 67 IN

## 2023-10-17 LAB — GLUCOSE SERPL-MCNC: 116 MG/DL (ref 65–140)

## 2023-10-17 PROCEDURE — 82948 REAGENT STRIP/BLOOD GLUCOSE: CPT

## 2023-10-17 PROCEDURE — 99238 HOSP IP/OBS DSCHRG MGMT 30/<: CPT | Performed by: STUDENT IN AN ORGANIZED HEALTH CARE EDUCATION/TRAINING PROGRAM

## 2023-10-17 RX ADMIN — DICLOFENAC SODIUM 2 G: 10 GEL TOPICAL at 08:39

## 2023-10-17 RX ADMIN — AMLODIPINE BESYLATE 10 MG: 10 TABLET ORAL at 08:36

## 2023-10-17 RX ADMIN — DULOXETINE HYDROCHLORIDE 60 MG: 60 CAPSULE, DELAYED RELEASE PELLETS ORAL at 08:37

## 2023-10-17 RX ADMIN — LIDOCAINE 2 PATCH: 700 PATCH TOPICAL at 08:37

## 2023-10-17 RX ADMIN — METFORMIN HYDROCHLORIDE 500 MG: 500 TABLET, FILM COATED ORAL at 08:36

## 2023-10-17 RX ADMIN — GABAPENTIN 600 MG: 300 CAPSULE ORAL at 08:36

## 2023-10-17 RX ADMIN — ARIPIPRAZOLE 2 MG: 2 TABLET ORAL at 08:37

## 2023-10-17 RX ADMIN — FAMOTIDINE 20 MG: 20 TABLET, FILM COATED ORAL at 08:37

## 2023-10-17 RX ADMIN — ACETAMINOPHEN 650 MG: 325 TABLET ORAL at 05:39

## 2023-10-17 RX ADMIN — METHOCARBAMOL 500 MG: 500 TABLET, FILM COATED ORAL at 08:36

## 2023-10-17 RX ADMIN — ASPIRIN 325 MG ORAL TABLET 325 MG: 325 PILL ORAL at 08:39

## 2023-10-17 NOTE — PLAN OF CARE
Problem: DISCHARGE PLANNING - CARE MANAGEMENT  Goal: Discharge to post-acute care or home with appropriate resources  Description: INTERVENTIONS:  - Conduct assessment to determine patient/family and health care team treatment goals, and need for post-acute services based on payer coverage, community resources, and patient preferences, and barriers to discharge  - Address psychosocial, clinical, and financial barriers to discharge as identified in assessment in conjunction with the patient/family and health care team  - Arrange appropriate level of post-acute services according to patient’s   needs and preference and payer coverage in collaboration with the physician and health care team  - Communicate with and update the patient/family, physician, and health care team regarding progress on the discharge plan  - Arrange appropriate transportation to post-acute venues  Outcome: Adequate for Discharge    The patient is cleared for d/c home today. CM arranged transport via [employer] for 11h. The patient is in agreement with his d/c plan. The patient has the following appts scheduled:    -Therapy, Debbie Altman Psych: 10/30/23 @ 8:00am  -PCP: 11/13/23 @ 3:40pm  -Medication Management, Debbie Altman Psych: 12/12/23 @ 12:00pm    The patient was also referred to [facility] services through [address].  Additional resources, including the crisis number and day programs, were provided to the patient on his AVS.

## 2023-10-17 NOTE — TREATMENT TEAM
10/17/23 0759   Team Meeting   Meeting Type Daily Rounds   Initial Conference Date 10/17/23   Team Members Present   Team Members Present Physician;Nurse;;Occupational Therapist   Physician Team Member Dr. Tre Quiñonez, Dr. [facility], [facility]   Nursing Team Member [address], 333 [facility] Management Team Member Jose ARRIAGA Team Member Aubrie Golden   Patient/Family Present   Patient Present No   Patient's Family Present No   Pharmacy: Ike Mcmahan    Patient is d/c to home today at 11h via [address]. All aftercare scheduled.

## 2023-10-17 NOTE — NURSING NOTE
Pt stated  he is ready for discharged today . Discharged instructions printed and reviewed , pt verbalized understanding . Pt escorted off the unit with staff. Pt left the unit with all his belongings.

## 2023-10-17 NOTE — PLAN OF CARE
Problem: Depression  Goal: Treatment Goal: Demonstrate behavioral control of depressive symptoms, verbalize feelings of improved mood/affect, and adopt new coping skills prior to discharge  Outcome: Progressing  Goal: Verbalize thoughts and feelings  Description: Interventions:  - Assess and re-assess patient's level of risk   - Engage patient in 1:1 interactions, daily, for a minimum of 15 minutes   - Encourage patient to express feelings, fears, frustrations, hopes   Outcome: Progressing  Goal: Refrain from harming self  Description: Interventions:  - Monitor patient closely, per order   - Supervise medication ingestion, monitor effects and side effects   Outcome: Progressing  Goal: Refrain from isolation  Description: Interventions:  - Develop a trusting relationship   - Encourage socialization   Outcome: Progressing  Goal: Refrain from self-neglect  Outcome: Progressing

## 2023-10-17 NOTE — DISCHARGE SUMMARY
and BPH and a past psychiatric history of major depressive disorder-severe, currently active without prior episode who presents to the unit voluntarily via 201 in the context of multiple psychosocial and financial stressors, chronic pain and additional medical issues for worsening depressive symptoms and active suicidal ideation with unspecified plan. Per ED crisis note by Brii Liz:  "Patient is a 65 yr old male with a hx of depression due to chronic pain. 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 Today, he continues to struggle with chronic pain, uses a walker, and is unable to work any more. He lives with his 15 yr old daughter, is  and allowed his x-wife with stay with him because she was going to have surgery and unable to do steps. As a result he lost food stamps due to a change in the family income. Patient was at his family doctor today, states that he is unable to live this way any more, and if he had a gun he would shoot self. He also wants to shoot the welfare man. He becomes very tearful, obviously overwhelmed with medical and financial problems. However, he does not want to be hospitalized because he has no one to care for his 15 yr old dtr. Patient received cymbalta from his PCP but does not have a psychiatrist."     Patient states that in 2019 he suffered an on the job accident caused him to lose his job. Patient states his he has been experiencing chronic pain ever since. Patient states that he has had to go on SSI disability has to ambulate with the assistance of a walker. Over the past year, patient reports worsening depression due to several psychosocial and financial stressors. Patient states that his estranged wife recently came from [facility] to live with him and their 15year-old daughter temporarily for an upcoming surgery.   Patient states that he required his estranged wife to pay $500 in rent and that when he subsequently reported this welfare office they said that his income was now too high and that they would be reducing his benefits. Patient states that he is struggling with paying his increasing right as it is as he only gets $1400 for SSI and that he is now unable to afford all of his bills. Patient also reports a reduction in his food stamps and no longer having access to home health care as additional stressors. Patient also says that recently his daughter said that she would now like to live with her mother as opposed to the patient which caused him additional distress. Patient also reports increased difficulty with ambulation including multiple, recurrent falls over the past few weeks and worsening pain symptoms. Due to these factors occurring over the past year, patient has been reporting worsening depression along with decreased sleep, decreased appetite, 13 pound unintentional weight loss, decreased energy, decreased concentration, anhedonia, feelings of guilt and hopelessness, and suicidal ideation. Patient states that the "last straw" occurred recently when his bank account became overdraft and he realized he had no money in his account. Patient says that it was this moment when he decided he wanted to commit suicide. He does not go into details regarding his plan but says that he was going to leave a note for his daughter prior. Patient says that he told his PCP of his plan the other day which led to his current admission. Regarding leah screening, patient denies any history of significant sleep deficit with increased energy, racing thoughts, pressured speech, increasing goal-directed activities or indiscrete/risky behaviors. Patient does report some worsening anxiety over the past year related to worries regarding multiple psychosocial and financial stressors. He denies any history of trauma or abuse as well as any PTSD related symptoms.        Patient denies any history of eating disorders or history of paranoia/delusions. Patient does report vague hypnagogic visual hallucinations in the form of seeing "shadows" when he is about to go to sleep. Patient denies seeing these images while fully awake though admits that when he is dozing off that they sometimes occur. Patient does not report any history of auditory hallucinations. Patient denies any history of suicide attempts or self-harm behaviors. Currently, patient reports continued passive death wish but contracts for safety on the unit and denies any active suicidal ideation or plan, homicidal ideation, auditory or visual hallucinations. 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    Depression     Diabetes mellitus ([address])     Hyperlipidemia     Hypertension     Type 2 diabetes mellitus without complication, without long-term current use of insulin ([address]) 10/16/2020     Past Surgical History:   Procedure Laterality Date    CYST REMOVAL  2017       Medications: All current active medications have been reviewed. Allergies:     No Known Allergies    Please refer to the initial H&P for full details. Vital signs in last 24 hours:    Temp:  [97.7 °F (36.5 °C)-98.6 °F (37 °C)] 98.6 °F (37 °C)  HR:  [] 75  Resp:  [16] 16  BP: (121-168)/(65-84) 121/65      Intake/Output Summary (Last 24 hours) at 10/17/2023 1335  Last data filed at 10/17/2023 0830  Gross per 24 hour   Intake 300 ml   Output --   Net 300 ml         Hospital Course: On admission, Marv was admitted to the inpatient psychiatric unit and started on Behavioral Health checks every 7 minutes. During the hospitalization he was encouraged to attend individual therapy, group therapy, milieu therapy and occupational therapy. Upon admission he was seen by medical service for medical clearance for inpatient treatment and medical follow up.     Home medications as well as PDMP reviewed, patient continued on Cymbalta 60 mg twice daily for mood, Remeron 7.5 mg nightly for mood and insomnia, gabapentin 600 mg 3 times daily for anxiety and pain, and trazodone 50 mg nightly for insomnia. Zoloft discontinued upon admission. Abilify 2 mg daily added for mood adjunct. Medications titrated to desired efficacy. No SI/HI verbalized on discharge, patient also denies access to guns. Counseled on the use of the crisis hotline as well as resources if feeling unsafe. Marv is in agreement to reach out if needed. Protective factors also discussed, patient reports that daughter is major factor in moving forward. Treatment team and patient in agreement to discharge today with follow-up resources. Marv's medications were titrated as appropriate until discharge, including:    Abilify 2 mg daily for mood adjunct  Cymbalta 60 mg twice daily for mood  Gabapentin 600 mg 3 times daily for anxiety and pain  Mirtazapine 15 mg nightly for mood and insomnia  Trazodone 50 mg nightly for insomnia    Prior to beginning of treatment medications risks and benefits and possible side effects including risk of cardiovascular events in elderly related to treatment with antipsychotic medications and risk of suicidality and serotonin syndrome related to treatment with antidepressants were reviewed with Marv. Marv verbalized understanding and agreement for treatment. Marv tolerated these medications with no acute side effects. The patient's mood brightened over the course of treatment, and he was seen in [facility] interacting appropriately with peers. Marv did not demonstrate dangerous behavior to self or others during his inpatient stay. On the day of discharge, he denied suicidal ideation, intent or plan at the time of discharge and denied homicidal ideation, intent or plan at the time of discharge. There was no overt psychosis at the time of discharge.  he was participating appropriately in [employer] at the time of discharge. Behavior was appropriate on the unit at the time of discharge. Sleep and appetite were improved. Since he was doing well at the end of the hospitalization, treatment team felt that he could be safely discharged to outpatient care. The outpatient follow up with a psychiatrist was arranged by the unit  upon discharge. I reviewed with Marv the importance of compliance with medications and outpatient treatment after discharge., I discussed the medication regimen and possible side effects of the medications with Marv prior to discharge. At the time of discharge he was tolerating psychiatric medications. , I discussed outpatient follow up with Marv., I reviewed with Marv crisis plan and safety plan upon discharge., Marv was competent to understand risks and benefits of withholding information and risks and benefits of his actions. , and Marv agreed to abstain from drug and alcohol use after discharge. The patient understands the importance of taking their medications and attending their outpatient appointments. The patient knows that if there are concerns for safety to utilize their coping skills and can call the suicide hotline as well as 911 if there are concerns for safety. Behavioral Health Medications: all current active meds have been reviewed and continue current psychiatric medications. Discharge on Two Antipsychotic Medications : No    Labs/Imaging:   I have personally reviewed all pertinent laboratory/tests results.     Mental Status at time of Discharge:   Appearance:  age appropriate, dressed appropriately, looks stated age, sitting comfortably in chair, adequate hygiene and grooming, cooperative with interview, good eye contact    Behavior:  cooperative   Speech:  normal rate, normal volume, normal pitch, fluent, clear, and coherent   Mood:  "good"   Affect:  mood-congruent   Language Within normal limits   Thought Process:  organized, logical, goal directed, normal rate of thoughts   Associations: intact associations      Thought Content:  No verbalized delusions   Perceptual Disturbances: Denies auditory or visual hallucinations and Does not appear to be responding to internal stimuli   Risk Potential: Denies suicidal or homicidal ideation, plan, or intent   Sensorium:  person, place, time, and current situation   Cognition:  Grossly intact   Consciousness:  alert and awake   Attention: attention span and concentration were age appropriate   Insight:  fair   Judgment: fair   Intellect appears to be of average intelligence   Gait/Station: normal gait/station   Motor Activity: no abnormal movements     Suicide/Homicide Risk Assessment:  Risk of Harm to Self:   The following ratings are based on assessment at the time of discharge  Demographic risk factors include: age: over 48 or older  Historical Risk Factors include: history of depression  Current Specific Risk Factors include: recent inpatient psychiatric admission - being discharged today  Protective Factors: no current suicidal ideation  Weapons/Firearms: none and no firearms. The following steps have been taken to ensure weapons are properly secured: not applicable  Based on today's assessment, Marv presents the following risk of harm to self: none    Risk of Harm to Others: The following ratings are based on assessment at the time of discharge  Demographic Risk Factors include: none. Historical Risk Factors include: none. Current Specific Risk Factors include: none  Protective Factors: no current homicidal ideation  Weapons/Firearms: none.  The following steps have been taken to ensure weapons are properly secured: not applicable  Based on today's assessment, Marv presents the following risk of harm to others: none    The following interventions are recommended: outpatient follow up with a psychiatrist    Discharge Medications:  See list above, as well as the after visit summary for all reconciled discharge medications provided to patient and family. Discharge instructions/Information to patient and family:   See after visit summary for information provided to patient and family. Provisions for Follow-Up Care:  See after visit summary for information related to follow-up care and any pertinent home health orders. RAMO Martinez 10/17/23      This note was completed in part utilizing Dragon dictation Software. Grammatical, translation, syntax errors, random word insertions, spelling mistakes, and incomplete sentences may be an occasional consequence of this system secondary to software limitations with voice recognition, ambient noise, and hardware issues. If you have any questions or concerns about the content, text, or information contained within the body of this dictation, please contact the provider for clarification.

## 2023-10-18 ENCOUNTER — APPOINTMENT (OUTPATIENT)
Dept: PHYSICAL THERAPY | Facility: CLINIC | Age: 63
End: 2023-10-18
Payer: MEDICARE

## 2023-10-20 ENCOUNTER — PATIENT OUTREACH (OUTPATIENT)
Dept: FAMILY MEDICINE CLINIC | Facility: CLINIC | Age: 63
End: 2023-10-20

## 2023-10-20 NOTE — PROGRESS NOTES
Per chart review this seems Pt was discharged from the hospital, also, has been scheduled for med management at the [facility] location on 12/12 at 12 pm with Virginia Baumann as well as scheduled with Nelda Leos for therapy on 10/30 at 8 am at the [facility] location. After chart review JEROD ALMARAZ did place a call to Pt and introduced herself. Pt expressed that he is doing better and stable. Also, Pt is aware about the information above. JEROD ALMARAZ asked Pt if there is any other social needs that he would like to share in order to assist him with. Pt denied any other social needs at this time. JEROD ALMARAZ explained Pt that he can reach the [facility] for further support, and also, this referral will be closed today. Pt seems understanding and thankful for the [facility] assistance. JEROD  is closing this referral due to there is no social needs at this time. [facility] is remain available for further assistance as needed.

## 2023-10-23 ENCOUNTER — OFFICE VISIT (OUTPATIENT)
Dept: PHYSICAL THERAPY | Facility: CLINIC | Age: 63
End: 2023-10-23
Payer: MEDICARE

## 2023-10-23 DIAGNOSIS — M54.42 CHRONIC LEFT-SIDED LOW BACK PAIN WITH LEFT-SIDED SCIATICA: ICD-10-CM

## 2023-10-23 DIAGNOSIS — M51.26 PROTRUSION OF LUMBAR INTERVERTEBRAL DISC: ICD-10-CM

## 2023-10-23 DIAGNOSIS — G89.29 CHRONIC LEFT-SIDED LOW BACK PAIN WITH LEFT-SIDED SCIATICA: ICD-10-CM

## 2023-10-23 DIAGNOSIS — M54.16 RADICULOPATHY, LUMBAR REGION: Primary | ICD-10-CM

## 2023-10-23 DIAGNOSIS — M47.817 FACET ARTHROPATHY, LUMBOSACRAL: ICD-10-CM

## 2023-10-23 DIAGNOSIS — M48.061 LUMBAR FORAMINAL STENOSIS: ICD-10-CM

## 2023-10-23 PROCEDURE — 97112 NEUROMUSCULAR REEDUCATION: CPT | Performed by: PHYSICAL THERAPIST

## 2023-10-23 PROCEDURE — 97110 THERAPEUTIC EXERCISES: CPT | Performed by: PHYSICAL THERAPIST

## 2023-10-23 PROCEDURE — 97530 THERAPEUTIC ACTIVITIES: CPT | Performed by: PHYSICAL THERAPIST

## 2023-10-23 NOTE — PROGRESS NOTES
Daily Note     Today's date: 10/23/2023  Patient name: Angelic Ramos  : 1960  MRN: [id number]  Referring provider: Yahir Armenta MD  Dx:   Encounter Diagnosis     ICD-10-CM    1. Radiculopathy, lumbar region  M54.16       2. Chronic left-sided low back pain with left-sided sciatica  M54.42     G89.29       3. Facet arthropathy, lumbosacral  M47.817       4. Protrusion of lumbar intervertebral disc  M51.26       5. Lumbar foraminal stenosis  M48.061                      Subjective: Pt reports he is doing a little better today, but was in the hospital last week for depression. His back is still stiff and sore, but he has been using a cream from his Doctor that has been helping. Objective: See treatment diary below      Assessment:  Pt does well w addition of exercises and progression of today's session. He has difficulty w trunk rotation during UTR and needs to complete seated to avoid excessive hip compensation. He does well w trial of open book str. Patient demonstrated fatigue post treatment and would benefit from continued PT. Plan: Continue per plan of care. Progress treatment as tolerated.        Precautions: stenosis    Date 10/10 10/23           Visit # IE 2           FOTO IE             Re-eval IE              Manuals 10/10 10/23           Lumbar gapping SF Gr III                                                   Neuro Re-Ed             TA bracing             Supine Marches  20x           Clams  30x Purple TB                                                               Ther Ex             Bike  7'           SKTC 20" ea            Piriformis str 20" ea 3x20"           Hamstring str             Leg press  3x10 125#           SLR             SB rolling             Open Book  10x5"           Ther Activity             Squats/STS  10x           Pallof/UTR  2x10 8#  UTR seated           Gait Training                                       Modalities

## 2023-10-24 ENCOUNTER — OFFICE VISIT (OUTPATIENT)
Dept: DENTISTRY | Facility: CLINIC | Age: 63
End: 2023-10-24

## 2023-10-24 VITALS — TEMPERATURE: 97.5 F | SYSTOLIC BLOOD PRESSURE: 117 MMHG | HEART RATE: 92 BPM | DIASTOLIC BLOOD PRESSURE: 79 MMHG

## 2023-10-24 DIAGNOSIS — Z01.21 ENCOUNTER FOR DENTAL EXAMINATION AND CLEANING WITH ABNORMAL FINDINGS: Primary | ICD-10-CM

## 2023-10-24 PROCEDURE — D0120 PERIODIC ORAL EVALUATION - ESTABLISHED PATIENT: HCPCS | Performed by: DENTAL HYGIENIST

## 2023-10-24 PROCEDURE — D1110 PROPHYLAXIS - ADULT: HCPCS | Performed by: DENTAL HYGIENIST

## 2023-10-24 NOTE — DENTAL PROCEDURE DETAILS
Peg Yoon presents for a Periodic exam. Verbal consent for treatment given in addition to the forms. Reviewed health history - Patient is ASA II  Consents signed: Yes     Perio: Generalized, Slight bleeding, and Recession  Pain Scale: 0  Caries Assessment: Medium  Radiographs: None  EO/IO/OCS:  WNL     Oral Hygiene instruction reviewed and given. OHI:  Fair/Poor  ---Lt to mod calc and plaque  ---Cavitron, handscaled, polish, floss  Recommended Hygiene recall visits with Marv. Treatment Plan:  1.  6mrc w/ BWs  2. Caries control: Watch overhang between 3, 4  ---Preauth  for LPD - check to see if resent  3. Occlusal evaluation:   Missing teeth  5. Case Difficulty Type 1    Prognosis is Good.   Referrals needed: No  Exam:  Dr. Neha Ashford:  final imps for LPD - 60 min - check pre-auth  NV2:  6mrc w/ Bws - 50 min

## 2023-10-26 ENCOUNTER — OFFICE VISIT (OUTPATIENT)
Dept: PHYSICAL THERAPY | Facility: CLINIC | Age: 63
End: 2023-10-26
Payer: MEDICARE

## 2023-10-26 DIAGNOSIS — M54.16 RADICULOPATHY, LUMBAR REGION: ICD-10-CM

## 2023-10-26 DIAGNOSIS — G89.29 CHRONIC LEFT-SIDED LOW BACK PAIN WITH LEFT-SIDED SCIATICA: ICD-10-CM

## 2023-10-26 DIAGNOSIS — M47.817 FACET ARTHROPATHY, LUMBOSACRAL: ICD-10-CM

## 2023-10-26 DIAGNOSIS — M51.26 PROTRUSION OF LUMBAR INTERVERTEBRAL DISC: ICD-10-CM

## 2023-10-26 DIAGNOSIS — M54.42 CHRONIC LEFT-SIDED LOW BACK PAIN WITH LEFT-SIDED SCIATICA: ICD-10-CM

## 2023-10-26 DIAGNOSIS — M48.061 LUMBAR FORAMINAL STENOSIS: Primary | ICD-10-CM

## 2023-10-26 PROCEDURE — 97112 NEUROMUSCULAR REEDUCATION: CPT | Performed by: PHYSICAL THERAPIST

## 2023-10-26 PROCEDURE — 97110 THERAPEUTIC EXERCISES: CPT | Performed by: PHYSICAL THERAPIST

## 2023-10-26 NOTE — PROGRESS NOTES
Daily Note     Today's date: 10/26/2023  Patient name: Jada Minor  : 1960  MRN: [id number]  Referring provider: Sandra Muse MD  Dx:   Encounter Diagnosis     ICD-10-CM    1. Lumbar foraminal stenosis  M48.061       2. Radiculopathy, lumbar region  M54.16       3. Chronic left-sided low back pain with left-sided sciatica  M54.42     G89.29       4. Facet arthropathy, lumbosacral  M47.817       5. Protrusion of lumbar intervertebral disc  M51.26                      Subjective: Pt reports he is feeling a little better today. Objective: See treatment diary below      Assessment:  Pt does well w today's session but is challenged w squatting and leg press. He has lots of stiffness completing piriformis str and open book str. He does well w s/l clamshell progression. Patient demonstrated fatigue post treatment and would benefit from continued PT. Plan: Continue per plan of care. Progress treatment as tolerated.        Precautions: stenosis    Date 10/10 10/23 10/26          Visit # IE 2 3          FOTO IE             Re-eval IE              Manuals 10/10 10/23 10/26          Lumbar gapping SF Gr III                                                   Neuro Re-Ed             TA bracing             Supine Marches  20x 20x          Clams  30x Purple TB 20x s/l purple TB                                                              Ther Ex             Bike  7' 6'          SKTC 20" ea            Piriformis str 20" ea 3x20" 3x20"          Hamstring str             Leg press  3x10 125# 2x15 125#          SLR             SB rolling             Open Book  10x5" 5x10"          Ther Activity             Squats/STS  10x 2x10          Pallof/UTR  2x10 8#  UTR seated 2x10 8#  UTR seated          Gait Training                                       Modalities

## 2023-10-27 ENCOUNTER — TELEPHONE (OUTPATIENT)
Dept: PSYCHIATRY | Facility: CLINIC | Age: 63
End: 2023-10-27

## 2023-10-27 NOTE — TELEPHONE ENCOUNTER
Writer contacted patient to remind him of his np appointment on [email or web address]- patient stated he will be there. Writer reminded him to be there 15 minutes early and he stated he will be there right after he drops hid daughter at school.

## 2023-10-30 ENCOUNTER — APPOINTMENT (OUTPATIENT)
Dept: PHYSICAL THERAPY | Facility: CLINIC | Age: 63
End: 2023-10-30
Payer: MEDICARE

## 2023-10-30 ENCOUNTER — SOCIAL WORK (OUTPATIENT)
Dept: BEHAVIORAL/MENTAL HEALTH CLINIC | Facility: CLINIC | Age: 63
End: 2023-10-30
Payer: MEDICARE

## 2023-10-30 DIAGNOSIS — R45.851 SUICIDAL IDEATION: Primary | ICD-10-CM

## 2023-10-30 DIAGNOSIS — F11.20 CONTINUOUS OPIOID DEPENDENCE (HCC): ICD-10-CM

## 2023-10-30 DIAGNOSIS — F41.1 GAD (GENERALIZED ANXIETY DISORDER): ICD-10-CM

## 2023-10-30 DIAGNOSIS — F32.2 CURRENT SEVERE EPISODE OF MAJOR DEPRESSIVE DISORDER WITHOUT PSYCHOTIC FEATURES WITHOUT PRIOR EPISODE (HCC): ICD-10-CM

## 2023-10-30 PROCEDURE — 90791 PSYCH DIAGNOSTIC EVALUATION: CPT | Performed by: SOCIAL WORKER

## 2023-10-30 NOTE — PSYCH
Behavioral Health Psychotherapy Assessment    Date of Initial Psychotherapy Assessment: 10/30/23  Referral Source:Behavioral health unit  Has a release of information been signed for the referral source? NA    Preferred Name: Brannon Croft  Preferred Pronouns: He/him  YOB: 1960 Age: 65 y.o. Sex assigned at birth: male   Gender Identity: male  Race:   Preferred Language: English    Emergency Contact:  Full Name: Braxton Perez  Relationship to Client: mother  Contact information: in cell phone    Primary Care Physician:  Nicole Pardo, [address] [place] [address] Drive  [phone]  Has a release of information been signed? No    Physical Health History:  Past surgical procedures: please see medical record  Do you have a history of any of the following: diabetes  Do you have any mobility issues? Yes, describe: uses a cane    Relevant Family History:  Cancer and diabetes    Presenting Problem (What brings you in?)  I have depression and anxiety. Patient is dealing with poor finances and with medical issues which keep him from working. His estranged wife lives with him and his 15year old daughter. He shared he is anxious over not having good transportation, food stamp issues etc. I will refer to  [facility] for these services and I will have him assigned to a doctor here. Mental Health Advance Directive:  Do you currently have a Mental Health Advance Directive? no    Diagnosis:  MDDR severe, SAHRA, continuous opioid dependence    Initial Assessment:     Current Mental Status:    Appearance: appropriate, casual and neat      Behavior/Manner: cooperative      Affect/Mood:  Blue and depressed    Speech:  Normal    Sleep:   Insomnia    Oriented to: oriented to self, oriented to place and oriented to time       Clinical Symptoms    Depression: yes      Depression Symptoms: depressed mood, restlessness, serious loss of interest in things, suicidal ideation, excessive crying, social isolation, fatigue, indecision, poor concentration, recent weight loss, sleep disturbance, insomnia and irritable      Anxiety Symptoms: irritable and restlessness      Have you ever been assaultive to others or the environment: No      Have you ever been self-injurious: No      Counseling History:  Previous Counseling or Treatment  (Mental Health or Drug & Alcohol): Yes    Previous Counseling Details:  Received counseling in the past. He stated they moved and never called him back. Was in [facility] for 7 days. Have you previously taken psychiatric medications: Yes    Previous Medications Attempted:  Does not remeber. Suicide Risk Assessment  Have you ever had a suicide attempt: No    Have you had incidents of suicidal ideation: Yes    Are you currently experiencing suicidal thoughts: No    Additional Suicide Risk Information: They fluctuate with if issues arise.      Substance Abuse/Addiction Assessment:  Alcohol: No    Heroin: No    Fentanyl: No    Opiates: No    Cocaine: No    Amphetamines: No    Hallucinogens: No    Club Drugs: No    Benzodiazepines: No    Other Rx Meds: No    Marijuana: No    Tobacco/Nicotine: No    Have you experienced blackouts as a result of substance use: No    Have you had any periods of abstinence: No    Have you experienced symptoms of withdrawal: No    Have you ever overdosed on any substances?: No    Are you currently using any Medication Assisted Treatment for Substance Use: No      Compulsive Behaviors:  Compulsive Behavior Information:  N/a    Disordered Eating History:  Do you have a history of disordered eating: No      Social Determinants of Health:    SDOH:  Financial instability, social isolation, unemployment/underemployment, education/low literacy, medical cost barrier, food insecurity and stress    Trauma and Abuse History:    Have you ever been abused: No      Legal History:    Have you ever been arrested  or had a DUI: No      Have you been incarcerated: No Are you currently on parole/probation: No      Any current Children and Youth involvement: No      Any pending legal charges: No      Relationship History:    Current marital status:       Natural Supports:  Other    Other natural supports:  He shared he gets home care and ex lives with him. Relationship History:  My ex brings up issues from the past which upset me. Employment History    Are you currently employed: No      Currently seeking employment: No      Longest period of employment:  Construction    Future work goals: On disability    Sources of income/financial support:  Social Security Disability (SSDI)     History:      Status: no history of [address] duty  Educational History:     Have you ever been diagnosed with a learning disability: No      Highest level of education:  Other    Other education: 7th grade    Have you ever had an IEP or 504-plan: No      Do you need assistance with reading or writing: Yes      Reading/writing assistance:  Shared he has some issues with this.     Recommended Treatment:     Psychotherapy:  Individual sessions    Frequency:  2 times    Session frequency:  Monthly      Visit start and stop times:    10/30/23

## 2023-10-30 NOTE — BH TREATMENT PLAN
Outpatient Behavioral Health Psychotherapy Treatment Plan    Aleja Queen  1960     Date of Initial Psychotherapy Assessment: 10/30/2023  Date of Current Treatment Plan: 10/30/23  Treatment Plan Target Date: 04/25/2024  Treatment Plan Expiration Date: 04/25/2024    Diagnosis:   Depression and anxiety    Area(s) of Need: please see below    Long Term Goal 1 (in the client's own words): I need help managing my anxiety and my depression. Stage of Change: Preparation    Target Date for completion: 04/25/2024     Anticipated therapeutic modalities:mindfulness and CBT     People identified to complete this goal: myself with the help of my therapist.       Objective 1: (identify the means of measuring success in meeting the objective): My symptoms will be manageable. Objective 2: (identify the means of measuring success in meeting the objective): n/a      Long Term Goal 2 (in the client's own words): I need some social work help with assistance, food stamps, IC etc.     Stage of Change: Preparation    Target Date for completion: 04/25/2024     Anticipated therapeutic modalities: I will have Mono Rodríguez our  him. People identified to complete this goal: myself with the help of my therapist and the . Objective 1: (identify the means of measuring success in meeting the objective): They will help me get . Objective 2: (identify the means of measuring success in meeting the objective): n/a     Long Term Goal 3 (in the client's own words):     Stage of Change:     Target Date for completion:      Anticipated therapeutic modalities:      People identified to complete this goal:       Objective 1: (identify the means of measuring success in meeting the objective):       Objective 2: (identify the means of measuring success in meeting the objective):       I am currently under the care of a [facility] psychiatric provider: no    My [facility] [place] psychiatric provider is: will be assigned    I am currently taking psychiatric medications: Yes, as prescribed    I feel that I will be ready for discharge from mental health care when I reach the following (measurable goal/objective): when my symptoms are more manageable. For children and adults who have a legal guardian:   Has there been any change to custody orders and/or guardianship status? NA. If yes, attach updated documentation. I have created my Crisis Plan and have been offered a copy of this plan    [address]: Diagnosis and Treatment Plan explained to [address] acknowledges an understanding of their diagnosis. Luis Eduardo Talita agrees to this treatment plan.     I have been offered a copy of this Treatment Plan. yes

## 2023-10-30 NOTE — BH CRISIS PLAN
Client Name: Sarah Kirkpatrick       Client YOB: 1960  : 1960    Treatment Team (include name and contact information):     Psychotherapist: luis Boles    Psychiatrist: will be assigned   Release of information completed: no/na    "    Release of information completed: Other (Specify Role):     Release of information completed: Other (Specify Role):    Release of information completed:     Healthcare Provider  Vinny Santiago MD  [address] [facility]  [phone]    Type of Plan   * Child plans (children 15 yo and younger) must be completed and signed by the child's legal guardian   * Plans for all individuals 15 yo and above must be signed by the client. Plan Type: adolescent/adult (15 and over) Initial      My Personal Strengths are (in the client's own words):  "Good dad, friendly"  The stressors and triggers that may put me at risk are:  My poor health, my poor finances and my ex living with me. Coping skills I can use to keep myself calm and safe: Other (describe)Amish, on my phone, talk to a friend, therapy    Coping skills/supports I can use to maintain abstinence from substance use:   Not Applicable    The people that provide me with help and support: (Include name, contact, and how they can help)   Support person #1: mom    * Phone number: in cell phone    * How can they help me? Support and guidanc   Support person #2:    * Phone number:     * How can they help me? Support person #3:     * Phone number:     * How can they help me?      In the past, the following has helped me in times of crisis:    Other: Amish, calling a friend or mother, my phone      If it is an emergency and you need immediate help, call     If there is a possibility of danger to yourself or others, call the following crisis hotline resources:     Adult Crisis Numbers  Suicide Prevention Hotline - Dial   [facility]: [address]: [address]: [address]: [phone]  [address] [place] [address] Street: [phone]  [facility]: [address]  [address]: [address]: [phone] (daytime). [phone] (after hours, weekends, holidays)     Child/Adolescent Crisis Numbers   [facility]: [address]: [phone]   [facility]: [phone]   [address]: [phone]    Please note: Some [facility] do not have a separate number for Child/Adolescent specific crisis. If your county is not listed under Child/Adolescent, please call the adult number for your county     National Talk to Text Line   [facility] [employer] - [phone]    In the event your feelings become unmanageable, and you cannot reach your support system, you will call 911 immediately or go to the nearest hospital emergency room.

## 2023-10-31 ENCOUNTER — TELEPHONE (OUTPATIENT)
Dept: FAMILY MEDICINE CLINIC | Facility: CLINIC | Age: 63
End: 2023-10-31

## 2023-10-31 DIAGNOSIS — G89.29 CHRONIC LEFT-SIDED LOW BACK PAIN WITH LEFT-SIDED SCIATICA: ICD-10-CM

## 2023-10-31 DIAGNOSIS — M54.42 CHRONIC BILATERAL LOW BACK PAIN WITH LEFT-SIDED SCIATICA: ICD-10-CM

## 2023-10-31 DIAGNOSIS — F11.20 CONTINUOUS OPIOID DEPENDENCE (HCC): ICD-10-CM

## 2023-10-31 DIAGNOSIS — G89.29 CHRONIC BILATERAL LOW BACK PAIN WITH LEFT-SIDED SCIATICA: ICD-10-CM

## 2023-10-31 DIAGNOSIS — M62.838 NECK MUSCLE SPASM: ICD-10-CM

## 2023-10-31 DIAGNOSIS — E11.9 TYPE 2 DIABETES MELLITUS WITHOUT COMPLICATION, WITHOUT LONG-TERM CURRENT USE OF INSULIN (HCC): ICD-10-CM

## 2023-10-31 DIAGNOSIS — G89.4 CHRONIC PAIN SYNDROME: ICD-10-CM

## 2023-10-31 DIAGNOSIS — M54.42 CHRONIC LEFT-SIDED LOW BACK PAIN WITH LEFT-SIDED SCIATICA: ICD-10-CM

## 2023-10-31 RX ORDER — OXYCODONE HYDROCHLORIDE 10 MG/1
10 TABLET ORAL 3 TIMES DAILY
Qty: 90 TABLET | Refills: 0 | Status: SHIPPED | OUTPATIENT
Start: 2023-10-31

## 2023-10-31 RX ORDER — LANCETS 33 GAUGE
EACH MISCELLANEOUS DAILY
Qty: 100 EACH | Refills: 1 | Status: SHIPPED | OUTPATIENT
Start: 2023-10-31

## 2023-10-31 NOTE — TELEPHONE ENCOUNTER
Pt is requesting medication refills on,          Diclofenac Sodium (VOLTAREN) 1 %         OneTouch Delica Lancets 34A MISC   oxyCODONE (ROXICODONE) 10 MG TABS

## 2023-11-02 ENCOUNTER — TELEPHONE (OUTPATIENT)
Dept: PSYCHIATRY | Facility: CLINIC | Age: 63
End: 2023-11-02

## 2023-11-02 NOTE — TELEPHONE ENCOUNTER
Case Management received a message from Ryan Emmanuel LCSW, to assist with referring patient to [facility]. Pt was a hospital d/c from [facility] CM saw that the inpatient SW referred patient to [facility] to obtain a . CM outreached to Fair at [phone]. Fair confirmed that the hospital did send over the referral to obtain an ICM. Marv stated that the [facility] have not reached out to him yet. Writer encouraged Pt to reach out to them to see where he is on their wait list, Pt stated that he had the number. Writer also contacted [facility] at [phone] to make sure referral was received and his place on the wait list. Writer left a message, requested a call back.

## 2023-11-06 NOTE — TELEPHONE ENCOUNTER
contacted [facility] at [phone] to make sure referral was received and his place on the wait list.  left a message, requested a call back.

## 2023-11-13 ENCOUNTER — OFFICE VISIT (OUTPATIENT)
Dept: FAMILY MEDICINE CLINIC | Facility: CLINIC | Age: 63
End: 2023-11-13

## 2023-11-13 VITALS
WEIGHT: 199 LBS | HEIGHT: 67 IN | TEMPERATURE: 98.3 F | BODY MASS INDEX: 31.23 KG/M2 | DIASTOLIC BLOOD PRESSURE: 78 MMHG | OXYGEN SATURATION: 98 % | RESPIRATION RATE: 16 BRPM | SYSTOLIC BLOOD PRESSURE: 122 MMHG | HEART RATE: 81 BPM

## 2023-11-13 DIAGNOSIS — F32.2 CURRENT SEVERE EPISODE OF MAJOR DEPRESSIVE DISORDER WITHOUT PSYCHOTIC FEATURES WITHOUT PRIOR EPISODE (HCC): Primary | ICD-10-CM

## 2023-11-13 PROCEDURE — 3074F SYST BP LT 130 MM HG: CPT | Performed by: FAMILY MEDICINE

## 2023-11-13 PROCEDURE — 3078F DIAST BP <80 MM HG: CPT | Performed by: FAMILY MEDICINE

## 2023-11-13 PROCEDURE — 99213 OFFICE O/P EST LOW 20 MIN: CPT | Performed by: FAMILY MEDICINE

## 2023-11-13 NOTE — PROGRESS NOTES
Name: Rene Emmanuel      : 1960      MRN: [id number]  Encounter Provider: Brook Babinski, MD  Encounter Date: 2023   Encounter department: [address] [facility]     1. Current severe episode of major depressive disorder without psychotic features without prior episode [facility])  Assessment & Plan:  Hospitalization for depression and suicidal intent 10/10/2023  Medications on discharge:  Abilify 2 mg daily for mood adjunct  Cymbalta 60 mg twice daily for mood  Gabapentin 600 mg 3 times daily for anxiety and pain  Mirtazapine 15 mg nightly for mood and insomnia  Trazodone 50 mg nightly for insomnia           Subjective      HPI  Rene Emmanuel is a 65 y.o. male  who presented to the office today for follow-up after hospitalization on 10/10/2023 for suicidal ideation and severe depression. Patient was hospitalized for 7 days. Patient states today that he is overall feeling much better, he still has his personal and financial stressors but is coping better at least.  He states while hospitalized he felt well supported by the staff present, and while observing other patients noted that everyone has problems and some people have more problems and he does. He is taking his medication as prescribed. He denies any suicidal ideation at this time    The following portions of the patient's history were reviewed and updated as appropriate: allergies, current medications, past family history, past medical history, past social history, past surgical history and problem list.    Review of Systems   Constitutional:  Negative for activity change, appetite change, chills and fever. HENT:  Negative for congestion, rhinorrhea and sore throat. Respiratory:  Negative for cough and shortness of breath. Cardiovascular:  Negative for chest pain. Gastrointestinal:  Negative for diarrhea, nausea and vomiting.    Musculoskeletal:  Positive for back pain and gait problem. Skin:  Negative for rash. Neurological:  Negative for dizziness and headaches. Psychiatric/Behavioral:  Negative for sleep disturbance and suicidal ideas. The patient is not nervous/anxious. Current Outpatient Medications on File Prior to Visit   Medication Sig   • albuterol (2.5 mg/3 mL) 0.083 % nebulizer solution Take 3 mL (2.5 mg total) by nebulization every 6 (six) hours as needed for wheezing or shortness of breath   • albuterol (ProAir HFA) 90 mcg/act inhaler Inhale 2 puffs every 6 (six) hours as needed for wheezing   • aluminum-magnesium hydroxide-simethicone (MAALOX) 200-200-20 MG/5ML SUSP Take 15 mL by mouth 4 (four) times a day (before meals and at bedtime) (Patient not taking: Reported on 6/15/2023)   • amLODIPine (NORVASC) 10 mg tablet Take 1 tablet (10 mg total) by mouth daily   • amLODIPine (NORVASC) 10 mg tablet Take 1 tablet (10 mg total) by mouth daily Do not start before October 17, 2023. • ARIPiprazole (ABILIFY) 2 mg tablet Take 1 tablet (2 mg total) by mouth daily Do not start before October 17, 2023. • aspirin 325 mg tablet Take 1 tablet (325 mg total) by mouth daily   • aspirin 325 mg tablet Take 1 tablet (325 mg total) by mouth daily Do not start before October 17, 2023.    • atorvastatin (LIPITOR) 20 mg tablet Take 1 tablet (20 mg total) by mouth daily   • atorvastatin (LIPITOR) 20 mg tablet Take 1 tablet (20 mg total) by mouth daily with dinner   • Bioflavonoid Products (RA Vitamin C CR) TBCR take 1 tablet by mouth twice a day for 14 days (Patient not taking: Reported on 6/15/2023)   • Blood Glucose Monitoring Suppl (OneTouch Verio) w/Device KIT Use daily   • Blood Glucose Monitoring Suppl KIT Use in the morning Please give test strips and lancets  Dx. E11.9   • Blood Pressure KIT Use in the morning With appropriate size cuff   • Blood Pressure Monitoring (Blood Pressure Monitor/Arm) EMILY Use daily   • Cholecalciferol (D3 PO) Take by mouth daily   • Diclofenac Sodium (VOLTAREN) 1 % Apply 2 g topically 4 (four) times a day   • Diclofenac Sodium (VOLTAREN) 1 % Apply 2 g topically 4 (four) times a day as needed (low back or neck pain)   • DULoxetine (CYMBALTA) 60 mg delayed release capsule Take 1 capsule (60 mg total) by mouth 2 (two) times a day   • DULoxetine (CYMBALTA) 60 mg delayed release capsule Take 1 capsule (60 mg total) by mouth 2 (two) times a day   • famotidine (PEPCID) 20 mg tablet Take 1 tablet (20 mg total) by mouth daily   • famotidine (PEPCID) 20 mg tablet Take 1 tablet (20 mg total) by mouth daily Do not start before October 17, 2023. • fluticasone (FLONASE) 50 mcg/act nasal spray 1 spray into each nostril daily   • fluticasone-salmeterol (Advair HFA) 115-21 MCG/ACT inhaler Inhale 2 puffs 2 (two) times a day Rinse mouth after use.    • gabapentin (NEURONTIN) 300 mg capsule Take 2 capsules (600 mg total) by mouth 3 (three) times a day   • gabapentin (NEURONTIN) 800 mg tablet Take 1 tablet (800 mg total) by mouth 3 (three) times a day   • glucose blood (OneTouch Verio) test strip Check blood sugar daily   • glucose monitoring kit (FREESTYLE) monitoring kit Use 1 each in the morning TESTING DAILY IN THE AM   • Lancet Devices (ONE TOUCH DELICA LANCING DEV) MISC Use daily   • lidocaine (Lidoderm) 5 % Apply 1 patch topically daily Remove & Discard patch within 12 hours or as directed by MD   • lidocaine (Lidoderm) 5 % Apply 1 patch topically over 12 hours daily Remove & Discard patch within 12 hours or as directed by MD   • lidocaine (LMX) 4 % cream Apply topically as needed for mild pain   • melatonin 3 mg Take 1 tablet (3 mg total) by mouth daily at bedtime   • metFORMIN (GLUCOPHAGE) 500 mg tablet take 1 tablet by mouth twice a day with meals   • metFORMIN (GLUCOPHAGE) 500 mg tablet Take 1 tablet (500 mg total) by mouth 2 (two) times a day with meals   • methocarbamol (ROBAXIN) 500 mg tablet Take 1 tablet (500 mg total) by mouth 3 (three) times a day   • methocarbamol (ROBAXIN) 500 mg tablet Take 1 tablet (500 mg total) by mouth 3 (three) times a day   • Mirabegron ER 50 MG TB24 Take 1 tablet (50 mg total) by mouth daily   • mirtazapine (REMERON) 15 mg tablet Take 1 tablet (15 mg total) by mouth daily at bedtime   • Multiple Vitamin (multivitamin) capsule Take 1 capsule by mouth daily for 14 days (Patient not taking: Reported on 1/24/2023)   • naloxone (NARCAN) 4 mg/0.1 mL nasal spray Administer 1 spray into a nostril. If no response after 2-3 minutes, give another dose in the other nostril using a new spray. • naloxone (NARCAN) 4 mg/0.1 mL nasal spray Administer 1 spray into a nostril. If no response after 2-3 minutes, give another dose in the other nostril using a new spray. • naproxen (NAPROSYN) 500 mg tablet Take 1 tablet (500 mg total) by mouth 2 (two) times a day with meals   • Nerve Stimulator (TENS Therapy Pain Relief) EMILY Use as directed   • ondansetron (Zofran ODT) 4 mg disintegrating tablet Take 1 tablet (4 mg total) by mouth every 6 (six) hours as needed for nausea or vomiting   • OneTouch Delica Lancets 91C MISC Use daily   • oxyCODONE (ROXICODONE) 10 MG TABS Take 1 tablet (10 mg total) by mouth 3 (three) times a day Max Daily Amount: 30 mg   • tamsulosin (FLOMAX) 0.4 mg take 1 capsule by mouth once daily with dinner   • tamsulosin (FLOMAX) 0.4 mg Take 1 capsule (0.4 mg total) by mouth daily with dinner   • traZODone (DESYREL) 50 mg tablet Take 1 tablet (50 mg total) by mouth daily at bedtime   • zolpidem (AMBIEN) 5 mg tablet Take 1 tablet (5 mg total) by mouth daily at bedtime as needed for sleep       Objective     /78 (BP Location: Right arm, Patient Position: Sitting, Cuff Size: Large)   Pulse 81   Temp 98.3 °F (36.8 °C) (Temporal)   Resp 16   Ht 5' 7" (1.702 m)   Wt 90.3 kg (199 lb)   SpO2 98%   BMI 31.17 kg/m²     Physical Exam  Vitals and nursing note reviewed. Constitutional:       General: He is not in acute distress. Appearance: Normal appearance. He is well-developed. He is obese. Comments: Ambulating with walker   HENT:      Head: Normocephalic and atraumatic. Cardiovascular:      Rate and Rhythm: Normal rate. Pulmonary:      Effort: Pulmonary effort is normal. No respiratory distress. Neurological:      Mental Status: He is alert and oriented to person, place, and time. Psychiatric:         Mood and Affect: Mood normal. Mood is not depressed. Affect is not tearful. Speech: Speech normal.         Behavior: Behavior is cooperative. Thought Content: Thought content does not include suicidal ideation. Thought content does not include suicidal plan.        Andrew Marin MD

## 2023-11-14 PROBLEM — Z48.02 VISIT FOR SUTURE REMOVAL: Status: RESOLVED | Noted: 2023-06-13 | Resolved: 2023-11-14

## 2023-11-14 PROBLEM — F32.0 CURRENT MILD EPISODE OF MAJOR DEPRESSIVE DISORDER WITHOUT PRIOR EPISODE (HCC): Status: RESOLVED | Noted: 2021-10-16 | Resolved: 2023-11-14

## 2023-11-14 NOTE — ASSESSMENT & PLAN NOTE
Hospitalization for depression and suicidal intent 10/10/2023  Medications on discharge:  Abilify 2 mg daily for mood adjunct  Cymbalta 60 mg twice daily for mood  Gabapentin 600 mg 3 times daily for anxiety and pain  Mirtazapine 15 mg nightly for mood and insomnia  Trazodone 50 mg nightly for insomnia

## 2023-11-16 ENCOUNTER — OFFICE VISIT (OUTPATIENT)
Dept: DENTISTRY | Facility: CLINIC | Age: 63
End: 2023-11-16

## 2023-11-16 VITALS — HEART RATE: 78 BPM | SYSTOLIC BLOOD PRESSURE: 134 MMHG | TEMPERATURE: 98 F | DIASTOLIC BLOOD PRESSURE: 82 MMHG

## 2023-11-16 DIAGNOSIS — K08.109 TEETH MISSING: Primary | ICD-10-CM

## 2023-11-16 PROCEDURE — WIS5000 PRELIMINARY IMPRESSIONS

## 2023-11-16 NOTE — PROGRESS NOTES
Removable    Pt presents for a partial denture visit and gave verbal consent for treatment:    Reviewed health history - Pt is ASA Class II    Treatment consents signed: Yes    Perio: Non applicable    Pain Scale: 0    Caries Assessment: Non applicable     Radiographs: Films are current    Oral Hygiene instruction reviewed    Routine prophy visits recommended to the pt. Explained partial denture making process to patient, including the necessary number of visits and timeframe to make partial denture. Upper and lower silgimix impressions made using stock trays.      NV: Rest seat preps, border molding and final impressions

## 2023-11-28 ENCOUNTER — TELEPHONE (OUTPATIENT)
Dept: FAMILY MEDICINE CLINIC | Facility: CLINIC | Age: 63
End: 2023-11-28

## 2023-11-28 DIAGNOSIS — G89.4 CHRONIC PAIN SYNDROME: ICD-10-CM

## 2023-11-28 DIAGNOSIS — G89.29 CHRONIC LEFT-SIDED LOW BACK PAIN WITH LEFT-SIDED SCIATICA: ICD-10-CM

## 2023-11-28 DIAGNOSIS — M54.42 CHRONIC LEFT-SIDED LOW BACK PAIN WITH LEFT-SIDED SCIATICA: ICD-10-CM

## 2023-11-28 DIAGNOSIS — E11.9 TYPE 2 DIABETES MELLITUS WITHOUT COMPLICATION, WITHOUT LONG-TERM CURRENT USE OF INSULIN (HCC): ICD-10-CM

## 2023-11-28 DIAGNOSIS — F11.20 CONTINUOUS OPIOID DEPENDENCE (HCC): ICD-10-CM

## 2023-11-28 NOTE — TELEPHONE ENCOUNTER
Patient came by asking for refills on:          glucose blood (OneTouch Verio) test strip   oxyCODONE (ROXICODONE) 10 MG TABS       Please advise

## 2023-11-29 RX ORDER — BLOOD SUGAR DIAGNOSTIC
STRIP MISCELLANEOUS
Qty: 100 EACH | Refills: 2 | Status: SHIPPED | OUTPATIENT
Start: 2023-11-29 | End: 2023-12-05 | Stop reason: SDUPTHER

## 2023-11-29 RX ORDER — OXYCODONE HYDROCHLORIDE 10 MG/1
10 TABLET ORAL 3 TIMES DAILY
Qty: 90 TABLET | Refills: 0 | Status: SHIPPED | OUTPATIENT
Start: 2023-11-29 | End: 2023-12-05 | Stop reason: SDUPTHER

## 2023-12-04 ENCOUNTER — TELEPHONE (OUTPATIENT)
Dept: FAMILY MEDICINE CLINIC | Facility: CLINIC | Age: 63
End: 2023-12-04

## 2023-12-04 DIAGNOSIS — F11.20 CONTINUOUS OPIOID DEPENDENCE (HCC): ICD-10-CM

## 2023-12-04 DIAGNOSIS — M54.42 CHRONIC LEFT-SIDED LOW BACK PAIN WITH LEFT-SIDED SCIATICA: ICD-10-CM

## 2023-12-04 DIAGNOSIS — M54.42 CHRONIC BILATERAL LOW BACK PAIN WITH LEFT-SIDED SCIATICA: ICD-10-CM

## 2023-12-04 DIAGNOSIS — G89.29 CHRONIC BILATERAL LOW BACK PAIN WITH LEFT-SIDED SCIATICA: ICD-10-CM

## 2023-12-04 DIAGNOSIS — E11.9 TYPE 2 DIABETES MELLITUS WITHOUT COMPLICATION, WITHOUT LONG-TERM CURRENT USE OF INSULIN (HCC): ICD-10-CM

## 2023-12-04 DIAGNOSIS — G89.4 CHRONIC PAIN SYNDROME: ICD-10-CM

## 2023-12-04 DIAGNOSIS — G89.29 CHRONIC LEFT-SIDED LOW BACK PAIN WITH LEFT-SIDED SCIATICA: ICD-10-CM

## 2023-12-04 DIAGNOSIS — M54.50 CHRONIC LEFT-SIDED LOW BACK PAIN WITHOUT SCIATICA: ICD-10-CM

## 2023-12-04 DIAGNOSIS — G89.29 CHRONIC LEFT-SIDED LOW BACK PAIN WITHOUT SCIATICA: ICD-10-CM

## 2023-12-04 NOTE — TELEPHONE ENCOUNTER
Patient came by stating that [facility]'t [facility] the rest of his medication of oxycodene.  He would like it to [facility] sent to [facility] [address],      glucose blood (OneTouch Verio) test stri     lidocaine (Lidoderm) 5 %     lidocaine (Lidoderm) 5 %   oxyCODONE (ROXICODONE) 10 MG TABS

## 2023-12-05 RX ORDER — BLOOD SUGAR DIAGNOSTIC
STRIP MISCELLANEOUS
Qty: 100 EACH | Refills: 2 | Status: SHIPPED | OUTPATIENT
Start: 2023-12-05

## 2023-12-05 RX ORDER — LIDOCAINE 50 MG/G
1 PATCH TOPICAL DAILY
Qty: 30 PATCH | Refills: 5 | Status: SHIPPED | OUTPATIENT
Start: 2023-12-05

## 2023-12-05 RX ORDER — OXYCODONE HYDROCHLORIDE 10 MG/1
10 TABLET ORAL 3 TIMES DAILY
Qty: 90 TABLET | Refills: 0 | Status: SHIPPED | OUTPATIENT
Start: 2023-12-05

## 2023-12-08 ENCOUNTER — TELEPHONE (OUTPATIENT)
Dept: PSYCHIATRY | Facility: CLINIC | Age: 63
End: 2023-12-08

## 2023-12-08 NOTE — TELEPHONE ENCOUNTER
Patients Name: Marv Astorga  : 1960  Phone Number: [phone]  Appointment Date: 23  Appointment time: 8:00 am   Address: [address] [place] [address] Dr Anil FINLEY [address]  Drop off Facility/Office Name: [facility]  Drop off  Address: [address] [place] [address] MALIA Guo [address]  Cost Center: [id number]  Special notes/directions for : n/a  Note for scheduling team:n/a

## 2023-12-11 ENCOUNTER — TELEPHONE (OUTPATIENT)
Dept: FAMILY MEDICINE CLINIC | Facility: CLINIC | Age: 63
End: 2023-12-11

## 2023-12-11 NOTE — TELEPHONE ENCOUNTER
PCP SIGNATURE NEEDED FOR [facility] FORM RECEIVED VIA FAX AND PLACED IN PCP FOLDER TO BE DELIVERED AT ASSIGNED TIMES.     Medicare part B detailed order

## 2023-12-12 ENCOUNTER — SOCIAL WORK (OUTPATIENT)
Dept: BEHAVIORAL/MENTAL HEALTH CLINIC | Facility: CLINIC | Age: 63
End: 2023-12-12
Payer: MEDICARE

## 2023-12-12 DIAGNOSIS — F41.1 GAD (GENERALIZED ANXIETY DISORDER): ICD-10-CM

## 2023-12-12 DIAGNOSIS — F32.2 CURRENT SEVERE EPISODE OF MAJOR DEPRESSIVE DISORDER WITHOUT PSYCHOTIC FEATURES WITHOUT PRIOR EPISODE (HCC): Primary | ICD-10-CM

## 2023-12-12 PROCEDURE — 90834 PSYTX W PT 45 MINUTES: CPT | Performed by: SOCIAL WORKER

## 2023-12-12 NOTE — PSYCH
Behavioral Health Psychotherapy Progress Note    Psychotherapy Provided: Individual Psychotherapy     1. 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 SAHRA (generalized anxiety disorder)            Goals addressed in session: Goal 1 and Goal 2     DATA: Damians depression and anxiety all hover around financial difficulties. I put a referral in to [facility] Guerra our . He has an issue with social security and concerning his other bills. He is so frustrated with his financial difficulties. He admits he thinks about all the negative things when he is by himself. This all affects his depression and his anxiety. I put a referral in to our  Mian Guerra. I provided support, encouragement and strategies to cope. During this session, this clinician used the following therapeutic modalities: Client-centered Therapy, Cognitive Behavioral Therapy, Mindfulness-based Strategies, and Supportive Psychotherapy    Substance Abuse was not addressed during this session. If the client is diagnosed with a co-occurring substance use disorder, please indicate any changes in the frequency or amount of use: n/a. Stage of change for addressing substance use diagnoses: No substance use/Not applicable    ASSESSMENT:  Rowena Hopkins presents with a Anxious and Depressed mood. his affect is Blunted, which is congruent, with his mood and the content of the session. The client just started therapy in order to say he has made progress on his goals. Rowena Hopkins presents with a none risk of suicide, none risk of self-harm, and none risk of harm to others. For any risk assessment that surpasses a "low" rating, a safety plan must be developed. A safety plan was indicated: no  If yes, describe in detail n/a    PLAN: Between sessions, Rowena Hopkins will use mindfulness and CBT. Vasquez Mojica  At the next session, the therapist will use Client-centered Therapy, Cognitive Behavioral Therapy, Mindfulness-based Strategies, and Supportive Psychotherapy to address issues and symptoms as they may arise. Behavioral Health Treatment Plan and Discharge Planning: Jada Minor is aware of and agrees to continue to work on their treatment plan. They have identified and are working toward their discharge goals.  yes    Visit start and stop times:    12/12/23  Start Time: 0810  Stop Time: 0900  Total Visit Time: 50 minutes

## 2023-12-13 ENCOUNTER — TELEPHONE (OUTPATIENT)
Dept: FAMILY MEDICINE CLINIC | Facility: CLINIC | Age: 63
End: 2023-12-13

## 2023-12-13 NOTE — TELEPHONE ENCOUNTER
PCP SIGNATURE NEEDED FOR Cover my meds FORM RECEIVED VIA FAX AND PLACED IN PCP FOLDER TO BE DELIVERED AT ASSIGNED TIMES.       Prior auth Lidocaine 5%

## 2023-12-15 ENCOUNTER — TELEPHONE (OUTPATIENT)
Dept: PSYCHIATRY | Facility: CLINIC | Age: 63
End: 2023-12-15

## 2023-12-15 NOTE — TELEPHONE ENCOUNTER
Called and spoke with patient regarding new patient appointment that was missed on 12/12/23. Patient stated he forgot about the visit as he had therapy the same day. Rescheduled NP appt for 3/1/24 11AM and placed on cancellation list for sooner NP appt. NO-SHOW LETTER MAILED TO Tanisha Bhatia.   ADDRESS: [address] [place] [address]

## 2023-12-15 NOTE — TELEPHONE ENCOUNTER
CM received a message from Jc Cordero to assist patient in obtaining an ICM. Writer informed Pt's therapist that they already have a BCM with [facility]. CM outreached to Marv and instructed him to reach out to his  if he needs assistance as they are there to help him. Aracely Magdiel stated that he will be seeing them on Monday.

## 2023-12-27 ENCOUNTER — TELEPHONE (OUTPATIENT)
Dept: FAMILY MEDICINE CLINIC | Facility: CLINIC | Age: 63
End: 2023-12-27

## 2023-12-27 DIAGNOSIS — M47.817 FACET ARTHROPATHY, LUMBOSACRAL: ICD-10-CM

## 2023-12-27 DIAGNOSIS — R26.2 AMBULATORY DYSFUNCTION: ICD-10-CM

## 2023-12-27 DIAGNOSIS — M54.16 RADICULOPATHY, LUMBAR REGION: ICD-10-CM

## 2023-12-27 DIAGNOSIS — M54.42 CHRONIC BILATERAL LOW BACK PAIN WITH LEFT-SIDED SCIATICA: Primary | ICD-10-CM

## 2023-12-27 DIAGNOSIS — G89.29 CHRONIC BILATERAL LOW BACK PAIN WITH LEFT-SIDED SCIATICA: Primary | ICD-10-CM

## 2023-12-27 NOTE — TELEPHONE ENCOUNTER
PCP SIGNATURE NEEDED FOR [facility] FORM RECEIVED VIA FAX AND PLACED IN PCP FOLDER TO BE DELIVERED AT ASSIGNED TIMES.      Script request for a push wheelchair

## 2023-12-28 ENCOUNTER — OFFICE VISIT (OUTPATIENT)
Dept: DENTISTRY | Facility: CLINIC | Age: 63
End: 2023-12-28

## 2023-12-28 VITALS — DIASTOLIC BLOOD PRESSURE: 78 MMHG | SYSTOLIC BLOOD PRESSURE: 118 MMHG

## 2023-12-28 DIAGNOSIS — K08.109 TEETH MISSING: Primary | ICD-10-CM

## 2023-12-28 PROCEDURE — WIS5001 FINAL IMPRESSIONS DENTURE

## 2024-01-02 ENCOUNTER — TELEPHONE (OUTPATIENT)
Dept: FAMILY MEDICINE CLINIC | Facility: CLINIC | Age: 64
End: 2024-01-02

## 2024-01-02 DIAGNOSIS — M54.42 CHRONIC LEFT-SIDED LOW BACK PAIN WITH LEFT-SIDED SCIATICA: ICD-10-CM

## 2024-01-02 DIAGNOSIS — F11.20 CONTINUOUS OPIOID DEPENDENCE (HCC): ICD-10-CM

## 2024-01-02 DIAGNOSIS — N40.1 BPH WITH OBSTRUCTION/LOWER URINARY TRACT SYMPTOMS: ICD-10-CM

## 2024-01-02 DIAGNOSIS — N40.1 BENIGN PROSTATIC HYPERPLASIA WITH LOWER URINARY TRACT SYMPTOMS, SYMPTOM DETAILS UNSPECIFIED: ICD-10-CM

## 2024-01-02 DIAGNOSIS — G89.4 CHRONIC PAIN SYNDROME: ICD-10-CM

## 2024-01-02 DIAGNOSIS — F32.0 CURRENT MILD EPISODE OF MAJOR DEPRESSIVE DISORDER WITHOUT PRIOR EPISODE (HCC): ICD-10-CM

## 2024-01-02 DIAGNOSIS — M54.50 CHRONIC LOW BACK PAIN WITHOUT SCIATICA, UNSPECIFIED BACK PAIN LATERALITY: ICD-10-CM

## 2024-01-02 DIAGNOSIS — G89.29 CHRONIC LEFT-SIDED LOW BACK PAIN WITH LEFT-SIDED SCIATICA: ICD-10-CM

## 2024-01-02 DIAGNOSIS — N13.8 BPH WITH OBSTRUCTION/LOWER URINARY TRACT SYMPTOMS: ICD-10-CM

## 2024-01-02 DIAGNOSIS — G89.29 CHRONIC LOW BACK PAIN WITHOUT SCIATICA, UNSPECIFIED BACK PAIN LATERALITY: ICD-10-CM

## 2024-01-02 DIAGNOSIS — F32.2 CURRENT SEVERE EPISODE OF MAJOR DEPRESSIVE DISORDER WITHOUT PRIOR EPISODE (HCC): ICD-10-CM

## 2024-01-02 PROBLEM — R26.2 AMBULATORY DYSFUNCTION: Status: ACTIVE | Noted: 2024-01-02

## 2024-01-02 NOTE — DENTAL PROCEDURE DETAILS
Denture Final Impression    Marv Astorga presents for maxillary and mandibular partial denture final impression. PMH reviewed. ASA 2, pain 0. Pt understands extent of denture fabrication process and consents to tx today.     The follow rest seat preps were placed using carbide and karoline burs w/ and w/out water on high speed. #3 MO, 4 DO occlusal rest, #12 MO rest and distal guide plane, #15 MO rest, #21 MO rest, #22, 27 cingulum rests    Custom tray Tray adhesive applied and dried. Border molding performed with heavy body Light body impression placed on prepped areas intraorally, heavy body filled the tray. Contours, vestibule and intaglio captured.       NV: Wax rim try in

## 2024-01-02 NOTE — TELEPHONE ENCOUNTER
Pt needs refill    oxyCODONE (ROXICODONE) 10 MG TABS     DULoxetine (CYMBALTA) 60 mg delayed release capsule     mirtazapine (REMERON) 15 mg tablet     gabapentin (NEURONTIN) 300 mg capsule     traZODone (DESYREL) 50 mg tablet       tamsulosin (FLOMAX) 0.4 mg

## 2024-01-05 RX ORDER — TAMSULOSIN HYDROCHLORIDE 0.4 MG/1
0.4 CAPSULE ORAL
Qty: 30 CAPSULE | Refills: 5 | Status: SHIPPED | OUTPATIENT
Start: 2024-01-05 | End: 2024-07-03

## 2024-01-05 RX ORDER — OXYCODONE HYDROCHLORIDE 10 MG/1
10 TABLET ORAL 3 TIMES DAILY
Qty: 90 TABLET | Refills: 0 | Status: SHIPPED | OUTPATIENT
Start: 2024-01-05

## 2024-01-05 RX ORDER — DULOXETIN HYDROCHLORIDE 60 MG/1
60 CAPSULE, DELAYED RELEASE ORAL 2 TIMES DAILY
Qty: 60 CAPSULE | Refills: 5 | Status: SHIPPED | OUTPATIENT
Start: 2024-01-05

## 2024-01-05 RX ORDER — MIRTAZAPINE 15 MG/1
15 TABLET, FILM COATED ORAL
Qty: 30 TABLET | Refills: 5 | Status: SHIPPED | OUTPATIENT
Start: 2024-01-05 | End: 2024-07-03

## 2024-01-05 RX ORDER — TAMSULOSIN HYDROCHLORIDE 0.4 MG/1
0.4 CAPSULE ORAL
Qty: 90 CAPSULE | Refills: 1 | Status: SHIPPED | OUTPATIENT
Start: 2024-01-05

## 2024-01-05 RX ORDER — GABAPENTIN 800 MG/1
800 TABLET ORAL 3 TIMES DAILY
Qty: 90 TABLET | Refills: 5 | Status: SHIPPED | OUTPATIENT
Start: 2024-01-05

## 2024-01-07 ENCOUNTER — TELEPHONE (OUTPATIENT)
Dept: PSYCHIATRY | Facility: CLINIC | Age: 64
End: 2024-01-07

## 2024-01-07 NOTE — TELEPHONE ENCOUNTER
Patients Name: Marv Astorga  : 1960  Phone Number: [phone]  Appointment Date: 24  Appointment time: 9:00 am   Address: [address] [place] [address] Dr Anil FIN[place] [address]  Drop off Facility/Office Name: [facility]  Drop off  Address: [address] [place] [address] MALIA Guo [address]  Cost Center: [id number]  Special notes/directions for : n/a  Note for scheduling team:n/a      Send to Ride Booking Pool: [id number] [facility]

## 2024-01-08 ENCOUNTER — OFFICE VISIT (OUTPATIENT)
Dept: FAMILY MEDICINE CLINIC | Facility: CLINIC | Age: 64
End: 2024-01-08

## 2024-01-08 VITALS
OXYGEN SATURATION: 97 % | HEIGHT: 67 IN | SYSTOLIC BLOOD PRESSURE: 140 MMHG | WEIGHT: 194 LBS | HEART RATE: 83 BPM | BODY MASS INDEX: 30.45 KG/M2 | RESPIRATION RATE: 16 BRPM | TEMPERATURE: 98 F | DIASTOLIC BLOOD PRESSURE: 86 MMHG

## 2024-01-08 DIAGNOSIS — Z59.9 FINANCIAL DIFFICULTIES: ICD-10-CM

## 2024-01-08 DIAGNOSIS — F32.2 CURRENT SEVERE EPISODE OF MAJOR DEPRESSIVE DISORDER WITHOUT PSYCHOTIC FEATURES WITHOUT PRIOR EPISODE (HCC): ICD-10-CM

## 2024-01-08 DIAGNOSIS — E11.9 TYPE 2 DIABETES MELLITUS WITHOUT COMPLICATION, WITHOUT LONG-TERM CURRENT USE OF INSULIN (HCC): Primary | ICD-10-CM

## 2024-01-08 DIAGNOSIS — E78.5 HYPERLIPIDEMIA, UNSPECIFIED HYPERLIPIDEMIA TYPE: ICD-10-CM

## 2024-01-08 DIAGNOSIS — Z59.41 FOOD INSECURITY: ICD-10-CM

## 2024-01-08 DIAGNOSIS — F11.20 OPIOID DEPENDENCE, UNCOMPLICATED (HCC): ICD-10-CM

## 2024-01-08 PROCEDURE — 3079F DIAST BP 80-89 MM HG: CPT | Performed by: FAMILY MEDICINE

## 2024-01-08 PROCEDURE — 99214 OFFICE O/P EST MOD 30 MIN: CPT | Performed by: FAMILY MEDICINE

## 2024-01-08 PROCEDURE — 3077F SYST BP >= 140 MM HG: CPT | Performed by: FAMILY MEDICINE

## 2024-01-08 SDOH — ECONOMIC STABILITY - FOOD INSECURITY: FOOD INSECURITY: Z59.41

## 2024-01-08 SDOH — ECONOMIC STABILITY - INCOME SECURITY: PROBLEM RELATED TO HOUSING AND ECONOMIC CIRCUMSTANCES, UNSPECIFIED: Z59.9

## 2024-01-08 NOTE — PROGRESS NOTES
Name: Marv Astorga      : 1960      MRN: [id number]  Encounter Provider: Audrey Ruvalcaba MD  Encounter Date: 2024   Encounter department: [facility]    Assessment & Plan     1. Type 2 diabetes mellitus without complication, without long-term current use of insulin (HCC)  Assessment & Plan:    Lab Results   Component Value Date    HGBA1C 7.0 (A) 10/10/2023    HGBA1C 6.7 (H) 05/15/2023    HGBA1C 7.3 (A) 2023    HGBA1C 6.9 (A) 2023     Stable  - Current medications:  Metformin 500 mg bid  - Home glucose readings: fasting 90s  - Diet: well balanced, drinks plenty of water, vegetable soups, limits bread  - +Htn: on Amlodipine  - +Hld: on Atorvastatin  - Aspirin: no  - Urine microalb: 2022 - normal  - Ophthalmology: complete  - DM Foot exam: complete  - Dentist:   - Pneumo vaccine:   - Influenza Vaccine:    - Smoker: no  - Keep log of blood glucose readings  - Encouraged: Diabetic Diet, Regular exercise, Weight loss           2. Current severe episode of major depressive disorder without psychotic features without prior episode (HCC)  Assessment & Plan:  Following with mental health provider    Hospitalization for depression and suicidal intent 10/10/2023  Medications on discharge:  Abilify 2 mg daily for mood adjunct  Cymbalta 60 mg twice daily for mood  Gabapentin 600 mg 3 times daily for anxiety and pain  Mirtazapine 15 mg nightly for mood and insomnia  Trazodone 50 mg nightly for insomnia      3. Hyperlipidemia, unspecified hyperlipidemia type  Assessment & Plan:  Lab Results   Component Value Date/Time    CHOLESTEROL 141 10/11/2023 04:56 AM    TRIG 92 10/11/2023 04:56 AM    HDL 52 10/11/2023 04:56 AM    LDLCALC 71 10/11/2023 04:56 AM     The 10-year ASCVD risk score is: 26.9%  Continue Atorvastatin 20 mg  Low Fat Diet, regular Exercise      Orders:  -     atorvastatin (LIPITOR) 20 mg tablet; Take 1 tablet (20 mg total) by mouth  daily    4. Financial difficulties  Assessment & Plan:  Referred to Social Work-Maria Fernanda Wagner visited pt during office visit      Orders:  -     Ambulatory referral to social work care management program; Future; Expected date: 01/14/2024    5. Food insecurity  -     Ambulatory referral to social work care management program; Future; Expected date: 01/14/2024    6. Opioid dependence, uncomplicated (HCC)           Subjective      HPI    Marv Astorga is a 63 y.o. male  who presented to the office today to follow up for DM, HLD, and Depression.    Patient states that his sx of depression are stable at this time.  He still has feeling of low mood and sadness.  He was not able to buy his daughter a present for [employer] due to financial difficulties, and visited his sister during the holidays.   Marv is following up with the his therapist and psychiatrist, and taking medications as prescribed.    As far as his chronic back pain, he is taking the oxycodone three times daily, is trying to do stretches as possible, but states the chronic pain is really taxing on his body and mood.      The following portions of the patient's history were reviewed and updated as appropriate: allergies, current medications, past family history, past medical history, past social history, past surgical history and problem list.    Review of Systems   Constitutional:  Negative for activity change, appetite change, chills and fever.   HENT:  Negative for congestion, rhinorrhea and sore throat.    Respiratory:  Negative for cough and shortness of breath.    Cardiovascular:  Negative for chest pain.   Gastrointestinal:  Negative for diarrhea, nausea and vomiting.   Musculoskeletal:  Positive for back pain and gait problem.   Skin:  Negative for rash.   Neurological:  Negative for dizziness and headaches.   Psychiatric/Behavioral:  Negative for sleep disturbance and suicidal ideas. The patient is not nervous/anxious.        Current Outpatient  Medications on File Prior to Visit   Medication Sig   • albuterol (2.5 mg/3 mL) 0.083 % nebulizer solution Take 3 mL (2.5 mg total) by nebulization every 6 (six) hours as needed for wheezing or shortness of breath   • albuterol (ProAir HFA) 90 mcg/act inhaler Inhale 2 puffs every 6 (six) hours as needed for wheezing   • amLODIPine (NORVASC) 10 mg tablet Take 1 tablet (10 mg total) by mouth daily   • ARIPiprazole (ABILIFY) 2 mg tablet Take 1 tablet (2 mg total) by mouth daily Do not start before October 17, 2023.   • aspirin 325 mg tablet Take 1 tablet (325 mg total) by mouth daily   • Blood Glucose Monitoring Suppl (OneTouch Verio) w/Device KIT Use daily   • Blood Glucose Monitoring Suppl KIT Use in the morning Please give test strips and lancets  Dx. E11.9   • Cholecalciferol (D3 PO) Take by mouth daily   • Diclofenac Sodium (VOLTAREN) 1 % Apply 2 g topically 4 (four) times a day   • Diclofenac Sodium (VOLTAREN) 1 % Apply 2 g topically 4 (four) times a day as needed (low back or neck pain)   • DULoxetine (CYMBALTA) 60 mg delayed release capsule Take 1 capsule (60 mg total) by mouth 2 (two) times a day   • famotidine (PEPCID) 20 mg tablet Take 1 tablet (20 mg total) by mouth daily   • fluticasone (FLONASE) 50 mcg/act nasal spray 1 spray into each nostril daily   • fluticasone-salmeterol (Advair HFA) 115-21 MCG/ACT inhaler Inhale 2 puffs 2 (two) times a day Rinse mouth after use.   • gabapentin (NEURONTIN) 800 mg tablet Take 1 tablet (800 mg total) by mouth 3 (three) times a day   • glucose blood (OneTouch Verio) test strip Check blood sugar daily   • glucose monitoring kit (FREESTYLE) monitoring kit Use 1 each in the morning TESTING DAILY IN THE AM   • Lancet Devices (ONE TOUCH DELICA LANCING DEV) MISC Use daily   • lidocaine (Lidoderm) 5 % Apply 1 patch topically over 12 hours daily Remove & Discard patch within 12 hours or as directed by MD   • lidocaine (LMX) 4 % cream Apply topically as needed for mild pain   •  "metFORMIN (GLUCOPHAGE) 500 mg tablet take 1 tablet by mouth twice a day with meals   • methocarbamol (ROBAXIN) 500 mg tablet Take 1 tablet (500 mg total) by mouth 3 (three) times a day   • Mirabegron ER 50 MG TB24 Take 1 tablet (50 mg total) by mouth daily   • mirtazapine (REMERON) 15 mg tablet Take 1 tablet (15 mg total) by mouth daily at bedtime   • naloxone (NARCAN) 4 mg/0.1 mL nasal spray Administer 1 spray into a nostril. If no response after 2-3 minutes, give another dose in the other nostril using a new spray.   • Nerve Stimulator (TENS Therapy Pain Relief) EMILY Use as directed   • ondansetron (Zofran ODT) 4 mg disintegrating tablet Take 1 tablet (4 mg total) by mouth every 6 (six) hours as needed for nausea or vomiting   • OneTouch Delica Lancets 33G MISC Use daily   • oxyCODONE (ROXICODONE) 10 MG TABS Take 1 tablet (10 mg total) by mouth 3 (three) times a day Max Daily Amount: 30 mg   • tamsulosin (FLOMAX) 0.4 mg Take 1 capsule (0.4 mg total) by mouth daily with dinner   • zolpidem (AMBIEN) 5 mg tablet Take 1 tablet (5 mg total) by mouth daily at bedtime as needed for sleep       Objective     /86 (BP Location: Left arm, Patient Position: Sitting, Cuff Size: Large)   Pulse 83   Temp 98 °F (36.7 °C) (Temporal)   Resp 16   Ht 5' 7\" (1.702 m)   Wt 88 kg (194 lb)   SpO2 97%   BMI 30.38 kg/m²     Physical Exam  Vitals and nursing note reviewed.   Constitutional:       General: He is not in acute distress.     Appearance: Normal appearance. He is well-developed. He is obese.      Comments: Ambulating with walker   HENT:      Head: Normocephalic and atraumatic.   Cardiovascular:      Rate and Rhythm: Normal rate.   Pulmonary:      Effort: Pulmonary effort is normal. No respiratory distress.   Neurological:      Mental Status: He is alert and oriented to person, place, and time.   Psychiatric:         Mood and Affect: Mood normal. Mood is not depressed. Affect is not tearful.         Speech: Speech " normal.         Behavior: Behavior is cooperative.         Thought Content: Thought content does not include suicidal ideation. Thought content does not include suicidal plan.       Audrey Ruvalcaba MD

## 2024-01-09 ENCOUNTER — PATIENT OUTREACH (OUTPATIENT)
Dept: FAMILY MEDICINE CLINIC | Facility: CLINIC | Age: 64
End: 2024-01-09

## 2024-01-09 ENCOUNTER — SOCIAL WORK (OUTPATIENT)
Dept: BEHAVIORAL/MENTAL HEALTH CLINIC | Facility: CLINIC | Age: 64
End: 2024-01-09
Payer: MEDICARE

## 2024-01-09 DIAGNOSIS — Z76.89 ENCOUNTER FOR SOCIAL WORK INTERVENTION: Primary | ICD-10-CM

## 2024-01-09 DIAGNOSIS — F41.1 GAD (GENERALIZED ANXIETY DISORDER): ICD-10-CM

## 2024-01-09 DIAGNOSIS — F32.2 CURRENT SEVERE EPISODE OF MAJOR DEPRESSIVE DISORDER WITHOUT PSYCHOTIC FEATURES WITHOUT PRIOR EPISODE (HCC): Primary | ICD-10-CM

## 2024-01-09 PROCEDURE — 90834 PSYTX W PT 45 MINUTES: CPT | Performed by: SOCIAL WORKER

## 2024-01-09 NOTE — PSYCH
"Behavioral Health Psychotherapy Progress Note    Psychotherapy Provided: Individual Psychotherapy     1. Current severe episode of major depressive disorder without psychotic features without prior episode (HCC)        2. SAHRA (generalized anxiety disorder)            Goals addressed in session: Goal 1 and Goal 2     DATA: Marv remains depressed and anxious over how poorly he feels about the situation of his life. He is 2 months behind in rent and needs assistance with food. I put another referral into our  [facility]. He shared he would be better off in detention where he would be taken care of. He discussed how these social issues affect him. I provided support, encouragement and strategies to cope. I also put another referral in to our  to help him. Perhaps case management or a  would help him. Even though he is hopeless about his life situation he denies suicidal plan or intent.   During this session, this clinician used the following therapeutic modalities: Client-centered Therapy and Supportive Psychotherapy    Substance Abuse was not addressed during this session. If the client is diagnosed with a co-occurring substance use disorder, please indicate any changes in the frequency or amount of use: n/a. Stage of change for addressing substance use diagnoses: No substance use/Not applicable    ASSESSMENT:  Marv Astorga presents with a Anxious and Depressed mood.     his affect is angry and depressed, which is congruent, with his mood and the content of the session. The client has not made progress on their goals.He has many social work concerns and I made another referral to our  about Marv.      Marv Astorga presents with a none risk of suicide, none risk of self-harm, and none risk of harm to others.    For any risk assessment that surpasses a \"low\" rating, a safety plan must be developed.    A safety plan was indicated: no  If yes, describe in detail n/a    PLAN: Between " sessions, Marv Astorga will use therapy as a support.  At the next session, the therapist will use Client-centered Therapy and Supportive Psychotherapy to address his hopelessness over his life situation. .    Behavioral Health Treatment Plan and Discharge Planning: Marv Astorga is aware of and agrees to continue to work on their treatment plan. They have identified and are working toward their discharge goals. yes    Visit start and stop times:    01/09/24  Start Time: 0910  Stop Time: 1000  Total Visit Time: 50 minutes

## 2024-01-09 NOTE — PROGRESS NOTES
JEROD RUFINA received referral from provider requesting assistance with pt due to financial difficulties. JEROD RUFINA called pt and inquired as to reason for requesting assistance.     Pt stated that he was requesting assistance with his rental payment as he was two months behind and will owe next month rent as well. JEROD RUFINA inquired if he received an eviction notice and he stated that his landlord has not formally given him a notice but has verbally warned him.     JEROD ALMARAZ inquired as to his rent and he stated that his rent is $1200 and his landlord wants to raise it to $1600. JEROD RUFINA inquired as to what was owed and he reported that he believes he owes $2900 with the late fees.     JEROD ALMARAZ inquired about pt's income and he reported he gets $1200 from SS and his 12 yo dtr  gets $300 a month.     JEROD RUFINA inquired if pt was on SNAP benefits and he stated that he tried to apply in the past but was told that he could not qualify as his income was too high with his wife.     Pt confirmed that he was still on CAP. Pt stated that he lost his medicare supplement payment and he is now getting $114 deducted from his income. JEROD ALMARAZ inquired about transport and pt reported that he drives.     JEROD RUFINA inquired about supports and he stated that his sister and brother are local but he is not able to stay with them as there home is already full and there is no space.     Pt confirmed that he is going to OP MH with [facility]. Pt confirmed he still has waiver services with his  being [facility].     JEROD ALMARAZ explained to pt that the financial assistance providers do not have funding at the moment. JEROD ALMARAZ encouraged pt to open communication with his landlord to see if he can start making payments. Pt shared that he was debating reaching out to his wife (who is he currently  from) to move back in so that she can help pay the rent. He stated that his wife had promised to give him $500 this week.     JEROD ALMARAZ and pt discussed  utilities an dt reported that he did not have internet, JEROD ALMARAZ informed him about free phone that he can come and get on Wednesday. Pt expressed understanding.     Pt expressed that the assessment brought back a lot of negative feelings for him. JEROD ALMARAZ provided supportive listening to pt.     JEROD ALMARAZ and pt discussed next steps. JEROD ALMARAZ will mail pt food bank list, landlord list, financial assistance programs, information on affordable connectivity program. JEROD ALMARAZ and pt discussed role of [facility] . Pt in agreement for JEROD ALMARAZ to reach out to [facility] to see if they can assist with medicare supplement and housing applications. JEROD ALMARAZ sent referral.     Pt shared that he has considered moving back to [place] as he feels that he would be more stable as he has housing there and he could survive on the income he has. However, he wants to wait until his dtr graduates high school.     JEROD ALMARAZ will continue to be available for any additional needs as requested.

## 2024-01-14 PROBLEM — Z59.9 FINANCIAL DIFFICULTIES: Status: ACTIVE | Noted: 2024-01-14

## 2024-01-14 RX ORDER — ATORVASTATIN CALCIUM 20 MG/1
20 TABLET, FILM COATED ORAL DAILY
Qty: 90 TABLET | Refills: 1 | Status: SHIPPED | OUTPATIENT
Start: 2024-01-14

## 2024-01-15 NOTE — ASSESSMENT & PLAN NOTE
Following with mental health provider    Hospitalization for depression and suicidal intent 10/10/2023  Medications on discharge:  Abilify 2 mg daily for mood adjunct  Cymbalta 60 mg twice daily for mood  Gabapentin 600 mg 3 times daily for anxiety and pain  Mirtazapine 15 mg nightly for mood and insomnia  Trazodone 50 mg nightly for insomnia

## 2024-01-15 NOTE — ASSESSMENT & PLAN NOTE
Lab Results   Component Value Date    HGBA1C 7.0 (A) 10/10/2023    HGBA1C 6.7 (H) 05/15/2023    HGBA1C 7.3 (A) 04/24/2023    HGBA1C 6.9 (A) 01/20/2023     Stable  - Current medications:  Metformin 500 mg bid  - Home glucose readings: fasting 90s  - Diet: well balanced, drinks plenty of water, vegetable soups, limits bread  - +Htn: on Amlodipine  - +Hld: on Atorvastatin  - Aspirin: no  - Urine microalb: 4/21/2022 - normal  - Ophthalmology: complete  - DM Foot exam: complete  - Dentist: 2023  - Pneumo vaccine: 2019  - Influenza Vaccine:  2023  - Smoker: no  - Keep log of blood glucose readings  - Encouraged: Diabetic Diet, Regular exercise, Weight loss

## 2024-01-15 NOTE — ASSESSMENT & PLAN NOTE
Lab Results   Component Value Date/Time    CHOLESTEROL 141 10/11/2023 04:56 AM    TRIG 92 10/11/2023 04:56 AM    HDL 52 10/11/2023 04:56 AM    LDLCALC 71 10/11/2023 04:56 AM     The 10-year ASCVD risk score is: 26.9%  Continue Atorvastatin 20 mg  Low Fat Diet, regular Exercise

## 2024-01-19 ENCOUNTER — TELEPHONE (OUTPATIENT)
Dept: PSYCHIATRY | Facility: CLINIC | Age: 64
End: 2024-01-19

## 2024-01-19 NOTE — TELEPHONE ENCOUNTER
Patients Name: Marv Astorga  : 1960  Phone Number: [phone]  Appointment Date: 24  Appointment time: 9:00 am   Address: [address] [place] [address] [address] [place] [id number]  Drop off Facility/Office Name: [facility]  Drop off  Address: [address] [place] [address] [place] Guo [address]  Cost Center: [id number]  Special notes/directions for : n/a  Note for scheduling team:n/a      Send to Ride Booking Pool: [id number] [facility]

## 2024-01-23 ENCOUNTER — PATIENT OUTREACH (OUTPATIENT)
Dept: FAMILY MEDICINE CLINIC | Facility: CLINIC | Age: 64
End: 2024-01-23

## 2024-01-23 ENCOUNTER — SOCIAL WORK (OUTPATIENT)
Dept: BEHAVIORAL/MENTAL HEALTH CLINIC | Facility: CLINIC | Age: 64
End: 2024-01-23
Payer: MEDICARE

## 2024-01-23 DIAGNOSIS — F32.2 CURRENT SEVERE EPISODE OF MAJOR DEPRESSIVE DISORDER WITHOUT PSYCHOTIC FEATURES WITHOUT PRIOR EPISODE (HCC): Primary | ICD-10-CM

## 2024-01-23 DIAGNOSIS — F41.1 GAD (GENERALIZED ANXIETY DISORDER): ICD-10-CM

## 2024-01-23 PROCEDURE — 90834 PSYTX W PT 45 MINUTES: CPT | Performed by: SOCIAL WORKER

## 2024-01-23 NOTE — LETTER
01/23/24    Dear Marv Astorga,    I am a Community Health Worker with [facility]  [facility]  [address] [address]    I have made an attempt to call you by phone.  It is important that you contact me back at [phone] so that I can assist with your care needs.     Sincerely,         Stacy Osborn CMOC

## 2024-01-23 NOTE — PSYCH
"Behavioral Health Psychotherapy Progress Note    Psychotherapy Provided: Individual Psychotherapy     1. Current severe episode of major depressive disorder without psychotic features without prior episode (HCC)        2. SAHRA (generalized anxiety disorder)            Goals addressed in session: Goal 1 and Goal 2     DATA: Marv arrived for his session. He is depressed and anxious over his current life situation. He is not doing well financially. He has been referred to our / and he has a  tied in with the [place] [email or web address]. I provided support, encouragement and strategies to cope.   During this session, this clinician used the following therapeutic modalities: Client-centered Therapy, Cognitive Behavioral Therapy, Mindfulness-based Strategies, and Supportive Psychotherapy    Substance Abuse was not addressed during this session. If the client is diagnosed with a co-occurring substance use disorder, please indicate any changes in the frequency or amount of use: n/a. Stage of change for addressing substance use diagnoses: No substance use/Not applicable    ASSESSMENT:  Marv Astorga presents with a Anxious and Depressed mood.     his affect is anxious and depressed which is congruent, with his mood and the content of the session. The client has not made progress on their goals because of his life situation.      Marv Astorga presents with a none risk of suicide, none risk of self-harm, and none risk of harm to others.    For any risk assessment that surpasses a \"low\" rating, a safety plan must be developed.    A safety plan was indicated: no  If yes, describe in detail n/a    PLAN: Between sessions, Marv Astorga will use mindfulness and CBT. At the next session, the therapist will use Client-centered Therapy, Cognitive Behavioral Therapy, Mindfulness-based Strategies, and Supportive Psychotherapy to address issues and symptoms as they .    Behavioral Health Treatment Plan and " Discharge Planning: Marv Astorga is aware of and agrees to continue to work on their treatment plan. They have identified and are working toward their discharge goals. yes    Visit start and stop times:    01/23/24  Start Time: 0910  Stop Time: 1000  Total Visit Time: 50 minutes

## 2024-01-23 NOTE — PROGRESS NOTES
Outgoing Call:  1/23/2024    Chart reviewed. [facility] received a referral from Arvind NEWSOME, informing Pt is interested in applying for subsidized housing and reapplying for Medicare Part B subsidy program (Medicare Buy-In program). VM left and UTR letter sent.     Next outreach is scheduled for 1/5/2024.

## 2024-01-24 ENCOUNTER — PATIENT OUTREACH (OUTPATIENT)
Dept: FAMILY MEDICINE CLINIC | Facility: CLINIC | Age: 64
End: 2024-01-24

## 2024-01-24 NOTE — PROGRESS NOTES
Incoming Call:  1/24/2024    CMOC received a return call from Pt in reference to referral for assistance in applying for Medicare Part B Buy-in and housing. Pt is familiar with CMOC as both have worked together on a previous referral. [facility] assessment was completed and Pt agreed to services. Pt states he is also in need of [facility]. [facility] to assist.     Next outreach is scheduled for 1/30/204 at 1 PM at [facility].

## 2024-01-30 ENCOUNTER — PATIENT OUTREACH (OUTPATIENT)
Dept: FAMILY MEDICINE CLINIC | Facility: CLINIC | Age: 64
End: 2024-01-30

## 2024-01-30 NOTE — PROGRESS NOTES
"Outgoing Call:  1/30/2024    [facility] called Pt to begin process of requesting Medicare Part B Buy-In program. CMOC informed Pt she will facilitate a three way call to [employer] to inquire about being denied this services. At this point Pt became upset and stated he was recently denied by [employer] as his income exceeds the income guidelines. Pt states they are counting his wife's income who also resides in the same home. Pt does not feel they should include her income as he states that they are . CMOC informed if she is still residing with him [employer] will continue to count her income as they are legally  and residing together. Pt began yelling at this point and used profanity while expressing his anger towards [employer]. CMOC asked Pt what type of assistance is Pt expecting from CMOC as he states he does not want to deal with [employer]. Pt informed he does not want to proceed with requesting Medicare Buy In program and stated that one day someone will \"read about me in the news.\"     [facility] discussed housing with Pt and he informed a CW from [employer] came to his home today to assist in seeking housing. Pt also declined assistance in this area as it is being worked on with someone else.     [facility] asked Pt if he would like to apply for [employer] services and Pt agreed. CMOC informed Pt she will apply on his behalf and reach out to him at end of week. Pt is not eligible for [facility] services as he does not have an appointment scheduled within the next two weeks. An application was completed via [employer] website by [facility]. Email confirmation received. Application [place] number is [id number].    Next outreach is scheduled 2/2/2024.  "

## 2024-02-01 ENCOUNTER — TELEPHONE (OUTPATIENT)
Dept: FAMILY MEDICINE CLINIC | Facility: CLINIC | Age: 64
End: 2024-02-01

## 2024-02-01 DIAGNOSIS — E11.9 TYPE 2 DIABETES MELLITUS WITHOUT COMPLICATION, WITHOUT LONG-TERM CURRENT USE OF INSULIN (HCC): ICD-10-CM

## 2024-02-01 DIAGNOSIS — G89.4 CHRONIC PAIN SYNDROME: ICD-10-CM

## 2024-02-01 DIAGNOSIS — F11.20 CONTINUOUS OPIOID DEPENDENCE (HCC): ICD-10-CM

## 2024-02-01 DIAGNOSIS — M54.42 CHRONIC LEFT-SIDED LOW BACK PAIN WITH LEFT-SIDED SCIATICA: ICD-10-CM

## 2024-02-01 DIAGNOSIS — G89.29 CHRONIC LEFT-SIDED LOW BACK PAIN WITH LEFT-SIDED SCIATICA: ICD-10-CM

## 2024-02-01 NOTE — TELEPHONE ENCOUNTER
Patient need refill of following medication     oxyCODONE (ROXICODONE) 10 MG       metFORMIN (GLUCOPHAGE) 500 mg

## 2024-02-02 ENCOUNTER — TELEPHONE (OUTPATIENT)
Dept: FAMILY MEDICINE CLINIC | Facility: CLINIC | Age: 64
End: 2024-02-02

## 2024-02-02 RX ORDER — OXYCODONE HYDROCHLORIDE 10 MG/1
10 TABLET ORAL 3 TIMES DAILY
Qty: 90 TABLET | Refills: 0 | Status: SHIPPED | OUTPATIENT
Start: 2024-02-02

## 2024-02-02 NOTE — TELEPHONE ENCOUNTER
PCP SIGNATURE NEEDED FOR Home Care Delivered FORM RECEIVED VIA FAX AND PLACED IN PCP FOLDER TO BE DELIVERED AT ASSIGNED TIMES.

## 2024-02-05 ENCOUNTER — OFFICE VISIT (OUTPATIENT)
Dept: FAMILY MEDICINE CLINIC | Facility: CLINIC | Age: 64
End: 2024-02-05

## 2024-02-05 ENCOUNTER — PATIENT OUTREACH (OUTPATIENT)
Dept: FAMILY MEDICINE CLINIC | Facility: CLINIC | Age: 64
End: 2024-02-05

## 2024-02-05 VITALS
WEIGHT: 191 LBS | OXYGEN SATURATION: 99 % | SYSTOLIC BLOOD PRESSURE: 130 MMHG | HEART RATE: 78 BPM | TEMPERATURE: 98.2 F | HEIGHT: 67 IN | DIASTOLIC BLOOD PRESSURE: 76 MMHG | BODY MASS INDEX: 29.98 KG/M2 | RESPIRATION RATE: 18 BRPM

## 2024-02-05 DIAGNOSIS — M54.42 CHRONIC BILATERAL LOW BACK PAIN WITH LEFT-SIDED SCIATICA: Primary | ICD-10-CM

## 2024-02-05 DIAGNOSIS — G89.29 CHRONIC BILATERAL LOW BACK PAIN WITH LEFT-SIDED SCIATICA: Primary | ICD-10-CM

## 2024-02-05 DIAGNOSIS — N40.1 BENIGN PROSTATIC HYPERPLASIA WITH LOWER URINARY TRACT SYMPTOMS, SYMPTOM DETAILS UNSPECIFIED: ICD-10-CM

## 2024-02-05 PROBLEM — R45.851 SUICIDAL INTENT: Status: RESOLVED | Noted: 2023-10-10 | Resolved: 2024-02-05

## 2024-02-05 PROCEDURE — 3075F SYST BP GE 130 - 139MM HG: CPT | Performed by: FAMILY MEDICINE

## 2024-02-05 PROCEDURE — 99214 OFFICE O/P EST MOD 30 MIN: CPT | Performed by: FAMILY MEDICINE

## 2024-02-05 PROCEDURE — 3078F DIAST BP <80 MM HG: CPT | Performed by: FAMILY MEDICINE

## 2024-02-05 NOTE — PROGRESS NOTES
Name: Marv Astorga      : 1960      MRN: [id number]  Encounter Provider: Audrey Ruvalcaba MD  Encounter Date: 2024   [facility]    Assessment & Plan     1. Chronic bilateral low back pain with left-sided sciatica  Assessment & Plan:  Ice, heat, massage, stretches  Continue Oxycodone 10 mg tid, Gabapentin, Methocarbamol, Lidocaine patches  Transport Chair order placed  Completed Disability Placard Form      2. Benign prostatic hyperplasia with lower urinary tract symptoms, symptom details unspecified  Assessment & Plan:  Referred for follow up to Urology  Repeat PSA before appointment    Orders:  -     Ambulatory Referral to Urology; Future  -     PSA Total, Diagnostic; Future         Subjective      HPI  Marv Astorga is a 63 y.o. male  who presented to the office today to follow up for chronic lower back pain.  Pt is using Oxycodone 10 mg three times daily.  He states that it does help to alleviate his pain.  He is also using lidocaine patches, gabapentin and topical creams.  Pt also requesting a disability placard.  Pt also at times is in need of a wheelchair when the pain is more  severe.  He has noticed in the cold winter months, he has increased pain and trouble getting up from sitting or lying down position.    Also pt states that he has urinary incontinence, and some difficulty with urination.  He was seen by Urology 2 years ago, dx with BPH, and takes Tamsulosin daily.    The following portions of the patient's history were reviewed and updated as appropriate: allergies, current medications, past family history, past medical history, past social history, past surgical history and problem list.    Review of Systems   Constitutional:  Negative for activity change, appetite change, chills and fever.   HENT:  Negative for congestion, rhinorrhea and sore throat.    Respiratory:  Negative for cough and shortness of breath.    Cardiovascular:   Negative for chest pain.   Gastrointestinal:  Negative for diarrhea, nausea and vomiting.   Musculoskeletal:  Positive for back pain and gait problem.   Skin:  Negative for rash.   Neurological:  Negative for dizziness and headaches.   Psychiatric/Behavioral:  Negative for sleep disturbance and suicidal ideas. The patient is not nervous/anxious.        Current Outpatient Medications on File Prior to Visit   Medication Sig   • albuterol (2.5 mg/3 mL) 0.083 % nebulizer solution Take 3 mL (2.5 mg total) by nebulization every 6 (six) hours as needed for wheezing or shortness of breath   • albuterol (ProAir HFA) 90 mcg/act inhaler Inhale 2 puffs every 6 (six) hours as needed for wheezing   • amLODIPine (NORVASC) 10 mg tablet Take 1 tablet (10 mg total) by mouth daily   • ARIPiprazole (ABILIFY) 2 mg tablet Take 1 tablet (2 mg total) by mouth daily Do not start before October 17, 2023.   • aspirin 325 mg tablet Take 1 tablet (325 mg total) by mouth daily   • atorvastatin (LIPITOR) 20 mg tablet Take 1 tablet (20 mg total) by mouth daily   • Blood Glucose Monitoring Suppl (OneTouch Verio) w/Device KIT Use daily   • Blood Glucose Monitoring Suppl KIT Use in the morning Please give test strips and lancets  Dx. E11.9   • Cholecalciferol (D3 PO) Take by mouth daily   • Diclofenac Sodium (VOLTAREN) 1 % Apply 2 g topically 4 (four) times a day   • Diclofenac Sodium (VOLTAREN) 1 % Apply 2 g topically 4 (four) times a day as needed (low back or neck pain)   • DULoxetine (CYMBALTA) 60 mg delayed release capsule Take 1 capsule (60 mg total) by mouth 2 (two) times a day   • famotidine (PEPCID) 20 mg tablet Take 1 tablet (20 mg total) by mouth daily   • fluticasone (FLONASE) 50 mcg/act nasal spray 1 spray into each nostril daily   • fluticasone-salmeterol (Advair HFA) 115-21 MCG/ACT inhaler Inhale 2 puffs 2 (two) times a day Rinse mouth after use.   • gabapentin (NEURONTIN) 800 mg tablet Take 1 tablet (800 mg total) by mouth 3 (three)  "times a day   • glucose blood (OneTouch Verio) test strip Check blood sugar daily   • glucose monitoring kit (FREESTYLE) monitoring kit Use 1 each in the morning TESTING DAILY IN THE AM   • Lancet Devices (ONE TOUCH DELICA LANCING DEV) MISC Use daily   • lidocaine (Lidoderm) 5 % Apply 1 patch topically over 12 hours daily Remove & Discard patch within 12 hours or as directed by MD   • lidocaine (LMX) 4 % cream Apply topically as needed for mild pain   • metFORMIN (GLUCOPHAGE) 500 mg tablet Take 1 tablet (500 mg total) by mouth 2 (two) times a day with meals   • methocarbamol (ROBAXIN) 500 mg tablet Take 1 tablet (500 mg total) by mouth 3 (three) times a day   • Mirabegron ER 50 MG TB24 Take 1 tablet (50 mg total) by mouth daily   • mirtazapine (REMERON) 15 mg tablet Take 1 tablet (15 mg total) by mouth daily at bedtime   • naloxone (NARCAN) 4 mg/0.1 mL nasal spray Administer 1 spray into a nostril. If no response after 2-3 minutes, give another dose in the other nostril using a new spray.   • Nerve Stimulator (TENS Therapy Pain Relief) EMILY Use as directed   • ondansetron (Zofran ODT) 4 mg disintegrating tablet Take 1 tablet (4 mg total) by mouth every 6 (six) hours as needed for nausea or vomiting   • OneTouch Delica Lancets 33G MISC Use daily   • oxyCODONE (ROXICODONE) 10 MG TABS Take 1 tablet (10 mg total) by mouth 3 (three) times a day Max Daily Amount: 30 mg   • tamsulosin (FLOMAX) 0.4 mg Take 1 capsule (0.4 mg total) by mouth daily with dinner   • zolpidem (AMBIEN) 5 mg tablet Take 1 tablet (5 mg total) by mouth daily at bedtime as needed for sleep       Objective     /76 (BP Location: Right arm, Patient Position: Sitting, Cuff Size: Standard)   Pulse 78   Temp 98.2 °F (36.8 °C) (Temporal)   Resp 18   Ht 5' 7\" (1.702 m)   Wt 86.6 kg (191 lb)   SpO2 99%   BMI 29.91 kg/m²     Physical Exam  Vitals and nursing note reviewed.   Constitutional:       General: He is not in acute distress.     " Appearance: Normal appearance. He is well-developed. He is obese.      Comments: Ambulating with rollator   HENT:      Head: Normocephalic and atraumatic.   Cardiovascular:      Rate and Rhythm: Normal rate.   Pulmonary:      Effort: Pulmonary effort is normal. No respiratory distress.   Neurological:      Mental Status: He is alert and oriented to person, place, and time.   Psychiatric:         Mood and Affect: Mood normal. Mood is not depressed. Affect is not tearful.         Speech: Speech normal.         Behavior: Behavior is cooperative.         Thought Content: Thought content does not include suicidal ideation. Thought content does not include suicidal plan.       Audrey Ruvalcaba MD

## 2024-02-05 NOTE — PROGRESS NOTES
Web:  2/5/2024    CMOC applied for [employer] services on [facility] website on behalf of Pt. Email confirmation received from [facility]. Application ID number is [id number].     [facility] [employer] services for transportation services.        Next outreach is scheduled for 2/12/2024.

## 2024-02-05 NOTE — PROGRESS NOTES
JEROD ALMARAZ was made aware by CM that pt was requesting a wheelchair. JEROD ALMARAZ called pt to follow up with him. Pt confirmed that he was looking for a wheelchair. JEROD ALMARAZ inquired if pt was seeking a manual or electric wheelchair and pt stated that he would prefer an electric wheelchair. JEROD ALMARAZ explained the process of getting a manual vs electric WC.     Pt reported that he does have a doctors appointment today. JEROD ALMARAZ sent pt's doctor a TT informing them that pt would like a wheel chair.     JEROD CM will continue to be available for any additional needs as requested.      Addendum    JEROD ALMARAZ received missed call from pt. No voicemail was left. JEROD ALMARAZ returned pt's call and the call went to voicemail. JEROD ALMARAZ left a message requesting a return call.   JEROD CM will continue to be available for any additional needs as requested.      JEROD ALMARAZ later received return call from pt. Pt shared that he spoke to his doctor and she would be ordering the wheelchair. JEROD ALMARAZ inquired how pt was doing. Pt stated that he spoke to his wife and she was going to be living with them and his dtr was going to be moving downstairs and his wife would be upstairs so they would both be splitting the rent so he would pay $750 and his wife would pay $750. Pt stated that he tried to get assistance from [employer] but they stated that he would have to move into something cheaper. Pt expressed frustration at the lack of assistance in the community. JEROD ALMARAZ provided supportive listening.     JEROD ALMARAZ will continue to be available for any additional needs as requested.

## 2024-02-05 NOTE — ASSESSMENT & PLAN NOTE
Ice, heat, massage, stretches  Continue Oxycodone 10 mg tid, Gabapentin, Methocarbamol, Lidocaine patches  Transport Chair order placed  Completed Disability Placard Form

## 2024-02-06 ENCOUNTER — PATIENT OUTREACH (OUTPATIENT)
Dept: FAMILY MEDICINE CLINIC | Facility: CLINIC | Age: 64
End: 2024-02-06

## 2024-02-06 LAB
DME PARACHUTE DELIVERY DATE ACTUAL: NORMAL
DME PARACHUTE DELIVERY DATE EXPECTED: NORMAL
DME PARACHUTE DELIVERY DATE REQUESTED: NORMAL
DME PARACHUTE ITEM DESCRIPTION: NORMAL
DME PARACHUTE ITEM DESCRIPTION: NORMAL
DME PARACHUTE ORDER STATUS: NORMAL
DME PARACHUTE SUPPLIER NAME: NORMAL
DME PARACHUTE SUPPLIER PHONE: NORMAL

## 2024-02-06 NOTE — PROGRESS NOTES
JEROD ALMARAZ received email from SANGEETA Osborn with contact information for pt's  mAara Robles [phone] requesting assistance with getting pt a wheelchair. She was requesting a script form provider so that she could order a wheelchair.     JEROD ALMARAZ made several attempts to fax the script to pt's [place], however, the fax failed each time. JEROD ALMARAZ attempted to call pt's [place], however, the call went to voicemail. JEROD ALMARAZ left a voicemail requesting a return call.     JEROD ALMARAZ will continue to be available for any additional needs as requested.      Addendum    JEROD ALMARAZ received missed call from pt's  with alternate fax number. JEROD ALMARAZ faxed script to new number provider and fax was completed. JEROD ALMARAZ attempted to call pt's , however, the call went to voicemail. JEROD ALMARAZ left a message. JEROD ALMARAZ will continue to be available for any additional needs as requested.

## 2024-02-09 NOTE — TELEPHONE ENCOUNTER
FAXED ON 02/09/24 TO [facility] Delivered/ Physician's Order at [phone]. FAX CONFIRMATION RECEIVED.

## 2024-02-12 ENCOUNTER — PATIENT OUTREACH (OUTPATIENT)
Dept: FAMILY MEDICINE CLINIC | Facility: CLINIC | Age: 64
End: 2024-02-12

## 2024-02-12 NOTE — PROGRESS NOTES
Outgoing Call:  2/12/2024    CMOC called John to inquire about status of [employer] application CMOC submitted one week ago. CMOC was placed on hold for 11 minutes prior to receiving assistance. Hans informed CMOC that they still have not processed Pt's application and it may take a few weeks before they do. Pt is not eligible for temp services as he does not have an appointment scheduled within the next two weeks. CMOC to f/u.    Next outreach is scheduled for 2/19/2024.

## 2024-02-19 ENCOUNTER — PATIENT OUTREACH (OUTPATIENT)
Dept: FAMILY MEDICINE CLINIC | Facility: CLINIC | Age: 64
End: 2024-02-19

## 2024-02-19 NOTE — PROGRESS NOTES
Outgoing Call:  2/19/2024    [facility] called John to check if Pt was scheduled for an evaluation to see if he is eligible for [facility] services. Hans informed they will need proof of age prior to scheduling. CMOC did scan proof of ID as requested.     Next outreach is scheduled for 2/23/2024.

## 2024-02-23 ENCOUNTER — TELEPHONE (OUTPATIENT)
Dept: FAMILY MEDICINE CLINIC | Facility: CLINIC | Age: 64
End: 2024-02-23

## 2024-02-23 ENCOUNTER — PATIENT OUTREACH (OUTPATIENT)
Dept: FAMILY MEDICINE CLINIC | Facility: CLINIC | Age: 64
End: 2024-02-23

## 2024-02-23 NOTE — TELEPHONE ENCOUNTER
Please schedule 40 ovl visit for evaluation /completion of [employer] disability.     Please route message to [employer] once pt is scheduled.     Thank you.

## 2024-02-26 ENCOUNTER — TELEPHONE (OUTPATIENT)
Dept: PSYCHIATRY | Facility: CLINIC | Age: 64
End: 2024-02-26

## 2024-02-26 NOTE — TELEPHONE ENCOUNTER
Patients Name: Marv Astorga  : 1960  Phone Number: [phone]  Appointment Date: 24  Appointment time: 5:00 pm   Address: [address] [place] [address] [address] [id number]  Drop off Facility/Office Name: [facility]  Drop off  Address: [address] [place] [address] [address] [address]  Cost Center: [id number]  Special notes/directions for : n/a  Note for scheduling team:n/a      Send to Ride Booking Pool: [id number] [facility]

## 2024-02-28 ENCOUNTER — SOCIAL WORK (OUTPATIENT)
Dept: BEHAVIORAL/MENTAL HEALTH CLINIC | Facility: CLINIC | Age: 64
End: 2024-02-28
Payer: MEDICARE

## 2024-02-28 ENCOUNTER — TELEPHONE (OUTPATIENT)
Dept: PSYCHIATRY | Facility: CLINIC | Age: 64
End: 2024-02-28

## 2024-02-28 DIAGNOSIS — F41.1 GAD (GENERALIZED ANXIETY DISORDER): ICD-10-CM

## 2024-02-28 DIAGNOSIS — F32.2 CURRENT SEVERE EPISODE OF MAJOR DEPRESSIVE DISORDER WITHOUT PSYCHOTIC FEATURES WITHOUT PRIOR EPISODE (HCC): Primary | ICD-10-CM

## 2024-02-28 PROCEDURE — 90837 PSYTX W PT 60 MINUTES: CPT | Performed by: SOCIAL WORKER

## 2024-02-28 NOTE — TELEPHONE ENCOUNTER
Patients Name: Marv Astorga  : 1960  Phone Number: [phone]  Appointment Date: 3/1/2024  Appointment time: 11:00 am   Address: [address] [place] [address] [address] [id number]  Drop off Facility/Office Name: [facility]  Drop off  Address: [address] [place], [place] [address] [address]  Cost Center: [id number]  Special notes/directions for : n/a  Note for scheduling team:n/a      Send to Ride Booking Pool: [id number] (Patient [employer])

## 2024-02-28 NOTE — PSYCH
"Behavioral Health Psychotherapy Progress Note    Psychotherapy Provided: Individual Psychotherapy     1. Current severe episode of major depressive disorder without psychotic features without prior episode (HCC)        2. SAHRA (generalized anxiety disorder)            Goals addressed in session: Goal 1 and Goal 2     DATA: Marv santos he remains depressed and anxious about life but denies suicidal ideation, plan or intent. He has serious pain in his shoulder and his back. His PCP is referring him for pain management. He discussed his 13 year old daughter who lives with him. Because his ex is living in his house he lost his food stamps but she has started to give him 500 dollars a month so he now has money for food. His daughter is in 8th grade. I provided support, encouragement and strategies to cope.   During this session, this clinician used the following therapeutic modalities: Client-centered Therapy, Cognitive Behavioral Therapy, Mindfulness-based Strategies, and Supportive Psychotherapy    Substance Abuse was not addressed during this session. If the client is diagnosed with a co-occurring substance use disorder, please indicate any changes in the frequency or amount of use: n/a. Stage of change for addressing substance use diagnoses: No substance use/Not applicable    ASSESSMENT:  Marv Astorga presents with a Anxious and Depressed mood.     his affect is anxious and depressed which is congruent, with his mood and the content of the session. The client has made progress on their goals.  Marv Astorga presents with a none risk of suicide, none risk of self-harm, and none risk of harm to others.    For any risk assessment that surpasses a \"low\" rating, a safety plan must be developed.    A safety plan was indicated: no  If yes, describe in detail n/a    PLAN: Between sessions, Marv Astorga will use mindfulness and CBT. At the next session, the therapist will use Client-centered Therapy, Cognitive Behavioral " Therapy, Mindfulness-based Strategies, and Supportive Psychotherapy to address issues and symptoms as they may arise.    Behavioral Health Treatment Plan and Discharge Planning: Marv Astorga is aware of and agrees to continue to work on their treatment plan. They have identified and are working toward their discharge goals. yes    Visit start and stop times:    02/28/24  Start Time: 1700  Stop Time: 1800  Total Visit Time: 60 minutes

## 2024-02-28 NOTE — PSYCH
PSYCHIATRIC EVALUATION     [facility] - PSYCHIATRIC ASSOCIATES    Name and Date of Birth:  Marv Astorga 63 y.o. 1960    Date of Visit: March 1, 2024    Reason for visit:  Establish outpatient continuity of care s/p psychiatric admission.    HPI     Marv is a 63 y.o. male with a history of Severe major Depression without psychotic features, Generalized Anxiety Disorder, Asthma, Lt sided Low Back Pain with Lt Sciatica, Lumbar Radiculopathy-- (ambulating with a rollator since 2022 per 1/24/2023 neurology notation),  Continuous Opioid Dependence, DM type 2, Benign Essential HTN, Hyperlipidemia, BPH.  Pt is s/p psychiatric hospitalization from 10/10/2023 - 10/17/2023 at [facility] -- on a 201 commitment, due to increasing depression with SI and desire to shoot himself and the welfare man because of his financial state and medical issues. Over the preceding year, his depression had been increasing, with further worsening in the several weeks prior to presentation, due to chronic pain, falls, and increased difficulty ambulating (already using a walker).  Pt is on SSI and lives with 14y/o daughter and had recently allowed his wife (from whom he was ) to come live with them due to her need to have a place to be after her anticipated surgery since she would not be able to climb steps afterwards.  When her income was added in, his SSI cash and foodstamps were both reduced and he could no longer make his bills. His home health care benefit was also removed. The last straw was finding out his bank acct was overdrawn. Before admission, Pt had been getting prescribed Duloxetine from his PCP -- this was continued and Aripiprazole added for mood Tx augmentation.  Pt improved and Discharge Rxs: Aripiprazole 2mg qd for mood, Duloxetine 60mg bid for mood and pain mgt, Gabapentin 600mg tid for anxiety and pain mgt, Mirtazapine 15mg qhs for mood and insomnia, and Trazodone  "50mg qhs for insomnia.          Pt started psychotherapy with Gregory Bay LCSW on 10/30/2023-- all recent notes and Tx plan were reviewed.         Pt presents for psychiatric evaluation with primary c/o / Area of need:  \"I just get depressed aggressive, my daughter said I yell at her sometimes.\"  He had begun to fee more irritable, depressed and anxious since approx 7/2023 due to inability to pay bills/rent, problems with his ex.   His life circumstances at that time also lead to increased depression and anxiety and he was no longer able to see his psychiatrist due to [facility] having closed down.  His Sxs resulted in a psychiatric admission during which medication  changes were made (Aripiprazole was added).  He feels the changes worked but he has not been on the Aripiprazole since approx 12/2023 due to having run out of the SGA.  He is unsure when he last took the Mirtazapine and Duloxetine and was unaware that his PCP renewed the antidepressant medicines.  He knows he had not run out of the Gabapentin.  Pt is currently still on SSI and still lives with his 14y/o daughter and wife lives upstairs--(emotionally  though still ).  Pt is paying his bills with assistance from [employer], family or friends help out as well.  He he does not always have enough food for himself, but ensures his 14y/o daughter has enough.  His wife is now contributing $500 a month.  His chronic  pains (from disk \"Dislocation\" and diabetic neuropathy) are an ongoing stressor and Pt needs to get up from his chair and walk, change positions in seat-- though some of this is due to anxiety as well.  His last panic attack was a few days ago.  All present depression, anxiety, and panic Sxs are as described in below Hx with exception that Pt denies any present death wishes or SI, and also denies self-injurious behaviors, physically violent behaviors, HI, access to firearms, manic Sxs, or VH, TH, OH, or paranoia.  He spoke of potential AH " "of a voice of someone talking to him -- but this only happens when he is at home and when he is there alone.  Pt reports compliance to psychiatric medications without SE.       HPI ROS Appetite Changes and Sleep: decreased sleep, --due to pain, depression and anxiety, decreased appetite, decreased energy      Review Of Systems:    Constitutional feeling tired   ENT negative   Cardiovascular as noted in HPI   Respiratory as noted in HPI   Gastrointestinal negative   Genitourinary negative   Musculoskeletal as noted in HPI   Integumentary negative   Neurological as noted in HPI   Endocrine negative   Other Symptoms Sweats with panic attacks, all other systems are negative       Past Psychiatric History:   Pt was born in [place] and grew up with biological parents, 4 sisters and 3 brother.  Pt is the fourth eldest.  He describes his upbringing as \"Happy\" and he was close to parents and siblings. His father moved to the [place] in approx [place].  [place] in appro[place] to be with his father and seek opportunities.  His siblings and mom followed him later-- in approx 1986.    Depression started 2022:  sadness, crying, anhedonia, self-isolating, insomnia, reduced appetite with Wt loss, impaired energy, motivation, and concentration, difficulty making decisions, feelings of guilt-(Pt stated he did not remember about this), feelings of hopelessness with passive SI wanting to be dead, and also overt SI of wanting to shoot himself, but no attempt.       Psychotic Sxs:  +AH of someone talking to him -- but this only tends to happen when he is at home and when he is there alone, which is why he does not like to be alone.  He has had hypnagogic visions of shadows.  He otherwise denies VH, TH, OH, paranoia or bizarre delusions.               Nasreen:  Pt reports of some irritable moods for a couple of days, without other manic type     Anxiety started in 2022 incited by family issues and finances: excessive worry more days " "than not for longer than 3 months, The Sxs can occur without concommittent depressive Sxs., difficulty concentrating, fatigue, insomnia, irritability, restlessness/keyed up, and muscle tension and pain in chest, irritability.    Panic attacks started in 10/2023 while inpatient, due to \"Too much pressure, I couldn't handle it.\"  Has had several since then.  Sxs: sweating, trembling, shortness of breath, choking sensation, chest pain/pressure, dizzy/light headed, chills, feels paralyzed-- like he cannot move his body and sometimes collapses.  He once fell and cut his Lt hand requiring sutures.     Social Anxiety symptoms started 7/2023 -- \"Sometimes I don't think people is good.\"  When asked if he felt a sense of judgment or embarrassment by others, he stated \"I don't know.\"         Eating Disorder symptoms: no historical or current eating disorder. no binge eating disorder; no anorexia nervosa. no symptoms of bulimia    Pt denies h/o PTSD or OCD Sxs    Prior psychiatrists:   [facility] in 2022 -- due to depression and anxiety Sxs.  That facility shut down  1st one seen in [place] in approx 2019 -- Pt unsure of why he went    Prior psychotherapists:  Gregory Bay LCSW 10/30/2023 -- ongoing  [facility] 5/26/2022 - 2023    Past Hospitalizations:  1st and only one: 10/10/2023 - 10/17/2023 at [facility] -- on a 201 commitment, due to increasing depression with SI and desire to shoot himself and the welfare man because of his financial state and medical issues. Over the preceding year, his depression had been increasing due to chronic pain, falls, and increased difficulty ambulating (already using a walker).  Pt is on SSI and lives with 12y/o daughter and had recently allowed his wife (from whom he was ) to come live with them due to her need to have a place to be after her anticipated surgery since she would not be able to climb steps afterwards.  When her income was added in, his SSI " cash and foodstamps were both reduced and he could no longer make his bills. His home health care benefit was also removed. The last straw was finding out his bank acct was overdrawn. Before admission, Pt had been getting prescribed Duloxetine from his PCP -- this was continued and Aripiprazole added for mood Tx augmentation.  Pt improved and Discharge Rxs: Aripiprazole 2mg qd for mood, Duloxetine 60mg bid for mood and pain mgt, Gabapentin 600mg tid for anxiety and pain mgt, Mirtazapine 15mg qhs for mood and insomnia, and Trazodone 50mg qhs for insomnia.     Pt had denied suicide attempts, self-injurious behaviors, physically violent behaviors, ECT, TMS, or legal or  Hx     Prior Rx trials:  Amitriptyline 25mg (for lumbar radiculopathy) , Sertraline 25mg, Venlafaxine ER up to 150mg (? Why stopped) ,  Duloxetine up to 60mg bid (for mood and neuropathy and helped both), Mirtazapine up to 15mg (helped), Aripiprazole 2mg (helped), Trazodone up to 50mg (helped), Zolpidem 5mg (helped), Melatonin 3mg (helped), Gabapentin up to 800mg tid for neuropathic pain (helps) ,     Abuse Hx: Pt denies any h/o physical, sexual or emotional abuse    Trauma Hx: Pt denies       Family Psychiatric History:     Family History   Problem Relation Age of Onset    Hypertension Mother     Diabetes Mother     Cancer Father     Diabetes Family     Hypertension Family        Substance Use History:    Social History     Substance and Sexual Activity   Drug Use Never     Additional Substance Use Detail       Questions Responses    Problems Due to Past Use of Alcohol? No    Problems Due to Past Use of Substances? No    Substance Use Assessment Denies substance use within the past 12 months    Alcohol Use Frequency Denies use in past 12 months    Cannabis frequency Never used    Comment:  Never used on 10/10/2023     Heroin Frequency Denies use in past 12 months    Cocaine frequency Never used    Comment:  Never used on 10/10/2023     Crack  Cocaine Frequency Denies use in past 12 months    Methamphetamine Frequency Denies use in past 12 months    Narcotic Frequency Denies use in past 12 months    Benzodiazepine Frequency Denies use in past 12 months    Amphetamine frequency Denies use in past 12 months    Barbituate Frequency Denies use use in past 12 months    Inhalant frequency Never used    Comment:  Never used on 10/10/2023     Hallucinogen frequency Never used    Comment:  Never used on 10/10/2023     Ecstasy frequency Never used    Comment:  Never used on 10/10/2023     Other drug frequency Never used    Comment:  Never used on 10/10/2023     Opiate frequency Denies use in past 12 months    Not reviewed.          Social History:    Social History     Socioeconomic History    Marital status: /Civil Union     Spouse name: Not on file    Number of children: 7    Years of education: Not on file    Highest education level: Not on file   Occupational History    Not on file   Tobacco Use    Smoking status: Never    Smokeless tobacco: Never   Vaping Use    Vaping status: Never Used   Substance and Sexual Activity    Alcohol use: Not Currently     Comment: rare    Drug use: Never    Sexual activity: Not Currently   Other Topics Concern    Not on file   Social History Narrative    Home: Lives with his 12y/o daughter in a rental house owned by an elder daughter    Children:  3 sons, 4 daughters            Educational:    Pt does not know if he reached childhood milestones on times.  He denies h/o learning disabilities    Highest grade completed: 7th     No college     Social Determinants of Health     Financial Resource Strain: High Risk (1/8/2024)    Overall Financial Resource Strain (CARDIA)     Difficulty of Paying Living Expenses: Hard   Food Insecurity: Food Insecurity Present (1/8/2024)    Hunger Vital Sign     Worried About Running Out of Food in the Last Year: Often true     Ran Out of Food in the Last Year: Often true   Transportation Needs:  No Transportation Needs (1/8/2024)    PRAPARE - Transportation     Lack of Transportation (Medical): No     Lack of Transportation (Non-Medical): No   Physical Activity: Not on file   Stress: Not on file   Social Connections: Not on file   Intimate Partner Violence: Not on file   Housing Stability: Unknown (6/16/2022)    Housing Stability Vital Sign     Unable to Pay for Housing in the Last Year: No     Number of Places Lived in the Last Year: Not on file     Unstable Housing in the Last Year: No     Past Medical History:    History of Seizures: no  History of Head injury with loss of consciousness: no    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    Depression     Diabetes mellitus (HCC)     Hyperlipidemia     Hypertension     Suicidal intent     Type 2 diabetes mellitus without complication, without long-term current use of insulin ([facility]) 10/16/2020     Past Surgical History:   Procedure Laterality Date    CYST REMOVAL  2017     Allergies:    No Known Allergies  History Review:    The following portions of the patient's history were reviewed and updated as appropriate: allergies, current medications, past family history, past medical history, past social history, past surgical history, and problem list.    OBJECTIVE:      Mental Status Evaluation:    Appearance casually dressed, adequate grooming, good eye contact   Behavior pleasant, cooperative, calm, but with some degree of anxious bearing-- changes position/fidgets in seat partly seems due to anxiety and partly to pain   Speech fluent, coherent, soft, normal rate   Mood depressed, anxious, irritable   Affect normal range and intensity   Thought Processes organized, coherent, goal directed, negative, worrisome, preoccupied with medical and other life stressors   Associations intact associations   Thought Content no overt delusions   Perceptual Disturbances: no  visual hallucinations, does not appear responding to internal stimuli.  ? Of AH.  No paranoia   Abnormal Thoughts  Risk Potential Suicidal ideation - None  Homicidal ideation - None  Potential for aggression - No   Orientation oriented to person, place, situation, day of week, date, month of year, and year   Memory short term memory grossly intact   Cosciousness alert and awake   Attention Span Fair   Intellect appears to be of average intelligence   Insight fair   Judgement fair   Muscle Strength and  Gait Slow, antalgic at times, ambulates with cane   Language no difficulty naming common objects, no difficulty repeating a phrase   Fund of Knowledge adequate knowledge of current events  adequate fund of knowledge regarding past history  adequate fund of knowledge regarding vocabulary   Pt able to name the president of the [place]   Pain severe   Pain Scale 9/10 in lower back and Lt leg       Laboratory Results: I have personally reviewed all pertinent laboratory/tests results.  COVID19:   Lab Results   Component Value Date    SARSCOV2 Negative 09/20/2023     Drug Screen:   Lab Results   Component Value Date    AMPMETHUR Negative 10/10/2023    BARBTUR Negative 10/10/2023    BDZUR Negative 10/10/2023    THCUR Negative 10/10/2023    COCAINEUR Negative 10/10/2023    METHADONEUR Negative 10/10/2023    OPIATEUR Negative 10/10/2023    PCPUR Negative 10/10/2023     Vitamin D Level   Lab Results   Component Value Date    XETQ04YTOWBL 40.9 10/11/2023     Vitamin B12   Lab Results   Component Value Date    ABJOBYSF60 467 10/11/2023     Folate   Lab Results   Component Value Date    FOLATE 19.3 10/11/2023     Magnesium   Lab Results   Component Value Date    MG 2.0 09/20/2023     Urinalysis   Lab Results   Component Value Date    COLORU Straw 10/10/2023    CLARITYU Clear 10/10/2023    SPECGRAV 1.010 10/10/2023    PHUR 6.5 10/10/2023    LEUKOCYTESUR Negative 10/10/2023    NITRITE Negative 10/10/2023    PROTEIN UA Negative  10/10/2023    GLUCOSEU Negative 10/10/2023    KETONESU Negative 10/10/2023    UROBILINOGEN Negative 10/10/2023    BILIRUBINUR Negative 10/10/2023    BLOODU 10.0 (A) 10/10/2023    RBCUA 0-1 10/10/2023    WBCUA None Seen 10/10/2023    EPIS None Seen 10/10/2023    BACTERIA None Seen 10/10/2023     EKG   Lab Results   Component Value Date    VENTRATE 76 10/10/2023    ATRIALRATE 76 10/10/2023    PRINT 196 10/10/2023    QRSDINT 82 10/10/2023    QTINT 400 10/10/2023    QTCINT 450 10/10/2023    PAXIS 60 10/10/2023    QRSAXIS 47 10/10/2023    TWAVEAXIS 31 10/10/2023     Coagulation Panel   Lab Results   Component Value Date    DDIMER 0.36 03/27/2022     Cardiac Profile   Lab Results   Component Value Date    TROPONINI <0.01 08/20/2021       Assessment/Plan:     Diagnoses and all orders for this visit:    Current severe episode of major depressive disorder without prior episode (HCC)  -     ARIPiprazole (ABILIFY) 2 mg tablet; Take 1 tablet (2 mg total) by mouth daily    Generalized anxiety disorder    Chronic low back pain without sciatica, unspecified back pain laterality    Chronic bilateral low back pain with left-sided sciatica    Insomnia, unspecified type  -     traZODone (DESYREL) 50 mg tablet; Take 1 tablet (50 mg total) by mouth daily at bedtime          Plan:  Pt is having depression, anxiety, also irritability-- mainly associated with the previous 2 conditions, and panic attacks.  Working Dxs are as listed above. Tx options discussed.  Pt is s/p hospital discharge and feels his current regimen is helpful, with recent resurgence related to a gap in taking his medicines.  He would like them restarted.  His PCP more recently increased Gabapentin to 800mg tid.  Treatment plan done and Pt accepts the plan.  Paper Safety Plan given for Pt to fill out and return.   Restart:   Aripiprazole 2mg (1) tab po qd # 30 R1  Trazodone 50mg (1) tab po qhs # 30 R1  Duloxetine 60mg (1) cap po bid -- Pt given a Rx for Qty 60 with R5 by  PCP on 1/5/2024  Mirtazapine 15mg (1) tab po qhs -- Pt given a Rx for Qty 30 with R5 by PCP on 1/5/2024  Gabapentin 800mg (1) tab po tid -- Pt given a Rx for Qty 90 with R5 by PCP on 1/5/2024  Pt to continue counseling with Gregory SUGGSW   F/U with [facility] Mgt 3/12/2024 as scheduled for John Sampson with Dr Ruvalcaba  Return 4-6 weeks.  Can call any time sooner prn.  Pt made aware of the 24-7 on call service line.      Risks/Benefits/Precautions:      Risks, Benefits And Possible Side Effects Of Medications:    Risks, benefits, and possible side effects of medications explained to Marv and he verbalizes understanding and agreement for treatment.    Controlled Medication Discussion:     Marv has been filling controlled prescriptions on time as prescribed according to [place] Prescription Drug Monitoring Program-- Oxycodone last filled 2/2/2024 by another provider, per PDMP    Oneyda Smith PA-C    Visit Time    Visit Start Time: 11:34AM  Visit Stop Time: 1:30PM  Total Visit Duration:  116 minutes

## 2024-02-29 ENCOUNTER — TELEPHONE (OUTPATIENT)
Dept: FAMILY MEDICINE CLINIC | Facility: CLINIC | Age: 64
End: 2024-02-29

## 2024-02-29 DIAGNOSIS — M54.42 CHRONIC LEFT-SIDED LOW BACK PAIN WITH LEFT-SIDED SCIATICA: ICD-10-CM

## 2024-02-29 DIAGNOSIS — G89.29 CHRONIC LEFT-SIDED LOW BACK PAIN WITH LEFT-SIDED SCIATICA: ICD-10-CM

## 2024-02-29 DIAGNOSIS — G89.4 CHRONIC PAIN SYNDROME: ICD-10-CM

## 2024-02-29 DIAGNOSIS — F11.20 CONTINUOUS OPIOID DEPENDENCE (HCC): ICD-10-CM

## 2024-02-29 NOTE — TELEPHONE ENCOUNTER
Patient in need refills for:        oxyCODONE (ROXICODONE) 10 MG TABS .      Please advise.    Patient aware he is not due til 3/2/24

## 2024-03-01 ENCOUNTER — PATIENT OUTREACH (OUTPATIENT)
Dept: FAMILY MEDICINE CLINIC | Facility: CLINIC | Age: 64
End: 2024-03-01

## 2024-03-01 ENCOUNTER — OFFICE VISIT (OUTPATIENT)
Dept: PSYCHIATRY | Facility: CLINIC | Age: 64
End: 2024-03-01
Payer: MEDICARE

## 2024-03-01 ENCOUNTER — RA CDI HCC (OUTPATIENT)
Dept: OTHER | Facility: HOSPITAL | Age: 64
End: 2024-03-01

## 2024-03-01 VITALS — WEIGHT: 194 LBS | HEIGHT: 67 IN | BODY MASS INDEX: 30.45 KG/M2

## 2024-03-01 DIAGNOSIS — G47.00 INSOMNIA, UNSPECIFIED TYPE: ICD-10-CM

## 2024-03-01 DIAGNOSIS — G89.29 CHRONIC LOW BACK PAIN WITHOUT SCIATICA, UNSPECIFIED BACK PAIN LATERALITY: ICD-10-CM

## 2024-03-01 DIAGNOSIS — F32.2 CURRENT SEVERE EPISODE OF MAJOR DEPRESSIVE DISORDER WITHOUT PRIOR EPISODE (HCC): Primary | ICD-10-CM

## 2024-03-01 DIAGNOSIS — M54.50 CHRONIC LOW BACK PAIN WITHOUT SCIATICA, UNSPECIFIED BACK PAIN LATERALITY: ICD-10-CM

## 2024-03-01 DIAGNOSIS — F41.1 GENERALIZED ANXIETY DISORDER: ICD-10-CM

## 2024-03-01 DIAGNOSIS — G89.29 CHRONIC BILATERAL LOW BACK PAIN WITH LEFT-SIDED SCIATICA: ICD-10-CM

## 2024-03-01 DIAGNOSIS — M54.42 CHRONIC BILATERAL LOW BACK PAIN WITH LEFT-SIDED SCIATICA: ICD-10-CM

## 2024-03-01 PROCEDURE — 90792 PSYCH DIAG EVAL W/MED SRVCS: CPT | Performed by: PHYSICIAN ASSISTANT

## 2024-03-01 RX ORDER — OXYCODONE HYDROCHLORIDE 10 MG/1
10 TABLET ORAL 3 TIMES DAILY
Qty: 90 TABLET | Refills: 0 | Status: SHIPPED | OUTPATIENT
Start: 2024-03-01

## 2024-03-01 RX ORDER — TRAZODONE HYDROCHLORIDE 50 MG/1
50 TABLET ORAL
Qty: 30 TABLET | Refills: 1 | Status: SHIPPED | OUTPATIENT
Start: 2024-03-01

## 2024-03-01 RX ORDER — ARIPIPRAZOLE 2 MG/1
2 TABLET ORAL DAILY
Qty: 30 TABLET | Refills: 1 | Status: SHIPPED | OUTPATIENT
Start: 2024-03-01 | End: 2024-03-03

## 2024-03-01 NOTE — PROGRESS NOTES
In Basket:  3/1/2024    CMOC received an IB message from MR, Soraida MARTINES, informing Pt has a scheduled [facility] evaluation with Dr. Ruvalcaba on 3/12/2024 at 3 PM. CMOC to call Pt day prior to confirm appointment. Chart reviewed.     Next outreach is scheduled for 3/11/2024.

## 2024-03-01 NOTE — BH TREATMENT PLAN
"TREATMENT PLAN (Medication Management Only)        [facility] - [facility]YCHIATRIC [facility]    Name/Date of Birth/MRN:  Mrav Astorga 63 y.o. 1960 MRN: [id number]  Date of Treatment Plan: March 1, 2024  Diagnosis/Diagnoses:   1. Current severe episode of major depressive disorder without prior episode (HCC)    2. Generalized anxiety disorder    3. Chronic low back pain without sciatica, unspecified back pain laterality    4. Chronic bilateral low back pain with left-sided sciatica    5. Insomnia, unspecified type      Strengths/Personal Resources for Self-Care: \"My daughter\".  Area/Areas of need (in own words): \"I just get depressed aggressive, my daughter said I yell at her sometimes \".  1. Long Term Goal:   Maintain mood stability and control of anxiety  Target Date:  3-6 months  Person/Persons responsible for completion of goal: Gregory Adam LCSW (therapist)  2.  Short Term Objective (s) - How will we reach this goal?:   A.  Provider new recommended medication/dosage changes and/or continue medication(s):  Pt is having depression, anxiety, also irritability-- mainly associated with the previous 2 conditions, and panic attacks.  Working Dxs are as listed above. Tx options discussed.  Pt is s/p hospital discharge and feels his current regimen is helpful, with recent resurgence related to a gap in taking his medicines.  He would like them restarted.  His PCP more recently increased Gabapentin to 800mg tid.  Treatment plan done and Pt accepts the plan.  Paper Safety Plan given for Pt to fill out and return.   Restart:   Aripiprazole 2mg (1) tab po qd # 30 R1  Trazodone 50mg (1) tab po qhs # 30 R1  Duloxetine 60mg (1) cap po bid -- Pt given a Rx for Qty 60 with R5 by PCP on 1/5/2024  Mirtazapine 15mg (1) tab po qhs -- Pt given a Rx for Qty 30 with R5 by PCP on 1/5/2024  Gabapentin 800mg (1) tab po tid -- Pt given a Rx for Qty 90 with R5 by PCP on 1/5/2024  Pt to " continue counseling with Gregory Bay LCSW   F/U with [facility] 3/12/2024 as scheduled for John Sampson with Dr Ruvalcaba  Return 4-6 weeks.  Can call any time sooner prn.  Pt made aware of the 24-7 on call service line.    Target Date:  3-6 months  Person/Persons Responsible for Completion of Goal: Marv and Gregory Call   Progress Towards Goals: stable, continuing treatment  Treatment Modality:  Medication mgt and psychotherapy  Review due 180 days from date of this plan: 6 months - 9/1/2024  Expected length of service: ongoing treatment unless revised  My Physician/PA/NP and I have developed this plan together and I agree to work on the goals and objectives. I understand the treatment goals that were developed for my treatment.  Electronic Signatures: on file (unless signed below)    Oneyda Smith PA-C 03/01/24

## 2024-03-03 DIAGNOSIS — F32.2 CURRENT SEVERE EPISODE OF MAJOR DEPRESSIVE DISORDER WITHOUT PRIOR EPISODE (HCC): ICD-10-CM

## 2024-03-03 RX ORDER — ARIPIPRAZOLE 2 MG/1
2 TABLET ORAL DAILY
Qty: 90 TABLET | Refills: 0 | Status: SHIPPED | OUTPATIENT
Start: 2024-03-03

## 2024-03-08 ENCOUNTER — OFFICE VISIT (OUTPATIENT)
Dept: FAMILY MEDICINE CLINIC | Facility: CLINIC | Age: 64
End: 2024-03-08

## 2024-03-08 ENCOUNTER — TELEPHONE (OUTPATIENT)
Age: 64
End: 2024-03-08

## 2024-03-08 VITALS
TEMPERATURE: 98.9 F | DIASTOLIC BLOOD PRESSURE: 86 MMHG | OXYGEN SATURATION: 98 % | RESPIRATION RATE: 18 BRPM | HEIGHT: 67 IN | WEIGHT: 194 LBS | BODY MASS INDEX: 30.45 KG/M2 | HEART RATE: 78 BPM | SYSTOLIC BLOOD PRESSURE: 134 MMHG

## 2024-03-08 DIAGNOSIS — N41.1 CHRONIC PROSTATITIS: ICD-10-CM

## 2024-03-08 DIAGNOSIS — M19.019 OSTEOARTHRITIS OF AC (ACROMIOCLAVICULAR) JOINT: ICD-10-CM

## 2024-03-08 DIAGNOSIS — M25.511 CHRONIC RIGHT SHOULDER PAIN: Primary | ICD-10-CM

## 2024-03-08 DIAGNOSIS — R30.0 DYSURIA: ICD-10-CM

## 2024-03-08 DIAGNOSIS — G89.29 CHRONIC RIGHT SHOULDER PAIN: Primary | ICD-10-CM

## 2024-03-08 DIAGNOSIS — Z23 ENCOUNTER FOR IMMUNIZATION: ICD-10-CM

## 2024-03-08 PROBLEM — U09.9 POST-ACUTE SEQUELAE OF COVID-19 (PASC): Status: RESOLVED | Noted: 2021-09-10 | Resolved: 2024-03-08

## 2024-03-08 PROBLEM — N13.8 BPH WITH OBSTRUCTION/LOWER URINARY TRACT SYMPTOMS: Status: RESOLVED | Noted: 2020-10-16 | Resolved: 2024-03-08

## 2024-03-08 PROBLEM — M62.838 NECK MUSCLE SPASM: Status: RESOLVED | Noted: 2022-03-18 | Resolved: 2024-03-08

## 2024-03-08 PROBLEM — M54.42 LOW BACK PAIN WITH LEFT-SIDED SCIATICA: Status: RESOLVED | Noted: 2021-03-03 | Resolved: 2024-03-08

## 2024-03-08 PROBLEM — Z00.8 MEDICAL CLEARANCE FOR PSYCHIATRIC ADMISSION: Status: RESOLVED | Noted: 2023-08-15 | Resolved: 2024-03-08

## 2024-03-08 PROBLEM — Z59.9 FINANCIAL DIFFICULTIES: Status: RESOLVED | Noted: 2024-01-14 | Resolved: 2024-03-08

## 2024-03-08 PROBLEM — M62.838 MUSCLE SPASM OF LEFT LOWER EXTREMITY: Status: RESOLVED | Noted: 2023-03-16 | Resolved: 2024-03-08

## 2024-03-08 PROBLEM — N40.1 BPH WITH OBSTRUCTION/LOWER URINARY TRACT SYMPTOMS: Status: RESOLVED | Noted: 2020-10-16 | Resolved: 2024-03-08

## 2024-03-08 LAB
SL AMB  POCT GLUCOSE, UA: NORMAL
SL AMB LEUKOCYTE ESTERASE,UA: NEGATIVE
SL AMB POCT BILIRUBIN,UA: NEGATIVE
SL AMB POCT BLOOD,UA: POSITIVE
SL AMB POCT CLARITY,UA: CLEAR
SL AMB POCT COLOR,UA: YELLOW
SL AMB POCT KETONES,UA: NEGATIVE
SL AMB POCT NITRITE,UA: NEGATIVE
SL AMB POCT PH,UA: 6
SL AMB POCT SPECIFIC GRAVITY,UA: 1.01
SL AMB POCT URINE PROTEIN: NEGATIVE
SL AMB POCT UROBILINOGEN: NEGATIVE

## 2024-03-08 PROCEDURE — 99214 OFFICE O/P EST MOD 30 MIN: CPT | Performed by: FAMILY MEDICINE

## 2024-03-08 PROCEDURE — 81002 URINALYSIS NONAUTO W/O SCOPE: CPT | Performed by: FAMILY MEDICINE

## 2024-03-08 PROCEDURE — 3075F SYST BP GE 130 - 139MM HG: CPT | Performed by: FAMILY MEDICINE

## 2024-03-08 PROCEDURE — 3079F DIAST BP 80-89 MM HG: CPT | Performed by: FAMILY MEDICINE

## 2024-03-08 PROCEDURE — G2211 COMPLEX E/M VISIT ADD ON: HCPCS | Performed by: FAMILY MEDICINE

## 2024-03-08 RX ORDER — LEVOFLOXACIN 500 MG/1
500 TABLET, FILM COATED ORAL EVERY 24 HOURS
Qty: 14 TABLET | Refills: 0 | Status: SHIPPED | OUTPATIENT
Start: 2024-03-08 | End: 2024-03-22

## 2024-03-08 NOTE — PROGRESS NOTES
Name: Marv Astorga      : 1960      MRN: [id number]  Encounter Provider: Audrey Ruvalcaba MD  Encounter Date: 3/8/2024   Encounter department: [facility]SE [facility]    Assessment & Plan     1. Chronic right shoulder pain  Assessment & Plan:  Shoulder Xray : AC joint osteoarthritis  Continue topical cream, massage and stretches  Pt declines PT at this time  Will refer to Ortho for possible steroid inj    Orders:  -     Ambulatory Referral to Orthopedic Surgery; Future    2. Osteoarthritis of AC (acromioclavicular) joint  -     Ambulatory Referral to Orthopedic Surgery; Future    3. Chronic prostatitis  Assessment & Plan:  Last episode   POCT Urine: -Leukocytes, -nitrites,  + blood  Start Levofloxacin for 2 weeks  Has f/u with Urology    Orders:  -     levofloxacin (LEVAQUIN) 500 mg tablet; Take 1 tablet (500 mg total) by mouth every 24 hours for 14 days    4. Encounter for immunization  Assessment & Plan:  Pneumococcal vaccine unavailable in office today, will defer to neck schedule visit      5. Dysuria  -     POCT urine dip           Subjective        Marv Astorga is a 63 y.o. male  has a past medical history of Asthma, Back pain, Back pain, BPH with obstruction/lower urinary tract symptoms, Depression, Diabetes mellitus (HCC), Hyperlipidemia, Hypertension, Suicidal intent, and Type 2 diabetes mellitus without complication, without long-term current use of insulin (HCC). who presented to the office today with one week history of tingling during urination.  He does not describe it as burning or pain,    He has an appt with Ophthalmology on 3/22/2024 for dilated DM exam.    Pt states still has chronic  back pain and also now his right shoulder is painful at times.  He uses the cream, massages it and tries to do stretching exercises.    Pt has caregiver coming to the home, for 12 hours daily. Pt coordinator will come 3/19/2024.      The following portions of the patient's  history were reviewed and updated as appropriate: allergies, current medications, past family history, past medical history, past social history, past surgical history and problem list.    Review of Systems   Constitutional:  Negative for activity change, appetite change, chills and fever.   HENT:  Negative for congestion, rhinorrhea and sore throat.    Respiratory:  Negative for cough and shortness of breath.    Cardiovascular:  Negative for chest pain.   Gastrointestinal:  Negative for diarrhea, nausea and vomiting.   Musculoskeletal:  Positive for back pain and gait problem.   Skin:  Negative for rash.   Neurological:  Negative for dizziness and headaches.   Psychiatric/Behavioral:  Negative for sleep disturbance and suicidal ideas. The patient is not nervous/anxious.        Current Outpatient Medications on File Prior to Visit   Medication Sig   • albuterol (2.5 mg/3 mL) 0.083 % nebulizer solution Take 3 mL (2.5 mg total) by nebulization every 6 (six) hours as needed for wheezing or shortness of breath   • albuterol (ProAir HFA) 90 mcg/act inhaler Inhale 2 puffs every 6 (six) hours as needed for wheezing   • amLODIPine (NORVASC) 10 mg tablet Take 1 tablet (10 mg total) by mouth daily   • ARIPiprazole (ABILIFY) 2 mg tablet take 1 tablet by mouth once daily   • aspirin 325 mg tablet Take 1 tablet (325 mg total) by mouth daily   • atorvastatin (LIPITOR) 20 mg tablet Take 1 tablet (20 mg total) by mouth daily   • Blood Glucose Monitoring Suppl (OneTouch Verio) w/Device KIT Use daily   • Blood Glucose Monitoring Suppl KIT Use in the morning Please give test strips and lancets  Dx. E11.9   • Cholecalciferol (D3 PO) Take by mouth daily   • Diclofenac Sodium (VOLTAREN) 1 % Apply 2 g topically 4 (four) times a day   • Diclofenac Sodium (VOLTAREN) 1 % Apply 2 g topically 4 (four) times a day as needed (low back or neck pain)   • DULoxetine (CYMBALTA) 60 mg delayed release capsule Take 1 capsule (60 mg total) by mouth 2  (two) times a day   • famotidine (PEPCID) 20 mg tablet Take 1 tablet (20 mg total) by mouth daily   • fluticasone (FLONASE) 50 mcg/act nasal spray 1 spray into each nostril daily   • fluticasone-salmeterol (Advair HFA) 115-21 MCG/ACT inhaler Inhale 2 puffs 2 (two) times a day Rinse mouth after use.   • gabapentin (NEURONTIN) 800 mg tablet Take 1 tablet (800 mg total) by mouth 3 (three) times a day   • glucose blood (OneTouch Verio) test strip Check blood sugar daily   • glucose monitoring kit (FREESTYLE) monitoring kit Use 1 each in the morning TESTING DAILY IN THE AM   • Lancet Devices (ONE TOUCH DELICA LANCING DEV) MISC Use daily   • lidocaine (Lidoderm) 5 % Apply 1 patch topically over 12 hours daily Remove & Discard patch within 12 hours or as directed by MD   • lidocaine (LMX) 4 % cream Apply topically as needed for mild pain   • metFORMIN (GLUCOPHAGE) 500 mg tablet Take 1 tablet (500 mg total) by mouth 2 (two) times a day with meals   • methocarbamol (ROBAXIN) 500 mg tablet Take 1 tablet (500 mg total) by mouth 3 (three) times a day   • Mirabegron ER 50 MG TB24 Take 1 tablet (50 mg total) by mouth daily   • mirtazapine (REMERON) 15 mg tablet Take 1 tablet (15 mg total) by mouth daily at bedtime   • naloxone (NARCAN) 4 mg/0.1 mL nasal spray Administer 1 spray into a nostril. If no response after 2-3 minutes, give another dose in the other nostril using a new spray.   • Nerve Stimulator (TENS Therapy Pain Relief) EMILY Use as directed   • ondansetron (Zofran ODT) 4 mg disintegrating tablet Take 1 tablet (4 mg total) by mouth every 6 (six) hours as needed for nausea or vomiting   • OneTouch Delica Lancets 33G MISC Use daily   • oxyCODONE (ROXICODONE) 10 MG TABS Take 1 tablet (10 mg total) by mouth 3 (three) times a day Max Daily Amount: 30 mg   • tamsulosin (FLOMAX) 0.4 mg Take 1 capsule (0.4 mg total) by mouth daily with dinner   • traZODone (DESYREL) 50 mg tablet Take 1 tablet (50 mg total) by mouth daily at  "bedtime       Objective     /86 (BP Location: Left arm, Patient Position: Sitting, Cuff Size: Standard)   Pulse 78   Temp 98.9 °F (37.2 °C) (Temporal)   Resp 18   Ht 5' 7\" (1.702 m)   Wt 88 kg (194 lb)   SpO2 98%   BMI 30.38 kg/m²     Physical Exam  Vitals and nursing note reviewed.   Constitutional:       General: He is not in acute distress.     Appearance: Normal appearance. He is well-developed. He is obese.      Comments: Ambulating with rollator   HENT:      Head: Normocephalic and atraumatic.   Cardiovascular:      Rate and Rhythm: Normal rate.   Pulmonary:      Effort: Pulmonary effort is normal. No respiratory distress.   Musculoskeletal:      Right shoulder: Tenderness present. Decreased range of motion.   Neurological:      Mental Status: He is alert and oriented to person, place, and time.   Psychiatric:         Mood and Affect: Mood normal. Mood is not depressed. Affect is not tearful.         Speech: Speech normal.         Behavior: Behavior is cooperative.         Thought Content: Thought content does not include suicidal ideation. Thought content does not include suicidal plan.       Audrey Ruvalcaba MD    "

## 2024-03-08 NOTE — ASSESSMENT & PLAN NOTE
Shoulder Xray 2023: AC joint osteoarthritis  Continue topical cream, massage and stretches  Pt declines PT at this time  Will refer to Ortho for possible steroid inj

## 2024-03-08 NOTE — ASSESSMENT & PLAN NOTE
Last episode 2022  POCT Urine: -Leukocytes, -nitrites,  + blood  Start Levofloxacin for 2 weeks  Has f/u with Urology

## 2024-03-11 ENCOUNTER — TELEPHONE (OUTPATIENT)
Dept: PSYCHIATRY | Facility: CLINIC | Age: 64
End: 2024-03-11

## 2024-03-11 ENCOUNTER — PATIENT OUTREACH (OUTPATIENT)
Dept: FAMILY MEDICINE CLINIC | Facility: CLINIC | Age: 64
End: 2024-03-11

## 2024-03-11 NOTE — TELEPHONE ENCOUNTER
Patients Name: Marv Astorga  : 1960  Phone Number: [phone]  Appointment Date: 3/13/24  Appointment time: 9:00 am   Address: [address] [place] [address] Dr Anil [place] [id number]  Drop off Facility/Office Name: [facility]  Drop off  Address: [address] [place] [address] [address]IA Guo [address]  Cost Center: [id number]  Special notes/directions for : n/a  Note for scheduling team:n/a      Send to Ride Booking Pool: [id number] [facility]

## 2024-03-11 NOTE — PROGRESS NOTES
Outgoing Call:  3/11/2024    Chart reviewed. CMOC called Pt and reminded him of scheduled appointment with his PCP tomorrow to complete [facility] evaluation. Pt confirmed he will be attending. CMOC reminded Pt to bring his disability packet with him which he received from [facility]. CMOC also informed Pt to leave packet at  or with PCP as CMOC will need to forward documents to [facility] for review. Pt expressed understanding. CMOC to f/u.     Next outreach is scheduled for 3/13/2024.

## 2024-03-12 ENCOUNTER — OFFICE VISIT (OUTPATIENT)
Dept: FAMILY MEDICINE CLINIC | Facility: CLINIC | Age: 64
End: 2024-03-12

## 2024-03-12 VITALS
WEIGHT: 196 LBS | TEMPERATURE: 97.6 F | DIASTOLIC BLOOD PRESSURE: 70 MMHG | BODY MASS INDEX: 30.7 KG/M2 | SYSTOLIC BLOOD PRESSURE: 120 MMHG | OXYGEN SATURATION: 98 % | HEART RATE: 87 BPM

## 2024-03-12 DIAGNOSIS — G89.29 CHRONIC LEFT-SIDED LOW BACK PAIN WITH LEFT-SIDED SCIATICA: Primary | ICD-10-CM

## 2024-03-12 DIAGNOSIS — F11.20 CONTINUOUS OPIOID DEPENDENCE (HCC): ICD-10-CM

## 2024-03-12 DIAGNOSIS — M54.42 CHRONIC LEFT-SIDED LOW BACK PAIN WITH LEFT-SIDED SCIATICA: Primary | ICD-10-CM

## 2024-03-12 DIAGNOSIS — Z02.89 ENCOUNTER FOR COMPLETION OF FORM WITH PATIENT: ICD-10-CM

## 2024-03-12 DIAGNOSIS — M54.16 RADICULOPATHY, LUMBAR REGION: ICD-10-CM

## 2024-03-12 DIAGNOSIS — G89.4 CHRONIC PAIN SYNDROME: ICD-10-CM

## 2024-03-12 PROBLEM — R20.2 PARESTHESIA OF BOTH HANDS: Status: RESOLVED | Noted: 2022-03-14 | Resolved: 2024-03-12

## 2024-03-12 PROBLEM — R20.2 PARESTHESIA OF BOTH FEET: Status: RESOLVED | Noted: 2023-03-16 | Resolved: 2024-03-12

## 2024-03-12 PROBLEM — Z78.9 NEED FOR FOLLOW-UP BY SOCIAL WORKER: Status: RESOLVED | Noted: 2022-06-15 | Resolved: 2024-03-12

## 2024-03-12 PROCEDURE — 99214 OFFICE O/P EST MOD 30 MIN: CPT | Performed by: FAMILY MEDICINE

## 2024-03-12 PROCEDURE — 3078F DIAST BP <80 MM HG: CPT | Performed by: FAMILY MEDICINE

## 2024-03-12 PROCEDURE — 3074F SYST BP LT 130 MM HG: CPT | Performed by: FAMILY MEDICINE

## 2024-03-12 PROCEDURE — G2211 COMPLEX E/M VISIT ADD ON: HCPCS | Performed by: FAMILY MEDICINE

## 2024-03-12 RX ORDER — OXYCODONE HYDROCHLORIDE 10 MG/1
10 TABLET ORAL 3 TIMES DAILY
Qty: 30 TABLET | Refills: 0 | Status: SHIPPED | OUTPATIENT
Start: 2024-03-12

## 2024-03-12 NOTE — PROGRESS NOTES
Name: Marv Astorga      : 1960      MRN: [id number]  Encounter Provider: uAdrey Ruvalcaba MD  Encounter Date: 3/12/2024   [facility]    Assessment & Plan     1. Chronic left-sided low back pain with left-sided sciatica  Assessment & Plan:  Ice, heat, massage, stretches  Continue Oxycodone 10 mg tid, Gabapentin, Methocarbamol, Lidocaine patches  Continue f/u with pain management  Discussion again with patient about his chronic opoid use, and the need to try alternative methods of pain control  Pt agreeable to a chiropractor referral    [employer] application completed      Orders:  -     oxyCODONE (ROXICODONE) 10 MG TABS; Take 1 tablet (10 mg total) by mouth 3 (three) times a day Max Daily Amount: 30 mg  -     Ambulatory Referral to Chiropractic; Future    2. Radiculopathy, lumbar region  -     Ambulatory Referral to Chiropractic; Future    3. Chronic pain syndrome  -     oxyCODONE (ROXICODONE) 10 MG TABS; Take 1 tablet (10 mg total) by mouth 3 (three) times a day Max Daily Amount: 30 mg    4. Continuous opioid dependence (HCC)  -     oxyCODONE (ROXICODONE) 10 MG TABS; Take 1 tablet (10 mg total) by mouth 3 (three) times a day Max Daily Amount: 30 mg    5. Encounter for completion of form with patient    [employer] application completed with the patient.       Subjective      HPI  Marv Astorga is a 63 y.o. male  who presented to the office today to complete a form for [employer] application.    Pt has a h/o of chronic lower back pain and ambulates with a rollator walker.  He has a  h/o of falls in the past with ambulatory dysfunction in the past.    Pt brings in today a medication bottle with about 20 oxycodone light blue tablets that are soft and disintegrating when touched.  He states that when the caregiver was placing them in his pill box, some of then got wet, and she placed the extras back into the bottle.  This made he rest of them  also wet and due to the humidity they are dissolving.  Pt requesting replacement of the oxycodone tablets.    Review of Systems   Constitutional:  Negative for activity change, appetite change, chills and fever.   HENT:  Negative for congestion, rhinorrhea and sore throat.    Respiratory:  Negative for cough and shortness of breath.    Cardiovascular:  Negative for chest pain.   Gastrointestinal:  Negative for diarrhea, nausea and vomiting.   Musculoskeletal:  Positive for back pain and gait problem.   Skin:  Negative for rash.   Neurological:  Negative for dizziness and headaches.   Psychiatric/Behavioral:  Negative for sleep disturbance and suicidal ideas. The patient is not nervous/anxious.        Current Outpatient Medications on File Prior to Visit   Medication Sig   • albuterol (2.5 mg/3 mL) 0.083 % nebulizer solution Take 3 mL (2.5 mg total) by nebulization every 6 (six) hours as needed for wheezing or shortness of breath   • albuterol (ProAir HFA) 90 mcg/act inhaler Inhale 2 puffs every 6 (six) hours as needed for wheezing   • amLODIPine (NORVASC) 10 mg tablet Take 1 tablet (10 mg total) by mouth daily   • ARIPiprazole (ABILIFY) 2 mg tablet take 1 tablet by mouth once daily   • aspirin 325 mg tablet Take 1 tablet (325 mg total) by mouth daily   • atorvastatin (LIPITOR) 20 mg tablet Take 1 tablet (20 mg total) by mouth daily   • Blood Glucose Monitoring Suppl (OneTouch Verio) w/Device KIT Use daily   • Blood Glucose Monitoring Suppl KIT Use in the morning Please give test strips and lancets  Dx. E11.9   • Cholecalciferol (D3 PO) Take by mouth daily   • Diclofenac Sodium (VOLTAREN) 1 % Apply 2 g topically 4 (four) times a day   • Diclofenac Sodium (VOLTAREN) 1 % Apply 2 g topically 4 (four) times a day as needed (low back or neck pain)   • DULoxetine (CYMBALTA) 60 mg delayed release capsule Take 1 capsule (60 mg total) by mouth 2 (two) times a day   • famotidine (PEPCID) 20 mg tablet Take 1 tablet (20 mg  total) by mouth daily   • fluticasone (FLONASE) 50 mcg/act nasal spray 1 spray into each nostril daily   • fluticasone-salmeterol (Advair HFA) 115-21 MCG/ACT inhaler Inhale 2 puffs 2 (two) times a day Rinse mouth after use.   • gabapentin (NEURONTIN) 800 mg tablet Take 1 tablet (800 mg total) by mouth 3 (three) times a day   • glucose blood (OneTouch Verio) test strip Check blood sugar daily   • glucose monitoring kit (FREESTYLE) monitoring kit Use 1 each in the morning TESTING DAILY IN THE AM   • Lancet Devices (ONE TOUCH DELICA LANCING DEV) MISC Use daily   • levofloxacin (LEVAQUIN) 500 mg tablet Take 1 tablet (500 mg total) by mouth every 24 hours for 14 days   • lidocaine (Lidoderm) 5 % Apply 1 patch topically over 12 hours daily Remove & Discard patch within 12 hours or as directed by MD   • lidocaine (LMX) 4 % cream Apply topically as needed for mild pain   • metFORMIN (GLUCOPHAGE) 500 mg tablet Take 1 tablet (500 mg total) by mouth 2 (two) times a day with meals   • methocarbamol (ROBAXIN) 500 mg tablet Take 1 tablet (500 mg total) by mouth 3 (three) times a day   • Mirabegron ER 50 MG TB24 Take 1 tablet (50 mg total) by mouth daily   • mirtazapine (REMERON) 15 mg tablet Take 1 tablet (15 mg total) by mouth daily at bedtime   • naloxone (NARCAN) 4 mg/0.1 mL nasal spray Administer 1 spray into a nostril. If no response after 2-3 minutes, give another dose in the other nostril using a new spray.   • Nerve Stimulator (TENS Therapy Pain Relief) EMILY Use as directed   • ondansetron (Zofran ODT) 4 mg disintegrating tablet Take 1 tablet (4 mg total) by mouth every 6 (six) hours as needed for nausea or vomiting   • OneTouch Delica Lancets 33G MISC Use daily   • tamsulosin (FLOMAX) 0.4 mg Take 1 capsule (0.4 mg total) by mouth daily with dinner   • traZODone (DESYREL) 50 mg tablet Take 1 tablet (50 mg total) by mouth daily at bedtime       Objective     /70 (BP Location: Left arm, Patient Position: Sitting, Cuff  Size: Standard)   Pulse 87   Temp 97.6 °F (36.4 °C) (Temporal)   Wt 88.9 kg (196 lb)   SpO2 98%   BMI 30.70 kg/m²     Physical Exam  Vitals and nursing note reviewed.   Constitutional:       General: He is not in acute distress.     Appearance: Normal appearance. He is well-developed. He is obese.      Comments: Ambulating with rollator   HENT:      Head: Normocephalic and atraumatic.   Cardiovascular:      Rate and Rhythm: Normal rate.   Pulmonary:      Effort: Pulmonary effort is normal. No respiratory distress.   Musculoskeletal:      Thoracic back: Spasms and tenderness present.      Lumbar back: Spasms and tenderness present.      Right lower leg: No edema.      Left lower leg: No edema.   Neurological:      Mental Status: He is alert and oriented to person, place, and time.   Psychiatric:         Mood and Affect: Mood normal. Mood is not depressed. Affect is not tearful.         Speech: Speech normal.         Behavior: Behavior is cooperative.         Thought Content: Thought content does not include suicidal ideation. Thought content does not include suicidal plan.       Audrey Ruvalcaba MD

## 2024-03-13 ENCOUNTER — PATIENT OUTREACH (OUTPATIENT)
Dept: FAMILY MEDICINE CLINIC | Facility: CLINIC | Age: 64
End: 2024-03-13

## 2024-03-13 ENCOUNTER — SOCIAL WORK (OUTPATIENT)
Dept: BEHAVIORAL/MENTAL HEALTH CLINIC | Facility: CLINIC | Age: 64
End: 2024-03-13

## 2024-03-13 DIAGNOSIS — F32.2 CURRENT SEVERE EPISODE OF MAJOR DEPRESSIVE DISORDER WITHOUT PSYCHOTIC FEATURES WITHOUT PRIOR EPISODE (HCC): Primary | ICD-10-CM

## 2024-03-13 DIAGNOSIS — F41.1 GAD (GENERALIZED ANXIETY DISORDER): ICD-10-CM

## 2024-03-13 NOTE — PSYCH
"Behavioral Health Psychotherapy Progress Note    Psychotherapy Provided: Individual Psychotherapy     1. Current severe episode of major depressive disorder without psychotic features without prior episode (HCC)        2. SAHRA (generalized anxiety disorder)            Goals addressed in session: Goal 1 and Goal 2     DATA: Marv discussed how his back pain affects his sleeping. He said the family is ok. He recently met with Oneyda and he shared he started the medications on Friday. He shared he is still dealing with the depression and the anxiety. I provided support, encouragement and strategies to cope. I also made a referral to our / to see what they can help him with social service wise.   During this session, this clinician used the following therapeutic modalities: Client-centered Therapy, Cognitive Behavioral Therapy, Mindfulness-based Strategies, and Supportive Psychotherapy    Substance Abuse was not addressed during this session. If the client is diagnosed with a co-occurring substance use disorder, please indicate any changes in the frequency or amount of use: n/a. Stage of change for addressing substance use diagnoses: No substance use/Not applicable    ASSESSMENT:  Marv Astorga presents with a Anxious and Depressed mood.     his affect is anxious and depressed  which is congruent, with his mood and the content of the session. The client has not made progress on their goals because of his chronic pain and the financial situation his family is dealing with.      Marv Astorga presents with a none risk of suicide, none risk of self-harm, and none risk of harm to others.    For any risk assessment that surpasses a \"low\" rating, a safety plan must be developed.    A safety plan was indicated: no  If yes, describe in detail n/a    PLAN: Between sessions, Marv Astorga will use mindfulness and CBT. At the next session, the therapist will use Client-centered Therapy, Cognitive " Behavioral Therapy, Mindfulness-based Strategies, and Supportive Psychotherapy to address issues and symptoms as they may arise. .    Behavioral Health Treatment Plan and Discharge Planning: Marv Astorga is aware of and agrees to continue to work on their treatment plan. They have identified and are working toward their discharge goals. yes    Visit start and stop times:    03/13/24  Start Time: 0900  Stop Time: 1000  Total Visit Time: 60 minutes

## 2024-03-14 NOTE — PROGRESS NOTES
Email:  3/13/2024    CMOC received Pt's disability packet for [facility] services which was completed by his PCP on 3/12/24. CMOC reviewed and scanned/emailed to [facility] for review. [facility] does have 21 days to make decision on whether Pt is eligible for services.     Next outreach is scheduled for 3/20/2024.

## 2024-03-15 ENCOUNTER — OFFICE VISIT (OUTPATIENT)
Dept: DENTISTRY | Facility: CLINIC | Age: 64
End: 2024-03-15

## 2024-03-15 VITALS — DIASTOLIC BLOOD PRESSURE: 86 MMHG | TEMPERATURE: 98.7 F | HEART RATE: 76 BPM | SYSTOLIC BLOOD PRESSURE: 135 MMHG

## 2024-03-15 DIAGNOSIS — K08.109 TEETH MISSING: Primary | ICD-10-CM

## 2024-03-15 PROCEDURE — D5899 UNSPECIFIED REMOVABLE PROSTHODONTIC PROCEDURE, BY REPORT: HCPCS

## 2024-03-15 NOTE — ASSESSMENT & PLAN NOTE
Ice, heat, massage, stretches  Continue Oxycodone 10 mg tid, Gabapentin, Methocarbamol, Lidocaine patches  Continue f/u with pain management  Discussion again with patient about his chronic opoid use, and the need to try alternative methods of pain control  Pt agreeable to a chiropractor referral    [facility] application completed

## 2024-03-15 NOTE — DENTAL PROCEDURE DETAILS
Pt presents for a dental Denture treatment for bite reg  and verbally consents for treatment:    Reviewed health history-  Pt is ASA type III  Treatment consents signed: Yes   Perio: Gingivitis   Pain Scale: 0   Caries Assessment: Low    Radiographs: Films are current  Oral Hygiene instruction reviewed and given  Hygiene recall visits recommended to the pt.    Framework tried intraorally individually. Maxillary frame work adjusted till fully seated and not in occulusion. Mandibular frame seated without need for adjustment. Bite rim wax applied to edentulous sections of frameworks. Notched rims and obtained bite registration with bluprint PVS. Selected shade A3 to NDX lab .  Pt left satisfied and ambulatory.     NV: wax try in

## 2024-03-20 ENCOUNTER — PATIENT OUTREACH (OUTPATIENT)
Dept: FAMILY MEDICINE CLINIC | Facility: CLINIC | Age: 64
End: 2024-03-20

## 2024-03-24 ENCOUNTER — TELEPHONE (OUTPATIENT)
Dept: PSYCHIATRY | Facility: CLINIC | Age: 64
End: 2024-03-24

## 2024-03-24 NOTE — TELEPHONE ENCOUNTER
Patients Name: Marv Astorga  : 1960  Phone Number: [phone]  Appointment Date: 3/27/24  Appointment time: 9:00 pm   Address: [address] [place] [address] Dr Anil [place] [id number]  Drop off Facility/Office Name: [facility]  Drop off  Address: [address] [place] [address] Kadeem Guo [address]  Cost Center: [phone]  Special notes/directions for : n/a   Note for scheduling team:n/a      Send to Ride Booking Pool: [id number] [facility]

## 2024-03-27 ENCOUNTER — SOCIAL WORK (OUTPATIENT)
Dept: BEHAVIORAL/MENTAL HEALTH CLINIC | Facility: CLINIC | Age: 64
End: 2024-03-27

## 2024-03-27 ENCOUNTER — PATIENT OUTREACH (OUTPATIENT)
Dept: FAMILY MEDICINE CLINIC | Facility: CLINIC | Age: 64
End: 2024-03-27

## 2024-03-27 DIAGNOSIS — F41.1 GAD (GENERALIZED ANXIETY DISORDER): ICD-10-CM

## 2024-03-27 DIAGNOSIS — F32.2 CURRENT SEVERE EPISODE OF MAJOR DEPRESSIVE DISORDER WITHOUT PSYCHOTIC FEATURES WITHOUT PRIOR EPISODE (HCC): Primary | ICD-10-CM

## 2024-03-27 NOTE — PROGRESS NOTES
"Outgoing Call:  3/27/2024    CMOC called John to check on status of Pt's [employer] application. Hans informed they are on day 13 of 21 to make a decision. He states Pt scored \"conditional\" which means Pt was approved however it has not been entered into their system as of today. CMOC to f/u.     Next outreach is scheduled for 3/29/2024.  "

## 2024-03-27 NOTE — PSYCH
"MEDICATION MANAGEMENT NOTE        [facility] - PSYCHIATRIC ASSOCIATES      Name and Date of Birth:  Marv Astorga 63 y.o. 1960    Date of Visit: April 4, 2024    HPI:    Marv Astorga is here for medication review with primary c/o:  \"Low mood sometimes.  I still jump out of my sleep at night sometimes.\"  He is depressed \"Always\" with feelings of sadness, self-isolating, vegetative Sxs, hopelessness-- mainly related to his back pain.  Pt's eye contact is poor and he mumbles frequently.  He has a home health aid 7 days a week who helps him bathe regularly and clean. He is \"Stressed\" with insomnia but denies worry over finances anymore-- he tries not to let that bother him.  No new mood or anxiety triggers.  He gets panic attacks which mostly occur when he is alone due to fear of something happening to him.  Panic Sxs: severe anxiety, sweating, trembling, SOB, choking sensation and chest pain/pressure, dizzy/light headed, chills and a paralyzed feeling.  He reports the panic is sometimes instigated by VH of people around him.  He gets guarded about questions regarding other hallucinations and eventually admits to  of someone talking to him.  Pt meets with friends approx q other week, but they talk by phone every week, and one friend calls him daily.  Pt presently denies self-injurious thoughts/behaviors, SI, HI, access to firearms, or TH, GH, OH, or paranoia.  Pt to continue counseling with [facility] LCSW whose recent note I reviewed.   Last Tx plan was 3/1/2024.    Appetite Changes and Sleep: decreased sleep, fluctuating appetite, decreased energy    Review Of Systems:      Constitutional feeling tired   ENT negative   Cardiovascular as noted in HPI   Respiratory as noted in HPI   Gastrointestinal negative   Genitourinary negative   Musculoskeletal negative   Integumentary negative   Neurological as noted in HPI   Endocrine negative   Other Symptoms Sweating with panic attacks, " "all other systems are negative       Past Psychiatric History:   As copied from my 3/1/2024 note with updates as needed:  \" [ Pt was born in [place] and grew up with biological parents, 4 sisters and 3 brother.  Pt is the fourth eldest.  He describes his upbringing as \"Happy\" and he was close to parents and siblings. His father moved to the [place] in approx [place].  [place] in appro[place] to be with his father and seek opportunities.  His siblings and mom followed him later-- in approx 1986.     Depression started 2022:  sadness, crying, anhedonia, self-isolating, insomnia, reduced appetite with Wt loss, impaired energy, motivation, and concentration, difficulty making decisions, feelings of guilt-(Pt stated he did not remember about this), feelings of hopelessness with passive SI wanting to be dead, and also overt SI of wanting to shoot himself, but no attempt.        Psychotic Sxs:  +AH of someone talking to him -- but this only tends to happen when he is at home and when he is there alone, which is why he does not like to be alone.  He has had hypnagogic visions of shadows.  He otherwise denies VH, TH, OH, paranoia or bizarre delusions.                Nasreen:  Pt reports of some irritable moods for a couple of days, without other manic type      Anxiety started in 2022 incited by family issues and finances: excessive worry more days than not for longer than 3 months, The Sxs can occur without concommittent depressive Sxs., difficulty concentrating, fatigue, insomnia, irritability, restlessness/keyed up, and muscle tension and pain in chest, irritability.     Panic attacks started in 10/2023 while inpatient, due to \"Too much pressure, I couldn't handle it.\"  Has had several since then.  Sxs: sweating, trembling, shortness of breath, choking sensation, chest pain/pressure, dizzy/light headed, chills, feels paralyzed-- like he cannot move his body and sometimes collapses.  He once fell and cut his Lt hand " "requiring sutures.      Social Anxiety symptoms started 7/2023 -- \"Sometimes I don't think people is good.\"  When asked if he felt a sense of judgment or embarrassment by others, he stated \"I don't know.\"          Eating Disorder symptoms: no historical or current eating disorder. no binge eating disorder; no anorexia nervosa. no symptoms of bulimia     Pt denies h/o PTSD or OCD Sxs     Prior psychiatrists:   [facility] in 2022 -- due to depression and anxiety Sxs.  That facility shut down  1st one seen in [place] in approx 2019 -- Pt unsure of why he went     Prior psychotherapists:  [facility] LCSW 10/30/2023 -- ongoing  [facility] 5/26/2022 - 2023     Past Hospitalizations:  1st and only one: 10/10/2023 - 10/17/2023 at [facility] -- on a 201 commitment, due to increasing depression with SI and desire to shoot himself and the welfare man because of his financial state and medical issues. Over the preceding year, his depression had been increasing due to chronic pain, falls, and increased difficulty ambulating (already using a walker).  Pt is on SSI and lives with 14y/o daughter and had recently allowed his wife (from whom he was ) to come live with them due to her need to have a place to be after her anticipated surgery since she would not be able to climb steps afterwards.  When her income was added in, his SSI cash and foodstamps were both reduced and he could no longer make his bills. His home health care benefit was also removed. The last straw was finding out his bank acct was overdrawn. Before admission, Pt had been getting prescribed Duloxetine from his PCP -- this was continued and Aripiprazole added for mood Tx augmentation.  Pt improved and Discharge Rxs: Aripiprazole 2mg qd for mood, Duloxetine 60mg bid for mood and pain mgt, Gabapentin 600mg tid for anxiety and pain mgt, Mirtazapine 15mg qhs for mood and insomnia, and Trazodone 50mg qhs for insomnia.      Pt had " "denied suicide attempts, self-injurious behaviors, physically violent behaviors, ECT, TMS, or legal or  Hx      Prior Rx trials:  Amitriptyline 25mg (for lumbar radiculopathy) , Sertraline 25mg, Venlafaxine ER up to 150mg (? Why stopped) ,  Duloxetine up to 60mg bid (for mood and neuropathy and helped both), Mirtazapine up to 15mg (helped), Aripiprazole 2mg (helped), Trazodone up to 50mg (helped), Zolpidem 5mg (helped), Melatonin 3mg (helped), Gabapentin up to 800mg tid for neuropathic pain (helps) ,      Abuse Hx: Pt denies any h/o physical, sexual or emotional abuse     Trauma Hx: Pt denies                      ] \"      Past Medical History:    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    Depression     Diabetes mellitus (HCC)     Hyperlipidemia     Hypertension     Suicidal intent     Type 2 diabetes mellitus without complication, without long-term current use of insulin ([facility]) 10/16/2020       Substance Abuse History:    Social History     Substance and Sexual Activity   Alcohol Use Not Currently    Comment: rare     Social History     Substance and Sexual Activity   Drug Use Never       Social History:    Social History     Socioeconomic History    Marital status: /Civil Union     Spouse name: Not on file    Number of children: 7    Years of education: Not on file    Highest education level: Not on file   Occupational History    Not on file   Tobacco Use    Smoking status: Never     Passive exposure: Never    Smokeless tobacco: Never   Vaping Use    Vaping status: Never Used   Substance and Sexual Activity    Alcohol use: Not Currently     Comment: rare    Drug use: Never    Sexual activity: Not Currently   Other Topics Concern    Not on file   Social History Narrative    Home: Lives with his 14y/o daughter in a rental house owned by an elder daughter    Children:  3 sons, 4 daughters            " Educational:    Pt does not know if he reached childhood milestones on times.  He denies h/o learning disabilities    Highest grade completed: 7th     No college     Social Determinants of Health     Financial Resource Strain: High Risk (1/8/2024)    Overall Financial Resource Strain (CARDIA)     Difficulty of Paying Living Expenses: Hard   Food Insecurity: Food Insecurity Present (1/8/2024)    Hunger Vital Sign     Worried About Running Out of Food in the Last Year: Often true     Ran Out of Food in the Last Year: Often true   Transportation Needs: No Transportation Needs (1/8/2024)    PRAPARE - Transportation     Lack of Transportation (Medical): No     Lack of Transportation (Non-Medical): No   Physical Activity: Not on file   Stress: Not on file   Social Connections: Not on file   Intimate Partner Violence: Not on file   Housing Stability: Unknown (6/16/2022)    Housing Stability Vital Sign     Unable to Pay for Housing in the Last Year: No     Number of Places Lived in the Last Year: Not on file     Unstable Housing in the Last Year: No       Family Psychiatric History:     Family History   Problem Relation Age of Onset    Hypertension Mother     Diabetes Mother     Cancer Father     Diabetes Family     Hypertension Family        History Review: The following portions of the patient's history were reviewed and updated as appropriate: allergies, current medications, past family history, past medical history, past social history, past surgical history, and problem list.         OBJECTIVE:     Mental Status Evaluation:    Appearance Casually dresssed, Poor eye contact, adequate hygiene   Behavior Calm, cooperative, withdrawn but pleasant, with anxious bearing   Speech Soft, mumbling frequently, but clarifies when asked.  Normal rate   Mood Depressed, anxious   Affect Mildly constricted   Thought Processes Organized, negative, ruminative, negative, does not completely understand why his mood is still so depressed  "and repeatedly stated \"I just want to be happy again.\"   Associations Intact   Thought Content No delusions   Perceptual Disturbances: VH, AH, but no other hallucinations or paranoia.  Pt does not appear to be RIS   Abnormal Thoughts  Risk Potential Suicidal ideation - None  Homicidal ideation - None  Potential for aggression - No   Orientation oriented to person, place, situation, day of week, and year   Memory short term memory Fair   Cosciousness alert and awake   Attention Span attention span and concentration are age appropriate   Intellect appears to be of average intelligence   Insight fair   Judgement fair   Muscle Strength and  Gait Slow, steady with cane   Language no difficulty naming common objects, no difficulty repeating a phrase   Fund of Knowledge adequate knowledge of current events  adequate fund of knowledge regarding past history  adequate fund of knowledge regarding vocabulary    Pain severe   Pain Scale 8/10 back pains       Laboratory Results: I have personally reviewed all pertinent laboratory/tests results.  Most Recent Labs:   Lab Results   Component Value Date    WBC 5.47 10/11/2023    RBC 4.97 10/11/2023    HGB 14.2 10/11/2023    HCT 41.4 10/11/2023     10/11/2023    RDW 13.3 10/11/2023    NEUTROABS 3.69 10/11/2023    SODIUM 136 10/11/2023    K 4.4 10/11/2023     10/11/2023    CO2 25 10/11/2023    BUN 17 10/11/2023    CREATININE 1.07 10/11/2023    GLUC 167 (H) 10/11/2023    GLUF 167 (H) 10/11/2023    CALCIUM 9.5 10/11/2023    AST 21 10/10/2023    ALT 37 10/10/2023    ALKPHOS 57 10/10/2023    TP 8.3 10/10/2023    ALB 5.0 10/10/2023    TBILI 0.78 10/10/2023    CHOLESTEROL 141 10/11/2023    HDL 52 10/11/2023    TRIG 92 10/11/2023    LDLCALC 71 10/11/2023    NONHDLC 89 10/11/2023    UVG7VXMLFBQI 0.985 10/10/2023    FREET4 0.88 04/21/2022    HGBA1C 7.0 (A) 10/10/2023     05/15/2023     Urinalysis   Lab Results   Component Value Date    COLORU yellow 03/08/2024    CLARITYU " clear 03/08/2024    SPECGRAV 1.010 10/10/2023    PHUR 6.5 10/10/2023    LEUKOCYTESUR negative 03/08/2024    NITRITE negative 03/08/2024    PROTEIN UA negative 03/08/2024    GLUCOSEU normal 03/08/2024    KETONESU negative 03/08/2024    UROBILINOGEN negative 03/08/2024    BILIRUBINUR negative 03/08/2024    BLOODU positive 03/08/2024    RBCUA 0-1 10/10/2023    WBCUA None Seen 10/10/2023    EPIS None Seen 10/10/2023    BACTERIA None Seen 10/10/2023       Assessment/plan:       Diagnoses and all orders for this visit:    Severe major depression with psychotic features (HCC)  -     ARIPiprazole (ABILIFY) 5 mg tablet; Take 1 tablet (5 mg total) by mouth daily    Generalized anxiety disorder    Insomnia, unspecified type  -     traZODone (DESYREL) 50 mg tablet; Take 1 tablet (50 mg total) by mouth daily at bedtime    Chronic left-sided low back pain with left-sided sciatica        PLAN:  Pt is having severe depression and anxiety with panic attacks, as well as hallucinations.  Tx options discussed and Pt accepts an increase in Aripiprazole for mood and psychotic Sxs.  Sleep hygiene discussed and Pt does not want other changes to his regimen.  Pt understands and accepts to continue an off label dose of Duloxetine for mood and pain mgt.  I encouraged hobbies, regular contact with friends and use of the Warm Line.  Safety Plan was done by his therapist on 10/30/2023.  Increase Aripiprazole to 5mg (1) tab po qd # 90  Trazodone 50mg (1) tab po qhs # 30 R2  Continue the following of which Pt was given a Rx for 30 day supplies with R5 by PCP on 1/5/2024:  Duloxetine 60mg (1) cap po bid   Mirtazapine 15mg (1) tab po qhs  Gabapentin 600mg (1) tab po tid  Pt to continue counseling with [facility]   F/U PCP, Orthopedist, and Chiropractor for medical issues  Return 5/16/2024 at 4:00PM,  Can call any time sooner prn.           Risks/Benefits      Risks, Benefits And Possible Side Effects Of Medications:    Risks, benefits, and  possible side effects of medications explained to Marv and he verbalizes understanding and agreement for treatment.    Visit Time    Visit Start Time: 9:50AM  Visit Stop Time: 10:20AM  Total Visit Duration:  40 minutes

## 2024-03-27 NOTE — PSYCH
"Behavioral Health Psychotherapy Progress Note    Psychotherapy Provided: Individual Psychotherapy     Depression, anxiety    Goals addressed in session: Goal 1 and Goal 2     DATA: Marv said a  who came to his house basically told him to find a cheaper place which in reality does not exist. He claims his daughter is doing well in school. He discussed thru out all of his financial struggles he tries to maintain his depression and anxiety at a minimal level. He complains of serious back pain and will be seeing a chiropractor. I provide support and strategies to cope  During this session, this clinician used the following therapeutic modalities: Client-centered Therapy, Cognitive Behavioral Therapy, Mindfulness-based Strategies, and Supportive Psychotherapy    Substance Abuse was not addressed during this session. If the client is diagnosed with a co-occurring substance use disorder, please indicate any changes in the frequency or amount of use: n/a. Stage of change for addressing substance use diagnoses: No substance use/Not applicable    ASSESSMENT:  Marv Astorga presents with a Anxious and Depressed mood.     his affect is Flat, which is congruent, with his mood and the content of the session. The client has and has not made progress on their goals.He tries to work with social workers for assisstance but he feels they give him useless advice like telling him to find a cheaper apartment.      Marv Astorga presents with a none risk of suicide, none risk of self-harm, and none risk of harm to others.    For any risk assessment that surpasses a \"low\" rating, a safety plan must be developed.    A safety plan was indicated: no  If yes, describe in detail n/a    PLAN: Between sessions, Marv Astorga will use mindfulness and CBT. At the next session, the therapist will use Client-centered Therapy, Cognitive Behavioral Therapy, Mindfulness-based Strategies, and Supportive Psychotherapy to address issues and " symptoms as they may arise. .    Behavioral Health Treatment Plan and Discharge Planning: Marv Astorga is aware of and agrees to continue to work on their treatment plan. They have identified and are working toward their discharge goals. yes    Visit start and stop times: 9am till 10am 53 plus    03/27/24

## 2024-03-29 ENCOUNTER — PATIENT OUTREACH (OUTPATIENT)
Dept: FAMILY MEDICINE CLINIC | Facility: CLINIC | Age: 64
End: 2024-03-29

## 2024-03-29 NOTE — PROGRESS NOTES
"Outgoing Call:  3/29/2024    CMOC called [facility] and spoke with Hans who informed Pt was approved for [facility] services however is required to pay for transportation. CMOC informed Hans Pt has MA and should not be required to pay for services. Hans completed a Promise Check and is aware Pt is eligible for free [facility]. Hans informed a \"Part 2\" form needs to be completed by Pt in order for free transportation and form will be mailed to Pt. CMOC to f/u.    Next outreach is scheduled for 4/2/2024.  "

## 2024-04-01 ENCOUNTER — OFFICE VISIT (OUTPATIENT)
Dept: OBGYN CLINIC | Facility: OTHER | Age: 64
End: 2024-04-01

## 2024-04-01 ENCOUNTER — APPOINTMENT (OUTPATIENT)
Dept: RADIOLOGY | Facility: OTHER | Age: 64
End: 2024-04-01
Payer: MEDICARE

## 2024-04-01 ENCOUNTER — TELEPHONE (OUTPATIENT)
Dept: FAMILY MEDICINE CLINIC | Facility: CLINIC | Age: 64
End: 2024-04-01

## 2024-04-01 VITALS
BODY MASS INDEX: 30.51 KG/M2 | SYSTOLIC BLOOD PRESSURE: 124 MMHG | HEIGHT: 67 IN | WEIGHT: 194.4 LBS | HEART RATE: 90 BPM | DIASTOLIC BLOOD PRESSURE: 74 MMHG

## 2024-04-01 DIAGNOSIS — G89.29 CHRONIC RIGHT SHOULDER PAIN: ICD-10-CM

## 2024-04-01 DIAGNOSIS — M54.12 RADICULOPATHY, CERVICAL REGION: ICD-10-CM

## 2024-04-01 DIAGNOSIS — R20.2 PARESTHESIAS IN RIGHT HAND: ICD-10-CM

## 2024-04-01 DIAGNOSIS — M54.12 RADICULOPATHY, CERVICAL REGION: Primary | ICD-10-CM

## 2024-04-01 DIAGNOSIS — M25.511 CHRONIC RIGHT SHOULDER PAIN: ICD-10-CM

## 2024-04-01 PROCEDURE — 72040 X-RAY EXAM NECK SPINE 2-3 VW: CPT

## 2024-04-01 NOTE — LETTER
April 1, 2024     Audrey Ruvalcaba MD  [address] [facility] [address]    Patient: Marv Astorga   YOB: 1960   Date of Visit: 4/1/2024       Dear Dr. Ruvalcaba:    Thank you for referring Marv Astorga to me for evaluation. Below are my notes for this consultation.    If you have questions, please do not hesitate to call me. I look forward to following your patient along with you.         Sincerely,        Dane Muñoz MD        CC: No Recipients    Todd Cedeno MD  4/1/2024 12:34 PM  Sign when Signing Visit  Assessment:     Diagnosis ICD-10-CM Associated Orders   1. Radiculopathy, cervical region  M54.12 XR spine cervical 2 or 3 vw injury     Ambulatory Referral to Physical Therapy      2. Chronic right shoulder pain  M25.511 Ambulatory Referral to Orthopedic Surgery    G89.29       3. Paresthesias in right hand  R20.2 EMG 1 Limb     Brace           Plan:    We discussed clinical examination and findings with Marv Astorga  Reviewed imaging as outlined below.     At this time, clinical presentation and findings are consistent with cervical radiculopathy of the right side.  He has positive spurlings on the right as well as positive median nerve compression test. He likely has carpal tunnel syndrome as well.  We reviewed anatomical and biomechanics of affected area.   Today, we discussed the non-operative treatment options that include, but are not limited to: rest, activity modification, anti-inflammatory medication, and physical therapy and lidocaine patch We also recommended EMG to evaluate carpal tunnel syndrome. In the interim, he may wear wrist brace at night for possible carpal tunnel syndrome.   Would consider further imaging MRI of cervical spine if symptoms persist after 6 weeks of PT for his cervical radiculopathy.    Shared decision making, patient agreeable to plan.       There are no Patient Instructions on file for this visit.    Follow-Up:    Return for after EMG  result.    Chief Complaint   Patient presents with   • Right Shoulder - Pain         HPI:    Marv Astorga is a 63-year-old male patient presented for new evaluation of the right shoulder pain as well as numbness in his right hand. He is referred by his PCP Dr. Ruvalcaba. The pain is located in the trapezius region on the right side It has been 2 years and has been progressive getting worse. He also reported he would drop objects. The pain is rated 7/10 in the shoulder. Moving his neck made the pain worse more than moving his right shoulder. He does admit to flick sign.     Past report:  10/2/2023 xray of the right shoulder did not show acute osseous abnormality    10/2/2023 xray of the lumbar spine did not show acute osseous abnormality      I have personally reviewed pertinent films in PACS and my interpretation is as above. Xray of the cervical spine showed multilevel degenerative changes from C3-C7  with osteophytes noted.   No results found.    Patient Active Problem List   Diagnosis   • Benign essential HTN   • Hyperlipidemia   • Type 2 diabetes mellitus without complication, without long-term current use of insulin ([facility])   • Chronic pain syndrome   • Class 1 obesity due to excess calories with serious comorbidity and body mass index (BMI) of 30.0 to 30.9 in adult   • Radiculopathy, lumbar region   • Mild asthma   • Diastolic dysfunction   • Chronic prostatitis   • Lumbar foraminal stenosis   • Facet arthropathy, lumbosacral   • Memory difficulties   • Tinnitus of left ear   • Continuous opioid dependence (HCC)   • Periodontitis   • Protrusion of lumbar intervertebral disc   • Current severe episode of major depressive disorder without prior episode (HCC)   • Ambulatory dysfunction   • Chronic left-sided low back pain with left-sided sciatica   • Benign prostatic hyperplasia with lower urinary tract symptoms   • Generalized anxiety disorder   • Insomnia   • Chronic right shoulder pain   • Osteoarthritis of AC  (acromioclavicular) joint   • Encounter for immunization   • Paresthesias in right hand        Current Outpatient Medications on File Prior to Visit   Medication Sig Dispense Refill   • albuterol (2.5 mg/3 mL) 0.083 % nebulizer solution Take 3 mL (2.5 mg total) by nebulization every 6 (six) hours as needed for wheezing or shortness of breath 180 mL 5   • albuterol (ProAir HFA) 90 mcg/act inhaler Inhale 2 puffs every 6 (six) hours as needed for wheezing 8.5 g 1   • amLODIPine (NORVASC) 10 mg tablet Take 1 tablet (10 mg total) by mouth daily 90 tablet 1   • ARIPiprazole (ABILIFY) 2 mg tablet take 1 tablet by mouth once daily 90 tablet 0   • aspirin 325 mg tablet Take 1 tablet (325 mg total) by mouth daily 90 tablet 3   • atorvastatin (LIPITOR) 20 mg tablet Take 1 tablet (20 mg total) by mouth daily 90 tablet 1   • Blood Glucose Monitoring Suppl (OneTouch Verio) w/Device KIT Use daily 1 kit 0   • Blood Glucose Monitoring Suppl KIT Use in the morning Please give test strips and lancets  Dx. E11.9 1 kit 0   • Cholecalciferol (D3 PO) Take by mouth daily     • Diclofenac Sodium (VOLTAREN) 1 % Apply 2 g topically 4 (four) times a day 100 g 0   • Diclofenac Sodium (VOLTAREN) 1 % Apply 2 g topically 4 (four) times a day as needed (low back or neck pain) 150 g 2   • DULoxetine (CYMBALTA) 60 mg delayed release capsule Take 1 capsule (60 mg total) by mouth 2 (two) times a day 60 capsule 5   • famotidine (PEPCID) 20 mg tablet Take 1 tablet (20 mg total) by mouth daily 60 tablet 2   • fluticasone (FLONASE) 50 mcg/act nasal spray 1 spray into each nostril daily 16 g 0   • fluticasone-salmeterol (Advair HFA) 115-21 MCG/ACT inhaler Inhale 2 puffs 2 (two) times a day Rinse mouth after use. 36 g 1   • gabapentin (NEURONTIN) 800 mg tablet Take 1 tablet (800 mg total) by mouth 3 (three) times a day 90 tablet 5   • glucose blood (OneTouch Verio) test strip Check blood sugar daily 100 each 2   • glucose monitoring kit (FREESTYLE)  monitoring kit Use 1 each in the morning TESTING DAILY IN THE AM 1 each 0   • Lancet Devices (ONE TOUCH DELICA LANCING DEV) MISC Use daily 1 each 2   • lidocaine (Lidoderm) 5 % Apply 1 patch topically over 12 hours daily Remove & Discard patch within 12 hours or as directed by MD 30 patch 5   • lidocaine (LMX) 4 % cream Apply topically as needed for mild pain 30 g 0   • metFORMIN (GLUCOPHAGE) 500 mg tablet Take 1 tablet (500 mg total) by mouth 2 (two) times a day with meals 180 tablet 3   • methocarbamol (ROBAXIN) 500 mg tablet Take 1 tablet (500 mg total) by mouth 3 (three) times a day 30 tablet 0   • Mirabegron ER 50 MG TB24 Take 1 tablet (50 mg total) by mouth daily 60 tablet 6   • mirtazapine (REMERON) 15 mg tablet Take 1 tablet (15 mg total) by mouth daily at bedtime 30 tablet 5   • Nerve Stimulator (TENS Therapy Pain Relief) EMILY Use as directed 1 each 0   • ondansetron (Zofran ODT) 4 mg disintegrating tablet Take 1 tablet (4 mg total) by mouth every 6 (six) hours as needed for nausea or vomiting 20 tablet 0   • OneTouch Delica Lancets 33G MISC Use daily 100 each 1   • oxyCODONE (ROXICODONE) 10 MG TABS Take 1 tablet (10 mg total) by mouth 3 (three) times a day Max Daily Amount: 30 mg 30 tablet 0   • tamsulosin (FLOMAX) 0.4 mg Take 1 capsule (0.4 mg total) by mouth daily with dinner 90 capsule 1   • traZODone (DESYREL) 50 mg tablet Take 1 tablet (50 mg total) by mouth daily at bedtime 30 tablet 1   • naloxone (NARCAN) 4 mg/0.1 mL nasal spray Administer 1 spray into a nostril. If no response after 2-3 minutes, give another dose in the other nostril using a new spray. (Patient not taking: Reported on 4/1/2024) 1 each 1     No current facility-administered medications on file prior to visit.        No Known Allergies     Tobacco Use: Low Risk  (4/1/2024)    Patient History    • Smoking Tobacco Use: Never    • Smokeless Tobacco Use: Never    • Passive Exposure: Never        Social Determinants of Health     Tobacco  "Use: Low Risk  (4/1/2024)    Patient History    • Smoking Tobacco Use: Never    • Smokeless Tobacco Use: Never    • Passive Exposure: Never   Alcohol Use: Not on file   Financial Resource Strain: High Risk (1/8/2024)    Overall Financial Resource Strain (CARDIA)    • Difficulty of Paying Living Expenses: Hard   Food Insecurity: Food Insecurity Present (1/8/2024)    Hunger Vital Sign    • Worried About Running Out of Food in the Last Year: Often true    • Ran Out of Food in the Last Year: Often true   Transportation Needs: No Transportation Needs (1/8/2024)    PRAPARE - Transportation    • Lack of Transportation (Medical): No    • Lack of Transportation (Non-Medical): No   Physical Activity: Not on file   Stress: Not on file   Social Connections: Not on file   Intimate Partner Violence: Not on file   Depression: At risk (12/10/2021)    PHQ-2    • PHQ-2 Score: 6   Housing Stability: Unknown (6/16/2022)    Housing Stability Vital Sign    • Unable to Pay for Housing in the Last Year: No    • Number of Places Lived in the Last Year: Not on file    • Unstable Housing in the Last Year: No   Utilities: Not on file   Health Literacy: Not on file         Physical Exam  /74 (BP Location: Left arm, Patient Position: Sitting, Cuff Size: Standard)   Pulse 90   Ht 5' 7\" (1.702 m)   Wt 88.2 kg (194 lb 6.4 oz)   BMI 30.45 kg/m²     Constitutional:  see vital signs  Gen: well-developed, normocephalic/atraumatic, well-groomed  Eyes: No inflammation or discharge of conjunctiva or lids; sclera clear   Neck: supple, no masses, non-distended  MSK: no inflammation, lesion, mass, or clubbing of nails and digits except for other than mentioned below  SKIN: no visible rashes or skin lesions  Pulmonary/Chest: Effort normal. No respiratory distress.   NEURO: cranial nerves grossly intact  PSYCH:  Alert and oriented to person, place, and time; recent and remote memory intact; mood normal, no depression, anxiety, or agitation, judgment " "and insight good and intact     Ortho Exam    RIGHT SHOULDER:  Erythema: no  Swelling: no  Increased Warmth: no    Tenderness: trapezius region    ROM  Touchdown sign: intact  External Rotation: Intact, 75 degrees  Internal Rotation: Intact, to L1     Strength  Abduction: 5/5  ER: 5/5  IR: 5/5    Drop-Arm: negative  Emptycan: negative  Belly Press:   Lift-off Test:    Heart: negative  Neer: negative  Cross-Arm:   Speeds:     LEFT SHOULDER:  Strength  Abduction: 5/5  ER: 5/5  IR: 5/5    ROM  Touchdown sign: intact    Empty can: negative     Cervical  ROM: intact  Midline spinous process tenderness: None  Muscular Tenderness: trapezius region on the right, paraspinal C5-C7.   Sensation UE Bilateral:  C5: normal  C6: normal  C7: normal  C8: normal  T1: normal  Strength UE: 5/5 elbow, wrist, fingers bilateral  Spurlings: positive on the right    Right hand exam:  Positive median nerve compression   Positive phalen  Thumb abduction and opposition 5/5  Finger abduction 5/5.       Procedures             Portions of the record may have been created with voice recognition software. Occasional wrong word or \"sound alike\" substitutions may have occurred due to the inherent limitations of voice recognition software. Please review the chart carefully and recognize, using context, where substitutions/typographical errors may have occurred.         "

## 2024-04-01 NOTE — TELEPHONE ENCOUNTER
Patient in need of refills for:          oxyCODONE (ROXICODONE) 10 MG TABS   famotidine (PEPCID) 20 mg tablet     Please advise  Patient has an appointment with you tomorrow.

## 2024-04-01 NOTE — PROGRESS NOTES
Assessment:     Diagnosis ICD-10-CM Associated Orders   1. Radiculopathy, cervical region  M54.12 XR spine cervical 2 or 3 vw injury     Ambulatory Referral to Physical Therapy      2. Chronic right shoulder pain  M25.511 Ambulatory Referral to Orthopedic Surgery    G89.29       3. Paresthesias in right hand  R20.2 EMG 1 Limb     Brace           Plan:    We discussed clinical examination and findings with Marv Astorga  Reviewed imaging as outlined below.     At this time, clinical presentation and findings are consistent with cervical radiculopathy of the right side.  He has positive spurlings on the right as well as positive median nerve compression test. He likely has carpal tunnel syndrome as well.  We reviewed anatomical and biomechanics of affected area.   Today, we discussed the non-operative treatment options that include, but are not limited to: rest, activity modification, anti-inflammatory medication, and physical therapy and lidocaine patch We also recommended EMG to evaluate carpal tunnel syndrome. In the interim, he may wear wrist brace at night for possible carpal tunnel syndrome.   Would consider further imaging MRI of cervical spine if symptoms persist after 6 weeks of PT for his cervical radiculopathy.    Shared decision making, patient agreeable to plan.       There are no Patient Instructions on file for this visit.    Follow-Up:    Return for after EMG result.    Chief Complaint   Patient presents with    Right Shoulder - Pain         HPI:    Marv Astorga is a 63-year-old male patient presented for new evaluation of the right shoulder pain as well as numbness in his right hand. He is referred by his PCP Dr. Ruvalcaba. The pain is located in the trapezius region on the right side It has been 2 years and has been progressive getting worse. He also reported he would drop objects. The pain is rated 7/10 in the shoulder. Moving his neck made the pain worse more than moving his right shoulder. He does  admit to flick sign.     Past report:  10/2/2023 xray of the right shoulder did not show acute osseous abnormality    10/2/2023 xray of the lumbar spine did not show acute osseous abnormality      I have personally reviewed pertinent films in PACS and my interpretation is as above. Xray of the cervical spine showed multilevel degenerative changes from C3-C7  with osteophytes noted.   No results found.    Patient Active Problem List   Diagnosis    Benign essential HTN    Hyperlipidemia    Type 2 diabetes mellitus without complication, without long-term current use of insulin (HCC)    Chronic pain syndrome    Class 1 obesity due to excess calories with serious comorbidity and body mass index (BMI) of 30.0 to 30.9 in adult    Radiculopathy, lumbar region    Mild asthma    Diastolic dysfunction    Chronic prostatitis    Lumbar foraminal stenosis    Facet arthropathy, lumbosacral    Memory difficulties    Tinnitus of left ear    Continuous opioid dependence (HCC)    Periodontitis    Protrusion of lumbar intervertebral disc    Current severe episode of major depressive disorder without prior episode (HCC)    Ambulatory dysfunction    Chronic left-sided low back pain with left-sided sciatica    Benign prostatic hyperplasia with lower urinary tract symptoms    Generalized anxiety disorder    Insomnia    Chronic right shoulder pain    Osteoarthritis of AC (acromioclavicular) joint    Encounter for immunization    Paresthesias in right hand        Current Outpatient Medications on File Prior to Visit   Medication Sig Dispense Refill    albuterol (2.5 mg/3 mL) 0.083 % nebulizer solution Take 3 mL (2.5 mg total) by nebulization every 6 (six) hours as needed for wheezing or shortness of breath 180 mL 5    albuterol (ProAir HFA) 90 mcg/act inhaler Inhale 2 puffs every 6 (six) hours as needed for wheezing 8.5 g 1    amLODIPine (NORVASC) 10 mg tablet Take 1 tablet (10 mg total) by mouth daily 90 tablet 1    ARIPiprazole (ABILIFY) 2  mg tablet take 1 tablet by mouth once daily 90 tablet 0    aspirin 325 mg tablet Take 1 tablet (325 mg total) by mouth daily 90 tablet 3    atorvastatin (LIPITOR) 20 mg tablet Take 1 tablet (20 mg total) by mouth daily 90 tablet 1    Blood Glucose Monitoring Suppl (OneTouch Verio) w/Device KIT Use daily 1 kit 0    Blood Glucose Monitoring Suppl KIT Use in the morning Please give test strips and lancets  Dx. E11.9 1 kit 0    Cholecalciferol (D3 PO) Take by mouth daily      Diclofenac Sodium (VOLTAREN) 1 % Apply 2 g topically 4 (four) times a day 100 g 0    Diclofenac Sodium (VOLTAREN) 1 % Apply 2 g topically 4 (four) times a day as needed (low back or neck pain) 150 g 2    DULoxetine (CYMBALTA) 60 mg delayed release capsule Take 1 capsule (60 mg total) by mouth 2 (two) times a day 60 capsule 5    famotidine (PEPCID) 20 mg tablet Take 1 tablet (20 mg total) by mouth daily 60 tablet 2    fluticasone (FLONASE) 50 mcg/act nasal spray 1 spray into each nostril daily 16 g 0    fluticasone-salmeterol (Advair HFA) 115-21 MCG/ACT inhaler Inhale 2 puffs 2 (two) times a day Rinse mouth after use. 36 g 1    gabapentin (NEURONTIN) 800 mg tablet Take 1 tablet (800 mg total) by mouth 3 (three) times a day 90 tablet 5    glucose blood (OneTouch Verio) test strip Check blood sugar daily 100 each 2    glucose monitoring kit (FREESTYLE) monitoring kit Use 1 each in the morning TESTING DAILY IN THE AM 1 each 0    Lancet Devices (ONE TOUCH DELICA LANCING DEV) MISC Use daily 1 each 2    lidocaine (Lidoderm) 5 % Apply 1 patch topically over 12 hours daily Remove & Discard patch within 12 hours or as directed by MD 30 patch 5    lidocaine (LMX) 4 % cream Apply topically as needed for mild pain 30 g 0    metFORMIN (GLUCOPHAGE) 500 mg tablet Take 1 tablet (500 mg total) by mouth 2 (two) times a day with meals 180 tablet 3    methocarbamol (ROBAXIN) 500 mg tablet Take 1 tablet (500 mg total) by mouth 3 (three) times a day 30 tablet 0     Mirabegron ER 50 MG TB24 Take 1 tablet (50 mg total) by mouth daily 60 tablet 6    mirtazapine (REMERON) 15 mg tablet Take 1 tablet (15 mg total) by mouth daily at bedtime 30 tablet 5    Nerve Stimulator (TENS Therapy Pain Relief) EMILY Use as directed 1 each 0    ondansetron (Zofran ODT) 4 mg disintegrating tablet Take 1 tablet (4 mg total) by mouth every 6 (six) hours as needed for nausea or vomiting 20 tablet 0    OneTouch Delica Lancets 33G MISC Use daily 100 each 1    oxyCODONE (ROXICODONE) 10 MG TABS Take 1 tablet (10 mg total) by mouth 3 (three) times a day Max Daily Amount: 30 mg 30 tablet 0    tamsulosin (FLOMAX) 0.4 mg Take 1 capsule (0.4 mg total) by mouth daily with dinner 90 capsule 1    traZODone (DESYREL) 50 mg tablet Take 1 tablet (50 mg total) by mouth daily at bedtime 30 tablet 1    naloxone (NARCAN) 4 mg/0.1 mL nasal spray Administer 1 spray into a nostril. If no response after 2-3 minutes, give another dose in the other nostril using a new spray. (Patient not taking: Reported on 4/1/2024) 1 each 1     No current facility-administered medications on file prior to visit.        No Known Allergies     Tobacco Use: Low Risk  (4/1/2024)    Patient History     Smoking Tobacco Use: Never     Smokeless Tobacco Use: Never     Passive Exposure: Never        Social Determinants of Health     Tobacco Use: Low Risk  (4/1/2024)    Patient History     Smoking Tobacco Use: Never     Smokeless Tobacco Use: Never     Passive Exposure: Never   Alcohol Use: Not on file   Financial Resource Strain: High Risk (1/8/2024)    Overall Financial Resource Strain (CARDIA)     Difficulty of Paying Living Expenses: Hard   Food Insecurity: Food Insecurity Present (1/8/2024)    Hunger Vital Sign     Worried About Running Out of Food in the Last Year: Often true     Ran Out of Food in the Last Year: Often true   Transportation Needs: No Transportation Needs (1/8/2024)    PRAPARE - Transportation     Lack of Transportation  "(Medical): No     Lack of Transportation (Non-Medical): No   Physical Activity: Not on file   Stress: Not on file   Social Connections: Not on file   Intimate Partner Violence: Not on file   Depression: At risk (12/10/2021)    PHQ-2     PHQ-2 Score: 6   Housing Stability: Unknown (6/16/2022)    Housing Stability Vital Sign     Unable to Pay for Housing in the Last Year: No     Number of Places Lived in the Last Year: Not on file     Unstable Housing in the Last Year: No   Utilities: Not on file   Health Literacy: Not on file         Physical Exam  /74 (BP Location: Left arm, Patient Position: Sitting, Cuff Size: Standard)   Pulse 90   Ht 5' 7\" (1.702 m)   Wt 88.2 kg (194 lb 6.4 oz)   BMI 30.45 kg/m²     Constitutional:  see vital signs  Gen: well-developed, normocephalic/atraumatic, well-groomed  Eyes: No inflammation or discharge of conjunctiva or lids; sclera clear   Neck: supple, no masses, non-distended  MSK: no inflammation, lesion, mass, or clubbing of nails and digits except for other than mentioned below  SKIN: no visible rashes or skin lesions  Pulmonary/Chest: Effort normal. No respiratory distress.   NEURO: cranial nerves grossly intact  PSYCH:  Alert and oriented to person, place, and time; recent and remote memory intact; mood normal, no depression, anxiety, or agitation, judgment and insight good and intact     Ortho Exam    RIGHT SHOULDER:  Erythema: no  Swelling: no  Increased Warmth: no    Tenderness: trapezius region    ROM  Touchdown sign: intact  External Rotation: Intact, 75 degrees  Internal Rotation: Intact, to L1     Strength  Abduction: 5/5  ER: 5/5  IR: 5/5    Drop-Arm: negative  Emptycan: negative  Belly Press:   Lift-off Test:    Heart: negative  Neer: negative  Cross-Arm:   Speeds:     LEFT SHOULDER:  Strength  Abduction: 5/5  ER: 5/5  IR: 5/5    ROM  Touchdown sign: intact    Empty can: negative     Cervical  ROM: intact  Midline spinous process tenderness: None  Muscular " "Tenderness: trapezius region on the right, paraspinal C5-C7.   Sensation UE Bilateral:  C5: normal  C6: normal  C7: normal  C8: normal  T1: normal  Strength UE: 5/5 elbow, wrist, fingers bilateral  Spurlings: positive on the right    Right hand exam:  Positive median nerve compression   Positive phalen  Thumb abduction and opposition 5/5  Finger abduction 5/5.       Procedures             Portions of the record may have been created with voice recognition software. Occasional wrong word or \"sound alike\" substitutions may have occurred due to the inherent limitations of voice recognition software. Please review the chart carefully and recognize, using context, where substitutions/typographical errors may have occurred.         "

## 2024-04-02 ENCOUNTER — PATIENT OUTREACH (OUTPATIENT)
Dept: FAMILY MEDICINE CLINIC | Facility: CLINIC | Age: 64
End: 2024-04-02

## 2024-04-02 ENCOUNTER — OFFICE VISIT (OUTPATIENT)
Age: 64
End: 2024-04-02
Payer: MEDICARE

## 2024-04-02 ENCOUNTER — OFFICE VISIT (OUTPATIENT)
Dept: FAMILY MEDICINE CLINIC | Facility: CLINIC | Age: 64
End: 2024-04-02

## 2024-04-02 VITALS
HEART RATE: 80 BPM | WEIGHT: 194 LBS | DIASTOLIC BLOOD PRESSURE: 75 MMHG | SYSTOLIC BLOOD PRESSURE: 117 MMHG | OXYGEN SATURATION: 97 % | HEIGHT: 67 IN | TEMPERATURE: 96.5 F | RESPIRATION RATE: 18 BRPM | BODY MASS INDEX: 30.45 KG/M2

## 2024-04-02 VITALS
HEIGHT: 67 IN | SYSTOLIC BLOOD PRESSURE: 126 MMHG | DIASTOLIC BLOOD PRESSURE: 82 MMHG | BODY MASS INDEX: 30.45 KG/M2 | WEIGHT: 194 LBS | OXYGEN SATURATION: 97 % | HEART RATE: 84 BPM

## 2024-04-02 DIAGNOSIS — G89.4 CHRONIC PAIN SYNDROME: ICD-10-CM

## 2024-04-02 DIAGNOSIS — G89.29 CHRONIC LEFT-SIDED LOW BACK PAIN WITH LEFT-SIDED SCIATICA: ICD-10-CM

## 2024-04-02 DIAGNOSIS — N41.1 CHRONIC PROSTATITIS: ICD-10-CM

## 2024-04-02 DIAGNOSIS — M54.16 RADICULOPATHY, LUMBAR REGION: ICD-10-CM

## 2024-04-02 DIAGNOSIS — M99.04 SEGMENTAL DYSFUNCTION OF SACRAL REGION: Primary | ICD-10-CM

## 2024-04-02 DIAGNOSIS — M54.42 CHRONIC LEFT-SIDED LOW BACK PAIN WITH LEFT-SIDED SCIATICA: ICD-10-CM

## 2024-04-02 DIAGNOSIS — M99.01 SEGMENTAL DYSFUNCTION OF CERVICAL REGION: ICD-10-CM

## 2024-04-02 DIAGNOSIS — M99.03 SEGMENTAL DYSFUNCTION OF LUMBAR REGION: ICD-10-CM

## 2024-04-02 DIAGNOSIS — M99.02 SEGMENTAL DYSFUNCTION OF THORACIC REGION: ICD-10-CM

## 2024-04-02 DIAGNOSIS — E11.9 TYPE 2 DIABETES MELLITUS WITHOUT COMPLICATION, WITHOUT LONG-TERM CURRENT USE OF INSULIN (HCC): Primary | ICD-10-CM

## 2024-04-02 DIAGNOSIS — M54.42 ACUTE LEFT-SIDED LOW BACK PAIN WITH LEFT-SIDED SCIATICA: ICD-10-CM

## 2024-04-02 DIAGNOSIS — F11.20 CONTINUOUS OPIOID DEPENDENCE (HCC): ICD-10-CM

## 2024-04-02 DIAGNOSIS — M62.838 MUSCLE SPASM: ICD-10-CM

## 2024-04-02 DIAGNOSIS — M25.562 ACUTE PAIN OF LEFT KNEE: ICD-10-CM

## 2024-04-02 PROCEDURE — 3078F DIAST BP <80 MM HG: CPT | Performed by: FAMILY MEDICINE

## 2024-04-02 PROCEDURE — 99214 OFFICE O/P EST MOD 30 MIN: CPT | Performed by: FAMILY MEDICINE

## 2024-04-02 PROCEDURE — G2211 COMPLEX E/M VISIT ADD ON: HCPCS | Performed by: FAMILY MEDICINE

## 2024-04-02 PROCEDURE — 99202 OFFICE O/P NEW SF 15 MIN: CPT | Performed by: CHIROPRACTOR

## 2024-04-02 PROCEDURE — 3074F SYST BP LT 130 MM HG: CPT | Performed by: FAMILY MEDICINE

## 2024-04-02 PROCEDURE — 98941 CHIROPRACT MANJ 3-4 REGIONS: CPT | Performed by: CHIROPRACTOR

## 2024-04-02 RX ORDER — LANCETS 33 GAUGE
EACH MISCELLANEOUS
Qty: 100 EACH | Refills: 3 | Status: SHIPPED | OUTPATIENT
Start: 2024-04-02

## 2024-04-02 RX ORDER — OXYCODONE HYDROCHLORIDE 10 MG/1
10 TABLET ORAL 3 TIMES DAILY
Qty: 30 TABLET | Refills: 0 | Status: SHIPPED | OUTPATIENT
Start: 2024-04-02 | End: 2024-04-12 | Stop reason: SDUPTHER

## 2024-04-02 RX ORDER — LEVOFLOXACIN 500 MG/1
500 TABLET, FILM COATED ORAL EVERY 24 HOURS
Qty: 30 TABLET | Refills: 0 | Status: SHIPPED | OUTPATIENT
Start: 2024-04-02 | End: 2024-05-02

## 2024-04-02 RX ORDER — BLOOD SUGAR DIAGNOSTIC
STRIP MISCELLANEOUS
Qty: 100 EACH | Refills: 3 | Status: SHIPPED | OUTPATIENT
Start: 2024-04-02

## 2024-04-02 RX ORDER — METHOCARBAMOL 500 MG/1
500 TABLET, FILM COATED ORAL 3 TIMES DAILY
Qty: 30 TABLET | Refills: 2 | Status: SHIPPED | OUTPATIENT
Start: 2024-04-02

## 2024-04-02 NOTE — PROGRESS NOTES
Initial date of service: 4/2/24    Diagnoses and all orders for this visit:    Segmental dysfunction of sacral region    Chronic left-sided low back pain with left-sided sciatica  -     Ambulatory Referral to Chiropractic    Radiculopathy, lumbar region  -     Ambulatory Referral to Chiropractic    Segmental dysfunction of lumbar region    Segmental dysfunction of thoracic region    Segmental dysfunction of cervical region    Muscle spasm    Acute pain of left knee  -     Ambulatory Referral to Orthopedic Surgery; Future    Pt's symptoms and exam findings consistent with mechanical neck/back pain secondary to repetitive st/sp injury, exacerbated by underlying DJD, compensation for L knee issue, and postural/ergonomic stressors. Referring to Sports Medicine to assess L knee pain.  Pt responded well to traction, stretches and manual mobilization of the affected spinal and myofascial jt dysfunction, with increased ROM; trial of conservative tx recommended consisting of stretching, ther-ex, graded mobilization/manipulation of the affected spinal/myofascial tissues, postural/ergonomic education, and take home stretches/exercises. If symptoms fail to improve with short trial of conservative care, appropriate imaging and referral will be coordinated.  Spent greater than 20 min c pt discussing hx, pe, ddx, tx options and reviewing notes/imaging    TREATMENT: [id number] AT  Fear avoidance behavior discussion, encouraged and reassured pt that natural course of condition is to improve over time with adherence to tx plan and home care strategies. Home care recommendations: avoid bed rest, walk (but avoid trails and uneven surfaces), gradual return to activity to tolerance (avoid anything that peripheralizes symptoms), ice 20 min on/off, watch for ice burn, call if symptoms peripheralize, worsen, or neurologic deficit progresses. Ther-ex: IASTM - discussed post procedure soreness and/or ecchymosis for up to 36 hrs, applied to  affected mm hypertonicities; wall bonnie, axial retraction, upper trap stretch, lev scap stretch, SCM stretch, lat rows with t-band, lat pull down with t-band, abdominal bracing; Thoracic mobilization/manipulation: prone P-A mob, supine A-P manip; cervical mobilization/manipulation: traction, diversified supine graded mobilization; lumbar prone flexion-traction, L SIJ LAT    HPI:  Marv Astorga is a 63 y.o. male   Chief Complaint   Patient presents with    Back Pain     Lower back pain-9  Numbness and tingling radiates down left leg      Pt presents for eval and tx for exacerbation of chronic neck/back pain; radiates into LLE; reports separate L knee pain and swelling as well. 2023 MR demonstrates left foraminal protrusion type disc herniation L3-4 and L4-5 results in moderate left foraminal narrowing    Back Pain  This is a chronic problem. The current episode started more than 1 year ago. The problem occurs constantly. The problem has been waxing and waning since onset. The pain is present in the gluteal, lumbar spine, sacro-iliac and thoracic spine. The quality of the pain is described as aching, burning and stabbing. The pain radiates to the left knee, left foot and left thigh. Pain scale: 4-9/10. The symptoms are aggravated by sitting (palliative includes walking). Pertinent negatives include no bladder incontinence or bowel incontinence.   Neck Pain   This is a chronic problem. The current episode started more than 1 year ago. The problem occurs constantly. The problem has been waxing and waning. The pain is present in the left side, midline and right side. The quality of the pain is described as aching and cramping. The symptoms are aggravated by bending, position, stress and twisting.     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    Depression     Diabetes mellitus (HCC)     Hyperlipidemia     Hypertension      Suicidal intent     Type 2 diabetes mellitus without complication, without long-term current use of insulin ([facility]) 10/16/2020      The following portions of the patient's history were reviewed and updated as appropriate: allergies, past family history, past medical history, past social history, past surgical history, and problem list.  Review of Systems   Gastrointestinal:  Negative for bowel incontinence.   Genitourinary:  Negative for bladder incontinence.   Musculoskeletal:  Positive for back pain and neck pain.     Physical Exam  Eyes:      Extraocular Movements: Extraocular movements intact.   Neck:        Comments: Pnful and limited in Ext/Brot  Cardiovascular:      Pulses: Normal pulses.   Abdominal:      General: There is no distension.      Tenderness: There is no abdominal tenderness.   Musculoskeletal:      Cervical back: Pain with movement and muscular tenderness present. Decreased range of motion.      Thoracic back: Spasms and tenderness present. Decreased range of motion.      Lumbar back: Spasms and tenderness present. Decreased range of motion. Negative right straight leg raise test and negative left straight leg raise test.        Back:       Comments: Pnful and limited in Ext, Llf   Lymphadenopathy:      Cervical: No cervical adenopathy.   Skin:     General: Skin is warm and dry.   Neurological:      Mental Status: He is alert and oriented to person, place, and time.      Cranial Nerves: Cranial nerves 2-12 are intact.      Sensory: Sensation is intact.      Motor: Motor function is intact.      Coordination: Coordination is intact.      Gait: Gait is intact. Gait and tandem walk normal.      Deep Tendon Reflexes: Reflexes normal. Babinski sign absent on the right side. Babinski sign absent on the left side.      Reflex Scores:       Tricep reflexes are 2+ on the right side and 2+ on the left side.       Bicep reflexes are 2+ on the right side and 2+ on the left side.       Brachioradialis reflexes  are 2+ on the right side and 2+ on the left side.       Patellar reflexes are 2+ on the right side and 2+ on the left side.       Achilles reflexes are 2+ on the right side and 2+ on the left side.  Psychiatric:         Mood and Affect: Mood and affect normal.         Behavior: Behavior normal.       SOFT TISSUE ASSESSMENT: Hypertonicity and tenderness palpated B C5-T6, L4-S1 erector spinae, upper traps, lev scap, rhomboid, L glute med/min JOINT RECTRICTIONS: C5-T6, L4-S1 and L SIJ ORTHO: Anna Marie c/s unremarkable for centralization/peripheralization; max foraminal comp elicits local np R/L; shoulder depression elicits stiffness in R/L upper trap; brachial plexus tension test elicits no neural tension in R/L UE; cervical distraction relieves CC; sitting root and slump test elicit local blp on L; reanna elicits pn L knee; iliac compression, thigh thrust elicits pn L SIJ    Return in about 1 week (around 4/9/2024) for Next scheduled follow up.

## 2024-04-02 NOTE — PROGRESS NOTES
Outgoing Call:  4/2/2024    CMOC called Pt to discuss status of [facility] services and to make him aware he should receive a welcome packet this week along with an insurance information form which needs to be completed and returned to [facility]. Once completed Pt will not be responsible for payment of services to medical appointments. Pt expressed understanding and agreed to call CM once received. Pt will need to sign and can drop off at [facility] for CMOC to forward to [facility] or Pt can mail it in with envelope that will be provided to him. CMOC to f/u.     Next outreach is scheduled for 4/9/2024.

## 2024-04-02 NOTE — PROGRESS NOTES
Name: Marv Astorga      : 1960      MRN: [id number]  Encounter Provider: Audrey Ruvalcaba MD  Encounter Date: 2024   Encounter department: [facility]    Assessment & Plan     1. Type 2 diabetes mellitus without complication, without long-term current use of insulin (HCC)  Assessment & Plan:    Lab Results   Component Value Date    HGBA1C 7.0 (A) 10/10/2023    HGBA1C 6.7 (H) 05/15/2023    HGBA1C 7.3 (A) 2023    HGBA1C 6.9 (A) 2023     Stable  - Current medications:  Metformin 500 mg bid  - Home glucose readings: fasting 90s  - Diet: well balanced, drinks plenty of water, vegetable soups, limits bread  - +Htn: on Amlodipine  - +Hld: on Atorvastatin  - Ophthalmology: complete  - DM Foot exam: complete  - Dentist:   - Pneumo vaccine:   - Influenza Vaccine:    - Smoker: no  - Keep log of blood glucose readings  - Encouraged: Diabetic Diet, Regular exercise, Weight loss         Orders:  -     Albumin / creatinine urine ratio; Future; Expected date: 2024  -     Comprehensive metabolic panel; Future; Expected date: 2024  -     Hemoglobin A1C; Future; Expected date: 2024  -     glucose blood (OneTouch Verio) test strip; Check blood sugars once daily. Please substitute with appropriate alternative as covered by patient's insurance. Dx: E11.65  -     OneTouch Delica Lancets 33G MISC; Check blood sugars once daily. Please substitute with appropriate alternative as covered by patient's insurance. Dx: E11.65    2. Chronic prostatitis  -     levofloxacin (LEVAQUIN) 500 mg tablet; Take 1 tablet (500 mg total) by mouth every 24 hours    3. Chronic pain syndrome  -     oxyCODONE (ROXICODONE) 10 MG TABS; Take 1 tablet (10 mg total) by mouth 3 (three) times a day Max Daily Amount: 30 mg    4. Continuous opioid dependence (HCC)  -     oxyCODONE (ROXICODONE) 10 MG TABS; Take 1 tablet (10 mg total) by mouth 3 (three) times a day Max Daily Amount:  30 mg    5. Chronic left-sided low back pain with left-sided sciatica  -     oxyCODONE (ROXICODONE) 10 MG TABS; Take 1 tablet (10 mg total) by mouth 3 (three) times a day Max Daily Amount: 30 mg    6. Acute left-sided low back pain with left-sided sciatica  -     methocarbamol (ROBAXIN) 500 mg tablet; Take 1 tablet (500 mg total) by mouth 3 (three) times a day    Needs updated contract and UDS next visit.       Subjective      HPI  Marv Astorga is a 63 y.o. male  has a past medical history of Asthma, Back pain, Back pain, BPH with obstruction/lower urinary tract symptoms, Depression, Diabetes mellitus (HCC), Hyperlipidemia, Hypertension, Suicidal intent, and Type 2 diabetes mellitus without complication, without long-term current use of insulin ([facility]). who presented to the office today for management of chronic conditions.    Patient states that he recently visited the chiropractor for his chronic back pain, and has a follow-up appointment for some adjustments.  Also patient would like refills of levofloxacin for lower urinary tract symptoms, history of chronic prostatitis.      The following portions of the patient's history were reviewed and updated as appropriate: allergies, current medications, past family history, past medical history, past social history, past surgical history and problem list.      Review of Systems   Constitutional:  Negative for activity change, appetite change, chills and fever.   HENT:  Negative for congestion, rhinorrhea and sore throat.    Respiratory:  Negative for cough and shortness of breath.    Cardiovascular:  Negative for chest pain.   Gastrointestinal:  Negative for diarrhea, nausea and vomiting.   Musculoskeletal:  Positive for back pain and gait problem.   Skin:  Negative for rash.   Neurological:  Negative for dizziness and headaches.   Psychiatric/Behavioral:  Negative for sleep disturbance and suicidal ideas. The patient is not nervous/anxious.        Current Outpatient  Medications on File Prior to Visit   Medication Sig   • albuterol (2.5 mg/3 mL) 0.083 % nebulizer solution Take 3 mL (2.5 mg total) by nebulization every 6 (six) hours as needed for wheezing or shortness of breath   • albuterol (ProAir HFA) 90 mcg/act inhaler Inhale 2 puffs every 6 (six) hours as needed for wheezing   • amLODIPine (NORVASC) 10 mg tablet Take 1 tablet (10 mg total) by mouth daily   • aspirin 325 mg tablet Take 1 tablet (325 mg total) by mouth daily   • atorvastatin (LIPITOR) 20 mg tablet Take 1 tablet (20 mg total) by mouth daily   • Blood Glucose Monitoring Suppl (OneTouch Verio) w/Device KIT Use daily   • Blood Glucose Monitoring Suppl KIT Use in the morning Please give test strips and lancets  Dx. E11.9   • Cholecalciferol (D3 PO) Take by mouth daily   • Diclofenac Sodium (VOLTAREN) 1 % Apply 2 g topically 4 (four) times a day   • Diclofenac Sodium (VOLTAREN) 1 % Apply 2 g topically 4 (four) times a day as needed (low back or neck pain)   • DULoxetine (CYMBALTA) 60 mg delayed release capsule Take 1 capsule (60 mg total) by mouth 2 (two) times a day   • famotidine (PEPCID) 20 mg tablet Take 1 tablet (20 mg total) by mouth daily   • fluticasone (FLONASE) 50 mcg/act nasal spray 1 spray into each nostril daily   • fluticasone-salmeterol (Advair HFA) 115-21 MCG/ACT inhaler Inhale 2 puffs 2 (two) times a day Rinse mouth after use.   • gabapentin (NEURONTIN) 800 mg tablet Take 1 tablet (800 mg total) by mouth 3 (three) times a day   • glucose blood (OneTouch Verio) test strip Check blood sugar daily   • glucose monitoring kit (FREESTYLE) monitoring kit Use 1 each in the morning TESTING DAILY IN THE AM   • Lancet Devices (ONE TOUCH DELICA LANCING DEV) MISC Use daily   • lidocaine (Lidoderm) 5 % Apply 1 patch topically over 12 hours daily Remove & Discard patch within 12 hours or as directed by MD   • lidocaine (LMX) 4 % cream Apply topically as needed for mild pain   • metFORMIN (GLUCOPHAGE) 500 mg tablet  "Take 1 tablet (500 mg total) by mouth 2 (two) times a day with meals   • Mirabegron ER 50 MG TB24 Take 1 tablet (50 mg total) by mouth daily   • mirtazapine (REMERON) 15 mg tablet Take 1 tablet (15 mg total) by mouth daily at bedtime   • naloxone (NARCAN) 4 mg/0.1 mL nasal spray Administer 1 spray into a nostril. If no response after 2-3 minutes, give another dose in the other nostril using a new spray. (Patient not taking: Reported on 4/1/2024)   • Nerve Stimulator (TENS Therapy Pain Relief) EMILY Use as directed   • ondansetron (Zofran ODT) 4 mg disintegrating tablet Take 1 tablet (4 mg total) by mouth every 6 (six) hours as needed for nausea or vomiting   • OneTouch Delica Lancets 33G MISC Use daily   • tamsulosin (FLOMAX) 0.4 mg Take 1 capsule (0.4 mg total) by mouth daily with dinner       Objective     /75 (BP Location: Right arm, Patient Position: Sitting, Cuff Size: Standard)   Pulse 80   Temp (!) 96.5 °F (35.8 °C) (Temporal)   Resp 18   Ht 5' 7\" (1.702 m)   Wt 88 kg (194 lb)   SpO2 97%   BMI 30.38 kg/m²     Physical Exam  Vitals and nursing note reviewed.   Constitutional:       General: He is not in acute distress.     Appearance: Normal appearance. He is well-developed. He is obese.      Comments: Ambulating with rollator   HENT:      Head: Normocephalic and atraumatic.   Cardiovascular:      Rate and Rhythm: Normal rate.   Pulmonary:      Effort: Pulmonary effort is normal. No respiratory distress.   Musculoskeletal:      Thoracic back: Spasms and tenderness present.      Lumbar back: Spasms and tenderness present.      Right lower leg: No edema.      Left lower leg: No edema.   Neurological:      Mental Status: He is alert and oriented to person, place, and time.   Psychiatric:         Mood and Affect: Mood normal. Mood is not depressed. Affect is not tearful.         Speech: Speech normal.         Behavior: Behavior is cooperative.         Thought Content: Thought content does not include " suicidal ideation. Thought content does not include suicidal plan.       Audrey Ruvalcaba MD

## 2024-04-03 ENCOUNTER — TELEPHONE (OUTPATIENT)
Dept: FAMILY MEDICINE CLINIC | Facility: CLINIC | Age: 64
End: 2024-04-03

## 2024-04-03 NOTE — TELEPHONE ENCOUNTER
PCP SIGNATURE NEEDED FOR [facility]  FORM RECEIVED VIA FAX AND PLACED IN PCP FOLDER TO BE DELIVERED AT ASSIGNED TIMES.      MEDICARE PART B DETAILED WRITTEN ORDER  ONETOUCH VERIO TESTSTRIP  ONETOUCH DELICA PLUS 33G LANCETS

## 2024-04-04 ENCOUNTER — OFFICE VISIT (OUTPATIENT)
Dept: PSYCHIATRY | Facility: CLINIC | Age: 64
End: 2024-04-04

## 2024-04-04 ENCOUNTER — TELEPHONE (OUTPATIENT)
Dept: PSYCHIATRY | Facility: CLINIC | Age: 64
End: 2024-04-04

## 2024-04-04 VITALS — HEIGHT: 67 IN | BODY MASS INDEX: 30.4 KG/M2 | WEIGHT: 193.7 LBS

## 2024-04-04 DIAGNOSIS — F41.1 GENERALIZED ANXIETY DISORDER: ICD-10-CM

## 2024-04-04 DIAGNOSIS — G47.00 INSOMNIA, UNSPECIFIED TYPE: ICD-10-CM

## 2024-04-04 DIAGNOSIS — F32.3 SEVERE MAJOR DEPRESSION WITH PSYCHOTIC FEATURES (HCC): Primary | ICD-10-CM

## 2024-04-04 DIAGNOSIS — G89.29 CHRONIC LEFT-SIDED LOW BACK PAIN WITH LEFT-SIDED SCIATICA: ICD-10-CM

## 2024-04-04 DIAGNOSIS — M54.42 CHRONIC LEFT-SIDED LOW BACK PAIN WITH LEFT-SIDED SCIATICA: ICD-10-CM

## 2024-04-04 RX ORDER — TRAZODONE HYDROCHLORIDE 50 MG/1
50 TABLET ORAL
Qty: 30 TABLET | Refills: 2 | Status: SHIPPED | OUTPATIENT
Start: 2024-04-04 | End: 2024-04-11

## 2024-04-04 RX ORDER — ARIPIPRAZOLE 5 MG/1
5 TABLET ORAL DAILY
Qty: 90 TABLET | Refills: 0 | Status: SHIPPED | OUTPATIENT
Start: 2024-04-04

## 2024-04-04 NOTE — TELEPHONE ENCOUNTER
Patient called in regards to a lyft ride for todays appointment 4/4/24 at 9:30am. Writer called [employer] and spoke to Gilberto. Patient is schedule for 8:50am  at current address on file. Writer called patient back and is aware of  time

## 2024-04-07 NOTE — ASSESSMENT & PLAN NOTE
Lab Results   Component Value Date    HGBA1C 7.0 (A) 10/10/2023    HGBA1C 6.7 (H) 05/15/2023    HGBA1C 7.3 (A) 04/24/2023    HGBA1C 6.9 (A) 01/20/2023     Stable  - Current medications:  Metformin 500 mg bid  - Home glucose readings: fasting 90s  - Diet: well balanced, drinks plenty of water, vegetable soups, limits bread  - +Htn: on Amlodipine  - +Hld: on Atorvastatin  - Ophthalmology: complete  - DM Foot exam: complete  - Dentist: 2023  - Pneumo vaccine: 2019  - Influenza Vaccine:  2023  - Smoker: no  - Keep log of blood glucose readings  - Encouraged: Diabetic Diet, Regular exercise, Weight loss

## 2024-04-08 DIAGNOSIS — F32.0 CURRENT MILD EPISODE OF MAJOR DEPRESSIVE DISORDER WITHOUT PRIOR EPISODE (HCC): ICD-10-CM

## 2024-04-08 RX ORDER — DULOXETIN HYDROCHLORIDE 60 MG/1
60 CAPSULE, DELAYED RELEASE ORAL 2 TIMES DAILY
Qty: 180 CAPSULE | Refills: 1 | Status: SHIPPED | OUTPATIENT
Start: 2024-04-08

## 2024-04-09 ENCOUNTER — PATIENT OUTREACH (OUTPATIENT)
Dept: FAMILY MEDICINE CLINIC | Facility: CLINIC | Age: 64
End: 2024-04-09

## 2024-04-09 NOTE — PROGRESS NOTES
Outgoing Call:  4/9/2024    CMOC called Pt to discuss [employer] form needed to receive free transportation services. Pt informed CMOC he would send a text with picture so that CMOC can confirm he received correct form. Today Pt informs his daughter will go to [facility] and drop off packet for CMOC to review. CMOC asked Pt to sign document if requested prior to dropping off form. Pt agreed.     Next outreach is scheduled for 4/16/2024.

## 2024-04-09 NOTE — TELEPHONE ENCOUNTER
FAXED ON 04/08/24 TO [facility] at [phone]. FAX CONFIRMATION RECEIVED.     Forms scanned into pt chart.

## 2024-04-10 ENCOUNTER — TELEPHONE (OUTPATIENT)
Dept: FAMILY MEDICINE CLINIC | Facility: CLINIC | Age: 64
End: 2024-04-10

## 2024-04-10 ENCOUNTER — PATIENT OUTREACH (OUTPATIENT)
Dept: FAMILY MEDICINE CLINIC | Facility: CLINIC | Age: 64
End: 2024-04-10

## 2024-04-10 ENCOUNTER — APPOINTMENT (OUTPATIENT)
Dept: LAB | Facility: CLINIC | Age: 64
End: 2024-04-10
Payer: MEDICARE

## 2024-04-10 ENCOUNTER — OFFICE VISIT (OUTPATIENT)
Dept: FAMILY MEDICINE CLINIC | Facility: CLINIC | Age: 64
End: 2024-04-10

## 2024-04-10 ENCOUNTER — TELEPHONE (OUTPATIENT)
Dept: PSYCHIATRY | Facility: CLINIC | Age: 64
End: 2024-04-10

## 2024-04-10 VITALS
TEMPERATURE: 97.7 F | RESPIRATION RATE: 20 BRPM | HEART RATE: 70 BPM | OXYGEN SATURATION: 98 % | WEIGHT: 196.8 LBS | DIASTOLIC BLOOD PRESSURE: 82 MMHG | BODY MASS INDEX: 30.89 KG/M2 | HEIGHT: 67 IN | SYSTOLIC BLOOD PRESSURE: 143 MMHG

## 2024-04-10 DIAGNOSIS — I10 BENIGN ESSENTIAL HTN: ICD-10-CM

## 2024-04-10 DIAGNOSIS — F11.20 CONTINUOUS OPIOID DEPENDENCE (HCC): ICD-10-CM

## 2024-04-10 DIAGNOSIS — M25.561 ACUTE PAIN OF BOTH KNEES: Primary | ICD-10-CM

## 2024-04-10 DIAGNOSIS — G47.00 INSOMNIA, UNSPECIFIED TYPE: ICD-10-CM

## 2024-04-10 DIAGNOSIS — G89.4 CHRONIC PAIN SYNDROME: ICD-10-CM

## 2024-04-10 DIAGNOSIS — E11.9 TYPE 2 DIABETES MELLITUS WITHOUT COMPLICATION, WITHOUT LONG-TERM CURRENT USE OF INSULIN (HCC): ICD-10-CM

## 2024-04-10 DIAGNOSIS — G89.29 CHRONIC LEFT-SIDED LOW BACK PAIN WITH LEFT-SIDED SCIATICA: ICD-10-CM

## 2024-04-10 DIAGNOSIS — N40.1 BENIGN PROSTATIC HYPERPLASIA WITH LOWER URINARY TRACT SYMPTOMS, SYMPTOM DETAILS UNSPECIFIED: ICD-10-CM

## 2024-04-10 DIAGNOSIS — M54.42 CHRONIC LEFT-SIDED LOW BACK PAIN WITH LEFT-SIDED SCIATICA: ICD-10-CM

## 2024-04-10 DIAGNOSIS — M25.562 ACUTE PAIN OF BOTH KNEES: Primary | ICD-10-CM

## 2024-04-10 DIAGNOSIS — K21.9 GASTROESOPHAGEAL REFLUX DISEASE, UNSPECIFIED WHETHER ESOPHAGITIS PRESENT: ICD-10-CM

## 2024-04-10 LAB
ALBUMIN SERPL BCP-MCNC: 4.2 G/DL (ref 3.5–5)
ALP SERPL-CCNC: 52 U/L (ref 34–104)
ALT SERPL W P-5'-P-CCNC: 20 U/L (ref 7–52)
ANION GAP SERPL CALCULATED.3IONS-SCNC: 7 MMOL/L (ref 4–13)
AST SERPL W P-5'-P-CCNC: 20 U/L (ref 13–39)
BILIRUB SERPL-MCNC: 0.5 MG/DL (ref 0.2–1)
BUN SERPL-MCNC: 15 MG/DL (ref 5–25)
CALCIUM SERPL-MCNC: 9.2 MG/DL (ref 8.4–10.2)
CHLORIDE SERPL-SCNC: 104 MMOL/L (ref 96–108)
CO2 SERPL-SCNC: 29 MMOL/L (ref 21–32)
CREAT SERPL-MCNC: 1.14 MG/DL (ref 0.6–1.3)
CREAT UR-MCNC: 151.9 MG/DL
GFR SERPL CREATININE-BSD FRML MDRD: 68 ML/MIN/1.73SQ M
GLUCOSE P FAST SERPL-MCNC: 149 MG/DL (ref 65–99)
MICROALBUMIN UR-MCNC: 8.3 MG/L
MICROALBUMIN/CREAT 24H UR: 5 MG/G CREATININE (ref 0–30)
POTASSIUM SERPL-SCNC: 4.2 MMOL/L (ref 3.5–5.3)
PROT SERPL-MCNC: 7.1 G/DL (ref 6.4–8.4)
PSA SERPL-MCNC: 2.7 NG/ML (ref 0–4)
SODIUM SERPL-SCNC: 140 MMOL/L (ref 135–147)

## 2024-04-10 PROCEDURE — 84153 ASSAY OF PSA TOTAL: CPT

## 2024-04-10 PROCEDURE — 83036 HEMOGLOBIN GLYCOSYLATED A1C: CPT

## 2024-04-10 PROCEDURE — 36415 COLL VENOUS BLD VENIPUNCTURE: CPT

## 2024-04-10 PROCEDURE — 82570 ASSAY OF URINE CREATININE: CPT

## 2024-04-10 PROCEDURE — 82043 UR ALBUMIN QUANTITATIVE: CPT

## 2024-04-10 PROCEDURE — 80053 COMPREHEN METABOLIC PANEL: CPT

## 2024-04-10 RX ORDER — FAMOTIDINE 20 MG/1
20 TABLET, FILM COATED ORAL DAILY
Qty: 60 TABLET | Refills: 2 | Status: SHIPPED | OUTPATIENT
Start: 2024-04-10

## 2024-04-10 NOTE — PROGRESS NOTES
Name: Marv Astorga      : 1960      MRN: [id number]  Encounter Provider: Jai Ludwig MD  Encounter Date: 4/10/2024   Encounter department: [facility]    Assessment & Plan     1. Acute pain of both knees  Assessment & Plan:  Bilateral knee pain noted in past couple weeks with intermittent swelling. Recommended voltaren gel and PT for muscle strengthening.     Orders:  -     XR knee 3 vw right non injury; Future; Expected date: 04/10/2024  -     XR knee 3 vw left non injury; Future; Expected date: 04/10/2024  -     Diclofenac Sodium (VOLTAREN) 1 %; Apply 2 g topically 4 (four) times a day as needed (knee pain)  -     Ambulatory Referral to Physical Therapy; Future    2. Gastroesophageal reflux disease, unspecified whether esophagitis present  -     famotidine (PEPCID) 20 mg tablet; Take 1 tablet (20 mg total) by mouth daily    3. Benign essential HTN  Assessment & Plan:  Just above goal. Continue current management on amlodipine 10 daily.              Subjective      Marv Astorga is a very pleasant 63 y.o. male who has a PMH of Asthma, Back pain, BPH, Depression with psychotic features, Diabetes mellitus, Hyperlipidemia, Hypertension, and chronic prostatitis and presents today with complaints of bilateral knee pain and swelling. Patient's chronic medical conditions are stable unless noted otherwise above. Patient reports full compliance on medications prescribed with no side effects experienced at this time. This patient has had no admissions to hospital or medical emergencies since the last visit to our office. Patient has no further complaints other than what is mentioned below and in the ROS.        Review of Systems   Constitutional:  Negative for activity change, appetite change, chills and fever.   Respiratory:  Negative for cough and shortness of breath.    Cardiovascular:  Negative for chest pain and leg swelling.   Musculoskeletal:  Positive for  arthralgias. Negative for gait problem.   Skin:  Negative for rash.   Neurological:  Negative for dizziness, weakness, numbness and headaches.       Current Outpatient Medications on File Prior to Visit   Medication Sig    albuterol (2.5 mg/3 mL) 0.083 % nebulizer solution Take 3 mL (2.5 mg total) by nebulization every 6 (six) hours as needed for wheezing or shortness of breath    albuterol (ProAir HFA) 90 mcg/act inhaler Inhale 2 puffs every 6 (six) hours as needed for wheezing    amLODIPine (NORVASC) 10 mg tablet Take 1 tablet (10 mg total) by mouth daily    ARIPiprazole (ABILIFY) 5 mg tablet Take 1 tablet (5 mg total) by mouth daily    aspirin 325 mg tablet Take 1 tablet (325 mg total) by mouth daily    atorvastatin (LIPITOR) 20 mg tablet Take 1 tablet (20 mg total) by mouth daily    Blood Glucose Monitoring Suppl (OneTouch Verio) w/Device KIT Use daily    Blood Glucose Monitoring Suppl KIT Use in the morning Please give test strips and lancets  Dx. E11.9    Cholecalciferol (D3 PO) Take by mouth daily    Diclofenac Sodium (VOLTAREN) 1 % Apply 2 g topically 4 (four) times a day    DULoxetine (CYMBALTA) 60 mg delayed release capsule take 1 capsule by mouth twice a day    fluticasone (FLONASE) 50 mcg/act nasal spray 1 spray into each nostril daily    fluticasone-salmeterol (Advair HFA) 115-21 MCG/ACT inhaler Inhale 2 puffs 2 (two) times a day Rinse mouth after use.    gabapentin (NEURONTIN) 800 mg tablet Take 1 tablet (800 mg total) by mouth 3 (three) times a day    glucose blood (OneTouch Verio) test strip Check blood sugar daily    glucose blood (OneTouch Verio) test strip Check blood sugars once daily. Please substitute with appropriate alternative as covered by patient's insurance. Dx: E11.65    glucose monitoring kit (FREESTYLE) monitoring kit Use 1 each in the morning TESTING DAILY IN THE AM    Lancet Devices (ONE TOUCH DELICA LANCING DEV) MISC Use daily    levofloxacin (LEVAQUIN) 500 mg tablet Take 1 tablet  "(500 mg total) by mouth every 24 hours    lidocaine (Lidoderm) 5 % Apply 1 patch topically over 12 hours daily Remove & Discard patch within 12 hours or as directed by MD    lidocaine (LMX) 4 % cream Apply topically as needed for mild pain    metFORMIN (GLUCOPHAGE) 500 mg tablet Take 1 tablet (500 mg total) by mouth 2 (two) times a day with meals    methocarbamol (ROBAXIN) 500 mg tablet Take 1 tablet (500 mg total) by mouth 3 (three) times a day    Mirabegron ER 50 MG TB24 Take 1 tablet (50 mg total) by mouth daily    mirtazapine (REMERON) 15 mg tablet Take 1 tablet (15 mg total) by mouth daily at bedtime    naloxone (NARCAN) 4 mg/0.1 mL nasal spray Administer 1 spray into a nostril. If no response after 2-3 minutes, give another dose in the other nostril using a new spray. (Patient not taking: Reported on 4/1/2024)    Nerve Stimulator (TENS Therapy Pain Relief) EMILY Use as directed    ondansetron (Zofran ODT) 4 mg disintegrating tablet Take 1 tablet (4 mg total) by mouth every 6 (six) hours as needed for nausea or vomiting    OneTouch Delica Lancets 33G MISC Use daily    OneTouch Delica Lancets 33G MISC Check blood sugars once daily. Please substitute with appropriate alternative as covered by patient's insurance. Dx: E11.65    oxyCODONE (ROXICODONE) 10 MG TABS Take 1 tablet (10 mg total) by mouth 3 (three) times a day Max Daily Amount: 30 mg    tamsulosin (FLOMAX) 0.4 mg Take 1 capsule (0.4 mg total) by mouth daily with dinner    [DISCONTINUED] traZODone (DESYREL) 50 mg tablet Take 1 tablet (50 mg total) by mouth daily at bedtime       Objective     /82 (BP Location: Right arm, Patient Position: Sitting, Cuff Size: Standard)   Pulse 70   Temp 97.7 °F (36.5 °C) (Temporal)   Resp 20   Ht 5' 7\" (1.702 m)   Wt 89.3 kg (196 lb 12.8 oz)   SpO2 98%   BMI 30.82 kg/m²     Physical Exam  Vitals and nursing note reviewed.   Constitutional:       General: He is not in acute distress.     Appearance: Normal " appearance. He is well-developed. He is obese.   HENT:      Head: Normocephalic and atraumatic.   Cardiovascular:      Rate and Rhythm: Normal rate.   Pulmonary:      Effort: Pulmonary effort is normal. No respiratory distress.   Musculoskeletal:         General: Tenderness present.      Thoracic back: Spasms and tenderness present.      Lumbar back: Spasms and tenderness present.      Right knee: No swelling or crepitus. Tenderness present.      Left knee: Crepitus present. No swelling. Tenderness present.      Right lower leg: No edema.      Left lower leg: No edema.   Neurological:      Mental Status: He is alert and oriented to person, place, and time.   Psychiatric:         Mood and Affect: Mood is not depressed. Affect is not tearful.         Speech: Speech normal.         Behavior: Behavior is cooperative.         Thought Content: Thought content does not include suicidal ideation. Thought content does not include suicidal plan.       Jai Ludwig MD

## 2024-04-10 NOTE — TELEPHONE ENCOUNTER
Good Morning ,     Patient Marv had an appointment on 4/10/24 and at checkout requested a refill on medication oxyCODONE (ROXICODONE) 10 MG TABS. Patient requested if the QTY could be 90 tablets. Please advise?

## 2024-04-10 NOTE — TELEPHONE ENCOUNTER
Patients Name: Marv Astorga  : 1960  Phone Number: [phone]  Appointment Date: 24  Appointment time: 9:00 am   Address: [address] [place] [address] Dr [place]il [place] [id number]  Drop off Facility/Office Name: [facility]  Drop off  Address: [address] [place] [address] [address] Guo [address]  Cost Center: [id number]  Special notes/directions for : n/a  Note for scheduling team:n/a      Send to Ride Booking Pool: [id number] ([facility]

## 2024-04-10 NOTE — PROGRESS NOTES
Outgoing Calls:  4/10/2024    CMOC called Pt to discuss LantaVan packet Pt dropped off at [facility] for CMOC to review. Part 2 form was not in this packet which John was requesting for Pt to complete. Pt informed he left everything as is. CMOC called John and spoke with Hans and he informed he did send the form. CMOC informed it is not in the envelope Pt received. Hans completed a Promise check and states Pt is not showing active for MA. CM requested to speak with Dayami who also could not find Pt as being active for MA. After reviewing further it was noted John misread application and entered incorrect information. Dayami confirmed she can see Pt has active MA and will not need to complete a Part 2 form. CMOC called Pt once again and informed him of this. CMOC also informed Pt she will mail out his welcome packet from [facility] so that he can have for his records. Pt is aware this referral will be closed as needs have been met.     No further outreach is scheduled.

## 2024-04-11 ENCOUNTER — SOCIAL WORK (OUTPATIENT)
Dept: BEHAVIORAL/MENTAL HEALTH CLINIC | Facility: CLINIC | Age: 64
End: 2024-04-11

## 2024-04-11 DIAGNOSIS — F41.1 GAD (GENERALIZED ANXIETY DISORDER): ICD-10-CM

## 2024-04-11 DIAGNOSIS — F32.2 CURRENT SEVERE EPISODE OF MAJOR DEPRESSIVE DISORDER WITHOUT PSYCHOTIC FEATURES WITHOUT PRIOR EPISODE (HCC): Primary | ICD-10-CM

## 2024-04-11 PROBLEM — M25.562 ACUTE PAIN OF BOTH KNEES: Status: ACTIVE | Noted: 2024-04-11

## 2024-04-11 PROBLEM — M25.561 ACUTE PAIN OF BOTH KNEES: Status: ACTIVE | Noted: 2024-04-11

## 2024-04-11 LAB
EST. AVERAGE GLUCOSE BLD GHB EST-MCNC: 163 MG/DL
HBA1C MFR BLD: 7.3 %

## 2024-04-11 RX ORDER — TRAZODONE HYDROCHLORIDE 50 MG/1
50 TABLET ORAL
Qty: 90 TABLET | Refills: 0 | Status: SHIPPED | OUTPATIENT
Start: 2024-04-11

## 2024-04-11 NOTE — ASSESSMENT & PLAN NOTE
-- DO NOT REPLY / DO NOT REPLY ALL --  -- Message is from Engagement Center [employer] (EC[facility]) --    General Patient Message: Please give call about follow up questions.    Caller Information       Type Contact Phone/Fax    08/01/2023 02:15 PM CDT Phone (Incoming) ELVIRA ANDRES (Emergency Contact) [phone]        Alternative phone number: None    Can a detailed message be left? Yes    Message Turnaround: [place]:    Refer to site's KB page for routing instructions    Please give this turnaround time to the caller:   \"You can expect to receive a response 2-3 business days after your provider's clinical team reviews the message\"               Just above goal. Continue current management on amlodipine 10 daily.

## 2024-04-11 NOTE — ASSESSMENT & PLAN NOTE
Bilateral knee pain noted in past couple weeks with intermittent swelling. Recommended voltaren gel and PT for muscle strengthening.

## 2024-04-11 NOTE — PSYCH
"Behavioral Health Psychotherapy Progress Note    Psychotherapy Provided: Individual Psychotherapy     1. Current severe episode of major depressive disorder without psychotic features without prior episode (HCC)        2. SAHRA (generalized anxiety disorder)            Goals addressed in session: Goal 1 and Goal 2     DATA: Marv arrived for his session. He discussed his physical pain and his poor finances. He claims the  who visits him is trying to get him internet and some type of help with the rent. Marv in his words feels she is a good talker and in his words does nothing for him. He claims she has not so far delivered one thing she has promised. He admits he is frustrated but would not hurt himself due to his family. Marv shared coming here helps him to be able to feel better.I provide support, encouragement and strategies to cope.   During this session, this clinician used the following therapeutic modalities: Client-centered Therapy, Cognitive Behavioral Therapy, Mindfulness-based Strategies, and Supportive Psychotherapy    Substance Abuse was not addressed during this session. If the client is diagnosed with a co-occurring substance use disorder, please indicate any changes in the frequency or amount of use: n/a. Stage of change for addressing substance use diagnoses: No substance use/Not applicable    ASSESSMENT:  Marv Astorga presents with a Anxious mood.     his affect is anxious due to his physical pain and his poor finances, which is congruent, with his mood and the content of the session. The client has made progress on their goals but his pain and his finances remain a problem.      Marv Astorga presents with a none risk of suicide, none risk of self-harm, and none risk of harm to others.    For any risk assessment that surpasses a \"low\" rating, a safety plan must be developed.    A safety plan was indicated: no  If yes, describe in detail n/a    PLAN: Between sessions, Marv Astorga " will use mindfulness and CBT. At the next session, the therapist will use Client-centered Therapy, Cognitive Behavioral Therapy, Mindfulness-based Strategies, and Supportive Psychotherapy to address issues and symptoms as they may arise. .    Behavioral Health Treatment Plan and Discharge Planning: Marv Astorga is aware of and agrees to continue to work on their treatment plan. They have identified and are working toward their discharge goals. yes    Visit start and stop times:    04/11/24  Start Time: 0900  Stop Time: 1000  Total Visit Time: 60 minutes

## 2024-04-12 ENCOUNTER — TELEPHONE (OUTPATIENT)
Dept: FAMILY MEDICINE CLINIC | Facility: CLINIC | Age: 64
End: 2024-04-12

## 2024-04-12 ENCOUNTER — HOSPITAL ENCOUNTER (OUTPATIENT)
Dept: RADIOLOGY | Facility: HOSPITAL | Age: 64
End: 2024-04-12
Payer: MEDICARE

## 2024-04-12 DIAGNOSIS — M25.562 ACUTE PAIN OF BOTH KNEES: ICD-10-CM

## 2024-04-12 DIAGNOSIS — M25.561 ACUTE PAIN OF BOTH KNEES: ICD-10-CM

## 2024-04-12 PROCEDURE — 73562 X-RAY EXAM OF KNEE 3: CPT

## 2024-04-12 RX ORDER — OXYCODONE HYDROCHLORIDE 10 MG/1
10 TABLET ORAL 3 TIMES DAILY
Qty: 60 TABLET | Refills: 0 | Status: SHIPPED | OUTPATIENT
Start: 2024-04-12

## 2024-04-12 NOTE — TELEPHONE ENCOUNTER
Patient came into office today requesting   oxyCODONE (ROXICODONE) 10 MG TABS  to be sent to pharmacy in  quantity of  90 tables instead of 30 tablets.    Please advice.

## 2024-04-15 ENCOUNTER — PATIENT OUTREACH (OUTPATIENT)
Dept: FAMILY MEDICINE CLINIC | Facility: CLINIC | Age: 64
End: 2024-04-15

## 2024-04-15 ENCOUNTER — OFFICE VISIT (OUTPATIENT)
Dept: DENTISTRY | Facility: CLINIC | Age: 64
End: 2024-04-15

## 2024-04-15 VITALS — DIASTOLIC BLOOD PRESSURE: 95 MMHG | TEMPERATURE: 98.7 F | HEART RATE: 78 BPM | SYSTOLIC BLOOD PRESSURE: 134 MMHG

## 2024-04-15 DIAGNOSIS — K08.109 TEETH MISSING: Primary | ICD-10-CM

## 2024-04-15 PROCEDURE — WIS5003 WAX TRY-IN

## 2024-04-15 NOTE — DENTAL PROCEDURE DETAILS
Pt presents for wax try in of upper and lower cast metal partials. PMH reviewed, no changes. Tooth set up tried intraorally. Confirmed proper tissue support and occlusion. Pt confirmed satisfaction with function and esthetics via pt mirror. Selected gingival shade  and confirmed by pt. Pt left satisfied and ambulatory.     NV: delivery

## 2024-04-15 NOTE — PROGRESS NOTES
JEROD CM received in-basket message from [facility] stating that referral was being closed at this time as pt now has Lanta Van. SW CM will close referral at this time as they are no other needs. JEROD CM will be available for any additional needs as requested.

## 2024-04-16 ENCOUNTER — HOSPITAL ENCOUNTER (EMERGENCY)
Facility: HOSPITAL | Age: 64
Discharge: HOME | End: 2024-04-16
Attending: EMERGENCY MEDICINE
Payer: MEDICARE

## 2024-04-16 VITALS
HEART RATE: 85 BPM | DIASTOLIC BLOOD PRESSURE: 80 MMHG | OXYGEN SATURATION: 97 % | TEMPERATURE: 98.6 F | RESPIRATION RATE: 16 BRPM | WEIGHT: 200 LBS | BODY MASS INDEX: 31.32 KG/M2 | SYSTOLIC BLOOD PRESSURE: 143 MMHG

## 2024-04-16 DIAGNOSIS — M54.50 ACUTE EXACERBATION OF CHRONIC LOW BACK PAIN: Primary | ICD-10-CM

## 2024-04-16 DIAGNOSIS — G89.29 ACUTE EXACERBATION OF CHRONIC LOW BACK PAIN: Primary | ICD-10-CM

## 2024-04-16 PROCEDURE — 96372 THER/PROPH/DIAG INJ SC/IM: CPT

## 2024-04-16 PROCEDURE — 99283 EMERGENCY DEPT VISIT LOW MDM: CPT

## 2024-04-16 PROCEDURE — 99284 EMERGENCY DEPT VISIT MOD MDM: CPT

## 2024-04-16 RX ORDER — LIDOCAINE 50 MG/G
1 PATCH TOPICAL ONCE
Status: DISCONTINUED | OUTPATIENT
Start: 2024-04-16 | End: 2024-04-16 | Stop reason: HOSPADM

## 2024-04-16 RX ORDER — PREDNISONE 20 MG/1
40 TABLET ORAL DAILY
Qty: 10 TABLET | Refills: 0 | Status: SHIPPED | OUTPATIENT
Start: 2024-04-16 | End: 2024-04-21

## 2024-04-16 RX ORDER — KETOROLAC TROMETHAMINE 30 MG/ML
15 INJECTION, SOLUTION INTRAMUSCULAR; INTRAVENOUS ONCE
Status: COMPLETED | OUTPATIENT
Start: 2024-04-16 | End: 2024-04-16

## 2024-04-16 RX ORDER — PREDNISONE 20 MG/1
40 TABLET ORAL DAILY
Qty: 10 TABLET | Refills: 0 | Status: CANCELLED | OUTPATIENT
Start: 2024-04-16 | End: 2024-04-21

## 2024-04-16 RX ADMIN — LIDOCAINE 1 PATCH: 700 PATCH TOPICAL at 16:15

## 2024-04-16 RX ADMIN — KETOROLAC TROMETHAMINE 15 MG: 30 INJECTION, SOLUTION INTRAMUSCULAR; INTRAVENOUS at 16:15

## 2024-04-16 NOTE — DISCHARGE INSTRUCTIONS
Watch blood sugars while taking steroid. Continue home pain regimen for back pain as prescribed by PCP. Continue follow up with pain management. Return to ED for new or worsening symptoms as discussed. Refrain from exercise while taking prednisone. contact your healthcare provider if you develop tendon-related pain or swelling, or develop weakness or inability to use one of your joints. rest and refrain from exercise should any tendon-related symptoms occur.

## 2024-04-16 NOTE — ED PROVIDER NOTES
History  Chief Complaint   Patient presents with    Back Pain     Reports lower L side back pain that radiates to L hip and leg x2 days. Took a muscle relaxer at 1pm.      63-year-old male past medical history of chronic low back pain with sciatica, type 2 diabetes mellitus, hypertension, hyperlipidemia presents to emergency department for left low back pain for 2 days.  Denies trauma. Same area as chronic pain for which he follows with PCP, pain management.       History provided by:  Patient and medical records  Back Pain  Duration:  2 days  Chronicity:  Chronic  Context: not jumping from heights, not lifting heavy objects, not MVA, not physical stress and not recent injury    Ineffective treatments:  Muscle relaxants  Associated symptoms: no abdominal pain, no abdominal swelling, no bladder incontinence, no bowel incontinence, no chest pain, no dysuria, no fever, no headaches, no numbness, no paresthesias, no pelvic pain, no perianal numbness, no tingling and no weakness    Risk factors: no hx of cancer, no hx of osteoporosis, no recent surgery and no steroid use    Risk factors comment:  Denies IVDA or injections into spine      Prior to Admission Medications   Prescriptions Last Dose Informant Patient Reported? Taking?   ARIPiprazole (ABILIFY) 5 mg tablet   No No   Sig: Take 1 tablet (5 mg total) by mouth daily   Blood Glucose Monitoring Suppl (OneTouch Verio) w/Device KIT  Self No No   Sig: Use daily   Blood Glucose Monitoring Suppl KIT  Self No No   Sig: Use in the morning Please give test strips and lancets  Dx. E11.9   Cholecalciferol (D3 PO)  Self Yes No   Sig: Take by mouth daily   DULoxetine (CYMBALTA) 60 mg delayed release capsule   No No   Sig: take 1 capsule by mouth twice a day   Diclofenac Sodium (VOLTAREN) 1 %   No No   Sig: Apply 2 g topically 4 (four) times a day   Diclofenac Sodium (VOLTAREN) 1 %   No No   Sig: Apply 2 g topically 4 (four) times a day as needed (knee pain)   Lancet Devices (ONE  TOUCH DELICA LANCING DEV) MISC  Self No No   Sig: Use daily   Mirabegron ER 50 MG TB24  Self No No   Sig: Take 1 tablet (50 mg total) by mouth daily   Nerve Stimulator (TENS Therapy Pain Relief) EMILY   No No   Sig: Use as directed   OneTouch Delica Lancets 33G MISC   No No   Sig: Use daily   OneTouch Delica Lancets 33G MISC   No No   Sig: Check blood sugars once daily. Please substitute with appropriate alternative as covered by patient's insurance. Dx: E11.65   albuterol (2.5 mg/3 mL) 0.083 % nebulizer solution  Self No No   Sig: Take 3 mL (2.5 mg total) by nebulization every 6 (six) hours as needed for wheezing or shortness of breath   albuterol (ProAir HFA) 90 mcg/act inhaler   No No   Sig: Inhale 2 puffs every 6 (six) hours as needed for wheezing   amLODIPine (NORVASC) 10 mg tablet   No No   Sig: Take 1 tablet (10 mg total) by mouth daily   aspirin 325 mg tablet   No No   Sig: Take 1 tablet (325 mg total) by mouth daily   atorvastatin (LIPITOR) 20 mg tablet   No No   Sig: Take 1 tablet (20 mg total) by mouth daily   famotidine (PEPCID) 20 mg tablet   No No   Sig: Take 1 tablet (20 mg total) by mouth daily   fluticasone (FLONASE) 50 mcg/act nasal spray  Self No No   Si spray into each nostril daily   fluticasone-salmeterol (Advair HFA) 115-21 MCG/ACT inhaler   No No   Sig: Inhale 2 puffs 2 (two) times a day Rinse mouth after use.   gabapentin (NEURONTIN) 800 mg tablet   No No   Sig: Take 1 tablet (800 mg total) by mouth 3 (three) times a day   glucose blood (OneTouch Verio) test strip   No No   Sig: Check blood sugar daily   glucose blood (OneTouch Verio) test strip   No No   Sig: Check blood sugars once daily. Please substitute with appropriate alternative as covered by patient's insurance. Dx: E11.65   glucose monitoring kit (FREESTYLE) monitoring kit  Self No No   Sig: Use 1 each in the morning TESTING DAILY IN THE AM   levofloxacin (LEVAQUIN) 500 mg tablet   No No   Sig: Take 1 tablet (500 mg total) by  mouth every 24 hours   lidocaine (LMX) 4 % cream  Self No No   Sig: Apply topically as needed for mild pain   lidocaine (Lidoderm) 5 %   No No   Sig: Apply 1 patch topically over 12 hours daily Remove & Discard patch within 12 hours or as directed by MD   metFORMIN (GLUCOPHAGE) 500 mg tablet   No No   Sig: Take 1 tablet (500 mg total) by mouth 2 (two) times a day with meals   methocarbamol (ROBAXIN) 500 mg tablet   No No   Sig: Take 1 tablet (500 mg total) by mouth 3 (three) times a day   mirtazapine (REMERON) 15 mg tablet   No No   Sig: Take 1 tablet (15 mg total) by mouth daily at bedtime   naloxone (NARCAN) 4 mg/0.1 mL nasal spray   No No   Sig: Administer 1 spray into a nostril. If no response after 2-3 minutes, give another dose in the other nostril using a new spray.   Patient not taking: Reported on 4/1/2024   ondansetron (Zofran ODT) 4 mg disintegrating tablet   No No   Sig: Take 1 tablet (4 mg total) by mouth every 6 (six) hours as needed for nausea or vomiting   oxyCODONE (ROXICODONE) 10 MG TABS   No No   Sig: Take 1 tablet (10 mg total) by mouth 3 (three) times a day Max Daily Amount: 30 mg   tamsulosin (FLOMAX) 0.4 mg   No No   Sig: Take 1 capsule (0.4 mg total) by mouth daily with dinner   traZODone (DESYREL) 50 mg tablet   No No   Sig: take 1 tablet by mouth at bedtime      Facility-Administered Medications: None       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    Depression     Diabetes mellitus (HCC)     Hyperlipidemia     Hypertension     Suicidal intent     Type 2 diabetes mellitus without complication, without long-term current use of insulin (HCC) 10/16/2020       Past Surgical History:   Procedure Laterality Date    CYST REMOVAL  2017       Family History   Problem Relation Age of Onset    Hypertension Mother     Diabetes Mother     Cancer Father     Diabetes Family     Hypertension Family      I  have reviewed and agree with the history as documented.    E-Cigarette/Vaping    E-Cigarette Use Never User      E-Cigarette/Vaping Substances    Nicotine No     THC No     CBD No     Flavoring No     Other No     Unknown No      Social History     Tobacco Use    Smoking status: Never     Passive exposure: Never    Smokeless tobacco: Never   Vaping Use    Vaping status: Never Used   Substance Use Topics    Alcohol use: Not Currently     Comment: rare    Drug use: Never       Review of Systems   Constitutional:  Negative for fever.   Cardiovascular:  Negative for chest pain.   Gastrointestinal:  Negative for abdominal pain, bowel incontinence, nausea and vomiting.   Genitourinary:  Negative for bladder incontinence, difficulty urinating, dysuria, hematuria, pelvic pain and testicular pain.   Musculoskeletal:  Positive for back pain. Negative for gait problem and neck pain.   Skin:  Negative for rash.   Neurological:  Negative for tingling, weakness, numbness, headaches and paresthesias.   All other systems reviewed and are negative.      Physical Exam  Physical Exam  Vitals and nursing note reviewed.   Constitutional:       General: He is awake. He is not in acute distress.     Appearance: Normal appearance. He is not ill-appearing.   HENT:      Head: Normocephalic and atraumatic.      Mouth/Throat:      Lips: Pink.      Mouth: Mucous membranes are moist.   Eyes:      General: Vision grossly intact.   Cardiovascular:      Rate and Rhythm: Normal rate and regular rhythm.      Pulses:           Dorsalis pedis pulses are 2+ on the right side and 2+ on the left side.      Heart sounds: Normal heart sounds.   Pulmonary:      Effort: Pulmonary effort is normal. No respiratory distress.      Breath sounds: Normal breath sounds.   Abdominal:      General: There is no distension.      Palpations: Abdomen is soft.      Tenderness: There is no abdominal tenderness. There is no right CVA tenderness or left CVA tenderness.    Musculoskeletal:      Cervical back: Neck supple.        Back:       Right lower leg: No edema.      Left lower leg: No edema.      Comments: No midline spinal tenderness, deformities, crepitus, step-off, skin changes noted to the area of pain.   Skin:     General: Skin is warm and dry.      Capillary Refill: Capillary refill takes less than 2 seconds.      Findings: No bruising, erythema or rash.   Neurological:      Mental Status: He is alert.      Sensory: No sensory deficit.      Motor: No weakness.      Gait: Gait normal.   Psychiatric:         Mood and Affect: Mood normal.         Behavior: Behavior normal.         Vital Signs  ED Triage Vitals [04/16/24 1539]   Temperature Pulse Respirations Blood Pressure SpO2   98.6 °F (37 °C) 85 16 143/80 97 %      Temp Source Heart Rate Source Patient Position - Orthostatic VS BP Location FiO2 (%)   Oral Monitor Sitting Left arm --      Pain Score       --           Vitals:    04/16/24 1539   BP: 143/80   Pulse: 85   Patient Position - Orthostatic VS: Sitting         Visual Acuity      ED Medications  Medications   ketorolac (TORADOL) injection 15 mg (15 mg Intramuscular Given 4/16/24 1615)       Diagnostic Studies  Results Reviewed       None                   No orders to display              Procedures  Procedures         ED Course  ED Course as of 04/17/24 1610   Tue Apr 16, 2024   1553 Atraumatic acute exacerbation of left low back pain with sciatica.  No numbness or tingling.  Neurovascularly intact lower extremities.  No midline tenderness step-offs or deformities.  No injections into spine since 2020.  States he took his oxycodone and muscle relaxer but it did not make the pain go away.  No NSAIDs.  Type 2 diabetes mellitus, does not use insulin.   1556 Per chart review chronic left lower back pain with sciatica.  Follows with pain management.                               SBIRT 22yo+      Flowsheet Row Most Recent Value   Initial Alcohol Screen: US AUDIT-C      1. How often do you have a drink containing alcohol? 0 Filed at: 04/16/2024 1540   2. How many drinks containing alcohol do you have on a typical day you are drinking?  0 Filed at: 04/16/2024 1540   3a. Male UNDER 65: How often do you have five or more drinks on one occasion? 0 Filed at: 04/16/2024 1540   3b. FEMALE Any Age, or MALE 65+: How often do you have 4 or more drinks on one occassion? 0 Filed at: 04/16/2024 1540   Audit-C Score 0 Filed at: 04/16/2024 1540   DANIELLE: How many times in the past year have you...    Used an illegal drug or used a prescription medication for non-medical reasons? Never Filed at: 04/16/2024 1540                      Medical Decision Making  The patient has no symptoms of new extremity weakness or paresthesia, urinary retention or incontinence, fecal incontinence, saddle anesthesia or paresthesia, unintentional weight loss, unexplained fevers/chills or night sweats, recent trauma, persistent vertigo present at rest, or truncal ataxia. No abdominal pain, or chest pain. The patient has no history of Cancer (active or in remission), osteoporosis, chronic corticosteroid use, immunosuppression, recent spinal procedures or IVDA. There were no unexplained abnormal vital signs or new neurologic deficits on physical exam. History does not suggest diagnosis of cauda equina syndrome.  Patient denies history of IVDA, denies history of fevers, no recent surgeries or any procedures to suggest a transient bacteremia leading to a diagnosis of subdural abscess. Denies history of blood thinner use with recent history of lumbar puncture or any violation of the epidural space to suggest history of epidural hematoma. Therefore these above diagnoses (cauda equina syndrome, epidural abscess, epidural hematoma) were not pursued with diagnostic imaging, additional imaging or workup not indicated at this time. Plan for supportive care, continued outpatient follow up.     All imaging and/or lab testing  "discussed with patient, strict return to ED precautions discussed. Patient recommended to follow up promptly with appropriate outpatient provider. Patient and/or family members verbalizes understanding and agrees with plan. Patient and/or family members were given opportunity to ask questions, all questions were answered at this time. Patient is stable for discharge.     Portions of the record may have been created with voice recognition software. Occasional wrong word or \"sound a like\" substitutions may have occurred due to the inherent limitations of voice recognition software. Read the chart carefully and recognize, using context, where substitutions have occurred.     Risk  Prescription drug management.             Disposition  Final diagnoses:   Acute exacerbation of chronic low back pain     Time reflects when diagnosis was documented in both MDM as applicable and the Disposition within this note       Time User Action Codes Description Comment    4/16/2024  3:58 PM Renee Orourke [M54.50,  G89.29] Acute exacerbation of chronic low back pain           ED Disposition       ED Disposition   Discharge    Condition   Stable    Date/Time   Tue Apr 16, 2024 1623    Comment   Marv Astorga discharge to home/self care.                   Follow-up Information       Follow up With Specialties Details Why Contact Info    Audrey Ruvalcaba MD Family Medicine   [address] [place] [id number]  [phone]              Discharge Medication List as of 4/16/2024  4:26 PM        START taking these medications    Details   predniSONE 20 mg tablet Take 2 tablets (40 mg total) by mouth daily for 5 days, Starting Tue 4/16/2024, Until Sun 4/21/2024, Normal           CONTINUE these medications which have NOT CHANGED    Details   albuterol (2.5 mg/3 mL) 0.083 % nebulizer solution Take 3 mL (2.5 mg total) by nebulization every 6 (six) hours as needed for wheezing or shortness of breath, Starting Tue 9/6/2022, Normal    "   albuterol (ProAir HFA) 90 mcg/act inhaler Inhale 2 puffs every 6 (six) hours as needed for wheezing, Starting Tue 10/3/2023, Normal      amLODIPine (NORVASC) 10 mg tablet Take 1 tablet (10 mg total) by mouth daily, Starting Mon 5/1/2023, Normal      ARIPiprazole (ABILIFY) 5 mg tablet Take 1 tablet (5 mg total) by mouth daily, Starting Thu 4/4/2024, Normal      aspirin 325 mg tablet Take 1 tablet (325 mg total) by mouth daily, Starting Thu 6/8/2023, Normal      atorvastatin (LIPITOR) 20 mg tablet Take 1 tablet (20 mg total) by mouth daily, Starting Sun 1/14/2024, Normal      Blood Glucose Monitoring Suppl (OneTouch Verio) w/Device KIT Use daily, Starting Tue 6/14/2022, Normal      !! Blood Glucose Monitoring Suppl KIT Use in the morning Please give test strips and lancets  Dx. E11.9, Starting Wed 4/13/2022, Print      Cholecalciferol (D3 PO) Take by mouth daily, Historical Med      !! Diclofenac Sodium (VOLTAREN) 1 % Apply 2 g topically 4 (four) times a day, Starting Sat 9/2/2023, Normal      !! Diclofenac Sodium (VOLTAREN) 1 % Apply 2 g topically 4 (four) times a day as needed (knee pain), Starting Wed 4/10/2024, Until Fri 5/10/2024 at 2359, Normal      DULoxetine (CYMBALTA) 60 mg delayed release capsule take 1 capsule by mouth twice a day, Starting Mon 4/8/2024, Normal      famotidine (PEPCID) 20 mg tablet Take 1 tablet (20 mg total) by mouth daily, Starting Wed 4/10/2024, Normal      fluticasone (FLONASE) 50 mcg/act nasal spray 1 spray into each nostril daily, Starting Thu 12/16/2021, Normal      fluticasone-salmeterol (Advair HFA) 115-21 MCG/ACT inhaler Inhale 2 puffs 2 (two) times a day Rinse mouth after use., Starting Mon 4/24/2023, Normal      gabapentin (NEURONTIN) 800 mg tablet Take 1 tablet (800 mg total) by mouth 3 (three) times a day, Starting Fri 1/5/2024, Normal      !! glucose blood (OneTouch Verio) test strip Check blood sugar daily, Normal      !! glucose blood (OneTouch Verio) test strip Check  blood sugars once daily. Please substitute with appropriate alternative as covered by patient's insurance. Dx: E11.65, Normal      !! glucose monitoring kit (FREESTYLE) monitoring kit Use 1 each in the morning TESTING DAILY IN THE AM, Starting Mon 5/9/2022, Print      Lancet Devices (ONE TOUCH DELICA LANCING DEV) MISC Use daily, Starting Fri 8/12/2022, Normal      levofloxacin (LEVAQUIN) 500 mg tablet Take 1 tablet (500 mg total) by mouth every 24 hours, Starting Tue 4/2/2024, Until Thu 5/2/2024, Normal      lidocaine (Lidoderm) 5 % Apply 1 patch topically over 12 hours daily Remove & Discard patch within 12 hours or as directed by MD, Starting Tue 12/5/2023, Normal      lidocaine (LMX) 4 % cream Apply topically as needed for mild pain, Starting Fri 8/12/2022, Normal      metFORMIN (GLUCOPHAGE) 500 mg tablet Take 1 tablet (500 mg total) by mouth 2 (two) times a day with meals, Starting Fri 2/2/2024, Normal      methocarbamol (ROBAXIN) 500 mg tablet Take 1 tablet (500 mg total) by mouth 3 (three) times a day, Starting Tue 4/2/2024, Normal      Mirabegron ER 50 MG TB24 Take 1 tablet (50 mg total) by mouth daily, Starting Mon 6/20/2022, Normal      mirtazapine (REMERON) 15 mg tablet Take 1 tablet (15 mg total) by mouth daily at bedtime, Starting Fri 1/5/2024, Until Wed 7/3/2024, Normal      naloxone (NARCAN) 4 mg/0.1 mL nasal spray Administer 1 spray into a nostril. If no response after 2-3 minutes, give another dose in the other nostril using a new spray., Normal      Nerve Stimulator (TENS Therapy Pain Relief) EMILY Use as directed, Print      ondansetron (Zofran ODT) 4 mg disintegrating tablet Take 1 tablet (4 mg total) by mouth every 6 (six) hours as needed for nausea or vomiting, Starting Mon 6/26/2023, Normal      !! OneTouch Delica Lancets 33G MISC Use daily, Starting Tue 10/31/2023, Normal      !! OneTouch Delica Lancets 33G MISC Check blood sugars once daily. Please substitute with appropriate alternative as  covered by patient's insurance. Dx: E11.65, Normal      oxyCODONE (ROXICODONE) 10 MG TABS Take 1 tablet (10 mg total) by mouth 3 (three) times a day Max Daily Amount: 30 mg, Starting Fri 4/12/2024, Normal      tamsulosin (FLOMAX) 0.4 mg Take 1 capsule (0.4 mg total) by mouth daily with dinner, Starting Fri 1/5/2024, Normal      traZODone (DESYREL) 50 mg tablet take 1 tablet by mouth at bedtime, Starting Thu 4/11/2024, Normal       !! - Potential duplicate medications found. Please discuss with provider.          No discharge procedures on file.    PDMP Review         Value Time User    PDMP Reviewed  Yes 4/12/2024 10:35 AM Audrey Ruvalcaba MD            ED Provider  Electronically Signed by             Renee Orourke PA-C  04/17/24 0839

## 2024-04-24 ENCOUNTER — TELEPHONE (OUTPATIENT)
Dept: PSYCHIATRY | Facility: CLINIC | Age: 64
End: 2024-04-24

## 2024-04-24 NOTE — TELEPHONE ENCOUNTER
Patient is calling regarding cancelling an appointment.    Date/Time: 4/25/2024 at 9 am    Reason: patient is having issues with his sugar    Patient was rescheduled: YES [] NO [x]  If yes, when was Patient reschedule for: n/a    Patient requesting call back to reschedule: YES [] NO [x]

## 2024-05-01 ENCOUNTER — TELEPHONE (OUTPATIENT)
Dept: FAMILY MEDICINE CLINIC | Facility: CLINIC | Age: 64
End: 2024-05-01

## 2024-05-01 NOTE — TELEPHONE ENCOUNTER
Pt came into office requesting medication refill for     oxyCODONE (ROXICODONE) 10 MG TABS     Please send to pharmacy on file      Pt is requesting a 90 day supply

## 2024-05-03 ENCOUNTER — TELEPHONE (OUTPATIENT)
Dept: FAMILY MEDICINE CLINIC | Facility: CLINIC | Age: 64
End: 2024-05-03

## 2024-05-03 DIAGNOSIS — M54.42 CHRONIC LEFT-SIDED LOW BACK PAIN WITH LEFT-SIDED SCIATICA: ICD-10-CM

## 2024-05-03 DIAGNOSIS — F11.20 CONTINUOUS OPIOID DEPENDENCE (HCC): ICD-10-CM

## 2024-05-03 DIAGNOSIS — G89.29 CHRONIC LEFT-SIDED LOW BACK PAIN WITH LEFT-SIDED SCIATICA: ICD-10-CM

## 2024-05-03 DIAGNOSIS — G89.4 CHRONIC PAIN SYNDROME: ICD-10-CM

## 2024-05-03 RX ORDER — OXYCODONE HYDROCHLORIDE 10 MG/1
10 TABLET ORAL 3 TIMES DAILY
Qty: 90 TABLET | Refills: 0 | Status: SHIPPED | OUTPATIENT
Start: 2024-05-03

## 2024-05-03 NOTE — TELEPHONE ENCOUNTER
Patient would like to know if you can send his pain medication to the pharmacy since his appointment had to be rescheduled. Please advise

## 2024-05-08 ENCOUNTER — TELEPHONE (OUTPATIENT)
Dept: PSYCHIATRY | Facility: CLINIC | Age: 64
End: 2024-05-08

## 2024-05-08 NOTE — TELEPHONE ENCOUNTER
Patients Name: Marv Astorga    : 1960    Phone Number: [phone]    Appointment Date: 2024    Appointment time: 4pm     Address: [address] [place] [address] [address] [place] [address]    Drop Off Facility/Office: [facility]    Drop Off Address: [facility] [address], [place] [address]    Cost Center Number: [id number]    Special notes/directions for :    Note for scheduling team:    Send to Ride Booking Pool: [id number] (Patient [employer])

## 2024-05-09 NOTE — PSYCH
"MEDICATION MANAGEMENT NOTE        [facility]      Name and Date of Birth:  Marv Astorga 63 y.o. 1960    Date of Visit: May 16, 2024    HPI:    aMrv Astorga is here for medication review with primary c/o \"Sometimes I just feel down and angry.  My daughter says sometimes I get mad.\"  He feels short tempreed and yells a lot.  Pt states he mainly gets irritable and angry when he is alone and when \"I think too much.\" His chronic pain is a stressor -- currently back pain is at an 8/10.  He is depressed, down, feeling helpless and hopeless and sometimes has death wishes, His is trying to be more positive and has been making more contact with friend by phone.  His anxiety and panic attack are \"Here and there.\"  Anxiety Sxs: worry over finances and fear that bad things will happen.  He must overdraft frequently from his bank acct.  His (ex?)-wife still lives with him and contributes a little money.  He has worked out a payment plan with his landlord.  Last panic episode was almost 2 weeks ago with unidentifiable trigger with severe anxiety, shakiness, SOB, and tachycardia.  Pt does have AVH -- thinks he hears someone calling him.  He did not want to divulge what the VH were.  Pt presently denies self-injurious thoughts/behaviors, SI, HI, access to firearms, or manic episodes, or TH, OH, paranoia, or bizarre delusions.  Pt continues counseling with Gregory Bay LCSW whose recent notes I reviewed.  Last Tx plan done 5/13/2024.       Appetite Changes and Sleep:  Sleep is a little better but still suboptimal, adequate appetite, with stress eating, decreased energy    Review Of Systems:      Constitutional feeling tired   ENT negative   Cardiovascular as noted in HPI   Respiratory shortness of breath   Gastrointestinal negative   Genitourinary negative   Musculoskeletal as noted in HPI   Integumentary negative   Neurological as noted in HPI   Endocrine negative   Other " "Symptoms none, all other systems are negative       Past Psychiatric History:   As copied from my 4/4/2024 note with updates as needed:  \" [ Pt was born in [place] and grew up with biological parents, 4 sisters and 3 brother.  Pt is the fourth eldest.  He describes his upbringing as \"Happy\" and he was close to parents and siblings. His father moved [place] the [place] in approx [place].  [place] in appro[place] to be with his father and seek opportunities.  His siblings and mom followed him later-- in approx 1986.     Depression started 2022:  sadness, crying, anhedonia, self-isolating, insomnia, reduced appetite with Wt loss, impaired energy, motivation, and concentration, difficulty making decisions, feelings of guilt-(Pt stated he did not remember about this), feelings of hopelessness with passive SI wanting to be dead, and also overt SI of wanting to shoot himself, but no attempt.        Psychotic Sxs:  +AH of someone talking to him -- but this only tends to happen when he is at home and when he is there alone, which is why he does not like to be alone.  He has had hypnagogic visions of shadows.  He otherwise denies VH, TH, OH, paranoia or bizarre delusions.                Nasreen:  Pt reports of some irritable moods for a couple of days, without other manic type      Anxiety started in 2022 incited by family issues and finances: excessive worry more days than not for longer than 3 months, The Sxs can occur without concommittent depressive Sxs., difficulty concentrating, fatigue, insomnia, irritability, restlessness/keyed up, and muscle tension and pain in chest, irritability.     Panic attacks started in 10/2023 while inpatient, due to \"Too much pressure, I couldn't handle it.\"  Has had several since then.  Sxs: sweating, trembling, shortness of breath, choking sensation, chest pain/pressure, dizzy/light headed, chills, feels paralyzed-- like he cannot move his body and sometimes collapses.  He once fell and cut " "his Lt hand requiring sutures.      Social Anxiety symptoms started 7/2023 -- \"Sometimes I don't think people is good.\"  When asked if he felt a sense of judgment or embarrassment by others, he stated \"I don't know.\"          Eating Disorder symptoms: no historical or current eating disorder. no binge eating disorder; no anorexia nervosa. no symptoms of bulimia     Pt denies h/o PTSD or OCD Sxs     Prior psychiatrists:   [facility] in 2022 -- due to depression and anxiety Sxs.  That facility shut down  1st one seen in [place] in approx 2019 -- Pt unsure of why he went     Prior psychotherapists:  Gregory Bay LCSW 10/30/2023 -- ongoing  [facility] 5/26/2022 - 2023     Past Hospitalizations:  1st and only one: 10/10/2023 - 10/17/2023 at [facility], [place] -- on a 201 commitment, due to increasing depression with SI and desire to shoot himself and the welfare man because of his financial state and medical issues. Over the preceding year, his depression had been increasing due to chronic pain, falls, and increased difficulty ambulating (already using a walker).  Pt is on SSI and lives with 14y/o daughter and had recently allowed his wife (from whom he was ) to come live with them due to her need to have a place to be after her anticipated surgery since she would not be able to climb steps afterwards.  When her income was added in, his SSI cash and foodstamps were both reduced and he could no longer make his bills. His home health care benefit was also removed. The last straw was finding out his bank acct was overdrawn. Before admission, Pt had been getting prescribed Duloxetine from his PCP -- this was continued and Aripiprazole added for mood Tx augmentation.  Pt improved and Discharge Rxs: Aripiprazole 2mg qd for mood, Duloxetine 60mg bid for mood and pain mgt, Gabapentin 600mg tid for anxiety and pain mgt, Mirtazapine 15mg qhs for mood and insomnia, and Trazodone 50mg qhs for insomnia.    " "  Pt had denied suicide attempts, self-injurious behaviors, physically violent behaviors, ECT, TMS, or legal or  Hx      Prior Rx trials:  Amitriptyline 25mg (for lumbar radiculopathy) , Sertraline 25mg, Venlafaxine ER up to 150mg (? Why stopped) ,  Duloxetine up to 60mg bid (for mood and neuropathy and helped both), Mirtazapine up to 15mg (helped), Aripiprazole 2mg (helped), Trazodone up to 50mg (helped), Zolpidem 5mg (helped), Melatonin 3mg (helped), Gabapentin up to 800mg tid for neuropathic pain (helps) ,      Abuse Hx: Pt denies any h/o physical, sexual or emotional abuse     Trauma Hx: Pt denies                      ] \"      Past Medical History:    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    Depression     Diabetes mellitus ([facility])     Hyperlipidemia     Hypertension     Suicidal intent     Type 2 diabetes mellitus without complication, without long-term current use of insulin ([facility]) 10/16/2020       Substance Abuse History:    Social History     Substance and Sexual Activity   Alcohol Use Not Currently    Comment: rare     Social History     Substance and Sexual Activity   Drug Use Never       Social History:    Social History     Socioeconomic History    Marital status: /Civil Union     Spouse name: Not on file    Number of children: 7    Years of education: Not on file    Highest education level: Not on file   Occupational History    Not on file   Tobacco Use    Smoking status: Never     Passive exposure: Never    Smokeless tobacco: Never   Vaping Use    Vaping status: Never Used   Substance and Sexual Activity    Alcohol use: Not Currently     Comment: rare    Drug use: Never    Sexual activity: Not Currently   Other Topics Concern    Not on file   Social History Narrative    Home: Lives with his 12y/o daughter in a rental house owned by an elder daughter    Children:  3 sons, 4 daughters     "        Educational:    Pt does not know if he reached childhood milestones on times.  He denies h/o learning disabilities    Highest grade completed: 7th     No college     Social Determinants of Health     Financial Resource Strain: High Risk (1/8/2024)    Overall Financial Resource Strain (CARDIA)     Difficulty of Paying Living Expenses: Hard   Food Insecurity: Food Insecurity Present (1/8/2024)    Hunger Vital Sign     Worried About Running Out of Food in the Last Year: Often true     Ran Out of Food in the Last Year: Often true   Transportation Needs: No Transportation Needs (1/8/2024)    PRAPARE - Transportation     Lack of Transportation (Medical): No     Lack of Transportation (Non-Medical): No   Physical Activity: Not on file   Stress: Not on file   Social Connections: Not on file   Intimate Partner Violence: Not on file   Housing Stability: High Risk (4/10/2024)    Housing Stability Vital Sign     Unable to Pay for Housing in the Last Year: Yes     Number of Places Lived in the Last Year: 1     Unstable Housing in the Last Year: No       Family Psychiatric History:     Family History   Problem Relation Age of Onset    Hypertension Mother     Diabetes Mother     Cancer Father     Diabetes Family     Hypertension Family        History Review: The following portions of the patient's history were reviewed and updated as appropriate: allergies, current medications, past family history, past medical history, past social history, past surgical history, and problem list.         OBJECTIVE:     Mental Status Evaluation:    Appearance Casually dresssed, Poor eye contact, adequate hygiene   Behavior Calm, cooperative, pleasant   Speech Soft, scant, mumbling at times, and rapid, but not pressured.    Mood Depressed, anxious, irritable   Affect Constricted   Thought Processes Organized, goal directed, paucity of thought, negative, worrisome   Associations Intact   Thought Content No delusions   Perceptual Disturbances:  Pt denies any form of hallucinations and does not appear to be responding to internal stimuli   Abnormal Thoughts  Risk Potential Suicidal ideation - None  Homicidal ideation - None  Potential for aggression - No   Orientation oriented to person, place, situation, day of week, date, month of year, and year   Memory short term memory grossly intact   Cosciousness alert and awake   Attention Span attention span and concentration are age appropriate   Intellect appears to be of average intelligence   Insight fair   Judgement fair   Muscle Strength and  Gait Slow and steady with cane   Language no difficulty naming common objects, no difficulty repeating a phrase   Fund of Knowledge adequate knowledge of current events  adequate fund of knowledge regarding past history  adequate fund of knowledge regarding vocabulary    Pain severe   Pain Scale 8       Laboratory Results: I have personally reviewed all pertinent laboratory/tests results.  Most Recent Labs:   Lab Results   Component Value Date    WBC 5.47 10/11/2023    RBC 4.97 10/11/2023    HGB 14.2 10/11/2023    HCT 41.4 10/11/2023     10/11/2023    RDW 13.3 10/11/2023    NEUTROABS 3.69 10/11/2023    SODIUM 140 04/10/2024    K 4.2 04/10/2024     04/10/2024    CO2 29 04/10/2024    BUN 15 04/10/2024    CREATININE 1.14 04/10/2024    GLUC 167 (H) 10/11/2023    GLUF 149 (H) 04/10/2024    CALCIUM 9.2 04/10/2024    AST 20 04/10/2024    ALT 20 04/10/2024    ALKPHOS 52 04/10/2024    TP 7.1 04/10/2024    ALB 4.2 04/10/2024    TBILI 0.50 04/10/2024    CHOLESTEROL 141 10/11/2023    HDL 52 10/11/2023    TRIG 92 10/11/2023    LDLCALC 71 10/11/2023    NONHDLC 89 10/11/2023    SSP0GNDXGOFZ 0.985 10/10/2023    FREET4 0.88 04/21/2022    HGBA1C 7.3 (H) 04/10/2024     04/10/2024         Assessment/plan:       Diagnoses and all orders for this visit:    Severe major depression with psychotic features (HCC)  -     ARIPiprazole (ABILIFY) 15 mg tablet; Take 0.5 tablets  (7.5 mg total) by mouth daily    Generalized anxiety disorder    Insomnia, unspecified type  -     traZODone (DESYREL) 50 mg tablet; Take 1 tablet (50 mg total) by mouth daily at bedtime    Current severe episode of major depressive disorder without prior episode (HCC)  -     mirtazapine (REMERON) 15 mg tablet; Take 1 tablet (15 mg total) by mouth daily at bedtime    Chronic pain syndrome            PLAN:  Pt is having ongoing depression, hallucinations, anxiety, and panic attacks.  Tx options discussed and Pt accepts an increase in Aripiprazole for mood and psychotic Sxs -- reducing these Sxs will likely also help to reduce anxiety and panic somewhat-- though life circumstances play a part.  No change in other medicines and Pt is aware of and accepts that the Duloxetine is being given at an off label does.    Safety Plan was done by therapist 10/30/2023 though Epic flags it as being due.  Increase Aripiprazole to 15mg (1/2) tab po qd # 45  Duloxetine 60mg (1) cap po bid -- Pt given a Qty 180 with R1 on 4/8/2024  Mirtazapine 15mg (1) tab po qhs # 30 R5  Trazodone 50mg (1) tab po qhs # 90   Gabapentin 800mg (1) tab po tid -- being prescribed by PCP Dr Audrey Ruvalcaba per Pt  Pt to continue counseling with Gregory Bay LCSW   F/U with PCP and specialists for medical issues  Return 8-10 weeks, the sooner available.  Can call any time sooner prn.              Risks/Benefits      Risks, Benefits And Possible Side Effects Of Medications:    Risks, benefits, and possible side effects of medications explained to Marv and he verbalizes understanding and agreement for treatment.  Pt is being prescribed Oxycodone by another provider    Controlled Medication Discussion:     Marv has been filling controlled prescriptions on time as prescribed according to [place] Prescription Drug Monitoring Program    Visit Time    Visit Start Time: 4:41PM  Visit Stop Time: 5:17PM  Total Visit Duration:  36 minutes

## 2024-05-13 ENCOUNTER — SOCIAL WORK (OUTPATIENT)
Dept: BEHAVIORAL/MENTAL HEALTH CLINIC | Facility: CLINIC | Age: 64
End: 2024-05-13

## 2024-05-13 ENCOUNTER — TELEPHONE (OUTPATIENT)
Dept: PSYCHIATRY | Facility: CLINIC | Age: 64
End: 2024-05-13

## 2024-05-13 DIAGNOSIS — F41.1 GAD (GENERALIZED ANXIETY DISORDER): ICD-10-CM

## 2024-05-13 DIAGNOSIS — F32.2 CURRENT SEVERE EPISODE OF MAJOR DEPRESSIVE DISORDER WITHOUT PSYCHOTIC FEATURES WITHOUT PRIOR EPISODE (HCC): Primary | ICD-10-CM

## 2024-05-13 NOTE — BH TREATMENT PLAN
Outpatient Behavioral Health Psychotherapy Treatment Plan    Treatment Plan tracking: The plan is due 4/25. He was last here 4/11 and today is the first time back since 4/11 so we did it today.     Marv Rizwan  1960     Date of Initial Psychotherapy Assessment: 10/30/2023  Date of Current Treatment Plan: 05/13/24  Treatment Plan Target Date: 11/09/2024  Treatment Plan Expiration Date: 11/09/2024    Diagnosis:   1. Current severe episode of major depressive disorder without psychotic features without prior episode (HCC)        2. SAHRA (generalized anxiety disorder)            Area(s) of Need: please see below    Long Term Goal 1 (in the client's own words): I still need help managing my depression and my anxiety.     Stage of Change: Action    Target Date for completion: 11/09/2024     Anticipated therapeutic modalities: mindfulness and CBT     People identified to complete this goal: myself with the help of my therapist.       Objective 1: (identify the means of measuring success in meeting the objective): When my symptoms arise I will be able to keep them at a minimum.       Objective 2: (identify the means of measuring success in meeting the objective):       Long Term Goal 2 (in the client's own words):     Stage of Change:     Target Date for completion:      Anticipated therapeutic modalities:      People identified to complete this goal:       Objective 1: (identify the means of measuring success in meeting the objective):       Objective 2: (identify the means of measuring success in meeting the objective):      Long Term Goal 3 (in the client's own words):     Stage of Change:     Target Date for completion:      Anticipated therapeutic modalities:      People identified to complete this goal:       Objective 1: (identify the means of measuring success in meeting the objective):       Objective 2: (identify the means of measuring success in meeting the objective):      I am currently under the care of  a [facility] psychiatric provider: yes    My [facility] psychiatric provider is: Oneyda Smith    I am currently taking psychiatric medications: Yes, as prescribed    I feel that I will be ready for discharge from mental health care when I reach the following (measurable goal/objective): when my symptoms arise I will be able to keep them at a minimum.     For children and adults who have a legal guardian:   Has there been any change to custody orders and/or guardianship status? Yes. If yes, attach updated documentation.    I have created my Crisis Plan and have been offered a copy of this plan    Behavioral Health Treatment Plan [facility]: Diagnosis and Treatment Plan explained to Marv Astorga acknowledges an understanding of their diagnosis. Marv Astorga agrees to this treatment plan.    I have been offered a copy of this Treatment Plan. yes

## 2024-05-13 NOTE — PSYCH
"Behavioral Health Psychotherapy Progress Note    Psychotherapy Provided: Individual Psychotherapy     1. Current severe episode of major depressive disorder without psychotic features without prior episode (HCC)        2. SAHRA (generalized anxiety disorder)            Goals addressed in session: Goal 1     DATA: Marv discussed his daughter will be graduating from 8th grade. He is proud she is doing well. Marv worries about his finances, dealing with his pain and the state of the world. I provide support, encouragement and strategies to cope. He denies suicidal but he shared he feels overall helpless most of the time. However he would never harm himself due to being there for his daughter.Marv is complaining of falling a lot recently. We discussed this and he sees his family doctor on Friday and will discuss this with him.   During this session, this clinician used the following therapeutic modalities: Client-centered Therapy, Cognitive Behavioral Therapy, Mindfulness-based Strategies, and Supportive Psychotherapy    Substance Abuse was not addressed during this session. If the client is diagnosed with a co-occurring substance use disorder, please indicate any changes in the frequency or amount of use: n/a. Stage of change for addressing substance use diagnoses: No substance use/Not applicable    ASSESSMENT:  Marv Astorga presents with a Anxious mood.     his affect is anxious which is congruent, with his mood and the content of the session. The client has made progress on their goals.     Marv Astorga presents with a none risk of suicide, none risk of self-harm, and none risk of harm to others.    For any risk assessment that surpasses a \"low\" rating, a safety plan must be developed.    A safety plan was indicated: no  If yes, describe in detail n/a    PLAN: Between sessions, Marv Astorga will use mindfulness and CBT. At the next session, the therapist will use Client-centered Therapy, Cognitive Behavioral " Therapy, Mindfulness-based Strategies, and Supportive Psychotherapy to address issues and symptoms as they may arise.     Behavioral Health Treatment Plan and Discharge Planning: Marv Astorga is aware of and agrees to continue to work on their treatment plan. They have identified and are working toward their discharge goals. yes    Visit start and stop times:    05/13/24  Start Time: 0900  Stop Time: 1000  Total Visit Time: 60 minutes

## 2024-05-13 NOTE — TELEPHONE ENCOUNTER
Patient called because [employer]ft had not shown up yet, upon review of patients chart, lyft was never arranged, writer called [employer] and scheduled , and [facility]ft is on their way for patient now

## 2024-05-16 ENCOUNTER — OFFICE VISIT (OUTPATIENT)
Dept: PSYCHIATRY | Facility: CLINIC | Age: 64
End: 2024-05-16

## 2024-05-16 DIAGNOSIS — G47.00 INSOMNIA, UNSPECIFIED TYPE: ICD-10-CM

## 2024-05-16 DIAGNOSIS — F41.1 GENERALIZED ANXIETY DISORDER: ICD-10-CM

## 2024-05-16 DIAGNOSIS — F32.3 SEVERE MAJOR DEPRESSION WITH PSYCHOTIC FEATURES (HCC): Primary | ICD-10-CM

## 2024-05-16 DIAGNOSIS — G89.4 CHRONIC PAIN SYNDROME: ICD-10-CM

## 2024-05-16 DIAGNOSIS — F32.2 CURRENT SEVERE EPISODE OF MAJOR DEPRESSIVE DISORDER WITHOUT PRIOR EPISODE (HCC): ICD-10-CM

## 2024-05-16 RX ORDER — MIRTAZAPINE 15 MG/1
15 TABLET, FILM COATED ORAL
Qty: 30 TABLET | Refills: 5 | Status: SHIPPED | OUTPATIENT
Start: 2024-05-16 | End: 2024-11-12

## 2024-05-16 RX ORDER — TRAZODONE HYDROCHLORIDE 50 MG/1
50 TABLET ORAL
Qty: 90 TABLET | Refills: 0 | Status: SHIPPED | OUTPATIENT
Start: 2024-05-16

## 2024-05-16 RX ORDER — ARIPIPRAZOLE 15 MG/1
7.5 TABLET ORAL DAILY
Qty: 45 TABLET | Refills: 0 | Status: SHIPPED | OUTPATIENT
Start: 2024-05-16

## 2024-05-17 ENCOUNTER — OFFICE VISIT (OUTPATIENT)
Dept: FAMILY MEDICINE CLINIC | Facility: CLINIC | Age: 64
End: 2024-05-17

## 2024-05-17 VITALS
HEART RATE: 78 BPM | SYSTOLIC BLOOD PRESSURE: 133 MMHG | TEMPERATURE: 98.5 F | HEIGHT: 67 IN | DIASTOLIC BLOOD PRESSURE: 80 MMHG | OXYGEN SATURATION: 98 % | WEIGHT: 197 LBS | BODY MASS INDEX: 30.92 KG/M2 | RESPIRATION RATE: 18 BRPM

## 2024-05-17 DIAGNOSIS — N41.1 CHRONIC PROSTATITIS: ICD-10-CM

## 2024-05-17 DIAGNOSIS — R31.29 MICROSCOPIC HEMATURIA: ICD-10-CM

## 2024-05-17 DIAGNOSIS — G89.4 CHRONIC PAIN SYNDROME: ICD-10-CM

## 2024-05-17 DIAGNOSIS — F11.20 CONTINUOUS OPIOID DEPENDENCE (HCC): ICD-10-CM

## 2024-05-17 DIAGNOSIS — M54.42 CHRONIC LEFT-SIDED LOW BACK PAIN WITH LEFT-SIDED SCIATICA: ICD-10-CM

## 2024-05-17 DIAGNOSIS — G89.29 CHRONIC LEFT-SIDED LOW BACK PAIN WITH LEFT-SIDED SCIATICA: ICD-10-CM

## 2024-05-17 DIAGNOSIS — R39.11 BENIGN PROSTATIC HYPERPLASIA WITH URINARY HESITANCY: Primary | ICD-10-CM

## 2024-05-17 DIAGNOSIS — N40.1 BENIGN PROSTATIC HYPERPLASIA WITH URINARY HESITANCY: Primary | ICD-10-CM

## 2024-05-17 PROCEDURE — 3079F DIAST BP 80-89 MM HG: CPT | Performed by: FAMILY MEDICINE

## 2024-05-17 PROCEDURE — 3077F SYST BP >= 140 MM HG: CPT | Performed by: FAMILY MEDICINE

## 2024-05-17 PROCEDURE — 99213 OFFICE O/P EST LOW 20 MIN: CPT | Performed by: FAMILY MEDICINE

## 2024-05-17 PROCEDURE — G2211 COMPLEX E/M VISIT ADD ON: HCPCS | Performed by: FAMILY MEDICINE

## 2024-05-17 RX ORDER — OXYCODONE HYDROCHLORIDE 10 MG/1
10 TABLET ORAL 3 TIMES DAILY
Qty: 90 TABLET | Refills: 0 | Status: SHIPPED | OUTPATIENT
Start: 2024-05-17

## 2024-05-17 NOTE — PROGRESS NOTES
Ambulatory Visit  Name: Marv Astorga      : 1960      MRN: [id number]  Encounter Provider: Audrey Ruvalcaba MD  Encounter Date: 2024   Encounter department: [facility]    Assessment & Plan   1. Benign prostatic hyperplasia with urinary hesitancy  Assessment & Plan:  Referred for follow up to Urology  PSA normal 2024  Orders:  -     Ambulatory Referral to Urology; Future  2. Microscopic hematuria  -     Ambulatory Referral to Urology; Future  3. Chronic prostatitis  -     Ambulatory Referral to Urology; Future  4. Chronic pain syndrome  -     oxyCODONE (ROXICODONE) 10 MG TABS; Take 1 tablet (10 mg total) by mouth 3 (three) times a day Max Daily Amount: 30 mg  5. Continuous opioid dependence (HCC)  -     oxyCODONE (ROXICODONE) 10 MG TABS; Take 1 tablet (10 mg total) by mouth 3 (three) times a day Max Daily Amount: 30 mg  6. Chronic left-sided low back pain with left-sided sciatica  -     oxyCODONE (ROXICODONE) 10 MG TABS; Take 1 tablet (10 mg total) by mouth 3 (three) times a day Max Daily Amount: 30 mg       History of Present Illness     HPI    Marv Astorga is a 63 y.o. male  who presented to the office today to follow up for symptoms of BPH.  Pt has some urinary hesitancy and discomfort at times.  He has taken courses of Levofloxacin for Chronic Prostatitis in the past with some relief of symptoms.      Also pt needs refill sof Oxycodone for Chronic lower back pain and chronic opioid dependence.    The following portions of the patient's history were reviewed and updated as appropriate: allergies, current medications, past family history, past medical history, past social history, past surgical history and problem list.      Review of Systems   Constitutional:  Negative for activity change, appetite change, chills and fever.   HENT:  Negative for congestion, rhinorrhea and sore throat.    Respiratory:  Negative for cough and shortness of breath.   "  Cardiovascular:  Negative for chest pain.   Gastrointestinal:  Negative for diarrhea, nausea and vomiting.   Musculoskeletal:  Positive for back pain and gait problem.   Skin:  Negative for rash.   Neurological:  Negative for dizziness and headaches.   Psychiatric/Behavioral:  Negative for sleep disturbance and suicidal ideas. The patient is not nervous/anxious.        Objective     /80 (BP Location: Right arm, Patient Position: Sitting, Cuff Size: Large)   Pulse 78   Temp 98.5 °F (36.9 °C) (Temporal)   Resp 18   Ht 5' 7\" (1.702 m)   Wt 89.4 kg (197 lb)   SpO2 98%   BMI 30.85 kg/m²     Physical Exam  Vitals and nursing note reviewed.   Constitutional:       General: He is not in acute distress.     Appearance: He is well-developed.      Comments: + walking cane   HENT:      Head: Normocephalic and atraumatic.   Eyes:      Conjunctiva/sclera: Conjunctivae normal.   Cardiovascular:      Rate and Rhythm: Normal rate.      Heart sounds: No murmur heard.  Pulmonary:      Effort: Pulmonary effort is normal. No respiratory distress.   Abdominal:      Palpations: Abdomen is soft.      Tenderness: There is no abdominal tenderness.   Musculoskeletal:         General: No swelling.      Cervical back: Neck supple.   Skin:     General: Skin is warm and dry.      Capillary Refill: Capillary refill takes less than 2 seconds.   Neurological:      Mental Status: He is alert.   Psychiatric:         Mood and Affect: Mood normal.       Administrative Statements       "

## 2024-05-24 ENCOUNTER — OFFICE VISIT (OUTPATIENT)
Dept: DENTISTRY | Facility: CLINIC | Age: 64
End: 2024-05-24

## 2024-05-24 VITALS — HEART RATE: 80 BPM | TEMPERATURE: 97.2 F | DIASTOLIC BLOOD PRESSURE: 74 MMHG | SYSTOLIC BLOOD PRESSURE: 125 MMHG

## 2024-05-24 DIAGNOSIS — Z01.20 ENCOUNTER FOR DENTAL EXAMINATION: Primary | ICD-10-CM

## 2024-05-29 ENCOUNTER — OFFICE VISIT (OUTPATIENT)
Dept: DENTISTRY | Facility: CLINIC | Age: 64
End: 2024-05-29

## 2024-05-29 VITALS — TEMPERATURE: 97.7 F | DIASTOLIC BLOOD PRESSURE: 80 MMHG | HEART RATE: 92 BPM | SYSTOLIC BLOOD PRESSURE: 146 MMHG

## 2024-05-29 DIAGNOSIS — K08.109 TEETH MISSING: ICD-10-CM

## 2024-05-29 DIAGNOSIS — Z01.20 ENCOUNTER FOR DENTAL EXAMINATION: Primary | ICD-10-CM

## 2024-05-29 PROCEDURE — WIS5009 POST-OP DENTURE ADJUSTMENT

## 2024-05-29 NOTE — DENTAL PROCEDURE DETAILS
Partial Adjustment    Pt presents for Partial adjustment.      Lower Denture seated but patient noted it feels like it its in slight hyper occlusion. Using articulating paper, the partial was adjusted till occlusion was similar with it in and out of the mouth. Pt is not interested in wearing upper at this time, but really likes the lower. Advised patient to return if he feels any pain or discomfort when wearing dentures for adjustments, and to try wearing the upper once he gets used to the lower. Pt understood and left in good spirits.     NV: Adjustment if necessary, recall

## 2024-06-02 NOTE — PROGRESS NOTES
Pt presents for a partial Denture treatment for Delivery and verbally consents for treatment:    Reviewed health history-  Pt is ASA type III  Treatment consents signed: Yes   Perio: Gingivitis   Pain Scale: 0   Caries Assessment: Low    Radiographs: Films are current  Oral Hygiene instruction reviewed and given  Hygiene recall visits recommended to the pt.    Partial Dentures tried intraorally, pt confirmed satisfaction with esthetics via pt mirror. Denture seated with PIP, guided patient through mastication and border molding movements. Adjusted areas of impingement with acrylic burs, polished with rag wheel and fine pumice. Occlusion verified and adjusted. Confirmed patient satisfied with fit and no remaining areas of discomfort. Provided denture home care instructions and storage case. Pt understands and left satisfied and ambulatory.       Prognosis is Good.   Referrals Needed: No  Next visit: partial follow up in about 1 week

## 2024-06-03 ENCOUNTER — TELEPHONE (OUTPATIENT)
Dept: PSYCHIATRY | Facility: CLINIC | Age: 64
End: 2024-06-03

## 2024-06-03 NOTE — TELEPHONE ENCOUNTER
Patient is calling regarding cancelling an appointment.    Date/Time: 6/4/2024 9am    Reason:     Patient was rescheduled: YES [] NO [x]  If yes, when was Patient reschedule for:     Patient requesting call back to reschedule: YES [] NO [x]

## 2024-06-05 ENCOUNTER — TELEPHONE (OUTPATIENT)
Dept: FAMILY MEDICINE CLINIC | Facility: CLINIC | Age: 64
End: 2024-06-05

## 2024-06-05 NOTE — TELEPHONE ENCOUNTER
from [facility] contact office regarding pt having a fall in the past 90 days. I call  back and left vm and was unable to reach her.

## 2024-06-23 ENCOUNTER — RA CDI HCC (OUTPATIENT)
Dept: OTHER | Facility: HOSPITAL | Age: 64
End: 2024-06-23

## 2024-06-24 DIAGNOSIS — N40.1 BENIGN PROSTATIC HYPERPLASIA WITH LOWER URINARY TRACT SYMPTOMS, SYMPTOM DETAILS UNSPECIFIED: ICD-10-CM

## 2024-06-25 RX ORDER — TAMSULOSIN HYDROCHLORIDE 0.4 MG/1
0.4 CAPSULE ORAL
Qty: 90 CAPSULE | Refills: 1 | Status: SHIPPED | OUTPATIENT
Start: 2024-06-25

## 2024-07-01 ENCOUNTER — OFFICE VISIT (OUTPATIENT)
Dept: FAMILY MEDICINE CLINIC | Facility: CLINIC | Age: 64
End: 2024-07-01

## 2024-07-01 VITALS
BODY MASS INDEX: 30.76 KG/M2 | TEMPERATURE: 98 F | SYSTOLIC BLOOD PRESSURE: 124 MMHG | OXYGEN SATURATION: 97 % | HEART RATE: 71 BPM | DIASTOLIC BLOOD PRESSURE: 78 MMHG | WEIGHT: 196 LBS | RESPIRATION RATE: 18 BRPM | HEIGHT: 67 IN

## 2024-07-01 DIAGNOSIS — G89.29 CHRONIC LEFT-SIDED LOW BACK PAIN WITH LEFT-SIDED SCIATICA: ICD-10-CM

## 2024-07-01 DIAGNOSIS — G47.00 INSOMNIA, UNSPECIFIED TYPE: ICD-10-CM

## 2024-07-01 DIAGNOSIS — J45.41 MODERATE PERSISTENT REACTIVE AIRWAY DISEASE WITH ACUTE EXACERBATION: ICD-10-CM

## 2024-07-01 DIAGNOSIS — F32.0 CURRENT MILD EPISODE OF MAJOR DEPRESSIVE DISORDER WITHOUT PRIOR EPISODE (HCC): ICD-10-CM

## 2024-07-01 DIAGNOSIS — M54.50 CHRONIC LEFT-SIDED LOW BACK PAIN WITHOUT SCIATICA: ICD-10-CM

## 2024-07-01 DIAGNOSIS — G89.4 CHRONIC PAIN SYNDROME: ICD-10-CM

## 2024-07-01 DIAGNOSIS — F32.3 SEVERE MAJOR DEPRESSION WITH PSYCHOTIC FEATURES (HCC): ICD-10-CM

## 2024-07-01 DIAGNOSIS — F11.20 CONTINUOUS OPIOID DEPENDENCE (HCC): ICD-10-CM

## 2024-07-01 DIAGNOSIS — K21.9 GASTROESOPHAGEAL REFLUX DISEASE, UNSPECIFIED WHETHER ESOPHAGITIS PRESENT: ICD-10-CM

## 2024-07-01 DIAGNOSIS — J45.909 ASTHMA: ICD-10-CM

## 2024-07-01 DIAGNOSIS — G89.29 CHRONIC LEFT-SIDED LOW BACK PAIN WITHOUT SCIATICA: ICD-10-CM

## 2024-07-01 DIAGNOSIS — M54.42 CHRONIC LEFT-SIDED LOW BACK PAIN WITH LEFT-SIDED SCIATICA: ICD-10-CM

## 2024-07-01 PROCEDURE — 99213 OFFICE O/P EST LOW 20 MIN: CPT | Performed by: FAMILY MEDICINE

## 2024-07-01 PROCEDURE — 3078F DIAST BP <80 MM HG: CPT | Performed by: FAMILY MEDICINE

## 2024-07-01 PROCEDURE — 3074F SYST BP LT 130 MM HG: CPT | Performed by: FAMILY MEDICINE

## 2024-07-01 PROCEDURE — G2211 COMPLEX E/M VISIT ADD ON: HCPCS | Performed by: FAMILY MEDICINE

## 2024-07-01 RX ORDER — TRAZODONE HYDROCHLORIDE 50 MG/1
50 TABLET ORAL
Qty: 90 TABLET | Refills: 0 | Status: SHIPPED | OUTPATIENT
Start: 2024-07-01

## 2024-07-01 RX ORDER — DULOXETIN HYDROCHLORIDE 60 MG/1
60 CAPSULE, DELAYED RELEASE ORAL 2 TIMES DAILY
Qty: 180 CAPSULE | Refills: 1 | Status: SHIPPED | OUTPATIENT
Start: 2024-07-01

## 2024-07-01 RX ORDER — FAMOTIDINE 20 MG/1
20 TABLET, FILM COATED ORAL DAILY
Qty: 60 TABLET | Refills: 2 | Status: SHIPPED | OUTPATIENT
Start: 2024-07-01

## 2024-07-01 RX ORDER — ARIPIPRAZOLE 15 MG/1
7.5 TABLET ORAL DAILY
Qty: 45 TABLET | Refills: 0 | Status: SHIPPED | OUTPATIENT
Start: 2024-07-01

## 2024-07-01 RX ORDER — FLUTICASONE PROPIONATE AND SALMETEROL XINAFOATE 115; 21 UG/1; UG/1
2 AEROSOL, METERED RESPIRATORY (INHALATION) 2 TIMES DAILY
Qty: 36 G | Refills: 1 | Status: SHIPPED | OUTPATIENT
Start: 2024-07-01

## 2024-07-01 RX ORDER — ALBUTEROL SULFATE 90 UG/1
2 AEROSOL, METERED RESPIRATORY (INHALATION) EVERY 6 HOURS PRN
Qty: 8.5 G | Refills: 1 | Status: SHIPPED | OUTPATIENT
Start: 2024-07-01

## 2024-07-01 RX ORDER — OXYCODONE HYDROCHLORIDE 10 MG/1
10 TABLET ORAL 3 TIMES DAILY
Qty: 120 TABLET | Refills: 0 | Status: SHIPPED | OUTPATIENT
Start: 2024-07-01

## 2024-07-01 NOTE — PROGRESS NOTES
Ambulatory Visit  Name: Marv Astorga      : 1960      MRN: [id number]  Encounter Provider: Audrey Ruvalcaba MD  Encounter Date: 2024   Encounter department: [facility] JOSE CRUZ    Assessment & Plan   1. Asthma  -     albuterol (ProAir HFA) 90 mcg/act inhaler; Inhale 2 puffs every 6 (six) hours as needed for wheezing  2. Chronic left-sided low back pain without sciatica  -     Diclofenac Sodium (VOLTAREN) 1 %; Apply 2 g topically 4 (four) times a day  3. Gastroesophageal reflux disease, unspecified whether esophagitis present  -     famotidine (PEPCID) 20 mg tablet; Take 1 tablet (20 mg total) by mouth daily  4. Moderate persistent reactive airway disease with acute exacerbation  -     fluticasone-salmeterol (Advair HFA) 115-21 MCG/ACT inhaler; Inhale 2 puffs 2 (two) times a day Rinse mouth after use.  5. Severe major depression with psychotic features (HCC)  -     ARIPiprazole (ABILIFY) 15 mg tablet; Take 0.5 tablets (7.5 mg total) by mouth daily  6. Current mild episode of major depressive disorder without prior episode (HCC)  -     DULoxetine (CYMBALTA) 60 mg delayed release capsule; Take 1 capsule (60 mg total) by mouth 2 (two) times a day  7. Insomnia, unspecified type  -     traZODone (DESYREL) 50 mg tablet; Take 1 tablet (50 mg total) by mouth daily at bedtime  8. Chronic pain syndrome  -     oxyCODONE (ROXICODONE) 10 MG TABS; Take 1 tablet (10 mg total) by mouth 3 (three) times a day Max Daily Amount: 30 mg  9. Continuous opioid dependence (HCC)  -     oxyCODONE (ROXICODONE) 10 MG TABS; Take 1 tablet (10 mg total) by mouth 3 (three) times a day Max Daily Amount: 30 mg  10. Chronic left-sided low back pain with left-sided sciatica  -     oxyCODONE (ROXICODONE) 10 MG TABS; Take 1 tablet (10 mg total) by mouth 3 (three) times a day Max Daily Amount: 30 mg       History of Present Illness     HPI  Marv Astorga is a 63 y.o. male  has a past medical history of Asthma,  "Back pain, Back pain, BPH with obstruction/lower urinary tract symptoms, Depression, Diabetes mellitus (HCC), Hyperlipidemia, Hypertension, Suicidal intent, and Type 2 diabetes mellitus without complication, without long-term current use of insulin (HCC). who presented to the office today to follow-up for his chronic conditions.  Patient's needs refills of his medications today because he will be traveling to [place] for 6 weeks.  He states his chronic back pain is the same, he has intermittent exacerbations.  He tries to go on walks for about 1-2 blocks which do help at times.  It makes him feel tired and then he is able to fall asleep.  His symptoms of depression do continue but he is following with mental health provider.  The following portions of the patient's history were reviewed and updated as appropriate: allergies, current medications, past family history, past medical history, past social history, past surgical history and problem list.    Review of Systems   Constitutional:  Negative for activity change, appetite change, chills and fever.   HENT:  Negative for congestion, rhinorrhea and sore throat.    Respiratory:  Negative for cough and shortness of breath.    Cardiovascular:  Negative for chest pain.   Gastrointestinal:  Negative for diarrhea, nausea and vomiting.   Musculoskeletal:  Positive for back pain and gait problem.   Skin:  Negative for rash.   Neurological:  Negative for dizziness and headaches.   Psychiatric/Behavioral:  Negative for sleep disturbance and suicidal ideas. The patient is not nervous/anxious.        Objective     /78 (BP Location: Right arm, Patient Position: Sitting, Cuff Size: Large)   Pulse 71   Temp 98 °F (36.7 °C) (Temporal)   Resp 18   Ht 5' 7\" (1.702 m)   Wt 88.9 kg (196 lb)   SpO2 97%   BMI 30.70 kg/m²     Physical Exam  Vitals and nursing note reviewed.   Constitutional:       General: He is not in acute distress.     Appearance: He is well-developed.     "  Comments: + rolling walker   HENT:      Head: Normocephalic and atraumatic.   Eyes:      Conjunctiva/sclera: Conjunctivae normal.   Cardiovascular:      Rate and Rhythm: Normal rate.      Heart sounds: No murmur heard.  Pulmonary:      Effort: Pulmonary effort is normal. No respiratory distress.   Abdominal:      Palpations: Abdomen is soft.      Tenderness: There is no abdominal tenderness.   Musculoskeletal:         General: No swelling.      Cervical back: Neck supple.   Skin:     General: Skin is warm and dry.      Capillary Refill: Capillary refill takes less than 2 seconds.   Neurological:      Mental Status: He is alert.   Psychiatric:         Mood and Affect: Mood normal.       Administrative Statements

## 2024-07-03 ENCOUNTER — SOCIAL WORK (OUTPATIENT)
Dept: BEHAVIORAL/MENTAL HEALTH CLINIC | Facility: CLINIC | Age: 64
End: 2024-07-03
Payer: MEDICARE

## 2024-07-03 ENCOUNTER — TELEPHONE (OUTPATIENT)
Dept: PSYCHIATRY | Facility: CLINIC | Age: 64
End: 2024-07-03

## 2024-07-03 DIAGNOSIS — F41.1 GAD (GENERALIZED ANXIETY DISORDER): Primary | ICD-10-CM

## 2024-07-03 DIAGNOSIS — F32.2 CURRENT SEVERE EPISODE OF MAJOR DEPRESSIVE DISORDER WITHOUT PSYCHOTIC FEATURES WITHOUT PRIOR EPISODE (HCC): ICD-10-CM

## 2024-07-03 PROCEDURE — 90837 PSYTX W PT 60 MINUTES: CPT | Performed by: SOCIAL WORKER

## 2024-07-03 NOTE — PSYCH
"Behavioral Health Psychotherapy Progress Note    Psychotherapy Provided: Individual Psychotherapy     1. SAHRA (generalized anxiety disorder)        2. Current severe episode of major depressive disorder without psychotic features without prior episode ([facility])            Goals addressed in session: Goal 1     DATA: Marv arrived for his session . He is depressed over his health and poor finances which depresses him. I provide support , encouragement, and strategies to cope. I had put a referral in for social work services.   During this session, this clinician used the following therapeutic modalities: Client-centered Therapy, Cognitive Behavioral Therapy, Mindfulness-based Strategies, and Supportive Psychotherapy    Substance Abuse was not addressed during this session. If the client is diagnosed with a co-occurring substance use disorder, please indicate any changes in the frequency or amount of use: n/a. Stage of change for addressing substance use diagnoses: No substance use/Not applicable    ASSESSMENT:  Marv Astorga presents with a Anxious mood.     his affect is anxious, which is congruent, with his mood and the content of the session. The client has made progress on their goals.     Marv Astorga presents with a none risk of suicide, none risk of self-harm, and none risk of harm to others.    For any risk assessment that surpasses a \"low\" rating, a safety plan must be developed.    A safety plan was indicated: no  If yes, describe in detail n/a    PLAN: Between sessions, Marv Astorga will use mindfulness and CBT. At the next session, the therapist will use Client-centered Therapy, Cognitive Behavioral Therapy, Mindfulness-based Strategies, and Supportive Psychotherapy to address issues and symptoms.    Behavioral Health Treatment Plan and Discharge Planning: Marv Astorga is aware of and agrees to continue to work on their treatment plan. They have identified and are working toward their discharge goals. " yes    Visit start and stop times:    07/03/24  Start Time: 1700  Stop Time: 1800  Total Visit Time: 60 minutes

## 2024-07-03 NOTE — TELEPHONE ENCOUNTER
Patient called to schedule LYFT for appt. Writer called LYFT and scheduled  for today.  4:20pm    Patients Name: Marv Astorga  : 1960  Phone Number: [phone]  Appointment Date: 7/3/24  Appointment time: 5pm   Address: [address] [place] [address] [address] [id number]  Drop off Facility/Office Name: [facility]  Drop off  Address: [address] [place], [place] [address]M[place] Ter[address] [address]  Cost Center: [id number]  Special notes/directions for : n/a  Note for scheduling team:n/a      Send to Ride Booking Pool: [id number] (Patient [employer])

## 2024-07-04 DIAGNOSIS — E78.5 HYPERLIPIDEMIA, UNSPECIFIED HYPERLIPIDEMIA TYPE: ICD-10-CM

## 2024-07-05 RX ORDER — ATORVASTATIN CALCIUM 20 MG/1
20 TABLET, FILM COATED ORAL DAILY
Qty: 90 TABLET | Refills: 1 | Status: SHIPPED | OUTPATIENT
Start: 2024-07-05

## 2024-07-17 DIAGNOSIS — E11.9 TYPE 2 DIABETES MELLITUS WITHOUT COMPLICATION, WITHOUT LONG-TERM CURRENT USE OF INSULIN (HCC): ICD-10-CM

## 2024-07-18 RX ORDER — BLOOD SUGAR DIAGNOSTIC
STRIP MISCELLANEOUS
Qty: 100 EACH | Refills: 3 | OUTPATIENT
Start: 2024-07-18

## 2024-07-21 NOTE — PSYCH
"MEDICATION MANAGEMENT NOTE        [facility]      Name and Date of Birth:  Marv Astorga 63 y.o. 1960    Date of Visit: July 31, 2024    HPI:    Marv Astorga is here for medication review with primary c/o \"I just try to focus myself\" and he feels more present and in the moment.   His anxiety is \"Still here and there, but sometimes I try to overcome it\"-- by \"Walking around or trying to talk to someone.\"  He has a friend who talks to him a lot and he is trying not to worry so much. He has had no recent panic episodes.  He has employed coping skills of social outreach with friends and listening to Jehovah's witness music.  He attends Jehovah's witness services twice weekly.  He reports his depression and focus improved and the hallucinations resolved since the increase in Aripiprazole last week. His depression has been ranging 5-8/10 intensity since last visit (5/2024) with Sxs of sadness an worry at times.  Sleep can interfere with sleep about twice a week due to chronic back pain-- which at this time hurts him at an intensity 8/10.  He has a home health aid 7 days of the week to assist with toileting/bathing and meal preparation.  Pt presently denies self-injurious thoughts/behaviors, SI, HI, panic attacks, or hallucinations or paranoia.  Pt reports compliance to psychiatric medications without SE.   Pt continues counseling with [facility] LCSW whose recent note reviewed.  Last Tx plan done 5/13/2024.    Appetite Changes and Sleep:  Sleep is sub-optimal -- can awaken a couple of nights a week due to chronic back pain , fluctuating appetite, can stress eat at times, energy is suboptimal but overall adequate and no worse than at last visit per Pt    Review Of Systems:      Constitutional negative   ENT negative   Cardiovascular negative   Respiratory negative   Gastrointestinal negative   Genitourinary negative   Musculoskeletal as noted in HPI   Integumentary negative " "  Neurological as noted in HPI in relation to back pain   Endocrine negative   Other Symptoms none, all other systems are negative       Past Psychiatric History:   As copied from my 5/16/2024 note with updates as needed:  \" [ Pt was born in [place] and grew up with biological parents, 4 sisters and 3 brother.  Pt is the fourth eldest.  He describes his upbringing as \"Happy\" and he was close to parents and siblings. His father moved [place] in approx [place].  [place] in [place] to be with his father and seek opportunities.  His siblings and mom followed him later-- in approx 1986.     Depression started 2022:  sadness, crying, anhedonia, self-isolating, insomnia, reduced appetite with Wt loss, impaired energy, motivation, and concentration, difficulty making decisions, feelings of guilt-(Pt stated he did not remember about this), feelings of hopelessness with passive SI wanting to be dead, and also overt SI of wanting to shoot himself, but no attempt.        Psychotic Sxs:  +AH of someone talking to him -- but this only tends to happen when he is at home and when he is there alone, which is why he does not like to be alone.  He has had hypnagogic visions of shadows.  He otherwise denies VH, TH, OH, paranoia or bizarre delusions.                Nasreen:  Pt reports of some irritable moods for a couple of days, without other manic type      Anxiety started in 2022 incited by family issues and finances: excessive worry more days than not for longer than 3 months, The Sxs can occur without concommittent depressive Sxs., difficulty concentrating, fatigue, insomnia, irritability, restlessness/keyed up, and muscle tension and pain in chest, irritability.     Panic attacks started in 10/2023 while inpatient, due to \"Too much pressure, I couldn't handle it.\"  Has had several since then.  Sxs: sweating, trembling, shortness of breath, choking sensation, chest pain/pressure, dizzy/light headed, chills, feels " "paralyzed-- like he cannot move his body and sometimes collapses.  He once fell and cut his Lt hand requiring sutures.      Social Anxiety symptoms started 7/2023 -- \"Sometimes I don't think people is good.\"  When asked if he felt a sense of judgment or embarrassment by others, he stated \"I don't know.\"          Eating Disorder symptoms: no historical or current eating disorder. no binge eating disorder; no anorexia nervosa. no symptoms of bulimia     Pt denies h/o PTSD or OCD Sxs     Prior psychiatrists:   [facility] in 2022 -- due to depression and anxiety Sxs.  That facility shut down  1st one seen in [place] in approx 2019 -- Pt unsure of why he went     Prior psychotherapists:  Gregory Bay LCSW 10/30/2023 -- ongoing  [facility] 5/26/2022 - 2023     Past Hospitalizations:  1st and only one: 10/10/2023 - 10/17/2023 at [facility] -- on a 201 commitment, due to increasing depression with SI and desire to shoot himself and the welfare man because of his financial state and medical issues. Over the preceding year, his depression had been increasing due to chronic pain, falls, and increased difficulty ambulating (already using a walker).  Pt is on SSI and lives with 14y/o daughter and had recently allowed his wife (from whom he was ) to come live with them due to her need to have a place to be after her anticipated surgery since she would not be able to climb steps afterwards.  When her income was added in, his SSI cash and foodstamps were both reduced and he could no longer make his bills. His home health care benefit was also removed. The last straw was finding out his bank acct was overdrawn. Before admission, Pt had been getting prescribed Duloxetine from his PCP -- this was continued and Aripiprazole added for mood Tx augmentation.  Pt improved and Discharge Rxs: Aripiprazole 2mg qd for mood, Duloxetine 60mg bid for mood and pain mgt, Gabapentin 600mg tid for anxiety and pain " "mgt, Mirtazapine 15mg qhs for mood and insomnia, and Trazodone 50mg qhs for insomnia.      Pt had denied suicide attempts, self-injurious behaviors, physically violent behaviors, ECT, TMS, or legal or  Hx      Prior Rx trials:  Amitriptyline 25mg (for lumbar radiculopathy) , Sertraline 25mg, Venlafaxine ER up to 150mg (? Why stopped) ,  Duloxetine up to 60mg bid (for mood and neuropathy and helped both), Mirtazapine up to 15mg (helped), Aripiprazole 2mg (helped), Trazodone up to 50mg (helped), Zolpidem 5mg (helped), Melatonin 3mg (helped), Gabapentin up to 800mg tid for neuropathic pain (helps) ,      Abuse Hx: Pt denies any h/o physical, sexual or emotional abuse     Trauma Hx: Pt denies                      ] \"      Past Medical History:    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    Current severe episode of major depressive disorder without prior episode (HCC) 10/11/2023    Depression     Diabetes mellitus ([facility])     Hyperlipidemia     Hypertension     Suicidal intent     Type 2 diabetes mellitus without complication, without long-term current use of insulin ([facility]) 10/16/2020       Substance Abuse History:    Social History     Substance and Sexual Activity   Alcohol Use Not Currently    Comment: rare     Social History     Substance and Sexual Activity   Drug Use Never       Social History:    Social History     Socioeconomic History    Marital status: /Civil Union     Spouse name: Not on file    Number of children: 7    Years of education: Not on file    Highest education level: Not on file   Occupational History    Not on file   Tobacco Use    Smoking status: Never     Passive exposure: Never    Smokeless tobacco: Never   Vaping Use    Vaping status: Never Used   Substance and Sexual Activity    Alcohol use: Not Currently     Comment: rare    Drug use: Never    Sexual activity: Not Currently "   Other Topics Concern    Not on file   Social History Narrative    Home: Lives with his 14y/o daughter in a rental house owned by an elder daughter    Children:  3 sons, 4 daughters            Educational:    Pt does not know if he reached childhood milestones on times.  He denies h/o learning disabilities    Highest grade completed: 7th     No college     Social Determinants of Health     Financial Resource Strain: High Risk (1/8/2024)    Overall Financial Resource Strain (CARDIA)     Difficulty of Paying Living Expenses: Hard   Food Insecurity: Food Insecurity Present (1/8/2024)    Hunger Vital Sign     Worried About Running Out of Food in the Last Year: Often true     Ran Out of Food in the Last Year: Often true   Transportation Needs: No Transportation Needs (1/8/2024)    PRAPARE - Transportation     Lack of Transportation (Medical): No     Lack of Transportation (Non-Medical): No   Physical Activity: Not on file   Stress: Not on file   Social Connections: Not on file   Intimate Partner Violence: Not on file   Housing Stability: High Risk (4/10/2024)    Housing Stability Vital Sign     Unable to Pay for Housing in the Last Year: Yes     Number of Times Moved in the Last Year: 1     Homeless in the Last Year: No       Family Psychiatric History:     Family History   Problem Relation Age of Onset    Hypertension Mother     Diabetes Mother     Cancer Father     Diabetes Family     Hypertension Family        History Review: The following portions of the patient's history were reviewed and updated as appropriate: allergies, current medications, past family history, past medical history, past social history, past surgical history, and problem list.         OBJECTIVE:     Mental Status Evaluation:    Appearance Casually dresssed, adequate hygiene, partial eye contact   Behavior Calm, cooperative, pleasant   Speech Clear, normal rate and volume, not very spontaneous but answers all questions   Mood Anxious   Affect  Normal range and intensity   Thought Processes Organized, goal directed, more positive, less worrisome overall   Associations Intact   Thought Content No delusions   Perceptual Disturbances: Pt denies any form of hallucinations and does not appear to be responding to internal stimuli   Abnormal Thoughts  Risk Potential Suicidal ideation - None  Homicidal ideation - None  Potential for aggression - No   Orientation oriented to person, place, situation, day of week, date, month of year, and year   Memory short term memory grossly intact   Cosciousness alert and awake   Attention Span attention span and concentration are age appropriate   Intellect appears to be of average intelligence   Insight fair and improving   Judgement fair and improving   Muscle Strength and  Gait Steady with cane   Language no difficulty naming common objects, no difficulty repeating a phrase   Fund of Knowledge adequate knowledge of current events  adequate fund of knowledge regarding past history  adequate fund of knowledge regarding vocabulary    Pain severe   Pain Scale 8/10 in his back       Laboratory Results: None new since last visit    Assessment/plan:       Diagnoses and all orders for this visit:    Generalized anxiety disorder  -     CBC and differential; Future  -     Lipid panel; Future  -     Hepatic function panel; Future    Severe major depression with psychotic features (HCC)  -     CBC and differential; Future  -     Lipid panel; Future  -     Hepatic function panel; Future    Insomnia, unspecified type  -     traZODone (DESYREL) 50 mg tablet; Take 1 tablet (50 mg total) by mouth daily at bedtime    Encounter for long-term (current) use of high-risk medication  -     CBC and differential; Future  -     Lipid panel; Future  -     Hepatic function panel; Future  -     traZODone (DESYREL) 50 mg tablet; Take 1 tablet (50 mg total) by mouth daily at bedtime          PLAN:  Pt is having milder anxiety and depression, without  recent psychotic or panic related Sxs.  Safety Plan was done by therapist on 10/30/2023 but Epic flags it as being due.  Aripiprazole 15mg (1/2) tab po qd -- Pt given Rxs for Qty 45 on 5/16/2024 by myself, and 7/1/2024 by PCP  Duloxetine 60mg (1) cap po bid -- Pt was given a Rx for Qty 180 with R1 by PCP on 7/1/2024  Mirtazapine 15mg (1) tab po qhs -- Pt was given a Rx for Qty 30 with R5 on 5/16/2024  Trazodone 50mg (1) tab po qhs # 90  Gabapentin 600mg (1) tab po tid - Per PCP  Pt to continue counseling with [facility] LCSW       CBC with diff, LFT, and Lipids to be done 10/11/2024  F/U with Spine Program -- Pt to make an appt when that office returns his call  Return 10 weeks, call sooner prn    Risks/Benefits      Risks, Benefits And Possible Side Effects Of Medications:    Risks, benefits, and possible side effects of medications explained to Fair and he verbalizes understanding and agreement for treatment.      Visit Time    Visit Start Time: 9:00AM  Visit Stop Time: 9:37AM  Total Visit Duration:  37 minutes

## 2024-07-28 ENCOUNTER — HOSPITAL ENCOUNTER (EMERGENCY)
Facility: HOSPITAL | Age: 64
Discharge: HOME | End: 2024-07-28
Attending: EMERGENCY MEDICINE
Payer: MEDICARE

## 2024-07-28 VITALS
HEART RATE: 85 BPM | RESPIRATION RATE: 18 BRPM | SYSTOLIC BLOOD PRESSURE: 165 MMHG | DIASTOLIC BLOOD PRESSURE: 89 MMHG | OXYGEN SATURATION: 100 % | BODY MASS INDEX: 30.46 KG/M2 | TEMPERATURE: 97.8 F | WEIGHT: 194.5 LBS

## 2024-07-28 DIAGNOSIS — M54.9 CHRONIC BACK PAIN: Primary | ICD-10-CM

## 2024-07-28 DIAGNOSIS — G89.29 CHRONIC BACK PAIN: Primary | ICD-10-CM

## 2024-07-28 PROCEDURE — 99283 EMERGENCY DEPT VISIT LOW MDM: CPT

## 2024-07-28 PROCEDURE — 99284 EMERGENCY DEPT VISIT MOD MDM: CPT | Performed by: EMERGENCY MEDICINE

## 2024-07-30 ENCOUNTER — TELEPHONE (OUTPATIENT)
Dept: PHYSICAL THERAPY | Facility: OTHER | Age: 64
End: 2024-07-30

## 2024-07-30 ENCOUNTER — TELEPHONE (OUTPATIENT)
Age: 64
End: 2024-07-30

## 2024-07-30 NOTE — TELEPHONE ENCOUNTER
Patients Name: Marv Astorga    : 1960    Phone Number: [phone]    Appointment Date: 24    Appointment time: 9AM     Address: [address] [place] [address] [address] [place] [address]    Drop Off Facility/Office: [facility]    Drop Off Address: [facility] [address], Anil [place] [address]    Cost Center Number: [id number]    Special notes/directions for :    Note for scheduling team:    Send to Ride Booking Pool: [id number] [facility]

## 2024-07-30 NOTE — TELEPHONE ENCOUNTER
Call placed to the patient per [facility] referral.    Message states the subscriber you called is not in service. Unable to LVM    This is the 1st attempt to reach the patient.  Will defer per protocol.

## 2024-07-31 ENCOUNTER — TELEPHONE (OUTPATIENT)
Dept: FAMILY MEDICINE CLINIC | Facility: CLINIC | Age: 64
End: 2024-07-31

## 2024-07-31 ENCOUNTER — OFFICE VISIT (OUTPATIENT)
Dept: PSYCHIATRY | Facility: CLINIC | Age: 64
End: 2024-07-31
Payer: MEDICARE

## 2024-07-31 VITALS — WEIGHT: 196.2 LBS | HEIGHT: 67 IN | BODY MASS INDEX: 30.79 KG/M2

## 2024-07-31 DIAGNOSIS — F32.3 SEVERE MAJOR DEPRESSION WITH PSYCHOTIC FEATURES (HCC): ICD-10-CM

## 2024-07-31 DIAGNOSIS — G89.29 CHRONIC LEFT-SIDED LOW BACK PAIN WITH LEFT-SIDED SCIATICA: ICD-10-CM

## 2024-07-31 DIAGNOSIS — F11.20 CONTINUOUS OPIOID DEPENDENCE (HCC): ICD-10-CM

## 2024-07-31 DIAGNOSIS — M54.42 CHRONIC LEFT-SIDED LOW BACK PAIN WITH LEFT-SIDED SCIATICA: ICD-10-CM

## 2024-07-31 DIAGNOSIS — Z79.899 ENCOUNTER FOR LONG-TERM (CURRENT) USE OF HIGH-RISK MEDICATION: ICD-10-CM

## 2024-07-31 DIAGNOSIS — G47.00 INSOMNIA, UNSPECIFIED TYPE: ICD-10-CM

## 2024-07-31 DIAGNOSIS — F41.1 GENERALIZED ANXIETY DISORDER: Primary | ICD-10-CM

## 2024-07-31 DIAGNOSIS — G89.4 CHRONIC PAIN SYNDROME: ICD-10-CM

## 2024-07-31 PROBLEM — F32.2 CURRENT SEVERE EPISODE OF MAJOR DEPRESSIVE DISORDER WITHOUT PRIOR EPISODE (HCC): Status: RESOLVED | Noted: 2023-10-11 | Resolved: 2024-07-31

## 2024-07-31 PROCEDURE — 99214 OFFICE O/P EST MOD 30 MIN: CPT | Performed by: PHYSICIAN ASSISTANT

## 2024-07-31 RX ORDER — TRAZODONE HYDROCHLORIDE 50 MG/1
50 TABLET ORAL
Qty: 90 TABLET | Refills: 0 | Status: SHIPPED | OUTPATIENT
Start: 2024-07-31

## 2024-07-31 NOTE — TELEPHONE ENCOUNTER
Patient need refill on following medication     oxyCODONE (ROXICODONE) 10 MG TABS       And he stating that he needs a referral for physical therapy

## 2024-08-01 ENCOUNTER — TELEPHONE (OUTPATIENT)
Age: 64
End: 2024-08-01

## 2024-08-01 ENCOUNTER — SOCIAL WORK (OUTPATIENT)
Dept: BEHAVIORAL/MENTAL HEALTH CLINIC | Facility: CLINIC | Age: 64
End: 2024-08-01
Payer: MEDICARE

## 2024-08-01 DIAGNOSIS — F32.2 CURRENT SEVERE EPISODE OF MAJOR DEPRESSIVE DISORDER WITHOUT PSYCHOTIC FEATURES WITHOUT PRIOR EPISODE (HCC): ICD-10-CM

## 2024-08-01 DIAGNOSIS — F41.1 GAD (GENERALIZED ANXIETY DISORDER): Primary | ICD-10-CM

## 2024-08-01 PROCEDURE — 90837 PSYTX W PT 60 MINUTES: CPT | Performed by: SOCIAL WORKER

## 2024-08-01 NOTE — TELEPHONE ENCOUNTER
Patient called the office to have his LYFT scheduled for todays appt at 9 am. Writer called transportation and scheduled the LYFT for the patient. Patient maybe be 5 to 10 mins late depending on the LYFT and how fast it can pick him up.

## 2024-08-01 NOTE — PSYCH
"Behavioral Health Psychotherapy Progress Note    Psychotherapy Provided: Individual Psychotherapy     1. SAHRA (generalized anxiety disorder)        2. Current severe episode of major depressive disorder without psychotic features without prior episode ([facility])            Goals addressed in session: Goal 1     DATA: Marv is less depressed but remains anxious about his finances and his physical pain. He shared he is trying to remain more positive. I provided support and strategies to cope.  During this session, this clinician used the following therapeutic modalities: Client-centered Therapy, Cognitive Behavioral Therapy, Mindfulness-based Strategies, and Supportive Psychotherapy    Substance Abuse was not addressed during this session. If the client is diagnosed with a co-occurring substance use disorder, please indicate any changes in the frequency or amount of use: n/a. Stage of change for addressing substance use diagnoses: No substance use/Not applicable    ASSESSMENT:  Marv Astorga presents with a anxious mood.     his affect is anxious which is congruent, with his mood and the content of the session. The client has made progress on their goals.He is less depressed.He is anxious about his health and finances.     Marv Astorga presents with a none risk of suicide, none risk of self-harm, and none risk of harm to others.    For any risk assessment that surpasses a \"low\" rating, a safety plan must be developed.    A safety plan was indicated: n/a  If yes, describe in detail n/a    PLAN: Between sessions, Marv Astorga will use mindfulness and CBT. At the next session, the therapist will use Client-centered Therapy, Cognitive Behavioral Therapy, Mindfulness-based Strategies, and Supportive Psychotherapy to address issues and symptoms .    Behavioral Health Treatment Plan and Discharge Planning: Marv Astorga is aware of and agrees to continue to work on their treatment plan. They have identified and are working " toward their discharge goals. yes    Visit start and stop times:    08/01/24  Start Time: 0900  Stop Time: 1000  Total Visit Time: 60 minutes

## 2024-08-02 RX ORDER — OXYCODONE HYDROCHLORIDE 10 MG/1
10 TABLET ORAL 3 TIMES DAILY
Qty: 90 TABLET | Refills: 0 | Status: SHIPPED | OUTPATIENT
Start: 2024-08-02

## 2024-08-02 NOTE — TELEPHONE ENCOUNTER
Tried calling the patient, but now his phone is not in service. Dr pena sent medication to the pharmacy.

## 2024-08-02 NOTE — ED PROVIDER NOTES
History  Chief Complaint   Patient presents with    Back Pain     Low back pain that radiates down L leg; took Gabapentin and oxycodone this morning     Patient is a 63-year-old male with a h/o chronic back pain presenting with continued lower back pain.  No new trauma to area.  No numbness/weakness/tingling.  Already walks with a cane.  Took his normal meds of gabapentin and oxycodone.         Prior to Admission Medications   Prescriptions Last Dose Informant Patient Reported? Taking?   ARIPiprazole (ABILIFY) 15 mg tablet   No No   Sig: Take 0.5 tablets (7.5 mg total) by mouth daily   Blood Glucose Monitoring Suppl (OneTouch Verio) w/Device KIT  Self No No   Sig: Use daily   Blood Glucose Monitoring Suppl KIT  Self No No   Sig: Use in the morning Please give test strips and lancets  Dx. E11.9   Cholecalciferol (D3 PO)  Self Yes No   Sig: Take by mouth daily   DULoxetine (CYMBALTA) 60 mg delayed release capsule   No No   Sig: Take 1 capsule (60 mg total) by mouth 2 (two) times a day   Diclofenac Sodium (VOLTAREN) 1 %   No No   Sig: Apply 2 g topically 4 (four) times a day   Lancet Devices (ONE TOUCH DELICA LANCING DEV) MISC  Self No No   Sig: Use daily   Mirabegron ER 50 MG TB24  Self No No   Sig: Take 1 tablet (50 mg total) by mouth daily   Nerve Stimulator (TENS Therapy Pain Relief) EMILY   No No   Sig: Use as directed   OneTouch Delica Lancets 33G MISC   No No   Sig: Use daily   OneTouch Delica Lancets 33G MISC   No No   Sig: Check blood sugars once daily. Please substitute with appropriate alternative as covered by patient's insurance. Dx: E11.65   albuterol (2.5 mg/3 mL) 0.083 % nebulizer solution  Self No No   Sig: Take 3 mL (2.5 mg total) by nebulization every 6 (six) hours as needed for wheezing or shortness of breath   albuterol (ProAir HFA) 90 mcg/act inhaler   No No   Sig: Inhale 2 puffs every 6 (six) hours as needed for wheezing   amLODIPine (NORVASC) 10 mg tablet   No No   Sig: Take 1 tablet (10 mg  total) by mouth daily   aspirin 325 mg tablet   No No   Sig: Take 1 tablet (325 mg total) by mouth daily   atorvastatin (LIPITOR) 20 mg tablet   No No   Sig: take 1 tablet by mouth once daily   famotidine (PEPCID) 20 mg tablet   No No   Sig: Take 1 tablet (20 mg total) by mouth daily   fluticasone (FLONASE) 50 mcg/act nasal spray  Self No No   Si spray into each nostril daily   fluticasone-salmeterol (Advair HFA) 115-21 MCG/ACT inhaler   No No   Sig: Inhale 2 puffs 2 (two) times a day Rinse mouth after use.   gabapentin (NEURONTIN) 800 mg tablet   No No   Sig: Take 1 tablet (800 mg total) by mouth 3 (three) times a day   glucose blood (OneTouch Verio) test strip   No No   Sig: Check blood sugar daily   glucose blood (OneTouch Verio) test strip   No No   Sig: Check blood sugars once daily. Please substitute with appropriate alternative as covered by patient's insurance. Dx: E11.65   glucose monitoring kit (FREESTYLE) monitoring kit  Self No No   Sig: Use 1 each in the morning TESTING DAILY IN THE AM   lidocaine (LMX) 4 % cream  Self No No   Sig: Apply topically as needed for mild pain   lidocaine (Lidoderm) 5 %   No No   Sig: Apply 1 patch topically over 12 hours daily Remove & Discard patch within 12 hours or as directed by MD   metFORMIN (GLUCOPHAGE) 500 mg tablet   No No   Sig: Take 1 tablet (500 mg total) by mouth 2 (two) times a day with meals   methocarbamol (ROBAXIN) 500 mg tablet   No No   Sig: Take 1 tablet (500 mg total) by mouth 3 (three) times a day   mirtazapine (REMERON) 15 mg tablet   No No   Sig: Take 1 tablet (15 mg total) by mouth daily at bedtime   naloxone (NARCAN) 4 mg/0.1 mL nasal spray   No No   Sig: Administer 1 spray into a nostril. If no response after 2-3 minutes, give another dose in the other nostril using a new spray.   Patient not taking: Reported on 2024   oxyCODONE (ROXICODONE) 10 MG TABS   No No   Sig: Take 1 tablet (10 mg total) by mouth 3 (three) times a day Max Daily  Amount: 30 mg   tamsulosin (FLOMAX) 0.4 mg   No No   Sig: take 1 capsule by mouth daily with dinner      Facility-Administered Medications: None       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    Current severe episode of major depressive disorder without prior episode ([facility]) 10/11/2023    Depression     Diabetes mellitus ([facility])     Hyperlipidemia     Hypertension     Suicidal intent     Type 2 diabetes mellitus without complication, without long-term current use of insulin ([facility]) 10/16/2020       Past Surgical History:   Procedure Laterality Date    CYST REMOVAL  2017       Family History   Problem Relation Age of Onset    Hypertension Mother     Diabetes Mother     Cancer Father     Diabetes Family     Hypertension Family      I have reviewed and agree with the history as documented.    E-Cigarette/Vaping    E-Cigarette Use Never User      E-Cigarette/Vaping Substances    Nicotine No     THC No     CBD No     Flavoring No     Other No     Unknown No      Social History     Tobacco Use    Smoking status: Never     Passive exposure: Never    Smokeless tobacco: Never   Vaping Use    Vaping status: Never Used   Substance Use Topics    Alcohol use: Not Currently     Comment: rare    Drug use: Never       Review of Systems   Constitutional: Negative.    HENT: Negative.     Eyes: Negative.    Respiratory: Negative.     Cardiovascular: Negative.    Gastrointestinal: Negative.    Endocrine: Negative.    Genitourinary: Negative.    Musculoskeletal:  Positive for back pain.   Skin: Negative.    Allergic/Immunologic: Negative.    Neurological: Negative.    Hematological: Negative.    Psychiatric/Behavioral: Negative.     All other systems reviewed and are negative.      Physical Exam  Physical Exam  Vitals and nursing note reviewed.   Constitutional:       Appearance: Normal appearance. He is normal weight.   HENT:      Head:  Normocephalic and atraumatic.   Cardiovascular:      Rate and Rhythm: Normal rate and regular rhythm.      Pulses: Normal pulses.      Heart sounds: Normal heart sounds.   Pulmonary:      Effort: Pulmonary effort is normal.      Breath sounds: Normal breath sounds.   Abdominal:      General: Bowel sounds are normal.      Palpations: Abdomen is soft.   Musculoskeletal:         General: Tenderness present.      Cervical back: Normal range of motion and neck supple.      Comments: Left lower lateral back pain.  No midline spinal ttp, stepoff or deformity.    Skin:     General: Skin is warm and dry.      Capillary Refill: Capillary refill takes less than 2 seconds.      Findings: No rash.   Neurological:      General: No focal deficit present.      Mental Status: He is alert and oriented to person, place, and time.      Cranial Nerves: No cranial nerve deficit.      Sensory: No sensory deficit.      Motor: No weakness.      Deep Tendon Reflexes: Reflexes normal.         Vital Signs  ED Triage Vitals [07/28/24 1507]   Temperature Pulse Respirations Blood Pressure SpO2   97.8 °F (36.6 °C) 85 18 165/89 100 %      Temp src Heart Rate Source Patient Position - Orthostatic VS BP Location FiO2 (%)   -- -- -- -- --      Pain Score       --           Vitals:    07/28/24 1507   BP: 165/89   Pulse: 85         Visual Acuity      ED Medications  Medications - No data to display    Diagnostic Studies  Results Reviewed       None                   No orders to display              Procedures  Procedures         ED Course                                 SBIRT 22yo+      Flowsheet Row Most Recent Value   Initial Alcohol Screen: US AUDIT-C     1. How often do you have a drink containing alcohol? 0 Filed at: 07/28/2024 1508   2. How many drinks containing alcohol do you have on a typical day you are drinking?  0 Filed at: 07/28/2024 1508   3a. Male UNDER 65: How often do you have five or more drinks on one occasion? 0 Filed at: 07/28/2024  1508   3b. FEMALE Any Age, or MALE 65+: How often do you have 4 or more drinks on one occassion? 0 Filed at: 07/28/2024 1508   Audit-C Score 0 Filed at: 07/28/2024 1508   DANIELLE: How many times in the past year have you...    Used an illegal drug or used a prescription medication for non-medical reasons? Never Filed at: 07/28/2024 1508                      Medical Decision Making  Problems Addressed:  Chronic back pain: chronic illness or injury with exacerbation, progression, or side effects of treatment     Details: Ongoing issue.  No new trauma.  No neuro deficits.  Maxxed out on meds.  Comp spine referral.                  Disposition  Final diagnoses:   Chronic back pain     Time reflects when diagnosis was documented in both MDM as applicable and the Disposition within this note       Time User Action Codes Description Comment    7/28/2024  3:43 PM Chase Tabares Add [M54.9,  G89.29] Chronic back pain           ED Disposition       ED Disposition   Discharge    Condition   Stable    Date/Time   Sun Jul 28, 2024 3586    Comment   Marv Astorga discharge to home/self care.                   Follow-up Information       Follow up With Specialties Details Why Contact Info    Audrey Ruvalcaba MD Family Medicine   [address] [place] [address]  [phone]              Discharge Medication List as of 7/28/2024  3:43 PM        CONTINUE these medications which have NOT CHANGED    Details   albuterol (2.5 mg/3 mL) 0.083 % nebulizer solution Take 3 mL (2.5 mg total) by nebulization every 6 (six) hours as needed for wheezing or shortness of breath, Starting Tue 9/6/2022, Normal      albuterol (ProAir HFA) 90 mcg/act inhaler Inhale 2 puffs every 6 (six) hours as needed for wheezing, Starting Mon 7/1/2024, Normal      amLODIPine (NORVASC) 10 mg tablet Take 1 tablet (10 mg total) by mouth daily, Starting Mon 5/1/2023, Normal      ARIPiprazole (ABILIFY) 15 mg tablet Take 0.5 tablets (7.5 mg total) by mouth daily,  Starting Mon 7/1/2024, Normal      aspirin 325 mg tablet Take 1 tablet (325 mg total) by mouth daily, Starting Thu 6/8/2023, Normal      atorvastatin (LIPITOR) 20 mg tablet take 1 tablet by mouth once daily, Starting Fri 7/5/2024, Normal      Blood Glucose Monitoring Suppl (OneTouch Verio) w/Device KIT Use daily, Starting Tue 6/14/2022, Normal      !! Blood Glucose Monitoring Suppl KIT Use in the morning Please give test strips and lancets  Dx. E11.9, Starting Wed 4/13/2022, Print      Cholecalciferol (D3 PO) Take by mouth daily, Historical Med      Diclofenac Sodium (VOLTAREN) 1 % Apply 2 g topically 4 (four) times a day, Starting Mon 7/1/2024, Normal      DULoxetine (CYMBALTA) 60 mg delayed release capsule Take 1 capsule (60 mg total) by mouth 2 (two) times a day, Starting Mon 7/1/2024, Normal      famotidine (PEPCID) 20 mg tablet Take 1 tablet (20 mg total) by mouth daily, Starting Mon 7/1/2024, Normal      fluticasone (FLONASE) 50 mcg/act nasal spray 1 spray into each nostril daily, Starting Thu 12/16/2021, Normal      fluticasone-salmeterol (Advair HFA) 115-21 MCG/ACT inhaler Inhale 2 puffs 2 (two) times a day Rinse mouth after use., Starting Mon 7/1/2024, Normal      gabapentin (NEURONTIN) 800 mg tablet Take 1 tablet (800 mg total) by mouth 3 (three) times a day, Starting Fri 1/5/2024, Normal      !! glucose blood (OneTouch Verio) test strip Check blood sugar daily, Normal      !! glucose blood (OneTouch Verio) test strip Check blood sugars once daily. Please substitute with appropriate alternative as covered by patient's insurance. Dx: E11.65, Normal      !! glucose monitoring kit (FREESTYLE) monitoring kit Use 1 each in the morning TESTING DAILY IN THE AM, Starting Mon 5/9/2022, Print      Lancet Devices (ONE TOUCH DELICA LANCING DEV) MISC Use daily, Starting Fri 8/12/2022, Normal      lidocaine (Lidoderm) 5 % Apply 1 patch topically over 12 hours daily Remove & Discard patch within 12 hours or as directed  by MD, Starting Tue 12/5/2023, Normal      lidocaine (LMX) 4 % cream Apply topically as needed for mild pain, Starting Fri 8/12/2022, Normal      metFORMIN (GLUCOPHAGE) 500 mg tablet Take 1 tablet (500 mg total) by mouth 2 (two) times a day with meals, Starting Fri 2/2/2024, Normal      methocarbamol (ROBAXIN) 500 mg tablet Take 1 tablet (500 mg total) by mouth 3 (three) times a day, Starting Tue 4/2/2024, Normal      Mirabegron ER 50 MG TB24 Take 1 tablet (50 mg total) by mouth daily, Starting Mon 6/20/2022, Normal      mirtazapine (REMERON) 15 mg tablet Take 1 tablet (15 mg total) by mouth daily at bedtime, Starting Thu 5/16/2024, Until Tue 11/12/2024, Normal      naloxone (NARCAN) 4 mg/0.1 mL nasal spray Administer 1 spray into a nostril. If no response after 2-3 minutes, give another dose in the other nostril using a new spray., Normal      Nerve Stimulator (TENS Therapy Pain Relief) EMILY Use as directed, Print      !! OneTouch Delica Lancets 33G MISC Use daily, Starting Tue 10/31/2023, Normal      !! OneTouch Delica Lancets 33G MISC Check blood sugars once daily. Please substitute with appropriate alternative as covered by patient's insurance. Dx: E11.65, Normal      oxyCODONE (ROXICODONE) 10 MG TABS Take 1 tablet (10 mg total) by mouth 3 (three) times a day Max Daily Amount: 30 mg, Starting Mon 7/1/2024, Normal      tamsulosin (FLOMAX) 0.4 mg take 1 capsule by mouth daily with dinner, Starting Tue 6/25/2024, Normal      traZODone (DESYREL) 50 mg tablet Take 1 tablet (50 mg total) by mouth daily at bedtime, Starting Mon 7/1/2024, Normal       !! - Potential duplicate medications found. Please discuss with provider.              PDMP Review         Value Time User    PDMP Reviewed  Yes 7/1/2024 10:00 AM Audrey Ruvalcaba MD            ED Provider  Electronically Signed by             Chase Tabares MD  08/01/24 8809

## 2024-08-07 ENCOUNTER — OFFICE VISIT (OUTPATIENT)
Dept: UROLOGY | Facility: AMBULATORY SURGERY CENTER | Age: 64
End: 2024-08-07
Payer: MEDICARE

## 2024-08-07 VITALS
HEART RATE: 86 BPM | SYSTOLIC BLOOD PRESSURE: 138 MMHG | WEIGHT: 195 LBS | HEIGHT: 67 IN | BODY MASS INDEX: 30.61 KG/M2 | OXYGEN SATURATION: 97 % | DIASTOLIC BLOOD PRESSURE: 78 MMHG

## 2024-08-07 DIAGNOSIS — R97.20 INCREASED PROSTATE SPECIFIC ANTIGEN (PSA) VELOCITY: ICD-10-CM

## 2024-08-07 DIAGNOSIS — R39.11 BENIGN PROSTATIC HYPERPLASIA WITH URINARY HESITANCY: ICD-10-CM

## 2024-08-07 DIAGNOSIS — N41.1 CHRONIC PROSTATITIS: ICD-10-CM

## 2024-08-07 DIAGNOSIS — R31.29 MICROSCOPIC HEMATURIA: ICD-10-CM

## 2024-08-07 DIAGNOSIS — N40.1 BENIGN PROSTATIC HYPERPLASIA WITH URINARY HESITANCY: ICD-10-CM

## 2024-08-07 DIAGNOSIS — N40.1 BENIGN PROSTATIC HYPERPLASIA WITH LOWER URINARY TRACT SYMPTOMS, SYMPTOM DETAILS UNSPECIFIED: Primary | ICD-10-CM

## 2024-08-07 LAB
BACTERIA UR QL AUTO: NORMAL /HPF
BILIRUB UR QL STRIP: NEGATIVE
CLARITY UR: CLEAR
COLOR UR: COLORLESS
GLUCOSE UR STRIP-MCNC: NEGATIVE MG/DL
HGB UR QL STRIP.AUTO: NEGATIVE
KETONES UR STRIP-MCNC: NEGATIVE MG/DL
LEUKOCYTE ESTERASE UR QL STRIP: NEGATIVE
NITRITE UR QL STRIP: NEGATIVE
NON-SQ EPI CELLS URNS QL MICRO: NORMAL /HPF
PH UR STRIP.AUTO: 6 [PH]
POST-VOID RESIDUAL VOLUME, ML POC: 14 ML
PROT UR STRIP-MCNC: NEGATIVE MG/DL
RBC #/AREA URNS AUTO: NORMAL /HPF
SL AMB  POCT GLUCOSE, UA: NORMAL
SL AMB LEUKOCYTE ESTERASE,UA: NORMAL
SL AMB POCT BILIRUBIN,UA: NORMAL
SL AMB POCT BLOOD,UA: NORMAL
SL AMB POCT CLARITY,UA: CLEAR
SL AMB POCT COLOR,UA: YELLOW
SL AMB POCT KETONES,UA: NORMAL
SL AMB POCT NITRITE,UA: NORMAL
SL AMB POCT PH,UA: 5
SL AMB POCT SPECIFIC GRAVITY,UA: 1
SL AMB POCT URINE PROTEIN: NORMAL
SL AMB POCT UROBILINOGEN: 0.2
SP GR UR STRIP.AUTO: 1.01 (ref 1–1.03)
UROBILINOGEN UR STRIP-ACNC: <2 MG/DL
WBC #/AREA URNS AUTO: NORMAL /HPF

## 2024-08-07 PROCEDURE — 87086 URINE CULTURE/COLONY COUNT: CPT

## 2024-08-07 PROCEDURE — 81002 URINALYSIS NONAUTO W/O SCOPE: CPT

## 2024-08-07 PROCEDURE — 81001 URINALYSIS AUTO W/SCOPE: CPT

## 2024-08-07 PROCEDURE — 51798 US URINE CAPACITY MEASURE: CPT

## 2024-08-07 PROCEDURE — 99213 OFFICE O/P EST LOW 20 MIN: CPT

## 2024-08-07 RX ORDER — TAMSULOSIN HYDROCHLORIDE 0.4 MG/1
0.4 CAPSULE ORAL
Qty: 90 CAPSULE | Refills: 3 | Status: SHIPPED | OUTPATIENT
Start: 2024-08-07

## 2024-08-07 NOTE — PROGRESS NOTES
Office Visit- Urology  Marv Astorga 1960 MRN: [id number]      Assessment/Discussion/Plan    63 y.o. male managed by     BPH with obstruction/lower urinary tract symptoms  -Predominantly bothered by irritative urinary symptoms when was last seen in 2022  -Maintain on Flomax 0.4 mg   -Had cystoscopy in June 2022 with bilateral lobar hypertrophy with small median lobe and prostatic extension to bladder  -PVR today is 14 mL  -Urine dip with no evidence of any infection.  Will send out urine for testing  -Patient denies irritative urinary symptoms today.  Will hold off on any further pharmacotherapy    2.  Prostate cancer screening  -PSA returned at 2.7 in April 2024 from a baseline of 1.4-1.6 between [id number]  -KELLEN today with no palpable nodularity.  Prostate does feel enlarged with protruding lobes but not indurated and overall feels symmetric  -Family history of prostate cancer in father  -Will obtain a repeat PSA.  If there is further increase in PSA would consider obtainment of a multiparametric MRI of the prostate due to increased PSA velocity and his known family history  -Given that PSA decreased to baseline and/or remained stable can consider follow-up in 1 year with PSA/KELLEN at that point in time      Chief Complaint:   Marv is a 63 y.o. male presenting to the office for a follow up visit regarding BPH/prostate cancer screening        Subjective    Patient is a 63-year-old male with a history of BPH with lower tract symptoms who presents to the office today for follow-up.  Last in the office in June 2022 for cystoscopy due to persistent bothersome urinary tract symptoms on Flomax 0.4 mg.  At that point in time his main concerning symptoms included frequency, urgency, nocturia, weak urinary stream.  He was initiated on Myrbetriq 50 mg.  In the office today he states that he is only utilizing Flomax 0.4 mg.  He denies bothersome irritative symptoms at this point in time no dysuria or gross hematuria  though he does note some tingling at the urethra from time to time.  Patient does have a significant family history of prostate cancer in his father who was diagnosed in his 80s.  Patient's baseline PSA has been between 1.4-1.6 between 2020 and 2023 his most recent PSA returned at 2.7 in April 2024      ROS:   Review of Systems   Constitutional: Negative.  Negative for chills, fatigue and fever.   HENT: Negative.     Respiratory:  Negative for shortness of breath.    Cardiovascular:  Negative for chest pain.   Gastrointestinal: Negative.  Negative for abdominal pain.   Endocrine: Negative.    Musculoskeletal: Negative.    Skin: Negative.    Neurological: Negative.  Negative for dizziness and light-headedness.   Hematological: Negative.    Psychiatric/Behavioral: Negative.           Past Medical History  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    Current severe episode of major depressive disorder without prior episode ([facility]) 10/11/2023    Depression     Diabetes mellitus ([facility])     Hyperlipidemia     Hypertension     Suicidal intent     Type 2 diabetes mellitus without complication, without long-term current use of insulin ([facility]) 10/16/2020       Past Surgical History  Past Surgical History:   Procedure Laterality Date    CYST REMOVAL  2017       Past Family History  Family History   Problem Relation Age of Onset    Hypertension Mother     Diabetes Mother     Cancer Father     Diabetes Family     Hypertension Family        Past Social history  Social History     Socioeconomic History    Marital status: /Civil Union     Spouse name: Not on file    Number of children: 7    Years of education: Not on file    Highest education level: Not on file   Occupational History    Not on file   Tobacco Use    Smoking status: Never     Passive exposure: Never    Smokeless tobacco: Never   Vaping Use    Vaping status: Never  Used   Substance and Sexual Activity    Alcohol use: Not Currently     Comment: rare    Drug use: Never    Sexual activity: Not Currently   Other Topics Concern    Not on file   Social History Narrative    Home: Lives with his 12y/o daughter in a rental house owned by an elder daughter    Children:  3 sons, 4 daughters            Educational:    Pt does not know if he reached childhood milestones on times.  He denies h/o learning disabilities    Highest grade completed: 7th     No college     Social Determinants of Health     Financial Resource Strain: High Risk (1/8/2024)    Overall Financial Resource Strain (CARDIA)     Difficulty of Paying Living Expenses: Hard   Food Insecurity: Food Insecurity Present (1/8/2024)    Hunger Vital Sign     Worried About Running Out of Food in the Last Year: Often true     Ran Out of Food in the Last Year: Often true   Transportation Needs: No Transportation Needs (1/8/2024)    PRAPARE - Transportation     Lack of Transportation (Medical): No     Lack of Transportation (Non-Medical): No   Physical Activity: Not on file   Stress: Not on file   Social Connections: Not on file   Intimate Partner Violence: Not on file   Housing Stability: High Risk (4/10/2024)    Housing Stability Vital Sign     Unable to Pay for Housing in the Last Year: Yes     Number of Times Moved in the Last Year: 1     Homeless in the Last Year: No       Current Medications  Current Outpatient Medications   Medication Sig Dispense Refill    albuterol (2.5 mg/3 mL) 0.083 % nebulizer solution Take 3 mL (2.5 mg total) by nebulization every 6 (six) hours as needed for wheezing or shortness of breath 180 mL 5    albuterol (ProAir HFA) 90 mcg/act inhaler Inhale 2 puffs every 6 (six) hours as needed for wheezing 8.5 g 1    amLODIPine (NORVASC) 10 mg tablet Take 1 tablet (10 mg total) by mouth daily 90 tablet 1    ARIPiprazole (ABILIFY) 15 mg tablet Take 0.5 tablets (7.5 mg total) by mouth daily 45 tablet 0    aspirin  325 mg tablet Take 1 tablet (325 mg total) by mouth daily 90 tablet 3    atorvastatin (LIPITOR) 20 mg tablet take 1 tablet by mouth once daily 90 tablet 1    Blood Glucose Monitoring Suppl (OneTouch Verio) w/Device KIT Use daily 1 kit 0    Blood Glucose Monitoring Suppl KIT Use in the morning Please give test strips and lancets  Dx. E11.9 1 kit 0    Cholecalciferol (D3 PO) Take by mouth daily      Diclofenac Sodium (VOLTAREN) 1 % Apply 2 g topically 4 (four) times a day 100 g 0    DULoxetine (CYMBALTA) 60 mg delayed release capsule Take 1 capsule (60 mg total) by mouth 2 (two) times a day 180 capsule 1    famotidine (PEPCID) 20 mg tablet Take 1 tablet (20 mg total) by mouth daily 60 tablet 2    fluticasone (FLONASE) 50 mcg/act nasal spray 1 spray into each nostril daily 16 g 0    fluticasone-salmeterol (Advair HFA) 115-21 MCG/ACT inhaler Inhale 2 puffs 2 (two) times a day Rinse mouth after use. 36 g 1    gabapentin (NEURONTIN) 800 mg tablet Take 1 tablet (800 mg total) by mouth 3 (three) times a day 90 tablet 5    glucose blood (OneTouch Verio) test strip Check blood sugar daily 100 each 2    glucose blood (OneTouch Verio) test strip Check blood sugars once daily. Please substitute with appropriate alternative as covered by patient's insurance. Dx: E11.65 100 each 3    glucose monitoring kit (FREESTYLE) monitoring kit Use 1 each in the morning TESTING DAILY IN THE AM 1 each 0    Lancet Devices (ONE TOUCH DELICA LANCING DEV) MISC Use daily 1 each 2    lidocaine (Lidoderm) 5 % Apply 1 patch topically over 12 hours daily Remove & Discard patch within 12 hours or as directed by MD 30 patch 5    lidocaine (LMX) 4 % cream Apply topically as needed for mild pain 30 g 0    metFORMIN (GLUCOPHAGE) 500 mg tablet Take 1 tablet (500 mg total) by mouth 2 (two) times a day with meals 180 tablet 3    methocarbamol (ROBAXIN) 500 mg tablet Take 1 tablet (500 mg total) by mouth 3 (three) times a day 30 tablet 2    Mirabegron ER 50 MG  "TB24 Take 1 tablet (50 mg total) by mouth daily 60 tablet 6    mirtazapine (REMERON) 15 mg tablet Take 1 tablet (15 mg total) by mouth daily at bedtime 30 tablet 5    Nerve Stimulator (TENS Therapy Pain Relief) EMILY Use as directed 1 each 0    OneTouch Delica Lancets 33G MISC Use daily 100 each 1    OneTouch Delica Lancets 33G MISC Check blood sugars once daily. Please substitute with appropriate alternative as covered by patient's insurance. Dx: E11.65 100 each 3    oxyCODONE (ROXICODONE) 10 MG TABS Take 1 tablet (10 mg total) by mouth 3 (three) times a day Max Daily Amount: 30 mg 90 tablet 0    tamsulosin (FLOMAX) 0.4 mg take 1 capsule by mouth daily with dinner 90 capsule 1    traZODone (DESYREL) 50 mg tablet Take 1 tablet (50 mg total) by mouth daily at bedtime 90 tablet 0    naloxone (NARCAN) 4 mg/0.1 mL nasal spray Administer 1 spray into a nostril. If no response after 2-3 minutes, give another dose in the other nostril using a new spray. (Patient not taking: Reported on 4/1/2024) 1 each 1     No current facility-administered medications for this visit.       Allergies  No Known Allergies    OBJECTIVE    Vitals   Vitals:    08/07/24 1119   BP: 138/78   BP Location: Left arm   Patient Position: Sitting   Cuff Size: Standard   Pulse: 86   SpO2: 97%   Weight: 88.5 kg (195 lb)   Height: 5' 7\" (1.702 m)       PVR:    Physical Exam  Constitutional:       General: He is not in acute distress.     Appearance: Normal appearance. He is normal weight. He is not ill-appearing or toxic-appearing.   HENT:      Head: Normocephalic and atraumatic.   Eyes:      Conjunctiva/sclera: Conjunctivae normal.   Cardiovascular:      Rate and Rhythm: Normal rate.   Pulmonary:      Effort: Pulmonary effort is normal. No respiratory distress.   Genitourinary:     Comments: KELLEN with an enlarged prostate.  No overt indurated nodularity appreciated.  There is protrusion of the prostate lobules bilaterally and it feels overall symmetric on " my examination today  Skin:     General: Skin is warm and dry.   Neurological:      General: No focal deficit present.      Mental Status: He is alert and oriented to person, place, and time.      Cranial Nerves: No cranial nerve deficit.   Psychiatric:         Mood and Affect: Mood normal.         Behavior: Behavior normal.         Thought Content: Thought content normal.          Labs:     Lab Results   Component Value Date    PSA 2.70 04/10/2024    PSA 1.5 05/15/2023    PSA 1.6 03/15/2022    PSA 1.4 11/13/2020     Lab Results   Component Value Date    CREATININE 1.14 04/10/2024      Lab Results   Component Value Date    HGBA1C 7.3 (H) 04/10/2024     Lab Results   Component Value Date    CALCIUM 9.2 04/10/2024    K 4.2 04/10/2024    CO2 29 04/10/2024     04/10/2024    BUN 15 04/10/2024    CREATININE 1.14 04/10/2024       I have personally reviewed all pertinent lab results and reviewed with patient    Imaging       Jose Meza PA-C  Date: 8/7/2024 Time: 11:35 AM  [facility]    This note was written using fluency dictation software. Please excuse any resulting minor grammatical errors.

## 2024-08-08 ENCOUNTER — HOSPITAL ENCOUNTER (OUTPATIENT)
Dept: NEUROLOGY | Facility: CLINIC | Age: 64
End: 2024-08-08
Payer: MEDICARE

## 2024-08-08 DIAGNOSIS — R20.2 PARESTHESIAS IN RIGHT HAND: ICD-10-CM

## 2024-08-08 PROCEDURE — 95886 MUSC TEST DONE W/N TEST COMP: CPT | Performed by: PSYCHIATRY & NEUROLOGY

## 2024-08-08 PROCEDURE — 95909 NRV CNDJ TST 5-6 STUDIES: CPT | Performed by: PSYCHIATRY & NEUROLOGY

## 2024-08-09 LAB — BACTERIA UR CULT: NORMAL

## 2024-08-09 NOTE — TELEPHONE ENCOUNTER
Call placed to the patient per [facility] referral.    Unable to reach Pt as the phone number in chart has been disconnected    closed

## 2024-08-14 ENCOUNTER — EVALUATION (OUTPATIENT)
Dept: PHYSICAL THERAPY | Facility: CLINIC | Age: 64
End: 2024-08-14
Payer: MEDICARE

## 2024-08-14 VITALS — SYSTOLIC BLOOD PRESSURE: 150 MMHG | DIASTOLIC BLOOD PRESSURE: 89 MMHG

## 2024-08-14 DIAGNOSIS — M25.562 ACUTE PAIN OF BOTH KNEES: ICD-10-CM

## 2024-08-14 DIAGNOSIS — J45.41 MODERATE PERSISTENT REACTIVE AIRWAY DISEASE WITH ACUTE EXACERBATION: ICD-10-CM

## 2024-08-14 DIAGNOSIS — M25.561 ACUTE PAIN OF BOTH KNEES: ICD-10-CM

## 2024-08-14 PROCEDURE — 97110 THERAPEUTIC EXERCISES: CPT

## 2024-08-14 PROCEDURE — 97161 PT EVAL LOW COMPLEX 20 MIN: CPT

## 2024-08-14 RX ORDER — FLUTICASONE PROPIONATE AND SALMETEROL XINAFOATE 115; 21 UG/1; UG/1
2 AEROSOL, METERED RESPIRATORY (INHALATION) 2 TIMES DAILY
Qty: 36 G | Refills: 1 | Status: SHIPPED | OUTPATIENT
Start: 2024-08-14

## 2024-08-14 NOTE — PROGRESS NOTES
PT Evaluation     Today's date: 2024  Patient name: Marv Astorga  : 1960  MRN: [id number]  Referring provider: Jai Ludwig  Dx:   Encounter Diagnosis     ICD-10-CM    1. Acute pain of both knees  M25.561 Ambulatory Referral to Physical Therapy    M25.562           Start Time: 0900  Stop Time: 0940  Total time in clinic (min): 40 minutes    Assessment  Impairments: abnormal or restricted ROM, impaired physical strength and pain with function  Symptom irritability: high    Assessment details: Marv is a 62 yo male presenting to OP PT secondary to bilateral knee pain with chronic onset.   Pt functional deficits include limited standing tolerance, discomfort with prolonged amb, difficulty with stairclimbing, and pain with bending his knee with ADls Pt presents with Knee strength deifcits, ROM deficits, gait deviations, and balance impairments. Marv would benefit from skilled PT to address aforementioned impairements to increase ease with all functional activities and improve return to PLOF    Understanding of Dx/Px/POC: good     Prognosis: good    Goals  1.Decrease pain by 50% in 4 weeks to allow for ease with getting into out of chair, increase ease with prolonged standing/walking.   2. Increase b/l lower extremity strength golbally to 4/5 in 6 weeks to increase tolerance to reciprocal stair climbing.  3. Increase b/lhip abductor and external rotator strength strength to 4+.5  6 weeks.  4. Pt will demonstrate 15-20 deg improvement in knee flex/ext ROM to increase tolerance to getting into/out of his chair, improve gait mechanics, and improve ability to putting on shoes/socks    1. Return to Prior Level of Function in 8 weeks to improve ease with self-care, grocery shopping, and regular hobbies  2. Pt will demonstrate Orient with progressive home exercise program to improve carryover and decrease recurrence of symptoms.  3. Pt will demonstrate 20% improvement in FOTO score to increase  tolerance to functional activities   4. Pt will demonstrate 4+/5 in LE strength to allow for improved tolerance to prolonged amb/standing in 6-8 weeks        Plan  Patient would benefit from: PT eval and skilled physical therapy  Planned modality interventions: low level laser therapy, manual electrical stimulation, TENS, thermotherapy: hydrocollator packs, cryotherapy and unattended electrical stimulation    Planned therapy interventions: IASTM, joint mobilization, kinesiology taping, manual therapy, massage, strengthening, stretching, therapeutic activities, therapeutic exercise, balance and gait training    Frequency: 2x week  Plan of Care beginning date: 2024  Plan of Care expiration date: 10/9/2024  Treatment plan discussed with: patient      Subjective Evaluation    History of Present Illness  Mechanism of injury: Marv is a 64 yo male presenting to OP PT chronic bilateral knee pain with onset , he additionally reports history of chronic low back pain. Pt reports pain prevents him from sleeping, walking, and standing. He takes prescription pain medication, and gabapentin for sx relief. Pt has sig history of multiple falls, due to LE weakness, stiffness. Denies any hx of trauma or surgerical intervention. Pt has had PT prior with mild relief. Pt has used pain patches, and modalities with mild relief. He reports intermittent numbness/tingling along Left lateral LE.           Recurrent probem    Quality of life: good    Patient Goals  Patient goals for therapy: increased strength, decreased pain, independence with ADLs/IADLs and improved balance    Pain  Current pain ratin  At best pain ratin  At worst pain ratin  Location: B/l anterior knee pain  Quality: sharp and tight  Relieving factors: rest, medications and change in position  Aggravating factors: walking, standing, sitting and stair climbing  Progression: no change    Social Support  Stairs in house: yes   Lives in: multiple-level  home  Lives with: adult children    Employment status: not working  Hand dominance: right    Treatments  Previous treatment: physical therapy and medication  Current treatment: medication        Objective     Tenderness   Left Knee   Tenderness in the inferior patella, lateral joint line, medial joint line, medial patella and superior patella.     Right Knee   Tenderness in the inferior patella, lateral joint line, medial joint line, medial patella and superior patella.     Active Range of Motion   Left Knee   Flexion: 112 degrees with pain  Extension: WFL    Right Knee   Flexion: 110 degrees with pain  Extension: WFL    Passive Range of Motion   Left Knee   Flexion: 120 degrees with pain  Extension: WFL    Right Knee   Flexion: 119 degrees with pain  Extension: WFL    Strength/Myotome Testing     Left Hip   Planes of Motion   Flexion: 3+  External rotation: 4  Internal rotation: 4    Right Hip   Planes of Motion   Flexion: 3+  External rotation: 4  Internal rotation: 4    Left Knee   Flexion: 4  Extension: 4+  Quadriceps contraction: good    Right Knee   Flexion: 4  Extension: 4  Quadriceps contraction: good      Flowsheet Rows      Flowsheet Row Most Recent Value   PT/OT G-Codes    Current Score 1   Projected Score 39               Precautions:   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    Current severe episode of major depressive disorder without prior episode ([facility]) 10/11/2023    Depression     Diabetes mellitus ([facility])     Hyperlipidemia     Hypertension     Suicidal intent     Type 2 diabetes mellitus without complication, without long-term current use of insulin ([facility]) 10/16/2020     Date 8/14          Visit # 1          FOTO done          Re-eval                   Manuals 8/14            CHECK BP  150/89                                                   Neuro Re-Ed             Tband side steps             SLR 3  way              Mini squats             Mini lunges                                                    Ther Ex             Bike             Quad set x15            Quad stretch             Hamstring str             SAQ, LAQ SAQ x15            SLR, SL SLR             HR/TR                          Ther Activity                                       Gait Training                                       Modalities             HP 10'

## 2024-08-19 ENCOUNTER — TELEPHONE (OUTPATIENT)
Dept: BEHAVIORAL/MENTAL HEALTH CLINIC | Facility: CLINIC | Age: 64
End: 2024-08-19

## 2024-08-19 NOTE — TELEPHONE ENCOUNTER
Patients Name: Marv Astorga    : 1960    Phone Number: [phone]    Appointment Date: 24    Appointment time: 9AM     Address: [address] [place] [address] [address] [place] [address]    Drop Off Facility/Office: [facility]    Drop Off Address: [facility] [address], [place] [address]    Cost Center Number: [id number]    Special notes/directions for :    Note for scheduling team:    Send to Ride Booking Pool: [id number] [facility]

## 2024-08-21 NOTE — TELEPHONE ENCOUNTER
Patient called to verify LYFT scheduled for appt 8/22/24. Writer confirmed LYFT scheduled.   Patient provided new phone # [phone]. Writer updated chart.

## 2024-08-22 ENCOUNTER — SOCIAL WORK (OUTPATIENT)
Dept: BEHAVIORAL/MENTAL HEALTH CLINIC | Facility: CLINIC | Age: 64
End: 2024-08-22
Payer: MEDICARE

## 2024-08-22 DIAGNOSIS — F32.2 CURRENT SEVERE EPISODE OF MAJOR DEPRESSIVE DISORDER WITHOUT PSYCHOTIC FEATURES WITHOUT PRIOR EPISODE (HCC): ICD-10-CM

## 2024-08-22 DIAGNOSIS — F41.1 GAD (GENERALIZED ANXIETY DISORDER): Primary | ICD-10-CM

## 2024-08-22 PROCEDURE — 90837 PSYTX W PT 60 MINUTES: CPT | Performed by: SOCIAL WORKER

## 2024-08-22 NOTE — PSYCH
"Behavioral Health Psychotherapy Progress Note    Psychotherapy Provided: Individual Psychotherapy     1. SAHRA (generalized anxiety disorder)        2. Current severe episode of major depressive disorder without psychotic features without prior episode ([facility])            Goals addressed in session: Goal 1     DATA: Marv shared his frustrations with the system as he calls it. He is financially frustrated. He is on the wait list for assisted housing. He has a coordinator from the [place] [place]. He is so frustrated with all the questions the social workers ask him . He said his situation remains the same, they provide him little services yet ask questions in his words like his situation is changing. He is so frustrated. I provide support, encouragement and strategies to cope. He is connected with community workers but he feels they are not doing much for him.   During this session, this clinician used the following therapeutic modalities: Client-centered Therapy, Cognitive Behavioral Therapy, Mindfulness-based Strategies, and Supportive Psychotherapy    Substance Abuse was not addressed during this session. If the client is diagnosed with a co-occurring substance use disorder, please indicate any changes in the frequency or amount of use: n/a. Stage of change for addressing substance use diagnoses: No substance use/Not applicable    ASSESSMENT:  Marv Astorga presents with a Angry, Anxious, and Depressed mood.     his affect is angry, anxious and depressed, which is congruent, with his mood and the content of the session. The client has made progress on their goals.However his depression and anxiety are affected by his poor finances.      Marv Astorga presents with a none risk of suicide, none risk of self-harm, and none risk of harm to others.    For any risk assessment that surpasses a \"low\" rating, a safety plan must be developed.    A safety plan was indicated: no  If yes, describe in detail n/a    PLAN: " Between sessions, Marv Astorga will use mindfulness and CBT. At the next session, the therapist will use Client-centered Therapy, Cognitive Behavioral Therapy, Mindfulness-based Strategies, and Supportive Psychotherapy to address issues and symptoms as they may arise. .    Behavioral Health Treatment Plan and Discharge Planning: Marv Astorga is aware of and agrees to continue to work on their treatment plan. They have identified and are working toward their discharge goals. yes    Visit start and stop times:    08/22/24  Start Time: 0900  Stop Time: 1000  Total Visit Time: 60 minutes

## 2024-08-23 ENCOUNTER — OFFICE VISIT (OUTPATIENT)
Dept: PHYSICAL THERAPY | Facility: CLINIC | Age: 64
End: 2024-08-23
Payer: MEDICARE

## 2024-08-23 DIAGNOSIS — M25.562 ACUTE PAIN OF BOTH KNEES: Primary | ICD-10-CM

## 2024-08-23 DIAGNOSIS — M25.561 ACUTE PAIN OF BOTH KNEES: Primary | ICD-10-CM

## 2024-08-23 PROCEDURE — 97112 NEUROMUSCULAR REEDUCATION: CPT

## 2024-08-23 PROCEDURE — 97535 SELF CARE MNGMENT TRAINING: CPT

## 2024-08-23 PROCEDURE — 97110 THERAPEUTIC EXERCISES: CPT

## 2024-08-23 NOTE — PROGRESS NOTES
"Daily Note     Today's date: 2024  Patient name: Marv Astorga  : 1960  MRN: [id number]  Referring provider: Jai Ludwig*  Dx:   Encounter Diagnosis     ICD-10-CM    1. Acute pain of both knees  M25.561     M25.562                      Subjective: Pt presents to PT reporting back pain is the problem.  He states knees feel weak and he has \"trouble bending my knees\".      Objective: See treatment diary below      Assessment: Pt demonstrates good tolerance to progression despite complaints of pain.  Pt does report fatigue with some TE.  Pt requires verba and tactile cuing for proper posture but demonstrates difficulty with maintaining proper posture. Pt reports his head feels fuzzy: blood pressure taken again and noted lower number increased.  Pt advised to sit and have some water.  He went out to his car and brought in his BP meds and took another pill stating he took one early this morning.  Pt would not let me see the dosage and instructions for taking the meds.  Pt was advised to let me know how he feels before he leaves however pt left without speaking with me or another clinician therefore unable to advised pt on what to do if he continues to not feel well. Patient demonstrated fatigue post treatment, exhibited good technique with therapeutic exercises, and would benefit from continued PT to increase flexibility, strength and function.      Plan: Continue per plan of care.      Precautions:   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    Current severe episode of major depressive disorder without prior episode ([facility]) 10/11/2023    Depression     Diabetes mellitus ([facility])     Hyperlipidemia     Hypertension     Suicidal intent     Type 2 diabetes mellitus without complication, without long-term current use of insulin ([facility]) 10/16/2020     Date          Visit # 1 2         FOTO done       " "   Re-eval                   Manuals 8/14 8/23           CHECK BP  150/89 Pre:132/82  Post: 140/98                                                  Neuro Re-Ed  8/23           Tband side steps  OTB 3 laps           SLR 3 way              Mini squats  15x           Mini lunges                                                    Ther Ex  8/23           Bike  6'           Quad set x15 5\" x 15           Quad stretch             Hamstring str             SAQ, LAQ SAQ x15 2# 15x ea           SLR, SL SLR             HR/TR  15x ea                        Ther Activity  8/23                                     Gait Training  8/23                                     Modalities  8/23           HP 10'                              "

## 2024-08-27 ENCOUNTER — OFFICE VISIT (OUTPATIENT)
Dept: PHYSICAL THERAPY | Facility: CLINIC | Age: 64
End: 2024-08-27
Payer: MEDICARE

## 2024-08-27 DIAGNOSIS — M25.561 ACUTE PAIN OF BOTH KNEES: Primary | ICD-10-CM

## 2024-08-27 DIAGNOSIS — M25.562 ACUTE PAIN OF BOTH KNEES: Primary | ICD-10-CM

## 2024-08-27 PROCEDURE — 97112 NEUROMUSCULAR REEDUCATION: CPT

## 2024-08-27 PROCEDURE — 97110 THERAPEUTIC EXERCISES: CPT

## 2024-08-27 PROCEDURE — 97140 MANUAL THERAPY 1/> REGIONS: CPT

## 2024-08-27 NOTE — PROGRESS NOTES
"Daily Note     Today's date: 2024  Patient name: Marv Astorga  : 1960  MRN: [id number]  Referring provider: Jai Ludwig  Dx:   Encounter Diagnosis     ICD-10-CM    1. Acute pain of both knees  M25.561     M25.562           Start Time: 0900  Stop Time: 0940  Total time in clinic (min): 40 minutes    Subjective: Pt reports continued feelings of cramping along quads, otherwise denies any other sx changes. He states he is following up with MD about BP to ensure he is on appropriate dosage of BP medication.       Objective: See treatment diary below      Assessment: Tolerated treatment well. Patient demonstrated fatigue post treatment, exhibited good technique with therapeutic exercises, and would benefit from continued PT      Plan: Continue per plan of care.      Precautions:   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    Current severe episode of major depressive disorder without prior episode ([facility]) 10/11/2023    Depression     Diabetes mellitus ([facility])     Hyperlipidemia     Hypertension     Suicidal intent     Type 2 diabetes mellitus without complication, without long-term current use of insulin ([facility]) 10/16/2020     Date         Visit # 1 2 3        FOTO done          Re-eval                   Manuals           CHECK BP  150/89 Pre:132/82  Post: 140/98 Pre:122/72  Post:143/87                                                 Neuro Re-Ed            Tband side steps  OTB 3 laps OTB 3 laps           SLR 3 way    OTB x15 ea           Mini squats  15x 15          Mini lunges                                                    Ther Ex            Bike  6'           Quad set x15 5\" x 15 5''x15          Quad stretch   Prone 3x30''          Hamstring str   3x20''          SAQ, LAQ SAQ x15 2# 15x ea 2# 2x10          SLR, SL SLR   2x10 SLR          HR/TR  15x ea " 20x ea          Bridges    2x10          Ther Activity  8/23 8/27                                    Gait Training  8/23 8/27                                    Modalities  8/23 8/27          HP 10'

## 2024-08-29 ENCOUNTER — OFFICE VISIT (OUTPATIENT)
Dept: PHYSICAL THERAPY | Facility: CLINIC | Age: 64
End: 2024-08-29
Payer: MEDICARE

## 2024-08-29 DIAGNOSIS — M25.561 ACUTE PAIN OF BOTH KNEES: Primary | ICD-10-CM

## 2024-08-29 DIAGNOSIS — M25.562 ACUTE PAIN OF BOTH KNEES: Primary | ICD-10-CM

## 2024-08-29 PROCEDURE — 97110 THERAPEUTIC EXERCISES: CPT

## 2024-08-29 NOTE — PROGRESS NOTES
"Daily Note     Today's date: 2024  Patient name: Marv Astorga  : 1960  MRN: [id number]  Referring provider: Jai Ludwig*  Dx:   Encounter Diagnosis     ICD-10-CM    1. Acute pain of both knees  M25.561     M25.562           Start Time: 0900  Stop Time: 0930  Total time in clinic (min): 30 minutes    Subjective: No medical updates reported. He states he would like to just complete 30 min this session, due to weather increasing back pain today.       Objective: See treatment diary below      Assessment: BP taken pre and post exercise to ensure safe levels for activities. He denies any elevation in sx during treatment. Tolerated treatment well. Patient demonstrated fatigue post treatment, exhibited good technique with therapeutic exercises, and would benefit from continued PT      Plan: Continue per plan of care.      Precautions:   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    Current severe episode of major depressive disorder without prior episode ([facility]) 10/11/2023    Depression     Diabetes mellitus ([facility])     Hyperlipidemia     Hypertension     Suicidal intent     Type 2 diabetes mellitus without complication, without long-term current use of insulin ([facility]) 10/16/2020     Date        Visit # 1 2 3 4       FOTO done          Re-eval                   Manuals          CHECK BP  150/89 Pre:132/82  Post: 140/98 Pre:122/72  Post:143/87 Pre:138/90  Post:145/88                                                Neuro Re-Ed           Tband side steps  OTB 3 laps OTB 3 laps  NV         SLR 3 way    OTB x15 ea  NV         Mini squats  15x 15 NV         Mini lunges                                                    Ther Ex           Bike  6'  6'         Quad set x15 5\" x 15 5''x15 5''x20         Quad stretch   Prone 3x30'' Prone 3x30''       "   Hamstring str   3x20'' 3x30''         SAQ, LAQ SAQ x15 2# 15x ea 2# 2x10 2# 2x10         SLR, SL SLR   2x10 SLR 2x10 SLR         HR/TR  15x ea 20x ea 20x ea         Bridges    2x10 2x10         Ther Activity  8/23 8/27 8/29                                   Gait Training  8/23 8/27 8/29                                   Modalities  8/23 8/27          HP 10'

## 2024-08-30 ENCOUNTER — OFFICE VISIT (OUTPATIENT)
Dept: FAMILY MEDICINE CLINIC | Facility: CLINIC | Age: 64
End: 2024-08-30

## 2024-08-30 VITALS
TEMPERATURE: 97.5 F | RESPIRATION RATE: 17 BRPM | BODY MASS INDEX: 30.64 KG/M2 | WEIGHT: 195.2 LBS | OXYGEN SATURATION: 97 % | HEIGHT: 67 IN | DIASTOLIC BLOOD PRESSURE: 78 MMHG | HEART RATE: 83 BPM | SYSTOLIC BLOOD PRESSURE: 130 MMHG

## 2024-08-30 DIAGNOSIS — G62.9 NEUROPATHY: ICD-10-CM

## 2024-08-30 DIAGNOSIS — E11.9 TYPE 2 DIABETES MELLITUS WITHOUT COMPLICATION, WITHOUT LONG-TERM CURRENT USE OF INSULIN (HCC): Primary | ICD-10-CM

## 2024-08-30 DIAGNOSIS — M54.42 CHRONIC LEFT-SIDED LOW BACK PAIN WITH LEFT-SIDED SCIATICA: ICD-10-CM

## 2024-08-30 DIAGNOSIS — I10 BENIGN ESSENTIAL HTN: ICD-10-CM

## 2024-08-30 DIAGNOSIS — F11.20 CONTINUOUS OPIOID DEPENDENCE (HCC): ICD-10-CM

## 2024-08-30 DIAGNOSIS — J30.9 ALLERGIC RHINITIS, UNSPECIFIED SEASONALITY, UNSPECIFIED TRIGGER: ICD-10-CM

## 2024-08-30 DIAGNOSIS — G89.4 CHRONIC PAIN SYNDROME: ICD-10-CM

## 2024-08-30 DIAGNOSIS — K21.9 GASTROESOPHAGEAL REFLUX DISEASE, UNSPECIFIED WHETHER ESOPHAGITIS PRESENT: ICD-10-CM

## 2024-08-30 DIAGNOSIS — G89.29 CHRONIC LEFT-SIDED LOW BACK PAIN WITH LEFT-SIDED SCIATICA: ICD-10-CM

## 2024-08-30 DIAGNOSIS — M54.42 ACUTE LEFT-SIDED LOW BACK PAIN WITH LEFT-SIDED SCIATICA: ICD-10-CM

## 2024-08-30 DIAGNOSIS — Z23 ENCOUNTER FOR IMMUNIZATION: ICD-10-CM

## 2024-08-30 PROBLEM — M25.562 ACUTE PAIN OF BOTH KNEES: Status: RESOLVED | Noted: 2024-04-11 | Resolved: 2024-08-30

## 2024-08-30 PROBLEM — M25.561 ACUTE PAIN OF BOTH KNEES: Status: RESOLVED | Noted: 2024-04-11 | Resolved: 2024-08-30

## 2024-08-30 LAB — SL AMB POCT HEMOGLOBIN AIC: 6.5 (ref ?–6.5)

## 2024-08-30 PROCEDURE — 83036 HEMOGLOBIN GLYCOSYLATED A1C: CPT | Performed by: FAMILY MEDICINE

## 2024-08-30 PROCEDURE — 3078F DIAST BP <80 MM HG: CPT | Performed by: FAMILY MEDICINE

## 2024-08-30 PROCEDURE — 99214 OFFICE O/P EST MOD 30 MIN: CPT | Performed by: FAMILY MEDICINE

## 2024-08-30 PROCEDURE — 3075F SYST BP GE 130 - 139MM HG: CPT | Performed by: FAMILY MEDICINE

## 2024-08-30 RX ORDER — METHOCARBAMOL 500 MG/1
500 TABLET, FILM COATED ORAL 3 TIMES DAILY
Qty: 30 TABLET | Refills: 2 | Status: SHIPPED | OUTPATIENT
Start: 2024-08-30

## 2024-08-30 RX ORDER — OXYCODONE HYDROCHLORIDE 10 MG/1
10 TABLET ORAL 3 TIMES DAILY
Qty: 90 TABLET | Refills: 0 | Status: SHIPPED | OUTPATIENT
Start: 2024-08-30

## 2024-08-30 RX ORDER — BLOOD SUGAR DIAGNOSTIC
STRIP MISCELLANEOUS
Qty: 100 EACH | Refills: 3 | Status: SHIPPED | OUTPATIENT
Start: 2024-08-30

## 2024-08-30 RX ORDER — AMLODIPINE BESYLATE 10 MG/1
10 TABLET ORAL DAILY
Qty: 90 TABLET | Refills: 1 | Status: SHIPPED | OUTPATIENT
Start: 2024-08-30

## 2024-08-30 RX ORDER — FLUTICASONE PROPIONATE 50 MCG
1 SPRAY, SUSPENSION (ML) NASAL DAILY
Qty: 16 G | Refills: 0 | Status: SHIPPED | OUTPATIENT
Start: 2024-08-30

## 2024-08-30 RX ORDER — FAMOTIDINE 20 MG/1
20 TABLET, FILM COATED ORAL DAILY
Qty: 60 TABLET | Refills: 2 | Status: SHIPPED | OUTPATIENT
Start: 2024-08-30

## 2024-08-30 NOTE — PROGRESS NOTES
Ambulatory Visit  Name: Marv Astorga      : 1960      MRN: [id number]  Encounter Provider: Audrey Ruvalcaba MD  Encounter Date: 2024   Encounter department: [facility]    Assessment & Plan   1. Type 2 diabetes mellitus without complication, without long-term current use of insulin (HCC)  Assessment & Plan:    Lab Results   Component Value Date    HGBA1C 6.5 2024    HGBA1C 7.3 (H) 04/10/2024    HGBA1C 7.0 (A) 10/10/2023    HGBA1C 6.7 (H) 05/15/2023     Stable  - Current medications:  Metformin 500 mg bid  - Home glucose readings: fasting 90s  - Diet: well balanced, drinks plenty of water, vegetable soups, limits bread  - +Htn: on Amlodipine  - +Hld: on Atorvastatin  - Ophthalmology: complete   - DM Foot exam: completed today  - Dentist:   - Pneumo vaccine: 2019  - Influenza Vaccine: neds to be comeplted for this year  - Smoker: no  - Keep log of blood glucose readings  - Encouraged: Diabetic Diet, Regular exercise, Weight loss       Orders:  -     POCT hemoglobin A1c  -     Ambulatory Referral to Podiatry; Future  -     glucose blood (OneTouch Verio) test strip; Check blood sugars once daily. Please substitute with appropriate alternative as covered by patient's insurance. Dx: E11.65  2. Benign essential HTN  Assessment & Plan:  BP Readings from Last 3 Encounters:   24 130/78   24 150/89   24 138/78     Stable  Current medication: Amlodipine 10 daily    Plan:  Continue current medication  Orders:  -     amLODIPine (NORVASC) 10 mg tablet; Take 1 tablet (10 mg total) by mouth daily  3. Neuropathy  -     Ambulatory Referral to Podiatry; Future  4. Gastroesophageal reflux disease, unspecified whether esophagitis present  -     famotidine (PEPCID) 20 mg tablet; Take 1 tablet (20 mg total) by mouth daily  5. Encounter for immunization  -     Pneumococcal Conjugate Vaccine 20-valent (Pcv20)  6. Acute left-sided low back pain with left-sided  sciatica  -     methocarbamol (ROBAXIN) 500 mg tablet; Take 1 tablet (500 mg total) by mouth 3 (three) times a day  7. Chronic pain syndrome  -     oxyCODONE (ROXICODONE) 10 MG TABS; Take 1 tablet (10 mg total) by mouth 3 (three) times a day Max Daily Amount: 30 mg  8. Continuous opioid dependence (HCC)  -     oxyCODONE (ROXICODONE) 10 MG TABS; Take 1 tablet (10 mg total) by mouth 3 (three) times a day Max Daily Amount: 30 mg  9. Chronic left-sided low back pain with left-sided sciatica  -     oxyCODONE (ROXICODONE) 10 MG TABS; Take 1 tablet (10 mg total) by mouth 3 (three) times a day Max Daily Amount: 30 mg  10. Allergic rhinitis, unspecified seasonality, unspecified trigger  -     fluticasone (FLONASE) 50 mcg/act nasal spray; 1 spray into each nostril daily       History of Present Illness     HPI  Marv Astorga is a 63 y.o. male  who presented to the office today to follow-up for his chronic conditions.  Pt states he is in his usual states of health, continues to have chronic pain in his lower back radiating to his legs.  He is using his rollator walker, no h/o recent falls.  He is using the Oxycodone and Gabapentin for the chronic pain and Neuropathy in his feet/legs.      The following portions of the patient's history were reviewed and updated as appropriate: allergies, current medications, past family history, past medical history, past social history, past surgical history and problem list.    Review of Systems   Constitutional:  Negative for activity change, appetite change, chills and fever.   HENT:  Negative for congestion, rhinorrhea and sore throat.    Respiratory:  Negative for cough and shortness of breath.    Cardiovascular:  Negative for chest pain.   Gastrointestinal:  Negative for diarrhea, nausea and vomiting.   Musculoskeletal:  Positive for back pain and gait problem.   Skin:  Negative for rash.   Neurological:  Negative for dizziness and headaches.   Psychiatric/Behavioral:  Negative for  "sleep disturbance and suicidal ideas. The patient is not nervous/anxious.        Objective     /78 (BP Location: Right arm, Patient Position: Sitting, Cuff Size: Standard)   Pulse 83   Temp 97.5 °F (36.4 °C) (Temporal)   Resp 17   Ht 5' 7\" (1.702 m)   Wt 88.5 kg (195 lb 3.2 oz)   SpO2 97%   BMI 30.57 kg/m²     Physical Exam  Vitals and nursing note reviewed.   Constitutional:       General: He is not in acute distress.     Appearance: He is well-developed.      Comments: + rolling walker   HENT:      Head: Normocephalic and atraumatic.   Eyes:      Conjunctiva/sclera: Conjunctivae normal.   Cardiovascular:      Rate and Rhythm: Normal rate.      Pulses: no weak pulses.           Dorsalis pedis pulses are 2+ on the right side and 2+ on the left side.      Heart sounds: No murmur heard.  Pulmonary:      Effort: Pulmonary effort is normal. No respiratory distress.   Abdominal:      Palpations: Abdomen is soft.      Tenderness: There is no abdominal tenderness.   Musculoskeletal:         General: No swelling.      Cervical back: Neck supple.        Feet:    Feet:      Right foot:      Skin integrity: No ulcer, skin breakdown, erythema, warmth, callus or dry skin.      Left foot:      Skin integrity: No ulcer, skin breakdown, erythema, warmth, callus or dry skin.   Skin:     General: Skin is warm and dry.      Capillary Refill: Capillary refill takes less than 2 seconds.   Neurological:      Mental Status: He is alert.   Psychiatric:         Mood and Affect: Mood normal.     Patient's shoes and socks removed.    Right Foot/Ankle   Right Foot Inspection  Skin Exam: skin normal and skin intact. No dry skin, no warmth, no callus, no erythema, no maceration, no abnormal color, no pre-ulcer, no ulcer and no callus.     Toe Exam: ROM and strength within normal limits.     Sensory   Proprioception: intact  Monofilament testing: diminished    Vascular  The right DP pulse is 2+.     Left Foot/Ankle  Left Foot " Inspection  Skin Exam: skin normal and skin intact. No dry skin, no warmth, no erythema, no maceration, normal color, no pre-ulcer, no ulcer and no callus.     Toe Exam: ROM and strength within normal limits.     Sensory   Proprioception: intact  Monofilament testing: diminished    Vascular  The left DP pulse is 2+.     Assign Risk Category  No deformity present  Loss of protective sensation  No weak pulses  Risk: 1          Administrative Statements

## 2024-08-30 NOTE — ASSESSMENT & PLAN NOTE
Lab Results   Component Value Date    HGBA1C 6.5 08/30/2024    HGBA1C 7.3 (H) 04/10/2024    HGBA1C 7.0 (A) 10/10/2023    HGBA1C 6.7 (H) 05/15/2023     Stable  - Current medications:  Metformin 500 mg bid  - Home glucose readings: fasting 90s  - Diet: well balanced, drinks plenty of water, vegetable soups, limits bread  - +Htn: on Amlodipine  - +Hld: on Atorvastatin  - Ophthalmology: complete 2024  - DM Foot exam: completed today  - Dentist: 2024  - Pneumo vaccine: 2019  - Influenza Vaccine: neds to be comeplted for this year  - Smoker: no  - Keep log of blood glucose readings  - Encouraged: Diabetic Diet, Regular exercise, Weight loss

## 2024-08-30 NOTE — ASSESSMENT & PLAN NOTE
BP Readings from Last 3 Encounters:   08/30/24 130/78   08/14/24 150/89   08/07/24 138/78     Stable  Current medication: Amlodipine 10 daily    Plan:  Continue current medication

## 2024-09-03 ENCOUNTER — TELEPHONE (OUTPATIENT)
Dept: FAMILY MEDICINE CLINIC | Facility: CLINIC | Age: 64
End: 2024-09-03

## 2024-09-03 ENCOUNTER — APPOINTMENT (OUTPATIENT)
Dept: PHYSICAL THERAPY | Facility: CLINIC | Age: 64
End: 2024-09-03
Payer: MEDICARE

## 2024-09-03 NOTE — TELEPHONE ENCOUNTER
PCP SIGNATURE NEEDED FOR [facility]  FORM RECEIVED VIA FAX AND PLACED IN PCP FOLDER TO BE DELIVERED AT ASSIGNED TIMES.      Medicare Part B Detailed Written Order.

## 2024-09-04 NOTE — TELEPHONE ENCOUNTER
FAXED ON 09/04/24 TO [facility] at [phone]. FAX CONFIRMATION RECEIVED.    Scanned into pt chart.

## 2024-09-05 ENCOUNTER — OFFICE VISIT (OUTPATIENT)
Dept: PHYSICAL THERAPY | Facility: CLINIC | Age: 64
End: 2024-09-05
Payer: MEDICARE

## 2024-09-05 VITALS — SYSTOLIC BLOOD PRESSURE: 138 MMHG | DIASTOLIC BLOOD PRESSURE: 80 MMHG

## 2024-09-05 DIAGNOSIS — M25.562 ACUTE PAIN OF BOTH KNEES: Primary | ICD-10-CM

## 2024-09-05 DIAGNOSIS — M25.561 ACUTE PAIN OF BOTH KNEES: Primary | ICD-10-CM

## 2024-09-05 PROCEDURE — 97110 THERAPEUTIC EXERCISES: CPT

## 2024-09-05 NOTE — PROGRESS NOTES
Daily Note     Today's date: 2024  Patient name: Marv Astorga  : 1960  MRN: [id number]  Referring provider: Jai Ludwig*  Dx:   Encounter Diagnosis     ICD-10-CM    1. Acute pain of both knees  M25.561     M25.562           Start Time: 0900  Stop Time: 09  Total time in clinic (min): 25 minutes    Subjective: Pt presents to PT stating he has a conflicting appointment for today and can only stay for a short session.        Objective: See treatment diary below      Assessment: BP taken pre exercise to ensure safe levels for activities. He denies any elevation in sx during treatment. Pt performed exercises as noted within time constraints, resume exercises next session. Tolerated treatment well. Patient demonstrated fatigue post treatment, exhibited good technique with therapeutic exercises, and would benefit from continued PT. Continue to progress as able.       Plan: Continue per plan of care.      Precautions:   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    Current severe episode of major depressive disorder without prior episode ([facility]) 10/11/2023    Depression     Diabetes mellitus ([facility])     Hyperlipidemia     Hypertension     Suicidal intent     Type 2 diabetes mellitus without complication, without long-term current use of insulin ([facility]) 10/16/2020     Date       Visit # 1 2 3 4 5      FOTO done          Re-eval                   Manuals         CHECK BP  150/89 Pre:132/82  Post: 140/98 Pre:122/72  Post:143/87 Pre:138/90  Post:145/88 130/80                                               Neuro Re-Ed          Tband side steps  OTB 3 laps OTB 3 laps  NV nv        SLR 3 way    OTB x15 ea  NV nv        Mini squats  15x 15 NV nv        Mini lunges                                                    Ther Ex         "  Bike  6'  6' 6'        Quad set x15 5\" x 15 5''x15 5''x20 20x5\"        Quad stretch   Prone 3x30'' Prone 3x30'' Prone 3x30''        Hamstring str   3x20'' 3x30'' 3x30\"        SAQ, LAQ SAQ x15 2# 15x ea 2# 2x10 2# 2x10 nv        SLR, SL SLR   2x10 SLR 2x10 SLR 2x10 ea        HR/TR  15x ea 20x ea 20x ea 20x        Bridges    2x10 2x10 2x10        Ther Activity  8/23 8/27 8/29                                   Gait Training  8/23 8/27 8/29                                   Modalities  8/23 8/27          HP 10'                                  "

## 2024-09-06 ENCOUNTER — TELEPHONE (OUTPATIENT)
Dept: PSYCHIATRY | Facility: CLINIC | Age: 64
End: 2024-09-06

## 2024-09-06 NOTE — TELEPHONE ENCOUNTER
Patients Name: Marv Astorga    : 1960    Phone Number: [phone]    Appointment Date:     Appointment time: 5 pm      Address: [address] [place] [address] [address] [place] [address]    Drop Off Facility/Office: [facility]    Drop Off Address: [address], [place] [address]    Cost Center Number: [id number]    Special notes/directions for :    Note for scheduling team:    Send to Ride Booking Pool: [id number] ([facility]

## 2024-09-12 ENCOUNTER — SOCIAL WORK (OUTPATIENT)
Dept: BEHAVIORAL/MENTAL HEALTH CLINIC | Facility: CLINIC | Age: 64
End: 2024-09-12
Payer: MEDICARE

## 2024-09-12 DIAGNOSIS — F32.2 CURRENT SEVERE EPISODE OF MAJOR DEPRESSIVE DISORDER WITHOUT PSYCHOTIC FEATURES WITHOUT PRIOR EPISODE (HCC): ICD-10-CM

## 2024-09-12 DIAGNOSIS — F41.1 GAD (GENERALIZED ANXIETY DISORDER): Primary | ICD-10-CM

## 2024-09-12 DIAGNOSIS — R45.851 SUICIDAL IDEATION: ICD-10-CM

## 2024-09-12 PROCEDURE — 90837 PSYTX W PT 60 MINUTES: CPT | Performed by: SOCIAL WORKER

## 2024-09-12 NOTE — PSYCH
"Behavioral Health Psychotherapy Progress Note    Psychotherapy Provided: Individual Psychotherapy     1. SAHRA (generalized anxiety disorder)        2. Current severe episode of major depressive disorder without psychotic features without prior episode (HCC)        3. Suicidal ideation            Goals addressed in session: Goal 1     DATA: Marv continues to deal with poor finances which are depressing to him. His daughter is in school. I told him to call nursing since he is almost out of his medications and he does not know when he sees Oneyda next. I also told him to check downstairs. I provided support, encouragement and strategies to cope.   During this session, this clinician used the following therapeutic modalities: Client-centered Therapy, Cognitive Behavioral Therapy, Mindfulness-based Strategies, and Supportive Psychotherapy    Substance Abuse was not addressed during this session. If the client is diagnosed with a co-occurring substance use disorder, please indicate any changes in the frequency or amount of use: n/a. Stage of change for addressing substance use diagnoses: No substance use/Not applicable    ASSESSMENT:  Marv Astorga presents with a Anxious and Depressed mood.     his affect is anxious and depressed over his finances and his health. , which is congruent, with his mood and the content of the session. The client has made progress on their goals.     Marv Astorga presents with a none risk of suicide, none risk of self-harm, and none risk of harm to others.    For any risk assessment that surpasses a \"low\" rating, a safety plan must be developed.    A safety plan was indicated: no  If yes, describe in detail n/a    PLAN: Between sessions, Marv Astorga will use mindfulness and CBT. At the next session, the therapist will use Client-centered Therapy, Cognitive Behavioral Therapy, Mindfulness-based Strategies, and Supportive Psychotherapy to address issues and symptoms as they may arise. " .    Behavioral Health Treatment Plan and Discharge Planning: Marv Astorga is aware of and agrees to continue to work on their treatment plan. They have identified and are working toward their discharge goals. yes    Visit start and stop times:    09/12/24  Start Time: 1700  Stop Time: 1800  Total Visit Time: 60 minutes

## 2024-09-26 DIAGNOSIS — G89.29 CHRONIC LEFT-SIDED LOW BACK PAIN WITH LEFT-SIDED SCIATICA: ICD-10-CM

## 2024-09-26 DIAGNOSIS — M54.42 CHRONIC LEFT-SIDED LOW BACK PAIN WITH LEFT-SIDED SCIATICA: ICD-10-CM

## 2024-09-26 DIAGNOSIS — G89.4 CHRONIC PAIN SYNDROME: ICD-10-CM

## 2024-09-26 DIAGNOSIS — F11.20 CONTINUOUS OPIOID DEPENDENCE (HCC): ICD-10-CM

## 2024-09-26 NOTE — TELEPHONE ENCOUNTER
Patient came into office today requesting medication refill:    oxyCODONE (ROXICODONE) 10 MG TABS     Please advise. Thank you!

## 2024-09-29 RX ORDER — OXYCODONE HYDROCHLORIDE 10 MG/1
10 TABLET ORAL 3 TIMES DAILY
Qty: 90 TABLET | Refills: 0 | Status: SHIPPED | OUTPATIENT
Start: 2024-09-29

## 2024-09-30 DIAGNOSIS — M54.50 CHRONIC LEFT-SIDED LOW BACK PAIN WITHOUT SCIATICA: ICD-10-CM

## 2024-09-30 DIAGNOSIS — G89.29 CHRONIC LEFT-SIDED LOW BACK PAIN WITHOUT SCIATICA: ICD-10-CM

## 2024-09-30 NOTE — TELEPHONE ENCOUNTER
Pt came into office and is requesting refill for the following medication. Please advise.     Diclofenac Sodium (VOLTAREN) 1 %

## 2024-10-02 ENCOUNTER — TELEPHONE (OUTPATIENT)
Dept: PSYCHIATRY | Facility: CLINIC | Age: 64
End: 2024-10-02

## 2024-10-02 ENCOUNTER — TELEPHONE (OUTPATIENT)
Dept: BEHAVIORAL/MENTAL HEALTH CLINIC | Facility: CLINIC | Age: 64
End: 2024-10-02

## 2024-10-02 NOTE — TELEPHONE ENCOUNTER
Patients Name: Marv Astorga    : 1960    Phone Number: [phone]    Appointment Date: 10/9/24    Appointment time: 9am     Address: [address] [place] [address] [address] [place] [address]    Drop Off Facility/Office: [facility]    Drop Off Address: [facility] [address], [place] [address]    Cost Center Number: [id number]    Special notes/directions for :    Note for scheduling team:    Send to Ride Booking Pool: [id number] (Patient [employer])

## 2024-10-02 NOTE — TELEPHONE ENCOUNTER
Patients Name: Marv Astorga    : 1960    Phone Number: [phone]    Appointment Date: 10/3/24    Appointment time: 5pm     Address: [address] [place] [address] [address] [place] [address]    Drop Off Facility/Office: [facility]    Drop Off Address: [facility] [address], [place] [address]    Cost Center Number: [id number]    Special notes/directions for :    Note for scheduling team:    Send to Ride Booking Pool: [id number] (Patient [employer])

## 2024-10-03 ENCOUNTER — SOCIAL WORK (OUTPATIENT)
Dept: BEHAVIORAL/MENTAL HEALTH CLINIC | Facility: CLINIC | Age: 64
End: 2024-10-03
Payer: MEDICARE

## 2024-10-03 DIAGNOSIS — F32.2 CURRENT SEVERE EPISODE OF MAJOR DEPRESSIVE DISORDER WITHOUT PSYCHOTIC FEATURES WITHOUT PRIOR EPISODE (HCC): ICD-10-CM

## 2024-10-03 DIAGNOSIS — F41.1 GAD (GENERALIZED ANXIETY DISORDER): Primary | ICD-10-CM

## 2024-10-03 PROCEDURE — 90837 PSYTX W PT 60 MINUTES: CPT | Performed by: SOCIAL WORKER

## 2024-10-03 NOTE — PSYCH
"Behavioral Health Psychotherapy Progress Note    Psychotherapy Provided: Individual Psychotherapy     1. SAHRA (generalized anxiety disorder)        2. Current severe episode of major depressive disorder without psychotic features without prior episode (Formerly [facility])            Goals addressed in session: Goal 1     DATA: Marv shared sometimes he gets in his words murphy, aggressive and he is not sleeping. He sees his medication provider on the 9th. He is upset about no job, not being able to work and having poor finances. He claims he has enough food. I suggested food stamps. He is either told he does not qualify and he is paying almost 1400 dollars a month for rent. He is thinking about going back to [place]. I provided support , encouragement and strategies to cope. Marv shared he appreciates coming here and being listened to.   During this session, this clinician used the following therapeutic modalities: Client-centered Therapy, Cognitive Behavioral Therapy, Mindfulness-based Strategies, and Supportive Psychotherapy    Substance Abuse was not addressed during this session. If the client is diagnosed with a co-occurring substance use disorder, please indicate any changes in the frequency or amount of use: n/a. Stage of change for addressing substance use diagnoses: No substance use/Not applicable    ASSESSMENT:  Marv Astorga presents with a Anxious  and depressed mood over his terrible financial status.     his affect is anxious and depressed which is congruent, with his mood and the content of the session. The client feels being listened to is helpful to keep him going.     Marv Astorga presents with a none risk of suicide, none risk of self-harm, and none risk of harm to others.    For any risk assessment that surpasses a \"low\" rating, a safety plan must be developed.    A safety plan was indicated: no  If yes, describe in detail n/a    PLAN: Between sessions, Marv Astorga will use mindfulness and CBT. At the next " ROS complete and negative unless stated in the interval HPI     Objective:     Vital Signs (Most Recent):  Temp: 98.4 °F (36.9 °C) (04/06/24 0705)  Pulse: 73 (04/06/24 0705)  Resp: 19 (04/06/24 0705)  BP: (!) 97/55 (04/06/24 0705)  SpO2: 95 % (04/06/24 0705) Vital Signs (24h Range):  Temp:  [97.5 °F (36.4 °C)-98.4 °F (36.9 °C)] 98.4 °F (36.9 °C)  Pulse:  [] 73  Resp:  [17-42] 19  SpO2:  [88 %-97 %] 95 %  BP: ()/() 97/55     Weight: 129.7 kg (286 lb)  Body mass index is 39.89 kg/m².      Intake/Output Summary (Last 24 hours) at 4/6/2024 0835  Last data filed at 4/6/2024 0800  Gross per 24 hour   Intake 1273.45 ml   Output 5955 ml   Net -4681.55 ml        Physical Exam  Vitals reviewed.   Constitutional:       General: He is awake. He is not in acute distress.     Appearance: He is well-developed. He is not ill-appearing.   Cardiovascular:      Rate and Rhythm: Normal rate.      Pulses:           Radial pulses are 2+ on the right side and 2+ on the left side.      Comments: RLE immobilizer in place s/t IABP  Pulmonary:      Effort: Pulmonary effort is normal.      Breath sounds: Normal breath sounds.      Comments: On 2L NC  Abdominal:      General: There is no distension.      Palpations: Abdomen is soft.   Musculoskeletal:      Right lower leg: 3+ Edema present.      Left lower leg: 3+ Edema present.      Comments: RLE immobilizer in place s/t IABP   Skin:     General: Skin is warm and dry.   Neurological:      General: No focal deficit present.      Mental Status: He is alert.   Psychiatric:         Behavior: Behavior is cooperative.               Vents:  Oxygen Concentration (%): 50 (04/05/24 1542)    Lines/Drains/Airways       Drain  Duration                  Urethral Catheter 04/05/24 1600 <1 day              Line  Duration                  IABP 04/05/24 1546 <1 day              Peripheral Intravenous Line  Duration                  Peripheral IV - Single Lumen 04/02/24 0813 20 G  Posterior;Right Forearm 4 days         Peripheral IV - Single Lumen 04/03/24 1700 22 G Posterior;Right Hand 2 days         Peripheral IV - Single Lumen 04/05/24 1609 20 G Anterior;Left Forearm <1 day                    Significant Labs:    CBC/Anemia Profile:  Recent Labs   Lab 04/05/24  0537 04/05/24  1716 04/06/24  0310   WBC 4.16 4.43 4.44   HGB 13.7* 13.7* 12.3*   HCT 41.0 41.3 37.2*    170 153   MCV 97 98 97   RDW 13.6 13.7 13.6        Chemistries:  Recent Labs   Lab 04/04/24  1744 04/05/24  0537 04/06/24  0310    136 139   K 3.8 3.6 3.3*    100 99   CO2 26 27 30*   BUN 23 25* 21   CREATININE 1.2 1.3 1.1   CALCIUM 8.7 9.0 9.1   MG  --  1.8 1.8   PHOS  --  4.0  --        All pertinent labs within the past 24 hours have been reviewed.    Significant Imaging:  I have reviewed all pertinent imaging results/findings within the past 24 hours.   session, the therapist will use Client-centered Therapy, Cognitive Behavioral Therapy, Mindfulness-based Strategies, and Supportive Psychotherapy to address issues and symptoms as they may arise. .    Behavioral Health Treatment Plan and Discharge Planning: Marv Astorga is aware of and agrees to continue to work on their treatment plan. They have identified and are working toward their discharge goals. yes    Visit start and stop times:    10/03/24  Start Time: 1700  Stop Time: 1800  Total Visit Time: 60 minutes

## 2024-10-04 ENCOUNTER — TELEPHONE (OUTPATIENT)
Dept: FAMILY MEDICINE CLINIC | Facility: CLINIC | Age: 64
End: 2024-10-04

## 2024-10-04 NOTE — TELEPHONE ENCOUNTER
PCP SIGNATURE NEEDED FOR CoverMyMeds/ [employer] Rx FORM RECEIVED VIA FAX AND PLACED IN PCP FOLDER TO BE DELIVERED AT ASSIGNED TIMES.     Lidocaine 5%

## 2024-10-09 ENCOUNTER — TELEPHONE (OUTPATIENT)
Age: 64
End: 2024-10-09

## 2024-10-09 NOTE — TELEPHONE ENCOUNTER
Patient is calling regarding cancelling an appointment.    Date/Time: 10/9/2024 at 9 am    Reason: pt is sick and in pain    Patient was rescheduled: YES [x] NO []  If yes, when was Patient reschedule for: 10/14/2024 at 4:30 pm    Patient requesting call back to reschedule: YES [] NO [x]

## 2024-10-09 NOTE — PSYCH
Assessment & Plan  Severe major depression with psychotic features (HCC)    Orders:    ARIPiprazole (ABILIFY) 10 mg tablet; Take 1 tablet (10 mg total) by mouth daily    Generalized anxiety disorder         Insomnia, unspecified type    Orders:    traZODone (DESYREL) 50 mg tablet; Take 1 to 2 tabs po qhs    Radiculopathy, lumbar region         Chronic left-sided low back pain with left-sided sciatica         Encounter for long-term (current) use of high-risk medication    Orders:    traZODone (DESYREL) 50 mg tablet; Take 1 to 2 tabs po qhs    Current mild episode of major depressive disorder without prior episode (HCC)    Orders:    DULoxetine (CYMBALTA) 60 mg delayed release capsule; Take 1 capsule (60 mg total) by mouth 2 (two) times a day    Current severe episode of major depressive disorder without prior episode (HCC)    Orders:    mirtazapine (REMERON) 15 mg tablet; Take 1 tablet (15 mg total) by mouth daily at bedtime      PLAN:  Pt is having increased depression and anxiety since last visit.  His AH have also returned but no other hallucinations or paranoia.  He reports of insomnia as well.  Pt appears generally stable. Tx options discussed and Pt accepts an increase in Aripiprazole for mood and psychotic Sx and increase Trazodone for sleep.  He requires a letter of his Dxs for the [facility] so they will assist with his bills.  He will sign an GIACOMO today.  Letter was prepared.   Increase:  Aripiprazole to 10mg (1) tab po qhs # 90  Trazodone to 50mg (1-2) tabs po qhs # 180  Continue:   Duloxetine 60mg (1) cap po bid # 180   Mirtazapine 15mg (1) tab po qhs # 30 R5  Gabapentin 600mg (1) tab po tid - Per PCP  Pt to continue counseling with Gregory SUGGSW   2nd request for CBC with diff, Lipids, LFTs -- rationale was explained -- new requisition given  Return 8 weeks.  Can call any time sooner prn.          MEDICATION MANAGEMENT NOTE        [facility] -  "[facility]      Name and Date of Birth:  Marv Astorga 63 y.o. 1960    Date of Visit: October 14, 2024    HPI:    Marv Astorga is here for medication review with primary c/o \"Something for sleep.\" -- partly due to back and leg pains interfering with sleep, but otherwise due to his depression and anxiety- which worsened 2 weeks ago without reason he can identify.  Mood and anxiety sxs are: sadness, irritable at times,   He has been having panic attacks as well with Sxs: severe anxiety, sense of fear, trembling, tachycardia, and nausea.  His ex-girlfriend is not at the apt nearly as much so she is not contributing to the bills.  Finances are very tight but he reports having enough food and heat and hot water.   He still has a home health aid coming 7 days a week to help with toileting, bathing and meal preparation. He continues to attend [employer] services, listens to Lutheran music and keeps contact with friends.  Pt presently denies self-injurious thoughts/behaviors, SI, HI, elevated or irritable moods, over-normal energy, reduced sleep requirement, impulsivity, VH, TH, OH, or paranoia. Pt denies any ETOH or illicit drug use/abuse.  Pt continues counseling with Gregory Bay LCSW whose recent note reviewed.  Last Tx plan done 5/13/2024.      Appetite Changes and Sleep: decreased sleep, fluctuating appetite, decreased energy    Review Of Systems:      Constitutional feeling tired   ENT negative   Cardiovascular as noted in HPI   Respiratory negative   Gastrointestinal as noted in HPI   Genitourinary negative   Musculoskeletal negative   Integumentary negative   Neurological as noted in HPI   Endocrine negative   Other Symptoms none, all other systems are negative       Past Psychiatric History:   As copied from my 7/31/2024 note with updates as needed:  \" [ Pt was born in [place] and grew up with biological parents, 4 sisters and 3 brother.  Pt is the fourth eldest.  He describes his upbringing as " "\"Happy\" and he was close to parents and siblings. His [place] in approx [place].  [place] in [place] to be with his father and seek opportunities.  His siblings and mom followed him later-- in approx 1986.     Depression started 2022:  sadness, crying, anhedonia, self-isolating, insomnia, reduced appetite with Wt loss, impaired energy, motivation, and concentration, difficulty making decisions, feelings of guilt-(Pt stated he did not remember about this), feelings of hopelessness with passive SI wanting to be dead, and also overt SI of wanting to shoot himself, but no attempt.        Psychotic Sxs:  +AH of someone talking to him -- but this only tends to happen when he is at home and when he is there alone, which is why he does not like to be alone.  He has had hypnagogic visions of shadows.  He otherwise denies VH, TH, OH, paranoia or bizarre delusions.                Nasreen:  Pt reports of some irritable moods for a couple of days, without other manic type      Anxiety started in 2022 incited by family issues and finances: excessive worry more days than not for longer than 3 months, The Sxs can occur without concommittent depressive Sxs., difficulty concentrating, fatigue, insomnia, irritability, restlessness/keyed up, and muscle tension and pain in chest, irritability.     Panic attacks started in 10/2023 while inpatient, due to \"Too much pressure, I couldn't handle it.\"  Has had several since then.  Sxs: sweating, trembling, shortness of breath, choking sensation, chest pain/pressure, dizzy/light headed, chills, feels paralyzed-- like he cannot move his body and sometimes collapses.  He once fell and cut his Lt hand requiring sutures.      Social Anxiety symptoms started 7/2023 -- \"Sometimes I don't think people is good.\"  When asked if he felt a sense of judgment or embarrassment by others, he stated \"I don't know.\"          Eating Disorder symptoms: no historical or current eating " disorder. no binge eating disorder; no anorexia nervosa. no symptoms of bulimia     Pt denies h/o PTSD or OCD Sxs     Prior psychiatrists:   [facility] in 2022 -- due to depression and anxiety Sxs.  That facility shut down  1st one seen in [place] in approx 2019 -- Pt unsure of why he went     Prior psychotherapists:  Gregory Bay LCSW 10/30/2023 -- ongoing  [facility] 5/26/2022 - 2023     Past Hospitalizations:  1st and only one: 10/10/2023 - 10/17/2023 at [facility] -- on a 201 commitment, due to increasing depression with SI and desire to shoot himself and the welfare man because of his financial state and medical issues. Over the preceding year, his depression had been increasing due to chronic pain, falls, and increased difficulty ambulating (already using a walker).  Pt is on SSI and lives with 14y/o daughter and had recently allowed his wife (from whom he was ) to come live with them due to her need to have a place to be after her anticipated surgery since she would not be able to climb steps afterwards.  When her income was added in, his SSI cash and foodstamps were both reduced and he could no longer make his bills. His home health care benefit was also removed. The last straw was finding out his bank acct was overdrawn. Before admission, Pt had been getting prescribed Duloxetine from his PCP -- this was continued and Aripiprazole added for mood Tx augmentation.  Pt improved and Discharge Rxs: Aripiprazole 2mg qd for mood, Duloxetine 60mg bid for mood and pain mgt, Gabapentin 600mg tid for anxiety and pain mgt, Mirtazapine 15mg qhs for mood and insomnia, and Trazodone 50mg qhs for insomnia.      Pt had denied suicide attempts, self-injurious behaviors, physically violent behaviors, ECT, TMS, or legal or  Hx      Prior Rx trials:  Amitriptyline 25mg (for lumbar radiculopathy) , Sertraline 25mg, Venlafaxine ER up to 150mg (? Why stopped) ,  Duloxetine up to 60mg bid  "(for mood and neuropathy and helped both), Mirtazapine up to 15mg (helped), Aripiprazole 2mg (helped), Trazodone up to 50mg (helped), Zolpidem 5mg (helped), Melatonin 3mg (helped), Gabapentin up to 800mg tid for neuropathic pain (helps) ,      Abuse Hx: Pt denies any h/o physical, sexual or emotional abuse     Trauma Hx: Pt denies                      ] \"    Past Medical History:    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    Current severe episode of major depressive disorder without prior episode ([facility]) 10/11/2023    Depression     Diabetes mellitus ([facility])     Hyperlipidemia     Hypertension     Suicidal intent     Type 2 diabetes mellitus without complication, without long-term current use of insulin ([facility]) 10/16/2020       Substance Abuse History:    Social History     Substance and Sexual Activity   Alcohol Use Not Currently    Comment: rare     Social History     Substance and Sexual Activity   Drug Use Never       Social History:    Social History     Socioeconomic History    Marital status: /Civil Union     Spouse name: Not on file    Number of children: 7    Years of education: Not on file    Highest education level: Not on file   Occupational History    Not on file   Tobacco Use    Smoking status: Never     Passive exposure: Never    Smokeless tobacco: Never   Vaping Use    Vaping status: Never Used   Substance and Sexual Activity    Alcohol use: Not Currently     Comment: rare    Drug use: Never    Sexual activity: Not Currently   Other Topics Concern    Not on file   Social History Narrative    Home: Lives with his 14y/o daughter in a rental house owned by an elder daughter    Children:  3 sons, 4 daughters            Educational:    Pt does not know if he reached childhood milestones on times.  He denies h/o learning disabilities    Highest grade completed: 7th     No college     Social Determinants " of Health     Financial Resource Strain: High Risk (1/8/2024)    Overall Financial Resource Strain (CARDIA)     Difficulty of Paying Living Expenses: Hard   Food Insecurity: Food Insecurity Present (1/8/2024)    Hunger Vital Sign     Worried About Running Out of Food in the Last Year: Often true     Ran Out of Food in the Last Year: Often true   Transportation Needs: No Transportation Needs (1/8/2024)    PRAPARE - Transportation     Lack of Transportation (Medical): No     Lack of Transportation (Non-Medical): No   Physical Activity: Not on file   Stress: Not on file   Social Connections: Not on file   Intimate Partner Violence: Not on file   Housing Stability: High Risk (4/10/2024)    Housing Stability Vital Sign     Unable to Pay for Housing in the Last Year: Yes     Number of Times Moved in the Last Year: 1     Homeless in the Last Year: No       Family Psychiatric History:     Family History   Problem Relation Age of Onset    Hypertension Mother     Diabetes Mother     Cancer Father     Diabetes Family     Hypertension Family        History Review: The following portions of the patient's history were reviewed and updated as appropriate: allergies, current medications, past family history, past medical history, past social history, past surgical history, and problem list.         OBJECTIVE:     Mental Status Evaluation:    Appearance Casually dresssed, fair eye contact   Behavior Calm, cooperative, pleasant   Speech normal rate, soft, mumbling at times, but answers questions readily   Mood Depressed, anxious   Affect Mildly constricted   Thought Processes Organized, goal directed, negative, worrisome   Associations Intact   Thought Content No delusions   Perceptual Disturbances: Pt denies any form of hallucinations and does not appear to be responding to internal stimuli   Abnormal Thoughts  Risk Potential Suicidal ideation - None  Homicidal ideation - None  Potential for aggression - No   Orientation oriented to  person, place, situation, day of week, date, month of year, and year   Memory short term memory grossly intact   Cosciousness alert and awake   Attention Span attention span and concentration are age appropriate   Intellect appears to be of average intelligence   Insight improved   Judgement good   Muscle Strength and  Gait Steady with cane   Language no difficulty naming common objects, no difficulty repeating a phrase   Fund of Knowledge adequate knowledge of current events  adequate fund of knowledge regarding past history  adequate fund of knowledge regarding vocabulary    Pain Severe    Pain Scale 8/10       Laboratory Results: I have personally reviewed all pertinent laboratory/tests results.  Most Recent Labs:   Lab Results   Component Value Date    WBC 5.47 10/11/2023    RBC 4.97 10/11/2023    HGB 14.2 10/11/2023    HCT 41.4 10/11/2023     10/11/2023    RDW 13.3 10/11/2023    NEUTROABS 3.69 10/11/2023    SODIUM 140 04/10/2024    K 4.2 04/10/2024     04/10/2024    CO2 29 04/10/2024    BUN 15 04/10/2024    CREATININE 1.14 04/10/2024    GLUC 167 (H) 10/11/2023    GLUF 149 (H) 04/10/2024    CALCIUM 9.2 04/10/2024    AST 20 04/10/2024    ALT 20 04/10/2024    ALKPHOS 52 04/10/2024    TP 7.1 04/10/2024    ALB 4.2 04/10/2024    TBILI 0.50 04/10/2024    CHOLESTEROL 141 10/11/2023    HDL 52 10/11/2023    TRIG 92 10/11/2023    LDLCALC 71 10/11/2023    NONHDLC 89 10/11/2023    KND1SKMBONFZ 0.985 10/10/2023    FREET4 0.88 04/21/2022    HGBA1C 6.5 08/30/2024     04/10/2024     Urinalysis   Lab Results   Component Value Date    COLORU Colorless 08/07/2024    CLARITYU Clear 08/07/2024    SPECGRAV 1.006 08/07/2024    PHUR 6.0 08/07/2024    LEUKOCYTESUR Negative 08/07/2024    NITRITE Negative 08/07/2024    PROTEIN UA Negative 08/07/2024    GLUCOSEU Negative 08/07/2024    KETONESU Negative 08/07/2024    UROBILINOGEN <2.0 08/07/2024    BILIRUBINUR Negative 08/07/2024    BLOODU Negative 08/07/2024    RBCUA  None Seen 08/07/2024    WBCUA None Seen 08/07/2024    EPIS Occasional 08/07/2024    BACTERIA None Seen 08/07/2024     Urine Culture   Lab Results   Component Value Date    URINECX No Growth <1000 cfu/mL 08/07/2024        Assessment/plan:       Diagnoses and all orders for this visit:    Severe major depression with psychotic features (HCC)  -     ARIPiprazole (ABILIFY) 10 mg tablet; Take 1 tablet (10 mg total) by mouth daily    Generalized anxiety disorder    Insomnia, unspecified type  -     traZODone (DESYREL) 50 mg tablet; Take 1 to 2 tabs po qhs    Radiculopathy, lumbar region    Chronic left-sided low back pain with left-sided sciatica    Encounter for long-term (current) use of high-risk medication  -     traZODone (DESYREL) 50 mg tablet; Take 1 to 2 tabs po qhs    Current mild episode of major depressive disorder without prior episode (HCC)  -     DULoxetine (CYMBALTA) 60 mg delayed release capsule; Take 1 capsule (60 mg total) by mouth 2 (two) times a day    Current severe episode of major depressive disorder without prior episode (HCC)  -     mirtazapine (REMERON) 15 mg tablet; Take 1 tablet (15 mg total) by mouth daily at bedtime          Risks/Benefits      Risks, Benefits And Possible Side Effects Of Medications:    Risks, benefits, and possible side effects of medications explained to Marv and he verbalizes understanding and agreement for treatment.    Controlled Medication Discussion:     Marv has been filling controlled prescriptions on time as prescribed according to [place] Prescription Drug Monitoring Program-- Oxycodone by other provider per PDMP    Visit Time    Visit Start Time: 5:33PM  Visit Stop Time: 6:15PM  Total Visit Duration:  42 minutes

## 2024-10-11 ENCOUNTER — TELEPHONE (OUTPATIENT)
Dept: PSYCHIATRY | Facility: CLINIC | Age: 64
End: 2024-10-11

## 2024-10-11 NOTE — TELEPHONE ENCOUNTER
Patients Name: Marv Astorga    : 1960    Phone Number: [phone]    Appointment Date: 10/14/24    Appointment time: 430pm     Address: [address] [place] [address] [address] [place] [address]    Drop Off Facility/Office: [facility]    Drop Off Address: [facility] [address], [place] [address]    Cost Center Number: [id number]    Special notes/directions for :    Note for scheduling team:    Send to Ride Booking Pool: [phone] (Patient [employer])

## 2024-10-14 ENCOUNTER — TELEPHONE (OUTPATIENT)
Dept: PSYCHIATRY | Facility: CLINIC | Age: 64
End: 2024-10-14

## 2024-10-14 ENCOUNTER — OFFICE VISIT (OUTPATIENT)
Dept: PSYCHIATRY | Facility: CLINIC | Age: 64
End: 2024-10-14
Payer: MEDICARE

## 2024-10-14 VITALS — WEIGHT: 199.4 LBS | HEIGHT: 67 IN | BODY MASS INDEX: 31.3 KG/M2

## 2024-10-14 DIAGNOSIS — F32.2 CURRENT SEVERE EPISODE OF MAJOR DEPRESSIVE DISORDER WITHOUT PRIOR EPISODE (HCC): ICD-10-CM

## 2024-10-14 DIAGNOSIS — F41.1 GENERALIZED ANXIETY DISORDER: ICD-10-CM

## 2024-10-14 DIAGNOSIS — F32.3 SEVERE MAJOR DEPRESSION WITH PSYCHOTIC FEATURES (HCC): Primary | ICD-10-CM

## 2024-10-14 DIAGNOSIS — G47.00 INSOMNIA, UNSPECIFIED TYPE: ICD-10-CM

## 2024-10-14 DIAGNOSIS — M54.16 RADICULOPATHY, LUMBAR REGION: ICD-10-CM

## 2024-10-14 DIAGNOSIS — M54.42 CHRONIC LEFT-SIDED LOW BACK PAIN WITH LEFT-SIDED SCIATICA: ICD-10-CM

## 2024-10-14 DIAGNOSIS — Z79.899 ENCOUNTER FOR LONG-TERM (CURRENT) USE OF HIGH-RISK MEDICATION: ICD-10-CM

## 2024-10-14 DIAGNOSIS — G89.29 CHRONIC LEFT-SIDED LOW BACK PAIN WITH LEFT-SIDED SCIATICA: ICD-10-CM

## 2024-10-14 DIAGNOSIS — F32.0 CURRENT MILD EPISODE OF MAJOR DEPRESSIVE DISORDER WITHOUT PRIOR EPISODE (HCC): ICD-10-CM

## 2024-10-14 PROCEDURE — 99214 OFFICE O/P EST MOD 30 MIN: CPT | Performed by: PHYSICIAN ASSISTANT

## 2024-10-14 RX ORDER — MIRTAZAPINE 15 MG/1
15 TABLET, FILM COATED ORAL
Qty: 30 TABLET | Refills: 5 | Status: SHIPPED | OUTPATIENT
Start: 2024-10-14 | End: 2025-04-12

## 2024-10-14 RX ORDER — DULOXETIN HYDROCHLORIDE 60 MG/1
60 CAPSULE, DELAYED RELEASE ORAL 2 TIMES DAILY
Qty: 180 CAPSULE | Refills: 0 | Status: SHIPPED | OUTPATIENT
Start: 2024-10-14

## 2024-10-14 RX ORDER — TRAZODONE HYDROCHLORIDE 50 MG/1
TABLET, FILM COATED ORAL
Qty: 180 TABLET | Refills: 0 | Status: SHIPPED | OUTPATIENT
Start: 2024-10-14

## 2024-10-14 RX ORDER — ARIPIPRAZOLE 10 MG/1
10 TABLET ORAL DAILY
Qty: 90 TABLET | Refills: 0 | Status: SHIPPED | OUTPATIENT
Start: 2024-10-14

## 2024-10-25 ENCOUNTER — TELEPHONE (OUTPATIENT)
Dept: BEHAVIORAL/MENTAL HEALTH CLINIC | Facility: CLINIC | Age: 64
End: 2024-10-25

## 2024-10-25 NOTE — TELEPHONE ENCOUNTER
Patients Name: Marv Astorga    : 1960    Phone Number: [phone]    Appointment Date: 10/29/24     Appointment time: 900am     Address: [address] [place] [address] [address] [place] [address]    Drop Off Facility/Office: [facility]    Drop Off Address: [facility] [address], [place] [address]    Cost Center Number: [id number]    Special notes/directions for :    Note for scheduling team:    Send to Ride Booking Pool: [id number] (Patient [employer])

## 2024-10-29 ENCOUNTER — SOCIAL WORK (OUTPATIENT)
Dept: BEHAVIORAL/MENTAL HEALTH CLINIC | Facility: CLINIC | Age: 64
End: 2024-10-29
Payer: MEDICARE

## 2024-10-29 DIAGNOSIS — F32.2 CURRENT SEVERE EPISODE OF MAJOR DEPRESSIVE DISORDER WITHOUT PSYCHOTIC FEATURES WITHOUT PRIOR EPISODE (HCC): ICD-10-CM

## 2024-10-29 DIAGNOSIS — F41.1 GAD (GENERALIZED ANXIETY DISORDER): Primary | ICD-10-CM

## 2024-10-29 PROCEDURE — 90837 PSYTX W PT 60 MINUTES: CPT | Performed by: SOCIAL WORKER

## 2024-10-29 NOTE — PSYCH
"Behavioral Health Psychotherapy Progress Note    Psychotherapy Provided: Individual Psychotherapy     1. SAHRA (generalized anxiety disorder)        2. Current severe episode of major depressive disorder without psychotic features without prior episode ([facility])            Goals addressed in session: Goal 1     DATA: Marv arrived for his session. He shared sometimes he feels frustrated, nervous and miserable about life. He denies suicidal ideation, plan or intent. He discussed his daughter is doing good in school. He discussed how he is not going to vote in this upcoming election and how he is disgusted with the entire situation. He discussed he may be considering a new place to live. I provided support and strategies to cope.   During this session, this clinician used the following therapeutic modalities: Client-centered Therapy, Cognitive Behavioral Therapy, Mindfulness-based Strategies, and Supportive Psychotherapy    Substance Abuse was not addressed during this session. If the client is diagnosed with a co-occurring substance use disorder, please indicate any changes in the frequency or amount of use: n/a. Stage of change for addressing substance use diagnoses: No substance use/Not applicable    ASSESSMENT:  Marv Astorga presents with a Anxious mood.     his affect is anxious, which is congruent, with his mood and the content of the session. The client has made progress on their goals.However he still struggles financially.      Marv Astorga presents with a none risk of suicide, none risk of self-harm, and none risk of harm to others.    For any risk assessment that surpasses a \"low\" rating, a safety plan must be developed.    A safety plan was indicated: no  If yes, describe in detail n/a    PLAN: Between sessions, Marv Astorga will use mindfulness and CBT. At the next session, the therapist will use Client-centered Therapy, Cognitive Behavioral Therapy, Mindfulness-based Strategies, and Supportive " Psychotherapy to address issues and symptoms as they may arise. .    Behavioral Health Treatment Plan and Discharge Planning: Marv Astorga is aware of and agrees to continue to work on their treatment plan. They have identified and are working toward their discharge goals. yes    Visit start and stop times:    10/29/24  Start Time: 0900  Stop Time: 1000  Total Visit Time: 60 minutes

## 2024-10-30 ENCOUNTER — OFFICE VISIT (OUTPATIENT)
Dept: FAMILY MEDICINE CLINIC | Facility: CLINIC | Age: 64
End: 2024-10-30

## 2024-10-30 ENCOUNTER — TELEPHONE (OUTPATIENT)
Dept: FAMILY MEDICINE CLINIC | Facility: CLINIC | Age: 64
End: 2024-10-30

## 2024-10-30 VITALS
RESPIRATION RATE: 19 BRPM | HEART RATE: 73 BPM | HEIGHT: 67 IN | DIASTOLIC BLOOD PRESSURE: 82 MMHG | OXYGEN SATURATION: 99 % | BODY MASS INDEX: 31.14 KG/M2 | TEMPERATURE: 97.9 F | WEIGHT: 198.4 LBS | SYSTOLIC BLOOD PRESSURE: 128 MMHG

## 2024-10-30 DIAGNOSIS — G89.29 CHRONIC LEFT-SIDED LOW BACK PAIN WITHOUT SCIATICA: ICD-10-CM

## 2024-10-30 DIAGNOSIS — M54.42 ACUTE LEFT-SIDED LOW BACK PAIN WITH LEFT-SIDED SCIATICA: ICD-10-CM

## 2024-10-30 DIAGNOSIS — K21.9 GASTROESOPHAGEAL REFLUX DISEASE, UNSPECIFIED WHETHER ESOPHAGITIS PRESENT: ICD-10-CM

## 2024-10-30 DIAGNOSIS — J45.909 ASTHMA: ICD-10-CM

## 2024-10-30 DIAGNOSIS — E11.9 TYPE 2 DIABETES MELLITUS WITHOUT COMPLICATION, WITHOUT LONG-TERM CURRENT USE OF INSULIN (HCC): ICD-10-CM

## 2024-10-30 DIAGNOSIS — I10 BENIGN ESSENTIAL HTN: ICD-10-CM

## 2024-10-30 DIAGNOSIS — R39.11 BENIGN PROSTATIC HYPERPLASIA WITH URINARY HESITANCY: ICD-10-CM

## 2024-10-30 DIAGNOSIS — J30.9 ALLERGIC RHINITIS, UNSPECIFIED SEASONALITY, UNSPECIFIED TRIGGER: ICD-10-CM

## 2024-10-30 DIAGNOSIS — Z23 ENCOUNTER FOR IMMUNIZATION: ICD-10-CM

## 2024-10-30 DIAGNOSIS — G89.29 CHRONIC LEFT-SIDED LOW BACK PAIN WITH LEFT-SIDED SCIATICA: Primary | ICD-10-CM

## 2024-10-30 DIAGNOSIS — G89.4 CHRONIC PAIN SYNDROME: ICD-10-CM

## 2024-10-30 DIAGNOSIS — M54.50 CHRONIC LEFT-SIDED LOW BACK PAIN WITHOUT SCIATICA: ICD-10-CM

## 2024-10-30 DIAGNOSIS — M54.42 CHRONIC LEFT-SIDED LOW BACK PAIN WITH LEFT-SIDED SCIATICA: Primary | ICD-10-CM

## 2024-10-30 DIAGNOSIS — E78.5 HYPERLIPIDEMIA, UNSPECIFIED HYPERLIPIDEMIA TYPE: ICD-10-CM

## 2024-10-30 DIAGNOSIS — N40.1 BENIGN PROSTATIC HYPERPLASIA WITH URINARY HESITANCY: ICD-10-CM

## 2024-10-30 DIAGNOSIS — F11.20 CONTINUOUS OPIOID DEPENDENCE (HCC): ICD-10-CM

## 2024-10-30 DIAGNOSIS — J45.40 MODERATE PERSISTENT REACTIVE AIRWAY DISEASE WITHOUT COMPLICATION: ICD-10-CM

## 2024-10-30 PROBLEM — Z79.899 ENCOUNTER FOR LONG-TERM (CURRENT) USE OF HIGH-RISK MEDICATION: Status: RESOLVED | Noted: 2024-07-31 | Resolved: 2024-10-30

## 2024-10-30 PROCEDURE — 3074F SYST BP LT 130 MM HG: CPT | Performed by: FAMILY MEDICINE

## 2024-10-30 PROCEDURE — 90673 RIV3 VACCINE NO PRESERV IM: CPT | Performed by: FAMILY MEDICINE

## 2024-10-30 PROCEDURE — G0008 ADMIN INFLUENZA VIRUS VAC: HCPCS | Performed by: FAMILY MEDICINE

## 2024-10-30 PROCEDURE — 3079F DIAST BP 80-89 MM HG: CPT | Performed by: FAMILY MEDICINE

## 2024-10-30 PROCEDURE — 99214 OFFICE O/P EST MOD 30 MIN: CPT | Performed by: FAMILY MEDICINE

## 2024-10-30 RX ORDER — FLUTICASONE PROPIONATE 50 MCG
1 SPRAY, SUSPENSION (ML) NASAL DAILY
Qty: 16 G | Refills: 0 | Status: SHIPPED | OUTPATIENT
Start: 2024-10-30

## 2024-10-30 RX ORDER — METHOCARBAMOL 500 MG/1
500 TABLET, FILM COATED ORAL 3 TIMES DAILY
Qty: 30 TABLET | Refills: 2 | Status: SHIPPED | OUTPATIENT
Start: 2024-10-30

## 2024-10-30 RX ORDER — FAMOTIDINE 20 MG/1
20 TABLET, FILM COATED ORAL DAILY
Qty: 60 TABLET | Refills: 2 | Status: SHIPPED | OUTPATIENT
Start: 2024-10-30

## 2024-10-30 RX ORDER — ALBUTEROL SULFATE 90 UG/1
2 INHALANT RESPIRATORY (INHALATION) EVERY 6 HOURS PRN
Qty: 8.5 G | Refills: 1 | Status: SHIPPED | OUTPATIENT
Start: 2024-10-30

## 2024-10-30 RX ORDER — ATORVASTATIN CALCIUM 20 MG/1
20 TABLET, FILM COATED ORAL DAILY
Qty: 90 TABLET | Refills: 1 | Status: SHIPPED | OUTPATIENT
Start: 2024-10-30

## 2024-10-30 RX ORDER — OXYCODONE HYDROCHLORIDE 10 MG/1
10 TABLET ORAL 3 TIMES DAILY
Qty: 120 TABLET | Refills: 0 | Status: SHIPPED | OUTPATIENT
Start: 2024-10-30 | End: 2024-10-31

## 2024-10-30 RX ORDER — ALBUTEROL SULFATE 0.83 MG/ML
2.5 SOLUTION RESPIRATORY (INHALATION) EVERY 6 HOURS PRN
Qty: 180 ML | Refills: 5 | Status: SHIPPED | OUTPATIENT
Start: 2024-10-30

## 2024-10-30 NOTE — TELEPHONE ENCOUNTER
Hi, I'm calling from [facility] on behalf of the mutual patient we just received a prescription  for Marv Astorga. Last name is Rizwan, Birthday 1960. This is on the oxycodone 10 milligram tablets. The doctor prescribed a 40 day supply because the patient's going on vacation. However, the insurance will only cover a 30 day supply at a time, so I just wanted to see if you could send in for the normal 30 days and then maybe an additional 10 tablets. That was a note saying that it's a vacation supply. This way we can he doesn't have to pay out of pocket for all 120 tablets. If you have any questions or call back number here is [phone]. Thank you, Have a nice day.

## 2024-10-30 NOTE — ASSESSMENT & PLAN NOTE
Orders:    oxyCODONE (ROXICODONE) 10 MG TABS; Take 1 tablet (10 mg total) by mouth 3 (three) times a day Max Daily Amount: 30 mg    oxyCODONE (OxyCONTIN) 10 mg 12 hr tablet; Take 1 tablet (10 mg total) by mouth every 12 (twelve) hours for 10 days Max Daily Amount: 20 mg

## 2024-10-30 NOTE — PROGRESS NOTES
Ambulatory Visit  Name: Marv Astorga      : 1960      MRN: [id number]  Encounter Provider: Audrey Ruvalcaba MD  Encounter Date: 10/30/2024   Encounter department: [facility]    Assessment & Plan  Chronic left-sided low back pain with left-sided sciatica  Ice, heat, massage, stretches  Continue Oxycodone 10 mg tid, Gabapentin, Methocarbamol, Lidocaine patches  Continue f/u with pain management  Discussion again with patient about his chronic opoid use, and the need to try alternative methods of pain control, pt unable to wean off of the Oxycodone  #0 tablets and extra #20 tablets prescribed as pt is going on vacation for the Holidays to [place] to visit his sister.    Orders:    oxyCODONE (ROXICODONE) 10 MG TABS; Take 1 tablet (10 mg total) by mouth 3 (three) times a day Max Daily Amount: 30 mg    oxyCODONE (OxyCONTIN) 10 mg 12 hr tablet; Take 1 tablet (10 mg total) by mouth every 12 (twelve) hours for 10 days Max Daily Amount: 20 mg    Benign essential HTN  BP Readings from Last 3 Encounters:   10/30/24 128/82   24 138/80   24 130/78     Stable  Current medication: Amlodipine 10 daily    Plan:  Continue current medication  Low salt diet  Monitor BP at home         Type 2 diabetes mellitus without complication, without long-term current use of insulin ([facility])    Lab Results   Component Value Date    HGBA1C 6.5 2024    HGBA1C 7.3 (H) 04/10/2024    HGBA1C 7.0 (A) 10/10/2023    HGBA1C 6.7 (H) 05/15/2023     Stable  - Current medications:  Metformin 500 mg bid  - Home glucose readings: fasting 90s  - Diet: well balanced, drinks plenty of water, vegetable soups, limits bread  - Ophthalmology: complete   - DM Foot exam: completed   - Dentist:   - Pneumo vaccine: 2019  - Influenza Vaccine: given today  - Encouraged: Diabetic Diet, Regular exercise, Weight loss  Orders:    Glucometer test strips    metFORMIN (GLUCOPHAGE) 500 mg tablet; Take 1 tablet  (500 mg total) by mouth 2 (two) times a day with meals    Encounter for immunization    Orders:    influenza vaccine, recombinant, PF, 0.5 mL IM (Flublok)    Benign prostatic hyperplasia with urinary hesitancy    Orders:    Ambulatory Referral to Urology; Future    Moderate persistent reactive airway disease without complication    Orders:    albuterol (2.5 mg/3 mL) 0.083 % nebulizer solution; Take 3 mL (2.5 mg total) by nebulization every 6 (six) hours as needed for wheezing or shortness of breath    Hyperlipidemia, unspecified hyperlipidemia type    Orders:    atorvastatin (LIPITOR) 20 mg tablet; Take 1 tablet (20 mg total) by mouth daily    Gastroesophageal reflux disease, unspecified whether esophagitis present    Orders:    famotidine (PEPCID) 20 mg tablet; Take 1 tablet (20 mg total) by mouth daily    Chronic left-sided low back pain without sciatica    Orders:    Diclofenac Sodium (VOLTAREN) 1 %; Apply 2 g topically 4 (four) times a day    Chronic pain syndrome    Orders:    oxyCODONE (ROXICODONE) 10 MG TABS; Take 1 tablet (10 mg total) by mouth 3 (three) times a day Max Daily Amount: 30 mg    oxyCODONE (OxyCONTIN) 10 mg 12 hr tablet; Take 1 tablet (10 mg total) by mouth every 12 (twelve) hours for 10 days Max Daily Amount: 20 mg    Continuous opioid dependence (HCC)    Orders:    oxyCODONE (ROXICODONE) 10 MG TABS; Take 1 tablet (10 mg total) by mouth 3 (three) times a day Max Daily Amount: 30 mg    oxyCODONE (OxyCONTIN) 10 mg 12 hr tablet; Take 1 tablet (10 mg total) by mouth every 12 (twelve) hours for 10 days Max Daily Amount: 20 mg    Asthma    Orders:    albuterol (ProAir HFA) 90 mcg/act inhaler; Inhale 2 puffs every 6 (six) hours as needed for wheezing    Allergic rhinitis, unspecified seasonality, unspecified trigger    Orders:    fluticasone (FLONASE) 50 mcg/act nasal spray; 1 spray into each nostril daily    Acute left-sided low back pain with left-sided sciatica    Orders:    methocarbamol (ROBAXIN)  "500 mg tablet; Take 1 tablet (500 mg total) by mouth 3 (three) times a day       History of Present Illness     HPI  Marv Astorga is a 63 y.o. male  who presented to the office today to follow-up for his chronic conditions.  Patient continues to use the oxycodone, methocarbamol, lidocaine patches and gabapentin for his chronic lower back pain.  Patient has tried to wean off the oxycodone on previously but was unable to due to the pain and had a fall.  Patient continues to follow with psychiatry, last visit was this week, medications adjusted and noted.        The following portions of the patient's history were reviewed and updated as appropriate: allergies, current medications, past family history, past medical history, past social history, past surgical history and problem list.    History obtained from : patient  Review of Systems   Constitutional:  Negative for activity change, appetite change, chills and fever.   HENT:  Negative for congestion, rhinorrhea and sore throat.    Respiratory:  Negative for cough and shortness of breath.    Cardiovascular:  Negative for chest pain.   Gastrointestinal:  Negative for diarrhea, nausea and vomiting.   Musculoskeletal:  Positive for back pain and gait problem.   Skin:  Negative for rash.   Neurological:  Negative for dizziness and headaches.   Psychiatric/Behavioral:  Negative for sleep disturbance and suicidal ideas. The patient is not nervous/anxious.            Objective     /82 (BP Location: Right arm, Patient Position: Sitting, Cuff Size: Standard)   Pulse 73   Temp 97.9 °F (36.6 °C) (Temporal)   Resp 19   Ht 5' 7\" (1.702 m)   Wt 90 kg (198 lb 6.4 oz)   SpO2 99%   BMI 31.07 kg/m²     Physical Exam  Vitals and nursing note reviewed.   Constitutional:       General: He is not in acute distress.     Appearance: He is well-developed.      Comments: + rollator walker   HENT:      Head: Normocephalic and atraumatic.   Eyes:      Conjunctiva/sclera: " Conjunctivae normal.   Cardiovascular:      Rate and Rhythm: Normal rate and regular rhythm.      Heart sounds: No murmur heard.  Pulmonary:      Effort: Pulmonary effort is normal. No respiratory distress.      Breath sounds: Normal breath sounds.   Abdominal:      Palpations: Abdomen is soft.      Tenderness: There is no abdominal tenderness.   Musculoskeletal:         General: No swelling.      Cervical back: Neck supple.   Skin:     General: Skin is warm and dry.      Capillary Refill: Capillary refill takes less than 2 seconds.   Neurological:      Mental Status: He is alert.   Psychiatric:         Mood and Affect: Mood normal.

## 2024-10-30 NOTE — ASSESSMENT & PLAN NOTE
Ice, heat, massage, stretches  Continue Oxycodone 10 mg tid, Gabapentin, Methocarbamol, Lidocaine patches  Continue f/u with pain management  Discussion again with patient about his chronic opoid use, and the need to try alternative methods of pain control, pt unable to wean off of the Oxycodone  #0 tablets and extra #20 tablets prescribed as pt is going on vacation for the Holidays to [place] to visit his sister.    Orders:    oxyCODONE (ROXICODONE) 10 MG TABS; Take 1 tablet (10 mg total) by mouth 3 (three) times a day Max Daily Amount: 30 mg    oxyCODONE (OxyCONTIN) 10 mg 12 hr tablet; Take 1 tablet (10 mg total) by mouth every 12 (twelve) hours for 10 days Max Daily Amount: 20 mg

## 2024-10-30 NOTE — ASSESSMENT & PLAN NOTE
BP Readings from Last 3 Encounters:   10/30/24 128/82   09/05/24 138/80   08/30/24 130/78     Stable  Current medication: Amlodipine 10 daily    Plan:  Continue current medication  Low salt diet  Monitor BP at home

## 2024-10-30 NOTE — ASSESSMENT & PLAN NOTE
Lab Results   Component Value Date    HGBA1C 6.5 08/30/2024    HGBA1C 7.3 (H) 04/10/2024    HGBA1C 7.0 (A) 10/10/2023    HGBA1C 6.7 (H) 05/15/2023     Stable  - Current medications:  Metformin 500 mg bid  - Home glucose readings: fasting 90s  - Diet: well balanced, drinks plenty of water, vegetable soups, limits bread  - Ophthalmology: complete 2024  - DM Foot exam: completed 2024  - Dentist: 2024  - Pneumo vaccine: 2019  - Influenza Vaccine: given today  - Encouraged: Diabetic Diet, Regular exercise, Weight loss  Orders:    Glucometer test strips    metFORMIN (GLUCOPHAGE) 500 mg tablet; Take 1 tablet (500 mg total) by mouth 2 (two) times a day with meals

## 2024-10-31 ENCOUNTER — TELEPHONE (OUTPATIENT)
Dept: FAMILY MEDICINE CLINIC | Facility: CLINIC | Age: 64
End: 2024-10-31

## 2024-10-31 RX ORDER — OXYCODONE HYDROCHLORIDE 10 MG/1
10 TABLET ORAL 3 TIMES DAILY
Qty: 90 TABLET | Refills: 0 | Status: SHIPPED | OUTPATIENT
Start: 2024-10-31

## 2024-10-31 RX ORDER — OXYCODONE HCL 10 MG/1
10 TABLET, FILM COATED, EXTENDED RELEASE ORAL EVERY 12 HOURS SCHEDULED
Qty: 20 TABLET | Refills: 0 | Status: SHIPPED | OUTPATIENT
Start: 2024-10-31 | End: 2024-11-10

## 2024-10-31 NOTE — TELEPHONE ENCOUNTER
Britni! Pt has new referral for Urology placed yesterday, however is already established and has been seen for the dx on this referral. He is also set for a follow up next year per provider request. I tried to see in the office visit note if the symptoms were worsening or any new reason for this new referral. Please advise.

## 2024-11-07 DIAGNOSIS — E11.9 TYPE 2 DIABETES MELLITUS WITHOUT COMPLICATION, WITHOUT LONG-TERM CURRENT USE OF INSULIN (HCC): Primary | ICD-10-CM

## 2024-11-07 DIAGNOSIS — F32.2 CURRENT SEVERE EPISODE OF MAJOR DEPRESSIVE DISORDER WITHOUT PRIOR EPISODE (HCC): ICD-10-CM

## 2024-11-07 RX ORDER — MIRTAZAPINE 15 MG/1
15 TABLET, FILM COATED ORAL
Qty: 90 TABLET | Refills: 1 | Status: SHIPPED | OUTPATIENT
Start: 2024-11-07

## 2024-11-07 RX ORDER — BLOOD SUGAR DIAGNOSTIC
STRIP MISCELLANEOUS
Qty: 100 EACH | Refills: 3 | Status: SHIPPED | OUTPATIENT
Start: 2024-11-07

## 2024-11-12 ENCOUNTER — TELEPHONE (OUTPATIENT)
Dept: FAMILY MEDICINE CLINIC | Facility: CLINIC | Age: 64
End: 2024-11-12

## 2024-11-12 NOTE — TELEPHONE ENCOUNTER
PCP SIGNATURE NEEDED FOR [facility]  FORM RECEIVED VIA FAX AND PLACED IN PCP FOLDER TO BE DELIVERED AT ASSIGNED TIMES.    Medicare Part B order.

## 2024-11-13 ENCOUNTER — TELEPHONE (OUTPATIENT)
Dept: PSYCHIATRY | Facility: CLINIC | Age: 64
End: 2024-11-13

## 2024-11-13 NOTE — TELEPHONE ENCOUNTER
Patients Name: Marv Astorga    : 1960    Phone Number: [phone]    Appointment Date: 2024    Appointment time: 8:00am     Address: [address] [place] [address] [address] [place] [place] [id number]    Drop Off Facility/Office: [facility]    Drop Off Address: [facility] [address], [place] [address]    LY  : 7:40am

## 2024-11-18 NOTE — TELEPHONE ENCOUNTER
Patient is calling regarding cancelling an appointment.    Date/Time: 11/18/24 @ 8 am    Patient was rescheduled: YES [] NO [x]    Patient requesting call back to reschedule: YES [] NO [x]

## 2024-11-26 ENCOUNTER — APPOINTMENT (OUTPATIENT)
Dept: LAB | Facility: CLINIC | Age: 64
End: 2024-11-26
Payer: MEDICARE

## 2024-11-26 ENCOUNTER — OFFICE VISIT (OUTPATIENT)
Dept: FAMILY MEDICINE CLINIC | Facility: CLINIC | Age: 64
End: 2024-11-26

## 2024-11-26 VITALS
OXYGEN SATURATION: 99 % | WEIGHT: 203 LBS | BODY MASS INDEX: 31.86 KG/M2 | RESPIRATION RATE: 16 BRPM | SYSTOLIC BLOOD PRESSURE: 126 MMHG | HEIGHT: 67 IN | HEART RATE: 81 BPM | DIASTOLIC BLOOD PRESSURE: 70 MMHG | TEMPERATURE: 97.8 F

## 2024-11-26 DIAGNOSIS — R97.20 INCREASED PROSTATE SPECIFIC ANTIGEN (PSA) VELOCITY: ICD-10-CM

## 2024-11-26 DIAGNOSIS — Z79.899 ENCOUNTER FOR LONG-TERM (CURRENT) USE OF HIGH-RISK MEDICATION: ICD-10-CM

## 2024-11-26 DIAGNOSIS — R05.1 ACUTE COUGH: Primary | ICD-10-CM

## 2024-11-26 DIAGNOSIS — F41.1 GENERALIZED ANXIETY DISORDER: ICD-10-CM

## 2024-11-26 DIAGNOSIS — F32.3 SEVERE MAJOR DEPRESSION WITH PSYCHOTIC FEATURES (HCC): ICD-10-CM

## 2024-11-26 LAB
ALBUMIN SERPL BCG-MCNC: 4.5 G/DL (ref 3.5–5)
ALP SERPL-CCNC: 64 U/L (ref 34–104)
ALT SERPL W P-5'-P-CCNC: 31 U/L (ref 7–52)
AST SERPL W P-5'-P-CCNC: 24 U/L (ref 13–39)
BASOPHILS # BLD AUTO: 0.02 THOUSANDS/ΜL (ref 0–0.1)
BASOPHILS NFR BLD AUTO: 0 % (ref 0–1)
BILIRUB DIRECT SERPL-MCNC: 0.08 MG/DL (ref 0–0.2)
BILIRUB SERPL-MCNC: 0.32 MG/DL (ref 0.2–1)
CHOLEST SERPL-MCNC: 157 MG/DL (ref ?–200)
EOSINOPHIL # BLD AUTO: 0.08 THOUSAND/ΜL (ref 0–0.61)
EOSINOPHIL NFR BLD AUTO: 2 % (ref 0–6)
ERYTHROCYTE [DISTWIDTH] IN BLOOD BY AUTOMATED COUNT: 13.4 % (ref 11.6–15.1)
HCT VFR BLD AUTO: 40.1 % (ref 36.5–49.3)
HDLC SERPL-MCNC: 53 MG/DL
HGB BLD-MCNC: 14.1 G/DL (ref 12–17)
IMM GRANULOCYTES # BLD AUTO: 0 THOUSAND/UL (ref 0–0.2)
IMM GRANULOCYTES NFR BLD AUTO: 0 % (ref 0–2)
LDLC SERPL CALC-MCNC: 63 MG/DL (ref 0–100)
LYMPHOCYTES # BLD AUTO: 2.4 THOUSANDS/ΜL (ref 0.6–4.47)
LYMPHOCYTES NFR BLD AUTO: 52 % (ref 14–44)
MCH RBC QN AUTO: 29.3 PG (ref 26.8–34.3)
MCHC RBC AUTO-ENTMCNC: 35.2 G/DL (ref 31.4–37.4)
MCV RBC AUTO: 83 FL (ref 82–98)
MONOCYTES # BLD AUTO: 0.3 THOUSAND/ΜL (ref 0.17–1.22)
MONOCYTES NFR BLD AUTO: 7 % (ref 4–12)
NEUTROPHILS # BLD AUTO: 1.79 THOUSANDS/ΜL (ref 1.85–7.62)
NEUTS SEG NFR BLD AUTO: 39 % (ref 43–75)
NONHDLC SERPL-MCNC: 104 MG/DL
NRBC BLD AUTO-RTO: 0 /100 WBCS
PLATELET # BLD AUTO: 225 THOUSANDS/UL (ref 149–390)
PMV BLD AUTO: 10.4 FL (ref 8.9–12.7)
PROT SERPL-MCNC: 7.2 G/DL (ref 6.4–8.4)
PSA SERPL-MCNC: 2.51 NG/ML (ref 0–4)
RBC # BLD AUTO: 4.82 MILLION/UL (ref 3.88–5.62)
TRIGL SERPL-MCNC: 207 MG/DL (ref ?–150)
WBC # BLD AUTO: 4.59 THOUSAND/UL (ref 4.31–10.16)

## 2024-11-26 PROCEDURE — 80076 HEPATIC FUNCTION PANEL: CPT

## 2024-11-26 PROCEDURE — 99213 OFFICE O/P EST LOW 20 MIN: CPT | Performed by: FAMILY MEDICINE

## 2024-11-26 PROCEDURE — 84153 ASSAY OF PSA TOTAL: CPT

## 2024-11-26 PROCEDURE — 80061 LIPID PANEL: CPT

## 2024-11-26 PROCEDURE — 3078F DIAST BP <80 MM HG: CPT | Performed by: FAMILY MEDICINE

## 2024-11-26 PROCEDURE — 3074F SYST BP LT 130 MM HG: CPT | Performed by: FAMILY MEDICINE

## 2024-11-26 PROCEDURE — G2211 COMPLEX E/M VISIT ADD ON: HCPCS | Performed by: FAMILY MEDICINE

## 2024-11-26 PROCEDURE — 85025 COMPLETE CBC W/AUTO DIFF WBC: CPT

## 2024-11-26 PROCEDURE — 36415 COLL VENOUS BLD VENIPUNCTURE: CPT

## 2024-11-26 RX ORDER — BENZONATATE 200 MG/1
200 CAPSULE ORAL 3 TIMES DAILY PRN
Qty: 20 CAPSULE | Refills: 0 | Status: SHIPPED | OUTPATIENT
Start: 2024-11-26 | End: 2024-12-03

## 2024-11-26 RX ORDER — BROMPHENIRAMINE MALEATE, PSEUDOEPHEDRINE HYDROCHLORIDE, AND DEXTROMETHORPHAN HYDROBROMIDE 2; 30; 10 MG/5ML; MG/5ML; MG/5ML
5 SYRUP ORAL 4 TIMES DAILY PRN
Qty: 120 ML | Refills: 0 | Status: SHIPPED | OUTPATIENT
Start: 2024-11-26 | End: 2024-12-03

## 2024-11-26 RX ORDER — BROMPHENIRAMINE MALEATE, PSEUDOEPHEDRINE HYDROCHLORIDE, AND DEXTROMETHORPHAN HYDROBROMIDE 2; 30; 10 MG/5ML; MG/5ML; MG/5ML
5 SYRUP ORAL 4 TIMES DAILY PRN
Qty: 120 ML | Refills: 0 | Status: SHIPPED | OUTPATIENT
Start: 2024-11-26 | End: 2024-11-26

## 2024-11-26 NOTE — PROGRESS NOTES
Name: Marv Astorga      : 1960      MRN: [id number]  Encounter Provider: Heath Laird MD  Encounter Date: 2024   Encounter department: [facility]  :  Assessment & Plan  Acute cough  Most likely lingering cough after recent upper respiratory infection  Discussed with patient postviral cough can sometimes last a few weeks to resolve  Recommend supportive care    Orders:    benzonatate (TESSALON) 200 MG capsule; Take 1 capsule (200 mg total) by mouth 3 (three) times a day as needed for cough    brompheniramine-pseudoephedrine-DM 30-2-10 MG/5ML syrup; Take 5 mL by mouth 4 (four) times a day as needed for allergies, cough or congestion           History of Present Illness     63-year-old male with a history of type 2 diabetes who presents today for cough.  Patient reports persistent cough for the past 2 weeks.  He states that 2 weeks ago he was experiencing some upper respiratory symptoms including nasal congestion, fatigue, and sore throat.  The symptoms have resolved other than the lingering cough.  He states that the cough is dry.  He does states that he feels accumulation of phlegm in his throat particularly at nighttime.  He is eating and drinking okay.  He has no fever.  He denies any shortness of breath or wheezing.        Review of Systems   Constitutional:  Negative for chills, diaphoresis, fatigue and fever.   HENT:  Negative for congestion, postnasal drip, sneezing and sore throat.    Respiratory:  Positive for cough. Negative for shortness of breath and wheezing.    Cardiovascular:  Negative for chest pain and palpitations.   Gastrointestinal:  Negative for abdominal pain, diarrhea, nausea and vomiting.   Genitourinary:  Negative for dysuria, hematuria and urgency.   Musculoskeletal:  Negative for back pain.          Objective   /70 (BP Location: Left arm, Patient Position: Sitting, Cuff Size: Large)   Pulse 81   Temp 97.8 °F (36.6 °C)  "(Temporal)   Resp 16   Ht 5' 7\" (1.702 m)   Wt 92.1 kg (203 lb)   SpO2 99%   BMI 31.79 kg/m²      Physical Exam  Vitals and nursing note reviewed.   Constitutional:       General: He is not in acute distress.     Appearance: Normal appearance. He is well-developed. He is not ill-appearing, toxic-appearing or diaphoretic.   HENT:      Head: Normocephalic and atraumatic.      Right Ear: External ear normal.      Left Ear: External ear normal.      Nose: Nose normal.      Mouth/Throat:      Mouth: Mucous membranes are moist.      Pharynx: No oropharyngeal exudate or posterior oropharyngeal erythema.   Eyes:      General: No scleral icterus.        Right eye: No discharge.         Left eye: No discharge.      Extraocular Movements: Extraocular movements intact.      Conjunctiva/sclera: Conjunctivae normal.   Cardiovascular:      Rate and Rhythm: Normal rate and regular rhythm.      Heart sounds: Normal heart sounds. No murmur heard.     No friction rub. No gallop.   Pulmonary:      Effort: Pulmonary effort is normal. No respiratory distress.      Breath sounds: Normal breath sounds. No stridor. No wheezing or rhonchi.   Abdominal:      General: Bowel sounds are normal. There is no distension.      Palpations: Abdomen is soft.      Tenderness: There is no abdominal tenderness.   Musculoskeletal:      Cervical back: Normal range of motion.   Skin:     General: Skin is warm and dry.      Capillary Refill: Capillary refill takes less than 2 seconds.   Neurological:      General: No focal deficit present.      Mental Status: He is alert.      Gait: Gait normal.   Psychiatric:         Mood and Affect: Mood normal.         "

## 2024-11-29 ENCOUNTER — RESULTS FOLLOW-UP (OUTPATIENT)
Dept: UROLOGY | Facility: CLINIC | Age: 64
End: 2024-11-29

## 2024-11-29 DIAGNOSIS — R97.20 INCREASED PROSTATE SPECIFIC ANTIGEN (PSA) VELOCITY: Primary | ICD-10-CM

## 2024-12-02 DIAGNOSIS — G89.4 CHRONIC PAIN SYNDROME: ICD-10-CM

## 2024-12-02 DIAGNOSIS — F11.20 CONTINUOUS OPIOID DEPENDENCE (HCC): ICD-10-CM

## 2024-12-02 DIAGNOSIS — M54.42 CHRONIC LEFT-SIDED LOW BACK PAIN WITH LEFT-SIDED SCIATICA: ICD-10-CM

## 2024-12-02 DIAGNOSIS — G89.29 CHRONIC LEFT-SIDED LOW BACK PAIN WITH LEFT-SIDED SCIATICA: ICD-10-CM

## 2024-12-02 RX ORDER — OXYCODONE HYDROCHLORIDE 10 MG/1
10 TABLET ORAL 3 TIMES DAILY
Qty: 90 TABLET | Refills: 0 | Status: SHIPPED | OUTPATIENT
Start: 2024-12-02

## 2024-12-02 NOTE — TELEPHONE ENCOUNTER
Pt came into office and is requesting refill for the following medication, please advise.     oxyCODONE (ROXICODONE) 10 MG TABS

## 2024-12-02 NOTE — TELEPHONE ENCOUNTER
Spoke with pt and he would like to move forward with getting the MRI. Please place order and route back to team to help set up.           ----- Message from Jose Meza PA-C sent at 11/29/2024  2:11 PM EST -----  PSA stable at 2.5.  Reasonable to continue with observation however if patient wants to get MRI of the prostate we can do so as well for 1 to be more aggressive and evaluation..  If patient is comfortable with observation will plan for follow-up PSA/prostate exam in August 2025 to obtain a PSA a few weeks before hand

## 2024-12-02 NOTE — TELEPHONE ENCOUNTER
Spoke directly to this pt to relay ginna's message. Assisted this pt by giving him the phone number to central scheduling  to set up his MRI appointment. If this pt calls back please relay both these messages please. Thank you

## 2024-12-03 ENCOUNTER — OFFICE VISIT (OUTPATIENT)
Dept: FAMILY MEDICINE CLINIC | Facility: CLINIC | Age: 64
End: 2024-12-03

## 2024-12-03 VITALS
WEIGHT: 199 LBS | TEMPERATURE: 97.8 F | OXYGEN SATURATION: 98 % | HEIGHT: 67 IN | DIASTOLIC BLOOD PRESSURE: 86 MMHG | SYSTOLIC BLOOD PRESSURE: 132 MMHG | RESPIRATION RATE: 18 BRPM | HEART RATE: 86 BPM | BODY MASS INDEX: 31.23 KG/M2

## 2024-12-03 DIAGNOSIS — M54.42 CHRONIC LEFT-SIDED LOW BACK PAIN WITH LEFT-SIDED SCIATICA: Primary | ICD-10-CM

## 2024-12-03 DIAGNOSIS — H91.92 DECREASED HEARING OF LEFT EAR: ICD-10-CM

## 2024-12-03 DIAGNOSIS — G89.29 CHRONIC LEFT-SIDED LOW BACK PAIN WITH LEFT-SIDED SCIATICA: Primary | ICD-10-CM

## 2024-12-03 PROCEDURE — 99214 OFFICE O/P EST MOD 30 MIN: CPT | Performed by: FAMILY MEDICINE

## 2024-12-03 PROCEDURE — 3075F SYST BP GE 130 - 139MM HG: CPT | Performed by: FAMILY MEDICINE

## 2024-12-03 PROCEDURE — 3079F DIAST BP 80-89 MM HG: CPT | Performed by: FAMILY MEDICINE

## 2024-12-03 PROCEDURE — G2211 COMPLEX E/M VISIT ADD ON: HCPCS | Performed by: FAMILY MEDICINE

## 2024-12-03 NOTE — ASSESSMENT & PLAN NOTE
Gross hearing test with decreased hearing left ear  Discussed may be due to recent URI and to wait 4 to 6 weeks before further evaluation

## 2024-12-03 NOTE — PROGRESS NOTES
Name: Marv Astorga      : 1960      MRN: [id number]  Encounter Provider: Audrey Ruvalcaba MD  Encounter Date: 12/3/2024   Encounter department: [facility]  :  Assessment & Plan  Chronic left-sided low back pain with left-sided sciatica  Ice, heat, massage, stretches  Continue Oxycodone 10 mg tid, Gabapentin, Methocarbamol, Lidocaine patches  Trial of Oxycodone taper failed in the past - pt had a fall   Patient is also attempting other conservative management of his back pain-walking at home, playing dominoes with his friends, other mental activities to keep  himself busy  Continue f/u with pain management    Discussed with patient that his increased back pain was most likely due to the cough from URI, will resolve in 1 to 2 weeks         Decreased hearing of left ear  Gross hearing test with decreased hearing left ear  Discussed may be due to recent URI and to wait 4 to 6 weeks before further evaluation         Reviewed labs and increased Triglycerides, patient was not fasting     History of Present Illness     HPI  Marv Astorga is a 63 y.o. male  who presented to the office today after returning from [place] while visiting his sister.  Patient states while there he was very sick with an upper respiratory tract infection and cough.  This increased his back pain.  He is using albuterol as needed.   Also patient is concerned regarding a congested feeling in the left ear, he states he cannot hear the same out of his left ear as he does with the right.        The following portions of the patient's history were reviewed and updated as appropriate: allergies, current medications, past family history, past medical history, past social history, past surgical history and problem list.    Review of Systems   Constitutional:  Negative for activity change, appetite change, chills and fever.   HENT:  Negative for congestion, rhinorrhea and sore throat.    Respiratory:   "Negative for cough and shortness of breath.    Cardiovascular:  Negative for chest pain.   Gastrointestinal:  Negative for diarrhea, nausea and vomiting.   Musculoskeletal:  Positive for back pain and gait problem.   Skin:  Negative for rash.   Neurological:  Negative for dizziness and headaches.   Psychiatric/Behavioral:  Negative for sleep disturbance and suicidal ideas. The patient is not nervous/anxious.           Objective   /86 (BP Location: Right arm, Patient Position: Sitting, Cuff Size: Large)   Pulse 86   Temp 97.8 °F (36.6 °C) (Temporal)   Resp 18   Ht 5' 7\" (1.702 m)   Wt 90.3 kg (199 lb)   SpO2 98%   BMI 31.17 kg/m²      Physical Exam  Vitals and nursing note reviewed.   Constitutional:       General: He is not in acute distress.     Appearance: He is well-developed.      Comments: + walking cane   HENT:      Head: Normocephalic and atraumatic.      Right Ear: Tympanic membrane, ear canal and external ear normal.      Left Ear: Tympanic membrane, ear canal and external ear normal.      Nose: Nose normal.      Mouth/Throat:      Mouth: Mucous membranes are moist.   Eyes:      Conjunctiva/sclera: Conjunctivae normal.   Cardiovascular:      Rate and Rhythm: Normal rate and regular rhythm.      Heart sounds: Normal heart sounds. No murmur heard.  Pulmonary:      Effort: Pulmonary effort is normal. No respiratory distress.      Breath sounds: Examination of the right-lower field reveals decreased breath sounds. Examination of the left-lower field reveals decreased breath sounds. Decreased breath sounds present.   Abdominal:      Palpations: Abdomen is soft.      Tenderness: There is no abdominal tenderness.   Musculoskeletal:         General: No swelling.      Cervical back: Neck supple.   Skin:     General: Skin is warm and dry.      Capillary Refill: Capillary refill takes less than 2 seconds.   Neurological:      Mental Status: He is alert.   Psychiatric:         Mood and Affect: Mood normal. "         Behavior: Behavior normal.

## 2024-12-03 NOTE — ASSESSMENT & PLAN NOTE
Ice, heat, massage, stretches  Continue Oxycodone 10 mg tid, Gabapentin, Methocarbamol, Lidocaine patches  Trial of Oxycodone taper failed in the past - pt had a fall   Patient is also attempting other conservative management of his back pain-walking at home, playing dominoes with his friends, other mental activities to keep  himself busy  Continue f/u with pain management    Discussed with patient that his increased back pain was most likely due to the cough from URI, will resolve in 1 to 2 weeks

## 2024-12-09 ENCOUNTER — TELEPHONE (OUTPATIENT)
Age: 64
End: 2024-12-09

## 2024-12-09 ENCOUNTER — TELEPHONE (OUTPATIENT)
Dept: PSYCHIATRY | Facility: CLINIC | Age: 64
End: 2024-12-09

## 2024-12-09 NOTE — PSYCH
"Assessment & Plan  Severe major depression with psychotic features (HCC)    Orders:    ARIPiprazole (ABILIFY) 10 mg tablet; Take 1 tablet (10 mg total) by mouth daily    Basic metabolic panel; Future    Insomnia, unspecified type    Orders:    traZODone (DESYREL) 50 mg tablet; Take 1 to 2 tabs po qhs    Basic metabolic panel; Future    Radiculopathy, lumbar region    Orders:    Basic metabolic panel; Future    Encounter for long-term (current) use of high-risk medication    Orders:    traZODone (DESYREL) 50 mg tablet; Take 1 to 2 tabs po qhs    Basic metabolic panel; Future    Current mild episode of major depressive disorder without prior episode (HCC)    Orders:    DULoxetine (CYMBALTA) 60 mg delayed release capsule; Take 1 capsule (60 mg total) by mouth 2 (two) times a day      PLAN:  Pt is having milder depression, no recent psychotic Sxs, anxiety or panic and his finances are more stable due to recent help by the [facility].  Pt feels better than before and does not want any changes to his regimen at this time.  He appears stable and I will continue the following medications:  Treatment plan done and Pt accepts the plan.   Continue:   Duloxetine 60mg (1) cap po bid # 180   Aripiprazole 10mg (1) tab po qd # 90   Trazodone 50mg (1-2) tabs po qhs # 180   Mirtazapine 15mg (1) tab po qhs - Pt was given a Rx for Qty 90 with R1 on 11/7/2024  Gabapentin 600mg (1) tab po tid-Per PCP  Pt continues counseling with Gregory SUGGSW  Get BMP   Return 8 - 9 weeks. Can call any time sooner prn.       MEDICATION MANAGEMENT NOTE        [facility]      Name and Date of Birth:  Marv Astorga 63 y.o. 1960    Date of Visit: December 10, 2024    HPI:    Marv Astorga is here for medication review with primary c/o \"Sometimes I just feel depressed.  When I'm around other people I feel better.\"  He describes feeling dad and lonely when he is alone at home " "(daughter is at school in the day), but he still talks to his friends on the phone. [facility] helped him tremendously with rent, food expenses and utility bills. They helped him apply for [employer] discount utility services as well.  He gets 4-7 hours and does not always feel rested in the morning.  Pt continues counseling with Gregory Bay LCSW whose recent note reviewed.  Last Tx plan done 5/13/2024.  He had a nice Thanksgiving with family.  Pt presently denies depression, self-injurious thoughts/behaviors, SI, HI, anxiety, panic attacks, elevated or irritable moods, over-normal energy, reduced sleep requirement, impulsivity, hallucinations or paranoia.  Pt denies any ETOH or any illicit drug use Pt reports compliance to psychiatric medications without SE.  He has ongoing back/spinal related pain rating 8/10 at this time.    Appetite Changes and Sleep:  Sleep varies due to his pain, adequate appetite, energy is impaired but better than at last visit 10/14/2024    Review Of Systems:      Constitutional feeling tired   ENT negative   Cardiovascular negative   Respiratory negative   Gastrointestinal negative   Genitourinary negative   Musculoskeletal negative   Integumentary negative   Neurological as noted in HPI   Endocrine negative   Other Symptoms none, all other systems are negative       Past Psychiatric History:   As copied from my 10/14/2024 note with updates as needed:  \" [ Pt was born in [place] and grew up with biological parents, 4 sisters and 3 brother.  Pt is the fourth eldest.  He describes his upbringing as \"Happy\" and he was close to parents and siblings. His father moved to the [place] in approx [place].  [place] in appro[place] to be with his father and seek opportunities.  His siblings and mom followed him later-- in approx 1986.     Depression started 2022:  sadness, crying, anhedonia, self-isolating, insomnia, reduced appetite with Wt loss, impaired energy, " "motivation, and concentration, difficulty making decisions, feelings of guilt-(Pt stated he did not remember about this), feelings of hopelessness with passive SI wanting to be dead, and also overt SI of wanting to shoot himself, but no attempt.        Psychotic Sxs:  +AH of someone talking to him -- but this only tends to happen when he is at home and when he is there alone, which is why he does not like to be alone.  He has had hypnagogic visions of shadows.  He otherwise denies VH, TH, OH, paranoia or bizarre delusions.                Nasreen:  Pt reports of some irritable moods for a couple of days, without other manic type      Anxiety started in 2022 incited by family issues and finances: excessive worry more days than not for longer than 3 months, The Sxs can occur without concommittent depressive Sxs., difficulty concentrating, fatigue, insomnia, irritability, restlessness/keyed up, and muscle tension and pain in chest, irritability.     Panic attacks started in 10/2023 while inpatient, due to \"Too much pressure, I couldn't handle it.\"  Has had several since then.  Sxs: sweating, trembling, shortness of breath, choking sensation, chest pain/pressure, dizzy/light headed, chills, feels paralyzed-- like he cannot move his body and sometimes collapses.  He once fell and cut his Lt hand requiring sutures.      Social Anxiety symptoms started 7/2023 -- \"Sometimes I don't think people is good.\"  When asked if he felt a sense of judgment or embarrassment by others, he stated \"I don't know.\"          Eating Disorder symptoms: no historical or current eating disorder. no binge eating disorder; no anorexia nervosa. no symptoms of bulimia     Pt denies h/o PTSD or OCD Sxs     Prior psychiatrists:   [facility] in 2022 -- due to depression and anxiety Sxs.  That facility shut down  1st one seen in [place] in approx 2019 -- Pt unsure of why he went     Prior psychotherapists:  Gregory SUGGSW 10/30/2023 -- ongoing  [facility] " "Counseling 5/26/2022 - 2023     Past Hospitalizations:  1st and only one: 10/10/2023 - 10/17/2023 at [facility] -- on a 201 commitment, due to increasing depression with SI and desire to shoot himself and the welfare man because of his financial state and medical issues. Over the preceding year, his depression had been increasing due to chronic pain, falls, and increased difficulty ambulating (already using a walker).  Pt is on SSI and lives with 14y/o daughter and had recently allowed his wife (from whom he was ) to come live with them due to her need to have a place to be after her anticipated surgery since she would not be able to climb steps afterwards.  When her income was added in, his SSI cash and foodstamps were both reduced and he could no longer make his bills. His home health care benefit was also removed. The last straw was finding out his bank acct was overdrawn. Before admission, Pt had been getting prescribed Duloxetine from his PCP -- this was continued and Aripiprazole added for mood Tx augmentation.  Pt improved and Discharge Rxs: Aripiprazole 2mg qd for mood, Duloxetine 60mg bid for mood and pain mgt, Gabapentin 600mg tid for anxiety and pain mgt, Mirtazapine 15mg qhs for mood and insomnia, and Trazodone 50mg qhs for insomnia.      Pt had denied suicide attempts, self-injurious behaviors, physically violent behaviors, ECT, TMS, or legal or  Hx      Prior Rx trials:  Amitriptyline 25mg (for lumbar radiculopathy) , Sertraline 25mg, Venlafaxine ER up to 150mg (? Why stopped) ,  Duloxetine up to 60mg bid (for mood and neuropathy and helped both), Mirtazapine up to 15mg (helped), Aripiprazole 2mg (helped), Trazodone up to 50mg (helped), Zolpidem 5mg (helped), Melatonin 3mg (helped), Gabapentin up to 800mg tid for neuropathic pain (helps) ,      Abuse Hx: Pt denies any h/o physical, sexual or emotional abuse     Trauma Hx: Pt denies                      ] \"       Past " Medical History:    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    Current severe episode of major depressive disorder without prior episode ([facility]) 10/11/2023    Depression     Diabetes mellitus ([facility])     Hyperlipidemia     Hypertension     Suicidal intent 10/10/2023    Type 2 diabetes mellitus without complication, without long-term current use of insulin ([facility]) 10/16/2020       Substance Abuse History:    Social History     Substance and Sexual Activity   Alcohol Use Not Currently    Comment: rare     Social History     Substance and Sexual Activity   Drug Use Never       Social History:    Social History     Socioeconomic History    Marital status: /Civil Union     Spouse name: Not on file    Number of children: 7    Years of education: Not on file    Highest education level: Not on file   Occupational History    Not on file   Tobacco Use    Smoking status: Never     Passive exposure: Never    Smokeless tobacco: Never   Vaping Use    Vaping status: Never Used   Substance and Sexual Activity    Alcohol use: Not Currently     Comment: rare    Drug use: Never    Sexual activity: Not Currently   Other Topics Concern    Not on file   Social History Narrative    Home: Lives with his 12y/o daughter in a rental house owned by an elder daughter    Children:  3 sons, 4 daughters            Educational:    Pt does not know if he reached childhood milestones on times.  He denies h/o learning disabilities    Highest grade completed: 7th     No college     Social Drivers of Health     Financial Resource Strain: High Risk (1/8/2024)    Overall Financial Resource Strain (CARDIA)     Difficulty of Paying Living Expenses: Hard   Food Insecurity: Food Insecurity Present (1/8/2024)    Nursing - Inadequate Food Risk Classification     Worried About Running Out of Food in the Last Year: Often true     Ran Out of Food in the Last  Year: Often true     Ran Out of Food in the Last Year: Not on file   Transportation Needs: No Transportation Needs (1/8/2024)    PRAPARE - Transportation     Lack of Transportation (Medical): No     Lack of Transportation (Non-Medical): No   Physical Activity: Not on file   Stress: Not on file   Social Connections: Not on file   Intimate Partner Violence: Not on file   Housing Stability: High Risk (4/10/2024)    Housing Stability Vital Sign     Unable to Pay for Housing in the Last Year: Yes     Number of Times Moved in the Last Year: 1     Homeless in the Last Year: No       Family Psychiatric History:     Family History   Problem Relation Age of Onset    Hypertension Mother     Diabetes Mother     Cancer Father     Diabetes Family     Hypertension Family        History Review: The following portions of the patient's history were reviewed and updated as appropriate: allergies, current medications, past family history, past medical history, past social history, past surgical history, and problem list.         OBJECTIVE:     Mental Status Evaluation:    Appearance Casually dressed, good eye contact and hygiene   Behavior Calm, cooperative, pleasant, more upbeat   Speech normal rate and volume, can mumble at times   Mood Depressed, anxious   Affect Normal range and intensity-- smiling moreso this visit though   Thought Processes Organized, goal directed, more positive and hopeful   Associations Intact   Thought Content No delusions   Perceptual Disturbances: Pt denies any form of hallucinations and does not appear to be responding to internal stimuli   Abnormal Thoughts  Risk Potential Suicidal ideation - None  Homicidal ideation - None  Potential for aggression - No   Orientation oriented to person, place, situation, day of week, month of year, and year   Memory short term memory grossly intact   Cosciousness alert and awake   Attention Span attention span and concentration are age appropriate   Intellect appears to  be of average intelligence   Insight improved   Judgement good   Muscle Strength and  Gait Steady with cane   Language no difficulty naming common objects, no difficulty repeating a phrase   Fund of Knowledge adequate knowledge of current events  adequate fund of knowledge regarding past history  adequate fund of knowledge regarding vocabulary    Pain severe   Pain Scale 8       Laboratory Results: I have personally reviewed all pertinent laboratory/tests results.     Recent Results (from the past 160 hours)   CBC and differential    Collection Time: 11/26/24 10:55 AM   Result Value Ref Range    WBC 4.59 4.31 - 10.16 Thousand/uL    RBC 4.82 3.88 - 5.62 Million/uL    Hemoglobin 14.1 12.0 - 17.0 g/dL    Hematocrit 40.1 36.5 - 49.3 %    MCV 83 82 - 98 fL    MCH 29.3 26.8 - 34.3 pg    MCHC 35.2 31.4 - 37.4 g/dL    RDW 13.4 11.6 - 15.1 %    MPV 10.4 8.9 - 12.7 fL    Platelets 225 149 - 390 Thousands/uL    nRBC 0 /100 WBCs    Segmented % 39 (L) 43 - 75 %    Immature Grans % 0 0 - 2 %    Lymphocytes % 52 (H) 14 - 44 %    Monocytes % 7 4 - 12 %    Eosinophils Relative 2 0 - 6 %    Basophils Relative 0 0 - 1 %    Absolute Neutrophils 1.79 (L) 1.85 - 7.62 Thousands/µL    Absolute Immature Grans 0.00 0.00 - 0.20 Thousand/uL    Absolute Lymphocytes 2.40 0.60 - 4.47 Thousands/µL    Absolute Monocytes 0.30 0.17 - 1.22 Thousand/µL    Eosinophils Absolute 0.08 0.00 - 0.61 Thousand/µL    Basophils Absolute 0.02 0.00 - 0.10 Thousands/µL   Lipid panel    Collection Time: 11/26/24 10:55 AM   Result Value Ref Range    Cholesterol 157 See Comment mg/dL    Triglycerides 207 (H) See Comment mg/dL    HDL, Direct 53 >=40 mg/dL    LDL Calculated 63 0 - 100 mg/dL    Non-HDL-Chol (CHOL-HDL) 104 mg/dl   Hepatic function panel    Collection Time: 11/26/24 10:55 AM   Result Value Ref Range    Total Bilirubin 0.32 0.20 - 1.00 mg/dL    Bilirubin, Direct 0.08 0.00 - 0.20 mg/dL    Alkaline Phosphatase 64 34 - 104 U/L    AST 24 13 - 39 U/L    ALT 31 7  - 52 U/L    Total Protein 7.2 6.4 - 8.4 g/dL    Albumin 4.5 3.5 - 5.0 g/dL   PSA Total, Diagnostic    Collection Time: 11/26/24 10:55 AM   Result Value Ref Range    PSA, Diagnostic 2.508 0.000 - 4.000 ng/mL                Risks/Benefits      Risks, Benefits And Possible Side Effects Of Medications:    Risks, benefits, and possible side effects of medications explained to Marv and he verbalizes understanding and agreement for treatment.    Controlled Medication Discussion:     Pt is getting Oxycodone from Audrey Ruvalcaba MD  per PDMP    Visit Time    Visit Start Time: 6:50PM  Visit Stop Time: 7:29PM  Total Visit Duration:  39 minutes

## 2024-12-09 NOTE — TELEPHONE ENCOUNTER
Patients Name: Marv Astorga    : 1960    Phone Number: [phone]    Appointment Date: 24    Appointment time: 5:00pm     Address: [address] [place] [address] [address] [place] [id number]    Drop Off Facility/Office: [facility]    Drop Off Address: [facility] [address], [place] [address]     4:30pm

## 2024-12-09 NOTE — TELEPHONE ENCOUNTER
Patient is calling regarding rescheduling  an appointment.    Date/Time: 12.9.24 @ 4:00pm    Reason: weather    Patient was rescheduled: YES [x] NO []  If yes, when was Patient reschedule for: 12.10.24 @ 6:00pm    Patient requesting call back to reschedule: YES [] NO [x]

## 2024-12-10 ENCOUNTER — OFFICE VISIT (OUTPATIENT)
Dept: PSYCHIATRY | Facility: CLINIC | Age: 64
End: 2024-12-10
Payer: COMMERCIAL

## 2024-12-10 VITALS — BODY MASS INDEX: 31.11 KG/M2 | HEIGHT: 67 IN | WEIGHT: 198.2 LBS

## 2024-12-10 DIAGNOSIS — G47.00 INSOMNIA, UNSPECIFIED TYPE: ICD-10-CM

## 2024-12-10 DIAGNOSIS — Z79.899 ENCOUNTER FOR LONG-TERM (CURRENT) USE OF HIGH-RISK MEDICATION: ICD-10-CM

## 2024-12-10 DIAGNOSIS — F32.3 SEVERE MAJOR DEPRESSION WITH PSYCHOTIC FEATURES (HCC): Primary | ICD-10-CM

## 2024-12-10 DIAGNOSIS — F32.0 CURRENT MILD EPISODE OF MAJOR DEPRESSIVE DISORDER WITHOUT PRIOR EPISODE (HCC): ICD-10-CM

## 2024-12-10 DIAGNOSIS — M54.16 RADICULOPATHY, LUMBAR REGION: ICD-10-CM

## 2024-12-10 PROCEDURE — 99214 OFFICE O/P EST MOD 30 MIN: CPT | Performed by: PHYSICIAN ASSISTANT

## 2024-12-10 RX ORDER — TRAZODONE HYDROCHLORIDE 50 MG/1
TABLET, FILM COATED ORAL
Qty: 180 TABLET | Refills: 0 | Status: SHIPPED | OUTPATIENT
Start: 2024-12-10

## 2024-12-10 RX ORDER — DULOXETIN HYDROCHLORIDE 60 MG/1
60 CAPSULE, DELAYED RELEASE ORAL 2 TIMES DAILY
Qty: 180 CAPSULE | Refills: 0 | Status: SHIPPED | OUTPATIENT
Start: 2024-12-10

## 2024-12-10 RX ORDER — ARIPIPRAZOLE 10 MG/1
10 TABLET ORAL DAILY
Qty: 90 TABLET | Refills: 0 | Status: SHIPPED | OUTPATIENT
Start: 2024-12-10

## 2024-12-11 ENCOUNTER — TELEPHONE (OUTPATIENT)
Age: 64
End: 2024-12-11

## 2024-12-11 NOTE — ASSESSMENT & PLAN NOTE
Orders:    traZODone (DESYREL) 50 mg tablet; Take 1 to 2 tabs po qhs    Basic metabolic panel; Future

## 2024-12-11 NOTE — TELEPHONE ENCOUNTER
Called and spoke with patient and address concerns regarding his experience after his appointment. Patient was upset that staff still in the office after closing declined to assist as the office was closed. Patient did not feel it was acceptable and left him to walk out of the office into the rain. He stated his phone was not working therefore he was unable to call for someone himself. He eventually received a call from a family member who was able to pick him up. Patient was appreciative that his concern was addressed and thanked staff for the call. Also scheduled follow up with provider.

## 2024-12-11 NOTE — TELEPHONE ENCOUNTER
Patient called in regard to LYFT ride after his appt yesterday 12/10/24 at 6pm. Patient went to the  to get a ride scheduled after his appt and was told that they are closed and could not get back into the system for scheduling. Patient was very upset to be treated this way and wants to make a complaint and speak to manager. Patient was not feeling well that day and had to walk in the rain. Patient stated that is unacceptable. Patient would like a call back from manager.   Writer confirmed phone number on file.

## 2024-12-11 NOTE — BH TREATMENT PLAN
"TREATMENT PLAN (Medication Management Only)        [facility] - [facility]    Name/Date of Birth/MRN:  Marv Astorga 63 y.o. 1960 MRN: [id number]  Date of Treatment Plan: December 10, 2024  Diagnosis/Diagnoses:   1. Severe major depression with psychotic features (HCC)    2. Insomnia, unspecified type    3. Radiculopathy, lumbar region    4. Encounter for long-term (current) use of high-risk medication    5. Current mild episode of major depressive disorder without prior episode (HCC)      Strengths/Personal Resources for Self-Care: \"Talking to my friends; Make sure my daughter is with me\"  Area/Areas of need (in own words): \"Sometimes I just feel depressed. When I'm around other people I feel better \"  1. Long Term Goal:   Maintain mood stability and control of anxiety   Target Date: 6 months - 6/10/2025  Person/Persons responsible for completion of goal: Gregory Adam LCSW (Pt's counselor)  2.  Short Term Objective (s) - How will we reach this goal?:   A.  Provider new recommended medication/dosage changes and/or continue medication(s):  Pt is having milder depression, no recent psychotic Sxs, anxiety or panic and his finances are more stable due to recent help by the [facility].  Pt feels better than before and does not want any changes to his regimen at this time.  He appears stable and I will continue the following medications:  Treatment plan done and Pt accepts the plan.   Continue:   Duloxetine 60mg (1) cap po bid # 180   Aripiprazole 10mg (1) tab po qd # 90   Trazodone 50mg (1-2) tabs po qhs # 180   Mirtazapine 15mg (1) tab po qhs - Pt was given a Rx for Qty 90 with R1 on 11/7/2024  Gabapentin 600mg (1) tab po tid-Per PCP  Pt continues counseling with Gregory Bay LCSW  Get BMP   Return 8 - 9 weeks. Can call any time sooner prn.     Target Date: 6 months - 6/10/2025  Person/Persons Responsible for Completion of Goal: " Marv and Gregory Call   Progress Towards Goals: stable, continuing treatment  Treatment Modality:  Medication mgt  Review due 180 days from date of this plan: 6 months - 6/10/2025  Expected length of service: ongoing treatment unless revised  My Physician/PA/NP and I have developed this plan together and I agree to work on the goals and objectives. I understand the treatment goals that were developed for my treatment.  Electronic Signatures: on file (unless signed below)    Oneyda Smith PA-C 12/10/24

## 2024-12-12 ENCOUNTER — TELEPHONE (OUTPATIENT)
Age: 64
End: 2024-12-12

## 2024-12-12 NOTE — TELEPHONE ENCOUNTER
Called pt and verify that pt is switching to virtual today since there is lyft ride  arranged for him.    Patient reiterate he is actually canceling his appt today since he have to  his daughter.  Writer canceled  pt appt and pt was inform the next appt will be 1/8/2025

## 2024-12-12 NOTE — TELEPHONE ENCOUNTER
Patient called in to verify appointment time for 12.12.24.    Writer verified 5:00pm appointment and a LYFT  for 4;30pm

## 2024-12-15 ENCOUNTER — APPOINTMENT (EMERGENCY)
Dept: CT IMAGING | Facility: HOSPITAL | Age: 64
End: 2024-12-15
Payer: MEDICARE

## 2024-12-15 ENCOUNTER — HOSPITAL ENCOUNTER (EMERGENCY)
Facility: HOSPITAL | Age: 64
Discharge: HOME | End: 2024-12-15
Attending: EMERGENCY MEDICINE | Admitting: EMERGENCY MEDICINE
Payer: MEDICARE

## 2024-12-15 VITALS
RESPIRATION RATE: 18 BRPM | OXYGEN SATURATION: 96 % | WEIGHT: 198.19 LBS | TEMPERATURE: 98.9 F | BODY MASS INDEX: 31.04 KG/M2 | SYSTOLIC BLOOD PRESSURE: 162 MMHG | HEART RATE: 83 BPM | DIASTOLIC BLOOD PRESSURE: 97 MMHG

## 2024-12-15 DIAGNOSIS — L03.90 CELLULITIS: Primary | ICD-10-CM

## 2024-12-15 LAB
ALBUMIN SERPL BCG-MCNC: 4 G/DL (ref 3.5–5)
ALP SERPL-CCNC: 48 U/L (ref 34–104)
ALT SERPL W P-5'-P-CCNC: 19 U/L (ref 7–52)
ANION GAP SERPL CALCULATED.3IONS-SCNC: 7 MMOL/L (ref 4–13)
AST SERPL W P-5'-P-CCNC: 18 U/L (ref 13–39)
BASOPHILS # BLD AUTO: 0.01 THOUSANDS/ÂΜL (ref 0–0.1)
BASOPHILS NFR BLD AUTO: 0 % (ref 0–1)
BILIRUB SERPL-MCNC: 0.46 MG/DL (ref 0.2–1)
BUN SERPL-MCNC: 17 MG/DL (ref 5–25)
CALCIUM SERPL-MCNC: 9 MG/DL (ref 8.4–10.2)
CHLORIDE SERPL-SCNC: 102 MMOL/L (ref 96–108)
CO2 SERPL-SCNC: 27 MMOL/L (ref 21–32)
CREAT SERPL-MCNC: 1.29 MG/DL (ref 0.6–1.3)
EOSINOPHIL # BLD AUTO: 0.05 THOUSAND/ÂΜL (ref 0–0.61)
EOSINOPHIL NFR BLD AUTO: 1 % (ref 0–6)
ERYTHROCYTE [DISTWIDTH] IN BLOOD BY AUTOMATED COUNT: 13.2 % (ref 11.6–15.1)
GFR SERPL CREATININE-BSD FRML MDRD: 59 ML/MIN/1.73SQ M
GLUCOSE SERPL-MCNC: 161 MG/DL (ref 65–140)
HCT VFR BLD AUTO: 39 % (ref 36.5–49.3)
HGB BLD-MCNC: 13.7 G/DL (ref 12–17)
IMM GRANULOCYTES # BLD AUTO: 0.01 THOUSAND/UL (ref 0–0.2)
IMM GRANULOCYTES NFR BLD AUTO: 0 % (ref 0–2)
LYMPHOCYTES # BLD AUTO: 1.23 THOUSANDS/ÂΜL (ref 0.6–4.47)
LYMPHOCYTES NFR BLD AUTO: 34 % (ref 14–44)
MCH RBC QN AUTO: 29.5 PG (ref 26.8–34.3)
MCHC RBC AUTO-ENTMCNC: 35.1 G/DL (ref 31.4–37.4)
MCV RBC AUTO: 84 FL (ref 82–98)
MONOCYTES # BLD AUTO: 0.56 THOUSAND/ÂΜL (ref 0.17–1.22)
MONOCYTES NFR BLD AUTO: 16 % (ref 4–12)
NEUTROPHILS # BLD AUTO: 1.75 THOUSANDS/ÂΜL (ref 1.85–7.62)
NEUTS SEG NFR BLD AUTO: 49 % (ref 43–75)
NRBC BLD AUTO-RTO: 0 /100 WBCS
PLATELET # BLD AUTO: 189 THOUSANDS/UL (ref 149–390)
PMV BLD AUTO: 9.4 FL (ref 8.9–12.7)
POTASSIUM SERPL-SCNC: 3.9 MMOL/L (ref 3.5–5.3)
PROCALCITONIN SERPL-MCNC: 0.14 NG/ML
PROT SERPL-MCNC: 7.1 G/DL (ref 6.4–8.4)
RBC # BLD AUTO: 4.64 MILLION/UL (ref 3.88–5.62)
SODIUM SERPL-SCNC: 136 MMOL/L (ref 135–147)
WBC # BLD AUTO: 3.61 THOUSAND/UL (ref 4.31–10.16)

## 2024-12-15 PROCEDURE — 96374 THER/PROPH/DIAG INJ IV PUSH: CPT

## 2024-12-15 PROCEDURE — 70487 CT MAXILLOFACIAL W/DYE: CPT

## 2024-12-15 PROCEDURE — 80053 COMPREHEN METABOLIC PANEL: CPT | Performed by: EMERGENCY MEDICINE

## 2024-12-15 PROCEDURE — 85025 COMPLETE CBC W/AUTO DIFF WBC: CPT | Performed by: EMERGENCY MEDICINE

## 2024-12-15 PROCEDURE — 99284 EMERGENCY DEPT VISIT MOD MDM: CPT | Performed by: EMERGENCY MEDICINE

## 2024-12-15 PROCEDURE — 96361 HYDRATE IV INFUSION ADD-ON: CPT

## 2024-12-15 PROCEDURE — 36415 COLL VENOUS BLD VENIPUNCTURE: CPT | Performed by: EMERGENCY MEDICINE

## 2024-12-15 PROCEDURE — 99283 EMERGENCY DEPT VISIT LOW MDM: CPT

## 2024-12-15 PROCEDURE — 84145 PROCALCITONIN (PCT): CPT | Performed by: EMERGENCY MEDICINE

## 2024-12-15 RX ORDER — SULFAMETHOXAZOLE AND TRIMETHOPRIM 800; 160 MG/1; MG/1
1 TABLET ORAL ONCE
Status: COMPLETED | OUTPATIENT
Start: 2024-12-15 | End: 2024-12-15

## 2024-12-15 RX ORDER — ACETAMINOPHEN 500 MG
1000 TABLET ORAL EVERY 6 HOURS PRN
Qty: 24 TABLET | Refills: 0 | Status: SHIPPED | OUTPATIENT
Start: 2024-12-15

## 2024-12-15 RX ORDER — SULFAMETHOXAZOLE AND TRIMETHOPRIM 800; 160 MG/1; MG/1
1 TABLET ORAL 2 TIMES DAILY
Qty: 14 TABLET | Refills: 0 | Status: SHIPPED | OUTPATIENT
Start: 2024-12-15 | End: 2024-12-22

## 2024-12-15 RX ORDER — CEPHALEXIN 500 MG/1
500 CAPSULE ORAL EVERY 6 HOURS SCHEDULED
Qty: 20 CAPSULE | Refills: 0 | Status: SHIPPED | OUTPATIENT
Start: 2024-12-15 | End: 2024-12-20

## 2024-12-15 RX ORDER — KETOROLAC TROMETHAMINE 30 MG/ML
15 INJECTION, SOLUTION INTRAMUSCULAR; INTRAVENOUS ONCE
Status: COMPLETED | OUTPATIENT
Start: 2024-12-15 | End: 2024-12-15

## 2024-12-15 RX ORDER — CEPHALEXIN 500 MG/1
500 CAPSULE ORAL ONCE
Status: COMPLETED | OUTPATIENT
Start: 2024-12-15 | End: 2024-12-15

## 2024-12-15 RX ADMIN — SULFAMETHOXAZOLE AND TRIMETHOPRIM 1 TABLET: 800; 160 TABLET ORAL at 08:10

## 2024-12-15 RX ADMIN — IOHEXOL 85 ML: 350 INJECTION, SOLUTION INTRAVENOUS at 06:06

## 2024-12-15 RX ADMIN — KETOROLAC TROMETHAMINE 15 MG: 30 INJECTION, SOLUTION INTRAMUSCULAR; INTRAVENOUS at 05:17

## 2024-12-15 RX ADMIN — CEPHALEXIN 500 MG: 500 CAPSULE ORAL at 08:10

## 2024-12-15 RX ADMIN — SODIUM CHLORIDE 1000 ML: 0.9 INJECTION, SOLUTION INTRAVENOUS at 05:14

## 2024-12-15 NOTE — ED CARE HANDOFF
Emergency Department Sign Out Note        Sign out and transfer of care from Dr san. See Separate Emergency Department note.     The patient, Marv Astorga, was evaluated by the previous provider for neck mass.    Workup Completed:  As above    ED Course / Workup Pending (followup):  Await cy                                     Procedures  MDM        Disposition  Final diagnoses:   None     ED Disposition       None          Follow-up Information    None       Patient's Medications   Discharge Prescriptions    No medications on file     No discharge procedures on file.       ED Provider  Electronically Signed by     Madan Fuentes MD  12/15/24 0700

## 2024-12-15 NOTE — ED CARE HANDOFF
Emergency Department Sign Out Note        Sign out and transfer of care from Dr san. See Separate Emergency Department note.     The patient, Marv Astorga, was evaluated by the previous provider for neck mass.    Workup Completed:  As above    ED Course / Workup Pending (followup):  Await ct      CT did not show focal abscess but did show enlarged lymph nodes as well as some inflammation of the skin suggesting a cellulitis at the concerned area we will place the patient on antibiotics follow-up with primary care no airway compromise                               Procedures  MDM        Disposition  Final diagnoses:   None     ED Disposition       None          Follow-up Information    None       Patient's Medications   Discharge Prescriptions    No medications on file     No discharge procedures on file.       ED Provider  Electronically Signed by     Madan Fuentes MD  12/15/24 0704

## 2024-12-15 NOTE — Clinical Note
Marv Astorga was seen and treated in our emergency department on 12/15/2024.                Diagnosis:     Marv  .    He may return on this date: 12/17/2024         If you have any questions or concerns, please don't hesitate to call.      Madan Fuentes MD    ______________________________           _______________          _______________  Hospital Representative                              Date                                Time

## 2024-12-15 NOTE — ED PROVIDER NOTES
Time reflects when diagnosis was documented in both MDM as applicable and the Disposition within this note       Time User Action Codes Description Comment    12/15/2024  8:06 AM Madan Fuentes Add [L03.90] Cellulitis           ED Disposition       ED Disposition   Discharge    Condition   Stable    Date/Time   Sun Dec 15, 2024  8:06 AM    Comment   Marv Astorga discharge to home/self care.                   Assessment & Plan       Medical Decision Making      Amount and/or Complexity of Data Reviewed    Reviewed past medical records: yes    History Provided by patient    Differential: simple abscess vs lymphadenopathy. No crepitus to suggest nec fasc or deeper space infection.     CT scan to delineate if mass is abscess vs enlarged lymph node.  Labs for leukocytosis. Procal for bacterial infection.    Signed out to Dr Fuentes pending labs imaging and re evaluation.           ED Course as of 12/15/24 2218   Sun Dec 15, 2024   0522 WBC(!): 3.61   0617 Procalcitonin: 0.14   0655 Signed out to Dr Fuentes pending imagining and re eval.       Medications   sodium chloride 0.9 % bolus 1,000 mL (0 mL Intravenous Stopped 12/15/24 0810)   ketorolac (TORADOL) injection 15 mg (15 mg Intravenous Given 12/15/24 0517)   iohexol (OMNIPAQUE) 350 MG/ML injection (MULTI-DOSE) 85 mL (85 mL Intravenous Given 12/15/24 0606)   sulfamethoxazole-trimethoprim (BACTRIM DS) 800-160 mg per tablet 1 tablet (1 tablet Oral Given 12/15/24 0810)   cephalexin (KEFLEX) capsule 500 mg (500 mg Oral Given 12/15/24 0810)       ED Risk Strat Scores                          SBIRT 22yo+      Flowsheet Row Most Recent Value   Initial Alcohol Screen: US AUDIT-C     1. How often do you have a drink containing alcohol? 0 Filed at: 12/15/2024 0449   2. How many drinks containing alcohol do you have on a typical day you are drinking?  0 Filed at: 12/15/2024 0449   3a. Male UNDER 65: How often do you have five or more drinks on one occasion? 0 Filed at: 12/15/2024 0449    Audit-C Score 0 Filed at: 12/15/2024 0449   DANIELLE: How many times in the past year have you...    Used an illegal drug or used a prescription medication for non-medical reasons? Never Filed at: 12/15/2024 0449                            History of Present Illness       Chief Complaint   Patient presents with    Dental Pain     Rt lower dental pain x 2 days radiating to rt ear with painful swallowing. Took tylenol last night     64 yo M hx of asthma depression HLD HTN DM presents to ed for eval of painful lump below right ear lobe. Ongoing for past two weeks after getting URI symptoms per patient. However, noticed enlargement getting worse over past two days. No difficulty swallowing, no sob no cp no fevers. No sore throat. No dental pain. No neuro complaints. No difficulty swallowing. No other complaints on ROS.    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    Current severe episode of major depressive disorder without prior episode ([facility]) 10/11/2023    Depression     Diabetes mellitus ([facility])     Hyperlipidemia     Hypertension     Suicidal intent 10/10/2023    Type 2 diabetes mellitus without complication, without long-term current use of insulin ([facility]) 10/16/2020      Past Surgical History:   Procedure Laterality Date    CYST REMOVAL  2017      Family History   Problem Relation Age of Onset    Hypertension Mother     Diabetes Mother     Cancer Father     Diabetes Family     Hypertension Family       Social History     Tobacco Use    Smoking status: Never     Passive exposure: Never    Smokeless tobacco: Never   Vaping Use    Vaping status: Never Used   Substance Use Topics    Alcohol use: Not Currently     Comment: rare    Drug use: Never      E-Cigarette/Vaping    E-Cigarette Use Never User       E-Cigarette/Vaping Substances    Nicotine No     THC No     CBD No     Flavoring No     Other No     Unknown No       I  have reviewed and agree with the history as documented.     HPI    Review of Systems   Constitutional:  Negative for chills, fatigue and fever.   HENT:  Negative for nosebleeds and sore throat.    Eyes:  Negative for redness and visual disturbance.   Respiratory:  Negative for shortness of breath and wheezing.    Cardiovascular:  Negative for chest pain and palpitations.   Gastrointestinal:  Negative for abdominal pain and diarrhea.   Endocrine: Negative for polyuria.   Genitourinary:  Negative for difficulty urinating and testicular pain.   Musculoskeletal:  Negative for back pain and neck stiffness.   Skin:  Negative for rash and wound.   Neurological:  Negative for seizures, speech difficulty and headaches.   Psychiatric/Behavioral:  Negative for dysphoric mood and hallucinations.    All other systems reviewed and are negative.          Objective       ED Triage Vitals   Temperature Pulse Blood Pressure Respirations SpO2 Patient Position - Orthostatic VS   12/15/24 0438 12/15/24 0438 12/15/24 0438 12/15/24 0438 12/15/24 0438 12/15/24 0438   98.9 °F (37.2 °C) 83 162/97 18 96 % Sitting      Temp Source Heart Rate Source BP Location FiO2 (%) Pain Score    12/15/24 0438 12/15/24 0438 12/15/24 0438 -- 12/15/24 0517    Oral Monitor Left arm  9      Vitals      Date and Time Temp Pulse SpO2 Resp BP Pain Score FACES Pain Rating User   12/15/24 0517 -- -- -- -- -- 9 -- LD   12/15/24 0438 98.9 °F (37.2 °C) 83 96 % 18 162/97 -- -- CO            Physical Exam  Vitals and nursing note reviewed.   Constitutional:       Appearance: He is well-developed.   HENT:      Head: Normocephalic and atraumatic.      Jaw: No trismus, tenderness, swelling, pain on movement or malocclusion.      Salivary Glands: Right salivary gland is not diffusely enlarged or tender. Left salivary gland is not diffusely enlarged or tender.        Right Ear: External ear normal.      Left Ear: External ear normal.      Mouth/Throat:     Eyes:       Conjunctiva/sclera: Conjunctivae normal.   Cardiovascular:      Rate and Rhythm: Normal rate and regular rhythm.      Heart sounds: Normal heart sounds.   Pulmonary:      Effort: Pulmonary effort is normal.      Breath sounds: Normal breath sounds.   Abdominal:      General: There is no distension.      Tenderness: There is no guarding.   Musculoskeletal:         General: Normal range of motion.      Cervical back: Normal range of motion.   Skin:     General: Skin is warm and dry.      Findings: No rash.   Neurological:      Mental Status: He is alert and oriented to person, place, and time.      Cranial Nerves: No cranial nerve deficit.      Sensory: No sensory deficit.      Motor: No abnormal muscle tone.      Coordination: Coordination normal.         Results Reviewed       Procedure Component Value Units Date/Time    Procalcitonin [[id number]]  (Normal) Collected: 12/15/24 0512    Lab Status: Final result Specimen: Blood from Arm, Left Updated: 12/15/24 0546     Procalcitonin 0.14 ng/ml     Comprehensive metabolic panel [[id number]]  (Abnormal) Collected: 12/15/24 0512    Lab Status: Final result Specimen: Blood from Arm, Left Updated: 12/15/24 0538     Sodium 136 mmol/L      Potassium 3.9 mmol/L      Chloride 102 mmol/L      CO2 27 mmol/L      ANION GAP 7 mmol/L      BUN 17 mg/dL      Creatinine 1.29 mg/dL      Glucose 161 mg/dL      Calcium 9.0 mg/dL      AST 18 U/L      ALT 19 U/L      Alkaline Phosphatase 48 U/L      Total Protein 7.1 g/dL      Albumin 4.0 g/dL      Total Bilirubin 0.46 mg/dL      eGFR 59 ml/min/1.73sq m     Narrative:      National Kidney Disease Foundation guidelines for Chronic Kidney Disease (CKD):     Stage 1 with normal or high GFR (GFR > 90 mL/min/1.73 square meters)    Stage 2 Mild CKD (GFR = 60-89 mL/min/1.73 square meters)    Stage 3A Moderate CKD (GFR = 45-59 mL/min/1.73 square meters)    Stage 3B Moderate CKD (GFR = 30-44 mL/min/1.73 square meters)    Stage 4 Severe CKD (GFR =  15-29 mL/min/1.73 square meters)    Stage 5 End Stage CKD (GFR <15 mL/min/1.73 square meters)  Note: GFR calculation is accurate only with a steady state creatinine    CBC and differential [[id number]]  (Abnormal) Collected: 12/15/24 0512    Lab Status: Final result Specimen: Blood from Arm, Left Updated: 12/15/24 0521     WBC 3.61 Thousand/uL      RBC 4.64 Million/uL      Hemoglobin 13.7 g/dL      Hematocrit 39.0 %      MCV 84 fL      MCH 29.5 pg      MCHC 35.1 g/dL      RDW 13.2 %      MPV 9.4 fL      Platelets 189 Thousands/uL      nRBC 0 /100 WBCs      Segmented % 49 %      Immature Grans % 0 %      Lymphocytes % 34 %      Monocytes % 16 %      Eosinophils Relative 1 %      Basophils Relative 0 %      Absolute Neutrophils 1.75 Thousands/µL      Absolute Immature Grans 0.01 Thousand/uL      Absolute Lymphocytes 1.23 Thousands/µL      Absolute Monocytes 0.56 Thousand/µL      Eosinophils Absolute 0.05 Thousand/µL      Basophils Absolute 0.01 Thousands/µL             CT facial bones with contrast   Final Interpretation by Riley Obrien MD (12/15 0752)      Skin thickening and infiltrative changes along the outer aspect of the right external auditory canal suggestive of cellulitis there is no suspicious drainable fluid/abscess collection.      Small bilateral submandibular, jugular chain and posterior triangle lymph nodes which are below size criteria for pathologic adenopathy.      Workstation performed: [id number]             Procedures    ED Medication and Procedure Management   Prior to Admission Medications   Prescriptions Last Dose Informant Patient Reported? Taking?   ARIPiprazole (ABILIFY) 10 mg tablet   No No   Sig: Take 1 tablet (10 mg total) by mouth daily   Cholecalciferol (D3 PO)  Self Yes No   Sig: Take by mouth daily   DULoxetine (CYMBALTA) 60 mg delayed release capsule   No No   Sig: Take 1 capsule (60 mg total) by mouth 2 (two) times a day   Diclofenac Sodium (VOLTAREN) 1 %   No No   Sig: Apply  2 g topically 4 (four) times a day   Lancet Devices (ONE TOUCH DELICA LANCING DEV) MISC  Self No No   Sig: Use daily   Nerve Stimulator (TENS Therapy Pain Relief) EMILY  Self No No   Sig: Use as directed   OneTouch Delica Lancets 33G MISC  Self No No   Sig: Use daily   albuterol (2.5 mg/3 mL) 0.083 % nebulizer solution   No No   Sig: Take 3 mL (2.5 mg total) by nebulization every 6 (six) hours as needed for wheezing or shortness of breath   albuterol (ProAir HFA) 90 mcg/act inhaler   No No   Sig: Inhale 2 puffs every 6 (six) hours as needed for wheezing   amLODIPine (NORVASC) 10 mg tablet   No No   Sig: Take 1 tablet (10 mg total) by mouth daily   aspirin 325 mg tablet  Self No No   Sig: Take 1 tablet (325 mg total) by mouth daily   atorvastatin (LIPITOR) 20 mg tablet   No No   Sig: Take 1 tablet (20 mg total) by mouth daily   famotidine (PEPCID) 20 mg tablet   No No   Sig: Take 1 tablet (20 mg total) by mouth daily   fluticasone (FLONASE) 50 mcg/act nasal spray   No No   Si spray into each nostril daily   fluticasone-salmeterol (Advair HFA) 115-21 MCG/ACT inhaler   No No   Sig: Inhale 2 puffs 2 (two) times a day Rinse mouth after use.   gabapentin (NEURONTIN) 800 mg tablet  Self No No   Sig: Take 1 tablet (800 mg total) by mouth 3 (three) times a day   glucose blood (OneTouch Verio) test strip   No No   Sig: Check blood sugars once daily. Please substitute with appropriate alternative as covered by patient's insurance. Dx: E11.65   glucose blood (OneTouch Verio) test strip   No No   Sig: Testing once daily   lidocaine (LMX) 4 % cream  Self No No   Sig: Apply topically as needed for mild pain   lidocaine (Lidoderm) 5 %  Self No No   Sig: Apply 1 patch topically over 12 hours daily Remove & Discard patch within 12 hours or as directed by MD   metFORMIN (GLUCOPHAGE) 500 mg tablet   No No   Sig: Take 1 tablet (500 mg total) by mouth 2 (two) times a day with meals   methocarbamol (ROBAXIN) 500 mg tablet   No No    Sig: Take 1 tablet (500 mg total) by mouth 3 (three) times a day   mirtazapine (REMERON) 15 mg tablet   No No   Sig: take 1 tablet by mouth at bedtime   naloxone (NARCAN) 4 mg/0.1 mL nasal spray   No No   Sig: Administer 1 spray into a nostril. If no response after 2-3 minutes, give another dose in the other nostril using a new spray.   Patient not taking: Reported on 4/1/2024   oxyCODONE (ROXICODONE) 10 MG TABS   No No   Sig: Take 1 tablet (10 mg total) by mouth 3 (three) times a day Max Daily Amount: 30 mg   tamsulosin (FLOMAX) 0.4 mg   No No   Sig: Take 1 capsule (0.4 mg total) by mouth daily with dinner   traZODone (DESYREL) 50 mg tablet   No No   Sig: Take 1 to 2 tabs po qhs      Facility-Administered Medications: None     Discharge Medication List as of 12/15/2024  8:08 AM        START taking these medications    Details   acetaminophen (TYLENOL) 500 mg tablet Take 2 tablets (1,000 mg total) by mouth every 6 (six) hours as needed for mild pain, Starting Sun 12/15/2024, Normal      cephalexin (KEFLEX) 500 mg capsule Take 1 capsule (500 mg total) by mouth every 6 (six) hours for 5 days, Starting Sun 12/15/2024, Until Fri 12/20/2024, Normal      sulfamethoxazole-trimethoprim (BACTRIM DS) 800-160 mg per tablet Take 1 tablet by mouth 2 (two) times a day for 7 days smx-tmp DS (BACTRIM) 800-160 mg tabs (1tab q12 D10), Starting Sun 12/15/2024, Until Sun 12/22/2024, Normal           CONTINUE these medications which have NOT CHANGED    Details   albuterol (2.5 mg/3 mL) 0.083 % nebulizer solution Take 3 mL (2.5 mg total) by nebulization every 6 (six) hours as needed for wheezing or shortness of breath, Starting Wed 10/30/2024, Normal      albuterol (ProAir HFA) 90 mcg/act inhaler Inhale 2 puffs every 6 (six) hours as needed for wheezing, Starting Wed 10/30/2024, Normal      amLODIPine (NORVASC) 10 mg tablet Take 1 tablet (10 mg total) by mouth daily, Starting Fri 8/30/2024, Normal      ARIPiprazole (ABILIFY) 10 mg  tablet Take 1 tablet (10 mg total) by mouth daily, Starting Tue 12/10/2024, Normal      aspirin 325 mg tablet Take 1 tablet (325 mg total) by mouth daily, Starting Thu 6/8/2023, Normal      atorvastatin (LIPITOR) 20 mg tablet Take 1 tablet (20 mg total) by mouth daily, Starting Wed 10/30/2024, Normal      Cholecalciferol (D3 PO) Take by mouth daily, Historical Med      Diclofenac Sodium (VOLTAREN) 1 % Apply 2 g topically 4 (four) times a day, Starting Wed 10/30/2024, Normal      DULoxetine (CYMBALTA) 60 mg delayed release capsule Take 1 capsule (60 mg total) by mouth 2 (two) times a day, Starting Tue 12/10/2024, Normal      famotidine (PEPCID) 20 mg tablet Take 1 tablet (20 mg total) by mouth daily, Starting Wed 10/30/2024, Normal      fluticasone (FLONASE) 50 mcg/act nasal spray 1 spray into each nostril daily, Starting Wed 10/30/2024, Normal      fluticasone-salmeterol (Advair HFA) 115-21 MCG/ACT inhaler Inhale 2 puffs 2 (two) times a day Rinse mouth after use., Starting Wed 8/14/2024, Normal      gabapentin (NEURONTIN) 800 mg tablet Take 1 tablet (800 mg total) by mouth 3 (three) times a day, Starting Fri 1/5/2024, Normal      !! glucose blood (OneTouch Verio) test strip Check blood sugars once daily. Please substitute with appropriate alternative as covered by patient's insurance. Dx: E11.65, Normal      !! glucose blood (OneTouch Verio) test strip Testing once daily, Normal      Lancet Devices (ONE TOUCH DELICA LANCING DEV) MISC Use daily, Starting Fri 8/12/2022, Normal      lidocaine (Lidoderm) 5 % Apply 1 patch topically over 12 hours daily Remove & Discard patch within 12 hours or as directed by MD, Starting Tue 12/5/2023, Normal      lidocaine (LMX) 4 % cream Apply topically as needed for mild pain, Starting Fri 8/12/2022, Normal      metFORMIN (GLUCOPHAGE) 500 mg tablet Take 1 tablet (500 mg total) by mouth 2 (two) times a day with meals, Starting Wed 10/30/2024, Normal      methocarbamol (ROBAXIN) 500 mg  tablet Take 1 tablet (500 mg total) by mouth 3 (three) times a day, Starting Wed 10/30/2024, Normal      mirtazapine (REMERON) 15 mg tablet take 1 tablet by mouth at bedtime, Starting Thu 11/7/2024, Normal      naloxone (NARCAN) 4 mg/0.1 mL nasal spray Administer 1 spray into a nostril. If no response after 2-3 minutes, give another dose in the other nostril using a new spray., Normal      Nerve Stimulator (TENS Therapy Pain Relief) EMILY Use as directed, Print      OneTouch Delica Lancets 33G MISC Use daily, Starting Tue 10/31/2023, Normal      oxyCODONE (ROXICODONE) 10 MG TABS Take 1 tablet (10 mg total) by mouth 3 (three) times a day Max Daily Amount: 30 mg, Starting Mon 12/2/2024, Normal      tamsulosin (FLOMAX) 0.4 mg Take 1 capsule (0.4 mg total) by mouth daily with dinner, Starting Wed 8/7/2024, Normal      traZODone (DESYREL) 50 mg tablet Take 1 to 2 tabs po qhs, Normal       !! - Potential duplicate medications found. Please discuss with provider.        No discharge procedures on file.  ED SEPSIS DOCUMENTATION   Time reflects when diagnosis was documented in both MDM as applicable and the Disposition within this note       Time User Action Codes Description Comment    12/15/2024  8:06 AM Madan Fuentes Add [L03.90] Cellulitis                  Justine Lindsey MD  12/15/24 3089

## 2024-12-17 ENCOUNTER — OFFICE VISIT (OUTPATIENT)
Dept: DENTISTRY | Facility: CLINIC | Age: 64
End: 2024-12-17

## 2024-12-17 VITALS — HEART RATE: 83 BPM | TEMPERATURE: 98.3 F | DIASTOLIC BLOOD PRESSURE: 82 MMHG | SYSTOLIC BLOOD PRESSURE: 130 MMHG

## 2024-12-17 DIAGNOSIS — Z01.20 ENCOUNTER FOR DENTAL EXAMINATION: Primary | ICD-10-CM

## 2024-12-17 PROCEDURE — D0140 LIMITED ORAL EVALUATION - PROBLEM FOCUSED: HCPCS

## 2024-12-17 PROCEDURE — D0220 INTRAORAL - PERIAPICAL FIRST RADIOGRAPHIC IMAGE: HCPCS

## 2024-12-21 DIAGNOSIS — J30.9 ALLERGIC RHINITIS, UNSPECIFIED SEASONALITY, UNSPECIFIED TRIGGER: ICD-10-CM

## 2024-12-21 NOTE — PROGRESS NOTES
Procedure Details  3  - INTRAORAL - PERIAPICAL FIRST RADIOGRAPHIC IMAGE   - LIMITED ORAL EVALUATION - PROBLEM FOCUSED  Dental procedures in this visit     - LIMITED ORAL EVALUATION - PROBLEM FOCUSED (Completed)     Service provider: Tavo Cheng DMD     Billing provider: Tavo Cheng DMD     - INTRAORAL - PERIAPICAL FIRST RADIOGRAPHIC IMAGE 3 (Completed)     Service provider: Tavo Cheng DMD     Billing provider: Tavo Cheng DMD     Subjective   Patient ID: Marv Astorga is a 63 y.o. male.  No chief complaint on file.    HPI  The following portions of the chart were reviewed this encounter and updated as appropriate:    Tobacco  Allergies  Meds  Problems  Med Hx  Surg Hx  Fam Hx           Objective   Soft Tissue Exam  Findings added this encounter   Facial cellulitis on Right Masseteric Area   Facial cellulitis on Right Zygomatic Area    12/15/24 CT IMPRESSION:     Skin thickening and infiltrative changes along the outer aspect of the right external auditory canal suggestive of cellulitis, there is no suspicious drainable fluid/abscess collection.     Small bilateral submandibular, jugular chain and posterior triangle lymph nodes which are below size criteria for pathologic adenopathy.    Dental Exam    Radiographic Interpretation:   Associated radiographs for today's visit were reviewed and finding(s) were discussed with the patient.   Findings include: PA tooth #3 WNL  Hard Tissue Exam:  No decay  Reference tooth chart for additional findings.    Assessment & Plan   Problem List Items Addressed This Visit    None  Visit Diagnoses         Encounter for dental examination    -  Primary    Relevant Orders    LIMITED ORAL EVALUATION - PROBLEM FOCUSED (Completed)    INTRAORAL - PERIAPICAL FIRST RADIOGRAPHIC IMAGE (Completed)        62 yo male presents per recommendation ED doctor to follow up if dental origin of facial infection. After clinical and  radiographic exam, dentition appear WNL, No dental origin evident. Facial cellulitis suspected per CT impression. Recommended follow up with PCP. Patient understood and agreed tot he plan    NV: follow up with PCP for facial cellulitis  Denture adj

## 2024-12-21 NOTE — DENTAL PROCEDURE DETAILS
Dental procedures in this visit     - LIMITED ORAL EVALUATION - PROBLEM FOCUSED (Completed)     Service provider: Tavo Cheng DMD     Billing provider: Tavo Cheng DMD     - INTRAORAL - PERIAPICAL FIRST RADIOGRAPHIC IMAGE 3 (Completed)     Service provider: Tavo Cheng DMD     Billing provider: Tavo Cheng DMD     Subjective   Patient ID: Marv Astorga is a 63 y.o. male.  No chief complaint on file.    HPI  The following portions of the chart were reviewed this encounter and updated as appropriate:    Tobacco  Allergies  Meds  Problems  Med Hx  Surg Hx  Fam Hx           Objective   Soft Tissue Exam  Findings added this encounter   Facial cellulitis on Right Masseteric Area   Facial cellulitis on Right Zygomatic Area    12/15/24 CT IMPRESSION:     Skin thickening and infiltrative changes along the outer aspect of the right external auditory canal suggestive of cellulitis, there is no suspicious drainable fluid/abscess collection.     Small bilateral submandibular, jugular chain and posterior triangle lymph nodes which are below size criteria for pathologic adenopathy.    Dental Exam    Radiographic Interpretation:   Associated radiographs for today's visit were reviewed and finding(s) were discussed with the patient.   Findings include: PA tooth #3 WNL  Hard Tissue Exam:  No decay  Reference tooth chart for additional findings.    Assessment & Plan   Problem List Items Addressed This Visit    None  Visit Diagnoses         Encounter for dental examination    -  Primary    Relevant Orders    LIMITED ORAL EVALUATION - PROBLEM FOCUSED (Completed)    INTRAORAL - PERIAPICAL FIRST RADIOGRAPHIC IMAGE (Completed)        62 yo male presents per recommendation ED doctor to follow up if dental origin of facial infection. After clinical and radiographic exam, dentition appear WNL, No dental origin evident. Facial cellulitis suspected per CT impression. Recommended  follow up with PCP. Patient understood and agreed tot he plan    NV: follow up with PCP for facial cellulitis  Denture adj

## 2024-12-23 ENCOUNTER — TELEPHONE (OUTPATIENT)
Dept: FAMILY MEDICINE CLINIC | Facility: CLINIC | Age: 64
End: 2024-12-23

## 2024-12-23 RX ORDER — FLUTICASONE PROPIONATE 50 MCG
1 SPRAY, SUSPENSION (ML) NASAL DAILY
Qty: 16 G | Refills: 0 | Status: SHIPPED | OUTPATIENT
Start: 2024-12-23

## 2024-12-23 NOTE — TELEPHONE ENCOUNTER
PCP SIGNATURE NEEDED FOR Homecare delivered FORM RECEIVED VIA FAX AND PLACED IN PCP FOLDER TO BE DELIVERED AT ASSIGNED TIMES.    Physician's order

## 2024-12-30 DIAGNOSIS — M54.42 CHRONIC BILATERAL LOW BACK PAIN WITH LEFT-SIDED SCIATICA: ICD-10-CM

## 2024-12-30 DIAGNOSIS — M54.42 CHRONIC LEFT-SIDED LOW BACK PAIN WITH LEFT-SIDED SCIATICA: ICD-10-CM

## 2024-12-30 DIAGNOSIS — F11.20 CONTINUOUS OPIOID DEPENDENCE (HCC): ICD-10-CM

## 2024-12-30 DIAGNOSIS — G89.4 CHRONIC PAIN SYNDROME: ICD-10-CM

## 2024-12-30 DIAGNOSIS — G89.29 CHRONIC BILATERAL LOW BACK PAIN WITH LEFT-SIDED SCIATICA: ICD-10-CM

## 2024-12-30 DIAGNOSIS — G89.29 CHRONIC LEFT-SIDED LOW BACK PAIN WITH LEFT-SIDED SCIATICA: ICD-10-CM

## 2024-12-30 RX ORDER — LIDOCAINE 50 MG/G
1 PATCH TOPICAL DAILY
Qty: 30 PATCH | Refills: 5 | Status: SHIPPED | OUTPATIENT
Start: 2024-12-30

## 2024-12-30 RX ORDER — OXYCODONE HYDROCHLORIDE 10 MG/1
10 TABLET ORAL 3 TIMES DAILY
Qty: 90 TABLET | Refills: 0 | Status: SHIPPED | OUTPATIENT
Start: 2024-12-30

## 2024-12-30 NOTE — TELEPHONE ENCOUNTER
Good afternoon,    Pt is requesting refills for medications:    oxyCODONE (ROXICODONE) 10 MG TABS [[id number]]     lidocaine (Lidoderm) 5 % [[id number]]

## 2025-01-01 NOTE — PROGRESS NOTES
Case Management Discharge Note      Final Note: dc home         Selected Continued Care - Discharged on 1/1/2025 Admission date: 12/30/2024 - Discharge disposition: Home or Self Care      Destination    No services have been selected for the patient.                Durable Medical Equipment       Service Provider Services Address Phone Fax Patient Preferred    [facility] [id number] [id number] [phone] --              Dialysis/Infusion    No services have been selected for the patient.                Home Medical Care    No services have been selected for the patient.                Therapy    No services have been selected for the patient.                Community Resources    No services have been selected for the patient.                Community & DME    No services have been selected for the patient.                         Final Discharge Disposition Code: 01 - home or self-care   [address]  coding opportunities       Chart reviewed, no opportunity found: CHART REVIEWED, NO OPPORTUNITY FOUND        Patients Insurance     Medicare Insurance: Medicare

## 2025-01-02 ENCOUNTER — TELEPHONE (OUTPATIENT)
Age: 65
End: 2025-01-02

## 2025-01-02 NOTE — TELEPHONE ENCOUNTER
Patient called in to verify upcoming appointment for 1.7.25    Writer assisted in reviewing the appointment time/date/ and specifics noted in the appointment details.

## 2025-01-06 ENCOUNTER — TELEPHONE (OUTPATIENT)
Dept: PSYCHIATRY | Facility: CLINIC | Age: 65
End: 2025-01-06

## 2025-01-06 ENCOUNTER — TELEPHONE (OUTPATIENT)
Age: 65
End: 2025-01-06

## 2025-01-06 NOTE — TELEPHONE ENCOUNTER
Patients Name: Marv Astorga    : 1960    Phone Number: [phone]    Appointment Date: 25    Appointment time: 9:00am     Address: [address] [place] [address] [address] [place] [address]    Drop Off Facility/Office: [facility]    Drop Off Address: [facility] [address], [place] [address]    P/U time 8:30am

## 2025-01-07 ENCOUNTER — TELEPHONE (OUTPATIENT)
Dept: PSYCHIATRY | Facility: CLINIC | Age: 65
End: 2025-01-07

## 2025-01-07 ENCOUNTER — HOSPITAL ENCOUNTER (OUTPATIENT)
Dept: RADIOLOGY | Age: 65
Discharge: HOME | End: 2025-01-07
Payer: MEDICARE

## 2025-01-07 DIAGNOSIS — R97.20 INCREASED PROSTATE SPECIFIC ANTIGEN (PSA) VELOCITY: ICD-10-CM

## 2025-01-07 PROCEDURE — A9585 GADOBUTROL INJECTION: HCPCS

## 2025-01-07 PROCEDURE — 72197 MRI PELVIS W/O & W/DYE: CPT

## 2025-01-07 PROCEDURE — 76377 3D RENDER W/INTRP POSTPROCES: CPT

## 2025-01-07 RX ORDER — GADOBUTROL 604.72 MG/ML
9 INJECTION INTRAVENOUS
Status: COMPLETED | OUTPATIENT
Start: 2025-01-07 | End: 2025-01-07

## 2025-01-07 RX ADMIN — GADOBUTROL 9 ML: 604.72 INJECTION INTRAVENOUS at 09:53

## 2025-01-07 NOTE — TELEPHONE ENCOUNTER
PT came in and signed an GIACOMO to get a letter from provider regarding not being able to attend court in [place] due to being hospitalized. PT was hospitalized in OCT 2023. Would like a letter so PT can send to his  in [place]. Dates 10/23-present

## 2025-01-08 ENCOUNTER — SOCIAL WORK (OUTPATIENT)
Dept: BEHAVIORAL/MENTAL HEALTH CLINIC | Facility: CLINIC | Age: 65
End: 2025-01-08
Payer: MEDICARE

## 2025-01-08 DIAGNOSIS — F41.1 GAD (GENERALIZED ANXIETY DISORDER): Primary | ICD-10-CM

## 2025-01-08 DIAGNOSIS — F32.2 CURRENT SEVERE EPISODE OF MAJOR DEPRESSIVE DISORDER WITHOUT PSYCHOTIC FEATURES WITHOUT PRIOR EPISODE (HCC): ICD-10-CM

## 2025-01-08 PROCEDURE — 90837 PSYTX W PT 60 MINUTES: CPT | Performed by: SOCIAL WORKER

## 2025-01-08 NOTE — TELEPHONE ENCOUNTER
Pt come in office today to sign an GIACOMO an so provider can create a letter for a  stated in the GIACOMO.    Please also check the document scanned along with GIACOMO.

## 2025-01-08 NOTE — PSYCH
"Behavioral Health Psychotherapy Progress Note    Psychotherapy Provided: Individual Psychotherapy     1. SAHRA (generalized anxiety disorder)        2. Current severe episode of major depressive disorder without psychotic features without prior episode (HCC)            Goals addressed in session: Goal 1     DATA: Marv shared he appreciates the health care he gets and he finally just received approval for food stamps. He is eating better and shared his holidays were better due to being able to eat better. He is going to his doctor tomorrow to help him with his pain and problems with his ears. He admits he still gets depressed but he uses the strategies we discuss to overcome it. He tries not to get stressed about things. He remains in physical pain. He talked a lot about his daughter who he loves very much. He has no interest in a relationship and he shared his focus is his daughter's happiness. I provided support, encouragement and strategies to cope.   During this session, this clinician used the following therapeutic modalities: Client-centered Therapy, Cognitive Behavioral Therapy, Mindfulness-based Strategies, and Supportive Psychotherapy    Substance Abuse was not addressed during this session. If the client is diagnosed with a co-occurring substance use disorder, please indicate any changes in the frequency or amount of use: n/a. Stage of change for addressing substance use diagnoses: No substance use/Not applicable    ASSESSMENT:  Marv Astorga presents with a Anxious mood.     his affect is somewhat anxious but hopeful which is congruent, with his mood and the content of the session. The client has made progress on their goals.     Marv Astorga presents with a none risk of suicide, none risk of self-harm, and none risk of harm to others.    For any risk assessment that surpasses a \"low\" rating, a safety plan must be developed.    A safety plan was indicated: no  If yes, describe in detail n/a    PLAN: " Between sessions, Marv Astorga will use mindfulness and CBT. At the next session, the therapist will use Client-centered Therapy, Cognitive Behavioral Therapy, Mindfulness-based Strategies, and Supportive Psychotherapy to address issues and symptoms as they may arise..    Behavioral Health Treatment Plan and Discharge Planning: Marv Astorga is aware of and agrees to continue to work on their treatment plan. They have identified and are working toward their discharge goals. yes    Depression Follow-up Plan Completed: Not applicable    Visit start and stop times:    01/08/25  Start Time: 0900  Stop Time: 1000  Total Visit Time: 60 minutes

## 2025-01-09 ENCOUNTER — OFFICE VISIT (OUTPATIENT)
Dept: FAMILY MEDICINE CLINIC | Facility: CLINIC | Age: 65
End: 2025-01-09

## 2025-01-09 ENCOUNTER — DOCUMENTATION (OUTPATIENT)
Dept: PSYCHIATRY | Facility: CLINIC | Age: 65
End: 2025-01-09

## 2025-01-09 VITALS
SYSTOLIC BLOOD PRESSURE: 138 MMHG | DIASTOLIC BLOOD PRESSURE: 86 MMHG | OXYGEN SATURATION: 98 % | HEART RATE: 82 BPM | RESPIRATION RATE: 18 BRPM | BODY MASS INDEX: 31.08 KG/M2 | WEIGHT: 198 LBS | HEIGHT: 67 IN | TEMPERATURE: 98.2 F

## 2025-01-09 DIAGNOSIS — Z02.89 ENCOUNTER FOR COMPLETION OF FORM WITH PATIENT: ICD-10-CM

## 2025-01-09 DIAGNOSIS — L03.811 CELLULITIS OF HEAD EXCEPT FACE: Primary | ICD-10-CM

## 2025-01-09 PROCEDURE — G2211 COMPLEX E/M VISIT ADD ON: HCPCS | Performed by: FAMILY MEDICINE

## 2025-01-09 PROCEDURE — 3075F SYST BP GE 130 - 139MM HG: CPT | Performed by: FAMILY MEDICINE

## 2025-01-09 PROCEDURE — 99213 OFFICE O/P EST LOW 20 MIN: CPT | Performed by: FAMILY MEDICINE

## 2025-01-09 PROCEDURE — 3079F DIAST BP 80-89 MM HG: CPT | Performed by: FAMILY MEDICINE

## 2025-01-09 NOTE — LETTER
January 9, 2025       Patient: Marv Astorga  YOB: 1960      To Whom it May Concern:    Marv Astorga is under my professional care. Marv was seen in my office on 1/9/2025. Mr. Astorga has the following chronic medical conditions:  Type 2 diabetes, radiculopathy of the lumbar region lumbar foraminal stenosis, facet arthropathy of the lumbosacral spine, chronic left-sided low back pain with left-sided sciatica, ambulatory dysfunction, hypertension, chronic pain syndrome, chronic prostatitis, chronic right shoulder pain, diastolic dysfunction, hyperlipidemia, insomnia, Generalized anxiety Disorder, Depression, memory difficulties, mild asthma, tinnitus of the left ear.    Mr. Astorga has a history of psychiatric hospitalization in 10/10/2023 to 10/17/2023 due to severe depression.     Mr. Astorga also has a history of chronic pain due to severe lumbar arthritic changes and utilizes a rolling walker for ambulation.  He also has home caregiver assistance for 12 hours daily 7 days a week.  Due to these chronic conditions, Mr. Astorga is unable to work since 2019.    If you have any questions or concerns, please don't hesitate to call.       Sincerely,          Audrey Ruvalcaba MD

## 2025-01-09 NOTE — PROGRESS NOTES
"Name: Marv Astorga      : 1960      MRN: [id number]  Encounter Provider: Audrey Ruvaclaba MD  Encounter Date: 2025   Encounter department: [facility]  :  Assessment & Plan  Cellulitis of head except face  Resolved       Encounter for completion of form with patient  Letter with pt's chronic conditions written for court              History of Present Illness     HPI    Marv Astorga is a 64 y.o. male  who presented to the office today to follow up after an ER visit on 12/15/2025.   Pt was diagnosed with cellulits around the right ear. CT scan showed: Skin thickening and infiltrative changes along the outer aspect of the right external auditory canal suggestive of cellulitis.  He was started on a course of Keflex and Bactrim.  Since completing the course, all sx have resolved.    Pt also present to have letter written with that lists his chronic conditions for court legal process.    Review of Systems   Constitutional:  Negative for activity change, appetite change, chills and fever.   HENT:  Negative for congestion, rhinorrhea and sore throat.    Respiratory:  Negative for cough and shortness of breath.    Cardiovascular:  Negative for chest pain.   Gastrointestinal:  Negative for diarrhea, nausea and vomiting.   Musculoskeletal:  Positive for back pain and gait problem.   Skin:  Negative for rash.   Neurological:  Negative for dizziness and headaches.   Psychiatric/Behavioral:  Negative for sleep disturbance and suicidal ideas. The patient is not nervous/anxious.        Objective   /86 (BP Location: Right arm, Patient Position: Sitting, Cuff Size: Large)   Pulse 82   Temp 98.2 °F (36.8 °C) (Temporal)   Resp 18   Ht 5' 7\" (1.702 m)   Wt 89.8 kg (198 lb)   SpO2 98%   BMI 31.01 kg/m²      Physical Exam  Vitals and nursing note reviewed.   Constitutional:       General: He is not in acute distress.     Appearance: He is well-developed. He is obese.      " Comments: +walking cane   HENT:      Head: Normocephalic and atraumatic.   Eyes:      Conjunctiva/sclera: Conjunctivae normal.   Cardiovascular:      Rate and Rhythm: Normal rate and regular rhythm.      Heart sounds: No murmur heard.  Pulmonary:      Effort: Pulmonary effort is normal. No respiratory distress.      Breath sounds: Normal breath sounds.   Abdominal:      Palpations: Abdomen is soft.      Tenderness: There is no abdominal tenderness.   Musculoskeletal:         General: No swelling.      Cervical back: Neck supple.   Skin:     General: Skin is warm and dry.      Capillary Refill: Capillary refill takes less than 2 seconds.   Neurological:      Mental Status: He is alert.   Psychiatric:         Mood and Affect: Mood normal.

## 2025-01-10 NOTE — TELEPHONE ENCOUNTER
Marv required a letter of attestation that he was inpatient at [facility] in 10/2023. Letter was composed and placed in the 's mailbox.

## 2025-01-10 NOTE — TELEPHONE ENCOUNTER
Pt came in to pick the letter and also states he needs a medical record request. An GIACOMO was signed and placed in medical record staff bin for processing.

## 2025-01-16 ENCOUNTER — TELEPHONE (OUTPATIENT)
Dept: PSYCHIATRY | Facility: CLINIC | Age: 65
End: 2025-01-16

## 2025-01-16 NOTE — TELEPHONE ENCOUNTER
A medical records request (GIACOMO) was received 1/16/25 for medical records to be sent to patient to send to his  in [place].. This writer confirmed this with Oneyda Smith.  Date range of request is all records from 10/1/23 to Present.    Paperwork placed in Oneyda's mailbox for her review. To be circulated to [place].

## 2025-01-17 ENCOUNTER — DOCUMENTATION (OUTPATIENT)
Dept: PSYCHIATRY | Facility: CLINIC | Age: 65
End: 2025-01-17

## 2025-01-18 NOTE — PSYCH
MRO request was received by Marv, with GIACOMO indicating himself as the recipient, so he may give them to his  who is in [place].  Form was filled out as requested for the time frame from 10/1/2023 to the present.  Paperwork then placed in the mailbox of [place] (therapist) for him to sign.

## 2025-01-21 ENCOUNTER — RESULTS FOLLOW-UP (OUTPATIENT)
Dept: UROLOGY | Facility: AMBULATORY SURGERY CENTER | Age: 65
End: 2025-01-21

## 2025-01-22 NOTE — TELEPHONE ENCOUNTER
Spoke directly to this pt and relayed in detail this message. Pt stated he understood the message as relayed. If this pt calls back please relay these messages again. Thank you              ----- Message from Jose Meza PA-C sent at 1/21/2025  4:40 PM EST -----    ----- Message -----  From: Interface, Radiology Results In  Sent: 1/9/2025   9:15 AM EST  To: Jose Meza PA-C

## 2025-01-27 ENCOUNTER — TELEPHONE (OUTPATIENT)
Dept: PSYCHIATRY | Facility: CLINIC | Age: 65
End: 2025-01-27

## 2025-01-27 NOTE — PSYCH
"Assessment & Plan  Generalized anxiety disorder         Severe major depression with psychotic features (HCC)    Orders:    ARIPiprazole (ABILIFY) 10 mg tablet; Take 1 tablet (10 mg total) by mouth daily    DULoxetine (CYMBALTA) 60 mg delayed release capsule; Take 1 capsule (60 mg total) by mouth 2 (two) times a day    mirtazapine (REMERON) 15 mg tablet; Take 1 tablet (15 mg total) by mouth daily at bedtime    Insomnia, unspecified type    Orders:    mirtazapine (REMERON) 15 mg tablet; Take 1 tablet (15 mg total) by mouth daily at bedtime    traZODone (DESYREL) 50 mg tablet; Take 1 to 2 tabs po qhs    Continuous opioid dependence (HCC)         Chronic left-sided low back pain with left-sided sciatica         Radiculopathy, lumbar region           PLAN:  Pt is having mild - moderate anxiety without panic attacks, and he is not depressed at this time, though suffers chronic back pain with I believe is interfering with his concentration today.  He reports some memory difficulties and states his PCP is referring him to neurology.  Tx options discussed and Pt   Continue:     Aripiprazole 10mg (1) tab po qd # 90   Duloxetine 60mg (1) cap po bid # 180   Trazodone 50mg (1-2) tabs po qhs # 180   Mirtazapine 15mg (1) tab po qhs - Pt was given a Rx for Qty 90 with R1 on 11/7/2024  Gabapentin 600mg (1) tab po tid-Per PCP  Pt continues counseling with Gregory SUGGSW   Pt to f/u PCP, Neurology  Return 3/13/2025 at 4:30PM.  Can call any time sooner prn.       MEDICATION MANAGEMENT NOTE        [facility]      Name and Date of Birth:  Marv Astorga 64 y.o. 1960    Date of Visit: February 5, 2025    HPI:    Marv Astorga is here for medication review with primary c/o \"Sometimes you know, I feel a little bit shaky or worried, sometimes, you know like-- I'm a lot better.\"  He is anxious but no recent panic attacks.  He reports the present trigger for his anxiety is his " "chronic back pain.  Back hurts at 8/10 right now and he continues mgt for this by his PCP-- which includes conservative measures, with Oxycodone. He reports the pain medicine is helping but the pain can cause interrupted sleep, and reduced energy and concentration.  At home, he will do some stretching and walking.  He reads from his phone-- Scott Cook, watches U-tube videos to keep busy at home otherwise.  His daughter still lives with her and they are getting along.  He occasionally thinks about his finances and legal case, but these are no longer stressing him at this moment per Pt.  He states his  got his medical records but he is not sure if he must go back to [place] to face trial or not.  Pt is paying his bills on time most of the time.  Rent is up to date with the ongoing help the [facility].  He feels \"Sometimes\" feels depressed-- lasting 1-2 days when his pain is acting up, but no lasting mood effect, and he presently denies depression, self-injurious thoughts/behaviors, SI, HI, anxiety, elevated or irritable moods, over-normal energy, reduced sleep requirement, impulsivity, hallucinations or paranoia.   Pt reports compliance to psychiatric medications without SE.   Pt continues counseling with Gregory Bay LCSW whose recent note reviewed.  Last Tx plan done 12/10/2024.    PHq 9 survey done now:  Current PHQ-9   PHQ-2/9 Depression Screening    Little interest or pleasure in doing things: 0 - not at all  Feeling down, depressed, or hopeless: 0 - not at all  Trouble falling or staying asleep, or sleeping too much: 3 - nearly every day  Feeling tired or having little energy: 1 - several days  Poor appetite or overeatin - not at all  Feeling bad about yourself - or that you are a failure or have let yourself or your family down: 0 - not at all  Trouble concentrating on things, such as reading the newspaper or watching television: 3 - nearly every day  Moving or speaking so slowly " "that other people could have noticed. Or the opposite - being so fidgety or restless that you have been moving around a lot more than usual: 0 - not at all  Thoughts that you would be better off dead, or of hurting yourself in some way: 0 - not at all  PHQ-9 Score: 7  PHQ-9 Interpretation: Mild depression          Appetite Changes and Sleep: adequate number of sleep hours, with his medicines, adequate appetite, suboptimal energy at times-- due to his physical condition    Review Of Systems:      Constitutional negative   ENT negative   Cardiovascular negative   Respiratory negative   Gastrointestinal negative   Genitourinary negative   Musculoskeletal negative   Integumentary negative   Neurological negative   Endocrine negative   Other Symptoms none, all other systems are negative       Past Psychiatric History:   As copied from my 12/10/2024 note with updates as needed:  \" [ Pt was born in [place] and grew up with biological parents, 4 sisters and 3 brother.  Pt is the fourth eldest.  He describes his upbringing as \"Happy\" and he was close to parents and siblings. His father moved [place] the [place] in approx [place].  [place] in [place] to be with his father and seek opportunities.  His siblings and mom followed him later-- in approx 1986.     Depression started 2022:  sadness, crying, anhedonia, self-isolating, insomnia, reduced appetite with Wt loss, impaired energy, motivation, and concentration, difficulty making decisions, feelings of guilt-(Pt stated he did not remember about this), feelings of hopelessness with passive SI wanting to be dead, and also overt SI of wanting to shoot himself, but no attempt.        Psychotic Sxs:  +AH of someone talking to him -- but this only tends to happen when he is at home and when he is there alone, which is why he does not like to be alone.  He has had hypnagogic visions of shadows.  He otherwise denies VH, TH, OH, paranoia or bizarre delusions.                Nasreen: " " Pt reports of some irritable moods for a couple of days, without other manic type      Anxiety started in 2022 incited by family issues and finances: excessive worry more days than not for longer than 3 months, The Sxs can occur without concommittent depressive Sxs., difficulty concentrating, fatigue, insomnia, irritability, restlessness/keyed up, and muscle tension and pain in chest, irritability.     Panic attacks started in 10/2023 while inpatient, due to \"Too much pressure, I couldn't handle it.\"  Has had several since then.  Sxs: sweating, trembling, shortness of breath, choking sensation, chest pain/pressure, dizzy/light headed, chills, feels paralyzed-- like he cannot move his body and sometimes collapses.  He once fell and cut his Lt hand requiring sutures.      Social Anxiety symptoms started 7/2023 -- \"Sometimes I don't think people is good.\"  When asked if he felt a sense of judgment or embarrassment by others, he stated \"I don't know.\"          Eating Disorder symptoms: no historical or current eating disorder. no binge eating disorder; no anorexia nervosa. no symptoms of bulimia     Pt denies h/o PTSD or OCD Sxs     Prior psychiatrists:   C[facility]ian in 2022 -- due to depression and anxiety Sxs.  That facility shut down  1st one seen in [place] in approx 2019 -- Pt unsure of why he went     Prior psychotherapists:  Gregory Bay LCSW 10/30/2023 -- ongoing  [facility] 5/26/2022 - 2023     Past Hospitalizations:  1st and only one: 10/10/2023 - 10/17/2023 at [facility] -- on a 201 commitment, due to increasing depression with SI and desire to shoot himself and the welfare man because of his financial state and medical issues. Over the preceding year, his depression had been increasing due to chronic pain, falls, and increased difficulty ambulating (already using a walker).  Pt is on SSI and lives with 14y/o daughter and had recently allowed his wife (from whom he was ) to " "come live with them due to her need to have a place to be after her anticipated surgery since she would not be able to climb steps afterwards.  When her income was added in, his SSI cash and foodstamps were both reduced and he could no longer make his bills. His home health care benefit was also removed. The last straw was finding out his bank acct was overdrawn. Before admission, Pt had been getting prescribed Duloxetine from his PCP -- this was continued and Aripiprazole added for mood Tx augmentation.  Pt improved and Discharge Rxs: Aripiprazole 2mg qd for mood, Duloxetine 60mg bid for mood and pain mgt, Gabapentin 600mg tid for anxiety and pain mgt, Mirtazapine 15mg qhs for mood and insomnia, and Trazodone 50mg qhs for insomnia.      Pt had denied suicide attempts, self-injurious behaviors, physically violent behaviors, ECT, TMS, or legal or  Hx      Prior Rx trials:  Amitriptyline 25mg (for lumbar radiculopathy) , Sertraline 25mg, Venlafaxine ER up to 150mg (? Why stopped) ,  Duloxetine up to 60mg bid (for mood and neuropathy and helped both), Mirtazapine up to 15mg (helped), Aripiprazole 2mg (helped), Trazodone up to 50mg (helped), Zolpidem 5mg (helped), Melatonin 3mg (helped), Gabapentin up to 800mg tid for neuropathic pain (helps) ,      Abuse Hx: Pt denies any h/o physical, sexual or emotional abuse     Trauma Hx: Pt denies                      ] \"      Past Medical History:    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    Current severe episode of major depressive disorder without prior episode (HCC) 10/11/2023    Depression     Diabetes mellitus (HCC)     Hyperlipidemia     Hypertension     Suicidal intent 10/10/2023    Type 2 diabetes mellitus without complication, without long-term current use of insulin ([facility]) 10/16/2020       Substance Abuse History:    Social History     Substance and Sexual " Activity   Alcohol Use Not Currently    Comment: rare     Social History     Substance and Sexual Activity   Drug Use Never       Social History:    Social History     Socioeconomic History    Marital status: /Civil Union     Spouse name: Not on file    Number of children: 7    Years of education: Not on file    Highest education level: Not on file   Occupational History    Not on file   Tobacco Use    Smoking status: Never     Passive exposure: Never    Smokeless tobacco: Never   Vaping Use    Vaping status: Never Used   Substance and Sexual Activity    Alcohol use: Not Currently     Comment: rare    Drug use: Never    Sexual activity: Not Currently   Other Topics Concern    Not on file   Social History Narrative    Home: Lives with his 14y/o daughter in a rental house owned by an elder daughter    Children:  3 sons, 4 daughters            Educational:    Pt does not know if he reached childhood milestones on times.  He denies h/o learning disabilities    Highest grade completed: 7th     No college     Social Drivers of Health     Financial Resource Strain: Medium Risk (1/9/2025)    Overall Financial Resource Strain (CARDIA)     Difficulty of Paying Living Expenses: Somewhat hard   Food Insecurity: No Food Insecurity (1/9/2025)    Hunger Vital Sign     Worried About Running Out of Food in the Last Year: Never true     Ran Out of Food in the Last Year: Never true   Transportation Needs: No Transportation Needs (1/8/2024)    PRAPARE - Transportation     Lack of Transportation (Medical): No     Lack of Transportation (Non-Medical): No   Physical Activity: Not on file   Stress: Not on file   Social Connections: Not on file   Intimate Partner Violence: Not on file   Housing Stability: Low Risk  (1/9/2025)    Housing Stability Vital Sign     Unable to Pay for Housing in the Last Year: No     Number of Times Moved in the Last Year: 0     Homeless in the Last Year: No       Family Psychiatric History:     Family  History   Problem Relation Age of Onset    Hypertension Mother     Diabetes Mother     Cancer Father     Diabetes Family     Hypertension Family        History Review: The following portions of the patient's history were reviewed and updated as appropriate: allergies, current medications, past family history, past medical history, past social history, past surgical history, and problem list.         OBJECTIVE:     Mental Status Evaluation:    Appearance Casually dressed, good eye contact and hygiene   Behavior Calm, cooperative, pleasant   Speech normal rate and volume, low at times mumbles at times   Mood Anxious   Affect Normal range and intensity   Thought Processes Organized, goal directed   Associations Intact   Thought Content No delusions   Perceptual Disturbances: Pt denies any form of hallucinations and does not appear to be responding to internal stimuli   Abnormal Thoughts  Risk Potential Suicidal ideation - None  Homicidal ideation - None  Potential for aggression - No   Orientation oriented to person, place, situation, day of week, date, month of year, and year   Memory short term memory grossly intact  1/3 word recall   Cosciousness alert and awake   Attention Span attention span and concentration are age appropriate   Intellect [place] than expected  Serial 7s: 100, 97, 90, 83, 76, 59, 52   Insight good   Judgement good   Muscle Strength and  Gait Slow and steady with cane   Language no difficulty naming common objects, no difficulty repeating a phrase   Fund of Knowledge adequate knowledge of current events  adequate fund of knowledge regarding past history  adequate fund of knowledge regarding vocabulary    Pain Severe   Pain Scale 8/10 in back       Laboratory Results: I have personally reviewed all pertinent laboratory/tests results.  Most Recent Labs:   Lab Results   Component Value Date    WBC 3.61 (L) 12/15/2024    RBC 4.64 12/15/2024    HGB 13.7 12/15/2024    HCT 39.0 12/15/2024      12/15/2024    RDW 13.2 12/15/2024    NEUTROABS 1.75 (L) 12/15/2024    SODIUM 136 12/15/2024    K 3.9 12/15/2024     12/15/2024    CO2 27 12/15/2024    BUN 17 12/15/2024    CREATININE 1.29 12/15/2024    GLUC 161 (H) 12/15/2024    GLUF 149 (H) 04/10/2024    CALCIUM 9.0 12/15/2024    AST 18 12/15/2024    ALT 19 12/15/2024    ALKPHOS 48 12/15/2024    TP 7.1 12/15/2024    ALB 4.0 12/15/2024    TBILI 0.46 12/15/2024    CHOLESTEROL 157 11/26/2024    HDL 53 11/26/2024    TRIG 207 (H) 11/26/2024    LDLCALC 63 11/26/2024    NONHDLC 104 11/26/2024    VHB0IETNIXSB 0.985 10/10/2023    FREET4 0.88 04/21/2022    HGBA1C 6.5 08/30/2024     04/10/2024     Imaging Studies: MRI prostate multiparametric wo w contrast  Result Date: 1/9/2025  Narrative: MULTIPARAMETRIC MRI OF THE PROSTATE WITH AND WITHOUT CONTRAST-WITH 3-D POSTPROCESSING INDICATION: R97.20: Elevated prostate specific antigen (PSA). PSA LEVEL: 2.5 ng/mL on November 26, 2024. PRIOR BIOPSY DATE: No prior biopsy. BIOPSY RESULTS: Not applicable. COMPARISON: None. TECHNIQUE: Multiparametric MRI of the prostate was performed with and without contrast CONTRAST: Gadobutrol (Gadavist) 9 mL of Gadobutrol injection (SINGLE-DOSE) TECHNICAL LIMITATIONS: None. FINDINGS: PROSTATE: Size: 4.7 x 4.7 x 4.3 cm = 51.4 mL. PSAD= 0.05 ng/mL2 Hemorrhage: None. Benign prostatic hyperplasia (BPH): Mild. Focal lesions - No dominant lesion. Heterogeneous peripheral zone. PI-RADSv2.1 Category 2 - Low (clinically significant cancer unlikely). Note: Clinically significant cancer is defined on pathology/histology as Jessica score greater than or equal to 7, and/or volume of greater than or equal to 0.5 mL, and/or extraprostatic extension. SEMINAL VESICLES : Unremarkable. URINARY BLADDER: Unremarkable. LYMPH NODES: No pelvic lymphadenopathy. BONES: No suspicious osseous lesion.     Impression: 1. PI-RADSv2.1 Category 2 - Low (clinically significant cancer is unlikely to be present). 2. Mild  BPH with calculated prostate volume of 51 mL. Prostate gland boundaries were segmented using 3D advanced post-processing on an independent [email or web address] system workstation with active physician participation. Workstation performed: [id number]           Risks/Benefits      Risks, Benefits And Possible Side Effects Of Medications:    Risks, benefits, and possible side effects of medications explained to Marv and he verbalizes understanding and agreement for treatment.    Controlled Medication Discussion:     Marv has been filling controlled prescriptions on time as prescribed according to [place] Prescription Drug Monitoring Program-- Oxycodone per PCP    Visit Time    Visit Start Time: 9:35AM  Visit Stop Time: 10:16AM  Total Visit Duration:  As above minutes

## 2025-01-27 NOTE — TELEPHONE ENCOUNTER
Patients Name: Marv Astorga    : 1960    Phone Number: [phone]    Appointment Date: 25    Appointment time: 9:00am     Address: [address] [place] [address] [address] [place] [id number]    Drop Off Facility/Office: [facility]    Drop Off Address: [facility] [address], [place] [address]    P/U 8:30am

## 2025-01-29 DIAGNOSIS — G89.4 CHRONIC PAIN SYNDROME: ICD-10-CM

## 2025-01-29 DIAGNOSIS — G89.29 CHRONIC LEFT-SIDED LOW BACK PAIN WITH LEFT-SIDED SCIATICA: ICD-10-CM

## 2025-01-29 DIAGNOSIS — E11.9 TYPE 2 DIABETES MELLITUS WITHOUT COMPLICATION, WITHOUT LONG-TERM CURRENT USE OF INSULIN (HCC): ICD-10-CM

## 2025-01-29 DIAGNOSIS — F11.20 CONTINUOUS OPIOID DEPENDENCE (HCC): ICD-10-CM

## 2025-01-29 DIAGNOSIS — M54.42 CHRONIC LEFT-SIDED LOW BACK PAIN WITH LEFT-SIDED SCIATICA: ICD-10-CM

## 2025-01-29 RX ORDER — OXYCODONE HYDROCHLORIDE 10 MG/1
10 TABLET ORAL 3 TIMES DAILY
Qty: 90 TABLET | Refills: 0 | Status: CANCELLED | OUTPATIENT
Start: 2025-01-29

## 2025-01-29 NOTE — TELEPHONE ENCOUNTER
Patient would like refills on:        oxyCODONE (ROXICODONE) 10 MG TABS   glucose blood (OneTouch Verio) test strip       Please advise

## 2025-01-31 ENCOUNTER — OFFICE VISIT (OUTPATIENT)
Dept: DENTISTRY | Facility: CLINIC | Age: 65
End: 2025-01-31

## 2025-01-31 ENCOUNTER — OFFICE VISIT (OUTPATIENT)
Dept: FAMILY MEDICINE CLINIC | Facility: CLINIC | Age: 65
End: 2025-01-31

## 2025-01-31 VITALS
HEART RATE: 78 BPM | TEMPERATURE: 97.5 F | OXYGEN SATURATION: 99 % | DIASTOLIC BLOOD PRESSURE: 82 MMHG | SYSTOLIC BLOOD PRESSURE: 132 MMHG | BODY MASS INDEX: 31.08 KG/M2 | RESPIRATION RATE: 18 BRPM | WEIGHT: 198 LBS | HEIGHT: 67 IN

## 2025-01-31 VITALS — SYSTOLIC BLOOD PRESSURE: 138 MMHG | DIASTOLIC BLOOD PRESSURE: 84 MMHG | HEART RATE: 76 BPM

## 2025-01-31 DIAGNOSIS — G89.4 CHRONIC PAIN SYNDROME: ICD-10-CM

## 2025-01-31 DIAGNOSIS — M54.42 CHRONIC LEFT-SIDED LOW BACK PAIN WITH LEFT-SIDED SCIATICA: Primary | ICD-10-CM

## 2025-01-31 DIAGNOSIS — G89.29 CHRONIC LEFT-SIDED LOW BACK PAIN WITH LEFT-SIDED SCIATICA: Primary | ICD-10-CM

## 2025-01-31 DIAGNOSIS — R26.2 AMBULATORY DYSFUNCTION: ICD-10-CM

## 2025-01-31 DIAGNOSIS — M54.16 RADICULOPATHY, LUMBAR REGION: ICD-10-CM

## 2025-01-31 DIAGNOSIS — E11.9 TYPE 2 DIABETES MELLITUS WITHOUT COMPLICATION, WITHOUT LONG-TERM CURRENT USE OF INSULIN (HCC): ICD-10-CM

## 2025-01-31 DIAGNOSIS — Z01.20 ENCOUNTER FOR DENTAL EXAMINATION: Primary | ICD-10-CM

## 2025-01-31 DIAGNOSIS — F11.20 CONTINUOUS OPIOID DEPENDENCE (HCC): ICD-10-CM

## 2025-01-31 PROCEDURE — WIS5009 POST-OP DENTURE ADJUSTMENT

## 2025-01-31 PROCEDURE — 99214 OFFICE O/P EST MOD 30 MIN: CPT | Performed by: FAMILY MEDICINE

## 2025-01-31 PROCEDURE — 3075F SYST BP GE 130 - 139MM HG: CPT | Performed by: FAMILY MEDICINE

## 2025-01-31 PROCEDURE — 3079F DIAST BP 80-89 MM HG: CPT | Performed by: FAMILY MEDICINE

## 2025-01-31 PROCEDURE — G2211 COMPLEX E/M VISIT ADD ON: HCPCS | Performed by: FAMILY MEDICINE

## 2025-01-31 PROCEDURE — D5899 UNSPECIFIED REMOVABLE PROSTHODONTIC PROCEDURE, BY REPORT: HCPCS

## 2025-01-31 RX ORDER — OXYCODONE HYDROCHLORIDE 10 MG/1
10 TABLET ORAL 3 TIMES DAILY
Qty: 90 TABLET | Refills: 0 | Status: SHIPPED | OUTPATIENT
Start: 2025-01-31

## 2025-01-31 RX ORDER — BLOOD SUGAR DIAGNOSTIC
STRIP MISCELLANEOUS
Qty: 100 EACH | Refills: 3 | Status: SHIPPED | OUTPATIENT
Start: 2025-01-31

## 2025-01-31 NOTE — PROGRESS NOTES
Name: Marv Astorga      : 1960      MRN: [id number]  Encounter Provider: Audrey Ruvalcaba MD  Encounter Date: 2025   Encounter department: [facility]  :  Assessment & Plan  Chronic left-sided low back pain with left-sided sciatica  Continue current conservative management of pain with ice, heat, stretches and massage.  Refilled oxycodone 10 mg 3 times daily  Referred to comprehensive spine/physical therapy  Ordered heat pad, but unsure if insurance will provide coverage, DME order placed in chart  Orders:  •  Ambulatory Referral to Comprehensive Spine PT; Future  •  Heating Pads PADS; Use in the morning  •  oxyCODONE (ROXICODONE) 10 MG TABS; Take 1 tablet (10 mg total) by mouth 3 (three) times a day Max Daily Amount: 30 mg  •  Ambulatory Referral to Physical Therapy; Future    Radiculopathy, lumbar region    Orders:  •  Ambulatory Referral to Comprehensive Spine PT; Future  •  Ambulatory Referral to Physical Therapy; Future    Ambulatory dysfunction    Orders:  •  Ambulatory Referral to Comprehensive Spine PT; Future  •  Ambulatory Referral to Physical Therapy; Future    Chronic pain syndrome    Orders:  •  oxyCODONE (ROXICODONE) 10 MG TABS; Take 1 tablet (10 mg total) by mouth 3 (three) times a day Max Daily Amount: 30 mg    Continuous opioid dependence (HCC)    Orders:  •  oxyCODONE (ROXICODONE) 10 MG TABS; Take 1 tablet (10 mg total) by mouth 3 (three) times a day Max Daily Amount: 30 mg    Type 2 diabetes mellitus without complication, without long-term current use of insulin (HCC)    Lab Results   Component Value Date    HGBA1C 6.5 2024     Needs refills of the test strips    Orders:  •  glucose blood (OneTouch Verio) test strip; Testing once daily        BMI Counseling: Body mass index is 31.01 kg/m². The BMI is above normal. Nutrition recommendations include decreasing portion sizes, encouraging healthy choices of fruits and vegetables, consuming  "healthier snacks, limiting drinks that contain sugar, moderation in carbohydrate intake and reducing intake of cholesterol. Exercise recommendations include moderate physical activity 150 minutes/week. Rationale for BMI follow-up plan is due to patient being overweight or obese.       History of Present Illness   HPI  Marv Astorga is a 64 y.o. male  who presented to the office today for chronic back pain.  Pt states that his back pain has increased, especially due to this cold weather.  He has been streching and exercising, but the pain continues.  Uses the walker and cane at home.  Currently patient denies any increase in numbness or weakness of his lower legs, bowel or bladder changes or saddle anesthesia.      Review of Systems   Constitutional:  Negative for activity change, appetite change, chills and fever.   HENT:  Negative for congestion, rhinorrhea and sore throat.    Respiratory:  Negative for cough and shortness of breath.    Cardiovascular:  Negative for chest pain.   Gastrointestinal:  Negative for diarrhea, nausea and vomiting.   Musculoskeletal:  Positive for back pain and gait problem.   Skin:  Negative for rash.   Neurological:  Negative for dizziness and headaches.   Psychiatric/Behavioral:  Negative for sleep disturbance and suicidal ideas. The patient is not nervous/anxious.        Objective   /82 (BP Location: Right arm, Patient Position: Sitting, Cuff Size: Standard)   Pulse 78   Temp 97.5 °F (36.4 °C) (Temporal)   Resp 18   Ht 5' 7\" (1.702 m)   Wt 89.8 kg (198 lb)   SpO2 99%   BMI 31.01 kg/m²      Physical Exam  Vitals and nursing note reviewed.   Constitutional:       General: He is not in acute distress.     Appearance: He is well-developed. He is obese.      Comments: +walking cane   HENT:      Head: Normocephalic and atraumatic.   Eyes:      Conjunctiva/sclera: Conjunctivae normal.   Cardiovascular:      Rate and Rhythm: Normal rate and regular rhythm.      Heart sounds: No " murmur heard.  Pulmonary:      Effort: Pulmonary effort is normal. No respiratory distress.      Breath sounds: Normal breath sounds.   Abdominal:      Palpations: Abdomen is soft.      Tenderness: There is no abdominal tenderness.   Musculoskeletal:         General: No swelling.      Cervical back: Neck supple.   Skin:     General: Skin is warm and dry.      Capillary Refill: Capillary refill takes less than 2 seconds.   Neurological:      Mental Status: He is alert.   Psychiatric:         Mood and Affect: Mood normal.

## 2025-01-31 NOTE — ASSESSMENT & PLAN NOTE
Orders:  •  oxyCODONE (ROXICODONE) 10 MG TABS; Take 1 tablet (10 mg total) by mouth 3 (three) times a day Max Daily Amount: 30 mg

## 2025-01-31 NOTE — PROGRESS NOTES
Procedure Details  [id number] - POST-OP DENTURE ADJUSTMENT  Denture Adjustment    Marv Astorga 64 y.o. male presents for Denture Adjustment.  PMH reviewed, no changes, ASA ASA 2 - Patient with mild systemic disease with no functional limitations.  Pain level 2.    Diagnosis:  History of denture delivery about 6 months ago.    Subjective report:  Patient notes left side discomfort.    Procedure details:  Evaluated edentulous areas for redness or sore spots.  Used PIP paste to identify pressure points and adjusted with acrylic bur.   Checked occlusion and adjusted with acrylic bur.    Patient dismissed ambulatory and alert.    NV: partial follow up in about 3 weeks

## 2025-01-31 NOTE — ASSESSMENT & PLAN NOTE
Continue current conservative management of pain with ice, heat, stretches and massage.  Refilled oxycodone 10 mg 3 times daily  Referred to comprehensive spine/physical therapy  Ordered heat pad, but unsure if insurance will provide coverage, DME order placed in chart  Orders:  •  Ambulatory Referral to Comprehensive Spine PT; Future  •  Heating Pads PADS; Use in the morning  •  oxyCODONE (ROXICODONE) 10 MG TABS; Take 1 tablet (10 mg total) by mouth 3 (three) times a day Max Daily Amount: 30 mg  •  Ambulatory Referral to Physical Therapy; Future

## 2025-01-31 NOTE — ASSESSMENT & PLAN NOTE
Orders:  •  Ambulatory Referral to Comprehensive Spine PT; Future  •  Ambulatory Referral to Physical Therapy; Future

## 2025-01-31 NOTE — ASSESSMENT & PLAN NOTE
Lab Results   Component Value Date    HGBA1C 6.5 08/30/2024     Needs refills of the test strips    Orders:  •  glucose blood (OneTouch Verio) test strip; Testing once daily

## 2025-01-31 NOTE — DENTAL PROCEDURE DETAILS
Denture Adjustment    Marv Astorga 64 y.o. male presents for Denture Adjustment.  PMH reviewed, no changes, ASA ASA 2 - Patient with mild systemic disease with no functional limitations.  Pain level 2.    Diagnosis:  History of denture delivery about 6 months ago.    Subjective report:  Patient notes left side discomfort.    Procedure details:  Evaluated edentulous areas for redness or sore spots.  Used PIP paste to identify pressure points and adjusted with acrylic bur.   Checked occlusion and adjusted with acrylic bur.    Patient dismissed ambulatory and alert.    NV: partial follow up in about 3 weeks

## 2025-02-03 RX ORDER — BLOOD SUGAR DIAGNOSTIC
STRIP MISCELLANEOUS
Qty: 100 EACH | Refills: 3 | OUTPATIENT
Start: 2025-02-03

## 2025-02-04 NOTE — TELEPHONE ENCOUNTER
CLOTILDE Photo Preface (Leave Blank If You Do Not Want): Photographs were obtained today Detail Level: Zone

## 2025-02-05 ENCOUNTER — OFFICE VISIT (OUTPATIENT)
Dept: PSYCHIATRY | Facility: CLINIC | Age: 65
End: 2025-02-05
Payer: MEDICARE

## 2025-02-05 VITALS — HEIGHT: 67 IN | BODY MASS INDEX: 30.81 KG/M2 | WEIGHT: 196.3 LBS

## 2025-02-05 DIAGNOSIS — F32.3 SEVERE MAJOR DEPRESSION WITH PSYCHOTIC FEATURES (HCC): ICD-10-CM

## 2025-02-05 DIAGNOSIS — M54.42 CHRONIC LEFT-SIDED LOW BACK PAIN WITH LEFT-SIDED SCIATICA: ICD-10-CM

## 2025-02-05 DIAGNOSIS — M54.16 RADICULOPATHY, LUMBAR REGION: ICD-10-CM

## 2025-02-05 DIAGNOSIS — F41.1 GENERALIZED ANXIETY DISORDER: Primary | ICD-10-CM

## 2025-02-05 DIAGNOSIS — F11.20 CONTINUOUS OPIOID DEPENDENCE (HCC): ICD-10-CM

## 2025-02-05 DIAGNOSIS — G89.29 CHRONIC LEFT-SIDED LOW BACK PAIN WITH LEFT-SIDED SCIATICA: ICD-10-CM

## 2025-02-05 DIAGNOSIS — G47.00 INSOMNIA, UNSPECIFIED TYPE: ICD-10-CM

## 2025-02-05 PROCEDURE — 99214 OFFICE O/P EST MOD 30 MIN: CPT | Performed by: PHYSICIAN ASSISTANT

## 2025-02-05 RX ORDER — DULOXETIN HYDROCHLORIDE 60 MG/1
60 CAPSULE, DELAYED RELEASE ORAL 2 TIMES DAILY
Qty: 180 CAPSULE | Refills: 0 | Status: SHIPPED | OUTPATIENT
Start: 2025-02-05

## 2025-02-05 RX ORDER — ARIPIPRAZOLE 10 MG/1
10 TABLET ORAL DAILY
Qty: 90 TABLET | Refills: 0 | Status: SHIPPED | OUTPATIENT
Start: 2025-02-05

## 2025-02-05 RX ORDER — TRAZODONE HYDROCHLORIDE 50 MG/1
TABLET, FILM COATED ORAL
Qty: 180 TABLET | Refills: 0 | Status: SHIPPED | OUTPATIENT
Start: 2025-02-05

## 2025-02-05 RX ORDER — MIRTAZAPINE 15 MG/1
15 TABLET, FILM COATED ORAL
Qty: 90 TABLET | Refills: 0 | Status: SHIPPED | OUTPATIENT
Start: 2025-02-05

## 2025-02-05 NOTE — ASSESSMENT & PLAN NOTE
Orders:    mirtazapine (REMERON) 15 mg tablet; Take 1 tablet (15 mg total) by mouth daily at bedtime    traZODone (DESYREL) 50 mg tablet; Take 1 to 2 tabs po qhs

## 2025-02-16 DIAGNOSIS — I10 BENIGN ESSENTIAL HTN: ICD-10-CM

## 2025-02-17 ENCOUNTER — TELEPHONE (OUTPATIENT)
Dept: PSYCHIATRY | Facility: CLINIC | Age: 65
End: 2025-02-17

## 2025-02-17 RX ORDER — AMLODIPINE BESYLATE 10 MG/1
10 TABLET ORAL DAILY
Qty: 90 TABLET | Refills: 1 | OUTPATIENT
Start: 2025-02-17

## 2025-02-17 NOTE — TELEPHONE ENCOUNTER
Patients Name: Marv Astorga    : 1960    Phone Number: [phone]    Appointment Date: 25    Appointment time: 9:00am     Address: [address] [place] [address] [address] [id number]    Drop Off Facility/Office: [facility]    Drop Off Address: [facility] [address], [place] [address]     8:30am

## 2025-02-18 ENCOUNTER — TELEPHONE (OUTPATIENT)
Dept: FAMILY MEDICINE CLINIC | Facility: CLINIC | Age: 65
End: 2025-02-18

## 2025-02-18 NOTE — TELEPHONE ENCOUNTER
Pharmacy called stating the have faxed us a DWO form multiple times and they have gotten no response, she stated she will be faxing over another form.

## 2025-02-19 ENCOUNTER — SOCIAL WORK (OUTPATIENT)
Dept: BEHAVIORAL/MENTAL HEALTH CLINIC | Facility: CLINIC | Age: 65
End: 2025-02-19
Payer: MEDICARE

## 2025-02-19 DIAGNOSIS — F41.1 GAD (GENERALIZED ANXIETY DISORDER): Primary | ICD-10-CM

## 2025-02-19 DIAGNOSIS — F32.2 CURRENT SEVERE EPISODE OF MAJOR DEPRESSIVE DISORDER WITHOUT PSYCHOTIC FEATURES WITHOUT PRIOR EPISODE (HCC): ICD-10-CM

## 2025-02-19 PROCEDURE — 90837 PSYTX W PT 60 MINUTES: CPT | Performed by: SOCIAL WORKER

## 2025-02-19 NOTE — PSYCH
"Behavioral Health Psychotherapy Progress Note    Psychotherapy Provided: Individual Psychotherapy     1. SAHRA (generalized anxiety disorder)        2. Current severe episode of major depressive disorder without psychotic features without prior episode ([facility])            Goals addressed in session: Goal 1     DATA: Marv arrived for his appointment. He admits his depression and his anxiety fluctuate with how bad his physical pain is and the stress he endures over his financial situation. He shared talking about the issues helps him and he does have a / thru the [facility]. I provide empathy, support, encouragement and strategies to cope.   During this session, this clinician used the following therapeutic modalities: Client-centered Therapy, Cognitive Behavioral Therapy, Mindfulness-based Strategies, and Supportive Psychotherapy    Substance Abuse was not addressed during this session. If the client is diagnosed with a co-occurring substance use disorder, please indicate any changes in the frequency or amount of use: n/a. Stage of change for addressing substance use diagnoses: No substance use/Not applicable    ASSESSMENT:  Marv Astorga presents with a Depressed mood.     his affect is depressed over his physical pain which is congruent, with his mood and the content of the session. The client has made progress on their goals however pain gets in his way.     Marv Astorga presents with a none risk of suicide, none risk of self-harm, and none risk of harm to others.    For any risk assessment that surpasses a \"low\" rating, a safety plan must be developed.    A safety plan was indicated: no  If yes, describe in detail n/a    PLAN: Between sessions, Marv Astorga will use mindfulness and CBT. At the next session, the therapist will use Client-centered Therapy, Cognitive Behavioral Therapy, Mindfulness-based Strategies, and Supportive Psychotherapy to address issues and symptoms as they " may arise..    Behavioral Health Treatment Plan and Discharge Planning: Marv Astorga is aware of and agrees to continue to work on their treatment plan. They have identified and are working toward their discharge goals. yes    Depression Follow-up Plan Completed: Not applicable    Visit start and stop times:    02/19/25  Start Time: 0900  Stop Time: 1000  Total Visit Time: 60 minutes

## 2025-02-27 ENCOUNTER — EVALUATION (OUTPATIENT)
Dept: PHYSICAL THERAPY | Facility: CLINIC | Age: 65
End: 2025-02-27
Payer: MEDICARE

## 2025-02-27 VITALS — SYSTOLIC BLOOD PRESSURE: 160 MMHG | DIASTOLIC BLOOD PRESSURE: 109 MMHG

## 2025-02-27 DIAGNOSIS — G89.29 CHRONIC LEFT-SIDED LOW BACK PAIN WITH LEFT-SIDED SCIATICA: ICD-10-CM

## 2025-02-27 DIAGNOSIS — G89.4 CHRONIC PAIN SYNDROME: ICD-10-CM

## 2025-02-27 DIAGNOSIS — M54.16 RADICULOPATHY, LUMBAR REGION: ICD-10-CM

## 2025-02-27 DIAGNOSIS — R26.2 AMBULATORY DYSFUNCTION: ICD-10-CM

## 2025-02-27 DIAGNOSIS — E11.9 TYPE 2 DIABETES MELLITUS WITHOUT COMPLICATION, WITHOUT LONG-TERM CURRENT USE OF INSULIN (HCC): ICD-10-CM

## 2025-02-27 DIAGNOSIS — M54.42 CHRONIC LEFT-SIDED LOW BACK PAIN WITH LEFT-SIDED SCIATICA: ICD-10-CM

## 2025-02-27 DIAGNOSIS — F11.20 CONTINUOUS OPIOID DEPENDENCE (HCC): ICD-10-CM

## 2025-02-27 PROCEDURE — 97162 PT EVAL MOD COMPLEX 30 MIN: CPT | Performed by: PHYSICAL THERAPIST

## 2025-02-27 PROCEDURE — 97112 NEUROMUSCULAR REEDUCATION: CPT | Performed by: PHYSICAL THERAPIST

## 2025-02-27 NOTE — PROGRESS NOTES
PT Evaluation     Today's date: 2025  Patient name: Marv Astorga  : 1960  MRN: [id number]  Referring provider: Audrey Ruvalcaba MD  Dx:   Encounter Diagnosis     ICD-10-CM    1. Chronic left-sided low back pain with left-sided sciatica  M54.42 Ambulatory Referral to [facility]    G89.29       2. Radiculopathy, lumbar region  M54.16 Ambulatory Referral to [facility]      3. Ambulatory dysfunction  R26.2 Ambulatory Referral to [facility]          Start Time: 836  Stop Time: 920  Total time in clinic (min): 44 minutes    Assessment  Impairments: abnormal muscle firing, abnormal or restricted ROM, activity intolerance, impaired physical strength, lacks appropriate home exercise program, pain with function, poor posture , unable to perform ADL, participation limitations, activity limitations and endurance  Symptom irritability: moderate    Assessment details: Pt is a 64 y.o. year old male presenting to physical therapy for chronic left-sided low back pain with left-sided sciatica, radiculopathy, lumbar region, ambulatory dysfunction. Patient presents via [facility] program and is high risk based on Start Back assessment tool. He present with the following impairments: lumbar ROM limitations, hypomobile thoracic and lumbar spine, hypertonicity of b/l erector spinae and paraspinals L>R, pain with movement, hypotonic DTR b/l, impaired gross L LE light touch sensation, LE weakness L>R, TTP: erector spinae b/l, paraspinals b/l, T12-S1 SP, (+) slump b/l, pSLR b/l, sacral distraction, JOE b/l affecting their function with standing, lifting, twisting, walking, working, sleeping, and navigating stairs. Pt will benefit from skilled physical therapy with mobility based program to address functional limitations noted in evaluation and meet patient goals.           Understanding of Dx/Px/POC: good     Prognosis: good    Goals  STG  1.Patient will demonstrate independence with  "HEP  2.Patient will improve lumbar ROM to minimal limitations within 4 weeks  3. Patient will improve pain level to 2/10 within 4 weeks  LTG  1.Patient will meet expected FOTO score  2. Patient will improve L LE MMTs to 4+/5 to improve ambulation and stair navigation  3. Patient will be able to walk for 15 minutes without increase in back pain    Plan  Patient would benefit from: PT eval and skilled physical therapy  Planned modality interventions: TENS, cryotherapy and thermotherapy: hydrocollator packs    Planned therapy interventions: strengthening, stretching, postural training, nerve gliding, abdominal trunk stabilization, manual therapy, massage, joint mobilization, neuromuscular re-education, patient/caregiver education, home exercise program, therapeutic exercise, therapeutic activities and flexibility    Frequency: 2x week  Duration in weeks: 8    Subjective Evaluation    History of Present Illness  Mechanism of injury: Patient reports chronic low back pain with radiating symptoms down L>R leg with exacerbation of back pain 2 weeks ago. Patient has been ambulating with standard cane \"for a while\". Patient reports aggravating factors include walking, standing, lifting. Patient reports not having to navigate stairs and avoiding them when possible. Patient reports warm water and heating pad help relieve pain some. Patient is on disability current, worked construction job prior. Patient reports fall on Thursday due to legs giving out on him, no other falls reported. Patient reports taking BP medication about an hour prior to evaluation. Patient denies changes in bowel and bladder habits, denies changes in medical history.   Quality of life: good    Pain  Current pain ratin  Quality: sharp and pulling  Aggravating factors: walking, stair climbing, standing, lifting, overhead activity and sitting        Objective     Concurrent Complaints  Positive for night pain and disturbed sleep. Negative for bladder " dysfunction and bowel dysfunction    Postural Observations  Seated posture: poor  Standing posture: poor      Palpation   Left   No palpable tenderness to the transverse abdominus.   Hypertonic in the erector spinae and lumbar paraspinals.   Tenderness of the erector spinae and lumbar paraspinals.     Right   No palpable tenderness to the transverse abdominus.   Hypertonic in the erector spinae and lumbar paraspinals.   Tenderness of the erector spinae and lumbar paraspinals.     Additional Palpation Details  Hypertonicity and tenderness of erector spinae and tenderness L>R    Tenderness     Lumbar Spine  Tenderness in the spinous process.     Left Hip   No tenderness in the ASIS and PSIS.     Right Hip   No tenderness in the ASIS and PSIS.     Additional Tenderness Details  T12-S1    Neurological Testing     Sensation     Lumbar   Left   Intact: light touch    Right   Diminished: light touch    Reflexes   Left   Patellar (L4): absent (0)  Achilles (S1): trace (1+)  Clonus sign: negative    Right   Patellar (L4): absent (0)  Achilles (S1): trace (1+)  Clonus sign: negative    Active Range of Motion     Lumbar   Flexion:  with pain Restriction level: moderate  Extension:  with pain Restriction level: minimal  Left lateral flexion:  with pain Restriction level: moderate  Right lateral flexion:  with pain Restriction level: moderate  Left rotation:  with pain Restriction level: minimal  Right rotation:  with pain Restriction level: minimal    Joint Play     Hypomobile: T8, T9, T10, T11, T12, L1, L2, L3, L4, L5 and S1     Pain: T12, L1, L2, L3, L4, L5 and S1     Strength/Myotome Testing     Lumbar   Left   Heel walk: abnormal  Toe walk: normal    Right   Heel walk: abnormal  Toe walk: normal    Left Hip   Planes of Motion   Flexion: 4-  Extension: 3+  Abduction: 4-  External rotation: 4  Internal rotation: 4    Right Hip   Planes of Motion   Flexion: 4+  Extension: 4-  Abduction: 5  External rotation: 4+  Internal  "rotation: 4+    Left Knee   Flexion: 4+  Extension: 4    Right Knee   Flexion: 5  Extension: 5    Left Ankle/Foot   Dorsiflexion: 4+    Right Ankle/Foot   Dorsiflexion: 5    Muscle Activation   Patient able to activate left transverse abdominals, left multifidus, right transverse abdominals and right multifidus.     Additional Muscle Activation Details  Poor TA and multifidi activation b/l    Tests     Lumbar   Positive sacroiliac distraction  and sacral thrust.   Negative SIJ compression.     Left   Positive passive SLR and slump test.     Right   Positive passive SLR and slump test.     Left Hip   Positive JOE.   Negative FADIR.     Right Hip   Positive JOE.              Precautions: N/A      Date 2/27            Visit # IE            FOTO IE             Re-eval IE                 Manuals 2/27            Paraspinal STM             S/l lumbar rotation mob ND                                      Neuro Re-Ed 2/27            S/l abd 5x L            Sciatic nerve glide 20x ea            Bridges 10x            Clamshells nv            TA brace             Paloff press             UTR             3 way hip nv            Ther Ex 2/27            Bike nv            LTR 5x5\"            Open books nv            Leg press             Lumbar roll nv                                                                Ther Activity 2/27            Squats             Step ups             Gait Training 2/27                                      Modalities 2/27                                           "

## 2025-02-27 NOTE — TELEPHONE ENCOUNTER
oxyCODONE (ROXICODONE) 10 MG TABS         OneTouch Delica Lancets 33G MISC       Pt is requesting medication refills.

## 2025-02-28 RX ORDER — LANCETS 33 GAUGE
EACH MISCELLANEOUS DAILY
Qty: 100 EACH | Refills: 1 | Status: SHIPPED | OUTPATIENT
Start: 2025-02-28

## 2025-02-28 RX ORDER — OXYCODONE HYDROCHLORIDE 10 MG/1
10 TABLET ORAL 3 TIMES DAILY
Qty: 90 TABLET | Refills: 0 | Status: SHIPPED | OUTPATIENT
Start: 2025-02-28 | End: 2025-03-04 | Stop reason: SDUPTHER

## 2025-03-04 DIAGNOSIS — G89.29 CHRONIC LEFT-SIDED LOW BACK PAIN WITH LEFT-SIDED SCIATICA: ICD-10-CM

## 2025-03-04 DIAGNOSIS — M54.42 CHRONIC LEFT-SIDED LOW BACK PAIN WITH LEFT-SIDED SCIATICA: ICD-10-CM

## 2025-03-04 DIAGNOSIS — G89.4 CHRONIC PAIN SYNDROME: ICD-10-CM

## 2025-03-04 DIAGNOSIS — F11.20 CONTINUOUS OPIOID DEPENDENCE (HCC): ICD-10-CM

## 2025-03-04 RX ORDER — OXYCODONE HYDROCHLORIDE 10 MG/1
10 TABLET ORAL 3 TIMES DAILY
Qty: 90 TABLET | Refills: 0 | Status: SHIPPED | OUTPATIENT
Start: 2025-03-04

## 2025-03-06 ENCOUNTER — TELEPHONE (OUTPATIENT)
Dept: PSYCHIATRY | Facility: CLINIC | Age: 65
End: 2025-03-06

## 2025-03-06 NOTE — TELEPHONE ENCOUNTER
Patients Name: Marv Astorga    : 1960    Phone Number: [phone]    Appointment Date: 3/11/25    Appointment time: 10am     Address: [address] [place] [address] [address] [place] [address]    Drop Off Facility/Office: [facility]    Drop Off Address: [facility] [address], [place] [address]     9:30am

## 2025-03-11 ENCOUNTER — SOCIAL WORK (OUTPATIENT)
Dept: BEHAVIORAL/MENTAL HEALTH CLINIC | Facility: CLINIC | Age: 65
End: 2025-03-11
Payer: MEDICARE

## 2025-03-11 DIAGNOSIS — F32.2 CURRENT SEVERE EPISODE OF MAJOR DEPRESSIVE DISORDER WITHOUT PSYCHOTIC FEATURES WITHOUT PRIOR EPISODE (HCC): ICD-10-CM

## 2025-03-11 DIAGNOSIS — F41.1 GAD (GENERALIZED ANXIETY DISORDER): Primary | ICD-10-CM

## 2025-03-11 PROCEDURE — 90837 PSYTX W PT 60 MINUTES: CPT | Performed by: SOCIAL WORKER

## 2025-03-11 NOTE — PSYCH
"Behavioral Health Psychotherapy Progress Note    Psychotherapy Provided: Individual Psychotherapy     1. SAHRA (generalized anxiety disorder)        2. Current severe episode of major depressive disorder without psychotic features without prior episode ([facility])            Goals addressed in session: Goal 1     DATA: Marv shared he can be miserable in his daughter's words. Some of this is due to his severe pain. His daughter watches out for him. Marv shared his 68 year old sister who lived in [place] recently  from a CVA. He will consider going to the  if his doctor clears him for travel. He claims things are going ok at home. He shared he tries to remain positive considering his poor finances and his poor health. He uses the strategies we discuss to control his depression and anxiety when the symptoms arise. I provide support, encouragement and strategies to cope.   During this session, this clinician used the following therapeutic modalities: Client-centered Therapy, Cognitive Behavioral Therapy, Mindfulness-based Strategies, and Supportive Psychotherapy    Substance Abuse was not addressed during this session. If the client is diagnosed with a co-occurring substance use disorder, please indicate any changes in the frequency or amount of use: n/a. Stage of change for addressing substance use diagnoses: No substance use/Not applicable    ASSESSMENT:  Marv Astorga presents with a Euthymic/ normal mood.     his affect is Normal range and intensity, which is congruent, with his mood and the content of the session. The client has made progress on their goals.He tries to stay positive in the midst of things he can't control.      Marv Astorga presents with a none risk of suicide, none risk of self-harm, and none risk of harm to others.    For any risk assessment that surpasses a \"low\" rating, a safety plan must be developed.    A safety plan was indicated: no  If yes, describe in detail n/a    PLAN: Between " sessions, Marv Astorga will use mindfulness and CBT. At the next session, the therapist will use Client-centered Therapy, Cognitive Behavioral Therapy, Mindfulness-based Strategies, and Supportive Psychotherapy to address issues and symptoms as they may arise. .    Behavioral Health Treatment Plan and Discharge Planning: Marv Astorga is aware of and agrees to continue to work on their treatment plan. They have identified and are working toward their discharge goals. yes    Depression Follow-up Plan Completed: Not applicable    Visit start and stop times:    03/11/25  Start Time: 1000  Stop Time: 1100  Total Visit Time: 60 minutes

## 2025-03-14 ENCOUNTER — OFFICE VISIT (OUTPATIENT)
Dept: FAMILY MEDICINE CLINIC | Facility: CLINIC | Age: 65
End: 2025-03-14

## 2025-03-14 VITALS
HEART RATE: 75 BPM | SYSTOLIC BLOOD PRESSURE: 138 MMHG | WEIGHT: 200 LBS | DIASTOLIC BLOOD PRESSURE: 92 MMHG | HEIGHT: 67 IN | BODY MASS INDEX: 31.39 KG/M2 | RESPIRATION RATE: 18 BRPM | TEMPERATURE: 99.2 F | OXYGEN SATURATION: 99 %

## 2025-03-14 DIAGNOSIS — J02.9 PHARYNGITIS, UNSPECIFIED ETIOLOGY: Primary | ICD-10-CM

## 2025-03-14 DIAGNOSIS — L91.0 KELOID SCAR: ICD-10-CM

## 2025-03-14 PROCEDURE — G2211 COMPLEX E/M VISIT ADD ON: HCPCS | Performed by: FAMILY MEDICINE

## 2025-03-14 PROCEDURE — 3075F SYST BP GE 130 - 139MM HG: CPT | Performed by: FAMILY MEDICINE

## 2025-03-14 PROCEDURE — 99214 OFFICE O/P EST MOD 30 MIN: CPT | Performed by: FAMILY MEDICINE

## 2025-03-14 PROCEDURE — 3080F DIAST BP >= 90 MM HG: CPT | Performed by: FAMILY MEDICINE

## 2025-03-14 RX ORDER — AZITHROMYCIN 250 MG/1
TABLET, FILM COATED ORAL
Qty: 6 TABLET | Refills: 0 | Status: SHIPPED | OUTPATIENT
Start: 2025-03-14 | End: 2025-03-18

## 2025-03-14 RX ORDER — BROMPHENIRAMINE MALEATE, PSEUDOEPHEDRINE HYDROCHLORIDE, AND DEXTROMETHORPHAN HYDROBROMIDE 2; 30; 10 MG/5ML; MG/5ML; MG/5ML
5 SYRUP ORAL 4 TIMES DAILY PRN
Qty: 120 ML | Refills: 0 | Status: SHIPPED | OUTPATIENT
Start: 2025-03-14

## 2025-03-14 RX ORDER — TRIAMCINOLONE ACETONIDE 1 MG/G
CREAM TOPICAL 2 TIMES DAILY
Qty: 28.4 G | Refills: 0 | Status: SHIPPED | OUTPATIENT
Start: 2025-03-14

## 2025-03-14 RX ORDER — BENZONATATE 200 MG/1
200 CAPSULE ORAL 3 TIMES DAILY PRN
Qty: 20 CAPSULE | Refills: 0 | Status: SHIPPED | OUTPATIENT
Start: 2025-03-14

## 2025-03-14 NOTE — PROGRESS NOTES
Name: Marv Astorga      : 1960      MRN: [id number]  Encounter Provider: Audrey Ruvalcaba MD  Encounter Date: 3/14/2025   Encounter department: [facility]  :  Assessment & Plan  Pharyngitis, unspecified etiology  Continue conservative management with salt water gargles, nasal saline  Will start azithromycin course  Orders:  •  azithromycin (ZITHROMAX) 250 mg tablet; Take 2 tablets today then 1 tablet daily x 4 days  •  brompheniramine-pseudoephedrine-DM 30-2-10 MG/5ML syrup; Take 5 mL by mouth 4 (four) times a day as needed for allergies, congestion or cough  •  benzonatate (TESSALON) 200 MG capsule; Take 1 capsule (200 mg total) by mouth 3 (three) times a day as needed for cough    Keloid scar  Left posterior thigh keloid  Apply topical steroid cream as needed  Orders:  •  triamcinolone (KENALOG) 0.1 % cream; Apply topically 2 (two) times a day           History of Present Illness   HPI  Marv Astorga is a 64 y.o. male  who presented to the office today due to continued symptoms of fever and sore throat, with slight cough.  Pt produced a yellow colored phlegm this morning, and states felt feverish at night.  Sx started about 2 weeks ago and have not resolved.  No associated vomiting, nausea, diarrhea, shortness of breath, headaches or dizziness.  Patient also mentions that he    Had an injury on the back of his left thigh, it healed but now there is a bump there that is itchy at times.    Patient traveling to [place] in 3 days.    Patient continues to have lower back pain and today states that the left side of his lower back has a muscle spasm for which he has been stretching.    Review of Systems   Constitutional:  Negative for activity change, appetite change, chills and fever.   HENT:  Positive for congestion and sore throat. Negative for rhinorrhea.    Respiratory:  Positive for cough. Negative for shortness of breath.    Cardiovascular:  Negative for chest pain.  "  Gastrointestinal:  Negative for diarrhea, nausea and vomiting.   Musculoskeletal:  Positive for back pain and gait problem.   Skin:  Negative for rash.   Neurological:  Negative for dizziness and headaches.   Psychiatric/Behavioral:  Negative for sleep disturbance and suicidal ideas. The patient is not nervous/anxious.        Objective   /92 (BP Location: Right arm, Patient Position: Sitting, Cuff Size: Large)   Pulse 75   Temp 99.2 °F (37.3 °C) (Temporal)   Resp 18   Ht 5' 7\" (1.702 m)   Wt 90.7 kg (200 lb)   SpO2 99%   BMI 31.32 kg/m²      Physical Exam  Vitals and nursing note reviewed.   Constitutional:       General: He is not in acute distress.     Appearance: He is well-developed. He is obese.      Comments: +walking cane   HENT:      Head: Normocephalic and atraumatic.      Right Ear: Tympanic membrane, ear canal and external ear normal.      Left Ear: Tympanic membrane, ear canal and external ear normal.      Nose: Nose normal.      Mouth/Throat:      Pharynx: Posterior oropharyngeal erythema present.   Eyes:      Conjunctiva/sclera: Conjunctivae normal.   Cardiovascular:      Rate and Rhythm: Normal rate and regular rhythm.      Heart sounds: No murmur heard.  Pulmonary:      Effort: Pulmonary effort is normal. No respiratory distress.      Breath sounds: Examination of the right-upper field reveals decreased breath sounds. Examination of the left-upper field reveals decreased breath sounds. Decreased breath sounds present.   Abdominal:      Palpations: Abdomen is soft.      Tenderness: There is no abdominal tenderness.   Musculoskeletal:         General: No swelling.      Cervical back: Neck supple.   Skin:     General: Skin is warm and dry.      Capillary Refill: Capillary refill takes less than 2 seconds.   Neurological:      Mental Status: He is alert and oriented to person, place, and time.   Psychiatric:         Mood and Affect: Mood normal.         Behavior: Behavior normal.         "

## 2025-04-07 ENCOUNTER — TELEPHONE (OUTPATIENT)
Dept: PSYCHIATRY | Facility: CLINIC | Age: 65
End: 2025-04-07

## 2025-04-07 NOTE — TELEPHONE ENCOUNTER
LVM to inform PT all 5 complimentary [employer] rides have been used. May do virtual or must find own transportation.

## 2025-04-08 ENCOUNTER — SOCIAL WORK (OUTPATIENT)
Dept: BEHAVIORAL/MENTAL HEALTH CLINIC | Facility: CLINIC | Age: 65
End: 2025-04-08
Payer: MEDICARE

## 2025-04-08 DIAGNOSIS — F41.1 GAD (GENERALIZED ANXIETY DISORDER): Primary | ICD-10-CM

## 2025-04-08 DIAGNOSIS — F32.2 CURRENT SEVERE EPISODE OF MAJOR DEPRESSIVE DISORDER WITHOUT PSYCHOTIC FEATURES WITHOUT PRIOR EPISODE (HCC): ICD-10-CM

## 2025-04-08 DIAGNOSIS — R45.851 SUICIDAL IDEATION: ICD-10-CM

## 2025-04-08 PROCEDURE — 90837 PSYTX W PT 60 MINUTES: CPT | Performed by: SOCIAL WORKER

## 2025-04-08 NOTE — PSYCH
"Behavioral Health Psychotherapy Progress Note    Psychotherapy Provided: Individual Psychotherapy     1. SAHRA (generalized anxiety disorder)        2. Current severe episode of major depressive disorder without psychotic features without prior episode ([facility])        3. Suicidal ideation            Goals addressed in session: Goal 1     DATA: Marv claims his depression and his anxiety are under control. He is still struggling financially and he discussed how his finances and his health issues can affect his mood and can increase his anxiety. I provide empathy, support, encouragement along with strategies to cope. I did put referrals in to our social workers to see what resources they can provide him with.   During this session, this clinician used the following therapeutic modalities: Client-centered Therapy, Cognitive Behavioral Therapy, Mindfulness-based Strategies, and Supportive Psychotherapy    Substance Abuse was not addressed during this session. If the client is diagnosed with a co-occurring substance use disorder, please indicate any changes in the frequency or amount of use: n/a. Stage of change for addressing substance use diagnoses: No substance use/Not applicable    ASSESSMENT:  Marv Astorga presents with a Anxious mood.     his affect is anxious, which is congruent, with his mood and the content of the session. The client has made progress on their goals.     Marv Astorga presents with a none risk of suicide, none risk of self-harm, and none risk of harm to others.    For any risk assessment that surpasses a \"low\" rating, a safety plan must be developed.    A safety plan was indicated: no  If yes, describe in detail n/a    PLAN: Between sessions, Marv Astorga will use mindfulness and CBT. At the next session, the therapist will use Client-centered Therapy, Cognitive Behavioral Therapy, Mindfulness-based Strategies, and Supportive Psychotherapy to address issues and symptoms as they may arise. " .    Behavioral Health Treatment Plan and Discharge Planning: Marv Astorga is aware of and agrees to continue to work on their treatment plan. They have identified and are working toward their discharge goals. yes    Depression Follow-up Plan Completed: Not applicable    Visit start and stop times:    04/08/25  Start Time: 0900  Stop Time: 1000  Total Visit Time: 60 minutes

## 2025-04-10 ENCOUNTER — OFFICE VISIT (OUTPATIENT)
Dept: DENTISTRY | Facility: CLINIC | Age: 65
End: 2025-04-10

## 2025-04-10 ENCOUNTER — TELEPHONE (OUTPATIENT)
Dept: FAMILY MEDICINE CLINIC | Facility: CLINIC | Age: 65
End: 2025-04-10

## 2025-04-10 VITALS — SYSTOLIC BLOOD PRESSURE: 130 MMHG | HEART RATE: 82 BPM | DIASTOLIC BLOOD PRESSURE: 81 MMHG

## 2025-04-10 DIAGNOSIS — K03.6 ACCRETIONS ON TEETH: ICD-10-CM

## 2025-04-10 DIAGNOSIS — Z01.20 ENCOUNTER FOR DENTAL EXAMINATION: Primary | ICD-10-CM

## 2025-04-10 DIAGNOSIS — K03.6 DENTAL CALCULUS: ICD-10-CM

## 2025-04-10 DIAGNOSIS — K02.9 DENTAL CARIES: ICD-10-CM

## 2025-04-10 PROCEDURE — D0274 BITEWINGS - 4 RADIOGRAPHIC IMAGES: HCPCS

## 2025-04-10 PROCEDURE — D0120 PERIODIC ORAL EVALUATION - ESTABLISHED PATIENT: HCPCS | Performed by: DENTIST

## 2025-04-10 PROCEDURE — D1110 PROPHYLAXIS - ADULT: HCPCS

## 2025-04-10 NOTE — PROGRESS NOTES
PERIODIC EXAM, ADULT PROPHY , 4 BWX   REVIEWED MED HX: meds, allergies, health changes reviewed in [facility]. All consents signed.  CHIEF CONCERN: no dental pain or concerns    He is very satisfied with U/L RPDs    PAIN SCALE:  0  ASA CLASS:  II  PLAQUE:  moderate  CALCULUS:  light  BLEEDING:   none  STAIN :   none      PERIO: xerostomia noted  stage 2 grade B    Hygiene Procedures:  Scaled, Polished, Flossed and Used Cavitron    Oral Hygiene Instruction: Brushing Minimum 2x daily for 2 minutes, daily flossing and Recommended soft toothbrush only    Dispensed: Toothbrush, Toothpaste, Floss and Flossers    Visual and Tactile Intraoral/ Extraoral evaluation: Oral and Oropharyngeal cancer evaluation. Bruxism evident especially anterior teeth    Dr. Patten   Reviewed with patient clinical and radiographic findings and patient verbalized understanding. All questions and concerns addressed.     REFERRALS: none    CARIES FINDINGS: no decay noted  bruxism noted   rec. Wearing a        TREATMENT  PLAN :   NV:     Next Recall: 6 month recall   re eval # 8  may need crown    Last BWX: 4/10/2025  Last [employer]x/ FMX : 2/2022

## 2025-04-10 NOTE — TELEPHONE ENCOUNTER
PCP SIGNATURE NEEDED FOR [facility]  FORM RECEIVED VIA FAX AND PLACED IN PCP FOLDER TO BE DELIVERED AT ASSIGNED TIMES.    ONETOUCH DELICA PLUS 33G LANCET

## 2025-04-11 ENCOUNTER — OFFICE VISIT (OUTPATIENT)
Dept: FAMILY MEDICINE CLINIC | Facility: CLINIC | Age: 65
End: 2025-04-11

## 2025-04-11 VITALS
HEIGHT: 67 IN | RESPIRATION RATE: 18 BRPM | TEMPERATURE: 98 F | OXYGEN SATURATION: 98 % | WEIGHT: 200 LBS | SYSTOLIC BLOOD PRESSURE: 126 MMHG | HEART RATE: 79 BPM | BODY MASS INDEX: 31.39 KG/M2 | DIASTOLIC BLOOD PRESSURE: 78 MMHG

## 2025-04-11 DIAGNOSIS — G89.4 CHRONIC PAIN SYNDROME: ICD-10-CM

## 2025-04-11 DIAGNOSIS — F11.20 CONTINUOUS OPIOID DEPENDENCE (HCC): ICD-10-CM

## 2025-04-11 DIAGNOSIS — B35.4 TINEA CORPORIS: ICD-10-CM

## 2025-04-11 DIAGNOSIS — M54.42 CHRONIC LEFT-SIDED LOW BACK PAIN WITH LEFT-SIDED SCIATICA: ICD-10-CM

## 2025-04-11 DIAGNOSIS — G89.29 CHRONIC LEFT-SIDED LOW BACK PAIN WITH LEFT-SIDED SCIATICA: ICD-10-CM

## 2025-04-11 DIAGNOSIS — E11.9 TYPE 2 DIABETES MELLITUS WITHOUT COMPLICATION, WITHOUT LONG-TERM CURRENT USE OF INSULIN (HCC): Primary | ICD-10-CM

## 2025-04-11 RX ORDER — CLOTRIMAZOLE 1 %
CREAM (GRAM) TOPICAL 2 TIMES DAILY
Qty: 60 G | Refills: 1 | Status: SHIPPED | OUTPATIENT
Start: 2025-04-11

## 2025-04-11 RX ORDER — OXYCODONE HYDROCHLORIDE 10 MG/1
10 TABLET ORAL 3 TIMES DAILY
Qty: 90 TABLET | Refills: 0 | Status: SHIPPED | OUTPATIENT
Start: 2025-04-11

## 2025-04-11 NOTE — PROGRESS NOTES
Name: Marv Astorga      : 1960      MRN: [id number]  Encounter Provider: Audrey Ruvalcaba MD  Encounter Date: 2025   Encounter department: [facility]  :  Assessment & Plan  Type 2 diabetes mellitus without complication, without long-term current use of insulin (HCC)    Orders:  •  Albumin / creatinine urine ratio; Future    Lab Results   Component Value Date    HGBA1C 6.5 2024    HGBA1C 7.3 (H) 04/10/2024    HGBA1C 7.0 (A) 10/10/2023    HGBA1C 6.7 (H) 05/15/2023     Stable  - Current medications:  Metformin 500 mg bid  - Ophthalmology: pending  - DM Foot exam: completed Podiatry visit   - Dentist:   - Encouraged: Diabetic Diet, Regular exercise, Weight loss  -Lancets and test strip [facility] Medical Form completed today  Orders:  •  Albumin / creatinine urine ratio; Future      Tinea corporis  Rash posterior neck  Orders:  •  clotrimazole (LOTRIMIN) 1 % cream; Apply topically 2 (two) times a day    Chronic left-sided low back pain with left-sided sciatica    Orders:  •  oxyCODONE (ROXICODONE) 10 MG TABS; Take 1 tablet (10 mg total) by mouth 3 (three) times a day Max Daily Amount: 30 mg    Chronic pain syndrome    Orders:  •  oxyCODONE (ROXICODONE) 10 MG TABS; Take 1 tablet (10 mg total) by mouth 3 (three) times a day Max Daily Amount: 30 mg    Continuous opioid dependence (HCC)    Orders:  •  oxyCODONE (ROXICODONE) 10 MG TABS; Take 1 tablet (10 mg total) by mouth 3 (three) times a day Max Daily Amount: 30 mg           History of Present Illness   HPI  Marv Astorga is a 64 y.o. male  who presented to the office today due to a cut on his right middle finger that was not healing.  Patient cut his finger on a metal chair, and for about 1 week it was open had loose discoloration and was painful.  He states over the last 2 to 3 days it has started to heal.  He also was concerned about a rash on his neck, not itchy, + dark discoloration  He will be starting  "physical therapy next week, and has increased pain in his left leg today.    Review of Systems   Constitutional:  Negative for activity change, appetite change, chills and fever.   HENT:  Negative for congestion, rhinorrhea and sore throat.    Respiratory:  Negative for cough and shortness of breath.    Cardiovascular:  Negative for chest pain.   Gastrointestinal:  Negative for diarrhea, nausea and vomiting.   Musculoskeletal:  Positive for back pain and gait problem.   Skin:  Negative for rash.   Neurological:  Negative for dizziness and headaches.   Psychiatric/Behavioral:  Negative for sleep disturbance and suicidal ideas. The patient is not nervous/anxious.        Objective   /78 (BP Location: Left arm, Patient Position: Sitting, Cuff Size: Large)   Pulse 79   Temp 98 °F (36.7 °C) (Temporal)   Resp 18   Ht 5' 7\" (1.702 m)   Wt 90.7 kg (200 lb)   SpO2 98%   BMI 31.32 kg/m²      Physical Exam  Vitals and nursing note reviewed.   Constitutional:       Appearance: He is obese.      Comments: + walking cane   HENT:      Right Ear: External ear normal.      Left Ear: External ear normal.      Nose: Nose normal.   Eyes:      Conjunctiva/sclera: Conjunctivae normal.   Cardiovascular:      Rate and Rhythm: Normal rate.   Pulmonary:      Effort: Respiratory distress present.   Skin:     Findings: Rash present.      Comments: Irregular macular rash on the posterior neck   Neurological:      Mental Status: He is alert and oriented to person, place, and time.   Psychiatric:         Mood and Affect: Mood normal.         Behavior: Behavior normal.         "

## 2025-04-11 NOTE — PSYCH
MEDICATION MANAGEMENT NOTE    Name: Marv Astorga      : 1960      MRN: [id number]  Encounter Provider: Oneyda Smith PA-C  Encounter Date: 2025   Encounter department: [facility]    Insurance: Payor: MEDICARE / Plan: MEDICARE A AND B / Product Type: Medicare A & B Fee for Service /      Reason for Visit: No chief complaint on file.  :  Assessment & Plan  Severe major depression with psychotic features (HCC)    Orders:    ARIPiprazole (ABILIFY) 10 mg tablet; Take 1 tablet (10 mg total) by mouth daily    DULoxetine (CYMBALTA) 60 mg delayed release capsule; Take 1 capsule (60 mg total) by mouth 2 (two) times a day    mirtazapine (REMERON) 30 mg tablet; Take 1 tablet (30 mg total) by mouth daily at bedtime    Insomnia, unspecified type    Orders:    mirtazapine (REMERON) 30 mg tablet; Take 1 tablet (30 mg total) by mouth daily at bedtime    traZODone (DESYREL) 100 mg tablet; Take 1 tablet (100 mg total) by mouth daily at bedtime      PLAN:  Pt is having increased depression, the same anxiety without panic attacks or recent manic or psychotic Sxs.  He appears depressed, but does not demonstrate leah or psychotic S/Sxs in office now.  Tx options discussed and even aside from ongoing challenging life circumstances, he feels his mood could be better, and I feel that he could benefit from medication adjustment as well.  He accepts an increase in Mirtazapine.  In regards to sleep, he achieved good results when taking Trazodone 100mg dose level, hence I will keep him there and change the dose tab formally to 100mg.  No change in other medications.  Pt accepts today's plan.       Increase Mirtazapine to 30mg (1) tab po qhs # 90  Continue:  Aripiprazole 10mg (1) tab po qd # 90   Duloxetine 60mg (1) cap po bid # 180   Trazodone 100mg (2) tabs po qhs # 90   Gabapentin 800mg (1) tab po tid-Per PCP  Pt continues counseling with [facility] LCSW   Return 8-9 weeks.  Can call  "any time sooner prn.     Treatment Recommendations:    Educated about diagnosis and treatment modalities. Verbalizes understanding and agreement with the treatment plan.  Discussed self monitoring of symptoms, and symptom monitoring tools.  Discussed medications and if treatment adjustment was needed or desired.  Aware of 24 hour and weekend coverage for urgent situations accessed by calling [facility] main practice number  I am scheduling this patient out for greater than 3 months: No    Medications Risks/Benefits:      Risks, Benefits And Possible Side Effects Of Medications:    Risks, benefits, and possible side effects of medications explained to Marv and he (or legal representative) verbalizes understanding and agreement for treatment.    Controlled Medication Discussion:     Pt taking Oxycodone by another provider      History of Present Illness      Pt presents for medication review with primary c/o  \"Ups and downs\" he feels depressive Sxs at times, even if not necessarily having pain, with Sxs of (sadness, difficulty focusing, sleep and appetite are down, and sometimes crying).  Energy is \"OK.\"  The duration of his depressive mood is still about 2 days.  Hi anxiety has been fluctuating from 4-7/10 with Sxs of worry,  difficulty concentrating, fatigue, insomnia, irritability, restlessness/keyed up, and some muscle tension in chest area.  Pt reports he does not always take Trazodone, but it helps when he does -- will use 2 tabs to good effect.   NO new triggers to mood and anxiety per Pt-- still has financial struggles but is making all of his bills and has enough food, heat and hot water.  He has not heard anything about his court case in [place].  Right now his back hurts him at 7/10.   Pt presently denies depression, self-injurious thoughts/behaviors, SI, HI, anxiety, panic attacks, elevated or irritable moods, over-normal energy, reduced sleep requirement, impulsivity, " "hallucinations or paranoia.   Pt reports compliance to psychiatric medications without SE.  [facility].  Last Tx plan done 3/11/2025.        Review Of Systems: A review of systems is obtained and is negative except for the pertinent positives listed in HPI/Subjective above.      Current Rating Scores:         Areas of Improvement:  None    Past Psychiatric History:   As copied from my 12/10/2024 note with updates as needed:  \" [ Pt was born in [place] and grew up with biological parents, 4 sisters and 3 brother.  Pt is the fourth eldest.  He describes his upbringing as \"Happy\" and he was close to parents and siblings. His father moved to the [place] in approx [place].  [place] in [place] to be with his father and seek opportunities.  His siblings and mom followed him later-- in approx 1986.     Depression started 2022:  sadness, crying, anhedonia, self-isolating, insomnia, reduced appetite with Wt loss, impaired energy, motivation, and concentration, difficulty making decisions, feelings of guilt-(Pt stated he did not remember about this), feelings of hopelessness with passive SI wanting to be dead, and also overt SI of wanting to shoot himself, but no attempt.        Psychotic Sxs:  +AH of someone talking to him -- but this only tends to happen when he is at home and when he is there alone, which is why he does not like to be alone.  He has had hypnagogic visions of shadows.  He otherwise denies VH, TH, OH, paranoia or bizarre delusions.                Nasreen:  Pt reports of some irritable moods for a couple of days, without other manic type      Anxiety started in 2022 incited by family issues and finances: excessive worry more days than not for longer than 3 months, The Sxs can occur without concommittent depressive Sxs., difficulty concentrating, fatigue, insomnia, irritability, restlessness/keyed up, and muscle tension and pain in chest.   " "     Panic attacks started in 10/2023 while inpatient, due to \"Too much pressure, I couldn't handle it.\"  Has had several since then.  Sxs: sweating, trembling, shortness of breath, choking sensation, chest pain/pressure, dizzy/light headed, chills, feels paralyzed-- like he cannot move his body and sometimes collapses.  He once fell and cut his Lt hand requiring sutures.      Social Anxiety symptoms started 7/2023 -- \"Sometimes I don't think people is good.\"  When asked if he felt a sense of judgment or embarrassment by others, he stated \"I don't know.\"          Eating Disorder symptoms: no historical or current eating disorder. no binge eating disorder; no anorexia nervosa. no symptoms of bulimia     Pt denies h/o PTSD or OCD Sxs     Prior psychiatrists:   [facility] in 2022 -- due to depression and anxiety Sxs.  That facility shut down  1st one seen in [place] in approx 2019 -- Pt unsure of why he went     Prior psychotherapists:  [facility] LCSW 10/30/2023 -- ongoing  [facility] 5/26/2022 - 2023     Past Hospitalizations:  1st and only one: 10/10/2023 - 10/17/2023 at [facility] -- on a 201 commitment, due to increasing depression with SI and desire to shoot himself and the welfare man because of his financial state and medical issues. Over the preceding year, his depression had been increasing due to chronic pain, falls, and increased difficulty ambulating (already using a walker).  Pt is on SSI and lives with 12y/o daughter and had recently allowed his wife (from whom he was ) to come live with them due to her need to have a place to be after her anticipated surgery since she would not be able to climb steps afterwards.  When her income was added in, his SSI cash and foodstamps were both reduced and he could no longer make his bills. His home health care benefit was also removed. The last straw was finding out his bank acct was overdrawn. Before admission, Pt had been getting " "prescribed Duloxetine from his PCP -- this was continued and Aripiprazole added for mood Tx augmentation.  Pt improved and Discharge Rxs: Aripiprazole 2mg qd for mood, Duloxetine 60mg bid for mood and pain mgt, Gabapentin 600mg tid for anxiety and pain mgt, Mirtazapine 15mg qhs for mood and insomnia, and Trazodone 50mg qhs for insomnia.      Pt had denied suicide attempts, self-injurious behaviors, physically violent behaviors, ECT, TMS, or legal or  Hx      Prior Rx trials:  Amitriptyline 25mg (for lumbar radiculopathy) , Sertraline 25mg, Venlafaxine ER up to 150mg (? 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    Current severe episode of major depressive disorder without prior episode ([facility]) 10/11/2023    Depression     Diabetes mellitus ([facility])     Hyperlipidemia     Hypertension     Suicidal intent 10/10/2023    Type 2 diabetes mellitus without complication, without long-term current use of insulin ([facility]) 10/16/2020     Past Surgical History:   Procedure Laterality Date    CYST REMOVAL  2017     Allergies: No Known Allergies    Current Outpatient Medications   Medication Instructions    acetaminophen (TYLENOL) 1,000 mg, Oral, Every 6 hours PRN    albuterol (ProAir HFA) 90 mcg/act inhaler 2 puffs, Inhalation, Every 6 hours PRN    albuterol 2.5 mg, Nebulization, Every 6 hours PRN    amLODIPine (NORVASC) 10 mg, Oral, Daily    ARIPiprazole (ABILIFY) 10 mg, Oral, Daily    aspirin 325 mg, Oral, Daily    atorvastatin " (LIPITOR) 20 mg, Oral, Daily    benzonatate (TESSALON) 200 mg, Oral, 3 times daily PRN    brompheniramine-pseudoephedrine-DM 30-2-10 MG/5ML syrup 5 mL, Oral, 4 times daily PRN    Cholecalciferol (D3 PO) Daily    clotrimazole (LOTRIMIN) 1 % cream Topical, 2 times daily    Diclofenac Sodium (VOLTAREN) 2 g, Topical, 4 times daily    DULoxetine (CYMBALTA) 60 mg, Oral, 2 times daily    famotidine (PEPCID) 20 mg, Oral, Daily    fluticasone (FLONASE) 50 mcg/act nasal spray 1 spray, Daily    fluticasone-salmeterol (Advair HFA) 115-21 MCG/ACT inhaler 2 puffs, Inhalation, 2 times daily, Rinse mouth after use.    gabapentin (NEURONTIN) 800 mg, Oral, 3 times daily    glucose blood (OneTouch Verio) test strip Testing once daily    Heating Pads PADS Does not apply, Daily    Lancet Devices (ONE TOUCH DELICA LANCING DEV) MISC Does not apply, Daily    lidocaine (Lidoderm) 5 % 1 patch, Topical, Daily, Remove & Discard patch within 12 hours or as directed by MD    lidocaine (LMX) 4 % cream Topical, As needed    metFORMIN (GLUCOPHAGE) 500 mg, Oral, 2 times daily with meals    methocarbamol (ROBAXIN) 500 mg, Oral, 3 times daily    mirtazapine (REMERON) 30 mg, Oral, Daily at bedtime    naloxone (NARCAN) 4 mg/0.1 mL nasal spray Administer 1 spray into a nostril. If no response after 2-3 minutes, give another dose in the other nostril using a new spray.    Nerve Stimulator (TENS Therapy Pain Relief) EMILY Use as directed    OneTouch Delica Lancets 33G MISC Does not apply, Daily    oxyCODONE (ROXICODONE) 10 mg, Oral, 3 times daily    tamsulosin (FLOMAX) 0.4 mg, Oral, Daily with dinner    traZODone (DESYREL) 100 mg, Oral, Daily at bedtime    triamcinolone (KENALOG) 0.1 % cream Topical, 2 times daily        Substance Abuse History:    Tobacco, Alcohol and Drug Use History     Tobacco Use    Smoking status: Never     Passive exposure: Never    Smokeless tobacco: Never   Vaping Use    Vaping status: Never Used   Substance Use Topics    Alcohol  "use: Not Currently     Comment: rare    Drug use: Never          Social History:    Social History     Socioeconomic History    Marital status: /Civil Union     Spouse name: Not on file    Number of children: 7    Years of education: Not on file    Highest education level: Not on file   Occupational History    Not on file   Other Topics Concern    Not on file   Social History Narrative    Home: Lives with his 14y/o daughter in a rental house owned by an elder daughter    Children:  3 sons, 4 daughters            Educational:    Pt does not know if he reached childhood milestones on times.  He denies h/o learning disabilities    Highest grade completed: 7th     No college        Family Psychiatric History:     Family History   Problem Relation Age of Onset    Hypertension Mother     Diabetes Mother     Cancer Father     Diabetes Family     Hypertension Family        Medical History Reviewed by provider this encounter:  Tobacco  Allergies  Meds  Problems  Med Hx  Surg Hx  Fam Hx          Objective   Ht 5' 7\" (1.702 m)   Wt 90.9 kg (200 lb 6.4 oz)   BMI 31.39 kg/m²      Mental Status Evaluation:    Appearance age appropriate, casually dressed partial eye contact   Behavior pleasant, cooperative, calm   Speech Soft, mumbling at times, not spontaneous, some latency, normal rate   Mood depressed, anxious   Affect constricted   Thought Processes organized, goal directed, negative, paucity of thought   Thought Content no overt delusions   Perceptual Disturbances: no auditory hallucinations, no visual hallucinations, does not appear responding to internal stimuli   Abnormal Thoughts  Risk Potential Suicidal ideation - None  Homicidal ideation - None  Potential for aggression - No   Orientation oriented to person, place, situation, month of the year, and year   Memory short term memory mildly impaired   Consciousness alert and awake   Attention Span Concentration Span attention span and concentration are age " appropriate   Intellect Not formally tested   Insight good   Judgement good   Muscle Strength and  Gait uses cane, slow, steady   Motor activity no abnormal movements   Language no difficulty naming common objects   Fund of Knowledge adequate knowledge of current events  adequate fund of knowledge regarding past history  adequate fund of knowledge regarding vocabulary   Pt able to name the President of the [place]   Pain moderate   Pain Scale 7       Laboratory Results:  None since last visit        Suicide/Homicide Risk Assessment:    Risk of Harm to Self:  Historical Risk Factors include: chronic depression, chronic anxiety symptoms, history of psychosis  Protective Factors: no current suicidal ideation, access to mental health treatment, compliant with mental health treatment, having a desire to be alive, personal beliefs, stable living environment  Weapons/Firearms: none. The following steps have been taken to ensure weapons are properly secured: not applicable  Based on today's assessment, Fair presents the following risk of harm to self: minimal    Risk of Harm to Others:  Historical Risk Factors include: none  Protective Factors: no current homicidal ideation, access to mental health treatment, compliant with medications, moral system, personal beliefs, safe and stable living environment  Weapons/Firearms: none. The following steps have been taken to ensure weapons are properly secured: not applicable  Based on today's assessment, Marv presents the following risk of harm to others: minimal    The following interventions are recommended: Continue medication management. No other intervention changes indicated at this time.    Psychotherapy Provided:     Individual psychotherapy provided: No    Treatment Plan:    Completed and signed during the session: Not applicable - Treatment Plan not due at this session.    Goals: Progress towards Treatment Plan goals - Stablein Assessment and Plan Section    Depression  Follow-up Plan Completed: Yes    Note Share:    This note was shared with patient.    Administrative Statements       Visit Time  Visit Start Time: 9:01 AM  Visit Stop Time: 9:37 AM  Total Visit Duration:  36 minutes    Oneyda Smith PA-C 04/14/25

## 2025-04-11 NOTE — ASSESSMENT & PLAN NOTE
Orders:  •  Albumin / creatinine urine ratio; Future    Lab Results   Component Value Date    HGBA1C 6.5 08/30/2024    HGBA1C 7.3 (H) 04/10/2024    HGBA1C 7.0 (A) 10/10/2023    HGBA1C 6.7 (H) 05/15/2023     Stable  - Current medications:  Metformin 500 mg bid  - Ophthalmology: pending  - DM Foot exam: completed Podiatry visit 2025  - Dentist: 2025  - Encouraged: Diabetic Diet, Regular exercise, Weight loss  -Lancets and test strip [facility] Medical Form completed today  Orders:  •  Albumin / creatinine urine ratio; Future

## 2025-04-14 ENCOUNTER — OFFICE VISIT (OUTPATIENT)
Dept: PSYCHIATRY | Facility: CLINIC | Age: 65
End: 2025-04-14
Payer: MEDICARE

## 2025-04-14 VITALS — WEIGHT: 200.4 LBS | BODY MASS INDEX: 31.45 KG/M2 | HEIGHT: 67 IN

## 2025-04-14 DIAGNOSIS — F32.3 SEVERE MAJOR DEPRESSION WITH PSYCHOTIC FEATURES (HCC): ICD-10-CM

## 2025-04-14 DIAGNOSIS — G47.00 INSOMNIA, UNSPECIFIED TYPE: ICD-10-CM

## 2025-04-14 DIAGNOSIS — E11.9 TYPE 2 DIABETES MELLITUS WITHOUT COMPLICATION, WITHOUT LONG-TERM CURRENT USE OF INSULIN (HCC): ICD-10-CM

## 2025-04-14 PROCEDURE — 99214 OFFICE O/P EST MOD 30 MIN: CPT | Performed by: PHYSICIAN ASSISTANT

## 2025-04-14 RX ORDER — DULOXETIN HYDROCHLORIDE 60 MG/1
60 CAPSULE, DELAYED RELEASE ORAL 2 TIMES DAILY
Qty: 180 CAPSULE | Refills: 0 | Status: SHIPPED | OUTPATIENT
Start: 2025-04-14

## 2025-04-14 RX ORDER — TRAZODONE HYDROCHLORIDE 100 MG/1
100 TABLET ORAL
Qty: 90 TABLET | Refills: 0 | Status: SHIPPED | OUTPATIENT
Start: 2025-04-14

## 2025-04-14 RX ORDER — MIRTAZAPINE 30 MG/1
30 TABLET, FILM COATED ORAL
Qty: 90 TABLET | Refills: 0 | Status: SHIPPED | OUTPATIENT
Start: 2025-04-14

## 2025-04-14 RX ORDER — ARIPIPRAZOLE 10 MG/1
10 TABLET ORAL DAILY
Qty: 90 TABLET | Refills: 0 | Status: SHIPPED | OUTPATIENT
Start: 2025-04-14

## 2025-04-14 NOTE — ASSESSMENT & PLAN NOTE
Orders:    mirtazapine (REMERON) 30 mg tablet; Take 1 tablet (30 mg total) by mouth daily at bedtime    traZODone (DESYREL) 100 mg tablet; Take 1 tablet (100 mg total) by mouth daily at bedtime

## 2025-04-16 ENCOUNTER — OFFICE VISIT (OUTPATIENT)
Dept: DENTISTRY | Facility: CLINIC | Age: 65
End: 2025-04-16

## 2025-04-16 VITALS — HEART RATE: 83 BPM | TEMPERATURE: 97.7 F | SYSTOLIC BLOOD PRESSURE: 159 MMHG | DIASTOLIC BLOOD PRESSURE: 95 MMHG

## 2025-04-16 DIAGNOSIS — Z01.20 ENCOUNTER FOR DENTAL EXAMINATION: Primary | ICD-10-CM

## 2025-04-16 PROCEDURE — D0330 PANORAMIC RADIOGRAPHIC IMAGE: HCPCS

## 2025-04-16 PROCEDURE — D0140 LIMITED ORAL EVALUATION - PROBLEM FOCUSED: HCPCS

## 2025-04-16 NOTE — DENTAL PROCEDURE DETAILS
Dental procedures in this visit     - LIMITED ORAL EVALUATION - PROBLEM FOCUSED (Completed)     Service provider: Tavo Cheng DMD     Billing provider: Tavo Cheng DMD     - PANORAMIC RADIOGRAPHIC IMAGE (Completed)     Service provider: Tavo Cheng DMD     Billing provider: Tavo Cheng DMD     Subjective   Patient ID: Marv Astorga is a 64 y.o. male.  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   Findings include: PAN: #18 site wnl, radiolucency left border of body of mandible, apical to #18 site   Hard Tissue Exam:  #18 site lingual gingiva leukoplakia spot    Assessment & Plan   Problem List Items Addressed This Visit    None  Visit Diagnoses         Encounter for dental examination    -  Primary    Relevant Orders    LIMITED ORAL EVALUATION - PROBLEM FOCUSED (Completed)    PANORAMIC RADIOGRAPHIC IMAGE (Completed)        63 yo male presents with leukoplakia spot #18 site lingual gingiva, leading differential chronic irration site do to partal, patient did not bring denture today, but will return tomorrow for adjustment.    NV: lower partial adjustment tomorrow

## 2025-04-16 NOTE — PROGRESS NOTES
Procedure Details   - LIMITED ORAL EVALUATION - PROBLEM FOCUSED   - PANORAMIC RADIOGRAPHIC IMAGE  Dental procedures in this visit     - LIMITED ORAL EVALUATION - PROBLEM FOCUSED (Completed)     Service provider: Tavo Cheng DMD     Billing provider: Tavo Cheng DMD     - PANORAMIC RADIOGRAPHIC IMAGE (Completed)     Service provider: Tavo Cheng DMD     Billing provider: Tavo Cheng DMD     Subjective   Patient ID: Marv Astorga is a 64 y.o. male.  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   Findings include:  PAN: #18 site wnl, radiolucency left border of body of mandible, apical to #18 site   Hard Tissue Exam:  #18 site lingual gingiva leukoplakia spot    Assessment & Plan   Problem List Items Addressed This Visit    None  Visit Diagnoses         Encounter for dental examination    -  Primary    Relevant Orders    LIMITED ORAL EVALUATION - PROBLEM FOCUSED (Completed)    PANORAMIC RADIOGRAPHIC IMAGE (Completed)        63 yo male presents with leukoplakia spot #18 site lingual gingiva, leading differential chronic irration site do to partal, patient did not bring denture today, but will return tomorrow for adjustment.    NV: lower partial adjustment tomorrow

## 2025-04-16 NOTE — TELEPHONE ENCOUNTER
Patient came in stating that the form was filled out wrong, it was for both the lancets one touch delica and the test strips.     Both sections has to be filled out.    Please advise and patient would like a call back.

## 2025-04-17 ENCOUNTER — OFFICE VISIT (OUTPATIENT)
Dept: DENTISTRY | Facility: CLINIC | Age: 65
End: 2025-04-17

## 2025-04-17 VITALS — DIASTOLIC BLOOD PRESSURE: 90 MMHG | HEART RATE: 72 BPM | TEMPERATURE: 97.5 F | SYSTOLIC BLOOD PRESSURE: 139 MMHG

## 2025-04-17 DIAGNOSIS — Z01.20 ENCOUNTER FOR DENTAL EXAMINATION: Primary | ICD-10-CM

## 2025-04-17 PROCEDURE — D5899 UNSPECIFIED REMOVABLE PROSTHODONTIC PROCEDURE, BY REPORT: HCPCS

## 2025-04-17 PROCEDURE — WIS5009 POST-OP DENTURE ADJUSTMENT

## 2025-04-17 NOTE — DENTAL PROCEDURE DETAILS
Denture Adjustment    Marv Astorga 64 y.o. male presents  for Denture Adjustment.  PMH reviewed, no changes, ASA ASA 2 - Patient with mild systemic disease with no functional limitations.  Pain level 2.    Diagnosis:  History of denture delivery 11 months ago.    Subjective report:  Patient notes irration on lower left lingual area apical to #17/18 site.    Procedure details:  Evaluated edentulous areas for redness or sore spots.  Marked area and placed partial but discovered area of concern is not covered partial. New leading differential is bone spicule / root tip. An ASAP OMS referral was provided for evaluation. Patient understood and agreed to the plan.    Patient dismissed ambulatory and alert.    NV: ASAP OMS referral  recall

## 2025-04-17 NOTE — PROGRESS NOTES
Procedure Details  [id number] - POST-OP DENTURE ADJUSTMENT  Denture Adjustment    Marv Astorga 64 y.o. male presents  for Denture Adjustment.  PMH reviewed, no changes, ASA ASA 2 - Patient with mild systemic disease with no functional limitations.  Pain level 2.    Diagnosis:  History of denture delivery 11 months ago.    Subjective report:  Patient notes irration on lower left lingual area apical to #17/18 site.    Procedure details:  Evaluated edentulous areas for redness or sore spots.  Marked area and placed partial but discovered area of concern is not covered partial. New leading differential is bone spicule / root tip. An ASAP OMS referral was provided for evaluation. Patient understood and agreed to the plan.    Patient dismissed ambulatory and alert.    NV: ASAP OMS referral  recall

## 2025-04-19 DIAGNOSIS — K21.9 GASTROESOPHAGEAL REFLUX DISEASE, UNSPECIFIED WHETHER ESOPHAGITIS PRESENT: ICD-10-CM

## 2025-04-21 RX ORDER — FAMOTIDINE 20 MG/1
20 TABLET, FILM COATED ORAL DAILY
Qty: 60 TABLET | Refills: 2 | Status: SHIPPED | OUTPATIENT
Start: 2025-04-21

## 2025-04-23 ENCOUNTER — TELEPHONE (OUTPATIENT)
Dept: DENTISTRY | Facility: CLINIC | Age: 65
End: 2025-04-23

## 2025-04-23 DIAGNOSIS — E78.5 HYPERLIPIDEMIA, UNSPECIFIED HYPERLIPIDEMIA TYPE: ICD-10-CM

## 2025-04-23 RX ORDER — ATORVASTATIN CALCIUM 20 MG/1
20 TABLET, FILM COATED ORAL DAILY
Qty: 90 TABLET | Refills: 1 | Status: SHIPPED | OUTPATIENT
Start: 2025-04-23

## 2025-04-25 ENCOUNTER — TELEPHONE (OUTPATIENT)
Dept: FAMILY MEDICINE CLINIC | Facility: CLINIC | Age: 65
End: 2025-04-25

## 2025-04-25 DIAGNOSIS — E11.9 TYPE 2 DIABETES MELLITUS WITHOUT COMPLICATION, WITHOUT LONG-TERM CURRENT USE OF INSULIN (HCC): ICD-10-CM

## 2025-04-25 DIAGNOSIS — G89.29 CHRONIC LOW BACK PAIN WITHOUT SCIATICA, UNSPECIFIED BACK PAIN LATERALITY: ICD-10-CM

## 2025-04-25 DIAGNOSIS — M54.50 CHRONIC LOW BACK PAIN WITHOUT SCIATICA, UNSPECIFIED BACK PAIN LATERALITY: ICD-10-CM

## 2025-04-25 RX ORDER — GABAPENTIN 800 MG/1
800 TABLET ORAL 3 TIMES DAILY
Qty: 90 TABLET | Refills: 5 | Status: SHIPPED | OUTPATIENT
Start: 2025-04-25

## 2025-04-25 RX ORDER — BLOOD SUGAR DIAGNOSTIC
STRIP MISCELLANEOUS
Qty: 100 EACH | Refills: 3 | Status: SHIPPED | OUTPATIENT
Start: 2025-04-25

## 2025-04-25 NOTE — TELEPHONE ENCOUNTER
FAXED ON 04/25/25 TO [facility] at 4/25/2025. FAX CONFIRMATION RECEIVED.

## 2025-04-25 NOTE — TELEPHONE ENCOUNTER
Pt does not need a Prior Auth. Pt need it a refill and the medicare part B form sent to pharmacy which was already sent on 4/15/25. Will re fax form just in case.

## 2025-04-28 ENCOUNTER — TELEPHONE (OUTPATIENT)
Dept: PSYCHIATRY | Facility: CLINIC | Age: 65
End: 2025-04-28

## 2025-04-30 ENCOUNTER — SOCIAL WORK (OUTPATIENT)
Dept: BEHAVIORAL/MENTAL HEALTH CLINIC | Facility: CLINIC | Age: 65
End: 2025-04-30
Payer: MEDICARE

## 2025-04-30 DIAGNOSIS — F41.1 GAD (GENERALIZED ANXIETY DISORDER): Primary | ICD-10-CM

## 2025-04-30 DIAGNOSIS — F32.2 CURRENT SEVERE EPISODE OF MAJOR DEPRESSIVE DISORDER WITHOUT PSYCHOTIC FEATURES WITHOUT PRIOR EPISODE (HCC): ICD-10-CM

## 2025-04-30 PROCEDURE — 90837 PSYTX W PT 60 MINUTES: CPT | Performed by: SOCIAL WORKER

## 2025-04-30 NOTE — PSYCH
"Behavioral Health Psychotherapy Progress Note    Psychotherapy Provided: Individual Psychotherapy     1. SAHRA (generalized anxiety disorder)        2. Current severe episode of major depressive disorder without psychotic features without prior episode (HCC)            Goals addressed in session: Goal 1     DATA: Patient shared he would rather be by himself and then made a dispariging remark about the opposite sex. He continues to struggle with depression and or anxiety over his finances or in his words if something happens. He also continues to struggle with physical pain in his back and his legs. He is getting food stamps and rent assistance thru his  and the [employer] of churches. I provided support, encouragement and strategies to cope.   During this session, this clinician used the following therapeutic modalities: Client-centered Therapy, Cognitive Behavioral Therapy, Mindfulness-based Strategies, and Supportive Psychotherapy    Substance Abuse was not addressed during this session. If the client is diagnosed with a co-occurring substance use disorder, please indicate any changes in the frequency or amount of use: n/a. Stage of change for addressing substance use diagnoses: No substance use/Not applicable    ASSESSMENT:  Marv Astorga presents with a Anxious and Depressed mood.     his affect is anxious and depressed which is congruent, with his mood and the content of the session. The client has made progress on their goals.     Marv Astorga presents with a none risk of suicide, none risk of self-harm, and none risk of harm to others.    For any risk assessment that surpasses a \"low\" rating, a safety plan must be developed.    A safety plan was indicated: no  If yes, describe in detail n/a    PLAN: Between sessions, Marv Astorga will use mindfulness and CBT. At the next session, the therapist will use Client-centered Therapy, Cognitive Behavioral Therapy, Mindfulness-based Strategies, and " Supportive Psychotherapy to address issues and symptoms as they may arise. .    Behavioral Health Treatment Plan and Discharge Planning: Marv Astorga is aware of and agrees to continue to work on their treatment plan. They have identified and are working toward their discharge goals. yes    Depression Follow-up Plan Completed: Not applicable    Visit start and stop times:8am till 9am 53 plus minutes    04/30/25

## 2025-05-06 ENCOUNTER — OFFICE VISIT (OUTPATIENT)
Dept: FAMILY MEDICINE CLINIC | Facility: CLINIC | Age: 65
End: 2025-05-06

## 2025-05-06 VITALS
TEMPERATURE: 97.7 F | DIASTOLIC BLOOD PRESSURE: 98 MMHG | RESPIRATION RATE: 19 BRPM | SYSTOLIC BLOOD PRESSURE: 138 MMHG | HEIGHT: 67 IN | HEART RATE: 76 BPM | BODY MASS INDEX: 30.54 KG/M2 | OXYGEN SATURATION: 98 % | WEIGHT: 194.6 LBS

## 2025-05-06 DIAGNOSIS — E11.9 TYPE 2 DIABETES MELLITUS WITHOUT COMPLICATION, WITHOUT LONG-TERM CURRENT USE OF INSULIN (HCC): ICD-10-CM

## 2025-05-06 DIAGNOSIS — G89.4 CHRONIC PAIN SYNDROME: ICD-10-CM

## 2025-05-06 DIAGNOSIS — Z00.00 MEDICARE ANNUAL WELLNESS VISIT, SUBSEQUENT: Primary | ICD-10-CM

## 2025-05-06 DIAGNOSIS — F11.20 CONTINUOUS OPIOID DEPENDENCE (HCC): ICD-10-CM

## 2025-05-06 DIAGNOSIS — Z23 ENCOUNTER FOR IMMUNIZATION: ICD-10-CM

## 2025-05-06 DIAGNOSIS — G89.29 CHRONIC LEFT-SIDED LOW BACK PAIN WITH LEFT-SIDED SCIATICA: ICD-10-CM

## 2025-05-06 DIAGNOSIS — M54.42 CHRONIC LEFT-SIDED LOW BACK PAIN WITH LEFT-SIDED SCIATICA: ICD-10-CM

## 2025-05-06 LAB — SL AMB POCT HEMOGLOBIN AIC: 7.3 (ref ?–6.5)

## 2025-05-06 PROCEDURE — G0537 PR RISK ASCVD TST ONCE PR 12 MO: HCPCS | Performed by: FAMILY MEDICINE

## 2025-05-06 PROCEDURE — 83036 HEMOGLOBIN GLYCOSYLATED A1C: CPT | Performed by: FAMILY MEDICINE

## 2025-05-06 PROCEDURE — G0439 PPPS, SUBSEQ VISIT: HCPCS | Performed by: FAMILY MEDICINE

## 2025-05-06 PROCEDURE — 3080F DIAST BP >= 90 MM HG: CPT | Performed by: FAMILY MEDICINE

## 2025-05-06 PROCEDURE — 90677 PCV20 VACCINE IM: CPT

## 2025-05-06 PROCEDURE — G0009 ADMIN PNEUMOCOCCAL VACCINE: HCPCS

## 2025-05-06 PROCEDURE — 3075F SYST BP GE 130 - 139MM HG: CPT | Performed by: FAMILY MEDICINE

## 2025-05-06 RX ORDER — OXYCODONE HYDROCHLORIDE 10 MG/1
10 TABLET ORAL 3 TIMES DAILY
Qty: 90 TABLET | Refills: 0 | Status: SHIPPED | OUTPATIENT
Start: 2025-05-06

## 2025-05-06 NOTE — PATIENT INSTRUCTIONS
Medicare Preventive Visit Patient Instructions  Thank you for completing your Welcome to Medicare Visit or Medicare Annual Wellness Visit today. Your next wellness visit will be due in one year (5/7/2026).  The screening/preventive services that you may require over the next 5-10 years are detailed below. Some tests may not apply to you based off risk factors and/or age. Screening tests ordered at today's visit but not completed yet may show as past due. Also, please note that scanned in results may not display below.  Preventive Screenings:  Service Recommendations Previous Testing/Comments   Colorectal Cancer Screening  Colonoscopy    Fecal Occult Blood Test (FOBT)/Fecal Immunochemical Test (FIT)  Fecal DNA/Cologuard Test  Flexible Sigmoidoscopy Age: 45-75 years old   Colonoscopy: every 10 years (May be performed more frequently if at higher risk)  OR  FOBT/FIT: every 1 year  OR  Cologuard: every 3 years  OR  Sigmoidoscopy: every 5 years  Screening may be recommended earlier than age 45 if at higher risk for colorectal cancer. Also, an individualized decision between you and your healthcare provider will decide whether screening between the ages of 76-85 would be appropriate. Colonoscopy: 10/11/2021  FOBT/FIT: Not on file  Cologuard: Not on file  Sigmoidoscopy: Not on file    Screening Current     Prostate Cancer Screening Individualized decision between patient and health care provider in men between ages of 55-69   Medicare will cover every 12 months beginning on the day after your 50th birthday PSA: 2.508 ng/mL     Screening Current     Hepatitis C Screening Once for adults born between 1945 and 1965  More frequently in patients at high risk for Hepatitis C Hep C Antibody: 02/12/2021    Screening Current   Diabetes Screening 1-2 times per year if you're at risk for diabetes or have pre-diabetes Fasting glucose: 149 mg/dL (4/10/2024)  A1C: 7.3 (5/6/2025)  Screening Not Indicated  History Diabetes   Cholesterol  Screening Once every 5 years if you don't have a lipid disorder. May order more often based on risk factors. Lipid panel: 11/26/2024  Screening Not Indicated  History Lipid Disorder      Other Preventive Screenings Covered by Medicare:  Abdominal Aortic Aneurysm (AAA) Screening: covered once if your at risk. You're considered to be at risk if you have a family history of AAA or a male between the age of 65-75 who smoking at least 100 cigarettes in your lifetime.  Lung Cancer Screening: covers low dose CT scan once per year if you meet all of the following conditions: (1) Age 55-77; (2) No signs or symptoms of lung cancer; (3) Current smoker or have quit smoking within the last 15 years; (4) You have a tobacco smoking history of at least 20 pack years (packs per day x number of years you smoked); (5) You get a written order from a healthcare provider.  Glaucoma Screening: covered annually if you're considered high risk: (1) You have diabetes OR (2) Family history of glaucoma OR (3)  aged 50 and older OR (4)  American aged 65 and older  Osteoporosis Screening: covered every 2 years if you meet one of the following conditions: (1) Have a vertebral abnormality; (2) On glucocorticoid therapy for more than 3 months; (3) Have primary hyperparathyroidism; (4) On osteoporosis medications and need to assess response to drug therapy.  HIV Screening: covered annually if you're between the age of 15-65. Also covered annually if you are younger than 15 and older than 65 with risk factors for HIV infection. For pregnant patients, it is covered up to 3 times per pregnancy.    Immunizations:  Immunization Recommendations   Influenza Vaccine Annual influenza vaccination during flu season is recommended for all persons aged >= 6 months who do not have contraindications   Pneumococcal Vaccine   * Pneumococcal conjugate vaccine = PCV13 (Prevnar 13), PCV15 (Vaxneuvance), PCV20 (Prevnar 20)  * Pneumococcal  polysaccharide vaccine = PPSV23 (Pneumovax) Adults 19-65 yo with certain risk factors or if 65+ yo  If never received any pneumonia vaccine: recommend Prevnar 20 (PCV20)  Give PCV20 if previously received 1 dose of PCV13 or PPSV23   Hepatitis B Vaccine 3 dose series if at intermediate or high risk (ex: diabetes, end stage renal disease, liver disease)   Respiratory syncytial virus (RSV) Vaccine - COVERED BY MEDICARE PART D  * RSVPreF3 (Arexvy) CDC recommends that adults 60 years of age and older may receive a single dose of RSV vaccine using shared clinical decision-making (SCDM)   Tetanus (Td) Vaccine - COST NOT COVERED BY MEDICARE PART B Following completion of primary series, a booster dose should be given every 10 years to maintain immunity against tetanus. Td may also be given as tetanus wound prophylaxis.   Tdap Vaccine - COST NOT COVERED BY MEDICARE PART B Recommended at least once for all adults. For pregnant patients, recommended with each pregnancy.   Shingles Vaccine (Shingrix) - COST NOT COVERED BY MEDICARE PART B  2 shot series recommended in those 19 years and older who have or will have weakened immune systems or those 50 years and older     Health Maintenance Due:      Topic Date Due   • Colorectal Cancer Screening  10/10/2026   • HIV Screening  Completed   • Hepatitis C Screening  Completed     Immunizations Due:      Topic Date Due   • Pneumococcal Vaccine: Pediatrics (0 to 5 Years) and At-Risk Patients (6 to 64 Years) (2 of 2 - PCV) 10/19/2020   • COVID-19 Vaccine (4 - 2024-25 season) 09/01/2024     Advance Directives   What are advance directives?  Advance directives are legal documents that state your wishes and plans for medical care. These plans are made ahead of time in case you lose your ability to make decisions for yourself. Advance directives can apply to any medical decision, such as the treatments you want, and if you want to donate organs.   What are the types of advance directives?   There are many types of advance directives, and each state has rules about how to use them. You may choose a combination of any of the following:  Living will:  This is a written record of the treatment you want. You can also choose which treatments you do not want, which to limit, and which to stop at a certain time. This includes surgery, medicine, IV fluid, and tube feedings.   Durable power of  for healthcare (DPAHC):  This is a written record that states who you want to make healthcare choices for you when you are unable to make them for yourself. This person, called a proxy, is usually a family member or a friend. You may choose more than 1 proxy.  Do not resuscitate (DNR) order:  A DNR order is used in case your heart stops beating or you stop breathing. It is a request not to have certain forms of treatment, such as CPR. A DNR order may be included in other types of advance directives.  Medical directive:  This covers the care that you want if you are in a coma, near death, or unable to make decisions for yourself. You can list the treatments you want for each condition. Treatment may include pain medicine, surgery, blood transfusions, dialysis, IV or tube feedings, and a ventilator (breathing machine).  Values history:  This document has questions about your views, beliefs, and how you feel and think about life. This information can help others choose the care that you would choose.  Why are advance directives important?  An advance directive helps you control your care. Although spoken wishes may be used, it is better to have your wishes written down. Spoken wishes can be misunderstood, or not followed. Treatments may be given even if you do not want them. An advance directive may make it easier for your family to make difficult choices about your care.   Weight Management   Why it is important to manage your weight:  Being overweight increases your risk of health conditions such as heart disease,  high blood pressure, type 2 diabetes, and certain types of cancer. It can also increase your risk for osteoarthritis, sleep apnea, and other respiratory problems. Aim for a slow, steady weight loss. Even a small amount of weight loss can lower your risk of health problems.  How to lose weight safely:  A safe and healthy way to lose weight is to eat fewer calories and get regular exercise. You can lose up about 1 pound a week by decreasing the number of calories you eat by 500 calories each day.   Healthy meal plan for weight management:  A healthy meal plan includes a variety of foods, contains fewer calories, and helps you stay healthy. A healthy meal plan includes the following:  Eat whole-grain foods more often.  A healthy meal plan should contain fiber. Fiber is the part of grains, fruits, and vegetables that is not broken down by your body. Whole-grain foods are healthy and provide extra fiber in your diet. Some examples of whole-grain foods are whole-wheat breads and pastas, oatmeal, brown rice, and bulgur.  Eat a variety of vegetables every day.  Include dark, leafy greens such as spinach, kale, nai greens, and mustard greens. Eat yellow and orange vegetables such as carrots, sweet potatoes, and winter squash.   Eat a variety of fruits every day.  Choose fresh or canned fruit (canned in its own juice or light syrup) instead of juice. Fruit juice has very little or no fiber.  Eat low-fat dairy foods.  Drink fat-free (skim) milk or 1% milk. Eat fat-free yogurt and low-fat cottage cheese. Try low-fat cheeses such as mozzarella and other reduced-fat cheeses.  Choose meat and other protein foods that are low in fat.  Choose beans or other legumes such as split peas or lentils. Choose fish, skinless poultry (chicken or turkey), or lean cuts of red meat (beef or pork). Before you cook meat or poultry, cut off any visible fat.   Use less fat and oil.  Try baking foods instead of frying them. Add less fat, such as  margarine, sour cream, regular salad dressing and mayonnaise to foods. Eat fewer high-fat foods. Some examples of high-fat foods include french fries, doughnuts, ice cream, and cakes.  Eat fewer sweets.  Limit foods and drinks that are high in sugar. This includes candy, cookies, regular soda, and sweetened drinks.  Exercise:  Exercise at least 30 minutes per day on most days of the week. Some examples of exercise include walking, biking, dancing, and swimming. You can also fit in more physical activity by taking the stairs instead of the elevator or parking farther away from stores. Ask your healthcare provider about the best exercise plan for you.   Narcotic (Opioid) Safety    Use narcotics safely:  Take prescribed narcotics exactly as directed  Do not give narcotics to others or take narcotics that belong to someone else  Do not mix narcotics without medicines or alcohol  Do not drive or operate heavy machinery after you take the narcotic  Monitor for side effects and notify your healthcare provider if you experienced side effects such as nausea, sleepiness, itching, or trouble thinking clearly.    Manage constipation:    Constipation is the most common side effect of narcotic medicine. Constipation is when you have hard, dry bowel movements, or you go longer than usual between bowel movements. Tell your healthcare provider about all changes in your bowel movements while you are taking narcotics. He or she may recommend laxative medicine to help you have a bowel movement. He or she may also change the kind of narcotic you are taking, or change when you take it. The following are more ways you can prevent or relieve constipation:    Drink liquids as directed.  You may need to drink extra liquids to help soften and move your bowels. Ask how much liquid to drink each day and which liquids are best for you.  Eat high-fiber foods.  This may help decrease constipation by adding bulk to your bowel movements. High-fiber  foods include fruits, vegetables, whole-grain breads and cereals, and beans. Your healthcare provider or dietitian can help you create a high-fiber meal plan. Your provider may also recommend a fiber supplement if you cannot get enough fiber from food.  Exercise regularly.  Regular physical activity can help stimulate your intestines. Walking is a good exercise to prevent or relieve constipation. Ask which exercises are best for you.  Schedule a time each day to have a bowel movement.  This may help train your body to have regular bowel movements. Bend forward while you are on the toilet to help move the bowel movement out. Sit on the toilet for at least 10 minutes, even if you do not have a bowel movement.    Store narcotics safely:   Store narcotics where others cannot easily get them.  Keep them in a locked cabinet or secure area. Do not  keep them in a purse or other bag you carry with you. A person may be looking for something else and find the narcotics.  Make sure narcotics are stored out of the reach of children.  A child can easily overdose on narcotics. Narcotics may look like candy to a small child.    The best way to dispose of narcotics:      The laws vary by country and area. In the [place], the best way is to return the narcotics through a take-back program. This program is offered by the [place] Drug Enforcement Agency (CIRO). The following are options for using the program:  Take the narcotics to a CIRO collection site.  The site is often a law enforcement center. Call your local law enforcement center for scheduled take-back days in your area. You will be given information on where to go if the collection site is in a different location.  Take the narcotics to an approved pharmacy or hospital.  A pharmacy or hospital may be set up as a collection site. You will need to ask if it is a CIRO collection site if you were not directed there. A pharmacy or doctor's office may not be able to take back  narcotics unless it is a CIRO site.  Use a mail-back system.  This means you are given containers to put the narcotics into. You will then mail them in the containers.  Use a take-back drop box.  This is a place to leave the narcotics at any time. People and animals will not be able to get into the box. Your local law enforcement agency can tell you where to find a drop box in your area.    Other ways to manage pain:   Ask your healthcare provider about non-narcotic medicines to control pain.  Nonprescription medicines include NSAIDs (such as ibuprofen) and acetaminophen. Prescription medicines include muscle relaxers, antidepressants, and steroids.  Pain may be managed without any medicines.  Some ways to relieve pain include massage, aromatherapy, or meditation. Physical or occupational therapy may also help.    For more information:   Drug Enforcement Administration  [address] [place] [address]  Phone: 0- 048 - 333-2144  Web Address: https://www.deadiversion.[facility].gov/drug_disposal/     Food and Drug Administration  [address] [place] [address]  Phone: 0- 799 - 390-7842  Web Address: http://www.fda.gov     © Copyright [employer] 2018 Information is for End User's use only and may not be sold, redistributed or otherwise used for commercial purposes. All illustrations and images included in CareNotes® are the copyrighted property of A.D.A.M., Inc. or [employer]

## 2025-05-06 NOTE — ASSESSMENT & PLAN NOTE
Lab Results   Component Value Date    HGBA1C 7.3 (A) 05/06/2025    HGBA1C 6.5 08/30/2024    HGBA1C 7.3 (H) 04/10/2024    HGBA1C 7.0 (A) 10/10/2023     Stable  - Current medications:  Metformin 500 mg bid  - Ophthalmology: pending  - DM Foot exam: completed Podiatry visit 2025  - Dentist: 2025  - Encouraged: Diabetic Diet, Regular exercise, Weight loss  -Lancets and test strip [facility] Medical Form completed today  SmartLink not supported outside of the Encounter Diagnoses SmartSection.

## 2025-05-06 NOTE — PROGRESS NOTES
Name: Marv Astorga      : 1960      MRN: [id number]  Encounter Provider: Audrey Ruvalcaba MD  Encounter Date: 2025   Encounter department: [facility]  :  Assessment & Plan  Medicare annual wellness visit, subsequent  Preventive screenings and immunizations reviewed       Type 2 diabetes mellitus without complication, without long-term current use of insulin (HCC)    Lab Results   Component Value Date    HGBA1C 7.3 (A) 2025    HGBA1C 6.5 2024    HGBA1C 7.3 (H) 04/10/2024     Increased  - Current medications:  Metformin 500 mg bid  - Ophthalmology: pending  - DM Foot exam: completed Podiatry visit   - Dentist: Completed     Plan:  - Continue Metformin 500 mg bid, if increased next three month will increase medication dose.  - Encouraged: Diabetic Diet, Regular exercise, Weight loss  Orders:  •  POCT hemoglobin A1c    Chronic left-sided low back pain with left-sided sciatica    Orders:  •  oxyCODONE (ROXICODONE) 10 MG TABS; Take 1 tablet (10 mg total) by mouth 3 (three) times a day Max Daily Amount: 30 mg    Encounter for immunization    Orders:  •  Pneumococcal Conjugate Vaccine 20-valent (Pcv20)    Chronic pain syndrome    Orders:  •  oxyCODONE (ROXICODONE) 10 MG TABS; Take 1 tablet (10 mg total) by mouth 3 (three) times a day Max Daily Amount: 30 mg    Continuous opioid dependence (HCC)    Orders:  •  oxyCODONE (ROXICODONE) 10 MG TABS; Take 1 tablet (10 mg total) by mouth 3 (three) times a day Max Daily Amount: 30 mg       Preventive health issues were discussed with patient, and age appropriate screening tests were ordered as noted in patient's After Visit Summary. Personalized health advice and appropriate referrals for health education or preventive services given if needed, as noted in patient's After Visit Summary.    History of Present Illness     Marv Astorga is a 64 y.o. male  has a past medical history of Asthma, Back pain, Back pain,  BPH with obstruction/lower urinary tract symptoms, Current severe episode of major depressive disorder without prior episode (HCC), Depression, Diabetes mellitus (HCC), Hyperlipidemia, Hypertension, Suicidal intent, and Type 2 diabetes mellitus without complication, without long-term current use of insulin ([facility]) who presented to the office today fow his Annual Wellness Exam.        The following portions of the patient's history were reviewed and updated as appropriate: allergies, current medications, past family history, past medical history, past social history, past surgical history and problem list.         Patient Care Team:  Audrey Ruvalcaba MD as PCP - General (Family Medicine)    Review of Systems   Constitutional:  Negative for activity change, appetite change, chills and fever.   HENT:  Negative for congestion, rhinorrhea and sore throat.    Respiratory:  Negative for cough and shortness of breath.    Cardiovascular:  Negative for chest pain.   Gastrointestinal:  Negative for diarrhea, nausea and vomiting.   Musculoskeletal:  Positive for back pain and gait problem.   Skin:  Negative for rash.   Neurological:  Negative for dizziness and headaches.   Psychiatric/Behavioral:  Negative for sleep disturbance and suicidal ideas. The patient is not nervous/anxious.      Medical History Reviewed by provider this encounter:       Annual Wellness Visit Questionnaire   Marv is here for his Subsequent Wellness visit.     Health Risk Assessment:   Patient rates overall health as poor. Patient feels that their physical health rating is much worse. Patient is dissatisfied with their life. Eyesight was rated as same. Hearing was rated as same. Patient feels that their emotional and mental health rating is much worse. Patients states they are always angry. Patient states they are sometimes unusually tired/fatigued. Pain experienced in the last 7 days has been a lot. Patient's pain rating has been 9/10. Patient states  that he has experienced no weight loss or gain in last 6 months. Vision: pending, wears eyeglasses  Hearing: normal  Dentist: Regular visits    9/10 pain in the lower back with stiffness and radiating to the left leg    Depression Screening:   PHQ-2 Score: 2      Fall Risk Screening:   In the past year, patient has experienced: history of falling in past year    Number of falls: 2 or more  Injured during fall?: Yes    Feels unsteady when standing or walking?: Yes    Worried about falling?: Yes      Home Safety:  Patient has trouble with stairs inside or outside of their home. Patient has working smoke alarms and has no working carbon monoxide detector. Home safety hazards include: loose rugs on the floor. Loose rugs in the bathroom, has  shower chair    Nutrition:   Current diet is Low Cholesterol and Diabetic.     Medications:   Patient is currently taking over-the-counter supplements. OTC medications include: see medication list. Patient is not able to manage medications.     Activities of Daily Living (ADLs)/Instrumental Activities of Daily Living (IADLs):   Walk and transfer into and out of bed and chair?: Yes  Dress and groom yourself?: No    Bathe or shower yourself?: No    Feed yourself? Yes  Do your laundry/housekeeping?: No  Manage your money, pay your bills and track your expenses?: No  Make your own meals?: No    Do your own shopping?: No    Previous Hospitalizations:   Any hospitalizations or ED visits within the last 12 months?: Yes    How many hospitalizations have you had in the last year?: 3-4    Advance Care Planning:   Living will: No    Durable POA for healthcare: No    Advanced directive: No    Advanced directive counseling given: Yes    ACP document given: Yes    Patient declined ACP directive: Yes      Comments: Pt not interested in a Living Will or POA    Cognitive Screening:   Provider or family/friend/caregiver concerned regarding cognition?: Yes    Cognition Comments: Pt reports short term  memory difficulty for the past one year.    Preventive Screenings      Cardiovascular Screening:    General: Screening Not Indicated and History Lipid Disorder      Diabetes Screening:     General: Screening Not Indicated and History Diabetes      Colorectal Cancer Screening:     General: Screening Current      Prostate Cancer Screening:    General: Screening Current      Osteoporosis Screening:    General: Screening Not Indicated      Abdominal Aortic Aneurysm (AAA) Screening:        General: Screening Not Indicated      Lung Cancer Screening:     General: Screening Not Indicated      Hepatitis C Screening:    General: Screening Current    Immunizations:  - Immunizations due: Prevnar 20 and Zoster (Shingrix)    Cardiovascular Risk Assessment:  Patient does not have underlying ASCVD and their cardiovascular risk was assessed today. Their cardiovascular risk factors include: hypertension, hyperlipidemia, overweight/obesity and pre-diabetes or diabetes.     The 10-year ASCVD risk score (Rajan ROMO, et al., 2019) is: 27.8%    Values used to calculate the score:      Age: 64 years      Sex: Male      Is Non- : Yes      Diabetic: Yes      Tobacco smoker: No      Systolic Blood Pressure: 138 mmHg      Is BP treated: Yes      HDL Cholesterol: 53 mg/dL      Total Cholesterol: 157 mg/dL    Time spent assessing cardiovascular risk: 5 minutes.     Screening, Brief Intervention, and Referral to Treatment (SBIRT)     Screening  Typical number of drinks in a day: 0  Typical number of drinks in a week: 0  Interpretation: Low risk drinking behavior.    Single Item Drug Screening:  How often have you used an illegal drug (including marijuana) or a prescription medication for non-medical reasons in the past year? never    Single Item Drug Screen Score: 0  Interpretation: Negative screen for possible drug use disorder    Brief Intervention  Alcohol & drug use screenings were reviewed. No concerns regarding  "substance use disorder identified.     Review of Current Opioid Use  Opioid Risk Tool (ORT) Score: 1  Opioid Risk Tool (ORT) Interpretation: Score 0-3: Low risk for opioid misuse    Other Counseling Topics:   Car/seat belt/driving safety, skin self-exam, sunscreen and calcium and vitamin D intake and regular weightbearing exercise.     Social Drivers of Health     Financial Resource Strain: Low Risk  (5/6/2025)    Overall Financial Resource Strain (CARDIA)    • Difficulty of Paying Living Expenses: Not hard at all   Food Insecurity: No Food Insecurity (5/6/2025)    Hunger Vital Sign    • Worried About Running Out of Food in the Last Year: Never true    • Ran Out of Food in the Last Year: Never true   Transportation Needs: No Transportation Needs (5/6/2025)    PRAPARE - Transportation    • Lack of Transportation (Medical): No    • Lack of Transportation (Non-Medical): No   Housing Stability: Low Risk  (5/6/2025)    Housing Stability Vital Sign    • Unable to Pay for Housing in the Last Year: No    • Number of Times Moved in the Last Year: 0    • Homeless in the Last Year: No   Utilities: Not At Risk (5/6/2025)    [facility] Utilities    • Threatened with loss of utilities: No     No results found.    Objective   /98 (BP Location: Left arm, Patient Position: Sitting, Cuff Size: Standard)   Pulse 76   Temp 97.7 °F (36.5 °C) (Temporal)   Resp 19   Ht 5' 7\" (1.702 m)   Wt 88.3 kg (194 lb 9.6 oz)   SpO2 98%   BMI 30.48 kg/m²     Physical Exam  Vitals and nursing note reviewed.   Constitutional:       General: He is not in acute distress.     Appearance: Normal appearance. He is well-developed. He is obese.      Comments: + rolling walker   HENT:      Head: Normocephalic and atraumatic.   Eyes:      Conjunctiva/sclera: Conjunctivae normal.   Cardiovascular:      Rate and Rhythm: Normal rate and regular rhythm.      Heart sounds: No murmur heard.  Pulmonary:      Effort: Pulmonary effort is normal. No respiratory " distress.      Breath sounds: Normal breath sounds.   Abdominal:      Palpations: Abdomen is soft.      Tenderness: There is no abdominal tenderness.   Musculoskeletal:         General: No swelling.      Cervical back: Neck supple.      Thoracic back: Spasms and tenderness present.      Lumbar back: Spasms and tenderness present.        Back:    Skin:     General: Skin is warm and dry.      Capillary Refill: Capillary refill takes less than 2 seconds.   Neurological:      Mental Status: He is alert and oriented to person, place, and time.   Psychiatric:         Mood and Affect: Mood normal.         Behavior: Behavior normal.

## 2025-05-06 NOTE — ASSESSMENT & PLAN NOTE
Lab Results   Component Value Date    HGBA1C 7.3 (A) 05/06/2025    HGBA1C 6.5 08/30/2024    HGBA1C 7.3 (H) 04/10/2024     Increased  - Current medications:  Metformin 500 mg bid  - Ophthalmology: pending  - DM Foot exam: completed Podiatry visit 2025  - Dentist: Completed 2025    Plan:  - Continue Metformin 500 mg bid, if increased next three month will increase medication dose.  - Encouraged: Diabetic Diet, Regular exercise, Weight loss  Orders:  •  POCT hemoglobin A1c

## 2025-05-14 ENCOUNTER — TELEPHONE (OUTPATIENT)
Dept: FAMILY MEDICINE CLINIC | Facility: CLINIC | Age: 65
End: 2025-05-14

## 2025-05-14 NOTE — TELEPHONE ENCOUNTER
PCP SIGNATURE NEEDED FOR Home Care Delivered FORM RECEIVED VIA FAX AND PLACED IN PCP FOLDER TO BE DELIVERED AT ASSIGNED TIMES.      Physician Order Start Date 5/13/25

## 2025-05-19 ENCOUNTER — SOCIAL WORK (OUTPATIENT)
Dept: BEHAVIORAL/MENTAL HEALTH CLINIC | Facility: CLINIC | Age: 65
End: 2025-05-19
Payer: MEDICARE

## 2025-05-19 DIAGNOSIS — F11.20 CONTINUOUS OPIOID DEPENDENCE (HCC): ICD-10-CM

## 2025-05-19 DIAGNOSIS — F32.2 CURRENT SEVERE EPISODE OF MAJOR DEPRESSIVE DISORDER WITHOUT PSYCHOTIC FEATURES WITHOUT PRIOR EPISODE (HCC): ICD-10-CM

## 2025-05-19 DIAGNOSIS — F41.1 GAD (GENERALIZED ANXIETY DISORDER): Primary | ICD-10-CM

## 2025-05-19 PROCEDURE — 90837 PSYTX W PT 60 MINUTES: CPT | Performed by: SOCIAL WORKER

## 2025-05-19 NOTE — PSYCH
"Behavioral Health Psychotherapy Progress Note    Psychotherapy Provided: Individual Psychotherapy     1. SAHRA (generalized anxiety disorder)        2. Current severe episode of major depressive disorder without psychotic features without prior episode (HCC)        3. Continuous opioid dependence (HCC)            Goals addressed in session: Goal 1     DATA: Marv arrived for his session. His main focus is his physical pain that he is in and his poor financial status. I provide empathy, support, encouragement and strategies to cope. As far as social work services I had Marv referred to our social workers and he has a  thru the conference of [facility].   During this session, this clinician used the following therapeutic modalities: Client-centered Therapy, Cognitive Behavioral Therapy, Mindfulness-based Strategies, and Supportive Psychotherapy    Substance Abuse was not addressed during this session. If the client is diagnosed with a co-occurring substance use disorder, please indicate any changes in the frequency or amount of use: n/a. Stage of change for addressing substance use diagnoses: No substance use/Not applicable    ASSESSMENT:  Marv Astorga presents with a Anxious mood.     his affect is anxious, which is congruent, with his mood and the content of the session. The client has made progress on their goals.     Marv Astorga presents with a none risk of suicide, none risk of self-harm, and none risk of harm to others.    For any risk assessment that surpasses a \"low\" rating, a safety plan must be developed.    A safety plan was indicated: no  If yes, describe in detail n/a    PLAN: Between sessions, Marv Astorga will use mindfulness and CBT. At the next session, the therapist will use Client-centered Therapy, Cognitive Behavioral Therapy, Mindfulness-based Strategies, and Supportive Psychotherapy to address issues and symptoms as they may arise.    Behavioral Health Treatment Plan and Discharge " Planning: Marv Astorga is aware of and agrees to continue to work on their treatment plan. They have identified and are working toward their discharge goals. yes    Depression Follow-up Plan Completed: Not applicable    Visit start and stop times:    05/19/25  Start Time: 0800  Stop Time: 0900  Total Visit Time: 60 minutes

## 2025-05-27 ENCOUNTER — TELEPHONE (OUTPATIENT)
Dept: FAMILY MEDICINE CLINIC | Facility: CLINIC | Age: 65
End: 2025-05-27

## 2025-06-09 ENCOUNTER — TELEPHONE (OUTPATIENT)
Age: 65
End: 2025-06-09

## 2025-06-11 ENCOUNTER — OFFICE VISIT (OUTPATIENT)
Dept: PSYCHIATRY | Facility: CLINIC | Age: 65
End: 2025-06-11
Payer: MEDICARE

## 2025-06-11 VITALS — HEIGHT: 67 IN | WEIGHT: 194 LBS | BODY MASS INDEX: 30.45 KG/M2

## 2025-06-11 DIAGNOSIS — F32.3 SEVERE MAJOR DEPRESSION WITH PSYCHOTIC FEATURES (HCC): ICD-10-CM

## 2025-06-11 DIAGNOSIS — G47.00 INSOMNIA, UNSPECIFIED TYPE: ICD-10-CM

## 2025-06-11 PROCEDURE — 99214 OFFICE O/P EST MOD 30 MIN: CPT | Performed by: PHYSICIAN ASSISTANT

## 2025-06-11 RX ORDER — MIRTAZAPINE 30 MG/1
30 TABLET, FILM COATED ORAL
Qty: 90 TABLET | Refills: 0 | Status: SHIPPED | OUTPATIENT
Start: 2025-06-11

## 2025-06-11 RX ORDER — ARIPIPRAZOLE 10 MG/1
10 TABLET ORAL DAILY
Qty: 90 TABLET | Refills: 0 | Status: SHIPPED | OUTPATIENT
Start: 2025-06-11

## 2025-06-11 RX ORDER — TRAZODONE HYDROCHLORIDE 100 MG/1
100 TABLET ORAL
Qty: 90 TABLET | Refills: 0 | Status: SHIPPED | OUTPATIENT
Start: 2025-06-11

## 2025-06-11 RX ORDER — DULOXETIN HYDROCHLORIDE 60 MG/1
60 CAPSULE, DELAYED RELEASE ORAL 2 TIMES DAILY
Qty: 180 CAPSULE | Refills: 0 | Status: SHIPPED | OUTPATIENT
Start: 2025-06-11

## 2025-06-11 RX ORDER — AMMONIUM LACTATE 12 G/100G
1 LOTION TOPICAL DAILY
COMMUNITY
Start: 2025-05-19

## 2025-06-11 NOTE — PSYCH
MEDICATION MANAGEMENT NOTE    Name: Marv Astorga      : 1960      MRN: [id number]  Encounter Provider: Oneyda Smith PA-C  Encounter Date: 2025   Encounter department: [facility]    Insurance: Payor: MEDICARE / Plan: MEDICARE A AND B / Product Type: Medicare A & B Fee for Service /      Reason for Visit: No chief complaint on file.  :  Assessment & Plan  Severe major depression with psychotic features (HCC)    Orders:    ARIPiprazole (ABILIFY) 10 mg tablet; Take 1 tablet (10 mg total) by mouth daily    DULoxetine (CYMBALTA) 60 mg delayed release capsule; Take 1 capsule (60 mg total) by mouth 2 (two) times a day    mirtazapine (REMERON) 30 mg tablet; Take 1 tablet (30 mg total) by mouth daily at bedtime    Insomnia, unspecified type    Orders:    mirtazapine (REMERON) 30 mg tablet; Take 1 tablet (30 mg total) by mouth daily at bedtime    traZODone (DESYREL) 100 mg tablet; Take 1 tablet (100 mg total) by mouth daily at bedtime      PLAN:  Pt's depression and anxiety are a little reduced since last visit but has had some AH of his voice calling him and he reports that he has heard from others that he seems irritable.  However, he admits that he forgets to take his Aripiprazole a few times a week.  His daughter will put it in a cup for him but he will still forget at times.  Pt denies irritability or any manic Sxs and does not appear overtly manic or psychotic in office.  Discussed importance of steady compliance to prevent Sxs relapses and advised him to use his phone alarm to remind him to take his pills.  I also advised socializing and brain and memory game.  I advised he go to the library, social clubs/senior centers if free and available.  Tx options discussed and from a risk/benefit standpoint, I do not want to raise the dose of any medications if the current issue simply relates to medication compliance.   Continue:     Aripiprazole 10mg (1) tab po qd # 90  "  Duloxetine 60mg (1) cap po bid # 180   Trazodone 100mg (1) tabs po qhs # 180   Mirtazapine 30mg (1) tab po qhs - Pt was given a Rx for Qty 90 with R1 on 11/7/2024  Gabapentin 800mg (1) tab po tid-Per PCP  Pt continues counseling with Gregory Bay LCSW   Return  .  Can call any time sooner prn.     Treatment Recommendations:    Educated about diagnosis and treatment modalities. Verbalizes understanding and agreement with the treatment plan.  Discussed self monitoring of symptoms, and symptom monitoring tools.  Discussed medications and if treatment adjustment was needed or desired.  Aware of 24 hour and weekend coverage for urgent situations accessed by calling [facility] main practice number  I am scheduling this patient out for greater than 3 months: No    Medications Risks/Benefits:      Risks, Benefits And Possible Side Effects Of Medications:    Risks, benefits, and possible side effects of medications explained to Marv and he (or legal representative) verbalizes understanding and agreement for treatment.    Controlled Medication Discussion:     Not applicable - controlled prescriptions are not prescribed by this practice.      History of Present Illness      Pt presents for medication review with primary c/o:  \"I feel like, trembling\" at night and cannot always sleep through the night due to anxiety, however he states the Mirtazapine increase last visit helped reduce some of these Sxs.  Other anxiety Sxs: worry, difficulty concentrating. In regards to mood \"Sometimes I feel down, mostly when I'm alone.\"  His daughter still lives with him, but when she is not there he will sometimes hear her calling his name.  He feels this is a sherrill twyla hallucination and not just a mistaken sound of some other sort, nor borne of a sense of longing for company.  However, he admits that he forgets to take his Aripiprazole a few times a week.  His daughter will put it in a cup for him but he will " "still forget at times.  He states his daughter and another individual have verbalized that he seems angry/irritable but Pt does not notice this.   Pt presently denies depression, self-injurious thoughts/behaviors, SI, HI, anxiety, panic attacks, elevated or irritable moods, over-normal energy, reduced sleep requirement, impulsivity, hallucinations or paranoia.   Pt reports compliance to psychiatric medications with exception of the SGA at times.  He denies any medication SE.  He reports having a  through [employer] and has enough money for food and other bills.  Pt continues counseling with Gregory Bay LCSW whose recent note reviewed.  Last Tx plan done 12/10/2024.      Review Of Systems: A review of systems is obtained and is negative except for the pertinent positives listed in HPI/Subjective above.      Current Rating Scores:         Areas of Improvement: reviewed in HPI/Subjective Section and reviewed in Assessment and Plan Section    Past Psychiatric History:   As copied from my 4/14/2025 note with updates as needed:  \" [ Pt was born in [place] and grew up with biological parents, 4 sisters and 3 brother.  Pt is the fourth eldest.  He describes his upbringing as \"Happy\" and he was close to parents and siblings. His father moved to the [place] in approx [place].  [place] in appro[place] to be with his father and seek opportunities.  His siblings and mom followed him later-- in approx 1986.     Depression started 2022:  sadness, crying, anhedonia, self-isolating, insomnia, reduced appetite with Wt loss, impaired energy, motivation, and concentration, difficulty making decisions, feelings of guilt-(Pt stated he did not remember about this), feelings of hopelessness with passive SI wanting to be dead, and also overt SI of wanting to shoot himself, but no attempt.        Psychotic Sxs:  +AH of someone talking to him -- but this only tends to happen when he is at home and when he is there " "alone, which is why he does not like to be alone.  He has had hypnagogic visions of shadows.  He otherwise denies VH, TH, OH, paranoia or bizarre delusions.                Nasreen:  Pt reports of some irritable moods for a couple of days, without other manic type      Anxiety started in 2022 incited by family issues and finances: excessive worry more days than not for longer than 3 months, The Sxs can occur without concommittent depressive Sxs., difficulty concentrating, fatigue, insomnia, irritability, restlessness/keyed up, and muscle tension and pain in chest.        Panic attacks started in 10/2023 while inpatient, due to \"Too much pressure, I couldn't handle it.\"  Has had several since then.  Sxs: sweating, trembling, shortness of breath, choking sensation, chest pain/pressure, dizzy/light headed, chills, feels paralyzed-- like he cannot move his body and sometimes collapses.  He once fell and cut his Lt hand requiring sutures.      Social Anxiety symptoms started 7/2023 -- \"Sometimes I don't think people is good.\"  When asked if he felt a sense of judgment or embarrassment by others, he stated \"I don't know.\"          Eating Disorder symptoms: no historical or current eating disorder. no binge eating disorder; no anorexia nervosa. no symptoms of bulimia     Pt denies h/o PTSD or OCD Sxs     Prior psychiatrists:   [facility] in 2022 -- due to depression and anxiety Sxs.  That facility shut down  1st one seen in [place] in approx 2019 -- Pt unsure of why he went     Prior psychotherapists:  Gregory SUGGSW 10/30/2023 -- ongoing  [facility] 5/26/2022 - 2023     Past Hospitalizations:  1st and only one: 10/10/2023 - 10/17/2023 at [facility] -- on a 201 commitment, due to increasing depression with SI and desire to shoot himself and the welfare man because of his financial state and medical issues. Over the preceding year, his depression had been increasing due to chronic pain, falls, and " "increased difficulty ambulating (already using a walker).  Pt is on SSI and lives with 12y/o daughter and had recently allowed his wife (from whom he was ) to come live with them due to her need to have a place to be after her anticipated surgery since she would not be able to climb steps afterwards.  When her income was added in, his SSI cash and foodstamps were both reduced and he could no longer make his bills. His home health care benefit was also removed. The last straw was finding out his bank acct was overdrawn. Before admission, Pt had been getting prescribed Duloxetine from his PCP -- this was continued and Aripiprazole added for mood Tx augmentation.  Pt improved and Discharge Rxs: Aripiprazole 2mg qd for mood, Duloxetine 60mg bid for mood and pain mgt, Gabapentin 600mg tid for anxiety and pain mgt, Mirtazapine 15mg qhs for mood and insomnia, and Trazodone 50mg qhs for insomnia.      Pt had denied suicide attempts, self-injurious behaviors, physically violent behaviors, ECT, TMS, or legal or  Hx      Prior Rx trials:  Amitriptyline 25mg (for lumbar radiculopathy) , Sertraline 25mg, Venlafaxine ER up to 150mg (? Why stopped) ,  Duloxetine up to 60mg bid (for mood and neuropathy and helped both), Mirtazapine up to 15mg (helped), Aripiprazole 2mg (helped), Trazodone up to 50mg (helped), Zolpidem 5mg (helped), Melatonin 3mg (helped), Gabapentin up to 800mg tid for neuropathic pain (helps) ,      Abuse Hx: Pt denies any h/o physical, sexual or emotional abuse     Trauma Hx: Pt denies                      ] \"    Past Medical History[1]  Past Surgical History[2]  Allergies: Allergies[3]    Current Outpatient Medications   Medication Instructions    acetaminophen (TYLENOL) 1,000 mg, Oral, Every 6 hours PRN    albuterol (ProAir HFA) 90 mcg/act inhaler 2 puffs, Inhalation, Every 6 hours PRN    albuterol 2.5 mg, Nebulization, Every 6 hours PRN    amLODIPine (NORVASC) 10 mg, Oral, Daily    ammonium " lactate (LAC-HYDRIN) 12 % lotion 1 Application, Daily    ARIPiprazole (ABILIFY) 10 mg, Oral, Daily    aspirin 325 mg, Oral, Daily    atorvastatin (LIPITOR) 20 mg, Oral, Daily    benzonatate (TESSALON) 200 mg, Oral, 3 times daily PRN    brompheniramine-pseudoephedrine-DM 30-2-10 MG/5ML syrup 5 mL, Oral, 4 times daily PRN    Cholecalciferol (D3 PO) Daily    clotrimazole (LOTRIMIN) 1 % cream Topical, 2 times daily    Diclofenac Sodium (VOLTAREN) 2 g, Topical, 4 times daily    DULoxetine (CYMBALTA) 60 mg, Oral, 2 times daily    famotidine (PEPCID) 20 mg, Oral, Daily    fluticasone (FLONASE) 50 mcg/act nasal spray 1 spray, Daily    fluticasone-salmeterol (Advair HFA) 115-21 MCG/ACT inhaler 2 puffs, Inhalation, 2 times daily, Rinse mouth after use.    gabapentin (NEURONTIN) 800 mg, Oral, 3 times daily    glucose blood (OneTouch Verio) test strip Testing once daily    Heating Pads PADS Does not apply, Daily    Lancet Devices (ONE TOUCH DELICA LANCING DEV) MISC Does not apply, Daily    lidocaine (Lidoderm) 5 % 1 patch, Topical, Daily, Remove & Discard patch within 12 hours or as directed by MD    lidocaine (LMX) 4 % cream Topical, As needed    metFORMIN (GLUCOPHAGE) 500 mg, Oral, 2 times daily with meals    methocarbamol (ROBAXIN) 500 mg, Oral, 3 times daily    mirtazapine (REMERON) 30 mg, Oral, Daily at bedtime    naloxone (NARCAN) 4 mg/0.1 mL nasal spray Administer 1 spray into a nostril. If no response after 2-3 minutes, give another dose in the other nostril using a new spray.    Nerve Stimulator (TENS Therapy Pain Relief) EMILY Use as directed    OneTouch Delica Lancets 33G MISC Does not apply, Daily    oxyCODONE (ROXICODONE) 10 mg, Oral, 3 times daily    tamsulosin (FLOMAX) 0.4 mg, Oral, Daily with dinner    traZODone (DESYREL) 100 mg, Oral, Daily at bedtime    triamcinolone (KENALOG) 0.1 % cream Topical, 2 times daily        Substance Abuse History:    Tobacco, Alcohol and Drug Use History     Tobacco Use    Smoking  "status: Never     Passive exposure: Never    Smokeless tobacco: Never   Vaping Use    Vaping status: Never Used   Substance Use Topics    Alcohol use: Not Currently     Comment: rare    Drug use: Never          Social History:    Social History     Socioeconomic History    Marital status: /Civil Union     Spouse name: Not on file    Number of children: 7    Years of education: Not on file    Highest education level: Not on file   Occupational History    Not on file   Other Topics Concern    Not on file   Social History Narrative    Home: Lives with his 12y/o daughter in a rental house owned by an elder daughter    Children:  3 sons, 4 daughters            Educational:    Pt does not know if he reached childhood milestones on times.  He denies h/o learning disabilities    Highest grade completed: 7th     No college        Family Psychiatric History:     Family History[4]    Medical History Reviewed by provider this encounter:  Tobacco  Allergies  Meds  Problems  Med Hx  Surg Hx  Fam Hx          Objective   Ht 5' 7\" (1.702 m)   Wt 88 kg (194 lb)   BMI 30.38 kg/m²      Mental Status Evaluation:    Appearance age appropriate, casually dressed, good eye contact   Behavior pleasant, cooperative, calm   Speech Soft, sometimes mumbling and rapid, not spontaneous but answers questions readily.    Mood depressed, anxious   Affect constricted   Thought Processes organized, goal directed, negative, worrisome, paucity of thought   Thought Content no overt delusions   Perceptual Disturbances: +AH, no other hallucinations of paranoia and he does not appear to be RIS    Abnormal Thoughts  Risk Potential Suicidal ideation - None  Homicidal ideation - None  Potential for aggression - No   Orientation oriented to person, place, situation, and year, the city and state   Memory Recent memory impaired   Consciousness alert and awake   Attention Span Concentration Span attention span and concentration are age appropriate "   Intellect Not formally tested   Insight good   Judgement good   Muscle Strength and  Gait Steady, using cane, but sometimes carries it   Motor activity no abnormal movements   Language no difficulty naming common objects, no difficulty repeating a phrase   Fund of Knowledge adequate fund of knowledge regarding past history  adequate fund of knowledge regarding vocabulary   Pt named the current president as Lydia       Laboratory Results: I have personally reviewed all pertinent laboratory/tests results    Recent Labs (last 6 months):   Office Visit on 05/06/2025   Component Date Value    Hemoglobin A1C 05/06/2025 7.3 (A)    Admission on 12/15/2024, Discharged on 12/15/2024   Component Date Value    WBC 12/15/2024 3.61 (L)     RBC 12/15/2024 4.64     Hemoglobin 12/15/2024 13.7     Hematocrit 12/15/2024 39.0     MCV 12/15/2024 84     MCH 12/15/2024 29.5     MCHC 12/15/2024 35.1     RDW 12/15/2024 13.2     MPV 12/15/2024 9.4     Platelets 12/15/2024 189     nRBC 12/15/2024 0     Segmented % 12/15/2024 49     Immature Grans % 12/15/2024 0     Lymphocytes % 12/15/2024 34     Monocytes % 12/15/2024 16 (H)     Eosinophils Relative 12/15/2024 1     Basophils Relative 12/15/2024 0     Absolute Neutrophils 12/15/2024 1.75 (L)     Absolute Immature Grans 12/15/2024 0.01     Absolute Lymphocytes 12/15/2024 1.23     Absolute Monocytes 12/15/2024 0.56     Eosinophils Absolute 12/15/2024 0.05     Basophils Absolute 12/15/2024 0.01     Sodium 12/15/2024 136     Potassium 12/15/2024 3.9     Chloride 12/15/2024 102     CO2 12/15/2024 27     ANION GAP 12/15/2024 7     BUN 12/15/2024 17     Creatinine 12/15/2024 1.29     Glucose 12/15/2024 161 (H)     Calcium 12/15/2024 9.0     AST 12/15/2024 18     ALT 12/15/2024 19     Alkaline Phosphatase 12/15/2024 48     Total Protein 12/15/2024 7.1     Albumin 12/15/2024 4.0     Total Bilirubin 12/15/2024 0.46     eGFR 12/15/2024 59     Procalcitonin 12/15/2024 0.14        Suicide/Homicide Risk  Assessment:    Risk of Harm to Self:  Weapons/Firearms: none. The following steps have been taken to ensure weapons are properly secured: not applicable  Based on today's assessment, Fair presents the following risk of harm to self: minimal    Risk of Harm to Others:  Weapons/Firearms: none. The following steps have been taken to ensure weapons are properly secured: not applicable  Based on today's assessment, Fair presents the following risk of harm to others: minimal    The following interventions are recommended: Continue medication management. No other intervention changes indicated at this time.    Psychotherapy Provided:     Individual psychotherapy provided: No    Treatment Plan:    Completed and signed during the session: Not applicable - Treatment Plan not due at this session.    Goals: Progress towards Treatment Plan goals - Yes, progressing, as evidenced by subjective findings in HPI/Subjective Section and in Assessment and Plan Section    Depression Follow-up Plan Completed: Yes    Note Share:        Administrative Statements       Visit Time  Visit Start Time: 10:22 AM  Visit Stop Time: 10:54 AM  Total Visit Duration: As above minutes        Oneyda Smith PA-C 06/11/25         [1]   Past Medical History:  Diagnosis Date    Asthma     Back pain     Back pain     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    Current severe episode of major depressive disorder without prior episode (HCC) 10/11/2023    Depression     Diabetes mellitus (HCC)     Hyperlipidemia     Hypertension     Suicidal intent 10/10/2023    Type 2 diabetes mellitus without complication, without long-term current use of insulin (HCC) 10/16/2020   [2]   Past Surgical History:  Procedure Laterality Date    CYST REMOVAL  2017   [3] No Known Allergies  [4]   Family History  Problem Relation Name Age of Onset    Hypertension Mother      Diabetes Mother      Cancer Father      Diabetes Family      Hypertension Family

## 2025-06-19 ENCOUNTER — OFFICE VISIT (OUTPATIENT)
Dept: FAMILY MEDICINE CLINIC | Facility: CLINIC | Age: 65
End: 2025-06-19

## 2025-06-19 VITALS
WEIGHT: 197 LBS | SYSTOLIC BLOOD PRESSURE: 132 MMHG | DIASTOLIC BLOOD PRESSURE: 86 MMHG | BODY MASS INDEX: 30.92 KG/M2 | OXYGEN SATURATION: 99 % | HEART RATE: 77 BPM | TEMPERATURE: 98.3 F | HEIGHT: 67 IN | RESPIRATION RATE: 18 BRPM

## 2025-06-19 DIAGNOSIS — M54.42 CHRONIC LEFT-SIDED LOW BACK PAIN WITH LEFT-SIDED SCIATICA: ICD-10-CM

## 2025-06-19 DIAGNOSIS — F11.20 CONTINUOUS OPIOID DEPENDENCE (HCC): ICD-10-CM

## 2025-06-19 DIAGNOSIS — I10 BENIGN ESSENTIAL HTN: ICD-10-CM

## 2025-06-19 DIAGNOSIS — M54.12 CERVICAL RADICULOPATHY: Primary | ICD-10-CM

## 2025-06-19 DIAGNOSIS — G89.29 CHRONIC LEFT-SIDED LOW BACK PAIN WITH LEFT-SIDED SCIATICA: ICD-10-CM

## 2025-06-19 DIAGNOSIS — G89.4 CHRONIC PAIN SYNDROME: ICD-10-CM

## 2025-06-19 DIAGNOSIS — E11.9 TYPE 2 DIABETES MELLITUS WITHOUT COMPLICATION, WITHOUT LONG-TERM CURRENT USE OF INSULIN (HCC): ICD-10-CM

## 2025-06-19 DIAGNOSIS — N40.1 BENIGN PROSTATIC HYPERPLASIA WITH LOWER URINARY TRACT SYMPTOMS, SYMPTOM DETAILS UNSPECIFIED: ICD-10-CM

## 2025-06-19 PROCEDURE — 3075F SYST BP GE 130 - 139MM HG: CPT | Performed by: FAMILY MEDICINE

## 2025-06-19 PROCEDURE — 99214 OFFICE O/P EST MOD 30 MIN: CPT | Performed by: FAMILY MEDICINE

## 2025-06-19 PROCEDURE — 3079F DIAST BP 80-89 MM HG: CPT | Performed by: FAMILY MEDICINE

## 2025-06-19 PROCEDURE — G2211 COMPLEX E/M VISIT ADD ON: HCPCS | Performed by: FAMILY MEDICINE

## 2025-06-19 RX ORDER — AMLODIPINE BESYLATE 10 MG/1
10 TABLET ORAL DAILY
Qty: 90 TABLET | Refills: 3 | Status: SHIPPED | OUTPATIENT
Start: 2025-06-19

## 2025-06-19 RX ORDER — METHOCARBAMOL 500 MG/1
500 TABLET, FILM COATED ORAL 3 TIMES DAILY
Qty: 30 TABLET | Refills: 2 | Status: SHIPPED | OUTPATIENT
Start: 2025-06-19

## 2025-06-19 RX ORDER — TAMSULOSIN HYDROCHLORIDE 0.4 MG/1
0.4 CAPSULE ORAL
Qty: 90 CAPSULE | Refills: 3 | Status: SHIPPED | OUTPATIENT
Start: 2025-06-19

## 2025-06-19 RX ORDER — IBUPROFEN 600 MG/1
600 TABLET, FILM COATED ORAL EVERY 6 HOURS PRN
Qty: 30 TABLET | Refills: 0 | Status: SHIPPED | OUTPATIENT
Start: 2025-06-19

## 2025-06-19 RX ORDER — ASPIRIN 81 MG/1
81 TABLET ORAL DAILY
COMMUNITY
Start: 2025-06-19

## 2025-06-19 RX ORDER — OXYCODONE HYDROCHLORIDE 10 MG/1
10 TABLET ORAL 3 TIMES DAILY
Qty: 90 TABLET | Refills: 0 | Status: SHIPPED | OUTPATIENT
Start: 2025-06-19

## 2025-06-19 NOTE — ASSESSMENT & PLAN NOTE
Orders:  •  oxyCODONE (ROXICODONE) 10 MG TABS; Take 1 tablet (10 mg total) by mouth 3 (three) times a day Max Daily Amount: 30 mg  •  methocarbamol (ROBAXIN) 500 mg tablet; Take 1 tablet (500 mg total) by mouth 3 (three) times a day  •  ibuprofen (MOTRIN) 600 mg tablet; Take 1 tablet (600 mg total) by mouth every 6 (six) hours as needed for mild pain

## 2025-06-19 NOTE — PROGRESS NOTES
Name: Marv Astorga      : 1960      MRN: [id number]  Encounter Provider: Audrey Ruvalcaba MD  Encounter Date: 2025   Encounter department: [facility]  :  Assessment & Plan  Cervical radiculopathy    Orders:  •  Ambulatory Referral to Physical Therapy; Future  •  Ambulatory Referral to Orthopedic Surgery; Future    Chronic left-sided low back pain with left-sided sciatica    Orders:  •  oxyCODONE (ROXICODONE) 10 MG TABS; Take 1 tablet (10 mg total) by mouth 3 (three) times a day Max Daily Amount: 30 mg  •  methocarbamol (ROBAXIN) 500 mg tablet; Take 1 tablet (500 mg total) by mouth 3 (three) times a day  •  ibuprofen (MOTRIN) 600 mg tablet; Take 1 tablet (600 mg total) by mouth every 6 (six) hours as needed for mild pain    Chronic pain syndrome    Orders:  •  oxyCODONE (ROXICODONE) 10 MG TABS; Take 1 tablet (10 mg total) by mouth 3 (three) times a day Max Daily Amount: 30 mg    Continuous opioid dependence (HCC)    Orders:  •  oxyCODONE (ROXICODONE) 10 MG TABS; Take 1 tablet (10 mg total) by mouth 3 (three) times a day Max Daily Amount: 30 mg    Benign essential HTN    Orders:  •  amLODIPine (NORVASC) 10 mg tablet; Take 1 tablet (10 mg total) by mouth daily    Benign prostatic hyperplasia with lower urinary tract symptoms, symptom details unspecified    Orders:  •  tamsulosin (FLOMAX) 0.4 mg; Take 1 capsule (0.4 mg total) by mouth daily with dinner    Type 2 diabetes mellitus without complication, without long-term current use of insulin (HCC)    Lab Results   Component Value Date    HGBA1C 7.3 (A) 2025    HGBA1C 6.5 2024    HGBA1C 7.3 (H) 04/10/2024     Increased  - Current medications:  Metformin 500 mg bid  - Ophthalmology: pending  - DM Foot exam: completed Podiatry visit   - Dentist: Completed     Plan:  - Continue Metformin 500 mg bid, if increased next three month will increase medication dose.  - Encouraged: Diabetic Diet, Regular  "exercise, Weight loss    Orders:  •  metFORMIN (GLUCOPHAGE) 500 mg tablet; Take 1 tablet (500 mg total) by mouth 2 (two) times a day with meals            History of Present Illness   HPI  Marv Astorga is a 64 y.o. male  has a past medical history of Asthma, Back pain, Back pain, BPH with obstruction/lower urinary tract symptoms, Current severe episode of major depressive disorder without prior episode (HCC), Depression, Diabetes mellitus ([facility]), Hyperlipidemia, Hypertension, Suicidal intent, and Type 2 diabetes mellitus without complication, without long-term current use of insulin ([facility]). who presented to the office today follow-up for his chronic conditions.  Patient states that due to the rainy weather his arthritis has been acting up and he has increased back pain with stiffness.  Also he notes that he has increased stiffness in his neck, with pain    Review of Systems   Constitutional:  Negative for activity change, appetite change, chills and fever.   HENT:  Negative for congestion, rhinorrhea and sore throat.    Respiratory:  Negative for cough and shortness of breath.    Cardiovascular:  Negative for chest pain.   Gastrointestinal:  Negative for diarrhea, nausea and vomiting.   Musculoskeletal:  Positive for back pain, gait problem, neck pain and neck stiffness.   Skin:  Negative for rash.   Neurological:  Negative for dizziness and headaches.   Psychiatric/Behavioral:  Negative for sleep disturbance and suicidal ideas. The patient is not nervous/anxious.        Objective   /86 (BP Location: Left arm, Patient Position: Sitting, Cuff Size: Large)   Pulse 77   Temp 98.3 °F (36.8 °C) (Temporal)   Resp 18   Ht 5' 7\" (1.702 m)   Wt 89.4 kg (197 lb)   SpO2 99%   BMI 30.85 kg/m²      Physical Exam  Vitals and nursing note reviewed.   Constitutional:       General: He is not in acute distress.     Appearance: Normal appearance. He is well-developed. He is obese.      Comments: + rolling walker   HENT: "      Head: Normocephalic and atraumatic.     Eyes:      Conjunctiva/sclera: Conjunctivae normal.       Cardiovascular:      Rate and Rhythm: Normal rate and regular rhythm.      Heart sounds: No murmur heard.  Pulmonary:      Effort: Pulmonary effort is normal. No respiratory distress.      Breath sounds: Normal breath sounds.   Abdominal:      Palpations: Abdomen is soft.      Tenderness: There is no abdominal tenderness.     Musculoskeletal:         General: No swelling.      Cervical back: Neck supple. Spasms and tenderness present.      Thoracic back: Spasms and tenderness present.      Lumbar back: Spasms and tenderness present.        Back:      Skin:     General: Skin is warm and dry.      Capillary Refill: Capillary refill takes less than 2 seconds.     Neurological:      Mental Status: He is alert and oriented to person, place, and time.     Psychiatric:         Mood and Affect: Mood normal.         Behavior: Behavior normal.

## 2025-06-30 NOTE — ASSESSMENT & PLAN NOTE
Lab Results   Component Value Date    HGBA1C 7.3 (A) 05/06/2025    HGBA1C 6.5 08/30/2024    HGBA1C 7.3 (H) 04/10/2024     Increased  - Current medications:  Metformin 500 mg bid  - Ophthalmology: pending  - DM Foot exam: completed Podiatry visit 2025  - Dentist: Completed 2025    Plan:  - Continue Metformin 500 mg bid, if increased next three month will increase medication dose.  - Encouraged: Diabetic Diet, Regular exercise, Weight loss    Orders:  •  metFORMIN (GLUCOPHAGE) 500 mg tablet; Take 1 tablet (500 mg total) by mouth 2 (two) times a day with meals

## 2025-07-01 VITALS — HEIGHT: 67 IN | WEIGHT: 197 LBS | BODY MASS INDEX: 30.92 KG/M2

## 2025-07-01 DIAGNOSIS — R29.898 RIGHT ARM WEAKNESS: ICD-10-CM

## 2025-07-01 DIAGNOSIS — M54.12 RIGHT CERVICAL RADICULOPATHY: Primary | ICD-10-CM

## 2025-07-01 DIAGNOSIS — M54.12 CERVICAL RADICULOPATHY: ICD-10-CM

## 2025-07-01 PROCEDURE — 99213 OFFICE O/P EST LOW 20 MIN: CPT | Performed by: ORTHOPAEDIC SURGERY

## 2025-07-01 NOTE — PROGRESS NOTES
Name: Marv Astorga      : 1960      MRN: [id number]  Encounter Provider: Dane Muñoz MD  Encounter Date: 2025   Encounter department: [facility]  :  Assessment & Plan  Right cervical radiculopathy  Patient has worsening right-sided upper extremity pain with associated weakness along with a known history of multilevel cervical spondylosis.  I have a clinical suspicion for potential right C5 and C6 nerve impingement without any clinically present myelopathy.  In this regard I will request an MRI of the cervical spine for further evaluation and follow-up after this investigation is completed.  Orders:    MRI cervical spine wo contrast; Future    Right arm weakness    Orders:    MRI cervical spine wo contrast; Future    Cervical radiculopathy    Orders:    Ambulatory Referral to Orthopedic Surgery        History of Present Illness   HPI  Marv Astorga is a 64 y.o. male who presents for follow-up of his neck pain.  Previously seen in this regard on 2024.  Patient had a nontraumatic onset right neck pain with radiculopathy to the right upper extremity including the shoulder region.  His plain radiograph of the right shoulder from 2023 had not revealed any abnormality.  His plain radiograph of the cervical spine performed at the last office visit had shown multilevel degenerative changes from C3-C7.  Patient was suspected to have right sided cervical radiculopathy as well as potentially have a carpal tunnel syndrome of the right upper extremity he was recommended to do physical therapy, use NSAIDs, we are and nighttime wrist brace for his carpal tunnel syndrome and suggested to do EMG assessment for his symptoms.  Today, the patient reports that he has had progressive worsening of symptoms including worsening of the right upper extremity radicular pain and numbness that affects nearly all digits but mostly the middle and index fingers.  He also notices a  "sense of weakness of his right upper extremity including some weakness of his right hand .  Denies any new injury.  Patient has already been treated with oral NSAIDs, duloxetine, high-dose gabapentin, methocarbamol and opioid medications with persistent symptoms.  History obtained from: patient    Review of Systems       Objective   Ht 5' 7\" (1.702 m)   Wt 89.4 kg (197 lb)   BMI 30.85 kg/m²      Physical Exam  Nursing note reviewed.              Back Exam     Tenderness   The patient is experiencing tenderness in the cervical (Tender to palpation at the C4-5 C5-6 and C6-7 levels).    Comments:  Limited cervical spine extension and right lateral flexion.  Positive Spurling's maneuver on the right.  Negative Viri sign bilaterally.  Hypoesthesia to light touch in the right C5-C6 dermatomal distribution and grade 4/5 strength of right shoulder abduction and elbow flexion.  Grade 4/5 strength of right hand .  Normal deep tendon reflexes bilaterally upper extremities.           I have personally reviewed pertinent films in PACS.   Procedures  Portions of the record may have been created with voice recognition software. Occasional wrong word or \"sound alike\" substitutions may have occurred due to the inherent limitations of voice recognition software. Please review the chart carefully and recognize, using context, where substitutions/typographical errors may have occurred.      "

## 2025-07-01 NOTE — ASSESSMENT & PLAN NOTE
Patient has worsening right-sided upper extremity pain with associated weakness along with a known history of multilevel cervical spondylosis.  I have a clinical suspicion for potential right C5 and C6 nerve impingement without any clinically present myelopathy.  In this regard I will request an MRI of the cervical spine for further evaluation and follow-up after this investigation is completed.  Orders:    MRI cervical spine wo contrast; Future

## 2025-07-15 ENCOUNTER — SOCIAL WORK (OUTPATIENT)
Dept: BEHAVIORAL/MENTAL HEALTH CLINIC | Facility: CLINIC | Age: 65
End: 2025-07-15
Payer: MEDICARE

## 2025-07-15 ENCOUNTER — HOSPITAL ENCOUNTER (OUTPATIENT)
Dept: MRI IMAGING | Facility: HOSPITAL | Age: 65
Discharge: HOME | End: 2025-07-15
Attending: ORTHOPAEDIC SURGERY
Payer: MEDICARE

## 2025-07-15 DIAGNOSIS — F11.20 CONTINUOUS OPIOID DEPENDENCE (HCC): ICD-10-CM

## 2025-07-15 DIAGNOSIS — M54.12 RIGHT CERVICAL RADICULOPATHY: ICD-10-CM

## 2025-07-15 DIAGNOSIS — M54.42 CHRONIC BILATERAL LOW BACK PAIN WITH LEFT-SIDED SCIATICA: ICD-10-CM

## 2025-07-15 DIAGNOSIS — R29.898 RIGHT ARM WEAKNESS: ICD-10-CM

## 2025-07-15 DIAGNOSIS — G89.29 CHRONIC BILATERAL LOW BACK PAIN WITH LEFT-SIDED SCIATICA: ICD-10-CM

## 2025-07-15 DIAGNOSIS — F41.1 GAD (GENERALIZED ANXIETY DISORDER): Primary | ICD-10-CM

## 2025-07-15 DIAGNOSIS — G89.29 CHRONIC LEFT-SIDED LOW BACK PAIN WITH LEFT-SIDED SCIATICA: ICD-10-CM

## 2025-07-15 DIAGNOSIS — M54.42 CHRONIC LEFT-SIDED LOW BACK PAIN WITH LEFT-SIDED SCIATICA: ICD-10-CM

## 2025-07-15 DIAGNOSIS — G89.4 CHRONIC PAIN SYNDROME: ICD-10-CM

## 2025-07-15 DIAGNOSIS — F32.2 CURRENT SEVERE EPISODE OF MAJOR DEPRESSIVE DISORDER WITHOUT PSYCHOTIC FEATURES WITHOUT PRIOR EPISODE (HCC): ICD-10-CM

## 2025-07-15 PROCEDURE — 90837 PSYTX W PT 60 MINUTES: CPT | Performed by: SOCIAL WORKER

## 2025-07-15 PROCEDURE — 72141 MRI NECK SPINE W/O DYE: CPT

## 2025-07-15 RX ORDER — LIDOCAINE 50 MG/G
1 PATCH TOPICAL DAILY
Qty: 30 PATCH | Refills: 5 | Status: SHIPPED | OUTPATIENT
Start: 2025-07-15

## 2025-07-15 RX ORDER — OXYCODONE HYDROCHLORIDE 10 MG/1
10 TABLET ORAL 3 TIMES DAILY
Qty: 90 TABLET | Refills: 0 | Status: SHIPPED | OUTPATIENT
Start: 2025-07-15

## 2025-07-24 ENCOUNTER — OFFICE VISIT (OUTPATIENT)
Dept: FAMILY MEDICINE CLINIC | Facility: CLINIC | Age: 65
End: 2025-07-24

## 2025-07-24 VITALS
BODY MASS INDEX: 30.61 KG/M2 | WEIGHT: 195 LBS | TEMPERATURE: 98.2 F | DIASTOLIC BLOOD PRESSURE: 92 MMHG | RESPIRATION RATE: 18 BRPM | HEIGHT: 67 IN | SYSTOLIC BLOOD PRESSURE: 140 MMHG | HEART RATE: 77 BPM | OXYGEN SATURATION: 98 %

## 2025-07-24 DIAGNOSIS — M54.12 RIGHT CERVICAL RADICULOPATHY: Primary | ICD-10-CM

## 2025-07-24 PROCEDURE — G2211 COMPLEX E/M VISIT ADD ON: HCPCS | Performed by: FAMILY MEDICINE

## 2025-07-24 PROCEDURE — 3077F SYST BP >= 140 MM HG: CPT | Performed by: FAMILY MEDICINE

## 2025-07-24 PROCEDURE — 99213 OFFICE O/P EST LOW 20 MIN: CPT | Performed by: FAMILY MEDICINE

## 2025-07-24 PROCEDURE — 3080F DIAST BP >= 90 MM HG: CPT | Performed by: FAMILY MEDICINE

## 2025-07-28 VITALS — WEIGHT: 195 LBS | HEIGHT: 67 IN | BODY MASS INDEX: 30.61 KG/M2

## 2025-07-28 DIAGNOSIS — G93.5 CHIARI I MALFORMATION (HCC): ICD-10-CM

## 2025-07-28 DIAGNOSIS — Q04.8 CEREBELLAR TONSILLAR ECTOPIA (HCC): ICD-10-CM

## 2025-07-28 DIAGNOSIS — M50.10: Primary | ICD-10-CM

## 2025-07-28 PROCEDURE — 99213 OFFICE O/P EST LOW 20 MIN: CPT | Performed by: ORTHOPAEDIC SURGERY

## 2025-08-06 ENCOUNTER — OFFICE VISIT (OUTPATIENT)
Dept: PSYCHIATRY | Facility: CLINIC | Age: 65
End: 2025-08-06
Payer: MEDICARE

## 2025-08-06 VITALS — BODY MASS INDEX: 30.56 KG/M2 | HEIGHT: 67 IN | WEIGHT: 194.7 LBS

## 2025-08-06 DIAGNOSIS — F32.3 SEVERE MAJOR DEPRESSION WITH PSYCHOTIC FEATURES (HCC): Primary | ICD-10-CM

## 2025-08-06 DIAGNOSIS — R41.3 MEMORY DIFFICULTIES: ICD-10-CM

## 2025-08-06 DIAGNOSIS — F41.1 GENERALIZED ANXIETY DISORDER: ICD-10-CM

## 2025-08-06 DIAGNOSIS — G47.00 INSOMNIA, UNSPECIFIED TYPE: ICD-10-CM

## 2025-08-06 PROBLEM — F41.9 ANXIETY: Status: ACTIVE | Noted: 2025-08-06

## 2025-08-06 PROCEDURE — 99214 OFFICE O/P EST MOD 30 MIN: CPT | Performed by: PHYSICIAN ASSISTANT

## 2025-08-06 RX ORDER — MIRTAZAPINE 30 MG/1
30 TABLET, FILM COATED ORAL
Qty: 90 TABLET | Refills: 0 | Status: SHIPPED | OUTPATIENT
Start: 2025-08-06

## 2025-08-06 RX ORDER — TRAZODONE HYDROCHLORIDE 100 MG/1
100 TABLET ORAL
Qty: 90 TABLET | Refills: 0 | Status: SHIPPED | OUTPATIENT
Start: 2025-08-06

## 2025-08-06 RX ORDER — DULOXETIN HYDROCHLORIDE 60 MG/1
60 CAPSULE, DELAYED RELEASE ORAL 2 TIMES DAILY
Qty: 180 CAPSULE | Refills: 0 | Status: SHIPPED | OUTPATIENT
Start: 2025-08-06

## 2025-08-06 RX ORDER — ARIPIPRAZOLE 10 MG/1
10 TABLET ORAL DAILY
Qty: 90 TABLET | Refills: 0 | Status: SHIPPED | OUTPATIENT
Start: 2025-08-06

## 2025-08-06 RX ORDER — ARIPIPRAZOLE 2 MG/1
2 TABLET ORAL DAILY
Qty: 90 TABLET | Refills: 0 | Status: SHIPPED | OUTPATIENT
Start: 2025-08-06

## 2025-08-11 ENCOUNTER — OFFICE VISIT (OUTPATIENT)
Dept: FAMILY MEDICINE CLINIC | Facility: CLINIC | Age: 65
End: 2025-08-11

## 2025-08-18 ENCOUNTER — TELEPHONE (OUTPATIENT)
Dept: FAMILY MEDICINE CLINIC | Facility: CLINIC | Age: 65
End: 2025-08-18

## 2025-08-18 PROBLEM — G93.5 CHIARI I MALFORMATION (HCC): Status: ACTIVE | Noted: 2025-08-18

## 2025-08-19 ENCOUNTER — OFFICE VISIT (OUTPATIENT)
Dept: NEUROSURGERY | Facility: CLINIC | Age: 65
End: 2025-08-19
Attending: ORTHOPAEDIC SURGERY
Payer: MEDICARE

## 2025-08-19 VITALS
HEIGHT: 67 IN | HEART RATE: 76 BPM | BODY MASS INDEX: 31.08 KG/M2 | RESPIRATION RATE: 16 BRPM | OXYGEN SATURATION: 98 % | WEIGHT: 198 LBS | DIASTOLIC BLOOD PRESSURE: 78 MMHG | TEMPERATURE: 98.4 F | SYSTOLIC BLOOD PRESSURE: 122 MMHG

## 2025-08-19 DIAGNOSIS — M54.12 RADICULOPATHY, CERVICAL: ICD-10-CM

## 2025-08-19 DIAGNOSIS — G89.29 CHRONIC HEADACHES: ICD-10-CM

## 2025-08-19 DIAGNOSIS — M48.02 CERVICAL STENOSIS OF SPINE: ICD-10-CM

## 2025-08-19 DIAGNOSIS — Q04.8 CEREBELLAR TONSILLAR ECTOPIA (HCC): ICD-10-CM

## 2025-08-19 DIAGNOSIS — G95.0 SYRINX OF SPINAL CORD (HCC): ICD-10-CM

## 2025-08-19 DIAGNOSIS — G93.5 CHIARI I MALFORMATION (HCC): ICD-10-CM

## 2025-08-19 DIAGNOSIS — R51.9 CHRONIC HEADACHES: ICD-10-CM

## 2025-08-19 DIAGNOSIS — M50.10: ICD-10-CM

## 2025-08-19 PROCEDURE — 99215 OFFICE O/P EST HI 40 MIN: CPT | Performed by: NURSE PRACTITIONER

## 2025-08-20 PROBLEM — R32 URINARY INCONTINENCE: Status: ACTIVE | Noted: 2025-08-20

## 2025-08-20 LAB
LEFT EYE DIABETIC RETINOPATHY: NORMAL
RIGHT EYE DIABETIC RETINOPATHY: NORMAL

## 2025-08-22 PROBLEM — M48.02 CERVICAL STENOSIS OF SPINE: Status: ACTIVE | Noted: 2025-08-22
